# Patient Record
Sex: FEMALE | Race: WHITE | NOT HISPANIC OR LATINO | Employment: OTHER | URBAN - METROPOLITAN AREA
[De-identification: names, ages, dates, MRNs, and addresses within clinical notes are randomized per-mention and may not be internally consistent; named-entity substitution may affect disease eponyms.]

---

## 2017-01-12 ENCOUNTER — RADIANT APPOINTMENT (OUTPATIENT)
Dept: GENERAL RADIOLOGY | Facility: CLINIC | Age: 77
End: 2017-01-12
Payer: COMMERCIAL

## 2017-01-12 ENCOUNTER — OFFICE VISIT (OUTPATIENT)
Dept: OPHTHALMOLOGY | Facility: CLINIC | Age: 77
End: 2017-01-12
Payer: COMMERCIAL

## 2017-01-12 ENCOUNTER — OFFICE VISIT (OUTPATIENT)
Dept: FAMILY MEDICINE | Facility: CLINIC | Age: 77
End: 2017-01-12
Payer: COMMERCIAL

## 2017-01-12 VITALS
WEIGHT: 204 LBS | SYSTOLIC BLOOD PRESSURE: 127 MMHG | HEIGHT: 65 IN | BODY MASS INDEX: 33.99 KG/M2 | TEMPERATURE: 97 F | DIASTOLIC BLOOD PRESSURE: 74 MMHG | OXYGEN SATURATION: 99 % | HEART RATE: 82 BPM

## 2017-01-12 DIAGNOSIS — H43.813 POSTERIOR VITREOUS DETACHMENT, BILATERAL: ICD-10-CM

## 2017-01-12 DIAGNOSIS — M17.12 PRIMARY OSTEOARTHRITIS OF LEFT KNEE: ICD-10-CM

## 2017-01-12 DIAGNOSIS — H26.8 PXF (PSEUDOEXFOLIATION OF LENS CAPSULE): ICD-10-CM

## 2017-01-12 DIAGNOSIS — E66.811 OBESITY, CLASS I, BMI 30-34.9: ICD-10-CM

## 2017-01-12 DIAGNOSIS — L71.9 ROSACEA: ICD-10-CM

## 2017-01-12 DIAGNOSIS — M17.12 PRIMARY OSTEOARTHRITIS OF LEFT KNEE: Primary | ICD-10-CM

## 2017-01-12 DIAGNOSIS — I10 ESSENTIAL HYPERTENSION WITH GOAL BLOOD PRESSURE LESS THAN 140/90: ICD-10-CM

## 2017-01-12 DIAGNOSIS — Z96.1 PSEUDOPHAKIA: ICD-10-CM

## 2017-01-12 DIAGNOSIS — H52.4 PRESBYOPIA: ICD-10-CM

## 2017-01-12 DIAGNOSIS — Z01.01 ENCOUNTER FOR EXAMINATION OF EYES AND VISION WITH ABNORMAL FINDINGS: Primary | ICD-10-CM

## 2017-01-12 DIAGNOSIS — H53.002 AMBLYOPIA, LEFT: ICD-10-CM

## 2017-01-12 PROCEDURE — 92014 COMPRE OPH EXAM EST PT 1/>: CPT | Performed by: OPHTHALMOLOGY

## 2017-01-12 PROCEDURE — 92015 DETERMINE REFRACTIVE STATE: CPT | Performed by: OPHTHALMOLOGY

## 2017-01-12 PROCEDURE — 99214 OFFICE O/P EST MOD 30 MIN: CPT | Performed by: PREVENTIVE MEDICINE

## 2017-01-12 PROCEDURE — 73560 X-RAY EXAM OF KNEE 1 OR 2: CPT | Mod: LT

## 2017-01-12 RX ORDER — PREDNISONE 20 MG/1
20 TABLET ORAL 2 TIMES DAILY
Qty: 10 TABLET | Refills: 0 | Status: SHIPPED | OUTPATIENT
Start: 2017-01-12 | End: 2017-02-18

## 2017-01-12 ASSESSMENT — REFRACTION_WEARINGRX
OS_SPHERE: -1.50
OS_ADD: +2.50
OD_AXIS: 010
OD_CYLINDER: +0.75
OS_AXIS: 131
SPECS_TYPE: PAL
OD_ADD: +3.00
OD_SPHERE: +0.50
OS_CYLINDER: +0.50

## 2017-01-12 ASSESSMENT — REFRACTION_MANIFEST
OD_AXIS: 015
OD_SPHERE: +0.75
OS_SPHERE: -1.50
OS_CYLINDER: +1.25
OS_ADD: +2.75
OD_ADD: +2.75
OS_AXIS: 165
OD_CYLINDER: +1.25

## 2017-01-12 ASSESSMENT — SLIT LAMP EXAM - LIDS
COMMENTS: GOOD COSMESIS
COMMENTS: GOOD COSMESIS

## 2017-01-12 ASSESSMENT — VISUAL ACUITY
OS_CC: J4
OD_CC: J2
CORRECTION_TYPE: GLASSES
OD_CC: 20/20
OS_CC+: +3
METHOD: SNELLEN - LINEAR
OS_CC: 20/50

## 2017-01-12 ASSESSMENT — CONF VISUAL FIELD
OD_NORMAL: 1
OS_NORMAL: 1

## 2017-01-12 ASSESSMENT — EXTERNAL EXAM - LEFT EYE: OS_EXAM: 1+ BROW PTOSIS

## 2017-01-12 ASSESSMENT — TONOMETRY
OS_IOP_MMHG: 22
OD_IOP_MMHG: 19
IOP_METHOD: APPLANATION

## 2017-01-12 ASSESSMENT — PAIN SCALES - GENERAL: PAINLEVEL: WORST PAIN (10)

## 2017-01-12 ASSESSMENT — CUP TO DISC RATIO
OS_RATIO: 0.3
OD_RATIO: 0.4

## 2017-01-12 ASSESSMENT — EXTERNAL EXAM - RIGHT EYE: OD_EXAM: 1+ BROW PTOSIS

## 2017-01-12 NOTE — PATIENT INSTRUCTIONS
Possible clouding of posterior capsule discussed. Right eye   Continue observation regarding glaucoma suspect status   Glasses Rx given -optional   Use artificial tears up to 4 times daily (Refresh Tears or Systane Ultra/Balance)   Call in September 2017 for an appointment in January 2018 for Complete Exam    Dr. Gonzales [phone]

## 2017-01-12 NOTE — MR AVS SNAPSHOT
After Visit Summary   1/12/2017    Yumiko Flowers    MRN: [id number]           Patient Information     Date Of Birth          1940        Visit Information        Provider Department      1/12/2017 8:30 AM Ashu Gonzalse MD [facility]        Today's Diagnoses     Encounter for examination of eyes and vision with abnormal findings    -  1     Presbyopia         Hypermetropia, right         Astigmatism, bilateral         Pseudophakia, ou; Yag Caps, os         Amblyopia, strabismic, left         Hx of pseudoexfoliation, os         Posterior vitreous detachment, bilateral         Rosacea           Care Instructions    Possible clouding of posterior capsule discussed. Right eye   Continue observation regarding glaucoma suspect status   Glasses Rx given -optional   Use artificial tears up to 4 times daily (Refresh Tears or Systane Ultra/Balance)   Call in September 2017 for an appointment in January 2018 for Complete Exam    Dr. Gonzales [phone]        Follow-ups after your visit        Your next 10 appointments already scheduled     Jan 12, 2017  3:40 PM   Office Visit with Gavi Bear MD   [facility] ([facility])    [address] [place] [id number] [phone]           Bring a current list of meds and any records pertaining to this visit.  For Physicals, please bring immunization records and any forms needing to be filled out.  Please arrive 10 minutes early to complete paperwork.              Who to contact     If you have questions or need follow up information about today's clinic visit or your schedule please contact [facility] directly at [phone].  Normal or non-critical lab and imaging results will be communicated to you by MyChart, letter or phone within 4 business days after the clinic has received the results. If you do not hear from us within 7 days, please contact the clinic through  [facility] or phone. If you have a critical or abnormal lab result, we will notify you by phone as soon as possible.  Submit refill requests through [employer] or call your pharmacy and they will forward the refill request to us. Please allow 3 business days for your refill to be completed.          Additional Information About Your Visit        [facility] Information     [employer] gives you secure access to your electronic health record. If you see a primary care provider, you can also send messages to your care team and make appointments. If you have questions, please call your primary care clinic.  If you do not have a primary care provider, please call [phone] and they will assist you.        Care EveryWhere ID     This is your Care EveryWhere ID. This could be used by other organizations to access your [place] medical records  [id number]         Blood Pressure from Last 3 Encounters:   11/01/16 138/80   07/21/16 129/72   12/24/15 113/74    Weight from Last 3 Encounters:   11/01/16 92.534 kg (204 lb)   07/21/16 91.627 kg (202 lb)   12/24/15 96.616 kg (213 lb)              We Performed the Following     EYE EXAM (SIMPLE-NONBILLABLE)     REFRACTIVE STATUS        Primary Care Provider Office Phone # Fax #    Gavi Bear [phone]       [facility] [address] [address] [facility] [address]        Thank you!     [facility]  [facility]. Our goal is always to provide you with excellent care. Hearing back from our patients is one way we can continue to improve our services. Please take a few minutes to complete the written survey that you may receive in the mail after your visit with us. Thank you!             Your Updated Medication List - Protect others around you: Learn how to safely use, store and throw away your medicines at www.disposemymeds.org.          This list is accurate as of: 1/12/17  9:41 AM.  Always use your most recent med list.                    Brand Name Dispense Instructions for use    albuterol 108 (90 BASE) MCG/ACT Inhaler    PROAIR HFA/PROVENTIL HFA/VENTOLIN HFA    1 Inhaler    Inhale into the lungs. INHALE 1-2 PUFFS EVERY 4-6 HOURS AS NEEDED       BENADRYL PO      Take 50 mg by mouth nightly as needed       calcium 500 MG Chew      None Entered       clonazePAM 0.5 MG tablet    klonoPIN    20 tablet    Take 0.5-1 tablets (0.25-0.5 mg) by mouth 2 times daily as needed for anxiety       diclofenac 1 % Gel topical gel    VOLTAREN    100 g    Apply 2 grams to affected area up to four times daily using enclosed dosing card.       etodolac 400 MG tablet    LODINE    60 tablet    Take 1 tablet (400 mg) by mouth 2 times daily as needed       gabapentin 600 MG tablet    NEURONTIN    180 tablet    Take 1 tablet (600 mg) by mouth 2 times daily       lisinopril-hydrochlorothiazide 20-12.5 MG per tablet    PRINZIDE/ZESTORETIC    180 tablet    Take 2 tablets by mouth daily Patient due for office visit for further refills.       metroNIDAZOLE 0.75 % topical gel    METROGEL    45 g    Apply topically daily       Multiple Vitamins-Iron Tabs      Take 1 tablet by mouth daily.       vitamin D 2000 UNITS Caps      Take 2,000 Units by mouth daily.

## 2017-01-12 NOTE — PROGRESS NOTES
SUBJECTIVE:                                                    Yumiko Flowers is a 76 year old female who presents to clinic today for the following health issues:    I have reviewed and agree with the documentation by the MA. I updated the history as indicated.  Gavi Bear MD MPH    Musculoskeletal problem/pain      Duration: x [place]    Description  Location: left knee    Intensity:  10/10    Accompanying signs and symptoms: warmth and swelling    History  Previous similar problem: no   Previous evaluation:  none    Precipitating or alleviating factors:  Trauma or overuse: no   Aggravating factors include: standing, walking and climbing stairs    Therapies tried and outcome: ice, support wrap and acetaminophen and not getting better     Pain is in the anterior and posterior aspect of the knee. Pain suddenly increases with twisting movement. Less at night, no injuries, no recent overuse. Also noticing pedal edema. No other joints with severe pain.  Is leaving for [place] on 2/5/17 for 12 days and would like to feel better before then.   Has had cortisone injections in the past.  No fever or chills.   Has been using a knee sleeve.    Problem list and histories reviewed & adjusted, as indicated.  Additional history: as documented    Patient Active Problem List   Diagnosis     Asthma, moderate persistent     Acne rosacea     CARDIOVASCULAR SCREENING; LDL GOAL LESS THAN 130     Osteopenia     Vitamin D deficiency     Advanced directives, counseling/discussion     Hx of pseudoexfoliation, os     Posterior vitreous detachment, ou     Amblyopia, mild, os      Pseudophakia, ou; Yag Caps, os     HTN, goal below 140/90     Peptic ulcer     Health Care Home     History of blepharoplasty, upper lid, ou (elsewhere); revision (EN)     Obesity, Class II, BMI 35-39.9 (H)     Restless legs syndrome     DJD (degenerative joint disease), lumbosacral     Rosacea     Essential hypertension with goal blood pressure less than  "140/90     Obesity, Class I, BMI 30-34.9     Past Surgical History   Procedure Laterality Date     C removal gallbladder       C appendectomy       Surgical history of -        endometriosis surgeriesx3     Surgical history of -        ganiglion cyst onfoot     Surgical history of -        Eye for \"droopy eye\"     Blepharoplasty bilateral  3/9/2006; 2013     both eyes, upper lid; revision (EN)     Phacoemulsification with standard intraocular lens implant  1/2012; 4/2013     right eye; left eye     Cataract iol, rt/lt       Laser yag capsulotomy       left eye (AC lysis also)       Social History   Substance Use Topics     Smoking status: Never Smoker      Smokeless tobacco: Never Used     Alcohol Use: Yes     Family History   Problem Relation Age of Onset     C.A.D. Father      C.A.D. Brother      CANCER No family hx of      DIABETES No family hx of      CEREBROVASCULAR DISEASE No family hx of      Thyroid Disease No family hx of      Glaucoma No family hx of      Macular Degeneration No family hx of      Hypertension Mother          Current Outpatient Prescriptions   Medication Sig Dispense Refill     predniSONE (DELTASONE) 20 MG tablet Take 1 tablet (20 mg) by mouth 2 times daily 10 tablet 0     lisinopril-hydrochlorothiazide (PRINZIDE,ZESTORETIC) 20-12.5 MG per tablet Take 2 tablets by mouth daily Patient due for office visit for further refills. 180 tablet 1     gabapentin (NEURONTIN) 600 MG tablet Take 1 tablet (600 mg) by mouth 2 times daily 180 tablet 3     etodolac (LODINE) 400 MG tablet Take 1 tablet (400 mg) by mouth 2 times daily as needed 60 tablet 0     albuterol (PROAIR HFA, PROVENTIL HFA, VENTOLIN HFA) 108 (90 BASE) MCG/ACT inhaler Inhale into the lungs. INHALE 1-2 PUFFS EVERY 4-6 HOURS AS NEEDED 1 Inhaler 4     Multiple Vitamins-Iron TABS Take 1 tablet by mouth daily.       Cholecalciferol (VITAMIN D) 2000 UNIT CAPS Take 2,000 Units by mouth daily.       CALCIUM 500 MG PO CHEW None Entered   " "    Allergies   Allergen Reactions     Erythromycin Swelling and Other (See Comments)     BP Readings from Last 3 Encounters:   01/12/17 127/74   11/01/16 138/80   07/21/16 129/72    Wt Readings from Last 3 Encounters:   01/12/17 204 lb (92.534 kg)   11/01/16 204 lb (92.534 kg)   07/21/16 202 lb (91.627 kg)           ROS:  Constitutional, HEENT, cardiovascular, pulmonary, gi and gu systems are negative, except as otherwise noted.    OBJECTIVE:                                                    /74 mmHg  Pulse 82  Temp(Src) 97  F (36.1  C) (Oral)  Ht 5' 4.5\" (1.638 m)  Wt 204 lb (92.534 kg)  BMI 34.49 kg/m2  SpO2 99%  Breastfeeding? No  Body mass index is 34.49 kg/(m^2).  GENERAL APPEARANCE: healthy, alert and no distress  EYES: Eyes grossly normal to inspection and conjunctivae and sclerae normal  MS: peripheral pulses normal and trace pedal edema left ankle   SKIN: no suspicious lesions or rashes  NEURO: Normal strength and tone, mentation intact and speech normal  PSYCH: mentation appears normal  Left knee: Inspection does not show any gross deformities.  Mild edema+, no erythema, no skin changes, no rash.  No tibial tuberosity tenderness, Tenderness along medial, and lateral joint lines+.  No pes anserine bursitis.  Popliteal tenderness+.  Full range of motion.  No quadriceps atrophy.  Strength is 5/5.  Neurovascular intact.  Anterior and posterior drawer signs are negative.  Varus and valgus stress negative.  Yolanda test equivocal. Pain with hyperflexion.      Diagnostic test results:  Diagnostic Test Results:  No results found for this or any previous visit (from the past 24 hour(s)).     Left Knee X rays:  My independent review of the images does not show any acute fractures or dislocations, medial joint space narrowing with osteophyte formation seen. Final radiology reading is pending.  Gavi Bear MD MPH       ASSESSMENT/PLAN:                                                    1. Primary " osteoarthritis of left knee  -Alternating heat and ice  -Knee sleeve  -Keep leg elevated  -No acute changes on X rays  -If not better in 1 week then plan for Orthopedics referral and MRI. Has had cortisone injections in left knee in the past.   - XR Knee Left 1/2 Views; Future  - predniSONE (DELTASONE) 20 MG tablet; Take 1 tablet (20 mg) by mouth 2 times daily  Dispense: 10 tablet; Refill: 0    2. Essential hypertension with goal blood pressure less than 140/90  -Stable  -Readings at goal  -continue current medication     3. Obesity, Class I, BMI 30-34.9  -Has lost weight over the last year  -Encouraged continued weight loss.     I ended our visit today by discussing the patient's diagnoses and recommended treatment. Please refer to today's diagnoses and orders for further details. I briefly discussed the pathophysiology of these conditions and outlined their expected course. I discussed the warning symptoms and signs that indicate an atypical course that would need urgent or emergent care. I also discussed self care strategies for symptom relief.  Patient voiced complete understanding of plan of care and was in full agreement to proceed. Common side effects of medications prescribed at this visit were discussed with the patient. Severe side effects, including current applicable black box warnings, were discussed.     Follow up with Provider - If not improving in a week then refer to Orthopedics.  Is leaving for [place] 2/5/17 and would like to feel better before then      Gavi Bear MD MPH    [facility]

## 2017-01-12 NOTE — PROGRESS NOTES
Current Eye Medications:  At's prn     Subjective:  COMPLETE EYE EXAM  No visual complaints.  Both eyes are dry.     Objective:  See Ophthalmology Exam.       Assessment: Stable eye exam.      ICD-10-CM    1. Encounter for examination of eyes and vision with abnormal findings Z01.01 EYE EXAM (SIMPLE-NONBILLABLE)   2. Presbyopia H52.4 EYE EXAM (SIMPLE-NONBILLABLE)   3. Pseudophakia, ou; Yag Caps, os Z96.1 REFRACTIVE STATUS   4. 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 Amblyopia, mild, left H53.002    6. Rosacea L71.9    7. Posterior vitreous detachment, bilateral H43.813         Plan: Possible clouding of posterior capsule discussed. Right eye   Continue observation regarding glaucoma suspect status   Glasses Rx given -optional   Use artificial tears up to 4 times daily (Refresh Tears or Systane Ultra/Balance)   Call in September 2017 for an appointment in January 2018 for Complete Exam    Dr. Gonzales [phone]

## 2017-01-12 NOTE — NURSING NOTE
"Chief Complaint   Patient presents with     Knee left       Initial /74 mmHg  Pulse 82  Temp(Src) 97  F (36.1  C) (Oral)  Ht 5' 4.5\" (1.638 m)  Wt 204 lb (92.534 kg)  BMI 34.49 kg/m2  SpO2 99%  Breastfeeding? No Estimated body mass index is 34.49 kg/(m^2) as calculated from the following:    Height as of this encounter: 5' 4.5\" (1.638 m).    Weight as of this encounter: 204 lb (92.534 kg).  BP completed using cuff size: ceferino Livingston MA        "

## 2017-01-14 PROBLEM — H43.813 POSTERIOR VITREOUS DETACHMENT, BILATERAL: Status: ACTIVE | Noted: 2017-01-14

## 2017-01-16 NOTE — PROGRESS NOTES
Quick Note:    Yumiko,     X rays of the knee showed arthritis. There is also a possible loose piece of bone in the joint. If the knee pain is not improving, it would be best to see Orthopedics for further evaluation.     Please do not hesitate to call us at [phone] if you have any questions or concerns.    Thank you,    Gavi Bear MD MPH  ______

## 2017-01-25 ENCOUNTER — OFFICE VISIT (OUTPATIENT)
Dept: ORTHOPEDICS | Facility: CLINIC | Age: 77
End: 2017-01-25
Payer: COMMERCIAL

## 2017-01-25 VITALS — HEIGHT: 65 IN | WEIGHT: 204 LBS | BODY MASS INDEX: 33.99 KG/M2 | RESPIRATION RATE: 16 BRPM

## 2017-01-25 DIAGNOSIS — M70.50 PES ANSERINE BURSITIS: ICD-10-CM

## 2017-01-25 DIAGNOSIS — M17.12 PRIMARY OSTEOARTHRITIS OF LEFT KNEE: Primary | ICD-10-CM

## 2017-01-25 PROCEDURE — 20610 DRAIN/INJ JOINT/BURSA W/O US: CPT | Mod: LT | Performed by: PHYSICIAN ASSISTANT

## 2017-01-25 PROCEDURE — 99212 OFFICE O/P EST SF 10 MIN: CPT | Mod: 25 | Performed by: PHYSICIAN ASSISTANT

## 2017-01-25 RX ORDER — METHYLPREDNISOLONE ACETATE 80 MG/ML
80 INJECTION, SUSPENSION INTRA-ARTICULAR; INTRALESIONAL; INTRAMUSCULAR; SOFT TISSUE ONCE
Qty: 2 ML | Refills: 0 | OUTPATIENT
Start: 2017-01-25 | End: 2017-01-25

## 2017-01-25 ASSESSMENT — PAIN SCALES - GENERAL: PAINLEVEL: SEVERE PAIN (6)

## 2017-01-25 NOTE — NURSING NOTE
"Chief Complaint   Patient presents with     RECHECK     Left knee pain. Last pes and knee injection: 2/13/13. Injection helped, lasting until about now. She is having knee pain again, posterior, medial, anterior. Pain with sitting or standing. She can walk pretty good. She would like another injection today. She is going to [place] in a week.        Initial Resp 16  Ht 1.638 m (5' 4.5\")  Wt 92.534 kg (204 lb)  BMI 34.49 kg/m2 Estimated body mass index is 34.49 kg/(m^2) as calculated from the following:    Height as of this encounter: 1.638 m (5' 4.5\").    Weight as of this encounter: 92.534 kg (204 lb).  BP completed using cuff size: NA (Not Taken)  Anju Rogers Certified Medical Assistant    "

## 2017-01-25 NOTE — PROGRESS NOTES
CHIEF COMPLAINT:   Chief Complaint   Patient presents with     RECHECK     Left knee pain. Last pes and knee injection: 2/13/13. Injection helped, lasting until about now. She is having knee pain again, posterior, medial, anterior. Pain with sitting or standing. She can walk pretty good. She would like another injection today. She is going to [place] in a week.        HISTORY OF PRESENT ILLNESS    Yumiko Flowers is a 76 year old female seen for evaluation of ongoing left knee pain with no known injury.   Pain has been present since ~7/5/2012. Pain is posterior, medial, and anterior. Her pain is rated a 6/10 today. Patient has little problem walking at this time. Her last injections were on 2/13/2013 into the pes anserine bursa and intra-articularly into the left knee. These injections have helped greatly, until recently. Patient is going to [place] in a week. She would like repeat injections into the left knee today.    Recall: Also cramping and pain in calf, swelling of left leg and foot. Prior deep vein thrombosis scan negative. Received cortisone injection 8/8/2012 with a couple days relief only, then again 10/3/2012 with great relief.     Her  passed away in May, 2016. She is going to [place] in a week for a much needed vacation with friends.     Present symptoms: pain medially , pain sharp, dull/achy , moderate pain, mild swelling.    Pain severity: 6/10  Frequency of symptoms: are constant, worse with weight bearing  Exacerbating Factors: weight bearing, stairs, [id number], prolonged sitting, prolonged standing, night  Relieving Factors: vicodin (in the past)  Night Pain: Yes  Pain while at rest: Yes   Numbness or tingling: No   Patient has tried:     NSAIDS: yes, Lodine currently     Physical Therapy: No      Activity modification: Yes      Bracing: yes     Injections: yes pes and intra-articular with good relief.     Ice: yes     Other: ace wrap    Orthopaedic PMH: none.    Other PMH:  has a past  medical history of Acne rosacea; Asthma, moderate persistent; HTN (hypertension); Moderate major depression (H); Endometriosis; Osteopenia; Elevated cholesterol; Actinic keratosis; Basal cell carcinoma (10/2010); Amblyopia; Cataract, mod, od; mild-mod, os (1/12/2012); and DJD (degenerative joint disease), lumbosacral (8/6/2014). She also has no past medical history of Malignant melanoma nos, Squamous cell carcinoma (H), Skin cancer, Acne keloidalis, Allergies, Type II or unspecified type diabetes mellitus without mention of complication, not stated as uncontrolled, Psoriasis, Eczema, Pacemaker, Urticaria, Heart valve disorder, Photosensitive contact dermatitis, Diabetes mellitus (H), Diabetic retinopathy (H), Glaucoma (increased eye pressure), Senile macular degeneration, Retinal detachment, Strabismus, Uveitis, Diabetes (H), Glaucoma, or Macular degeneration.    Surgical Hx:  has past surgical history that includes REMOVAL GALLBLADDER; APPENDECTOMY; surgical history of - ; surgical history of - ; surgical history of - ; Blepharoplasty bilateral (3/9/2006; 2013); Phacoemulsification with standard intraocular lens implant (1/2012; 4/2013); cataract iol, rt/lt; and Laser YAG capsulotomy ().    Medications:   Current outpatient prescriptions:      predniSONE (DELTASONE) 20 MG tablet, Take 1 tablet (20 mg) by mouth 2 times daily, Disp: 10 tablet, Rfl: 0     lisinopril-hydrochlorothiazide (PRINZIDE,ZESTORETIC) 20-12.5 MG per tablet, Take 2 tablets by mouth daily Patient due for office visit for further refills., Disp: 180 tablet, Rfl: 1     gabapentin (NEURONTIN) 600 MG tablet, Take 1 tablet (600 mg) by mouth 2 times daily, Disp: 180 tablet, Rfl: 3     etodolac (LODINE) 400 MG tablet, Take 1 tablet (400 mg) by mouth 2 times daily as needed, Disp: 60 tablet, Rfl: 0     albuterol (PROAIR HFA, PROVENTIL HFA, VENTOLIN HFA) 108 (90 BASE) MCG/ACT inhaler, Inhale into the lungs. INHALE 1-2 PUFFS EVERY 4-6 HOURS AS  "NEEDED, Disp: 1 Inhaler, Rfl: 4     Multiple Vitamins-Iron TABS, Take 1 tablet by mouth daily., Disp: , Rfl:      Cholecalciferol (VITAMIN D) 2000 UNIT CAPS, Take 2,000 Units by mouth daily., Disp: , Rfl:      CALCIUM 500 MG PO CHEW, None Entered, Disp: , Rfl:     Allergies:   Allergies   Allergen Reactions     Erythromycin Swelling and Other (See Comments)     REVIEW OF SYSTEMS:   CONSTITUTIONAL:NEGATIVE for fever, chills, change in weight  INTEGUMENTARY/SKIN: NEGATIVE for worrisome rashes, moles or lesions  MUSCULOSKELETAL:See HPI above  NEURO: NEGATIVE for weakness, dizziness or paresthesias    PHYSICAL EXAM:  Resp 16  Ht 1.638 m (5' 4.5\")  Wt 92.534 kg (204 lb)  BMI 34.49 kg/m2   GENERAL APPEARANCE: healthy, alert, no distress.  SKIN: no suspicious lesions or rashes  NEURO: Normal strength and tone, mentation intact and speech normal  PSYCH:  mentation appears normal and affect normal/bright, not anxious  RESPIRATORY: No increased work of breathing.  HANDS: no clubbing, nail pitting or nodes.    BILATERAL LOWER EXTREMITIES:  Gait: antalgic favoring left   Alignment: varus  No gross deformities or masses.  No Quad atrophy, strength normal.  Intact sensation deep peroneal nerve, superficial peroneal nerve, med/lat tibial nerve, sural nerve, saphenous nerve  Intact EHL, EDL, TA, FHL, GS, quadriceps hamstrings and hip flexors  Palpable 2+ posterior tibial pulses.  Bilateral calf soft and nttp or squeeze.  Edema: 1+, Pitting left lower extremity, trace right lower extremity.    LEFT KNEE EXAM:    Skin: intact, no ecchymosis or erythema  AROM: 0 extension to 110 flexion  Tight hamstrings on straight leg raise.  Effusion: mild  Tender: tender to palpation medial joint line, medial distal hamstrings and pes insertion (most severe at pes) nontender to palpation lateral joint line, posterior knee    MCL: stable, and non-painful at both 0 and 30 degrees knee flexion  Varus stress: stable, and non-painful at both 0 and " 30 degrees knee flexion  Posterior Drawer stable  Patellofemoral joint:                Apprehension: negative              Crepitations: mild   Grind: positive     RIGHT KNEE EXAM:    Skin: intact, no ecchymosis or erythema  ROM: 0 extension to 120 flexion  Tight hamstrings on straight leg raise.  Effusion: none  Tender: NTTP med/lat joint line, anterior or posterior knee; tender to palpation medial hamstrings and pes insertion.    MCL: stable, and non-painful at both 0 and 30 degrees knee flexion  Varus stress: stable, and non-painful at both 0 and 30 degrees knee flexion  Posterior Drawer stable  Patellofemoral joint:                Apprehension: negative              Crepitations: minimal    X-RAY:  3 views left knee from 1/12/2017 were reviewed in clinic today. On my review, no obvious fractures or dislocations. Severe medial and moderate patello-femoral degenerative changes.    MRI:  MRI left knee from 7/31/2012 was reviewed in clinic today.   IMPRESSION:  1. Free edge fraying of the body and posterior horn of the left knee  medial meniscus with large areas of full-thickness loss of articular  cartilage in the medial compartment. Small foci of subjacent  subchondral edema within the medial femoral condyle.  2. Small focal area of high-grade full thickness cartilage loss  measuring 8 mm along the anterior aspect of the left lateral femoral  trochlea with associated focus of subchondral edema within the lateral  femoral condyle  3. Irregularity with small foci of full thickness cartilage loss along  the median ridge of the patella with associated subchondral edema in  the superior pole of the patella.  4. Thickening of the proximal MCL suggesting old injury.  5. Small-moderate left knee joint effusion.      Impression:   76 year old female with left medial knee pain, arthritis and pes bursitis. Left lower extremity edema.     Plan:     * reviewed imaging studies with patient  Treatment:  * rest  * Activity  modification - avoid impact activities or activities that aggravate symptoms  * ice, 15-20 minutes at a time several times a day or as needed.  * Strengthening of quadriceps muscles  * Physical Therapy ordered for strengthening, stretching and range of motion exercises  * Tylenol as needed for pain  * Weight loss: Weight loss: The patient's Body mass index is 34.49 kg/(m^2).. Discussed with patient weight loss benefits, not only for the current pain symptoms, but also overall health. Recommend a good diet plan that works for the patient, with the assistance of a dietician or PCP as needed. Also, a good, low-impact exercise program for at least 20 minutes per day, 3 times per week, such as exercise bike, elliptical , or pool.  * Exercise: low impact such as stationary bike, elliptical, pool.  * Injections: risks and perceived benefits of cortisone versus viscosupplementation injections discussed. Patient elected to proceed. See procedure note below for left knee intra-articular and pes bursal injections.  * Bracing: bracing the knee may offer some relief of symptoms when worn  * Return to clinic as needed.    William Guerrero PA-C, LEE (Ortho)  Supervising Physician: Alexandre Calle M.D., M.S.  Dept. of Orthopaedic Surgery  [facility]      PROCEDURE NOTE:  The risks, benefits and potential complications (including but not limited to, bleeding, infection, pain, scar, damage to adjacent structures, atrophy or necrosis of soft tissue, skin blanching, failure to relieve symptoms) of injection were discussed with the patient. Questions were addressed and answered.The patient elected to proceed. Written informed consent was obtained. The correct procedural site was identified and confirmed. A Left Knee intraarticular injection was performed using 1mL Depo Medrol 80mg per mL and 7mL (4mL 1% lidocaine, 3mL 0.25% marcaine)  of local anesthetic after sterile prep, to the correct procedural site. Sterile  bandaid applied. This was tolerated well by the patient. No apparent complications. Patient reported immediate relief of symptoms on way out of clinic today.      The patient's left knee was prepped with betadine solution after verification of allergies. Area approximately 10 cm x 10 cm prepped in a sterile fashion. After injection, betadine removed with soap and water and band-aids applied.    4cc Lidocaine 1%  NDC 5193-4541-27, LOT -[id number],  2018  3cc Bupivacaine 0.25% NDC 55150-167-10, LOT [id number],  2018  1cc Depo Medrol 80 mg/ml NDC 1492-3010-13, LOT [id number],  2019 injected into patient's left Knee by:   William Guerrero PA-C, LEE (Ortho)  Supervising Physician: Alexandre Calle M.D., M.S.  Dept. of Orthopaedic Surgery  [facility]      The risks, benefits and potential complications (including but not limited to, bleeding, infection, pain, scar, damage to adjacent structures, atrophy or necrosis of soft tissue, skin blanching, failure to relieve symptoms) of injection were discussed with the patient. Questions were addressed and answered.The patient elected to proceed. Written informed consent was obtained. The correct procedural site was identified and confirmed. A Left Knee pes anserine bursa injection was performed using 1mL Depo Medrol 80mg per mL and 3mL  of local anesthetic after sterile prep, to the correct procedural site. Sterile bandaid applied. This was tolerated well by the patient. No apparent complications. Patient reported immediate relief of symptoms on way out of clinic today.       The patient's left knee pes anserine bursa was prepped with betadine solution after verification of allergies. Area approximately 10 cm x 10 cm prepped in a sterile fashion. After injection, betadine removed with soap and water and band-aids applied.    3cc Lidocaine 1%  NDC 9593-4718-88, LOT -[id number],  2018  1cc Depo Medrol 80 mg/ml NDC 4748-9245-79, LOT [id number],   2019 injected into patient's left Knee pes anserine bursa by:  William Guerrero PA-C, CAYULIYA (Ortho)  Supervising Physician: Alexandre Calle M.D., M.S.  Dept. of Orthopaedic Surgery  [facility]

## 2017-01-25 NOTE — PATIENT INSTRUCTIONS
Please remember to call and schedule a follow up appointment if one was recommended at your earliest convenience.  Orthopedics CLINIC HOURS TELEPHONE NUMBER   Dr. Alva Rene  Certified Medical Assistant   Monday & Wednesday   8am - 5pm  Thursday 1pm - 5pm  Friday 8am -11:30am Specialty schedulers:   [phone] to schedule your surgery.  Main Clinic:   [phone] to make an appointment with any provider.    Urgent Care locations:    [facility] Monday-Friday Closed  Saturday-Sunday 9am-5pm      Monday-Friday 12pm - 8pm  Saturday-Sunday 9am-5pm [phone]     If SURGERY has been recommended, please call our Specialty Schedulers at [phone] to schedule your procedure.    If you need a medication refill, please contact your pharmacy. Please allow 3 business days for your refill to be completed.    If an MRI or CT scan has been recommended, please call [facility] Schedulers at [phone] to schedule your appointment.  Use Cyan (secure e-mail communication and access to your chart) to send a message or to make an appointment. Please ask how you can sign up for Cyan.  Your care team's suggested websites for health information:   [email or web address] : Up to date and easily searchable information on multiple topics.   [email or web address] : [place] dept of heat, public health issues in [place], N1N1

## 2017-02-10 ENCOUNTER — TELEPHONE (OUTPATIENT)
Dept: FAMILY MEDICINE | Facility: CLINIC | Age: 77
End: 2017-02-10

## 2017-02-10 DIAGNOSIS — J45.30 MILD PERSISTENT ASTHMA: Primary | ICD-10-CM

## 2017-02-10 RX ORDER — ALBUTEROL SULFATE 90 UG/1
AEROSOL, METERED RESPIRATORY (INHALATION)
Qty: 1 INHALER | Refills: 0 | Status: SHIPPED | OUTPATIENT
Start: 2017-02-10 | End: 2018-07-31

## 2017-02-10 NOTE — TELEPHONE ENCOUNTER
Returned call to patient. Pt states [place] and now her asthma is acting up.  She would like albuterol and possibly prednisone refilled.    Patient speaking in full sentences. Patient denies current symptoms.    Advised patient that a refill of albuterol can be sent but would recommend that she be seen to determine need for prednisone.  Recommend a walk in clinic or urgent care in her current location.    Patient verbalized understanding and states if the inhaler does not help or symptoms persist, she will be seen.    Albuterol      Last Written Prescription Date: 12/17/15  Last Fill Quantity: 1, # refills: 4    [place] [facility] or [facility] prescribing provider:  1/12/17   Future Office Visit:       Date of Last Asthma Action Plan Letter:   Asthma Action Plan Q1 Year    Topic Date Due     Asthma Action Plan - yearly  07/21/2017      Asthma Control Test:   ACT Total Scores 11/1/2016   ACT TOTAL SCORE -   ASTHMA ER VISITS -   ASTHMA HOSPITALIZATIONS -   ACT TOTAL SCORE (Goal Greater than or Equal to 20) 25   In the past 12 months, how many times did you visit the emergency room for your asthma without being admitted to the hospital? 0   In the past 12 months, how many times were you hospitalized overnight because of your asthma? 0       Date of Last Spirometry Test:   No results found for this or any previous visit.    Prescription(s) approved per [facility] Refill Protocol.    Marshall Herman RN

## 2017-02-10 NOTE — TELEPHONE ENCOUNTER
Reason for Call:  Medication or medication refill:    Do you use a [facility]?  Name of the pharmacy and phone number for the current request: [facility] [id number] [address]    Name of the medication requested: INHALER & ETC    Other request: PT IS IN [place] AND ASTHMA IS OUT OF CONTROL, ASKING FOR MEDICATION    Can we leave a detailed message on this number? YES       Phone number patient can be reached at: Other phone number:  Yumiko Flowers (Self) [phone]      Best Time: AS SOON AS POSSIBLE    Call taken on 2/10/2017 at 4:41 PM by Madisyn Reynolds

## 2017-02-18 ENCOUNTER — OFFICE VISIT (OUTPATIENT)
Dept: URGENT CARE | Facility: URGENT CARE | Age: 77
End: 2017-02-18
Payer: COMMERCIAL

## 2017-02-18 VITALS
DIASTOLIC BLOOD PRESSURE: 80 MMHG | OXYGEN SATURATION: 99 % | TEMPERATURE: 98.4 F | SYSTOLIC BLOOD PRESSURE: 144 MMHG | BODY MASS INDEX: 33.46 KG/M2 | WEIGHT: 198 LBS | HEART RATE: 68 BPM

## 2017-02-18 DIAGNOSIS — J45.901 ASTHMA EXACERBATION: Primary | ICD-10-CM

## 2017-02-18 PROCEDURE — 99213 OFFICE O/P EST LOW 20 MIN: CPT | Performed by: FAMILY MEDICINE

## 2017-02-18 RX ORDER — PREDNISONE 20 MG/1
20 TABLET ORAL 2 TIMES DAILY
Qty: 10 TABLET | Refills: 0 | Status: SHIPPED | OUTPATIENT
Start: 2017-02-18 | End: 2017-03-01

## 2017-02-18 RX ORDER — PREDNISONE 20 MG/1
20 TABLET ORAL 2 TIMES DAILY
Qty: 10 TABLET | Refills: 0 | Status: SHIPPED | OUTPATIENT
Start: 2017-02-18 | End: 2017-02-18

## 2017-02-18 NOTE — PROGRESS NOTES
"  SUBJECTIVE:                                                    Yumiko Flowers is a 76 year old female who presents to clinic today for the following health issues:      SOB      Duration: A week ago    Description (location/character/radiation):     Intensity:  Severe    Accompanying signs and symptoms: Tightness in the chest    History (similar episodes/previous evaluation): None    Precipitating or alleviating factors: None    Therapies tried and outcome: None     ADDITIONAL HPI: 76 year old female here for above issue.  Was in [place]. Hiked the [place] and sulfer air and hugo has flared asthma.    ROS: 10 point review of systems negative except as per HPI.    PAST MEDICAL HISTORY:  Past Medical History   Diagnosis Date     Acne rosacea      Actinic keratosis      Amblyopia      Asthma, moderate persistent      Basal cell carcinoma 10/2010     back X2     Cataract, mod, od; mild-mod, os 1/12/2012     DJD (degenerative joint disease), lumbosacral 8/6/2014     Elevated cholesterol      Endometriosis      HTN (hypertension)      Moderate major depression (H)      \"Circumstances\"     Osteopenia         ACTIVE MEDICAL PROBLEMS:  Patient Active Problem List   Diagnosis     Asthma, moderate persistent     Acne rosacea     CARDIOVASCULAR SCREENING; LDL GOAL LESS THAN 130     Osteopenia     Vitamin D deficiency     Advanced directives, counseling/discussion     Hx of pseudoexfoliation, os     Pseudophakia, ou; Yag Caps, os     HTN, goal below 140/90     Peptic ulcer     Health Care Home     History of blepharoplasty, upper lid, ou (elsewhere); revision (EN)     Obesity, Class II, BMI 35-39.9 (H)     Restless legs syndrome     DJD (degenerative joint disease), lumbosacral     Rosacea     Essential hypertension with goal blood pressure less than 140/90     Obesity, Class I, BMI 30-34.9     Posterior vitreous detachment, bilateral        FAMILY HISTORY:  Family History   Problem Relation Age of Onset     C.A.D. Father      " BRADEN Brother      Hypertension Mother      CANCER No family hx of      DIABETES No family hx of      CEREBROVASCULAR DISEASE No family hx of      Thyroid Disease No family hx of      Glaucoma No family hx of      Macular Degeneration No family hx of        SOCIAL HISTORY:  Social History     Social History     Marital status:      Spouse name: N/A     Number of children: 3     Years of education: N/A     Occupational History      Retired     Social History Main Topics     Smoking status: Never Smoker     Smokeless tobacco: Never Used     Alcohol use Yes     Drug use: No     Sexual activity: Yes     Partners: Male     Other Topics Concern     Not on file     Social History Narrative       MEDICATIONS:  Current Outpatient Prescriptions   Medication Sig Dispense Refill     albuterol (PROAIR HFA/PROVENTIL HFA/VENTOLIN HFA) 108 (90 BASE) MCG/ACT Inhaler Inhale into the lungs. INHALE 1-2 PUFFS EVERY 4-6 HOURS AS NEEDED 1 Inhaler 0     lisinopril-hydrochlorothiazide (PRINZIDE,ZESTORETIC) 20-12.5 MG per tablet Take 2 tablets by mouth daily Patient due for office visit for further refills. 180 tablet 1     gabapentin (NEURONTIN) 600 MG tablet Take 1 tablet (600 mg) by mouth 2 times daily 180 tablet 3     etodolac (LODINE) 400 MG tablet Take 1 tablet (400 mg) by mouth 2 times daily as needed 60 tablet 0     Multiple Vitamins-Iron TABS Take 1 tablet by mouth daily.       Cholecalciferol (VITAMIN D) 2000 UNIT CAPS Take 2,000 Units by mouth daily.       CALCIUM 500 MG PO CHEW None Entered         ALLERGIES:     Allergies   Allergen Reactions     Erythromycin Swelling and Other (See Comments)       Problem list, Medication list, Allergies, and Medical/Social/Surgical histories reviewed in EPIC and updated as appropriate.    OBJECTIVE:                                                    VITALS: /80  Pulse 68  Temp 98.4  F (36.9  C) (Oral)  Wt 198 lb (89.8 kg)  SpO2 99%  BMI 33.46 kg/m2 Body mass index is 33.46  kg/(m^2).  GENERAL: Pleasant, well appearing female.  HEENT: PERRL, EOMI, oropharynx clear.  NECK: supple, no thyromegaly or thyroid masses, no lymphadenopathy.  CV: RRR, no murmurs, rubs or gallops.  LUNGS: Scattered expiratory wheezing bilaterally, normal effort.    SKIN: warm and dry without obvious rashes.   EXTREMITIES: No edema, normal pulses.     ASSESSMENT/PLAN:                                                    1. Asthma exacerbation  Continue with advair and albuterol. Add prednisone. Follow-up if not improving or if worsening.   - predniSONE (DELTASONE) 20 MG tablet; Take 1 tablet (20 mg) by mouth 2 times daily  Dispense: 10 tablet; Refill: 0

## 2017-02-18 NOTE — NURSING NOTE
"Chief Complaint   Patient presents with     Respiratory Problems     staretd a week ago       Initial /80  Pulse 68  Temp 98.4  F (36.9  C) (Oral)  Wt 198 lb (89.8 kg)  SpO2 99%  BMI 33.46 kg/m2 Estimated body mass index is 33.46 kg/(m^2) as calculated from the following:    Height as of 1/25/17: 5' 4.5\" (1.638 m).    Weight as of this encounter: 198 lb (89.8 kg).  Medication Reconciliation: complete   Geovani Heart MA        "

## 2017-02-18 NOTE — MR AVS SNAPSHOT
After Visit Summary   2/18/2017    Yumiko Flowers    MRN: [id number]           Patient Information     Date Of Birth          1940        Visit Information        Provider Department      2/18/2017 2:50 PM Jennifer Alamo MD [facility]        Today's Diagnoses     Asthma exacerbation    -  1       Follow-ups after your visit        Your next 10 appointments already scheduled     Feb 27, 2017 10:10 AM CST   EVITA Extremity with David Moser PT   [facility] ([facility])    [address]  [facility] [id number] [phone]            Mar 06, 2017 10:10 AM CST   EVITA Extremity with David Moser PT   [facility] ([facility])    [address]  [facility] [id number] [phone]              Who to contact     If you have questions or need follow up information about today's clinic visit or your schedule please contact [facility] directly at [phone].  Normal or non-critical lab and imaging results will be communicated to you by MyChart, letter or phone within 4 business days after the clinic has received the results. If you do not hear from us within 7 days, please contact the clinic through TSSI Systemshart or phone. If you have a critical or abnormal lab result, we will notify you by phone as soon as possible.  Submit refill requests through Pinoccio or call your pharmacy and they will forward the refill request to us. Please allow 3 business days for your refill to be completed.          Additional Information About Your Visit        MyChart Information     Pinoccio gives you secure access to your electronic health record. If you see a primary care provider, you can also send messages to your care team and make appointments. If you have questions, please call your primary care clinic.  If you do not have a  primary care provider, please call [phone] and they will assist you.        Care EveryWhere ID     This is your Care EveryWhere ID. This could be used by other organizations to access your [place] medical records  [id number]        Your Vitals Were     Pulse Temperature Pulse Oximetry BMI (Body Mass Index)          68 98.4  F (36.9  C) (Oral) 99% 33.46 kg/m2         Blood Pressure from Last 3 Encounters:   02/18/17 144/80   01/12/17 127/74   11/01/16 138/80    Weight from Last 3 Encounters:   02/18/17 198 lb (89.8 kg)   01/25/17 204 lb (92.5 kg)   01/12/17 204 lb (92.5 kg)              Today, you had the following     No orders found for display         Today's Medication Changes          These changes are accurate as of: 2/18/17  3:37 PM.  If you have any questions, ask your nurse or doctor.               Start taking these medicines.        Dose/Directions    predniSONE 20 MG tablet   Commonly known as:  DELTASONE   Used for:  Asthma exacerbation   Started by:  Jennifer Alamo MD        Dose:  20 mg   Take 1 tablet (20 mg) by mouth 2 times daily   Quantity:  10 tablet   Refills:  0            Where to get your medicines      Some of these will need a paper prescription and others can be bought over the counter.  Ask your nurse if you have questions.     Bring a paper prescription for each of these medications     predniSONE 20 MG tablet                Primary Care Provider Office Phone # Fax #    Gavi Bear [phone]       [facility] [address] [facility] [address]        Thank you!     Thank you for choosing [facility]  for your care. Our goal is always to provide you with excellent care. Hearing back from our patients is one way we can continue to improve our services. Please take a few minutes to complete the written survey that you may receive in the mail after your visit with us. Thank you!             Your Updated Medication  List - Protect others around you: Learn how to safely use, store and throw away your medicines at www.disposemymeds.org.          This list is accurate as of: 2/18/17  3:37 PM.  Always use your most recent med list.                   Brand Name Dispense Instructions for use    albuterol 108 (90 BASE) MCG/ACT Inhaler    PROAIR HFA/PROVENTIL HFA/VENTOLIN HFA    1 Inhaler    Inhale into the lungs. INHALE 1-2 PUFFS EVERY 4-6 HOURS AS NEEDED       calcium 500 MG Chew      None Entered       etodolac 400 MG tablet    LODINE    60 tablet    Take 1 tablet (400 mg) by mouth 2 times daily as needed       gabapentin 600 MG tablet    NEURONTIN    180 tablet    Take 1 tablet (600 mg) by mouth 2 times daily       lisinopril-hydrochlorothiazide 20-12.5 MG per tablet    PRINZIDE/ZESTORETIC    180 tablet    Take 2 tablets by mouth daily Patient due for office visit for further refills.       Multiple Vitamins-Iron Tabs      Take 1 tablet by mouth daily.       predniSONE 20 MG tablet    DELTASONE    10 tablet    Take 1 tablet (20 mg) by mouth 2 times daily       vitamin D 2000 UNITS Caps      Take 2,000 Units by mouth daily.

## 2017-02-24 ENCOUNTER — TRANSFERRED RECORDS (OUTPATIENT)
Dept: HEALTH INFORMATION MANAGEMENT | Facility: CLINIC | Age: 77
End: 2017-02-24

## 2017-02-27 ENCOUNTER — THERAPY VISIT (OUTPATIENT)
Dept: PHYSICAL THERAPY | Facility: CLINIC | Age: 77
End: 2017-02-27
Payer: COMMERCIAL

## 2017-02-27 DIAGNOSIS — G89.29 CHRONIC PAIN OF LEFT KNEE: Primary | ICD-10-CM

## 2017-02-27 DIAGNOSIS — M25.562 CHRONIC PAIN OF LEFT KNEE: Primary | ICD-10-CM

## 2017-02-27 PROCEDURE — 97161 PT EVAL LOW COMPLEX 20 MIN: CPT | Mod: GP | Performed by: PHYSICAL THERAPIST

## 2017-02-27 PROCEDURE — 97110 THERAPEUTIC EXERCISES: CPT | Mod: GP | Performed by: PHYSICAL THERAPIST

## 2017-02-27 ASSESSMENT — ACTIVITIES OF DAILY LIVING (ADL)
SQUAT: ACTIVITY IS MINIMALLY DIFFICULT
HOW_WOULD_YOU_RATE_THE_OVERALL_FUNCTION_OF_YOUR_KNEE_DURING_YOUR_USUAL_DAILY_ACTIVITIES?: NEARLY NORMAL
LIMPING: I DO NOT HAVE THE SYMPTOM
WEAKNESS: I HAVE THE SYMPTOM BUT IT DOES NOT AFFECT MY ACTIVITY
KNEEL ON THE FRONT OF YOUR KNEE: ACTIVITY IS MINIMALLY DIFFICULT
SWELLING: I DO NOT HAVE THE SYMPTOM
KNEE_ACTIVITY_OF_DAILY_LIVING_SUM: 57
GO UP STAIRS: ACTIVITY IS MINIMALLY DIFFICULT
GO DOWN STAIRS: ACTIVITY IS MINIMALLY DIFFICULT
GIVING WAY, BUCKLING OR SHIFTING OF KNEE: THE SYMPTOM AFFECTS MY ACTIVITY SLIGHTLY
KNEE_ACTIVITY_OF_DAILY_LIVING_SCORE: 81.43
SIT WITH YOUR KNEE BENT: ACTIVITY IS SOMEWHAT DIFFICULT
HOW_WOULD_YOU_RATE_THE_CURRENT_FUNCTION_OF_YOUR_KNEE_DURING_YOUR_USUAL_DAILY_ACTIVITIES_ON_A_SCALE_FROM_0_TO_100_WITH_100_BEING_YOUR_LEVEL_OF_KNEE_FUNCTION_PRIOR_TO_YOUR_INJURY_AND_0_BEING_THE_INABILITY_TO_PERFORM_ANY_OF_YOUR_USUAL_DAILY_ACTIVITIES?: 90
AS_A_RESULT_OF_YOUR_KNEE_INJURY,_HOW_WOULD_YOU_RATE_YOUR_CURRENT_LEVEL_OF_DAILY_ACTIVITY?: NEARLY NORMAL
STAND: ACTIVITY IS MINIMALLY DIFFICULT
RISE FROM A CHAIR: ACTIVITY IS MINIMALLY DIFFICULT
WALK: ACTIVITY IS NOT DIFFICULT
PAIN: I HAVE THE SYMPTOM BUT IT DOES NOT AFFECT MY ACTIVITY
STIFFNESS: I HAVE THE SYMPTOM BUT IT DOES NOT AFFECT MY ACTIVITY
RAW_SCORE: 57

## 2017-02-27 NOTE — PROGRESS NOTES
"[place] [facility] Initial Evaluation      Subjective:    Yumiko Flowers is a 76 year old female with a left knee condition.  Condition occurred with:  Insidious onset.  Condition occurred: for unknown reasons.  This is a chronic condition  Pt reports insidious onset of L knee pain four years ago (2013).  Had three cortisone injections that year which helped.  Was fine until Jan 2017 when the L knee started to \"crack\" and the knee would ache.  Would ice and had trip to [place] planned so saw MD again and was given another injection which helped.  Currently reports that the knee is still better after the injection but continues to get some knee pain.  .    Patient reports pain:  Medial and posterior.    Pain is described as sharp and is intermittent and reported as 2/10.  Associated symptoms:  Loss of motion/stiffness and buckling/giving out.   Symptoms are exacerbated by standing and bending/squatting and relieved by ice.  Since onset symptoms are unchanged.  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      General health as reported by patient is good.  Pertinent medical history includes:  Overweight, high blood pressure and asthma.  Medical allergies: no.  Other surgeries include:  None reported.  Current medications:  High blood pressure medication.  Current occupation is retired.        Barriers include:  None as reported by the patient.    Red flags:  None as reported by the patient.                      Objective:      Gait:    Gait Type:  Normal                                                           Knee Evaluation:  ROM:  Strength:  Normal  AROM    Hyperextension: Left:     Right: 4  Extension: Left: 1    Right:   Flexion: Left: 123    Right: 127            Special Tests: Not Assessed      Palpation:  Normal      Edema:  Normal                                                 Musculoskeletal:        Legs:      ROS    Assessment/Plan:      Patient is a 76 year old " female with left side knee complaints.    Patient has the following significant findings with corresponding treatment plan.                Diagnosis 1:  L knee DJD/derangement  Pain -  manual therapy, self management, education, directional preference exercise and home program  Decreased ROM/flexibility - manual therapy, therapeutic exercise, therapeutic activity and home program  Decreased joint mobility - manual therapy, therapeutic exercise, therapeutic activity and home program  Inflammation - self management/home program  Decreased function - therapeutic activities and home program    Therapy Evaluation Codes:   1) History comprised of:   Personal factors that impact the plan of care:      None.    Comorbidity factors that impact the plan of care are:      None.     Medications impacting care: None.  2) Examination of Body Systems comprised of:   Body structures and functions that impact the plan of care:      Knee.   Activity limitations that impact the plan of care are:      Stairs and Standing.  3) Clinical presentation characteristics are:   Stable/Uncomplicated.  4) Decision-Making    Low complexity using standardized patient assessment instrument and/or measureable assessment of functional outcome.  Cumulative Therapy Evaluation is: Low complexity.    Previous and current functional limitations:  (See Goal Flow Sheet for this information)    Short term and Long term goals: (See Goal Flow Sheet for this information)     Communication ability:  Patient appears to be able to clearly communicate and understand verbal and written communication and follow directions correctly.  Treatment Explanation - The following has been discussed with the patient:   RX ordered/plan of care  Anticipated outcomes  Possible risks and side effects  This patient would benefit from PT intervention to resume normal activities.   Rehab potential is excellent.    Frequency:  2 X week, once daily  Duration:  for 3 weeks  Discharge  Plan:  Achieve all LTG.  Independent in home treatment program.  Reach maximal therapeutic benefit.    Please refer to the daily flowsheet for treatment today, total treatment time and time spent performing 1:1 timed codes.

## 2017-02-27 NOTE — MR AVS SNAPSHOT
After Visit Summary   2/27/2017    Yumiko Flowers    MRN: [id number]           Patient Information     Date Of Birth          1940        Visit Information        Provider Department      2/27/2017 10:10 AM David Moser, PT [facility]        Today's Diagnoses     Chronic pain of left knee    -  1       Follow-ups after your visit        Your next 10 appointments already scheduled     Mar 03, 2017 11:00 AM CST   EVITA Extremity with David Moser PT   [facility] ([facility])    [address]  [facility] [id number] [phone]            Mar 06, 2017 10:10 AM CST   EVITA Extremity with David Moser PT   [facility] ([facility])    [address]  [facility] [id number] [phone]              Who to contact     If you have questions or need follow up information about today's clinic visit or your schedule please contact [facility] directly at [phone].  Normal or non-critical lab and imaging results will be communicated to you by Casentrichart, letter or phone within 4 business days after the clinic has received the results. If you do not hear from us within 7 days, please contact the clinic through Casentrichart or phone. If you have a critical or abnormal lab result, we will notify you by phone as soon as possible.  Submit refill requests through BlueStripe Software or call your pharmacy and they will forward the refill request to us. Please allow 3 business days for your refill to be completed.          Additional Information About Your Visit        MyChart Information     BlueStripe Software gives you secure access to your electronic health record. If you see a primary care provider, you can also send messages to your care team and make appointments. If you have  questions, please call your primary care clinic.  If you do not have a primary care provider, please call [phone] and they will assist you.        Care EveryWhere ID     This is your Care EveryWhere ID. This could be used by other organizations to access your [place] medical records  [id number]         Blood Pressure from Last 3 Encounters:   02/18/17 144/80   01/12/17 127/74   11/01/16 138/80    Weight from Last 3 Encounters:   02/18/17 89.8 kg (198 lb)   01/25/17 92.5 kg (204 lb)   01/12/17 92.5 kg (204 lb)              We Performed the Following     HC PT EVAL, LOW COMPLEXITY     EVITA INITIAL EVAL REPORT     THERAPEUTIC EXERCISES        Primary Care Provider Office Phone # Fax #    Gavi Bear [phone]       [facility] [address] [facility] [address]        Thank you!     Thank you for [facility]  for your care. Our goal is always to provide you with excellent care. Hearing back from our patients is one way we can continue to improve our services. Please take a few minutes to complete the written survey that you may receive in the mail after your visit with us. Thank you!             Your Updated Medication List - Protect others around you: Learn how to safely use, store and throw away your medicines at www.disposemymeds.org.          This list is accurate as of: 2/27/17  1:08 PM.  Always use your most recent med list.                   Brand Name Dispense Instructions for use    albuterol 108 (90 BASE) MCG/ACT Inhaler    PROAIR HFA/PROVENTIL HFA/VENTOLIN HFA    1 Inhaler    Inhale into the lungs. INHALE 1-2 PUFFS EVERY 4-6 HOURS AS NEEDED       calcium 500 MG Chew      None Entered       etodolac 400 MG tablet    LODINE    60 tablet    Take 1 tablet (400 mg) by mouth 2 times daily as needed       gabapentin 600 MG tablet    NEURONTIN    180 tablet    Take 1 tablet (600 mg) by mouth 2 times daily        lisinopril-hydrochlorothiazide 20-12.5 MG per tablet    PRINZIDE/ZESTORETIC    180 tablet    Take 2 tablets by mouth daily Patient due for office visit for further refills.       Multiple Vitamins-Iron Tabs      Take 1 tablet by mouth daily.       predniSONE 20 MG tablet    DELTASONE    10 tablet    Take 1 tablet (20 mg) by mouth 2 times daily       vitamin D 2000 UNITS Caps      Take 2,000 Units by mouth daily.

## 2017-03-01 ENCOUNTER — OFFICE VISIT (OUTPATIENT)
Dept: FAMILY MEDICINE | Facility: CLINIC | Age: 77
End: 2017-03-01
Payer: COMMERCIAL

## 2017-03-01 ENCOUNTER — RADIANT APPOINTMENT (OUTPATIENT)
Dept: GENERAL RADIOLOGY | Facility: CLINIC | Age: 77
End: 2017-03-01
Payer: COMMERCIAL

## 2017-03-01 VITALS
HEART RATE: 80 BPM | SYSTOLIC BLOOD PRESSURE: 114 MMHG | BODY MASS INDEX: 32.99 KG/M2 | WEIGHT: 198 LBS | DIASTOLIC BLOOD PRESSURE: 73 MMHG | OXYGEN SATURATION: 99 % | TEMPERATURE: 97.9 F | HEIGHT: 65 IN

## 2017-03-01 DIAGNOSIS — J45.41 MODERATE PERSISTENT ASTHMA WITH ACUTE EXACERBATION: Primary | ICD-10-CM

## 2017-03-01 DIAGNOSIS — J45.41 MODERATE PERSISTENT ASTHMA WITH ACUTE EXACERBATION: ICD-10-CM

## 2017-03-01 PROCEDURE — 94640 AIRWAY INHALATION TREATMENT: CPT | Performed by: PREVENTIVE MEDICINE

## 2017-03-01 PROCEDURE — 71020 XR CHEST 2 VW: CPT

## 2017-03-01 PROCEDURE — 99214 OFFICE O/P EST MOD 30 MIN: CPT | Mod: 25 | Performed by: PREVENTIVE MEDICINE

## 2017-03-01 RX ORDER — ALBUTEROL SULFATE 0.83 MG/ML
1 SOLUTION RESPIRATORY (INHALATION) EVERY 6 HOURS PRN
Qty: 25 VIAL | Refills: 1 | Status: SHIPPED | OUTPATIENT
Start: 2017-03-01 | End: 2018-07-31

## 2017-03-01 RX ORDER — AMOXICILLIN AND CLAVULANATE POTASSIUM 500; 125 MG/1; MG/1
1 TABLET, FILM COATED ORAL 3 TIMES DAILY
Qty: 21 TABLET | Refills: 0 | Status: SHIPPED | OUTPATIENT
Start: 2017-03-01 | End: 2017-03-08

## 2017-03-01 RX ORDER — PREDNISONE 20 MG/1
20 TABLET ORAL 2 TIMES DAILY
Qty: 10 TABLET | Refills: 0 | Status: SHIPPED | OUTPATIENT
Start: 2017-03-01 | End: 2017-08-01

## 2017-03-01 ASSESSMENT — PAIN SCALES - GENERAL: PAINLEVEL: NO PAIN (0)

## 2017-03-01 NOTE — PROGRESS NOTES
Yumiko, your test results were within normal limits. Chest X ray did not show any pneumonias. Plan of care and follow up as discussed in clinic.     Please do not hesitate to call us at [phone] if you have any questions or concerns.    Thank you,    Gavi Bear MD MPH

## 2017-03-01 NOTE — MR AVS SNAPSHOT
After Visit Summary   3/1/2017    Yumiko Flowers    MRN: [id number]           Patient Information     Date Of Birth          1940        Visit Information        Provider Department      3/1/2017 1:20 PM Gavi Bear MD [facility]        Today's Diagnoses     Moderate persistent asthma with acute exacerbation    -  1      Care Instructions    At [facility], we strive to deliver an exceptional experience to you, every time we see you.    If you receive a survey in the mail, please send us back your thoughts. We really do value your feedback.    Thank you for visiting [facility]    Normal or non-critical lab and imaging results will be communicated to you by MyChart, letter or phone within 7 days.  If you do not hear from us within 10 days, please call the clinic. If you have a critical or abnormal lab result, we will notify you by phone as soon as possible.     If you have any questions regarding your visit please contact:     Team [facility]  Clinic Hours Telephone Number   Dr. Sigrid Bautista   7am-7pm  Monday through Thursday  7am-5pm Friday [phone]  Candi SHARP RN   Pharmacy 8:30am-9pm Monday-Friday    9am-5pm Saturday-Sunday [phone]   Urgent Care 11am-9pm Monday-Friday        9am-5pm Saturday-Sunday [phone]     After hours, weekend or if you need to make an appointment with your primary provider please call [phone].   After Hours nurse advise: call [facility]: [phone]    Use RICS Softwarehart (secure email communication and access to your chart) to send your primary care provider a message or make an appointment. Ask someone on your Team how to sign up for Meiyou. To log on to Streetlife or for more information in [employer] please visit the website at [email or web address].   As of October 8, 2013,  all password changes, disabled accounts, or ID changes in NewsMaven/MyHealth will be done by our Access Services Department.   If you need help with your account or password, call: [phone]. Clinic staff no longer has the ability to change passwords.           Follow-ups after your visit        Follow-up notes from your care team     Return if symptoms worsen or fail to improve.      Your next 10 appointments already scheduled     Mar 03, 2017 11:00 AM CST   EVITA Extremity with David Moser PT   [facility] ([facility])    [address]  [facility] [id number] [phone]            Mar 06, 2017 10:10 AM CST   EVITA Extremity with David Moser PT   [facility] ([facility])    [address]  [facility] [id number] [phone]              Who to contact     If you have questions or need follow up information about today's clinic visit or your schedule please contact [facility] directly at [phone].  Normal or non-critical lab and imaging results will be communicated to you by pic5hart, letter or phone within 4 business days after the clinic has received the results. If you do not hear from us within 7 days, please contact the clinic through pic5hart or phone. If you have a critical or abnormal lab result, we will notify you by phone as soon as possible.  Submit refill requests through NewsMaven or call your pharmacy and they will forward the refill request to us. Please allow 3 business days for your refill to be completed.          Additional Information About Your Visit        NewsMaven Information     NewsMaven gives you secure access to your electronic health record. If you see a primary care provider, you can also send messages to your care team and make appointments. If you have questions, please call your primary care clinic.  If you do  "not have a primary care provider, please call [phone] and they will assist you.        Care EveryWhere ID     This is your Care EveryWhere ID. This could be used by other organizations to access your [place] medical records  [id number]        Your Vitals Were     Pulse Temperature Height Pulse Oximetry Breastfeeding? BMI (Body Mass Index)    80 97.9  F (36.6  C) (Oral) 5' 4.5\" (1.638 m) 99% No 33.46 kg/m2       Blood Pressure from Last 3 Encounters:   03/01/17 114/73   02/18/17 144/80   01/12/17 127/74    Weight from Last 3 Encounters:   03/01/17 198 lb (89.8 kg)   02/18/17 198 lb (89.8 kg)   01/25/17 204 lb (92.5 kg)              We Performed the Following     ALBUTEROL/IPRATROPIUM 3ML NEB     INHALATION/NEBULIZER TREATMENT, INITIAL          Today's Medication Changes          These changes are accurate as of: 3/1/17  2:38 PM.  If you have any questions, ask your nurse or doctor.               Start taking these medicines.        Dose/Directions    amoxicillin-clavulanate 500-125 MG per tablet   Commonly known as:  AUGMENTIN   Used for:  Moderate persistent asthma with acute exacerbation   Started by:  Gavi Bear MD        Dose:  1 tablet   Take 1 tablet by mouth 3 times daily for 7 days   Quantity:  21 tablet   Refills:  0         These medicines have changed or have updated prescriptions.        Dose/Directions    * albuterol 108 (90 BASE) MCG/ACT Inhaler   Commonly known as:  PROAIR HFA/PROVENTIL HFA/VENTOLIN HFA   This may have changed:  Another medication with the same name was added. Make sure you understand how and when to take each.   Used for:  Mild persistent asthma   Changed by:  Gavi Bear MD        Inhale into the lungs. INHALE 1-2 PUFFS EVERY 4-6 HOURS AS NEEDED   Quantity:  1 Inhaler   Refills:  0       * albuterol (2.5 MG/3ML) 0.083% neb solution   This may have changed:  You were already taking a medication with the same name, and this prescription was added. Make sure you " understand how and when to take each.   Used for:  Moderate persistent asthma with acute exacerbation   Changed by:  Gavi Bear MD        Dose:  1 vial   Take 1 vial (2.5 mg) by nebulization every 6 hours as needed for shortness of breath / dyspnea or wheezing   Quantity:  25 vial   Refills:  1       * Notice:  This list has 2 medication(s) that are the same as other medications prescribed for you. Read the directions carefully, and ask your doctor or other care provider to review them with you.         Where to get your medicines      These medications were sent to [facility] [place] [place] - [address]  [address] [address] [facility] [address]     Phone:  [phone]     albuterol (2.5 MG/3ML) 0.083% neb solution    amoxicillin-clavulanate 500-125 MG per tablet    predniSONE 20 MG tablet                Primary Care Provider Office Phone # Fax #    Gavi Bear [phone]       [facility] [address]  [facility] [address]        Thank you!     Thank you for choosing [facility]  for your care. Our goal is always to provide you with excellent care. Hearing back from our patients is one way we can continue to improve our services. Please take a few minutes to complete the written survey that you may receive in the mail after your visit with us. Thank you!             Your Updated Medication List - Protect others around you: Learn how to safely use, store and throw away your medicines at www.disposemymeds.org.          This list is accurate as of: 3/1/17  2:38 PM.  Always use your most recent med list.                   Brand Name Dispense Instructions for use    * albuterol 108 (90 BASE) MCG/ACT Inhaler    PROAIR HFA/PROVENTIL HFA/VENTOLIN HFA    1 Inhaler    Inhale into the lungs. INHALE 1-2 PUFFS EVERY 4-6 HOURS AS NEEDED       * albuterol (2.5 MG/3ML) 0.083% neb solution     25 vial    Take 1 vial (2.5 mg) by  nebulization every 6 hours as needed for shortness of breath / dyspnea or wheezing       amoxicillin-clavulanate 500-125 MG per tablet    AUGMENTIN    21 tablet    Take 1 tablet by mouth 3 times daily for 7 days       calcium 500 MG Chew      None Entered       etodolac 400 MG tablet    LODINE    60 tablet    Take 1 tablet (400 mg) by mouth 2 times daily as needed       gabapentin 600 MG tablet    NEURONTIN    180 tablet    Take 1 tablet (600 mg) by mouth 2 times daily       lisinopril-hydrochlorothiazide 20-12.5 MG per tablet    PRINZIDE/ZESTORETIC    180 tablet    Take 2 tablets by mouth daily Patient due for office visit for further refills.       Multiple Vitamins-Iron Tabs      Take 1 tablet by mouth daily.       predniSONE 20 MG tablet    DELTASONE    10 tablet    Take 1 tablet (20 mg) by mouth 2 times daily       vitamin D 2000 UNITS Caps      Take 2,000 Units by mouth daily.       * Notice:  This list has 2 medication(s) that are the same as other medications prescribed for you. Read the directions carefully, and ask your doctor or other care provider to review them with you.

## 2017-03-01 NOTE — PROGRESS NOTES
SUBJECTIVE:                                                    Yumiko Flowers is a 76 year old female who presents to clinic today for the following health issues:    I have reviewed and agree with the documentation by the MA. I updated the history as indicated.  Gavi Bear MD MPH    RESPIRATORY SYMPTOMS      Duration: x 3 weeks    Description  cough and wheezing    Severity: moderate    Accompanying signs and symptoms: None    History (predisposing factors):  Asthma    Precipitating or alleviating factors: None    Therapies tried and outcome:  Inhaler and steroids     Was seen in Urgent Care on 2/18/17 and treated with Prednisone for an asthma exacerbation. Has had some improvement in the coughing this morning.  No fever or chills. [place].  No rash, no emesis, no abdominal pain. Some wheezing. Using Advair and Albuterol.      Problem list and histories reviewed & adjusted, as indicated.  Additional history: as documented    Patient Active Problem List   Diagnosis     Asthma, moderate persistent     Acne rosacea     CARDIOVASCULAR SCREENING; LDL GOAL LESS THAN 130     Osteopenia     Vitamin D deficiency     Advanced directives, counseling/discussion     Hx of pseudoexfoliation, os     Pseudophakia, ou; Yag Caps, os     HTN, goal below 140/90     Peptic ulcer     Health Care Home     History of blepharoplasty, upper lid, ou (elsewhere); revision (EN)     Obesity, Class II, BMI 35-39.9 (H)     Restless legs syndrome     DJD (degenerative joint disease), lumbosacral     Rosacea     Essential hypertension with goal blood pressure less than 140/90     Obesity, Class I, BMI 30-34.9     Posterior vitreous detachment, bilateral     Chronic pain of left knee     Past Surgical History   Procedure Laterality Date     C removal gallbladder       C appendectomy       Surgical history of -        endometriosis surgeriesx3     Surgical history of -        ganiglion cyst onfoot      "Surgical history of -        Eye for \"droopy eye\"     Blepharoplasty bilateral  3/9/2006; 2013     both eyes, upper lid; revision (EN)     Phacoemulsification with standard intraocular lens implant  1/2012; 4/2013     right eye; left eye     Cataract iol, rt/lt       Laser yag capsulotomy       left eye (AC lysis also)       Social History   Substance Use Topics     Smoking status: Never Smoker     Smokeless tobacco: Never Used     Alcohol use Yes     Family History   Problem Relation Age of Onset     C.A.D. Father      C.A.D. Brother      Hypertension Mother      CANCER No family hx of      DIABETES No family hx of      CEREBROVASCULAR DISEASE No family hx of      Thyroid Disease No family hx of      Glaucoma No family hx of      Macular Degeneration No family hx of          Current Outpatient Prescriptions   Medication Sig Dispense Refill     predniSONE (DELTASONE) 20 MG tablet Take 1 tablet (20 mg) by mouth 2 times daily 10 tablet 0     amoxicillin-clavulanate (AUGMENTIN) 500-125 MG per tablet Take 1 tablet by mouth 3 times daily for 7 days 21 tablet 0     albuterol (2.5 MG/3ML) 0.083% neb solution Take 1 vial (2.5 mg) by nebulization every 6 hours as needed for shortness of breath / dyspnea or wheezing 25 vial 1     albuterol (PROAIR HFA/PROVENTIL HFA/VENTOLIN HFA) 108 (90 BASE) MCG/ACT Inhaler Inhale into the lungs. INHALE 1-2 PUFFS EVERY 4-6 HOURS AS NEEDED 1 Inhaler 0     lisinopril-hydrochlorothiazide (PRINZIDE,ZESTORETIC) 20-12.5 MG per tablet Take 2 tablets by mouth daily Patient due for office visit for further refills. 180 tablet 1     gabapentin (NEURONTIN) 600 MG tablet Take 1 tablet (600 mg) by mouth 2 times daily 180 tablet 3     etodolac (LODINE) 400 MG tablet Take 1 tablet (400 mg) by mouth 2 times daily as needed 60 tablet 0     Multiple Vitamins-Iron TABS Take 1 tablet by mouth daily.       Cholecalciferol (VITAMIN D) 2000 UNIT CAPS Take 2,000 Units by mouth daily.       CALCIUM 500 MG PO " "CHEW None Entered       Allergies   Allergen Reactions     Erythromycin Swelling and Other (See Comments)     BP Readings from Last 3 Encounters:   03/01/17 114/73   02/18/17 144/80   01/12/17 127/74    Wt Readings from Last 3 Encounters:   03/01/17 198 lb (89.8 kg)   02/18/17 198 lb (89.8 kg)   01/25/17 204 lb (92.5 kg)                    Reviewed and updated as needed this visit by clinical staff  Tobacco  Allergies  Meds       Reviewed and updated as needed this visit by Provider         ROS:  Constitutional, neuro, ENT, endocrine, pulmonary, cardiac, gastrointestinal, genitourinary, musculoskeletal, integument and psychiatric systems are negative, except as otherwise noted.    OBJECTIVE:                                                    /73 (BP Location: Left arm, Patient Position: Chair, Cuff Size: Adult Large)  Pulse 80  Temp 97.9  F (36.6  C) (Oral)  Ht 5' 4.5\" (1.638 m)  Wt 198 lb (89.8 kg)  SpO2 99%  Breastfeeding? No  BMI 33.46 kg/m2  Body mass index is 33.46 kg/(m^2).  GENERAL APPEARANCE: healthy, alert and no distress  EYES: Eyes grossly normal to inspection and conjunctivae and sclerae normal  HENT: ear canals and TM's normal, nose and mouth without ulcers or lesions and no pharyngeal exudates or pus points, no uvular deviation.   NECK: no adenopathy  RESP: Scattered rhonchi bilateral lung fields, slight improvement after Duo neb treatment   CV: regular rates and rhythm and normal S1 S2, no S3 or S4  ABDOMEN: soft, non-tender  MS: extremities normal- no gross deformities noted  SKIN: no suspicious lesions or rashes  NEURO: Normal strength and tone, mentation intact and speech normal  PSYCH: mentation appears normal    Diagnostic test results:  Diagnostic Test Results:  No results found for this or any previous visit (from the past 24 hour(s)).     XR CHEST 2 VW 3/1/2017 1:47 PM     HISTORY: Moderate persistent asthma with (acute) exacerbation     COMPARISON: 6/19/2012         IMPRESSION: Lungs " are clear. Probable mild hyperinflation with some  flattening of the hemidiaphragms. Otherwise negative.      ABHI RIZZO MD       ASSESSMENT/PLAN:                                                    1. Moderate persistent asthma with acute exacerbation  -No acute infiltrates on Chest Xray   - XR Chest 2 Views; Future  - INHALATION/NEBULIZER TREATMENT, INITIAL  - ALBUTEROL/IPRATROPIUM 3ML NEB  - predniSONE (DELTASONE) 20 MG tablet; Take 1 tablet (20 mg) by mouth 2 times daily  Dispense: 10 tablet; Refill: 0  - amoxicillin-clavulanate (AUGMENTIN) 500-125 MG per tablet; Take 1 tablet by mouth 3 times daily for 7 days  Dispense: 21 tablet; Refill: 0  - albuterol (2.5 MG/3ML) 0.083% neb solution; Take 1 vial (2.5 mg) by nebulization every 6 hours as needed for shortness of breath / dyspnea or wheezing  Dispense: 25 vial; Refill: 1  -ER precautions reviewed    I ended our visit today by discussing the patient's diagnoses and recommended treatment. Please refer to today's diagnoses and orders for further details. I briefly discussed the pathophysiology of these conditions and outlined their expected course. I discussed the warning symptoms and signs that indicate an atypical course that would need urgent or emergent care. I also discussed self care strategies for symptom relief.  Patient voiced complete understanding of plan of care and was in full agreement to proceed. Common side effects of medications prescribed at this visit were discussed with the patient. Severe side effects, including current applicable black box warnings, were discussed.       Follow up with Provider - If not improving in 5 days or fever over 101 F. Otherwise follow up as scheduled      Gavi Bear MD MPH    [facility]

## 2017-03-01 NOTE — NURSING NOTE
"Chief Complaint   Patient presents with     Cough       Initial /73 (BP Location: Left arm, Patient Position: Chair, Cuff Size: Adult Large)  Pulse 80  Temp 97.9  F (36.6  C) (Oral)  Ht 5' 4.5\" (1.638 m)  Wt 198 lb (89.8 kg)  SpO2 99%  Breastfeeding? No  BMI 33.46 kg/m2 Estimated body mass index is 33.46 kg/(m^2) as calculated from the following:    Height as of this encounter: 5' 4.5\" (1.638 m).    Weight as of this encounter: 198 lb (89.8 kg).  Medication Reconciliation: complete   Yara BERUMEN        "

## 2017-03-01 NOTE — PATIENT INSTRUCTIONS
At [facility], we strive to deliver an exceptional experience to you, every time we see you.    If you receive a survey in the mail, please send us back your thoughts. We really do value your feedback.    Thank you for visiting [facility]    Normal or non-critical lab and imaging results will be communicated to you by MyChart, letter or phone within 7 days.  If you do not hear from us within 10 days, please call the clinic. If you have a critical or abnormal lab result, we will notify you by phone as soon as possible.     If you have any questions regarding your visit please contact:     Team [place]  Clinic Hours Telephone Number   Dr. Sigrid Bautista   7am-7pm  Monday through Thursday  7am-5pm Friday [phone]  Candi SHARP RN   Pharmacy 8:30am-9pm Monday-Friday    9am-5pm Saturday-Sunday [phone]   Urgent Care 11am-9pm Monday-Friday        9am-5pm Saturday-Sunday [phone]     After hours, weekend or if you need to make an appointment with your primary provider please call [phone].   After Hours nurse advise: call [place] [facility]: [phone]    Use Mynglehart (secure email communication and access to your chart) to send your primary care provider a message or make an appointment. Ask someone on your Team how to sign up for Glympse. To log on to [employer] or for more information in [place] please visit the website at [email or web address].   As of October 8, 2013, all password changes, disabled accounts, or ID changes in Glympse/MyHealth will be done by our Access Services Department.   If you need help with your account or password, call: [phone]. Clinic staff no longer has the ability to change passwords.

## 2017-03-01 NOTE — NURSING NOTE
The following nebulizer treatment was given at 1:51 pm:     MEDICATION: Duoneb  : [employer]  LOT #: [id number]  EXPIRATION DATE:  11/2017  NDC # [id number]    Yara BERUMEN

## 2017-03-03 ENCOUNTER — THERAPY VISIT (OUTPATIENT)
Dept: PHYSICAL THERAPY | Facility: CLINIC | Age: 77
End: 2017-03-03
Payer: COMMERCIAL

## 2017-03-03 DIAGNOSIS — M25.562 CHRONIC PAIN OF LEFT KNEE: ICD-10-CM

## 2017-03-03 DIAGNOSIS — G89.29 CHRONIC PAIN OF LEFT KNEE: ICD-10-CM

## 2017-03-03 PROCEDURE — 97110 THERAPEUTIC EXERCISES: CPT | Mod: GP | Performed by: PHYSICAL THERAPIST

## 2017-03-06 ENCOUNTER — THERAPY VISIT (OUTPATIENT)
Dept: PHYSICAL THERAPY | Facility: CLINIC | Age: 77
End: 2017-03-06
Payer: COMMERCIAL

## 2017-03-06 DIAGNOSIS — M25.562 CHRONIC PAIN OF LEFT KNEE: ICD-10-CM

## 2017-03-06 DIAGNOSIS — G89.29 CHRONIC PAIN OF LEFT KNEE: ICD-10-CM

## 2017-03-06 PROCEDURE — 97110 THERAPEUTIC EXERCISES: CPT | Mod: GP | Performed by: PHYSICAL THERAPIST

## 2017-03-06 PROCEDURE — 97140 MANUAL THERAPY 1/> REGIONS: CPT | Mod: GP | Performed by: PHYSICAL THERAPIST

## 2017-03-06 NOTE — PROGRESS NOTES
"Subjective:    HPI                    Objective:    System    Physical Exam    General     ROS    Assessment/Plan:      SUBJECTIVE  Subjective changes as noted by pt:  Has perfomed the knee extension stretch 5x total over the weekend.  As a result, feels about the same.       Current pain level: 5/10 when bending/certain motions.   Changes in function:  None     Adverse reaction to treatment or activity:  None    OBJECTIVE  Changes in objective findings:  Yes, L knee pain dec and ROM inc on squat following standing L knee extension with self overpressure.  \"Felt really good\" after manual mobilization.         ASSESSMENT  Yumiko continues to require intervention to meet STG and LTG's: PT  Patient is progressing as expected.  Response to therapy has shown lack of progress in  pain level  Progress made towards STG/LTG?  None    PLAN  Continue current treatment plan until patient demonstrates readiness to progress to higher level exercises.    PTA/ATC plan:  N/A    Please refer to the daily flowsheet for treatment today, total treatment time and time spent performing 1:1 timed codes.              "

## 2017-03-06 NOTE — MR AVS SNAPSHOT
After Visit Summary   3/6/2017    Yumiko Flowers    MRN: [id number]           Patient Information     Date Of Birth          1940        Visit Information        Provider Department      3/6/2017 10:10 AM David Moser, PT [facility] Physical Therapy        Today's Diagnoses     Chronic pain of left knee           Follow-ups after your visit        Your next 10 appointments already scheduled     Mar 10, 2017  9:00 AM CST   EVITA Extremity with David Moser PT   [facility] Physical Therapy ([facility])    [address]  [facility] [id number] [phone]              Who to contact     If you have questions or need follow up information about today's clinic visit or your schedule please contact [facility] PHYSICAL THERAPY directly at [phone].  Normal or non-critical lab and imaging results will be communicated to you by Pharmacahart, letter or phone within 4 business days after the clinic has received the results. If you do not hear from us within 7 days, please contact the clinic through Pharmacahart or phone. If you have a critical or abnormal lab result, we will notify you by phone as soon as possible.  Submit refill requests through Pagevamp or call your pharmacy and they will forward the refill request to us. Please allow 3 business days for your refill to be completed.          Additional Information About Your Visit        MyChart Information     Pagevamp gives you secure access to your electronic health record. If you see a primary care provider, you can also send messages to your care team and make appointments. If you have questions, please call your primary care clinic.  If you do not have a primary care provider, please call [phone] and they will assist you.        Care EveryWhere ID     This is your Care EveryWhere ID. This could be used by other  organizations to access your [place] medical records  [id number]         Blood Pressure from Last 3 Encounters:   03/01/17 114/73   02/18/17 144/80   01/12/17 127/74    Weight from Last 3 Encounters:   03/01/17 89.8 kg (198 lb)   02/18/17 89.8 kg (198 lb)   01/25/17 92.5 kg (204 lb)              We Performed the Following     MANUAL THER TECH,1+REGIONS,EA 15 MIN     THERAPEUTIC EXERCISES        Primary Care Provider Office Phone # Fax #    Gavi Bear [phone]       [facility] [address]  [facility] [address]        Thank you!     Thank you for choosing [place] [facility] PHYSICAL THERAPY  for your care. Our goal is always to provide you with excellent care. Hearing back from our patients is one way we can continue to improve our services. Please take a few minutes to complete the written survey that you may receive in the mail after your visit with us. Thank you!             Your Updated Medication List - Protect others around you: Learn how to safely use, store and throw away your medicines at www.disposemymeds.org.          This list is accurate as of: 3/6/17 10:44 AM.  Always use your most recent med list.                   Brand Name Dispense Instructions for use    * albuterol 108 (90 BASE) MCG/ACT Inhaler    PROAIR HFA/PROVENTIL HFA/VENTOLIN HFA    1 Inhaler    Inhale into the lungs. INHALE 1-2 PUFFS EVERY 4-6 HOURS AS NEEDED       * albuterol (2.5 MG/3ML) 0.083% neb solution     25 vial    Take 1 vial (2.5 mg) by nebulization every 6 hours as needed for shortness of breath / dyspnea or wheezing       amoxicillin-clavulanate 500-125 MG per tablet    AUGMENTIN    21 tablet    Take 1 tablet by mouth 3 times daily for 7 days       calcium 500 MG Chew      None Entered       etodolac 400 MG tablet    LODINE    60 tablet    Take 1 tablet (400 mg) by mouth 2 times daily as needed       gabapentin 600 MG tablet    NEURONTIN    180 tablet     Take 1 tablet (600 mg) by mouth 2 times daily       lisinopril-hydrochlorothiazide 20-12.5 MG per tablet    PRINZIDE/ZESTORETIC    180 tablet    Take 2 tablets by mouth daily Patient due for office visit for further refills.       Multiple Vitamins-Iron Tabs      Take 1 tablet by mouth daily.       predniSONE 20 MG tablet    DELTASONE    10 tablet    Take 1 tablet (20 mg) by mouth 2 times daily       vitamin D 2000 UNITS Caps      Take 2,000 Units by mouth daily.       * Notice:  This list has 2 medication(s) that are the same as other medications prescribed for you. Read the directions carefully, and ask your doctor or other care provider to review them with you.

## 2017-03-10 ENCOUNTER — THERAPY VISIT (OUTPATIENT)
Dept: PHYSICAL THERAPY | Facility: CLINIC | Age: 77
End: 2017-03-10
Payer: COMMERCIAL

## 2017-03-10 DIAGNOSIS — G89.29 CHRONIC PAIN OF LEFT KNEE: ICD-10-CM

## 2017-03-10 DIAGNOSIS — M25.562 CHRONIC PAIN OF LEFT KNEE: ICD-10-CM

## 2017-03-10 PROCEDURE — 97140 MANUAL THERAPY 1/> REGIONS: CPT | Mod: GP | Performed by: PHYSICAL THERAPIST

## 2017-03-10 PROCEDURE — 97110 THERAPEUTIC EXERCISES: CPT | Mod: GP | Performed by: PHYSICAL THERAPIST

## 2017-03-17 ENCOUNTER — THERAPY VISIT (OUTPATIENT)
Dept: PHYSICAL THERAPY | Facility: CLINIC | Age: 77
End: 2017-03-17
Payer: COMMERCIAL

## 2017-03-17 DIAGNOSIS — G89.29 CHRONIC PAIN OF LEFT KNEE: ICD-10-CM

## 2017-03-17 DIAGNOSIS — M25.562 CHRONIC PAIN OF LEFT KNEE: ICD-10-CM

## 2017-03-17 PROCEDURE — 97110 THERAPEUTIC EXERCISES: CPT | Mod: GP | Performed by: PHYSICAL THERAPIST

## 2017-03-17 PROCEDURE — 97140 MANUAL THERAPY 1/> REGIONS: CPT | Mod: GP | Performed by: PHYSICAL THERAPIST

## 2017-03-17 NOTE — MR AVS SNAPSHOT
After Visit Summary   3/17/2017    Yumiko Flowers    MRN: [id number]           Patient Information     Date Of Birth          1940        Visit Information        Provider Department      3/17/2017 9:40 AM David Moser, PT [facility] Physical Therapy        Today's Diagnoses     Chronic pain of left knee           Follow-ups after your visit        Your next 10 appointments already scheduled     Mar 24, 2017  9:00 AM CDT   EVITA Extremity with David Moser PT   [facility] Physical Therapy ([facility])    [address]  [facility] [id number] [phone]              Who to contact     If you have questions or need follow up information about today's clinic visit or your schedule please contact [facility] PHYSICAL THERAPY directly at [phone].  Normal or non-critical lab and imaging results will be communicated to you by Tissue Regeneration Systemshart, letter or phone within 4 business days after the clinic has received the results. If you do not hear from us within 7 days, please contact the clinic through Tissue Regeneration Systemshart or phone. If you have a critical or abnormal lab result, we will notify you by phone as soon as possible.  Submit refill requests through urturn or call your pharmacy and they will forward the refill request to us. Please allow 3 business days for your refill to be completed.          Additional Information About Your Visit        MyChart Information     urturn gives you secure access to your electronic health record. If you see a primary care provider, you can also send messages to your care team and make appointments. If you have questions, please call your primary care clinic.  If you do not have a primary care provider, please call [phone] and they will assist you.        Care EveryWhere ID     This is your Care EveryWhere ID. This could be used by other  organizations to access your [place] medical records  [id number]         Blood Pressure from Last 3 Encounters:   03/01/17 114/73   02/18/17 144/80   01/12/17 127/74    Weight from Last 3 Encounters:   03/01/17 89.8 kg (198 lb)   02/18/17 89.8 kg (198 lb)   01/25/17 92.5 kg (204 lb)              We Performed the Following     MANUAL THER TECH,1+REGIONS,EA 15 MIN     THERAPEUTIC EXERCISES        Primary Care Provider Office Phone # Fax #    Gavi Bear [phone]       [facility] [address]  [facility] [address]        Thank you!     Thank you for choosing [place] [facility] PHYSICAL THERAPY  for your care. Our goal is always to provide you with excellent care. Hearing back from our patients is one way we can continue to improve our services. Please take a few minutes to complete the written survey that you may receive in the mail after your visit with us. Thank you!             Your Updated Medication List - Protect others around you: Learn how to safely use, store and throw away your medicines at www.disposemymeds.org.          This list is accurate as of: 3/17/17 10:36 AM.  Always use your most recent med list.                   Brand Name Dispense Instructions for use    * albuterol 108 (90 BASE) MCG/ACT Inhaler    PROAIR HFA/PROVENTIL HFA/VENTOLIN HFA    1 Inhaler    Inhale into the lungs. INHALE 1-2 PUFFS EVERY 4-6 HOURS AS NEEDED       * albuterol (2.5 MG/3ML) 0.083% neb solution     25 vial    Take 1 vial (2.5 mg) by nebulization every 6 hours as needed for shortness of breath / dyspnea or wheezing       calcium 500 MG Chew      None Entered       etodolac 400 MG tablet    LODINE    60 tablet    Take 1 tablet (400 mg) by mouth 2 times daily as needed       gabapentin 600 MG tablet    NEURONTIN    180 tablet    Take 1 tablet (600 mg) by mouth 2 times daily       lisinopril-hydrochlorothiazide 20-12.5 MG per tablet    PRINZIDE/ZESTORETIC     180 tablet    Take 2 tablets by mouth daily Patient due for office visit for further refills.       Multiple Vitamins-Iron Tabs      Take 1 tablet by mouth daily.       predniSONE 20 MG tablet    DELTASONE    10 tablet    Take 1 tablet (20 mg) by mouth 2 times daily       vitamin D 2000 UNITS Caps      Take 2,000 Units by mouth daily.       * Notice:  This list has 2 medication(s) that are the same as other medications prescribed for you. Read the directions carefully, and ask your doctor or other care provider to review them with you.

## 2017-03-17 NOTE — PROGRESS NOTES
Subjective:    HPI                    Objective:    System    Physical Exam    General     ROS    Assessment/Plan:      SUBJECTIVE  Subjective changes as noted by pt:  Was doing very well up until about two days ago when the knee began to bother more.  It was difficult to sleep last night due to discomfort.  Back bothering too.      Current pain level: 3/10     Changes in function:  None     Adverse reaction to treatment or activity:  None    OBJECTIVE  Changes in objective findings:  Yes, See physical exam section and/or daily flowsheet for response to repeated movements.           ASSESSMENT  Yumiko continues to require intervention to meet STG and LTG's: PT  Patient has experienced an exacerbation of symptoms.  Response to therapy has shown a worsening of  pain level  Progress made towards STG/LTG?  None    PLAN  Continue current treatment plan until patient demonstrates readiness to progress to higher level exercises.    PTA/ATC plan:  N/A    Please refer to the daily flowsheet for treatment today, total treatment time and time spent performing 1:1 timed codes.

## 2017-03-24 ENCOUNTER — THERAPY VISIT (OUTPATIENT)
Dept: PHYSICAL THERAPY | Facility: CLINIC | Age: 77
End: 2017-03-24
Payer: COMMERCIAL

## 2017-03-24 DIAGNOSIS — M25.562 CHRONIC PAIN OF LEFT KNEE: ICD-10-CM

## 2017-03-24 DIAGNOSIS — G89.29 CHRONIC PAIN OF LEFT KNEE: ICD-10-CM

## 2017-03-24 PROCEDURE — 97110 THERAPEUTIC EXERCISES: CPT | Mod: GP | Performed by: PHYSICAL THERAPIST

## 2017-03-24 PROCEDURE — 97140 MANUAL THERAPY 1/> REGIONS: CPT | Mod: GP | Performed by: PHYSICAL THERAPIST

## 2017-03-24 NOTE — PROGRESS NOTES
Subjective:    HPI                    Objective:    System    Physical Exam    General     ROS    SUBJECTIVE  Subjective changes as noted by pt:  Fell onto L knee four days ago at home in the dark.  Had difficulty at first with some pain and bending but reports back to where she was.  Has not done as much of the knee stretching since then but was feeling overall better.  Still feels overall better than when started.  The therapy has helped. Continues to get pain with certain motions.       Current pain level: 3/10     Changes in function:  Yes (See Goal flowsheet attached for changes in current functional level)     Adverse reaction to treatment or activity:  None    OBJECTIVE  Changes in objective findings:  Yes, pain provoked with IR on L knee.  Pain decreased with inc flexion following seated repeated L knee extension with self overpressure in slight IR.          ASSESSMENT  Yumiko continues to require intervention to meet STG and LTG's: PT  Patient is progressing as expected.  Response to therapy has shown an improvement in  pain level and function  Progress made towards STG/LTG?  Yes (See Goal flowsheet attached for updates on achievement of STG and LTG)    PLAN  Continue current treatment plan until patient demonstrates readiness to progress to higher level exercises.    PTA/ATC plan:  N/A    Please refer to the daily flowsheet for treatment today, total treatment time and time spent performing 1:1 timed codes.

## 2017-03-24 NOTE — MR AVS SNAPSHOT
After Visit Summary   3/24/2017    Yumiko Flowers    MRN: [id number]           Patient Information     Date Of Birth          1940        Visit Information        Provider Department      3/24/2017 9:00 AM David Moser, PT [facility] Physical Therapy        Today's Diagnoses     Chronic pain of left knee           Follow-ups after your visit        Who to contact     If you have questions or need follow up information about today's clinic visit or your schedule please contact [facility] ATHLETIC [facility] PHYSICAL [facility] directly at [phone].  Normal or non-critical lab and imaging results will be communicated to you by [employer], letter or phone within 4 business days after the clinic has received the results. If you do not hear from us within 7 days, please contact the clinic through [employer] or phone. If you have a critical or abnormal lab result, we will notify you by phone as soon as possible.  Submit refill requests through [employer] or call your pharmacy and they will forward the refill request to us. Please allow 3 business days for your refill to be completed.          Additional Information About Your Visit        MyChart Information     [employer] gives you secure access to your electronic health record. If you see a primary care provider, you can also send messages to your care team and make appointments. If you have questions, please call your primary care clinic.  If you do not have a primary care provider, please call [phone] and they will assist you.        Care EveryWhere ID     This is your Care EveryWhere ID. This could be used by other organizations to access your [place] medical records  [id number]         Blood Pressure from Last 3 Encounters:   03/01/17 114/73   02/18/17 144/80   01/12/17 127/74    Weight from Last 3 Encounters:   03/01/17 89.8 kg (198 lb)   02/18/17 89.8 kg (198 lb)   01/25/17 92.5 kg (204 lb)               We Performed the Following     MANUAL THER TECH,1+REGIONS,EA 15 MIN     THERAPEUTIC EXERCISES        Primary Care Provider Office Phone # Fax #    Gavi Bear [phone]       [facility] [address]  [facility] [address]        Thank you!     Thank you for choosing [place] FOR ATHLETIC MEDICINE [facility] PHYSICAL THERAPY  for your care. Our goal is always to provide you with excellent care. Hearing back from our patients is one way we can continue to improve our services. Please take a few minutes to complete the written survey that you may receive in the mail after your visit with us. Thank you!             Your Updated Medication List - Protect others around you: Learn how to safely use, store and throw away your medicines at www.disposemymeds.org.          This list is accurate as of: 3/24/17  9:37 AM.  Always use your most recent med list.                   Brand Name Dispense Instructions for use    * albuterol 108 (90 BASE) MCG/ACT Inhaler    PROAIR HFA/PROVENTIL HFA/VENTOLIN HFA    1 Inhaler    Inhale into the lungs. INHALE 1-2 PUFFS EVERY 4-6 HOURS AS NEEDED       * albuterol (2.5 MG/3ML) 0.083% neb solution     25 vial    Take 1 vial (2.5 mg) by nebulization every 6 hours as needed for shortness of breath / dyspnea or wheezing       calcium 500 MG Chew      None Entered       etodolac 400 MG tablet    LODINE    60 tablet    Take 1 tablet (400 mg) by mouth 2 times daily as needed       gabapentin 600 MG tablet    NEURONTIN    180 tablet    Take 1 tablet (600 mg) by mouth 2 times daily       lisinopril-hydrochlorothiazide 20-12.5 MG per tablet    PRINZIDE/ZESTORETIC    180 tablet    Take 2 tablets by mouth daily Patient due for office visit for further refills.       Multiple Vitamins-Iron Tabs      Take 1 tablet by mouth daily.       predniSONE 20 MG tablet    DELTASONE    10 tablet    Take 1 tablet (20 mg) by mouth 2 times daily       vitamin D 2000  UNITS Caps      Take 2,000 Units by mouth daily.       * Notice:  This list has 2 medication(s) that are the same as other medications prescribed for you. Read the directions carefully, and ask your doctor or other care provider to review them with you.

## 2017-04-10 ENCOUNTER — THERAPY VISIT (OUTPATIENT)
Dept: PHYSICAL THERAPY | Facility: CLINIC | Age: 77
End: 2017-04-10
Payer: COMMERCIAL

## 2017-04-10 DIAGNOSIS — M25.562 CHRONIC PAIN OF LEFT KNEE: ICD-10-CM

## 2017-04-10 DIAGNOSIS — G89.29 CHRONIC PAIN OF LEFT KNEE: ICD-10-CM

## 2017-04-10 PROCEDURE — 97110 THERAPEUTIC EXERCISES: CPT | Mod: GP | Performed by: PHYSICAL THERAPIST

## 2017-04-10 PROCEDURE — 97140 MANUAL THERAPY 1/> REGIONS: CPT | Mod: GP | Performed by: PHYSICAL THERAPIST

## 2017-04-10 PROCEDURE — 97010 HOT OR COLD PACKS THERAPY: CPT | Mod: GP | Performed by: PHYSICAL THERAPIST

## 2017-04-10 PROCEDURE — 97530 THERAPEUTIC ACTIVITIES: CPT | Mod: GP | Performed by: PHYSICAL THERAPIST

## 2017-04-10 NOTE — MR AVS SNAPSHOT
After Visit Summary   4/10/2017    Yumiko Flowers    MRN: [id number]           Patient Information     Date Of Birth          1940        Visit Information        Provider Department      4/10/2017 8:50 AM David Moser, PT [facility] Physical Therapy        Today's Diagnoses     Chronic pain of left knee           Follow-ups after your visit        Your next 10 appointments already scheduled     Apr 18, 2017  9:10 AM CDT   EVITA Extremity with David Moser PT   [facility] Physical Therapy ([facility])    [address]  [facility] [id number] [phone]              Who to contact     If you have questions or need follow up information about today's clinic visit or your schedule please contact [facility] ATHLETIC [facility] PHYSICAL THERAPY directly at [phone].  Normal or non-critical lab and imaging results will be communicated to you by [employer], letter or phone within 4 business days after the clinic has received the results. If you do not hear from us within 7 days, please contact the clinic through [employer] or phone. If you have a critical or abnormal lab result, we will notify you by phone as soon as possible.  Submit refill requests through [employer] or call your pharmacy and they will forward the refill request to us. Please allow 3 business days for your refill to be completed.          Additional Information About Your Visit        MyChart Information     [employer] gives you secure access to your electronic health record. If you see a primary care provider, you can also send messages to your care team and make appointments. If you have questions, please call your primary care clinic.  If you do not have a primary care provider, please call [phone] and they will assist you.        Care EveryWhere ID     This is your Care EveryWhere ID. This could be used by other  organizations to access your [place] medical records  [id number]         Blood Pressure from Last 3 Encounters:   03/01/17 114/73   02/18/17 144/80   01/12/17 127/74    Weight from Last 3 Encounters:   03/01/17 89.8 kg (198 lb)   02/18/17 89.8 kg (198 lb)   01/25/17 92.5 kg (204 lb)              We Performed the Following     HOT OR COLD PACKS THERAPY     MANUAL THER TECH,1+REGIONS,EA 15 MIN     THERAPEUTIC ACTIVITIES     THERAPEUTIC EXERCISES        Primary Care Provider Office Phone # Fax #    Gavi Bear [phone]       [facility] [address]  [facility] [address]        Thank you!     Thank you for choosing [place] FOR ATHLETIC MEDICINE [facility] PHYSICAL THERAPY  for your care. Our goal is always to provide you with excellent care. Hearing back from our patients is one way we can continue to improve our services. Please take a few minutes to complete the written survey that you may receive in the mail after your visit with us. Thank you!             Your Updated Medication List - Protect others around you: Learn how to safely use, store and throw away your medicines at www.disposemymeds.org.          This list is accurate as of: 4/10/17  9:33 AM.  Always use your most recent med list.                   Brand Name Dispense Instructions for use    * albuterol 108 (90 BASE) MCG/ACT Inhaler    PROAIR HFA/PROVENTIL HFA/VENTOLIN HFA    1 Inhaler    Inhale into the lungs. INHALE 1-2 PUFFS EVERY 4-6 HOURS AS NEEDED       * albuterol (2.5 MG/3ML) 0.083% neb solution     25 vial    Take 1 vial (2.5 mg) by nebulization every 6 hours as needed for shortness of breath / dyspnea or wheezing       calcium 500 MG Chew      None Entered       etodolac 400 MG tablet    LODINE    60 tablet    Take 1 tablet (400 mg) by mouth 2 times daily as needed       gabapentin 600 MG tablet    NEURONTIN    180 tablet    Take 1 tablet (600 mg) by mouth 2 times daily        lisinopril-hydrochlorothiazide 20-12.5 MG per tablet    PRINZIDE/ZESTORETIC    180 tablet    Take 2 tablets by mouth daily Patient due for office visit for further refills.       Multiple Vitamins-Iron Tabs      Take 1 tablet by mouth daily.       predniSONE 20 MG tablet    DELTASONE    10 tablet    Take 1 tablet (20 mg) by mouth 2 times daily       vitamin D 2000 UNITS Caps      Take 2,000 Units by mouth daily.       * Notice:  This list has 2 medication(s) that are the same as other medications prescribed for you. Read the directions carefully, and ask your doctor or other care provider to review them with you.

## 2017-04-10 NOTE — PROGRESS NOTES
Subjective:    HPI                    Objective:    System    Physical Exam    General     ROS    Assessment/Plan:      SUBJECTIVE  Subjective changes as noted by pt:  Had been performing the extension stretch with IR and feels quite a bit of pain posteriorly with it.  Not doing much of it anymore.  Squatting still painful.       Current pain level: 5/10     Changes in function:  None     Adverse reaction to treatment or activity:  None    OBJECTIVE  Changes in objective findings:  Yes, pain upon palpation of L MCL/pes anserine and proximal calf.  Decreased pain with ext mobilizations supine (with inc ROM squatting)        ASSESSMENT  Yumiko continues to require intervention to meet STG and LTG's: PT  Patient has experienced an exacerbation of symptoms.  Response to therapy has shown a worsening of  pain level  Progress made towards STG/LTG?  None    PLAN  Continue current treatment plan until patient demonstrates readiness to progress to higher level exercises.    PTA/ATC plan:  N/A    Please refer to the daily flowsheet for treatment today, total treatment time and time spent performing 1:1 timed codes.

## 2017-04-18 ENCOUNTER — THERAPY VISIT (OUTPATIENT)
Dept: PHYSICAL THERAPY | Facility: CLINIC | Age: 77
End: 2017-04-18
Payer: COMMERCIAL

## 2017-04-18 DIAGNOSIS — G89.29 CHRONIC PAIN OF LEFT KNEE: ICD-10-CM

## 2017-04-18 DIAGNOSIS — M25.562 CHRONIC PAIN OF LEFT KNEE: ICD-10-CM

## 2017-04-18 PROCEDURE — 97140 MANUAL THERAPY 1/> REGIONS: CPT | Mod: GP | Performed by: PHYSICAL THERAPIST

## 2017-04-18 PROCEDURE — 97110 THERAPEUTIC EXERCISES: CPT | Mod: GP | Performed by: PHYSICAL THERAPIST

## 2017-04-18 NOTE — PROGRESS NOTES
Subjective:    HPI                    Objective:    System    Physical Exam    General     ROS    Assessment/Plan:      SUBJECTIVE  Subjective changes as noted by pt:  Sore today.  Busy yesterday with activity.   Not getting any lasting change.  A lot of tightness in the popliteal area.    Current pain level: 6/10     Changes in function:  None     Adverse reaction to treatment or activity:  None    OBJECTIVE  Changes in objective findings:  Yes, tender upon palpation of L ITB.  Neg Romie's sign, no discoloration in calf.          ASSESSMENT  Yumiko continues to require intervention to meet STG and LTG's: PT  Patient is not progressing as expected.  Response to therapy has shown lack of progress in  pain level  Progress made towards STG/LTG?  None    PLAN  Continue current treatment plan until patient demonstrates readiness to progress to higher level exercises.    PTA/ATC plan:  N/A    Please refer to the daily flowsheet for treatment today, total treatment time and time spent performing 1:1 timed codes.

## 2017-04-18 NOTE — MR AVS SNAPSHOT
After Visit Summary   4/18/2017    Yumiko Flowers    MRN: [id number]           Patient Information     Date Of Birth          1940        Visit Information        Provider Department      4/18/2017 9:10 AM David Moser, PT [facility] Physical Therapy        Today's Diagnoses     Chronic pain of left knee           Follow-ups after your visit        Your next 10 appointments already scheduled     Apr 25, 2017  2:40 PM CDT   EVITA Extremity with David Moser PT   [facility] Physical Therapy ([facility])    [address]  [facility] [id number] [phone]              Who to contact     If you have questions or need follow up information about today's clinic visit or your schedule please contact [facility] ATHLETIC [facility] PHYSICAL THERAPY directly at [phone].  Normal or non-critical lab and imaging results will be communicated to you by [employer], letter or phone within 4 business days after the clinic has received the results. If you do not hear from us within 7 days, please contact the clinic through [employer] or phone. If you have a critical or abnormal lab result, we will notify you by phone as soon as possible.  Submit refill requests through "[employer] or call your pharmacy and they will forward the refill request to us. Please allow 3 business days for your refill to be completed.          Additional Information About Your Visit        MyChart Information     "[employer] gives you secure access to your electronic health record. If you see a primary care provider, you can also send messages to your care team and make appointments. If you have questions, please call your primary care clinic.  If you do not have a primary care provider, please call [phone] and they will assist you.        Care EveryWhere ID     This is your Care EveryWhere ID. This could be used by other  organizations to access your [place] medical records  [id number]         Blood Pressure from Last 3 Encounters:   03/01/17 114/73   02/18/17 144/80   01/12/17 127/74    Weight from Last 3 Encounters:   03/01/17 89.8 kg (198 lb)   02/18/17 89.8 kg (198 lb)   01/25/17 92.5 kg (204 lb)              We Performed the Following     MANUAL THER TECH,1+REGIONS,EA 15 MIN     THERAPEUTIC EXERCISES        Primary Care Provider Office Phone # Fax #    Gavi Bear [phone]       [facility] [address]  [facility] [address]        Thank you!     Thank you for choosing [place] FOR ATHLETIC MEDICINE [facility] PHYSICAL THERAPY  for your care. Our goal is always to provide you with excellent care. Hearing back from our patients is one way we can continue to improve our services. Please take a few minutes to complete the written survey that you may receive in the mail after your visit with us. Thank you!             Your Updated Medication List - Protect others around you: Learn how to safely use, store and throw away your medicines at www.disposemymeds.org.          This list is accurate as of: 4/18/17 11:16 AM.  Always use your most recent med list.                   Brand Name Dispense Instructions for use    * albuterol 108 (90 BASE) MCG/ACT Inhaler    PROAIR HFA/PROVENTIL HFA/VENTOLIN HFA    1 Inhaler    Inhale into the lungs. INHALE 1-2 PUFFS EVERY 4-6 HOURS AS NEEDED       * albuterol (2.5 MG/3ML) 0.083% neb solution     25 vial    Take 1 vial (2.5 mg) by nebulization every 6 hours as needed for shortness of breath / dyspnea or wheezing       calcium 500 MG Chew      None Entered       etodolac 400 MG tablet    LODINE    60 tablet    Take 1 tablet (400 mg) by mouth 2 times daily as needed       gabapentin 600 MG tablet    NEURONTIN    180 tablet    Take 1 tablet (600 mg) by mouth 2 times daily       lisinopril-hydrochlorothiazide 20-12.5 MG per tablet    PRINZIDE/ZESTORETIC     180 tablet    Take 2 tablets by mouth daily Patient due for office visit for further refills.       Multiple Vitamins-Iron Tabs      Take 1 tablet by mouth daily.       predniSONE 20 MG tablet    DELTASONE    10 tablet    Take 1 tablet (20 mg) by mouth 2 times daily       vitamin D 2000 UNITS Caps      Take 2,000 Units by mouth daily.       * Notice:  This list has 2 medication(s) that are the same as other medications prescribed for you. Read the directions carefully, and ask your doctor or other care provider to review them with you.

## 2017-04-28 DIAGNOSIS — I10 ESSENTIAL HYPERTENSION WITH GOAL BLOOD PRESSURE LESS THAN 140/90: ICD-10-CM

## 2017-04-28 NOTE — TELEPHONE ENCOUNTER
lisinopril-hydrochlorothiazide (PRINZIDE,ZESTORETIC) 20-12.5 MG per tablet      Last Written Prescription Date: 11/01/16  Last Fill Quantity: 90, # refills: 1  Last Office Visit with [facility], [facility] or [facility] prescribing provider: 03/01/17       Potassium   Date Value Ref Range Status   11/01/2016 3.9 3.4 - 5.3 mmol/L Final     Creatinine   Date Value Ref Range Status   11/01/2016 0.73 0.52 - 1.04 mg/dL Final     BP Readings from Last 3 Encounters:   03/01/17 114/73   02/18/17 144/80   01/12/17 127/74           Kamilah Pham  [facility]

## 2017-05-02 RX ORDER — LISINOPRIL AND HYDROCHLOROTHIAZIDE 12.5; 2 MG/1; MG/1
TABLET ORAL
Qty: 180 TABLET | Refills: 1 | Status: SHIPPED | OUTPATIENT
Start: 2017-05-02 | End: 2017-10-27

## 2017-05-02 NOTE — TELEPHONE ENCOUNTER
Prescription approved per [facility] Refill Protocol.  MERCED Dejesus, Clinical RN Cynthia Jenkins.

## 2017-08-01 ENCOUNTER — OFFICE VISIT (OUTPATIENT)
Dept: FAMILY MEDICINE | Facility: CLINIC | Age: 77
End: 2017-08-01
Payer: COMMERCIAL

## 2017-08-01 ENCOUNTER — TELEPHONE (OUTPATIENT)
Dept: FAMILY MEDICINE | Facility: CLINIC | Age: 77
End: 2017-08-01

## 2017-08-01 VITALS
SYSTOLIC BLOOD PRESSURE: 126 MMHG | HEIGHT: 65 IN | DIASTOLIC BLOOD PRESSURE: 74 MMHG | OXYGEN SATURATION: 98 % | TEMPERATURE: 97.5 F | BODY MASS INDEX: 34.32 KG/M2 | WEIGHT: 206 LBS | HEART RATE: 70 BPM

## 2017-08-01 DIAGNOSIS — M54.2 CERVICALGIA: ICD-10-CM

## 2017-08-01 DIAGNOSIS — E55.9 VITAMIN D DEFICIENCY: ICD-10-CM

## 2017-08-01 DIAGNOSIS — J45.40 MODERATE PERSISTENT ASTHMA WITHOUT COMPLICATION: ICD-10-CM

## 2017-08-01 DIAGNOSIS — L71.9 ROSACEA: ICD-10-CM

## 2017-08-01 DIAGNOSIS — I10 HTN, GOAL BELOW 140/90: ICD-10-CM

## 2017-08-01 DIAGNOSIS — L71.9 ACNE ROSACEA: Primary | ICD-10-CM

## 2017-08-01 DIAGNOSIS — H81.10 BPPV (BENIGN PAROXYSMAL POSITIONAL VERTIGO), UNSPECIFIED LATERALITY: Primary | ICD-10-CM

## 2017-08-01 DIAGNOSIS — L71.9 ACNE ROSACEA: ICD-10-CM

## 2017-08-01 DIAGNOSIS — M54.5 LOW BACK PAIN, UNSPECIFIED BACK PAIN LATERALITY, UNSPECIFIED CHRONICITY, WITH SCIATICA PRESENCE UNSPECIFIED: ICD-10-CM

## 2017-08-01 PROCEDURE — 99214 OFFICE O/P EST MOD 30 MIN: CPT | Performed by: PREVENTIVE MEDICINE

## 2017-08-01 RX ORDER — GABAPENTIN 600 MG/1
600 TABLET ORAL 2 TIMES DAILY
Qty: 180 TABLET | Refills: 3 | Status: SHIPPED | OUTPATIENT
Start: 2017-08-01 | End: 2018-07-31

## 2017-08-01 RX ORDER — DOXYCYCLINE 40 MG/1
40 CAPSULE ORAL 2 TIMES DAILY
Qty: 90 CAPSULE | Refills: 1 | Status: SHIPPED | OUTPATIENT
Start: 2017-08-01 | End: 2017-08-02

## 2017-08-01 ASSESSMENT — PAIN SCALES - GENERAL: PAINLEVEL: NO PAIN (0)

## 2017-08-01 NOTE — TELEPHONE ENCOUNTER
[facility] is faxing a PA request:    Plan does not cover azelaic acid (AZELEX) 20 % cream AND doxycycline, Rosacea, (ORACEA) 40 MG CPDR CR capsule.  Please call [phone] to initiate Prior Auth or change med.      ID# [id number]      Kamilah Pham  [facility]

## 2017-08-01 NOTE — MR AVS SNAPSHOT
After Visit Summary   8/1/2017    Yumiko Flowers    MRN: [id number]           Patient Information     Date Of Birth          1940        Visit Information        Provider Department      8/1/2017 11:20 AM Gavi Bear MD [facility]        Today's Diagnoses     BPPV (benign paroxysmal positional vertigo), unspecified laterality    -  1    Cervicalgia        Vitamin D deficiency        Moderate persistent asthma without complication        HTN, goal below 140/90        Acne rosacea        Low back pain, unspecified back pain laterality, unspecified chronicity, with sciatica presence unspecified          Care Instructions    Based on your medical history and these are the current health maintenance or preventive care services that you are due for (some may have been done at this visit)  Health Maintenance Due   Topic Date Due     FARA QUESTIONNAIRE 1 YEAR  07/03/1960     PHQ-9 Q1YR  07/03/1960     WELLNESS VISIT Q1 YR  06/11/2015     ASTHMA CONTROL TEST Q6 MOS  05/01/2017     ASTHMA ACTION PLAN Q1 YR  07/21/2017         At [facility], we strive to deliver an exceptional experience to you, every time we see you.    If you receive a survey in the mail, please send us back your thoughts. We really do value your feedback.    Your care team's suggested websites for health information:  Www.[employer].org : Up to date and easily searchable information on multiple topics.  Www.medlineplus.gov : medication info, interactive tutorials, watch real surgeries online  Www.familydoctor.org : good info from the Academy of Family Physicians  Www.cdc.gov : public health info, travel advisories, epidemics (H1N1)  Www.aap.org : children's health info, normal development, vaccinations  Www.health.state[place]us : [place] dept of health, public health issues in [place], N1N1    How to contact your care team:   Team Magdalena/Spirit [phone]         Pharmacy [phone]      Sigrid, Radha Mccormack PA-C, Dr. Bear, Shara GAGNON CNP, Nichole Regalado PA-C, Dr. Serrano, and KALIN Ayers CNP    Team RNs: Jcsander & Gricel      Clinic hours  M-Th 7 am-7 pm   Fri 7 am-5 pm.   Urgent care M-F 11 am-9 pm,   Sat/Sun 9 am-5 pm.  Pharmacy M-Th 8 am-8 pm Fri 8 am-6 pm  Sat/Sun 9 am-5 pm.     All password changes, disabled accounts, or ID changes in [employer]/MyHealth will be done by our Access Services Department.    If you need help with your account or password, call: [phone]. Clinic staff no longer has the ability to change passwords.             Follow-ups after your visit        Additional Services     [facility] PT, HAND, AND CHIROPRACTIC REFERRAL       **This order will print in the [facility] Scheduling Office**    Physical Therapy, Hand Therapy and Chiropractic Care are available through:    *[facility]  *[facility]  *[facility]    Call one number to schedule at any of the above locations: [phone].    Your provider has referred you to: As Indicated:     Indication/Reason for Referral: Neck pain and Vertigo  Onset of Illness: One month  Therapy Orders: Evaluate and Treat  Special Programs: None  Special Request: None    Mallory Tejada      Additional Comments for the Therapist or Chiropractor:     Please be aware that coverage of these services is subject to the terms and limitations of your health insurance plan.  Call member services at your health plan with any benefit or coverage questions.      Please bring the following to your appointment:    *Your personal calendar for scheduling future appointments  *Comfortable clothing            PHYSICAL THERAPY REFERRAL       *This therapy referral will be filtered to a centralized scheduling office at [facility] and the patient will receive a call to schedule an appointment at a [place] location most convenient for them. *     [facility]  "Services provides Physical Therapy evaluation and treatment and many specialty services across the [facility] system.  If requesting a specialty program, please choose from the list below.    If you have not heard from the scheduling office within 2 business days, please call [phone] for all locations, with the exception of [place], please call [phone].  Treatment: Evaluation & Treatment  Special Instructions/Modalities: None  Special Programs: Balance/Vestibular    Please be aware that coverage of these services is subject to the terms and limitations of your health insurance plan.  Call member services at your health plan with any benefit or coverage questions.      **Note to Provider:  If you are referring outside of [facility] for the therapy appointment, please list the name of the location in the \"special instructions\" above, print the referral and give to the patient to schedule the appointment.                  Future tests that were ordered for you today     Open Future Orders        Priority Expected Expires Ordered    Vitamin D Deficiency Routine  12/1/2017 8/1/2017    Basic metabolic panel Routine  12/1/2017 8/1/2017    Albumin Random Urine Quantitative Routine  12/1/2017 8/1/2017    Lipid panel reflex to direct LDL Routine  12/1/2017 8/1/2017            Who to contact     If you have questions or need follow up information about today's clinic visit or your schedule please contact [facility] directly at [phone].  Normal or non-critical lab and imaging results will be communicated to you by MyChart, letter or phone within 4 business days after the clinic has received the results. If you do not hear from us within 7 days, please contact the clinic through MyChart or phone. If you have a critical or abnormal lab result, we will notify you by phone as soon as possible.  Submit refill requests through [employer] or call your pharmacy and they will forward the refill request to us. " "Please allow 3 business days for your refill to be completed.          Additional Information About Your Visit        veri[employer]hart Information     [facility] gives you secure access to your electronic health record. If you see a primary care provider, you can also send messages to your care team and make appointments. If you have questions, please call your primary care clinic.  If you do not have a primary care provider, please call [phone] and they will assist you.        Care EveryWhere ID     This is your Care EveryWhere ID. This could be used by other organizations to access your [facility] medical records  [id number]        Your Vitals Were     Pulse Temperature Height Pulse Oximetry Breastfeeding? BMI (Body Mass Index)    70 97.5  F (36.4  C) (Oral) 5' 4.5\" (1.638 m) 98% No 34.81 kg/m2       Blood Pressure from Last 3 Encounters:   08/01/17 126/74   03/01/17 114/73   02/18/17 144/80    Weight from Last 3 Encounters:   08/01/17 206 lb (93.4 kg)   03/01/17 198 lb (89.8 kg)   02/18/17 198 lb (89.8 kg)              We Performed the Following     [facility] PT, HAND, AND CHIROPRACTIC REFERRAL     PHYSICAL THERAPY REFERRAL          Today's Medication Changes          These changes are accurate as of: 8/1/17 12:07 PM.  If you have any questions, ask your nurse or doctor.               Start taking these medicines.        Dose/Directions    azelaic acid 20 % cream   Commonly known as:  AZELEX   Used for:  Acne rosacea   Started by:  Gavi Bear MD        Apply topically 2 times daily   Quantity:  50 g   Refills:  11       diclofenac 1 % Gel topical gel   Commonly known as:  VOLTAREN   Used for:  Cervicalgia   Started by:  Gavi Bear MD        Apply 2 grams to affected area up to four times daily using enclosed dosing card.   Quantity:  100 g   Refills:  1       doxycycline (Rosacea) 40 MG Cpdr CR capsule   Commonly known as:  ORACEA   Used for:  Acne rosacea   Started by:  Gavi Bear MD        Dose:  40 mg   Take " 1 capsule (40 mg) by mouth 2 times daily   Quantity:  90 capsule   Refills:  1         Stop taking these medicines if you haven't already. Please contact your care team if you have questions.     predniSONE 20 MG tablet   Commonly known as:  DELTASONE   Stopped by:  Gavi Bear MD                Where to get your medicines      These medications were sent to [place] [address] [facility] - [facility], [place] - [address]  [address] [facility] [address]     Phone:  [phone]     azelaic acid 20 % cream    diclofenac 1 % Gel topical gel    doxycycline (Rosacea) 40 MG Cpdr CR capsule    gabapentin 600 MG tablet                Primary Care Provider Office Phone # Fax #    Gavi Bear [phone]       [facility] [address]  [facility] [address]        Equal Access to Services     [facility]: Hadii aad ku hadasho Soomaali, waaxda luqadaha, qaybta kaalmada adeegyada, waxay idiin hayaan adeeg kharaele fiedl . So [facility] [phone].    ATENCIÓN: Si habla español, tiene a church disposición servicios gratuitos de asistencia lingüística. Navame al [phone].    We comply with applicable federal civil rights laws and [place] laws. We do not discriminate on the basis of race, color, national origin, age, disability sex, sexual orientation or gender identity.            Thank you!     Thank you for choosing [facility]  for your care. Our goal is always to provide you with excellent care. Hearing back from our patients is one way we can continue to improve our services. Please take a few minutes to complete the written survey that you may receive in the mail after your visit with us. Thank you!             Your Updated Medication List - Protect others around you: Learn how to safely use, store and throw away your medicines at www.disposemymeds.org.          This list is accurate as of: 8/1/17 12:07 PM.  Always use your most recent med list.                    Brand Name Dispense Instructions for use Diagnosis    * albuterol 108 (90 BASE) MCG/ACT Inhaler    PROAIR HFA/PROVENTIL HFA/VENTOLIN HFA    1 Inhaler    Inhale into the lungs. INHALE 1-2 PUFFS EVERY 4-6 HOURS AS NEEDED    Mild persistent asthma       * albuterol (2.5 MG/3ML) 0.083% neb solution     25 vial    Take 1 vial (2.5 mg) by nebulization every 6 hours as needed for shortness of breath / dyspnea or wheezing    Moderate persistent asthma with acute exacerbation       azelaic acid 20 % cream    AZELEX    50 g    Apply topically 2 times daily    Acne rosacea       calcium 500 MG Chew      None Entered        diclofenac 1 % Gel topical gel    VOLTAREN    100 g    Apply 2 grams to affected area up to four times daily using enclosed dosing card.    Cervicalgia       doxycycline (Rosacea) 40 MG Cpdr CR capsule    ORACEA    90 capsule    Take 1 capsule (40 mg) by mouth 2 times daily    Acne rosacea       etodolac 400 MG tablet    LODINE    60 tablet    Take 1 tablet (400 mg) by mouth 2 times daily as needed    Chronic low back pain without sciatica, unspecified back pain laterality       gabapentin 600 MG tablet    NEURONTIN    180 tablet    Take 1 tablet (600 mg) by mouth 2 times daily    Low back pain, unspecified back pain laterality, unspecified chronicity, with sciatica presence unspecified       lisinopril-hydrochlorothiazide 20-12.5 MG per tablet    PRINZIDE/ZESTORETIC    180 tablet    TAKE 2 TABLETS BY MOUTH DAILY PATIENT DUE FOR OFFICE VISIT FOR FURTHER REFILLS.    Essential hypertension with goal blood pressure less than 140/90       Multiple Vitamins-Iron Tabs      Take 1 tablet by mouth daily.        vitamin D 2000 UNITS Caps      Take 2,000 Units by mouth daily.        * Notice:  This list has 2 medication(s) that are the same as other medications prescribed for you. Read the directions carefully, and ask your doctor or other care provider to review them with you.

## 2017-08-01 NOTE — PATIENT INSTRUCTIONS
Based on your medical history and these are the current health maintenance or preventive care services that you are due for (some may have been done at this visit)  Health Maintenance Due   Topic Date Due     FRAA QUESTIONNAIRE 1 YEAR  07/03/1958     PHQ-9 Q1YR  07/03/1960     WELLNESS VISIT Q1 YR  06/11/2015     ASTHMA CONTROL TEST Q6 MOS  05/01/2017     ASTHMA ACTION PLAN Q1 YR  07/21/2017         At [facility], we strive to deliver an exceptional experience to you, every time we see you.    If you receive a survey in the mail, please send us back your thoughts. We really do value your feedback.    Your care team's suggested websites for health information:  Www.[employer].org : Up to date and easily searchable information on multiple topics.  Www.medlineplus.gov : medication info, interactive tutorials, watch real surgeries online  Www.familydoctor.org : good info from the Academy of Family Physicians  Www.cdc.gov : public health info, travel advisories, epidemics (H1N1)  Www.aap.org : children's health info, normal development, vaccinations  Www.health.[facility].[place].us : [place] dept of health, public health issues in [place], N1N1    How to contact your care team:   Team Magdalena/Spirit [phone]         Pharmacy [phone]    Dr. Matta, Radha Mccormack PA-C, Dr. Bear, Shara GAGNON CNP, Nichole Regalado PA-C, Dr. Serrano, and KALIN Ayers CNP    Team RNs: Claudia & Gricel      Clinic hours  M-Th 7 am-7 pm   Fri 7 am-5 pm.   Urgent care M-F 11 am-9 pm,   Sat/Sun 9 am-5 pm.  Pharmacy M-Th 8 am-8 pm Fri 8 am-6 pm  Sat/Sun 9 am-5 pm.     All password changes, disabled accounts, or ID changes in [employer]/MyHealth will be done by our Access Services Department.    If you need help with your account or password, call: [phone]. Clinic staff no longer has the ability to change passwords.

## 2017-08-01 NOTE — PROGRESS NOTES
SUBJECTIVE:                                                    Yumiko Flowers is a 77 year old female who presents to clinic today for the following health issues:      Patient was in [place] on vacation. No records on Care Everywhere. Has a few discharge papers with minimal information. Was having problems walking, was very dizzy, fell and was taken to the ER. No loss of consciousness and no seizures. MRI and Ct scans of the Brain were negative for Strokes, masses or bleeds. She was diagnosed with having BPPV. Was started on Meclizine 12.5 mg as needed, has not needed this anymore as less nauseated, but still feels off balance.     Hospital Follow-up Visit:    Hospital/Nursing Home/IP Rehab Facility: [place]  Date of Admission: 7/19/17  Date of Discharge: 7/20/17  Reason(s) for Admission: Vertigo, head injury and vomiting            Problems taking medications regularly:  None       Medication changes since discharge: None       Problems adhering to non-medication therapy:  None    Summary of hospitalization:  Discharge summary unavailable  Diagnostic Tests/Treatments reviewed.  Follow up needed: none  Other Healthcare Providers Involved in Patient s Care:         None  Update since discharge: improved.     Post Discharge Medication Reconciliation: unable to reconcile discharge medications due to lack of records.  Plan of care communicated with patient     Coding guidelines for this visit:  Type of Medical   Decision Making Face-to-Face Visit       within 7 Days of discharge Face-to-Face Visit        within 14 days of discharge   Moderate Complexity 29119 [id number]   High Complexity 21297 58260              Musculoskeletal problem/pain      Duration: Over a month     Description  Location: Neck    Intensity:  moderate    Accompanying signs and symptoms: none    History  Previous similar problem: YES  Previous evaluation:  none    Precipitating or alleviating factors:  Trauma or overuse: no   Aggravating factors  "include: none    Therapies tried and outcome: ice        Problem list and histories reviewed & adjusted, as indicated.  Additional history: as documented    Patient Active Problem List   Diagnosis     Asthma, moderate persistent     Acne rosacea     CARDIOVASCULAR SCREENING; LDL GOAL LESS THAN 130     Osteopenia     Vitamin D deficiency     Advanced directives, counseling/discussion     Hx of pseudoexfoliation, os     Pseudophakia, ou; Yag Caps, os     HTN, goal below 140/90     Peptic ulcer     Health Care Home     History of blepharoplasty, upper lid, ou (elsewhere); revision (EN)     Obesity, Class II, BMI 35-39.9     Restless legs syndrome     DJD (degenerative joint disease), lumbosacral     Rosacea     Essential hypertension with goal blood pressure less than 140/90     Obesity, Class I, BMI 30-34.9     Posterior vitreous detachment, bilateral     Chronic pain of left knee     Past Surgical History:   Procedure Laterality Date     BLEPHAROPLASTY BILATERAL  3/9/2006; 2013    both eyes, upper lid; revision (EN)     C APPENDECTOMY       CATARACT IOL, RT/LT       HC REMOVAL GALLBLADDER       LASER YAG CAPSULOTOMY      left eye (AC lysis also)     PHACOEMULSIFICATION WITH STANDARD INTRAOCULAR LENS IMPLANT  1/2012; 4/2013    right eye; left eye     SURGICAL HISTORY OF -       endometriosis surgeriesx3     SURGICAL HISTORY OF -       ganiglion cyst onfoot     SURGICAL HISTORY OF -       Eye for \"droopy eye\"       Social History   Substance Use Topics     Smoking status: Never Smoker     Smokeless tobacco: Never Used     Alcohol use Yes     Family History   Problem Relation Age of Onset     C.A.D. Father      C.A.D. Brother      Hypertension Mother      CANCER No family hx of      DIABETES No family hx of      CEREBROVASCULAR DISEASE No family hx of      Thyroid Disease No family hx of      Glaucoma No family hx of      Macular Degeneration No family hx of          Current Outpatient Prescriptions   Medication " Sig Dispense Refill     diclofenac (VOLTAREN) 1 % GEL topical gel Apply 2 grams to affected area up to four times daily using enclosed dosing card. 100 g 1     doxycycline, Rosacea, (ORACEA) 40 MG CPDR CR capsule Take 1 capsule (40 mg) by mouth 2 times daily 90 capsule 1     azelaic acid (AZELEX) 20 % cream Apply topically 2 times daily 50 g 11     gabapentin (NEURONTIN) 600 MG tablet Take 1 tablet (600 mg) by mouth 2 times daily 180 tablet 3     lisinopril-hydrochlorothiazide (PRINZIDE/ZESTORETIC) 20-12.5 MG per tablet TAKE 2 TABLETS BY MOUTH DAILY PATIENT DUE FOR OFFICE VISIT FOR FURTHER REFILLS. 180 tablet 1     albuterol (2.5 MG/3ML) 0.083% neb solution Take 1 vial (2.5 mg) by nebulization every 6 hours as needed for shortness of breath / dyspnea or wheezing 25 vial 1     albuterol (PROAIR HFA/PROVENTIL HFA/VENTOLIN HFA) 108 (90 BASE) MCG/ACT Inhaler Inhale into the lungs. INHALE 1-2 PUFFS EVERY 4-6 HOURS AS NEEDED 1 Inhaler 0     etodolac (LODINE) 400 MG tablet Take 1 tablet (400 mg) by mouth 2 times daily as needed 60 tablet 0     Multiple Vitamins-Iron TABS Take 1 tablet by mouth daily.       Cholecalciferol (VITAMIN D) 2000 UNIT CAPS Take 2,000 Units by mouth daily.       CALCIUM 500 MG PO CHEW None Entered       [DISCONTINUED] gabapentin (NEURONTIN) 600 MG tablet Take 1 tablet (600 mg) by mouth 2 times daily 180 tablet 3     Allergies   Allergen Reactions     Erythromycin Swelling and Other (See Comments)     BP Readings from Last 3 Encounters:   08/01/17 126/74   03/01/17 114/73   02/18/17 144/80    Wt Readings from Last 3 Encounters:   08/01/17 206 lb (93.4 kg)   03/01/17 198 lb (89.8 kg)   02/18/17 198 lb (89.8 kg)               Reviewed and updated as needed this visit by clinical staffTobacco  Allergies  Meds       Reviewed and updated as needed this visit by Provider         ROS:  Constitutional, neuro, ENT, endocrine, pulmonary, cardiac, gastrointestinal, genitourinary, musculoskeletal, integument  "and psychiatric systems are negative, except as otherwise noted.      OBJECTIVE:                                                    /74  Pulse 70  Temp 97.5  F (36.4  C) (Oral)  Ht 5' 4.5\" (1.638 m)  Wt 206 lb (93.4 kg)  SpO2 98%  Breastfeeding? No  BMI 34.81 kg/m2  Body mass index is 34.81 kg/(m^2).  GENERAL APPEARANCE: healthy, alert and no distress  EYES: Eyes grossly normal to inspection, PERRL and conjunctivae and sclerae normal  HENT: ear canals and TM's normal and nose and mouth without ulcers or lesions  NECK: no adenopathy, no asymmetry, masses, or scars and trachea midline and normal to palpation  RESP: lungs clear to auscultation - no rales, rhonchi or wheezes  CV: regular rates and rhythm, normal S1 S2, no S3 or S4 and no murmur, click or rub  ABDOMEN: soft, non-tender  MS: extremities normal- no gross deformities noted and peripheral pulses normal  SKIN: Erythematous papules noted on forehead and cheeks   NEURO: Normal strength and tone, mentation intact, speech normal, DTR symmetrically normal in lower extremities, gait normal including heel/toe/tandem walking and normal strength throughout  PSYCH: mentation appears normal    Diagnostic test results:  Diagnostic Test Results:  No results found for this or any previous visit (from the past 24 hour(s)).       ASSESSMENT/PLAN:                                                    1. BPPV (benign paroxysmal positional vertigo), unspecified laterality  -Was diagnosed in [place], MRI Brain negative for Stroke/bleeds  - [facility] PT, HAND, AND CHIROPRACTIC REFERRAL  - PHYSICAL THERAPY REFERRAL    2. Cervicalgia  -Alternating heat and ice   - EVITA PT, HAND, AND CHIROPRACTIC REFERRAL  - diclofenac (VOLTAREN) 1 % GEL topical gel; Apply 2 grams to affected area up to four times daily using enclosed dosing card.  Dispense: 100 g; Refill: 1    3. Vitamin D deficiency  - Vitamin D Deficiency; Future    4. Moderate persistent asthma without " complication  -Stable  -ACT today is 25    5. HTN, goal below 140/90  -Continue current medication   - Basic metabolic panel; Future  - Albumin Random Urine Quantitative; Future  - Lipid panel reflex to direct LDL; Future    6. Acne rosacea  -Maintenance dose of Doxycycline   - doxycycline, Rosacea, (ORACEA) 40 MG CPDR CR capsule; Take 1 capsule (40 mg) by mouth 2 times daily  Dispense: 90 capsule; Refill: 1  - azelaic acid (AZELEX) 20 % cream; Apply topically 2 times daily  Dispense: 50 g; Refill: 11    7. Low back pain, unspecified back pain laterality, unspecified chronicity, with sciatica presence unspecified  - gabapentin (NEURONTIN) 600 MG tablet; Take 1 tablet (600 mg) by mouth 2 times daily  Dispense: 180 tablet; Refill: 3      Follow up with Provider - 6 months, sooner if any changes or concerns      Gavi Bear MD MPH    [facility]

## 2017-08-01 NOTE — NURSING NOTE
"Chief Complaint   Patient presents with     Hospital F/U       Initial /74  Pulse 70  Temp 97.5  F (36.4  C) (Oral)  Ht 5' 4.5\" (1.638 m)  Wt 206 lb (93.4 kg)  SpO2 98%  Breastfeeding? No  BMI 34.81 kg/m2 Estimated body mass index is 34.81 kg/(m^2) as calculated from the following:    Height as of this encounter: 5' 4.5\" (1.638 m).    Weight as of this encounter: 206 lb (93.4 kg).  Medication Reconciliation: complete   Yara BERUMEN        "

## 2017-08-02 RX ORDER — DOXYCYCLINE HYCLATE 100 MG
100 TABLET ORAL 2 TIMES DAILY
Qty: 180 TABLET | Refills: 0 | Status: SHIPPED | OUTPATIENT
Start: 2017-08-02 | End: 2018-05-19

## 2017-08-02 ASSESSMENT — ASTHMA QUESTIONNAIRES: ACT_TOTALSCORE: 25

## 2017-08-02 ASSESSMENT — PATIENT HEALTH QUESTIONNAIRE - PHQ9: SUM OF ALL RESPONSES TO PHQ QUESTIONS 1-9: 4

## 2017-08-02 NOTE — TELEPHONE ENCOUNTER
Plan prefers other alternatives for both medications.   PA# [phone]    Azelaic Acid:  Tretinoin  Tazorac  Adapalene   Metronidazole    Doxycycline, Rosacea:  Doxycycline Monohydrate  Doxycycline Hyclate      Routing to provider to advise.  Rachel Lobo MA

## 2017-08-02 NOTE — TELEPHONE ENCOUNTER
Pt called back, staff verbalize the PCP note below.  PT verbalize understood.  CASSIE Leigh (AMAA)

## 2017-08-02 NOTE — TELEPHONE ENCOUNTER
This writer attempted to contact Yumiko on 08/02/17      Reason for call medication change and left message to return call.      When patient calls back, please contact 2nd floor [place] Care Team (MA/TC). If no one available, document that pt called and route to care team. routine priority .          Rachel Lobo CMA

## 2017-08-02 NOTE — TELEPHONE ENCOUNTER
Please call patient to let her know that her insurance does not cover the lower 40 mg dose of Doxycycline and the topical medication for rosacea. Hence, I sent in a script for the 100 mg twice a day dose that is covered.  Will defer topical medication at this time since she has not responded to the ones on formulary in the past.    Thanks,    Gavi Bear MD MPH

## 2017-08-03 ENCOUNTER — THERAPY VISIT (OUTPATIENT)
Dept: PHYSICAL THERAPY | Facility: CLINIC | Age: 77
End: 2017-08-03
Payer: COMMERCIAL

## 2017-08-03 DIAGNOSIS — M54.2 CERVICALGIA: Primary | ICD-10-CM

## 2017-08-03 PROCEDURE — G8982 BODY POS GOAL STATUS: HCPCS | Mod: GP | Performed by: PHYSICAL THERAPIST

## 2017-08-03 PROCEDURE — 97161 PT EVAL LOW COMPLEX 20 MIN: CPT | Mod: GP | Performed by: PHYSICAL THERAPIST

## 2017-08-03 PROCEDURE — 97110 THERAPEUTIC EXERCISES: CPT | Mod: GP | Performed by: PHYSICAL THERAPIST

## 2017-08-03 PROCEDURE — 97112 NEUROMUSCULAR REEDUCATION: CPT | Mod: GP | Performed by: PHYSICAL THERAPIST

## 2017-08-03 PROCEDURE — G8981 BODY POS CURRENT STATUS: HCPCS | Mod: GP | Performed by: PHYSICAL THERAPIST

## 2017-08-03 PROCEDURE — 97010 HOT OR COLD PACKS THERAPY: CPT | Mod: GP | Performed by: PHYSICAL THERAPIST

## 2017-08-03 NOTE — MR AVS SNAPSHOT
After Visit Summary   8/3/2017    Yumiko Flowers    MRN: [id number]           Patient Information     Date Of Birth          1940        Visit Information        Provider Department      8/3/2017 9:30 AM Juanito Madsen PT [facility]        Today's Diagnoses     Cervicalgia    -  1       Follow-ups after your visit        Your next 10 appointments already scheduled     Aug 08, 2017  8:10 AM CDT   EVITA Spine with Juanito Madsen PT   [facility] ([facility] [place]  )    [address]  [facility] [address]   [phone]              Who to contact     If you have questions or need follow up information about today's clinic visit or your schedule please contact [facility] directly at [phone].  Normal or non-critical lab and imaging results will be communicated to you by MyChart, letter or phone within 4 business days after the clinic has received the results. If you do not hear from us within 7 days, please contact the clinic through LookUPhart or phone. If you have a critical or abnormal lab result, we will notify you by phone as soon as possible.  Submit refill requests through LoveLula or call your pharmacy and they will forward the refill request to us. Please allow 3 business days for your refill to be completed.          Additional Information About Your Visit        MyChart Information     LoveLula gives you secure access to your electronic health record. If you see a primary care provider, you can also send messages to your care team and make appointments. If you have questions, please call your primary care clinic.  If you do not have a primary care provider, please call [phone] and they will assist you.        Care EveryWhere ID     This is your Care EveryWhere ID. This could be used by other organizations to access your [place] medical records  [id number]          Blood Pressure from Last 3 Encounters:   08/01/17 126/74   03/01/17 114/73   02/18/17 144/80    Weight from Last 3 Encounters:   08/01/17 93.4 kg (206 lb)   03/01/17 89.8 kg (198 lb)   02/18/17 89.8 kg (198 lb)              We Performed the Following     Hot or Cold Packs Therapy     EVITA CERT REPORT     EVITA Inital Eval Report     Neuromuscular Re-Education     PT Eval, Low Complexity (60439)     Therapeutic Exercises        Primary Care Provider Office Phone # Fax #    Gavi Bear [phone]       [facility] [address] [facility] [address]        Equal Access to Services     SOFIE CINTRON : Hadii aad ku hadasho Soomaali, waaxda luqadaha, qaybta kaalmada adeegyada, waxay idiin hayramos field . So [facility] [phone].    ATENCIÓN: Si habla español, tiene a church disposición servicios gratuitos de asistencia lingüística. Llame al [phone].    We comply with applicable federal civil rights laws and [place] laws. We do not discriminate on the basis of race, color, national origin, age, disability sex, sexual orientation or gender identity.            Thank you!     Thank you for choosing [facility]  for your care. Our goal is always to provide you with excellent care. Hearing back from our patients is one way we can continue to improve our services. Please take a few minutes to complete the written survey that you may receive in the mail after your visit with us. Thank you!             Your Updated Medication List - Protect others around you: Learn how to safely use, store and throw away your medicines at www.disposemymeds.org.          This list is accurate as of: 8/3/17 10:07 PM.  Always use your most recent med list.                   Brand Name Dispense Instructions for use Diagnosis    * albuterol 108 (90 BASE) MCG/ACT Inhaler    PROAIR HFA/PROVENTIL HFA/VENTOLIN HFA    1 Inhaler    Inhale into the lungs. INHALE 1-2 PUFFS  EVERY 4-6 HOURS AS NEEDED    Mild persistent asthma       * albuterol (2.5 MG/3ML) 0.083% neb solution     25 vial    Take 1 vial (2.5 mg) by nebulization every 6 hours as needed for shortness of breath / dyspnea or wheezing    Moderate persistent asthma with acute exacerbation       calcium 500 MG Chew      None Entered        diclofenac 1 % Gel topical gel    VOLTAREN    100 g    Apply 2 grams to affected area up to four times daily using enclosed dosing card.    Cervicalgia       doxycycline 100 MG tablet    VIBRA-TABS    180 tablet    Take 1 tablet (100 mg) by mouth 2 times daily    Rosacea       etodolac 400 MG tablet    LODINE    60 tablet    Take 1 tablet (400 mg) by mouth 2 times daily as needed    Chronic low back pain without sciatica, unspecified back pain laterality       gabapentin 600 MG tablet    NEURONTIN    180 tablet    Take 1 tablet (600 mg) by mouth 2 times daily    Low back pain, unspecified back pain laterality, unspecified chronicity, with sciatica presence unspecified       lisinopril-hydrochlorothiazide 20-12.5 MG per tablet    PRINZIDE/ZESTORETIC    180 tablet    TAKE 2 TABLETS BY MOUTH DAILY PATIENT DUE FOR OFFICE VISIT FOR FURTHER REFILLS.    Essential hypertension with goal blood pressure less than 140/90       Multiple Vitamins-Iron Tabs      Take 1 tablet by mouth daily.        vitamin D 2000 UNITS Caps      Take 2,000 Units by mouth daily.        * Notice:  This list has 2 medication(s) that are the same as other medications prescribed for you. Read the directions carefully, and ask your doctor or other care provider to review them with you.

## 2017-08-03 NOTE — PROGRESS NOTES
[place] [facility] Initial Evaluation      Subjective:    Patient is a 77 year old female presenting with rehab cervical spine hpi.     Condition occurred with:  Insidious onset.  Condition occurred: for unknown reasons.  This is a new condition  June 2017.    Patient reports pain:  Cervical left side.    Pain is described as sharp and aching and is constant and reported as 9/10.  Associated symptoms:  Headache and loss of motion/stiffness (HAS VERTIGO). Pain is the same all the time.  Symptoms are exacerbated by rotating head, looking up or down, lifting and driving and relieved by ice.  Since onset symptoms are gradually worsening.  Special testing: NONE.  Previous treatment: NONE.    General health as reported by patient is good.  Pertinent medical history includes:  High blood pressure.  Medical allergies: no.  Other surgeries include:  No.  Current medications:  High blood pressure medication and sleep medication.  Current occupation is RETIRED.        Barriers include:  None as reported by the patient.    Red flags:  None as reported by the patient.                        Objective:    System    Physical Exam    Ruth Cervical Evaluation    Posture:  Sitting: poor        Correction of Posture: better    Movement Loss:  Protrusion (PRO): pain  Flexion (Flex): pain  Retraction (RET): pain  Extension (EXT): pain  Lateral Flexion Right (LF R): pain and major  Lateral Flexion Left (LF L): pain and major  Rotation Right (ROT R): pain and min  Rotation Left (ROT L): pain and mod  Test Movements:    PRO: During: increases      RET: During: increases    Repeat RET: During: centralizing    RET EXT: During: centralizing    Repeat RET EXT: During: centralizing                          Conclusion: derangement  Principle of Treatment:  Posture Correction: IN SITTING WITH TOWEL ROLL.     Extension: RETRACTION    Other: NEUTRAL SPINE, THER EX, THER ACT, NMR, MANUAL THERAPY.                                           ROS    Assessment/Plan:      Patient is a 77 year old female with cervical complaints.    Patient has the following significant findings with corresponding treatment plan.                Diagnosis 1:  CERVICALGIA  Pain -  hot/cold therapy, US, electric stimulation, mechanical traction, manual therapy, splint/taping/bracing/orthotics, self management, education, directional preference exercise and home program  Decreased ROM/flexibility - manual therapy, therapeutic exercise, therapeutic activity and home program  Decreased function - therapeutic activities and home program  Impaired posture - neuro re-education, therapeutic activities and home program    Therapy Evaluation Codes:   1) History comprised of:   Personal factors that impact the plan of care:      None.    Comorbidity factors that impact the plan of care are:      None.     Medications impacting care: None.  2) Examination of Body Systems comprised of:   Body structures and functions that impact the plan of care:      Cervical spine.   Activity limitations that impact the plan of care are:      Bending, Cooking, Driving, Reading/Computer work and Sitting.  3) Clinical presentation characteristics are:   Stable/Uncomplicated.  4) Decision-Making    Low complexity using standardized patient assessment instrument and/or measureable assessment of functional outcome.  Cumulative Therapy Evaluation is: Low complexity.    Previous and current functional limitations:  (See Goal Flow Sheet for this information)    Short term and Long term goals: (See Goal Flow Sheet for this information)     Communication ability:  Patient appears to be able to clearly communicate and understand verbal and written communication and follow directions correctly.  Treatment Explanation - The following has been discussed with the patient:   RX ordered/plan of care  Anticipated outcomes  Possible risks and side effects  This patient would benefit from PT intervention to resume  normal activities.   Rehab potential is good.    Frequency:  1 X week, once daily  Duration:  for 6 weeks  Discharge Plan:  Achieve all LTG.  Independent in home treatment program.  Reach maximal therapeutic benefit.    Please refer to the daily flowsheet for treatment today, total treatment time and time spent performing 1:1 timed codes.

## 2017-08-08 ENCOUNTER — TELEPHONE (OUTPATIENT)
Dept: FAMILY MEDICINE | Facility: CLINIC | Age: 77
End: 2017-08-08

## 2017-08-08 ENCOUNTER — THERAPY VISIT (OUTPATIENT)
Dept: PHYSICAL THERAPY | Facility: CLINIC | Age: 77
End: 2017-08-08
Payer: COMMERCIAL

## 2017-08-08 DIAGNOSIS — M54.2 CERVICALGIA: ICD-10-CM

## 2017-08-08 PROCEDURE — 97110 THERAPEUTIC EXERCISES: CPT | Mod: GP | Performed by: PHYSICAL THERAPIST

## 2017-08-08 PROCEDURE — 97035 APP MDLTY 1+ULTRASOUND EA 15: CPT | Mod: GP | Performed by: PHYSICAL THERAPIST

## 2017-08-08 PROCEDURE — 97140 MANUAL THERAPY 1/> REGIONS: CPT | Mod: GP | Performed by: PHYSICAL THERAPIST

## 2017-08-08 NOTE — TELEPHONE ENCOUNTER
..Reason for Call:   prescription    Detailed comments: Magnus called from pharmacy to see if prior authorization was received for [employer]. Magnus said it was faxed on 08/01/2017    Phone Number Patient can be reached at: [phone]    Best Time: anytime    Can we leave a detailed message on this number? YES    Call taken on 8/8/2017 at 2:55 PM by Steve Cabrera

## 2017-08-15 ENCOUNTER — THERAPY VISIT (OUTPATIENT)
Dept: PHYSICAL THERAPY | Facility: CLINIC | Age: 77
End: 2017-08-15
Payer: COMMERCIAL

## 2017-08-15 DIAGNOSIS — M54.2 CERVICALGIA: ICD-10-CM

## 2017-08-15 PROCEDURE — 97140 MANUAL THERAPY 1/> REGIONS: CPT | Mod: GP | Performed by: PHYSICAL THERAPIST

## 2017-08-15 PROCEDURE — 97110 THERAPEUTIC EXERCISES: CPT | Mod: GP | Performed by: PHYSICAL THERAPIST

## 2017-08-16 ENCOUNTER — THERAPY VISIT (OUTPATIENT)
Dept: PHYSICAL THERAPY | Facility: CLINIC | Age: 77
End: 2017-08-16
Payer: COMMERCIAL

## 2017-08-16 DIAGNOSIS — R42 VERTIGO: Primary | ICD-10-CM

## 2017-08-16 PROCEDURE — G8982 BODY POS GOAL STATUS: HCPCS | Mod: GP | Performed by: PHYSICAL THERAPIST

## 2017-08-16 PROCEDURE — 97162 PT EVAL MOD COMPLEX 30 MIN: CPT | Mod: GP | Performed by: PHYSICAL THERAPIST

## 2017-08-16 PROCEDURE — G8981 BODY POS CURRENT STATUS: HCPCS | Mod: GP | Performed by: PHYSICAL THERAPIST

## 2017-08-16 NOTE — MR AVS SNAPSHOT
After Visit Summary   8/16/2017    Yumiko Flowers    MRN: [id number]           Patient Information     Date Of Birth          1940        Visit Information        Provider Department      8/16/2017 10:10 AM Lisa Maria, RENE [facility]        Today's Diagnoses     Vertigo    -  1       Follow-ups after your visit        Your next 10 appointments already scheduled     Aug 22, 2017  9:30 AM CDT   EVITA Spine with Juanito Madsen PT   [facility] ([facility]  )    [address]  [facility] [address]   [phone]            Aug 22, 2017  3:50 PM CDT   EVITA Spine with Lisa Maria PT   [facility] ([facility]  )    [address] [facility] [address]   [phone]              Who to contact     If you have questions or need follow up information about today's clinic visit or your schedule please contact [facility] directly at [phone].  Normal or non-critical lab and imaging results will be communicated to you by TapDoghart, letter or phone within 4 business days after the clinic has received the results. If you do not hear from us within 7 days, please contact the clinic through TapDoghart or phone. If you have a critical or abnormal lab result, we will notify you by phone as soon as possible.  Submit refill requests through Bioaxial or call your pharmacy and they will forward the refill request to us. Please allow 3 business days for your refill to be completed.          Additional Information About Your Visit        MyChart Information     Bioaxial gives you secure access to your electronic health record. If you see a primary care provider, you can also send messages to your care team and make appointments. If you have questions, please call your primary care clinic.  If you do not have a primary care provider, please call  July 11, 2017         Patient: Sharmin Duval   YOB: 1991   Date of Visit: 7/11/2017           To Whom it May Concern:    Sharmin Duval was seen in my clinic on 7/11/2017. She should be off work for 2 days due to illness.     If you have any questions or concerns, please don't hesitate to call.        Sincerely,           REMEDIOS Smiley.  Electronically Signed      [phone] and they will assist you.        Care EveryWhere ID     This is your Care EveryWhere ID. This could be used by other organizations to access your [place] medical records  [id number]         Blood Pressure from Last 3 Encounters:   08/01/17 126/74   03/01/17 114/73   02/18/17 144/80    Weight from Last 3 Encounters:   08/01/17 93.4 kg (206 lb)   03/01/17 89.8 kg (198 lb)   02/18/17 89.8 kg (198 lb)              We Performed the Following     HC PT EVAL, MODERATE COMPLEXITY     EVITA INITIAL EVAL REPORT        Primary Care Provider Office Phone # Fax #    Gavi Bear [phone]       [address]  [facility] [address]        Equal Access to Services     [facility]: Hadii aad ku hadasho Soomaali, waaxda luqadaha, qaybta kaalmada adeegyada, waxay tishain hayramos field . So [facility] [phone].    ATENCIÓN: Si habla español, tiene a church disposición servicios gratuitos de asistencia lingüística. [place] al [phone].    We comply with applicable federal civil rights laws and [place] laws. We do not discriminate on the basis of race, color, national origin, age, disability sex, sexual orientation or gender identity.            Thank you!     Thank you for choosing [place] [facility]  for your care. Our goal is always to provide you with excellent care. Hearing back from our patients is one way we can continue to improve our services. Please take a few minutes to complete the written survey that you may receive in the mail after your visit with us. Thank you!             Your Updated Medication List - Protect others around you: Learn how to safely use, store and throw away your medicines at www.disposemymeds.org.          This list is accurate as of: 8/16/17  1:40 PM.  Always use your most recent med list.                   Brand Name Dispense Instructions for use Diagnosis    * albuterol 108 (90 BASE) MCG/ACT Inhaler    PROAIR HFA/PROVENTIL  HFA/VENTOLIN HFA    1 Inhaler    Inhale into the lungs. INHALE 1-2 PUFFS EVERY 4-6 HOURS AS NEEDED    Mild persistent asthma       * albuterol (2.5 MG/3ML) 0.083% neb solution     25 vial    Take 1 vial (2.5 mg) by nebulization every 6 hours as needed for shortness of breath / dyspnea or wheezing    Moderate persistent asthma with acute exacerbation       calcium 500 MG Chew      None Entered        diclofenac 1 % Gel topical gel    VOLTAREN    100 g    Apply 2 grams to affected area up to four times daily using enclosed dosing card.    Cervicalgia       doxycycline 100 MG tablet    VIBRA-TABS    180 tablet    Take 1 tablet (100 mg) by mouth 2 times daily    Rosacea       etodolac 400 MG tablet    LODINE    60 tablet    Take 1 tablet (400 mg) by mouth 2 times daily as needed    Chronic low back pain without sciatica, unspecified back pain laterality       gabapentin 600 MG tablet    NEURONTIN    180 tablet    Take 1 tablet (600 mg) by mouth 2 times daily    Low back pain, unspecified back pain laterality, unspecified chronicity, with sciatica presence unspecified       lisinopril-hydrochlorothiazide 20-12.5 MG per tablet    PRINZIDE/ZESTORETIC    180 tablet    TAKE 2 TABLETS BY MOUTH DAILY PATIENT DUE FOR OFFICE VISIT FOR FURTHER REFILLS.    Essential hypertension with goal blood pressure less than 140/90       Multiple Vitamins-Iron Tabs      Take 1 tablet by mouth daily.        vitamin D 2000 UNITS Caps      Take 2,000 Units by mouth daily.        * Notice:  This list has 2 medication(s) that are the same as other medications prescribed for you. Read the directions carefully, and ask your doctor or other care provider to review them with you.

## 2017-08-16 NOTE — PROGRESS NOTES
[place] [facility] Initial Evaluation      Subjective:    Patient is a 77 year old female presenting with rehab general hpi. The history is provided by the patient.   Yumiko Flowers is a 77 year old female with other and poor balance (vertigo) condition which occurred with other.      This is a new condition  Pt notes that she started having her dizziness when she was in [place] in July 2017.  She drove down there, and about 5 days after arriving in [place] she came back to her room after having a couple of drinks and ended up having to go the the ER the next day because she was so dizzy she was throwing up and getting dehydrated.  They did the test at the hospital (Bharath-Hallpike) and then tried the correction but she was throwing up so badly that they had to stop.  She has continued to have the vertigo daily, sometimes several times/day.    Progression since onset: unchanged  Frequency of episodes/time of day: at least 10 episodes in 24 hr period, both during the day and at night  Duration of episodes: <2 minutes  Exacerbating factors: turning head, standing up quickly, walking  Relieving factors: rest, sitting still  Previous treatments:  [place]-Hallpike (but not completed), Meclizine (not taking it currently)  Special tests/diagnostics performed: [place]-Hallpike, fluids via IVs, head CT scan and MRI (negative)  Significant medical hx: see initial exam from cervical evaluation  Meds: listed in Epic  Occupation: retired  General health reported by patient: good  Red Flags: none      OBJECTIVE:  Cervical ROM screen: WFLs, >30 deg for all movements, dizzy w/ext>all other movements  Oculomotor/gaze stability screen: negative for nystagmus, but L eye blinks more w/L head movement and L eye movement  Vertebral artery test: positive for dizziness w/45 deg rot combined w/cervical ext in a seated position.  Hallpike-Bharath maneuver:  R: negative  L: negative for nystagmus, just dizziness  Horizontal canal test:   R: not tested  L: not tested  Balance testing:  No time to test today.    Patient reports pain:  Head (on L).  Pain is described as aching (pressure)     and is intermittent and reported as 5/10 (8/10 at end of testing today). Associated symptoms:  Loss of balance, headache, dizziness and loss of motion. Pain is the same all the time.  Symptoms are exacerbated by moving head and relieved by rest.  Special tests:  CT scan and MRI.    Previous treatment: at hospital, tried the [place]y. There was no improvement following previous treatment.    General health as reported by patient is good. Pertinent medical history includes:  Osteoarthritis (listed in Epic).Medical allergies: yes (listed in Epic).Medication history: listed in Epic.  Current occupation is retired.                  Red flags:  Severe dizziness.                      Objective:    System    Physical Exam    General     ROS    Assessment/Plan:      Patient is a 77 year old female with vertigo complaints.    Patient has the following significant findings with corresponding treatment plan.                Diagnosis 1:  Vertigo w/o BPPV  Decreased ROM/flexibility - manual therapy and therapeutic exercise  Impaired balance - neuro re-education and therapeutic activities  Decreased function - therapeutic activities    Therapy Evaluation Codes:   1) History comprised of:   Personal factors that impact the plan of care:      None.    Comorbidity factors that impact the plan of care are:      Dizziness and Osteoarthritis.     Medications impacting care: meclizine.  2) Examination of Body Systems comprised of:   Body structures and functions that impact the plan of care:      Cervical spine and head/dizziness.   Activity limitations that impact the plan of care are:      Bending, Reading/Computer work, Standing, Walking, Laying down and head movements.  3) Clinical presentation characteristics are:   Evolving/Changing.  4) Decision-Making    Moderate complexity using  standardized patient assessment instrument and/or measureable assessment of functional outcome.  Cumulative Therapy Evaluation is: Moderate complexity.    Previous and current functional limitations:  (See Goal Flow Sheet for this information)    Short term and Long term goals: (See Goal Flow Sheet for this information)     Communication ability:  Patient appears to be able to clearly communicate and understand verbal and written communication and follow directions correctly.  Treatment Explanation - The following has been discussed with the patient:   RX ordered/plan of care  Anticipated outcomes  Possible risks and side effects  This patient would benefit from PT intervention to resume normal activities.   Rehab potential is fair.    Frequency:  1 X week, once daily  Duration:  for 6 weeks  Discharge Plan:  Achieve all LTG.  Independent in home treatment program.  Reach maximal therapeutic benefit.    Please refer to the daily flowsheet for treatment today, total treatment time and time spent performing 1:1 timed codes.

## 2017-08-21 ENCOUNTER — TRANSFERRED RECORDS (OUTPATIENT)
Dept: HEALTH INFORMATION MANAGEMENT | Facility: CLINIC | Age: 77
End: 2017-08-21

## 2017-08-22 ENCOUNTER — THERAPY VISIT (OUTPATIENT)
Dept: PHYSICAL THERAPY | Facility: CLINIC | Age: 77
End: 2017-08-22
Payer: COMMERCIAL

## 2017-08-22 DIAGNOSIS — R42 VERTIGO: ICD-10-CM

## 2017-08-22 DIAGNOSIS — M54.2 CERVICALGIA: ICD-10-CM

## 2017-08-22 PROCEDURE — 97140 MANUAL THERAPY 1/> REGIONS: CPT | Mod: GP | Performed by: PHYSICAL THERAPIST

## 2017-08-22 PROCEDURE — 97112 NEUROMUSCULAR REEDUCATION: CPT | Mod: GP | Performed by: PHYSICAL THERAPIST

## 2017-08-22 PROCEDURE — 97110 THERAPEUTIC EXERCISES: CPT | Mod: GP | Performed by: PHYSICAL THERAPIST

## 2017-08-22 PROCEDURE — 97035 APP MDLTY 1+ULTRASOUND EA 15: CPT | Mod: GP | Performed by: PHYSICAL THERAPIST

## 2017-08-29 ENCOUNTER — THERAPY VISIT (OUTPATIENT)
Dept: PHYSICAL THERAPY | Facility: CLINIC | Age: 77
End: 2017-08-29
Payer: COMMERCIAL

## 2017-08-29 DIAGNOSIS — M54.2 CERVICALGIA: ICD-10-CM

## 2017-08-29 DIAGNOSIS — R42 VERTIGO: ICD-10-CM

## 2017-08-29 DIAGNOSIS — E55.9 VITAMIN D DEFICIENCY: ICD-10-CM

## 2017-08-29 DIAGNOSIS — I10 HTN, GOAL BELOW 140/90: ICD-10-CM

## 2017-08-29 PROCEDURE — 97140 MANUAL THERAPY 1/> REGIONS: CPT | Mod: GP | Performed by: PHYSICAL THERAPIST

## 2017-08-29 PROCEDURE — 97112 NEUROMUSCULAR REEDUCATION: CPT | Mod: GP | Performed by: PHYSICAL THERAPIST

## 2017-08-29 PROCEDURE — 97012 MECHANICAL TRACTION THERAPY: CPT | Mod: GP | Performed by: PHYSICAL THERAPIST

## 2017-09-01 ENCOUNTER — THERAPY VISIT (OUTPATIENT)
Dept: PHYSICAL THERAPY | Facility: CLINIC | Age: 77
End: 2017-09-01
Payer: COMMERCIAL

## 2017-09-01 DIAGNOSIS — M54.2 CERVICALGIA: ICD-10-CM

## 2017-09-01 DIAGNOSIS — R42 VERTIGO: ICD-10-CM

## 2017-09-01 PROCEDURE — 97112 NEUROMUSCULAR REEDUCATION: CPT | Mod: GP | Performed by: PHYSICAL THERAPIST

## 2017-09-01 PROCEDURE — 97012 MECHANICAL TRACTION THERAPY: CPT | Mod: GP | Performed by: PHYSICAL THERAPIST

## 2017-09-01 PROCEDURE — 97140 MANUAL THERAPY 1/> REGIONS: CPT | Mod: GP | Performed by: PHYSICAL THERAPIST

## 2017-09-13 ENCOUNTER — THERAPY VISIT (OUTPATIENT)
Dept: PHYSICAL THERAPY | Facility: CLINIC | Age: 77
End: 2017-09-13
Payer: COMMERCIAL

## 2017-09-13 DIAGNOSIS — M54.2 CERVICALGIA: ICD-10-CM

## 2017-09-13 PROCEDURE — 97140 MANUAL THERAPY 1/> REGIONS: CPT | Mod: GP

## 2017-09-13 PROCEDURE — 97012 MECHANICAL TRACTION THERAPY: CPT | Mod: GP

## 2017-09-14 DIAGNOSIS — G89.29 CHRONIC LOW BACK PAIN WITHOUT SCIATICA, UNSPECIFIED BACK PAIN LATERALITY: ICD-10-CM

## 2017-09-14 DIAGNOSIS — M54.50 CHRONIC LOW BACK PAIN WITHOUT SCIATICA, UNSPECIFIED BACK PAIN LATERALITY: ICD-10-CM

## 2017-09-14 NOTE — TELEPHONE ENCOUNTER
etodolac (LODINE) 400 MG tablet      Last Written Prescription Date: 07/21/16  Last Quantity: 60, # refills: 0  Last Office Visit with G, P or [facility] prescribing provider: 08/01/17       Creatinine   Date Value Ref Range Status   11/01/2016 0.73 0.52 - 1.04 mg/dL Final     Lab Results   Component Value Date    AST 27 11/18/2011     Lab Results   Component Value Date    ALT 16 11/18/2011     BP Readings from Last 3 Encounters:   08/01/17 126/74   03/01/17 114/73   02/18/17 144/80         Kamilah Pham  [facility]

## 2017-09-18 RX ORDER — ETODOLAC 400 MG
TABLET ORAL
Qty: 60 TABLET | Refills: 0 | Status: SHIPPED | OUTPATIENT
Start: 2017-09-18 | End: 2017-10-19

## 2017-09-19 ENCOUNTER — THERAPY VISIT (OUTPATIENT)
Dept: PHYSICAL THERAPY | Facility: CLINIC | Age: 77
End: 2017-09-19
Payer: COMMERCIAL

## 2017-09-19 DIAGNOSIS — I10 HTN, GOAL BELOW 140/90: ICD-10-CM

## 2017-09-19 DIAGNOSIS — M54.2 CERVICALGIA: ICD-10-CM

## 2017-09-19 DIAGNOSIS — R42 VERTIGO: ICD-10-CM

## 2017-09-19 DIAGNOSIS — E55.9 VITAMIN D DEFICIENCY: ICD-10-CM

## 2017-09-19 LAB
ANION GAP SERPL CALCULATED.3IONS-SCNC: 7 MMOL/L (ref 3–14)
BUN SERPL-MCNC: 12 MG/DL (ref 7–30)
CALCIUM SERPL-MCNC: 9.4 MG/DL (ref 8.5–10.1)
CHLORIDE SERPL-SCNC: 104 MMOL/L (ref 94–109)
CHOLEST SERPL-MCNC: 169 MG/DL
CO2 SERPL-SCNC: 30 MMOL/L (ref 20–32)
CREAT SERPL-MCNC: 0.84 MG/DL (ref 0.52–1.04)
CREAT UR-MCNC: 66 MG/DL
GFR SERPL CREATININE-BSD FRML MDRD: 66 ML/MIN/1.7M2
GLUCOSE SERPL-MCNC: 96 MG/DL (ref 70–99)
HDLC SERPL-MCNC: 54 MG/DL
LDLC SERPL CALC-MCNC: 93 MG/DL
MICROALBUMIN UR-MCNC: <5 MG/L
MICROALBUMIN/CREAT UR: NORMAL MG/G CR (ref 0–25)
NONHDLC SERPL-MCNC: 115 MG/DL
POTASSIUM SERPL-SCNC: 4.4 MMOL/L (ref 3.4–5.3)
SODIUM SERPL-SCNC: 141 MMOL/L (ref 133–144)
TRIGL SERPL-MCNC: 111 MG/DL

## 2017-09-19 PROCEDURE — 80061 LIPID PANEL: CPT | Performed by: PREVENTIVE MEDICINE

## 2017-09-19 PROCEDURE — 97110 THERAPEUTIC EXERCISES: CPT | Mod: GP | Performed by: PHYSICAL THERAPIST

## 2017-09-19 PROCEDURE — 80048 BASIC METABOLIC PNL TOTAL CA: CPT | Performed by: PREVENTIVE MEDICINE

## 2017-09-19 PROCEDURE — 36415 COLL VENOUS BLD VENIPUNCTURE: CPT | Performed by: PREVENTIVE MEDICINE

## 2017-09-19 PROCEDURE — G8982 BODY POS GOAL STATUS: HCPCS | Mod: GP | Performed by: PHYSICAL THERAPIST

## 2017-09-19 PROCEDURE — 97140 MANUAL THERAPY 1/> REGIONS: CPT | Mod: GP | Performed by: PHYSICAL THERAPIST

## 2017-09-19 PROCEDURE — 97012 MECHANICAL TRACTION THERAPY: CPT | Mod: GP | Performed by: PHYSICAL THERAPIST

## 2017-09-19 PROCEDURE — 82306 VITAMIN D 25 HYDROXY: CPT | Performed by: PREVENTIVE MEDICINE

## 2017-09-19 PROCEDURE — 82043 UR ALBUMIN QUANTITATIVE: CPT | Performed by: PREVENTIVE MEDICINE

## 2017-09-19 PROCEDURE — G8981 BODY POS CURRENT STATUS: HCPCS | Mod: GP | Performed by: PHYSICAL THERAPIST

## 2017-09-19 NOTE — PROGRESS NOTES
Subjective:    HPI                    Objective:    System    Physical Exam    General     ROS    Assessment/Plan:      PROGRESS  REPORT    Progress reporting period is from 8/16/17 to 9/19/17.       SUBJECTIVE  Subjective changes noted by patient:   Pt notes that her dizziness is better, but still has it when she gets up from laying down, but that's really the only time now.  She is able to turn her head to the L better w/driving, but still has some pain that limits her ability to look as far to the L as the R.    Current pain level is  0/10.     Previous pain level was  0/10  .   Changes in function:  Yes (See Goal flowsheet attached for changes in current functional level)  Adverse reaction to treatment or activity: None    OBJECTIVE  Changes noted in objective findings:    CROM: flex 90%, ext 75%, SB R 40%, SB L 33% w/pain, rot R 85%, rot L 75% w/pain.  No dizziness w/any cervical motions. Bharath-Hallpike performed and neg bilaterally (some dizziness w/head rot'd to the L, but no nystagmus present and dizziness was gone w/in 15 sec).     ASSESSMENT/PLAN  Updated problem list and treatment plan: Diagnosis 1:  Cervical DJD w/dizziness  Pain -  hot/cold therapy, mechanical traction, self management and home program  Decreased ROM/flexibility - manual therapy and therapeutic exercise  Decreased strength - therapeutic exercise and therapeutic activities  Impaired muscle performance - neuro re-education  Decreased function - therapeutic activities  STG/LTGs have been met or progress has been made towards goals:  Yes (See Goal flow sheet completed today.)  Assessment of Progress: The patient's condition is improving.  The patient's condition has potential to improve.  Patient is meeting short term goals and is progressing towards long term goals.  Self Management Plans:  Patient has been instructed in a home treatment program.  Patient is independent in a home treatment program.  Patient  has been instructed in self  management of symptoms.  Patient is independent in self management of symptoms.  I have re-evaluated this patient and find that the nature, scope, duration and intensity of the therapy is appropriate for the medical condition of the patient.  Yumiko continues to require the following intervention to meet STG and LTG's:  PT    Recommendations:  This patient would benefit from continued therapy.     Frequency:  1 X week, once daily  Duration:  for 6 weeks          Please refer to the daily flowsheet for treatment today, total treatment time and time spent performing 1:1 timed codes.

## 2017-09-19 NOTE — MR AVS SNAPSHOT
After Visit Summary   9/19/2017    Yumiko Flowers    MRN: [id number]           Patient Information     Date Of Birth          1940        Visit Information        Provider Department      9/19/2017 1:10 PM Lisa Maria PT [facility]        Today's Diagnoses     Cervicalgia        Vertigo           Follow-ups after your visit        Your next 10 appointments already scheduled     Sep 20, 2017  2:30 PM CDT   [facility]le MD   [facility]    [address] [facility] [address]   [phone]            Dec 11, 2017  9:45 AM CST   (Arrive by 9:30 AM)   New Patient Visit with Darlene Wagner MD   [facility] ([facility])    [address] [id number] [phone]            Feb 27, 2018  8:45 AM CST   Return Visit with Tiff Siu MD   [facility]    [address] [place] [address] [phone]              Who to contact     If you have questions or need follow up information about today's clinic visit or your schedule please contact [facility] directly at [phone].  Normal or non-critical lab and imaging results will be communicated to you by MyChart, letter or phone within 4 business days after the clinic has received the results. If you do not hear from us within 7 days, please contact the clinic through MyChart or phone. If you have a critical or abnormal lab result, we will notify you by phone as soon as possible.  Submit refill requests through Inner[employer] or call your pharmacy and they will forward the refill request to us. Please allow 3 business days for your refill to be completed.          Additional Information About Your Visit        [employer] Information     Inner[employer] gives you secure access  to your electronic health record. If you see a primary care provider, you can also send messages to your care team and make appointments. If you have questions, please call your primary care clinic.  If you do not have a primary care provider, please call [phone] and they will assist you.        Care EveryWhere ID     This is your Care EveryWhere ID. This could be used by other organizations to access your [place] medical records  [id number]         Blood Pressure from Last 3 Encounters:   08/01/17 126/74   03/01/17 114/73   02/18/17 144/80    Weight from Last 3 Encounters:   08/01/17 93.4 kg (206 lb)   03/01/17 89.8 kg (198 lb)   02/18/17 89.8 kg (198 lb)              We Performed the Following     EVITA PROGRESS NOTES REPORT     MANUAL THER TECH,1+REGIONS,EA 15 MIN     MECHANICAL TRACTION THERAPY     THERAPEUTIC EXERCISES        Primary Care Provider Office Phone # Fax #    Gavi Bear [phone]       [address]  [facility] [address]        Equal Access to Services     [facility]: Hadii aad ku hadasho Soomaali, waaxda luqadaha, qaybta kaalmada adeegyada, waxay sacha field . So [facility] [phone].    ATENCIÓN: Si habla español, tiene a church disposición servicios gratuitos de asistencia lingüística. Llame al [phone].    We comply with applicable federal civil rights laws and [place] laws. We do not discriminate on the basis of race, color, national origin, age, disability sex, sexual orientation or gender identity.            Thank you!     Thank you for choosing [facility]  for your care. Our goal is always to provide you with excellent care. Hearing back from our patients is one way we can continue to improve our services. Please take a few minutes to complete the written survey that you may receive in the mail after your visit with us. Thank you!             Your Updated Medication List - Protect others around  you: Learn how to safely use, store and throw away your medicines at www.disposemymeds.org.          This list is accurate as of: 9/19/17  2:06 PM.  Always use your most recent med list.                   Brand Name Dispense Instructions for use Diagnosis    * albuterol 108 (90 BASE) MCG/ACT Inhaler    PROAIR HFA/PROVENTIL HFA/VENTOLIN HFA    1 Inhaler    Inhale into the lungs. INHALE 1-2 PUFFS EVERY 4-6 HOURS AS NEEDED    Mild persistent asthma       * albuterol (2.5 MG/3ML) 0.083% neb solution     25 vial    Take 1 vial (2.5 mg) by nebulization every 6 hours as needed for shortness of breath / dyspnea or wheezing    Moderate persistent asthma with acute exacerbation       calcium 500 MG Chew      None Entered        diclofenac 1 % Gel topical gel    VOLTAREN    100 g    Apply 2 grams to affected area up to four times daily using enclosed dosing card.    Cervicalgia       doxycycline 100 MG tablet    VIBRA-TABS    180 tablet    Take 1 tablet (100 mg) by mouth 2 times daily    Rosacea       etodolac 400 MG tablet    LODINE    60 tablet    TAKE 1 TABLET (400 MG) BY MOUTH 2 TIMES DAILY AS NEEDED    Chronic low back pain without sciatica, unspecified back pain laterality       gabapentin 600 MG tablet    NEURONTIN    180 tablet    Take 1 tablet (600 mg) by mouth 2 times daily    Low back pain, unspecified back pain laterality, unspecified chronicity, with sciatica presence unspecified       lisinopril-hydrochlorothiazide 20-12.5 MG per tablet    PRINZIDE/ZESTORETIC    180 tablet    TAKE 2 TABLETS BY MOUTH DAILY PATIENT DUE FOR OFFICE VISIT FOR FURTHER REFILLS.    Essential hypertension with goal blood pressure less than 140/90       Multiple Vitamins-Iron Tabs      Take 1 tablet by mouth daily.        vitamin D 2000 UNITS Caps      Take 2,000 Units by mouth daily.        * Notice:  This list has 2 medication(s) that are the same as other medications prescribed for you. Read the directions carefully, and ask your doctor  or other care provider to review them with you.

## 2017-09-20 ENCOUNTER — OFFICE VISIT (OUTPATIENT)
Dept: ORTHOPEDICS | Facility: CLINIC | Age: 77
End: 2017-09-20
Payer: COMMERCIAL

## 2017-09-20 VITALS — HEIGHT: 65 IN | RESPIRATION RATE: 16 BRPM | BODY MASS INDEX: 34.62 KG/M2 | WEIGHT: 207.8 LBS

## 2017-09-20 DIAGNOSIS — M17.12 PRIMARY OSTEOARTHRITIS OF LEFT KNEE: Primary | ICD-10-CM

## 2017-09-20 DIAGNOSIS — M70.52 PES ANSERINUS BURSITIS OF LEFT KNEE: ICD-10-CM

## 2017-09-20 LAB — DEPRECATED CALCIDIOL+CALCIFEROL SERPL-MC: 31 UG/L (ref 20–75)

## 2017-09-20 PROCEDURE — 20610 DRAIN/INJ JOINT/BURSA W/O US: CPT | Mod: LT | Performed by: ORTHOPAEDIC SURGERY

## 2017-09-20 RX ORDER — METHYLPREDNISOLONE ACETATE 80 MG/ML
80 INJECTION, SUSPENSION INTRA-ARTICULAR; INTRALESIONAL; INTRAMUSCULAR; SOFT TISSUE ONCE
Qty: 5 ML | Refills: 0 | OUTPATIENT
Start: 2017-09-20 | End: 2017-09-20

## 2017-09-20 ASSESSMENT — PAIN SCALES - GENERAL: PAINLEVEL: MODERATE PAIN (4)

## 2017-09-20 NOTE — MR AVS SNAPSHOT
After Visit Summary   9/20/2017    Yumiko Flowers    MRN: [id number]           Patient Information     Date Of Birth          1940        Visit Information        Provider Department      9/20/2017 2:30 PM Alexandre Calle MD [facility]        Today's Diagnoses     Primary osteoarthritis of left knee    -  1    Pes anserinus bursitis of left knee          Care Instructions    Please remember to call and schedule a follow up appointment if one was recommended at your earliest convenience.  Orthopedics CLINIC HOURS TELEPHONE NUMBER   Dr. Alva Rene  Certified Medical Assistant   Monday & Wednesday   8am - 5pm  Thursday 1pm - 5pm  Friday 8am -11:30am Specialty schedulers:   [phone] to schedule your surgery.  Main Clinic:   [phone] to make an appointment with any provider.    Urgent Care locations:    [facility] Monday-Friday Closed  Saturday-Sunday 9am-5pm      Monday-Friday 12pm - 8pm  Saturday-Sunday 9am-5pm [phone]     If SURGERY has been recommended, please call our Specialty Schedulers at [phone] to schedule your procedure.    If you need a medication refill, please contact your pharmacy. Please allow 3 business days for your refill to be completed.    If an MRI or CT scan has been recommended, please call [facility] Schedulers at [phone] to schedule your appointment.  Use [employer] (secure e-mail communication and access to your chart) to send a message or to make an appointment. Please ask how you can sign up for [employer].  Your care team's suggested websites for health information:   [email or web address] : Up to date and easily searchable information on multiple topics.   [email or web address] : [place] dept of heat, public health issues in [place], N1N1              Follow-ups after your visit        Follow-up notes from your care team     Return if symptoms worsen or fail to improve.  "     Your next 10 appointments already scheduled     Dec 11, 2017  9:45 AM CST   (Arrive by 9:30 AM)   New Patient Visit with Darlene Wagner MD   [facility] ([facility])    [address] [facility]  [address] [facility] [address] [phone]            Feb 27, 2018  8:45 AM CST   Return Visit with Tiff Siu MD   [facility]    [address] [place] [address] [phone]              Who to contact     If you have questions or need follow up information about today's clinic visit or your schedule please contact [facility] directly at [phone].  Normal or non-critical lab and imaging results will be communicated to you by MyChart, letter or phone within 4 business days after the clinic has received the results. If you do not hear from us within 7 days, please contact the clinic through MyChart or phone. If you have a critical or abnormal lab result, we will notify you by phone as soon as possible.  Submit refill requests through [employer] or call your pharmacy and they will forward the refill request to us. Please allow 3 business days for your refill to be completed.          Additional Information About Your Visit        MyChart Information     [employer] gives you secure access to your electronic health record. If you see a primary care provider, you can also send messages to your care team and make appointments. If you have questions, please call your primary care clinic.  If you do not have a primary care provider, please call [phone] and they will assist you.        Care EveryWhere ID     This is your Care EveryWhere ID. This could be used by other organizations to access your [place] medical records  [id number]        Your Vitals Were     Respirations Height BMI (Body Mass Index)             16 5' 4.5\" (1.638 m) 35.12 kg/m2          Blood Pressure from Last 3 " Encounters:   08/01/17 126/74   03/01/17 114/73   02/18/17 144/80    Weight from Last 3 Encounters:   09/20/17 207 lb 12.8 oz (94.3 kg)   08/01/17 206 lb (93.4 kg)   03/01/17 198 lb (89.8 kg)              We Performed the Following     DRAIN/INJECT LARGE JOINT/BURSA     METHYLPREDNISOLONE 80 MG INJ          Today's Medication Changes          These changes are accurate as of: 9/20/17  4:15 PM.  If you have any questions, ask your nurse or doctor.               Start taking these medicines.        Dose/Directions    methylPREDNISolone acetate 80 MG/ML injection   Commonly known as:  DEPO-MEDROL   Used for:  Pes anserinus bursitis of left knee, Primary osteoarthritis of left knee   Started by:  Alexandre Calle MD        Dose:  80 mg   1 mL (80 mg) by INTRA-ARTICULAR route once for 1 dose   Quantity:  5 mL   Refills:  0            Where to get your medicines      Some of these will need a paper prescription and others can be bought over the counter.  Ask your nurse if you have questions.     You don't need a prescription for these medications     methylPREDNISolone acetate 80 MG/ML injection                Primary Care Provider Office Phone # Fax #    Gavi Bear [phone]       [address]  [facility] [address]        Equal Access to Services     [facility]: Hadii micky ku hadasho Soomaali, waaxda luqadaha, qaybta kaalmada adeegyada, waxay tishain hayramos buitrago. So [facility] [phone].    ATENCIÓN: Si habla español, tiene a church disposición servicios gratuitos de asistencia lingüística. Llame al [phone].    We comply with applicable federal civil rights laws and [place] laws. We do not discriminate on the basis of race, color, national origin, age, disability sex, sexual orientation or gender identity.            Thank you!     Thank you for choosing [facility]  for your care. Our goal is always to provide you with excellent care. Hearing back from  our patients is one way we can continue to improve our services. Please take a few minutes to complete the written survey that you may receive in the mail after your visit with us. Thank you!             Your Updated Medication List - Protect others around you: Learn how to safely use, store and throw away your medicines at www.disposemymeds.org.          This list is accurate as of: 9/20/17  4:15 PM.  Always use your most recent med list.                   Brand Name Dispense Instructions for use Diagnosis    * albuterol 108 (90 BASE) MCG/ACT Inhaler    PROAIR HFA/PROVENTIL HFA/VENTOLIN HFA    1 Inhaler    Inhale into the lungs. INHALE 1-2 PUFFS EVERY 4-6 HOURS AS NEEDED    Mild persistent asthma       * albuterol (2.5 MG/3ML) 0.083% neb solution     25 vial    Take 1 vial (2.5 mg) by nebulization every 6 hours as needed for shortness of breath / dyspnea or wheezing    Moderate persistent asthma with acute exacerbation       calcium 500 MG Chew      None Entered        diclofenac 1 % Gel topical gel    VOLTAREN    100 g    Apply 2 grams to affected area up to four times daily using enclosed dosing card.    Cervicalgia       doxycycline 100 MG tablet    VIBRA-TABS    180 tablet    Take 1 tablet (100 mg) by mouth 2 times daily    Rosacea       etodolac 400 MG tablet    LODINE    60 tablet    TAKE 1 TABLET (400 MG) BY MOUTH 2 TIMES DAILY AS NEEDED    Chronic low back pain without sciatica, unspecified back pain laterality       gabapentin 600 MG tablet    NEURONTIN    180 tablet    Take 1 tablet (600 mg) by mouth 2 times daily    Low back pain, unspecified back pain laterality, unspecified chronicity, with sciatica presence unspecified       lisinopril-hydrochlorothiazide 20-12.5 MG per tablet    PRINZIDE/ZESTORETIC    180 tablet    TAKE 2 TABLETS BY MOUTH DAILY PATIENT DUE FOR OFFICE VISIT FOR FURTHER REFILLS.    Essential hypertension with goal blood pressure less than 140/90       methylPREDNISolone acetate 80  MG/ML injection    DEPO-MEDROL    5 mL    1 mL (80 mg) by INTRA-ARTICULAR route once for 1 dose    Pes anserinus bursitis of left knee, Primary osteoarthritis of left knee       Multiple Vitamins-Iron Tabs      Take 1 tablet by mouth daily.        vitamin D 2000 UNITS Caps      Take 2,000 Units by mouth daily.        * Notice:  This list has 2 medication(s) that are the same as other medications prescribed for you. Read the directions carefully, and ask your doctor or other care provider to review them with you.

## 2017-09-20 NOTE — PROGRESS NOTES
CHIEF COMPLAINT:   Chief Complaint   Patient presents with     RECHECK     Left knee pain. Last injection: pes and knee 1/25/17. Injection worked good, pain started to come back about a month ago. She is going on another trip so she wants to make sure her knee is good. She would like another injection today.        HISTORY OF PRESENT ILLNESS    Yumiko Flowers is a 77 year old female seen for follow up evaluation of ongoing left knee pain with no known injury. Pain has been present since ~7/5/2012. Today, patient returns with recurring left knee pain. Pain is posterior, medial, and anterior. Her pain today is moderate, rated a 4/10. Her last injections (pes and knee) were on 1/25/2017. She notes the injections worked great. Her pain started to come back about 1 month ago. She is going on a trip to [place] soon and would like to have injections before leaving.     Present symptoms: pain medially , pain sharp, dull/achy , moderate pain, mild swelling.    Pain severity: 4/10  Frequency of symptoms: are constant, worse with weight bearing  Exacerbating Factors: weight bearing, stairs, [id number], prolonged sitting, prolonged standing, night  Relieving Factors: vicodin (in the past)  Night Pain: Yes  Pain while at rest: Yes   Numbness or tingling: No   Patient has tried:     NSAIDS: yes, Lodine currently     Physical Therapy: No      Activity modification: Yes      Bracing: yes     Injections: yes pes and intra-articular with good relief.     Ice: yes     Other: ace wrap    Orthopaedic PMH: none.    Other PMH:  has a past medical history of Acne rosacea; Actinic keratosis; Amblyopia; Asthma, moderate persistent; Basal cell carcinoma (10/2010); Cataract, mod, od; mild-mod, os (1/12/2012); DJD (degenerative joint disease), lumbosacral (8/6/2014); Elevated cholesterol; Endometriosis; HTN (hypertension); Moderate major depression (H); and Osteopenia. She also has no past medical history of Acne keloidalis; Allergies;  Diabetes (H); Diabetes mellitus (H); Diabetic retinopathy (H); Eczema; Glaucoma; Glaucoma (increased eye pressure); Heart valve disorder; Macular degeneration; Malignant melanoma nos; Pacemaker; Photosensitive contact dermatitis; Psoriasis; Retinal detachment; Senile macular degeneration; Skin cancer; Squamous cell carcinoma; Strabismus; Type II or unspecified type diabetes mellitus without mention of complication, not stated as uncontrolled; Urticaria; or Uveitis.  Patient Active Problem List    Diagnosis Date Noted     Vertigo 08/16/2017     Priority: Medium     Cervicalgia 08/03/2017     Priority: Medium     Chronic pain of left knee 02/27/2017     Priority: Medium     Posterior vitreous detachment, bilateral 01/14/2017     Priority: Medium     Obesity, Class I, BMI 30-34.9 01/12/2017     Priority: Medium     Essential hypertension with goal blood pressure less than 140/90 11/01/2016     Priority: Medium     Rosacea 05/18/2015     Priority: Medium     Obesity, Class II, BMI 35-39.9 08/06/2014     Priority: Medium     Restless legs syndrome 08/06/2014     Priority: Medium     DJD (degenerative joint disease), lumbosacral 08/06/2014     Priority: Medium     Advanced on X rays       History of blepharoplasty, upper lid, ou (elsewhere); revision (EN) 09/14/2013     Priority: Medium     Health Care Home 01/24/2013     Priority: Medium     Monserrat Rolle RN-PHN  FPA / [facility]   [phone]   DX V65.8 REPLACED WITH [facility] (04/08/2013)       Peptic ulcer 01/23/2013     Priority: Medium     HTN, goal below 140/90 03/02/2012     Priority: Medium     Pseudophakia, ou; Yag Caps, os 01/30/2012     Priority: Medium     Hx of pseudoexfoliation, 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  Yumiko Flowers has no plan  or code status on file. Mailed available resources and provided with information. Confirmed code status reflects current choices pending further ACP discussions.  Confirmed/documented designated decision maker(s). See permanent comments section of demographics in clinical tab.   Added by Jacqueline Mendoza on 2/26/2015  Discussed advance care planning with patient; information given to patient to review. 11/18/2011   TANYA Clinton MA       Vitamin D deficiency 02/16/2011     Priority: Medium     Osteopenia      Priority: Medium     CARDIOVASCULAR SCREENING; LDL GOAL LESS THAN 130 10/31/2010     Priority: Medium     Asthma, moderate persistent      Priority: Medium     Acne rosacea      Priority: Medium       Surgical Hx:  has a past surgical history that includes REMOVAL GALLBLADDER; APPENDECTOMY; surgical history of -; surgical history of -; surgical history of -; Blepharoplasty bilateral (3/9/2006; 2013); Phacoemulsification with standard intraocular lens implant (1/2012; 4/2013); cataract iol, rt/lt; and Laser YAG capsulotomy ().    Medications:   Current Outpatient Prescriptions:      etodolac (LODINE) 400 MG tablet, TAKE 1 TABLET (400 MG) BY MOUTH 2 TIMES DAILY AS NEEDED, Disp: 60 tablet, Rfl: 0     doxycycline (VIBRA-TABS) 100 MG tablet, Take 1 tablet (100 mg) by mouth 2 times daily, Disp: 180 tablet, Rfl: 0     diclofenac (VOLTAREN) 1 % GEL topical gel, Apply 2 grams to affected area up to four times daily using enclosed dosing card., Disp: 100 g, Rfl: 1     gabapentin (NEURONTIN) 600 MG tablet, Take 1 tablet (600 mg) by mouth 2 times daily, Disp: 180 tablet, Rfl: 3     lisinopril-hydrochlorothiazide (PRINZIDE/ZESTORETIC) 20-12.5 MG per tablet, TAKE 2 TABLETS BY MOUTH DAILY PATIENT DUE FOR OFFICE VISIT FOR FURTHER REFILLS., Disp: 180 tablet, Rfl: 1     albuterol (2.5 MG/3ML) 0.083% neb solution, Take 1 vial (2.5 mg) by nebulization every 6 hours as needed for shortness of breath / dyspnea or wheezing, Disp: 25  "vial, Rfl: 1     albuterol (PROAIR HFA/PROVENTIL HFA/VENTOLIN HFA) 108 (90 BASE) MCG/ACT Inhaler, Inhale into the lungs. INHALE 1-2 PUFFS EVERY 4-6 HOURS AS NEEDED, Disp: 1 Inhaler, Rfl: 0     Multiple Vitamins-Iron TABS, Take 1 tablet by mouth daily., Disp: , Rfl:      Cholecalciferol (VITAMIN D) 2000 UNIT CAPS, Take 2,000 Units by mouth daily., Disp: , Rfl:      CALCIUM 500 MG PO CHEW, None Entered, Disp: , Rfl:     Allergies:   Allergies   Allergen Reactions     Erythromycin Swelling and Other (See Comments)     REVIEW OF SYSTEMS:   CONSTITUTIONAL:NEGATIVE for fever, chills, change in weight  INTEGUMENTARY/SKIN: NEGATIVE for worrisome rashes, moles or lesions  MUSCULOSKELETAL:See HPI above  NEURO: NEGATIVE for weakness, dizziness or paresthesias    This document serves as a record of the services and decisions personally performed and made by Alexandre Calle MD. It was created on his behalf by Jenny Evans, a trained medical scribe. The creation of this document is based the provider's statements to the medical scribe.    Scribe Jenny Evans 2:40 PM 9/20/2017     PHYSICAL EXAM:  Resp 16  Ht 1.638 m (5' 4.5\")  Wt 94.3 kg (207 lb 12.8 oz)  BMI 35.12 kg/m2   GENERAL APPEARANCE: healthy, alert, no distress.  SKIN: no suspicious lesions or rashes  NEURO: Normal strength and tone, mentation intact and speech normal  PSYCH:  mentation appears normal and affect normal/bright, not anxious  RESPIRATORY: No increased work of breathing.    BILATERAL LOWER EXTREMITIES:  Gait: antalgic favoring left   Alignment: varus  No gross deformities or masses.  No Quad atrophy, strength normal.  Intact sensation deep peroneal nerve, superficial peroneal nerve, med/lat tibial nerve, sural nerve, saphenous nerve  Intact EHL, EDL, TA, FHL, GS, quadriceps hamstrings and hip flexors  Palpable 2+ posterior tibial pulses.  Bilateral calf soft and nttp or squeeze.  Edema: 1+, Pitting left lower extremity, trace right lower extremity.    LEFT KNEE " EXAM:    Skin: intact, no ecchymosis or erythema  AROM: 0 extension to 110 flexion  Tight hamstrings on straight leg raise.  Effusion: mild  Tender: tender to palpation medial joint line, medial distal hamstrings and pes insertion (most severe at pes) nontender to palpation lateral joint line, posterior knee    MCL: stable, and non-painful at both 0 and 30 degrees knee flexion  Varus stress: stable, and non-painful at both 0 and 30 degrees knee flexion  Posterior Drawer stable  Patellofemoral joint:                Apprehension: negative              Crepitations: mild   Grind: positive     RIGHT KNEE EXAM:    Skin: intact, no ecchymosis or erythema  ROM: 0 extension to 120 flexion  Tight hamstrings on straight leg raise.  Effusion: none  Tender: NTTP med/lat joint line, anterior or posterior knee; tender to palpation medial hamstrings and pes insertion.    MCL: stable, and non-painful at both 0 and 30 degrees knee flexion  Varus stress: stable, and non-painful at both 0 and 30 degrees knee flexion  Posterior Drawer stable  Patellofemoral joint:                Apprehension: negative              Crepitations: minimal    X-RAY: no new x-rays today.  3 views left knee from 1/12/2017 were reviewed in clinic today. On my review, no obvious fractures or dislocations. Severe medial and moderate patello-femoral degenerative changes.    MRI:  MRI left knee from 7/31/2012 was reviewed in clinic today.   IMPRESSION:  1. Free edge fraying of the body and posterior horn of the left knee  medial meniscus with large areas of full-thickness loss of articular  cartilage in the medial compartment. Small foci of subjacent  subchondral edema within the medial femoral condyle.  2. Small focal area of high-grade full thickness cartilage loss  measuring 8 mm along the anterior aspect of the left lateral femoral  trochlea with associated focus of subchondral edema within the lateral  femoral condyle  3. Irregularity with small foci of full  thickness cartilage loss along  the median ridge of the patella with associated subchondral edema in  the superior pole of the patella.  4. Thickening of the proximal MCL suggesting old injury.  5. Small-moderate left knee joint effusion.      Impression:   77 year old female with left medial knee pain, arthritis and pes bursitis. Left lower extremity edema.     Plan:     * reviewed imaging studies with patient  Treatment:  * rest  * Activity modification - avoid impact activities or activities that aggravate symptoms  * ice, 15-20 minutes at a time several times a day or as needed.  * Strengthening of quadriceps muscles  * Physical Therapy ordered for strengthening, stretching and range of motion exercises  * Tylenol as needed for pain  * Weight loss: Weight loss: The patient's Body mass index is 35.12 kg/(m^2).. Discussed with patient weight loss benefits, not only for the current pain symptoms, but also overall health. Recommend a good diet plan that works for the patient, with the assistance of a dietician or PCP as needed. Also, a good, low-impact exercise program for at least 20 minutes per day, 3 times per week, such as exercise bike, elliptical , or pool.  * Exercise: low impact such as stationary bike, elliptical, pool.  * Injections: risks and perceived benefits of cortisone versus viscosupplementation injections discussed. Patient elected to proceed. See procedure note below for left knee intra-articular and pes bursal injections.  * Bracing: bracing the knee may offer some relief of symptoms when worn  * Return to clinic as needed.    PROCEDURE NOTE:  The risks, perceived benefits and potential complications (including but not limited to: bleeding, infection, pain, scar, damage to adjacent structures, atrophy or necrosis of soft tissue, skin blanching, failure to relieve symptoms, worsening of symptoms, allergic reaction) of injection were discussed with the patient. Questions were addressed and  answered.The patient elected to proceed. Written informed consent was obtained. The correct procedural site was identified and confirmed. A Left Knee pes anserine injection was performed using 1mL Depo Medrol 80mg per mL and 7mL (4mL 1% lidocaine, 3mL 0.25% marcaine)  of local anesthetic after sterile prep, to the correct procedural site. Sterile bandaid applied. This was tolerated well by the patient. No apparent complications. Did also discuss that if diabetic, recommend close monitoring of blood sugars over the next week as cortisone injections can temporarily elevate blood sugars.     PROCEDURE NOTE:  The risks, perceived benefits and potential complications (including but not limited to: bleeding, infection, pain, scar, damage to adjacent structures, atrophy or necrosis of soft tissue, skin blanching, failure to relieve symptoms, worsening of symptoms, allergic reaction) of injection were discussed with the patient. Questions were addressed and answered.The patient elected to proceed. Written informed consent was obtained. The correct procedural site was identified and confirmed. A Left Knee intraarticular injection was performed using 1mL Depo Medrol 80mg per mL and 7mL (4mL 1% lidocaine, 3mL 0.25% marcaine)  of local anesthetic after sterile prep, to the correct procedural site. Sterile bandaid applied. This was tolerated well by the patient. No apparent complications. Did also discuss that if diabetic, recommend close monitoring of blood sugars over the next week as cortisone injections can temporarily elevate blood sugars.    The information in this document, created by a scribe for me, accurately reflects the services I personally performed and the decisions made by me. I have reviewed and approved this document for accuracy.         Alexandre Calle M.D., M.S.  Dept. of Orthopaedic Surgery  [facility]

## 2017-09-20 NOTE — LETTER
9/20/2017         RE: Yumiko Flowers  [address]  [place] [facility] [address]        Dear Colleague,    Thank you for referring your patient, Yumiko Flowers, to the [facility]. Please see a copy of my visit note below.    CHIEF COMPLAINT:   Chief Complaint   Patient presents with     RECHECK     Left knee pain. Last injection: pes and knee 1/25/17. Injection worked good, pain started to come back about a month ago. She is going on another trip so she wants to make sure her knee is good. She would like another injection today.        HISTORY OF PRESENT ILLNESS    Yumiko Flowers is a 77 year old female seen for follow up evaluation of ongoing left knee pain with no known injury. Pain has been present since ~7/5/2012. Today, patient returns with recurring left knee pain. Pain is posterior, medial, and anterior. Her pain today is moderate, rated a 4/10. Her last injections (pes and knee) were on 1/25/2017. She notes the injections worked great. Her pain started to come back about 1 month ago. She is going on a trip to [place] soon and would like to have injections before leaving.     Present symptoms: pain medially , pain sharp, dull/achy , moderate pain, mild swelling.    Pain severity: 4/10  Frequency of symptoms: are constant, worse with weight bearing  Exacerbating Factors: weight bearing, stairs, [id number], prolonged sitting, prolonged standing, night  Relieving Factors: vicodin (in the past)  Night Pain: Yes  Pain while at rest: Yes   Numbness or tingling: No   Patient has tried:     NSAIDS: yes, Lodine currently     Physical Therapy: No      Activity modification: Yes      Bracing: yes     Injections: yes pes and intra-articular with good relief.     Ice: yes     Other: ace wrap    Orthopaedic PMH: none.    Other PMH:  has a past medical history of Acne rosacea; Actinic keratosis; Amblyopia; Asthma, moderate persistent; Basal cell carcinoma (10/2010); Cataract, mod, od; mild-mod, os  (1/12/2012); DJD (degenerative joint disease), lumbosacral (8/6/2014); Elevated cholesterol; Endometriosis; HTN (hypertension); Moderate major depression (H); and Osteopenia. She also has no past medical history of Acne keloidalis; Allergies; Diabetes (H); Diabetes mellitus (H); Diabetic retinopathy (H); Eczema; Glaucoma; Glaucoma (increased eye pressure); Heart valve disorder; Macular degeneration; Malignant melanoma nos; Pacemaker; Photosensitive contact dermatitis; Psoriasis; Retinal detachment; Senile macular degeneration; Skin cancer; Squamous cell carcinoma; Strabismus; Type II or unspecified type diabetes mellitus without mention of complication, not stated as uncontrolled; Urticaria; or Uveitis.  Patient Active Problem List    Diagnosis Date Noted     Vertigo 08/16/2017     Priority: Medium     Cervicalgia 08/03/2017     Priority: Medium     Chronic pain of left knee 02/27/2017     Priority: Medium     Posterior vitreous detachment, bilateral 01/14/2017     Priority: Medium     Obesity, Class I, BMI 30-34.9 01/12/2017     Priority: Medium     Essential hypertension with goal blood pressure less than 140/90 11/01/2016     Priority: Medium     Rosacea 05/18/2015     Priority: Medium     Obesity, Class II, BMI 35-39.9 08/06/2014     Priority: Medium     Restless legs syndrome 08/06/2014     Priority: Medium     DJD (degenerative joint disease), lumbosacral 08/06/2014     Priority: Medium     Advanced on X rays       History of blepharoplasty, upper lid, ou (elsewhere); revision (EN) 09/14/2013     Priority: Medium     Health Care Home 01/24/2013     Priority: Medium     Monserrat Rolle RN-PHN  FPA / FMG [facility]   [phone]   DX V65.8 REPLACED WITH [id number] [facility] (04/08/2013)       Peptic ulcer 01/23/2013     Priority: Medium     HTN, goal below 140/90 03/02/2012     Priority: Medium     Pseudophakia, ou; Yag Caps, os 01/30/2012     Priority: Medium     Hx of pseudoexfoliation,  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  Yumiko Flowers has no plan or code status on file. Mailed available resources and provided with information. Confirmed code status reflects current choices pending further ACP discussions.  Confirmed/documented designated decision maker(s). See permanent comments section of demographics in clinical tab.   Added by Jacqueline Mendoza on 2/26/2015  Discussed advance care planning with patient; information given to patient to review. 11/18/2011   TANYA Clinton MA       Vitamin D deficiency 02/16/2011     Priority: Medium     Osteopenia      Priority: Medium     CARDIOVASCULAR SCREENING; LDL GOAL LESS THAN 130 10/31/2010     Priority: Medium     Asthma, moderate persistent      Priority: Medium     Acne rosacea      Priority: Medium       Surgical Hx:  has a past surgical history that includes REMOVAL GALLBLADDER; APPENDECTOMY; surgical history of -; surgical history of -; surgical history of -; Blepharoplasty bilateral (3/9/2006; 2013); Phacoemulsification with standard intraocular lens implant (1/2012; 4/2013); cataract iol, rt/lt; and Laser YAG capsulotomy ().    Medications:   Current Outpatient Prescriptions:      etodolac (LODINE) 400 MG tablet, TAKE 1 TABLET (400 MG) BY MOUTH 2 TIMES DAILY AS NEEDED, Disp: 60 tablet, Rfl: 0     doxycycline (VIBRA-TABS) 100 MG tablet, Take 1 tablet (100 mg) by mouth 2 times daily, Disp: 180 tablet, Rfl: 0     diclofenac (VOLTAREN) 1 % GEL topical gel, Apply 2 grams to affected area up to four times daily using enclosed dosing card., Disp: 100 g, Rfl: 1     gabapentin (NEURONTIN) 600 MG tablet, Take 1 tablet (600 mg) by mouth 2 times daily, Disp: 180 tablet, Rfl: 3     lisinopril-hydrochlorothiazide (PRINZIDE/ZESTORETIC) 20-12.5 MG per tablet, TAKE 2 TABLETS BY MOUTH  "DAILY PATIENT DUE FOR OFFICE VISIT FOR FURTHER REFILLS., Disp: 180 tablet, Rfl: 1     albuterol (2.5 MG/3ML) 0.083% neb solution, Take 1 vial (2.5 mg) by nebulization every 6 hours as needed for shortness of breath / dyspnea or wheezing, Disp: 25 vial, Rfl: 1     albuterol (PROAIR HFA/PROVENTIL HFA/VENTOLIN HFA) 108 (90 BASE) MCG/ACT Inhaler, Inhale into the lungs. INHALE 1-2 PUFFS EVERY 4-6 HOURS AS NEEDED, Disp: 1 Inhaler, Rfl: 0     Multiple Vitamins-Iron TABS, Take 1 tablet by mouth daily., Disp: , Rfl:      Cholecalciferol (VITAMIN D) 2000 UNIT CAPS, Take 2,000 Units by mouth daily., Disp: , Rfl:      CALCIUM 500 MG PO CHEW, None Entered, Disp: , Rfl:     Allergies:   Allergies   Allergen Reactions     Erythromycin Swelling and Other (See Comments)     REVIEW OF SYSTEMS:   CONSTITUTIONAL:NEGATIVE for fever, chills, change in weight  INTEGUMENTARY/SKIN: NEGATIVE for worrisome rashes, moles or lesions  MUSCULOSKELETAL:See HPI above  NEURO: NEGATIVE for weakness, dizziness or paresthesias    This document serves as a record of the services and decisions personally performed and made by Alexandre Calle MD. It was created on his behalf by Jenny Evans, a trained medical scribe. The creation of this document is based the provider's statements to the medical scribe.    Jonathan Evans 2:40 PM 9/20/2017     PHYSICAL EXAM:  Resp 16  Ht 1.638 m (5' 4.5\")  Wt 94.3 kg (207 lb 12.8 oz)  BMI 35.12 kg/m2   GENERAL APPEARANCE: healthy, alert, no distress.  SKIN: no suspicious lesions or rashes  NEURO: Normal strength and tone, mentation intact and speech normal  PSYCH:  mentation appears normal and affect normal/bright, not anxious  RESPIRATORY: No increased work of breathing.    BILATERAL LOWER EXTREMITIES:  Gait: antalgic favoring left   Alignment: varus  No gross deformities or masses.  No Quad atrophy, strength normal.  Intact sensation deep peroneal nerve, superficial peroneal nerve, med/lat tibial nerve, sural nerve, " saphenous nerve  Intact EHL, EDL, TA, FHL, GS, quadriceps hamstrings and hip flexors  Palpable 2+ posterior tibial pulses.  Bilateral calf soft and nttp or squeeze.  Edema: 1+, Pitting left lower extremity, trace right lower extremity.    LEFT KNEE EXAM:    Skin: intact, no ecchymosis or erythema  AROM: 0 extension to 110 flexion  Tight hamstrings on straight leg raise.  Effusion: mild  Tender: tender to palpation medial joint line, medial distal hamstrings and pes insertion (most severe at pes) nontender to palpation lateral joint line, posterior knee    MCL: stable, and non-painful at both 0 and 30 degrees knee flexion  Varus stress: stable, and non-painful at both 0 and 30 degrees knee flexion  Posterior Drawer stable  Patellofemoral joint:                Apprehension: negative              Crepitations: mild   Grind: positive     RIGHT KNEE EXAM:    Skin: intact, no ecchymosis or erythema  ROM: 0 extension to 120 flexion  Tight hamstrings on straight leg raise.  Effusion: none  Tender: NTTP med/lat joint line, anterior or posterior knee; tender to palpation medial hamstrings and pes insertion.    MCL: stable, and non-painful at both 0 and 30 degrees knee flexion  Varus stress: stable, and non-painful at both 0 and 30 degrees knee flexion  Posterior Drawer stable  Patellofemoral joint:                Apprehension: negative              Crepitations: minimal    X-RAY: no new x-rays today.  3 views left knee from 1/12/2017 were reviewed in clinic today. On my review, no obvious fractures or dislocations. Severe medial and moderate patello-femoral degenerative changes.    MRI:  MRI left knee from 7/31/2012 was reviewed in clinic today.   IMPRESSION:  1. Free edge fraying of the body and posterior horn of the left knee  medial meniscus with large areas of full-thickness loss of articular  cartilage in the medial compartment. Small foci of subjacent  subchondral edema within the medial femoral condyle.  2. Small focal  area of high-grade full thickness cartilage loss  measuring 8 mm along the anterior aspect of the left lateral femoral  trochlea with associated focus of subchondral edema within the lateral  femoral condyle  3. Irregularity with small foci of full thickness cartilage loss along  the median ridge of the patella with associated subchondral edema in  the superior pole of the patella.  4. Thickening of the proximal MCL suggesting old injury.  5. Small-moderate left knee joint effusion.      Impression:   77 year old female with left medial knee pain, arthritis and pes bursitis. Left lower extremity edema.     Plan:     * reviewed imaging studies with patient  Treatment:  * rest  * Activity modification - avoid impact activities or activities that aggravate symptoms  * ice, 15-20 minutes at a time several times a day or as needed.  * Strengthening of quadriceps muscles  * Physical Therapy ordered for strengthening, stretching and range of motion exercises  * Tylenol as needed for pain  * Weight loss: Weight loss: The patient's Body mass index is 35.12 kg/(m^2).. Discussed with patient weight loss benefits, not only for the current pain symptoms, but also overall health. Recommend a good diet plan that works for the patient, with the assistance of a dietician or PCP as needed. Also, a good, low-impact exercise program for at least 20 minutes per day, 3 times per week, such as exercise bike, elliptical , or pool.  * Exercise: low impact such as stationary bike, elliptical, pool.  * Injections: risks and perceived benefits of cortisone versus viscosupplementation injections discussed. Patient elected to proceed. See procedure note below for left knee intra-articular and pes bursal injections.  * Bracing: bracing the knee may offer some relief of symptoms when worn  * Return to clinic as needed.    PROCEDURE NOTE:  The risks, perceived benefits and potential complications (including but not limited to: bleeding,  infection, pain, scar, damage to adjacent structures, atrophy or necrosis of soft tissue, skin blanching, failure to relieve symptoms, worsening of symptoms, allergic reaction) of injection were discussed with the patient. Questions were addressed and answered.The patient elected to proceed. Written informed consent was obtained. The correct procedural site was identified and confirmed. A Left Knee pes anserine injection was performed using 1mL Depo Medrol 80mg per mL and 7mL (4mL 1% lidocaine, 3mL 0.25% marcaine)  of local anesthetic after sterile prep, to the correct procedural site. Sterile bandaid applied. This was tolerated well by the patient. No apparent complications. Did also discuss that if diabetic, recommend close monitoring of blood sugars over the next week as cortisone injections can temporarily elevate blood sugars.     PROCEDURE NOTE:  The risks, perceived benefits and potential complications (including but not limited to: bleeding, infection, pain, scar, damage to adjacent structures, atrophy or necrosis of soft tissue, skin blanching, failure to relieve symptoms, worsening of symptoms, allergic reaction) of injection were discussed with the patient. Questions were addressed and answered.The patient elected to proceed. Written informed consent was obtained. The correct procedural site was identified and confirmed. A Left Knee intraarticular injection was performed using 1mL Depo Medrol 80mg per mL and 7mL (4mL 1% lidocaine, 3mL 0.25% marcaine)  of local anesthetic after sterile prep, to the correct procedural site. Sterile bandaid applied. This was tolerated well by the patient. No apparent complications. Did also discuss that if diabetic, recommend close monitoring of blood sugars over the next week as cortisone injections can temporarily elevate blood sugars.    The information in this document, created by a scribe for me, accurately reflects the services I personally performed and the decisions  made by me. I have reviewed and approved this document for accuracy.         Alexandre Calle M.D., M.S.  Dept. of Orthopaedic Surgery  [facility]     The patient's left knee was prepped with betadine solution after verification of allergies. Area approximately 10 cm x 10 cm prepped in a sterile fashion. After injection, betadine removed with soap and water and band-aids applied.    4cc Lidocaine 1%  NDC 91575-898-05, LOT [id number],    3cc Bupivacaine 0.25% NDC 41747-760-05, LOT [id number],  2018  1cc Depo Medrol 80 mg/ml NDC 0060-7567-88, LOT [id number],  2019 injected into patient's left Knee by:  William Guerrero PA-C, LEE (Ortho)  Supervising Physician: Alexandre Calle M.D., M.S.  Dept. of Orthopaedic Surgery  [facility]    The patient's left knee pes anserine bursa was prepped with betadine solution after verification of allergies. Area approximately 10 cm x 10 cm prepped in a sterile fashion. After injection, betadine removed with soap and water and band-aids applied.    3cc Lidocaine 1%  NDC 96300-213-75, LOT [id number],    1cc Depo Medrol 80 mg/ml NDC 8114-7540-66, LOT [id number],  2019 injected into patient's left Knee pes anserine bursa by:  William Guerrero PA-C, LEE (Ortho)  Supervising Physician: Alexandre Calle M.D., M.S.  Dept. of Orthopaedic Surgery  [facility]            Again, thank you for allowing me to participate in the care of your patient.        Sincerely,        Alexandre Calle MD

## 2017-09-20 NOTE — NURSING NOTE
"Chief Complaint   Patient presents with     RECHECK     Left knee pain. Last injection: pes 1/25/17. Injection worked good, pain started to come back about a month ago. She is going on another trip so she wants to make sure her knee is good. She would like another injection today.        Initial Resp 16  Ht 1.638 m (5' 4.5\")  Wt 94.3 kg (207 lb 12.8 oz)  BMI 35.12 kg/m2 Estimated body mass index is 35.12 kg/(m^2) as calculated from the following:    Height as of this encounter: 1.638 m (5' 4.5\").    Weight as of this encounter: 94.3 kg (207 lb 12.8 oz).  Medication Reconciliation: antonietta Rogers Certified Medical Assistant    "

## 2017-09-20 NOTE — PROGRESS NOTES
The patient's left knee was prepped with betadine solution after verification of allergies. Area approximately 10 cm x 10 cm prepped in a sterile fashion. After injection, betadine removed with soap and water and band-aids applied.    4cc Lidocaine 1%  NDC 24995-519-36, LOT [id number],    3cc Bupivacaine 0.25% NDC 78197-663-95, LOT [id number],  2018  1cc Depo Medrol 80 mg/ml NDC 7195-7408-81, LOT [id number],  2019 injected into patient's left Knee by:  William Guerrero PA-C, CAYULIYA (Ortho)  Supervising Physician: Alexandre Calle M.D., M.S.  Dept. of Orthopaedic Surgery  [facility]    The patient's left knee pes anserine bursa was prepped with betadine solution after verification of allergies. Area approximately 10 cm x 10 cm prepped in a sterile fashion. After injection, betadine removed with soap and water and band-aids applied.    3cc Lidocaine 1%  NDC 40361-268-45, LOT [id number],    1cc Depo Medrol 80 mg/ml NDC 5702-4900-86, LOT [id number],  2019 injected into patient's left Knee pes anserine bursa by:  William Guerrero PA-C, LEE (Ortho)  Supervising Physician: Alexandre Calle M.D., M.S.  Dept. of Orthopaedic Surgery  [facility]

## 2017-09-20 NOTE — PATIENT INSTRUCTIONS
Please remember to call and schedule a follow up appointment if one was recommended at your earliest convenience.  Orthopedics CLINIC HOURS TELEPHONE NUMBER   Dr. Alva Rene  Certified Medical Assistant   Monday & Wednesday   8am - 5pm  Thursday 1pm - 5pm  Friday 8am -11:30am Specialty schedulers:   [phone] to schedule your surgery.  Main Clinic:   [phone] to make an appointment with any provider.    Urgent Care locations:    [facility] Monday-Friday Closed  Saturday-Sunday 9am-5pm      Monday-Friday 12pm - 8pm  Saturday-Sunday 9am-5pm [phone]     If SURGERY has been recommended, please call our Specialty Schedulers at [phone] to schedule your procedure.    If you need a medication refill, please contact your pharmacy. Please allow 3 business days for your refill to be completed.    If an MRI or CT scan has been recommended, please call [facility] Schedulers at [phone] to schedule your appointment.  Use [employer] (secure e-mail communication and access to your chart) to send a message or to make an appointment. Please ask how you can sign up for [employer].  Your care team's suggested websites for health information:   [email or web address] : Up to date and easily searchable information on multiple topics.   [email or web address] : [place] dept of heat, public health issues in [place], [place]1

## 2017-09-22 NOTE — PROGRESS NOTES
Yumiko,     Cholesterol is at goal for you.  Urine sample did not show any abnormal protein.  Electrolytes, glucose, kidney function and Vitamin D levels are normal.     Please do not hesitate to call us at [phone] if you have any questions or concerns.    Thank you,    Gavi Bear MD MPH

## 2017-10-19 DIAGNOSIS — G89.29 CHRONIC LOW BACK PAIN WITHOUT SCIATICA, UNSPECIFIED BACK PAIN LATERALITY: ICD-10-CM

## 2017-10-19 DIAGNOSIS — M54.50 CHRONIC LOW BACK PAIN WITHOUT SCIATICA, UNSPECIFIED BACK PAIN LATERALITY: ICD-10-CM

## 2017-10-22 ENCOUNTER — TRANSFERRED RECORDS (OUTPATIENT)
Dept: HEALTH INFORMATION MANAGEMENT | Facility: CLINIC | Age: 77
End: 2017-10-22

## 2017-10-24 RX ORDER — ETODOLAC 400 MG
TABLET ORAL
Qty: 60 TABLET | Refills: 0 | Status: SHIPPED | OUTPATIENT
Start: 2017-10-24 | End: 2017-11-27

## 2017-10-27 ENCOUNTER — RADIANT APPOINTMENT (OUTPATIENT)
Dept: MAMMOGRAPHY | Facility: CLINIC | Age: 77
End: 2017-10-27
Payer: COMMERCIAL

## 2017-10-27 DIAGNOSIS — Z12.31 VISIT FOR SCREENING MAMMOGRAM: ICD-10-CM

## 2017-10-27 DIAGNOSIS — I10 ESSENTIAL HYPERTENSION WITH GOAL BLOOD PRESSURE LESS THAN 140/90: ICD-10-CM

## 2017-10-27 PROCEDURE — G0202 SCR MAMMO BI INCL CAD: HCPCS | Mod: TC

## 2017-10-30 RX ORDER — LISINOPRIL AND HYDROCHLOROTHIAZIDE 12.5; 2 MG/1; MG/1
TABLET ORAL
Qty: 180 TABLET | Refills: 1 | Status: SHIPPED | OUTPATIENT
Start: 2017-10-30 | End: 2018-04-29

## 2017-11-27 DIAGNOSIS — G89.29 CHRONIC LOW BACK PAIN WITHOUT SCIATICA, UNSPECIFIED BACK PAIN LATERALITY: ICD-10-CM

## 2017-11-27 DIAGNOSIS — M54.50 CHRONIC LOW BACK PAIN WITHOUT SCIATICA, UNSPECIFIED BACK PAIN LATERALITY: ICD-10-CM

## 2017-11-28 NOTE — TELEPHONE ENCOUNTER
[facility] STATES THAT PATIENT WOULD LIKE TO REQUEST A 90 DAY SUPPLY OF MEDICATION. THANKS

## 2017-11-30 NOTE — TELEPHONE ENCOUNTER
Routing refill request to provider for review/approval because:  Labs not current:  ALT/AST last done 2011, CBC 2013

## 2017-12-01 RX ORDER — ETODOLAC 400 MG
TABLET ORAL
Qty: 60 TABLET | Refills: 0 | Status: SHIPPED | OUTPATIENT
Start: 2017-12-01 | End: 2018-07-31

## 2017-12-11 ENCOUNTER — OFFICE VISIT (OUTPATIENT)
Dept: DERMATOLOGY | Facility: CLINIC | Age: 77
End: 2017-12-11
Payer: OTHER GOVERNMENT

## 2017-12-11 ENCOUNTER — TRANSFERRED RECORDS (OUTPATIENT)
Dept: HEALTH INFORMATION MANAGEMENT | Facility: CLINIC | Age: 77
End: 2017-12-11

## 2017-12-11 DIAGNOSIS — L71.9 ACNE ROSACEA: Primary | ICD-10-CM

## 2017-12-11 DIAGNOSIS — L85.3 XEROSIS OF SKIN: ICD-10-CM

## 2017-12-11 DIAGNOSIS — D48.5 NEOPLASM OF UNCERTAIN BEHAVIOR OF SKIN: ICD-10-CM

## 2017-12-11 DIAGNOSIS — L57.0 AK (ACTINIC KERATOSIS): ICD-10-CM

## 2017-12-11 RX ORDER — LIDOCAINE HYDROCHLORIDE AND EPINEPHRINE 10; 10 MG/ML; UG/ML
3 INJECTION, SOLUTION INFILTRATION; PERINEURAL ONCE
Qty: 3 ML | Refills: 0 | OUTPATIENT
Start: 2017-12-11 | End: 2017-12-11

## 2017-12-11 RX ORDER — METRONIDAZOLE 7.5 MG/G
GEL TOPICAL 2 TIMES DAILY
Qty: 45 G | Refills: 3 | Status: CANCELLED | OUTPATIENT
Start: 2017-12-11

## 2017-12-11 RX ORDER — SULFACETAMIDE SODIUM, SULFUR 100; 50 MG/G; MG/G
LOTION TOPICAL
Qty: 60 G | Refills: 11 | Status: SHIPPED | OUTPATIENT
Start: 2017-12-11 | End: 2018-07-31

## 2017-12-11 ASSESSMENT — PAIN SCALES - GENERAL
PAINLEVEL: NO PAIN (1)
PAINLEVEL: NO PAIN (0)

## 2017-12-11 NOTE — PROGRESS NOTES
"[facility] Dermatology Note      Dermatology Problem List:  1. NMSC  -BCC, left mid back, s/p ED&C 10/25/2010  -BCC, mid back, s/p ED&C 10/25/2010  2. AK cryotherapy 12/11/2017  3. NUB left upper back biopsy 12/11/2017    Encounter Date: Dec 11, 2017    CC:  Chief Complaint   Patient presents with     Derm Problem     Yumiko is here for a skin check and for concerns of rosacea.  States she has a concerning area on her nose, and states the rosacea was \"horrible\" 3 months ago, but is alot better now.          History of Present Illness:  Ms. Yumiko Flowers is a 77 year old female who presents for evaluation of history of NMSC. The patient was seen in 4/2016 by Dr. Siu.  PCP Dr. Bear is prescribing doxycycline 100mg BID for rosacea.  3 months ago did not want to leave the house because of this and it is significantly better now. CeraVe face lotion being use.  Complains about severe dryness in these areas.  Wears sunscreen \"all the time\".  She denies exacerbating factors.  The patient reports no other lesions of concern.     Past Medical History:   Patient Active Problem List   Diagnosis     Asthma, moderate persistent     Acne rosacea     CARDIOVASCULAR SCREENING; LDL GOAL LESS THAN 130     Osteopenia     Vitamin D deficiency     Advanced directives, counseling/discussion     Hx of pseudoexfoliation, os     Pseudophakia, ou; Yag Caps, os     HTN, goal below 140/90     Peptic ulcer     Health Care Home     History of blepharoplasty, upper lid, ou (elsewhere); revision (EN)     Obesity, Class II, BMI 35-39.9     Restless legs syndrome     DJD (degenerative joint disease), lumbosacral     Rosacea     Essential hypertension with goal blood pressure less than 140/90     Obesity, Class I, BMI 30-34.9     Posterior vitreous detachment, bilateral     Chronic pain of left knee     Cervicalgia     Vertigo     Past Medical History:   Diagnosis Date     Acne rosacea      Actinic keratosis      Amblyopia      " "Asthma, moderate persistent      Basal cell carcinoma 10/2010    back X2     Cataract, mod, od; mild-mod, os 1/12/2012     DJD (degenerative joint disease), lumbosacral 8/6/2014     Elevated cholesterol      Endometriosis      HTN (hypertension)      Moderate major depression (H)     \"Circumstances\"     Osteopenia      Past Surgical History:   Procedure Laterality Date     BLEPHAROPLASTY BILATERAL  3/9/2006; 2013    both eyes, upper lid; revision (EN)     C APPENDECTOMY       CATARACT IOL, RT/LT       HC REMOVAL GALLBLADDER       LASER YAG CAPSULOTOMY      left eye (AC lysis also)     PHACOEMULSIFICATION WITH STANDARD INTRAOCULAR LENS IMPLANT  1/2012; 4/2013    right eye; left eye     SURGICAL HISTORY OF -       endometriosis surgeriesx3     SURGICAL HISTORY OF -       ganiglion cyst onfoot     SURGICAL HISTORY OF -       Eye for \"droopy eye\"       Social History:  The patient is retired. The patient denies use of tanning beds. She used to work insurance. She has tanned in the past.     Family History:  There is a family history of skin cancer in the father.       Medications:  Current Outpatient Prescriptions   Medication Sig Dispense Refill     etodolac (LODINE) 400 MG tablet TAKE 1 TABLET (400 MG) BY MOUTH 2 TIMES DAILY AS NEEDED 60 tablet 0     lisinopril-hydrochlorothiazide (PRINZIDE/ZESTORETIC) 20-12.5 MG per tablet TAKE 2 TABLETS BY MOUTH DAILY 180 tablet 1     doxycycline (VIBRA-TABS) 100 MG tablet Take 1 tablet (100 mg) by mouth 2 times daily 180 tablet 0     gabapentin (NEURONTIN) 600 MG tablet Take 1 tablet (600 mg) by mouth 2 times daily 180 tablet 3     albuterol (2.5 MG/3ML) 0.083% neb solution Take 1 vial (2.5 mg) by nebulization every 6 hours as needed for shortness of breath / dyspnea or wheezing 25 vial 1     albuterol (PROAIR HFA/PROVENTIL HFA/VENTOLIN HFA) 108 (90 BASE) MCG/ACT Inhaler Inhale into the lungs. INHALE 1-2 PUFFS EVERY 4-6 HOURS AS NEEDED 1 Inhaler 0     Multiple Vitamins-Iron " TABS Take 1 tablet by mouth daily.       Cholecalciferol (VITAMIN D) 2000 UNIT CAPS Take 2,000 Units by mouth daily.       CALCIUM 500 MG PO CHEW None Entered       diclofenac (VOLTAREN) 1 % GEL topical gel Apply 2 grams to affected area up to four times daily using enclosed dosing card. (Patient not taking: Reported on 12/11/2017) 100 g 1       Allergies   Allergen Reactions     Erythromycin Swelling and Other (See Comments)       Review of Systems:  -Skin: As above in HPI. No additional skin concerns.  -Const: The patient is generally feeling well today. No fevers or chills.     Physical exam:  Vitals: There were no vitals taken for this visit.  GEN: This is a well developed, well-nourished female in no acute distress, in a pleasant mood.    SKIN: Total skin excluding the undergarment areas was performed. The exam included the head/face, neck, both arms, chest, back, abdomen, both legs, digits and/or nails.   - There are well healed surgical scars without erythema, nodularity or telangiectasias on the mid back and left mid back.   - There are 2 erythematous macules with overyling adherent scale on the nasal tip.   -There are waxy stuck on tan to brown papules on the trunk and extremities  -16mm erythematous plaque on left upper back with peripheral hyperkeratosis  -Scattered brown macules on sun exposed areas  -xerosis predominantly on lower legs  -No other lesions of concern on areas examined.       Impression/Plan:  1. History of nonmelanoma skin cancer, no clinical evidence or recurrence  2. Actinic keratosis, nasal tip    Cryotherapy procedure note: After verbal consent and discussion of risks and benefits including but no limited to dyspigmentation/scar, blister, and pain, 2 were treated with 1-2mm freeze border for 1 cycle with liquid nitrogen. Post cryotherapy instructions were provided.     If not resolved at follow up plan for biposy.   3. Seborrheic keratosis, non irritated, trunk and  extremities    Benign nature was discussed. No further intervention required at this time.   4. Pink 16mm plaque, non pearly, left upper back. Neoplasm of uncertain behavior. Differential diagnosis includes inflamed SK, rule out NMSC    Shave biopsy:  After discussion of benefits and risks including but not limited to bleeding/bruising, pain/swelling, infection, scar, incomplete removal, nerve damage/numbness, recurrence, and non-diagnostic biopsy, written consent, verbal consent and photographs were obtained. Time-out was performed. The area was cleaned with isopropyl alcohol. 0.5 mL of 1% lidocaine with epinephrine was injected to obtain adequate anesthesia of the lesion on the left upper back. A shave biopsy was performed. Hemostasis was achieved with aluminium chloride. Vaseline and a sterile dressing were applied. The patient tolerated the procedure and no complications were noted. The patient was provided with verbal and written post care instructions.   5.  Acne rosacea--papular    Continue doxycyline 100mg BID    Add sulfacetamide sodium-sulfur lotion for face  6. Xerosis    Amlactin    Follow-up in 1 year      I was present for the entire procedures. KB  .I, Darlene Wagner MD, saw this patient with the resident and agree with the resident s findings and plan of care as documented in the resident s note.

## 2017-12-11 NOTE — MR AVS SNAPSHOT
After Visit Summary   12/11/2017    Yumiko Flowers    MRN: [id number]           Patient Information     Date Of Birth          1940        Visit Information        Provider Department      12/11/2017 9:45 AM Darlene Wagner MD [facility] Dermatology        Today's Diagnoses     Acne rosacea    -  1    Neoplasm of uncertain behavior of skin        AK (actinic keratosis)          Care Instructions    Amlactin lotion to body (not face) for moisturization  Sulfacetamide sodium-sulfur lotion for face for rosacea    Cryotherapy    What is it?    Use of a very cold liquid, such as liquid nitrogen, to freeze and destroy abnormal skin cells that need to be removed    What should I expect?    Tenderness and redness    A small blister that might grow and fill with dark purple blood. There may be crusting.    More than one treatment may be needed if the lesions do not go away.    How do I care for the treated area?    Gently wash the area with your hands when bathing.    Use a thin layer of Vaseline to help with healing. You may use a Band-Aid.     The area should heal within 7-10 days and may leave behind a pink or lighter color.     Do not use an antibiotic or Neosporin ointment.     You may take acetaminophen (Tylenol) for pain.     Call your Doctor if you have:    Severe pain    Signs of infection (warmth, redness, cloudy yellow drainage, and or a bad smell)    Questions or concerns    Who should I call with questions?       [facility]: [phone]       [facility]: [phone]       For urgent needs outside of business hours call the [facility] at [phone]        and ask for the dermatology resident on call    Wound Care After a Biopsy    What is a skin biopsy?  A skin biopsy allows the doctor to examine a very small piece of tissue under the microscope to determine the diagnosis and the best treatment for the skin condition.  A local anesthetic (numbing medicine)  is injected with a very small needle into the skin area to be tested. A small piece of skin is taken from the area. Sometimes a suture (stitch) is used.     What are the risks of a skin biopsy?  I will experience scar, bleeding, swelling, pain, crusting and redness. I may experience incomplete removal or recurrence. Risks of this procedure are excessive bleeding, bruising, infection, nerve damage, numbness, thick (hypertrophic or keloidal) scar and non-diagnostic biopsy.    How should I care for my wound for the first 24 hours?    Keep the wound dry and covered for 24 hours    If it bleeds, hold direct pressure on the area for 15 minutes. If bleeding does not stop then go to the emergency room    Avoid strenuous exercise the first 1-2 days or as your doctor instructs you    How should I care for the wound after 24 hours?    After 24 hours, remove the bandage    You may bathe or shower as normal    If you had a scalp biopsy, you can shampoo as usual and can use shower water to clean the biopsy site daily    Clean the wound twice a day with gentle soap and water    Do not scrub, be gentle    Apply white petroleum/Vaseline after cleaning the wound with a cotton swab or a clean finger, and keep the site covered with a Bandaid /bandage. Bandages are not necessary with a scalp biopsy    If you are unable to cover the site with a Bandaid /bandage, re-apply ointment 2-3 times a day to keep the site moist. Moisture will help with healing    Avoid strenuous activity for first 1-2 days    Avoid lakes, rivers, pools, and oceans until the stitches are removed or the site is healed    How do I clean my wound?    Wash hands thoroughly with soap or use hand  before all wound care    Clean the wound with gentle soap and water    Apply white petroleum/Vaseline  to wound after it is clean    Replace the Bandaid /bandage to keep the wound covered for the first few days or as instructed by  your doctor    If you had a scalp biopsy, warm shower water to the area on a daily basis should suffice    What should I use to clean my wound?     Cotton-tipped applicators (Qtips )    White petroleum jelly (Vaseline ). Use a clean new container and use Q-tips to apply.    Bandaids   as needed    Gentle soap     How should I care for my wound long term?    Do not get your wound dirty    Keep up with wound care for one week or until the area is healed.    A small scab will form and fall off by itself when the area is completely healed. The area will be red and will become pink in color as it heals. Sun protection is very important for how your scar will turn out. Sunscreen with an SPF 30 or greater is recommended once the area is healed.    You should have some soreness but it should be mild and slowly go away over several days. Talk to your doctor about using tylenol for pain,    When should I call my doctor?  If you have increased:     Pain or swelling    Pus or drainage (clear or slightly yellow drainage is ok)    Temperature over 100F    Spreading redness or warmth around wound    When will I hear about my results?  The biopsy results can take 2-3 weeks to come back. The clinic will call you with the results, send you a [employer] message, or have you schedule a follow-up clinic or phone time to discuss the results. Contact our clinics if you do not hear from us in 3 weeks.     Who should I call with questions?    [facility]: [phone]     [facility]: [phone]    For urgent needs outside of business hours call the [facility] at [phone] and ask for the dermatology resident on call              Follow-ups after your visit        Follow-up notes from your care team     Return in about 1 year (around 12/11/2018).      Your next 10 appointments already scheduled     Feb 27, 2018  8:45 AM CST   Return Visit with Tiff Siu MD   [facility]  [facility])    [address] [place] [id number] [phone]              Who to contact     Please call your clinic at [phone] to:    Ask questions about your health    Make or cancel appointments    Discuss your medicines    Learn about your test results    Speak to your doctor   If you have compliments or concerns about an experience at your clinic, or if you wish to file a complaint, please contact [facility] Patient Relations at [phone] or email us at [email or web address]         Additional Information About Your Visit        [employer] Information     [employer] gives you secure access to your electronic health record. If you see a primary care provider, you can also send messages to your care team and make appointments. If you have questions, please call your primary care clinic.  If you do not have a primary care provider, please call [phone] and they will assist you.      [employer] is an electronic gateway that provides easy, online access to your medical records. With [employer], you can request a clinic appointment, read your test results, renew a prescription or communicate with your care team.     To access your existing account, please contact your [facility] or call [phone] for assistance.        Care EveryWhere ID     This is your Care EveryWhere ID. This could be used by other organizations to access your [place] medical records  [id number]         Blood Pressure from Last 3 Encounters:   08/01/17 126/74   03/01/17 114/73   02/18/17 144/80    Weight from Last 3 Encounters:   09/20/17 94.3 kg (207 lb 12.8 oz)   08/01/17 93.4 kg (206 lb)   03/01/17 89.8 kg (198 lb)              We Performed the Following     BIOPSY SKIN/SUBQ/MUC MEM, SINGLE LESION     DESTRUCT PREMALIGNANT LESION, 2-14     DESTRUCT PREMALIGNANT LESION, FIRST     Surgical pathology exam           Today's Medication Changes          These changes are accurate as of: 12/11/17 10:58 AM.  If you have any questions, ask your nurse or doctor.               Start taking these medicines.        Dose/Directions    lidocaine 1% with EPINEPHrine 1:100,000 1 %-1:295274 injection   Used for:  Neoplasm of uncertain behavior of skin   Started by:  Darlene Wagner MD        Dose:  3 mL   Inject 3 mLs into the skin once for 1 dose   Quantity:  3 mL   Refills:  0       sulfacetamide sodium-sulfur 10-5 % Lotn   Used for:  Acne rosacea   Started by:  Darlene Wagner MD        Use daily as directed   Quantity:  60 g   Refills:  11            Where to get your medicines      These medications were sent to [facility] [address] [facility] - [place], [place] - [address]  [address] [address]     Phone:  [phone]     sulfacetamide sodium-sulfur 10-5 % Lotn         Some of these will need a paper prescription and others can be bought over the counter.  Ask your nurse if you have questions.     You don't need a prescription for these medications     lidocaine 1% with EPINEPHrine 1:100,000 1 %-1:521897 injection                Primary Care Provider Office Phone # Fax #    Gavi Bear [phone]       [address]  [place] [address]        Equal Access to Services     [facility] AH: Hadii micky ku hadasho Soomaali, waaxda luqadaha, qaybta kaalmada adeegyada, jadyn buitrago. So [facility] [phone].    ATENCIÓN: Si habla español, tiene a church disposición servicios gratuitos de asistencia lingüística. Llame al [phone].    We comply with applicable federal civil rights laws and [place] laws. We do not discriminate on the basis of race, color, national origin, age, disability, sex, sexual orientation, or gender identity.            Thank you!     Thank you for choosing [facility] DERMATOLOGY  for your care. Our goal is always to provide you with excellent  care. Hearing back from our patients is one way we can continue to improve our services. Please take a few minutes to complete the written survey that you may receive in the mail after your visit with us. Thank you!             Your Updated Medication List - Protect others around you: Learn how to safely use, store and throw away your medicines at www.disposemymeds.org.          This list is accurate as of: 12/11/17 10:58 AM.  Always use your most recent med list.                   Brand Name Dispense Instructions for use Diagnosis    * albuterol 108 (90 BASE) MCG/ACT Inhaler    PROAIR HFA/PROVENTIL HFA/VENTOLIN HFA    1 Inhaler    Inhale into the lungs. INHALE 1-2 PUFFS EVERY 4-6 HOURS AS NEEDED    Mild persistent asthma       * albuterol (2.5 MG/3ML) 0.083% neb solution     25 vial    Take 1 vial (2.5 mg) by nebulization every 6 hours as needed for shortness of breath / dyspnea or wheezing    Moderate persistent asthma with acute exacerbation       calcium 500 MG Chew      None Entered        diclofenac 1 % Gel topical gel    VOLTAREN    100 g    Apply 2 grams to affected area up to four times daily using enclosed dosing card.    Cervicalgia       doxycycline 100 MG tablet    VIBRA-TABS    180 tablet    Take 1 tablet (100 mg) by mouth 2 times daily    Rosacea       etodolac 400 MG tablet    LODINE    60 tablet    TAKE 1 TABLET (400 MG) BY MOUTH 2 TIMES DAILY AS NEEDED    Chronic low back pain without sciatica, unspecified back pain laterality       gabapentin 600 MG tablet    NEURONTIN    180 tablet    Take 1 tablet (600 mg) by mouth 2 times daily    Low back pain, unspecified back pain laterality, unspecified chronicity, with sciatica presence unspecified       lidocaine 1% with EPINEPHrine 1:100,000 1 %-1:113554 injection     3 mL    Inject 3 mLs into the skin once for 1 dose    Neoplasm of uncertain behavior of skin       lisinopril-hydrochlorothiazide 20-12.5 MG per tablet    PRINZIDE/ZESTORETIC    180 tablet     TAKE 2 TABLETS BY MOUTH DAILY    Essential hypertension with goal blood pressure less than 140/90       Multiple Vitamins-Iron Tabs      Take 1 tablet by mouth daily.        sulfacetamide sodium-sulfur 10-5 % Lotn     60 g    Use daily as directed    Acne rosacea       vitamin D 2000 UNITS Caps      Take 2,000 Units by mouth daily.        * Notice:  This list has 2 medication(s) that are the same as other medications prescribed for you. Read the directions carefully, and ask your doctor or other care provider to review them with you.

## 2017-12-11 NOTE — PATIENT INSTRUCTIONS
Amlactin lotion to body (not face) for moisturization  Sulfacetamide sodium-sulfur lotion for face for rosacea    Cryotherapy    What is it?    Use of a very cold liquid, such as liquid nitrogen, to freeze and destroy abnormal skin cells that need to be removed    What should I expect?    Tenderness and redness    A small blister that might grow and fill with dark purple blood. There may be crusting.    More than one treatment may be needed if the lesions do not go away.    How do I care for the treated area?    Gently wash the area with your hands when bathing.    Use a thin layer of Vaseline to help with healing. You may use a Band-Aid.     The area should heal within 7-10 days and may leave behind a pink or lighter color.     Do not use an antibiotic or Neosporin ointment.     You may take acetaminophen (Tylenol) for pain.     Call your Doctor if you have:    Severe pain    Signs of infection (warmth, redness, cloudy yellow drainage, and or a bad smell)    Questions or concerns    Who should I call with questions?       [facility]: [phone]       [facility]: [phone]       For urgent needs outside of business hours call the [facility] at [phone]        and ask for the dermatology resident on call    Wound Care After a Biopsy    What is a skin biopsy?  A skin biopsy allows the doctor to examine a very small piece of tissue under the microscope to determine the diagnosis and the best treatment for the skin condition. A local anesthetic (numbing medicine)  is injected with a very small needle into the skin area to be tested. A small piece of skin is taken from the area. Sometimes a suture (stitch) is used.     What are the risks of a skin biopsy?  I will experience scar, bleeding, swelling, pain, crusting and redness. I may experience incomplete removal or recurrence. Risks of this procedure are excessive bleeding, bruising,  infection, nerve damage, numbness, thick (hypertrophic or keloidal) scar and non-diagnostic biopsy.    How should I care for my wound for the first 24 hours?    Keep the wound dry and covered for 24 hours    If it bleeds, hold direct pressure on the area for 15 minutes. If bleeding does not stop then go to the emergency room    Avoid strenuous exercise the first 1-2 days or as your doctor instructs you    How should I care for the wound after 24 hours?    After 24 hours, remove the bandage    You may bathe or shower as normal    If you had a scalp biopsy, you can shampoo as usual and can use shower water to clean the biopsy site daily    Clean the wound twice a day with gentle soap and water    Do not scrub, be gentle    Apply white petroleum/Vaseline after cleaning the wound with a cotton swab or a clean finger, and keep the site covered with a Bandaid /bandage. Bandages are not necessary with a scalp biopsy    If you are unable to cover the site with a Bandaid /bandage, re-apply ointment 2-3 times a day to keep the site moist. Moisture will help with healing    Avoid strenuous activity for first 1-2 days    Avoid lakes, rivers, pools, and oceans until the stitches are removed or the site is healed    How do I clean my wound?    Wash hands thoroughly with soap or use hand  before all wound care    Clean the wound with gentle soap and water    Apply white petroleum/Vaseline  to wound after it is clean    Replace the Bandaid /bandage to keep the wound covered for the first few days or as instructed by your doctor    If you had a scalp biopsy, warm shower water to the area on a daily basis should suffice    What should I use to clean my wound?     Cotton-tipped applicators (Qtips )    White petroleum jelly (Vaseline ). Use a clean new container and use Q-tips to apply.    Bandaids   as needed    Gentle soap     How should I care for my wound long term?    Do not get your wound dirty    Keep up with wound care  for one week or until the area is healed.    A small scab will form and fall off by itself when the area is completely healed. The area will be red and will become pink in color as it heals. Sun protection is very important for how your scar will turn out. Sunscreen with an SPF 30 or greater is recommended once the area is healed.    You should have some soreness but it should be mild and slowly go away over several days. Talk to your doctor about using tylenol for pain,    When should I call my doctor?  If you have increased:     Pain or swelling    Pus or drainage (clear or slightly yellow drainage is ok)    Temperature over 100F    Spreading redness or warmth around wound    When will I hear about my results?  The biopsy results can take 2-3 weeks to come back. The clinic will call you with the results, send you a [email or web address] message, or have you schedule a follow-up clinic or phone time to discuss the results. Contact our clinics if you do not hear from us in 3 weeks.     Who should I call with questions?    [facility]: [phone]     [facility]: [phone]    For urgent needs outside of business hours call the [facility] at [phone] and ask for the dermatology resident on call

## 2017-12-11 NOTE — NURSING NOTE
"Dermatology Rooming Note    Yumiko Flowers's goals for this visit include:   Chief Complaint   Patient presents with     Derm Problem     Yumiko is here for a skin check and for concerns of rosacea.  States she has a concerning area on her nose, and states the rosacea was \"horrible\" 3 months ago, but is alot better now.        Arianna Keys, BRANN  "

## 2017-12-11 NOTE — LETTER
"12/11/2017       RE: Yumiko Flowers  [address]  [place] [address]     Dear Colleague,    Thank you for referring your patient, Yumiko Flowers, to the [facility] DERMATOLOGY at [facility]. Please see a copy of my visit note below.    [facility] Dermatology Note      Dermatology Problem List:  1. NMSC  -BCC, left mid back, s/p ED&C 10/25/2010  -BCC, mid back, s/p ED&C 10/25/2010  2. AK cryotherapy 12/11/2017  3. NUB left upper back biopsy 12/11/2017    Encounter Date: Dec 11, 2017    CC:  Chief Complaint   Patient presents with     Derm Problem     Yumiko is here for a skin check and for concerns of rosacea.  States she has a concerning area on her nose, and states the rosacea was \"horrible\" 3 months ago, but is alot better now.          History of Present Illness:  Ms. Yumiko Flowers is a 77 year old female who presents for evaluation of history of NMSC. The patient was seen in 4/2016 by Dr. Siu.  PCP Dr. Bear is prescribing doxycycline 100mg BID for rosacea.  3 months ago did not want to leave the house because of this and it is significantly better now. CeraVe face lotion being use.  Complains about severe dryness in these areas.  Wears sunscreen \"all the time\".  She denies exacerbating factors.  The patient reports no other lesions of concern.     Past Medical History:   Patient Active Problem List   Diagnosis     Asthma, moderate persistent     Acne rosacea     CARDIOVASCULAR SCREENING; LDL GOAL LESS THAN 130     Osteopenia     Vitamin D deficiency     Advanced directives, counseling/discussion     Hx of pseudoexfoliation, os     Pseudophakia, ou; Yag Caps, os     HTN, goal below 140/90     Peptic ulcer     Health Care Home     History of blepharoplasty, upper lid, ou (elsewhere); revision (EN)     Obesity, Class II, BMI 35-39.9     Restless legs syndrome     DJD (degenerative joint disease), lumbosacral     Rosacea     Essential hypertension with " "goal blood pressure less than 140/90     Obesity, Class I, BMI 30-34.9     Posterior vitreous detachment, bilateral     Chronic pain of left knee     Cervicalgia     Vertigo     Past Medical History:   Diagnosis Date     Acne rosacea      Actinic keratosis      Amblyopia      Asthma, moderate persistent      Basal cell carcinoma 10/2010    back X2     Cataract, mod, od; mild-mod, os 1/12/2012     DJD (degenerative joint disease), lumbosacral 8/6/2014     Elevated cholesterol      Endometriosis      HTN (hypertension)      Moderate major depression (H)     \"Circumstances\"     Osteopenia      Past Surgical History:   Procedure Laterality Date     BLEPHAROPLASTY BILATERAL  3/9/2006; 2013    both eyes, upper lid; revision (EN)     C APPENDECTOMY       CATARACT IOL, RT/LT       HC REMOVAL GALLBLADDER       LASER YAG CAPSULOTOMY      left eye (AC lysis also)     PHACOEMULSIFICATION WITH STANDARD INTRAOCULAR LENS IMPLANT  1/2012; 4/2013    right eye; left eye     SURGICAL HISTORY OF -       endometriosis surgeriesx3     SURGICAL HISTORY OF -       ganiglion cyst onfoot     SURGICAL HISTORY OF -       Eye for \"droopy eye\"       Social History:  The patient is retired. The patient denies use of tanning beds. She used to work insurance. She has tanned in the past.     Family History:  There is a family history of skin cancer in the father.       Medications:  Current Outpatient Prescriptions   Medication Sig Dispense Refill     etodolac (LODINE) 400 MG tablet TAKE 1 TABLET (400 MG) BY MOUTH 2 TIMES DAILY AS NEEDED 60 tablet 0     lisinopril-hydrochlorothiazide (PRINZIDE/ZESTORETIC) 20-12.5 MG per tablet TAKE 2 TABLETS BY MOUTH DAILY 180 tablet 1     doxycycline (VIBRA-TABS) 100 MG tablet Take 1 tablet (100 mg) by mouth 2 times daily 180 tablet 0     gabapentin (NEURONTIN) 600 MG tablet Take 1 tablet (600 mg) by mouth 2 times daily 180 tablet 3     albuterol (2.5 MG/3ML) 0.083% neb solution Take 1 vial (2.5 mg) by " nebulization every 6 hours as needed for shortness of breath / dyspnea or wheezing 25 vial 1     albuterol (PROAIR HFA/PROVENTIL HFA/VENTOLIN HFA) 108 (90 BASE) MCG/ACT Inhaler Inhale into the lungs. INHALE 1-2 PUFFS EVERY 4-6 HOURS AS NEEDED 1 Inhaler 0     Multiple Vitamins-Iron TABS Take 1 tablet by mouth daily.       Cholecalciferol (VITAMIN D) 2000 UNIT CAPS Take 2,000 Units by mouth daily.       CALCIUM 500 MG PO CHEW None Entered       diclofenac (VOLTAREN) 1 % GEL topical gel Apply 2 grams to affected area up to four times daily using enclosed dosing card. (Patient not taking: Reported on 12/11/2017) 100 g 1       Allergies   Allergen Reactions     Erythromycin Swelling and Other (See Comments)       Review of Systems:  -Skin: As above in HPI. No additional skin concerns.  -Const: The patient is generally feeling well today. No fevers or chills.     Physical exam:  Vitals: There were no vitals taken for this visit.  GEN: This is a well developed, well-nourished female in no acute distress, in a pleasant mood.    SKIN: Total skin excluding the undergarment areas was performed. The exam included the head/face, neck, both arms, chest, back, abdomen, both legs, digits and/or nails.   - There are well healed surgical scars without erythema, nodularity or telangiectasias on the mid back and left mid back.   - There are 2 erythematous macules with overyling adherent scale on the nasal tip.   -There are waxy stuck on tan to brown papules on the trunk and extremities  -16mm erythematous plaque on left upper back with peripheral hyperkeratosis  -Scattered brown macules on sun exposed areas  -xerosis predominantly on lower legs  -No other lesions of concern on areas examined.       Impression/Plan:  1. History of nonmelanoma skin cancer, no clinical evidence or recurrence  2. Actinic keratosis, nasal tip    Cryotherapy procedure note: After verbal consent and discussion of risks and benefits including but no limited to  dyspigmentation/scar, blister, and pain, 2 were treated with 1-2mm freeze border for 1 cycle with liquid nitrogen. Post cryotherapy instructions were provided.     If not resolved at follow up plan for biposy.   3. Seborrheic keratosis, non irritated, trunk and extremities    Benign nature was discussed. No further intervention required at this time.   4. Pink 16mm plaque, non pearly, left upper back. Neoplasm of uncertain behavior. Differential diagnosis includes inflamed SK, rule out NMSC    Shave biopsy:  After discussion of benefits and risks including but not limited to bleeding/bruising, pain/swelling, infection, scar, incomplete removal, nerve damage/numbness, recurrence, and non-diagnostic biopsy, written consent, verbal consent and photographs were obtained. Time-out was performed. The area was cleaned with isopropyl alcohol. 0.5 mL of 1% lidocaine with epinephrine was injected to obtain adequate anesthesia of the lesion on the left upper back. A shave biopsy was performed. Hemostasis was achieved with aluminium chloride. Vaseline and a sterile dressing were applied. The patient tolerated the procedure and no complications were noted. The patient was provided with verbal and written post care instructions.   5.  Acne rosacea--papular    Continue doxycyline 100mg BID    Add sulfacetamide sodium-sulfur lotion for face  6. Xerosis    Amlactin    Follow-up in 1 year      I was present for the entire procedures. KB  .I, Darlene Wagner MD, saw this patient with the resident and agree with the resident s findings and plan of care as documented in the resident s note.          Pictures were placed in Pt's chart today for future reference.

## 2017-12-12 ENCOUNTER — TELEPHONE (OUTPATIENT)
Dept: DERMATOLOGY | Facility: CLINIC | Age: 77
End: 2017-12-12

## 2017-12-12 DIAGNOSIS — L71.9 ACNE ROSACEA: Primary | ICD-10-CM

## 2017-12-12 NOTE — TELEPHONE ENCOUNTER
Central Prior Authorization Team   Phone: [phone]      PA Initiation man faxed     Medication: sulfacetamide sodium-sulfur 10-5 % LOTN  Insurance Company: [employer] - Phone [phone] Fax [phone]  Pharmacy Filling the Rx: [id number] - [place] - [address]  Filling Pharmacy Phone: [phone]  Filling Pharmacy Fax:    Start Date: 12/12/2017

## 2017-12-13 LAB — COPATH REPORT: NORMAL

## 2017-12-13 NOTE — TELEPHONE ENCOUNTER
Central Prior Authorization Team   Phone: [phone]      PRIOR AUTHORIZATION DENIED    Medication: sulfacetamide sodium-sulfur 10-5 % LOTN - Denied    Denial Date: 12/13/2017     Denial Rational:         Appeal Information:

## 2017-12-14 DIAGNOSIS — C44.91 BCC (BASAL CELL CARCINOMA): Primary | ICD-10-CM

## 2017-12-14 NOTE — TELEPHONE ENCOUNTER
Medication Appeal Initiation    We have initiated an appeal for the requested medication:  Medication: sulfacetamide sodium-sulfur 10-5% lotion - DENIED, APPEALING  Appeal Start Date:  12/14/2017  Insurance Company: [facility] - Phone [phone] Fax [phone]  Comments:   FRANKLIN along with chart notes faxed to [facility] for appeal via fax# [phone]

## 2017-12-15 ENCOUNTER — RADIANT APPOINTMENT (OUTPATIENT)
Dept: GENERAL RADIOLOGY | Facility: CLINIC | Age: 77
End: 2017-12-15
Attending: PHYSICIAN ASSISTANT
Payer: COMMERCIAL

## 2017-12-15 ENCOUNTER — OFFICE VISIT (OUTPATIENT)
Dept: FAMILY MEDICINE | Facility: CLINIC | Age: 77
End: 2017-12-15
Payer: COMMERCIAL

## 2017-12-15 VITALS
WEIGHT: 208.9 LBS | OXYGEN SATURATION: 95 % | BODY MASS INDEX: 35.3 KG/M2 | DIASTOLIC BLOOD PRESSURE: 71 MMHG | HEART RATE: 94 BPM | TEMPERATURE: 102.1 F | SYSTOLIC BLOOD PRESSURE: 133 MMHG

## 2017-12-15 DIAGNOSIS — S29.8XXA BLUNT TRAUMA OF RIB, INITIAL ENCOUNTER: ICD-10-CM

## 2017-12-15 DIAGNOSIS — S20.212A CONTUSION OF RIB ON LEFT SIDE, INITIAL ENCOUNTER: ICD-10-CM
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
## 2017-12-15 DIAGNOSIS — Z98.890 S/P SKIN BIOPSY: ICD-10-CM

## 2017-12-15 DIAGNOSIS — R68.89 FLU-LIKE SYMPTOMS: Primary | ICD-10-CM

## 2017-12-15 DIAGNOSIS — R07.0 THROAT PAIN: ICD-10-CM

## 2017-12-15 LAB
DEPRECATED S PYO AG THROAT QL EIA: NORMAL
FLUAV+FLUBV AG SPEC QL: NEGATIVE
FLUAV+FLUBV AG SPEC QL: NEGATIVE
SPECIMEN SOURCE: NORMAL
SPECIMEN SOURCE: NORMAL

## 2017-12-15 PROCEDURE — 87081 CULTURE SCREEN ONLY: CPT | Performed by: PHYSICIAN ASSISTANT

## 2017-12-15 PROCEDURE — 99214 OFFICE O/P EST MOD 30 MIN: CPT | Performed by: PHYSICIAN ASSISTANT

## 2017-12-15 PROCEDURE — 87880 STREP A ASSAY W/OPTIC: CPT | Performed by: PHYSICIAN ASSISTANT

## 2017-12-15 PROCEDURE — 87804 INFLUENZA ASSAY W/OPTIC: CPT | Performed by: PHYSICIAN ASSISTANT

## 2017-12-15 PROCEDURE — 71101 X-RAY EXAM UNILAT RIBS/CHEST: CPT | Mod: LT

## 2017-12-15 RX ORDER — IBUPROFEN 600 MG/1
600 TABLET, FILM COATED ORAL EVERY 6 HOURS PRN
Qty: 30 TABLET | Refills: 0 | Status: SHIPPED | OUTPATIENT
Start: 2017-12-15 | End: 2017-12-15

## 2017-12-15 RX ORDER — HYDROCODONE BITARTRATE AND ACETAMINOPHEN 5; 325 MG/1; MG/1
1 TABLET ORAL EVERY 6 HOURS PRN
Qty: 30 TABLET | Refills: 0 | Status: SHIPPED | OUTPATIENT
Start: 2017-12-15 | End: 2018-07-31

## 2017-12-15 RX ORDER — OSELTAMIVIR PHOSPHATE 75 MG/1
75 CAPSULE ORAL 2 TIMES DAILY
Qty: 10 CAPSULE | Refills: 0 | Status: SHIPPED | OUTPATIENT
Start: 2017-12-15 | End: 2017-12-20

## 2017-12-15 NOTE — MR AVS SNAPSHOT
After Visit Summary   12/15/2017    Yumiko Flowers    MRN: [id number]           Patient Information     Date Of Birth          1940        Visit Information        Provider Department      12/15/2017 1:40 PM Mary Mccormack PA-C [facility]        Today's Diagnoses     Flu-like symptoms    -  1    At risk for falling        Fall, initial encounter        Blunt trauma of rib, initial encounter        Throat pain        Contusion of rib on left side, initial encounter          Care Instructions    Tamiflu 75 mg twice a day for 5 days take after eating only    Ibuprofen 600 mg every 6 hours with food for pain and fever   Increase fluids  Rest   Follow up in 5 days if not kwame   Rib Contusion     A rib contusion is a bruise to one or more rib bones. It may cause pain, tenderness, swelling and a purplish discoloration. There may be a sharp pain while breathing.  You will be assessed for other injuries. You will likely be given pain medicine. Rib contusions heal on their own, without further treatment. However, pain may take weeks to months to go away.   Note that a small crack (fracture) in the rib may cause the same symptoms as a rib contusion. The small crack may not be seen on a chest X-ray. However, the conditions are managed in the same way.  Home care    Rest. Avoid heavy lifting, strenuous exertion, or any activity that causes pain.    Ice the area to reduce pain and swelling. Put ice cubes in a plastic bag or use a cold pack. (Wrap the cold source in a thin towel. Do not place it directly on your skin.) Ice the injured area for 20 minutes every 1 to 2 hours the first day. Continue with ice packs 3 to 4 times a day for the next 2 days, then as needed for the relief of pain and swelling.    Take any prescribed pain medicine as directed by your healthcare provider. If none was prescribed, take acetaminophen, ibuprofen, or naproxen to control pain.    If you have a  significant injury, you may be given a device called an incentive spirometer to keep your lungs healthy. Use as directed.  Follow-up care  Follow up with your healthcare provider during the next week or as directed.  When to seek medical advice  Call your healthcare provider for any of the following:    Shortness of breath or trouble breathing    Increasing chest pain with breathing    Coughing    Dizziness, weakness, or fainting    New or worsening pain    Fever of 100.4 F (38 C) or higher, or as directed by your healthcare provider  Date Last Reviewed: 2/1/2017 2000-2017 [employer]. [address] [place] [address]. All rights reserved. This information is not intended as a substitute for professional medical care. Always follow your healthcare professional's instructions.                Follow-ups after your visit        Your next 10 appointments already scheduled     Feb 27, 2018  8:45 AM CST   Return Visit with Tiff Siu MD   [facility]    [address] [place] [address] [phone]              Who to contact     If you have questions or need follow up information about today's clinic visit or your schedule please contact [facility] directly at [phone].  Normal or non-critical lab and imaging results will be communicated to you by [facility], letter or phone within 4 business days after the clinic has received the results. If you do not hear from us within 7 days, please contact the clinic through [facility] or phone. If you have a critical or abnormal lab result, we will notify you by phone as soon as possible.  Submit refill requests through [employer] or call your pharmacy and they will forward the refill request to us. Please allow 3 business days for your refill to be completed.          Additional Information About Your Visit        [employer]ello Information     [employer] gives you secure access to  your electronic health record. If you see a primary care provider, you can also send messages to your care team and make appointments. If you have questions, please call your primary care clinic.  If you do not have a primary care provider, please call [phone] and they will assist you.        Care EveryWhere ID     This is your Care EveryWhere ID. This could be used by other organizations to access your [facility] medical records  [id number]        Your Vitals Were     Pulse Temperature Pulse Oximetry BMI (Body Mass Index)          94 102.1  F (38.9  C) (Oral) 95% 35.3 kg/m2         Blood Pressure from Last 3 Encounters:   12/15/17 133/71   08/01/17 126/74   03/01/17 114/73    Weight from Last 3 Encounters:   12/15/17 208 lb 14.4 oz (94.8 kg)   09/20/17 207 lb 12.8 oz (94.3 kg)   08/01/17 206 lb (93.4 kg)              We Performed the Following     Beta strep group A culture     Influenza A/B antigen     Strep, Rapid Screen          Today's Medication Changes          These changes are accurate as of: 12/15/17  3:06 PM.  If you have any questions, ask your nurse or doctor.               Start taking these medicines.        Dose/Directions    ibuprofen 600 MG tablet   Commonly known as:  ADVIL/MOTRIN   Used for:  Flu-like symptoms, Blunt trauma of rib, initial encounter   Started by:  Mary Mccormack PA-C        Dose:  600 mg   Take 1 tablet (600 mg) by mouth every 6 hours as needed for moderate pain or fever   Quantity:  30 tablet   Refills:  0       oseltamivir 75 MG capsule   Commonly known as:  TAMIFLU   Used for:  Flu-like symptoms   Started by:  Mary Mccormack PA-C        Dose:  75 mg   Take 1 capsule (75 mg) by mouth 2 times daily for 5 days   Quantity:  10 capsule   Refills:  0            Where to get your medicines      These medications were sent to [facility] [place] - [address]  [address], [place] [address]     Phone:   [phone]     ibuprofen 600 MG tablet    oseltamivir 75 MG capsule                Primary Care Provider Office Phone # Fax #    Gavi Bear [phone] [address]  [facility] [address]        Equal Access to Services     SOFIE CINTRON : Hadii aad ku hadasho Soomaali, waaxda luqadaha, qaybta kaalmada adeegyada, waxay idiin hayaan adeeg michelleele buitrago. So [facility] [phone].    ATENCIÓN: Si habla español, tiene a church disposición servicios gratuitos de asistencia lingüística. Llame al [phone].    We comply with applicable federal civil rights laws and [place] laws. We do not discriminate on the basis of race, color, national origin, age, disability, sex, sexual orientation, or gender identity.            Thank you!     Thank you for choosing [facility]  for your care. Our goal is always to provide you with excellent care. Hearing back from our patients is one way we can continue to improve our services. Please take a few minutes to complete the written survey that you may receive in the mail after your visit with us. Thank you!             Your Updated Medication List - Protect others around you: Learn how to safely use, store and throw away your medicines at www.disposemymeds.org.          This list is accurate as of: 12/15/17  3:06 PM.  Always use your most recent med list.                   Brand Name Dispense Instructions for use Diagnosis    * albuterol 108 (90 BASE) MCG/ACT Inhaler    PROAIR HFA/PROVENTIL HFA/VENTOLIN HFA    1 Inhaler    Inhale into the lungs. INHALE 1-2 PUFFS EVERY 4-6 HOURS AS NEEDED    Mild persistent asthma       * albuterol (2.5 MG/3ML) 0.083% neb solution     25 vial    Take 1 vial (2.5 mg) by nebulization every 6 hours as needed for shortness of breath / dyspnea or wheezing    Moderate persistent asthma with acute exacerbation       calcium 500 MG Chew      None Entered        diclofenac 1 % Gel topical gel    VOLTAREN    100 g     Apply 2 grams to affected area up to four times daily using enclosed dosing card.    Cervicalgia       doxycycline 100 MG tablet    VIBRA-TABS    180 tablet    Take 1 tablet (100 mg) by mouth 2 times daily    Rosacea       etodolac 400 MG tablet    LODINE    60 tablet    TAKE 1 TABLET (400 MG) BY MOUTH 2 TIMES DAILY AS NEEDED    Chronic low back pain without sciatica, unspecified back pain laterality       gabapentin 600 MG tablet    NEURONTIN    180 tablet    Take 1 tablet (600 mg) by mouth 2 times daily    Low back pain, unspecified back pain laterality, unspecified chronicity, with sciatica presence unspecified       ibuprofen 600 MG tablet    ADVIL/MOTRIN    30 tablet    Take 1 tablet (600 mg) by mouth every 6 hours as needed for moderate pain or fever    Flu-like symptoms, Blunt trauma of rib, initial encounter       lisinopril-hydrochlorothiazide 20-12.5 MG per tablet    PRINZIDE/ZESTORETIC    180 tablet    TAKE 2 TABLETS BY MOUTH DAILY    Essential hypertension with goal blood pressure less than 140/90       Multiple Vitamins-Iron Tabs      Take 1 tablet by mouth daily.        oseltamivir 75 MG capsule    TAMIFLU    10 capsule    Take 1 capsule (75 mg) by mouth 2 times daily for 5 days    Flu-like symptoms       sulfacetamide sodium-sulfur 10-5 % Lotn     60 g    Use daily as directed    Acne rosacea       vitamin D 2000 UNITS Caps      Take 2,000 Units by mouth daily.        * Notice:  This list has 2 medication(s) that are the same as other medications prescribed for you. Read the directions carefully, and ask your doctor or other care provider to review them with you.

## 2017-12-15 NOTE — TELEPHONE ENCOUNTER
Central Prior Authorization Team   Phone: [phone]    MEDICATION APPEAL DENIED    Medication: sulfacetamide sodium-sulfur 10-5% lotion - DENIED, APPEALING, Redetermination-Denied    Denial Date:  12/15/2017    Denial Rational: Drug is an excluded medication.        Second Level Appeal Information:      Second level appeals will be managed by the clinic staff and provider. Please contact the [employer] Prior Authorization Team if additional information about the denial is needed.

## 2017-12-15 NOTE — PROGRESS NOTES
SUBJECTIVE:   Yumiko Flowers is a 77 year old female who presents to clinic today for the following health issues:  Joint Pain    Onset: 12/8/2017    Description:   Location: left side ribs  Character: Sharp    Intensity: now 2/10, when bending over 8/10    Progression of Symptoms: worse    Accompanying Signs & Symptoms:  Other symptoms: headache 8/10 constant, yesterday started sore throat, cough, fatigue,     History:   Previous similar pain: no       Precipitating factors:   Trauma or overuse: YES- when was getting out of the tub slipped and hit the side of the tub    Alleviating factors:  Improved by: nothing  Therapies Tried and outcome: Etodolac, tylenol, Aleve, diclofenac gel  On Monday at [facility] they scraped a spot on her lower back and it is at the spot where she can not reach it would like for it to be checked and band aid reapplied with basitracin        RESPIRATORY SYMPTOMS      Duration: 1 day, started last night     Description  nasal congestion, rhinorrhea, sore throat, cough and fever    Severity: moderate    Accompanying signs and symptoms: None    History (predisposing factors):  none    Precipitating or alleviating factors: None    Therapies tried and outcome:  none    Check on skin biopsy      Duration: 1 week    Description (location/character/radiation): has skin biopsy on the back    Intensity:  mild    Accompanying signs and symptoms: none    History (similar episodes/previous evaluation): patient has had a skin lesion that was biopsied    Precipitating or alleviating factors: None    Therapies tried and outcome: None           Problem list and histories reviewed & adjusted, as indicated.  Additional history: as documented    Patient Active Problem List   Diagnosis     Asthma, moderate persistent     Acne rosacea     CARDIOVASCULAR SCREENING; LDL GOAL LESS THAN 130     Osteopenia     Vitamin D deficiency     Advanced directives, counseling/discussion     Hx of pseudoexfoliation, os      "Pseudophakia, ou; Yag Caps, os     HTN, goal below 140/90     Peptic ulcer     Health Care Home     History of blepharoplasty, upper lid, ou (elsewhere); revision (EN)     Obesity, Class II, BMI 35-39.9     Restless legs syndrome     DJD (degenerative joint disease), lumbosacral     Rosacea     Essential hypertension with goal blood pressure less than 140/90     Obesity, Class I, BMI 30-34.9     Posterior vitreous detachment, bilateral     Chronic pain of left knee     Cervicalgia     Vertigo     Past Surgical History:   Procedure Laterality Date     BLEPHAROPLASTY BILATERAL  3/9/2006; 2013    both eyes, upper lid; revision (EN)     C APPENDECTOMY       CATARACT IOL, RT/LT       HC REMOVAL GALLBLADDER       LASER YAG CAPSULOTOMY      left eye (AC lysis also)     PHACOEMULSIFICATION WITH STANDARD INTRAOCULAR LENS IMPLANT  1/2012; 4/2013    right eye; left eye     SURGICAL HISTORY OF -       endometriosis surgeriesx3     SURGICAL HISTORY OF -       ganiglion cyst onfoot     SURGICAL HISTORY OF -       Eye for \"droopy eye\"       Social History   Substance Use Topics     Smoking status: Never Smoker     Smokeless tobacco: Never Used     Alcohol use Yes     Family History   Problem Relation Age of Onset     C.A.D. Father      C.A.D. Brother      Hypertension Mother      CANCER No family hx of      DIABETES No family hx of      CEREBROVASCULAR DISEASE No family hx of      Thyroid Disease No family hx of      Glaucoma No family hx of      Macular Degeneration No family hx of          Current Outpatient Prescriptions   Medication Sig Dispense Refill     oseltamivir (TAMIFLU) 75 MG capsule Take 1 capsule (75 mg) by mouth 2 times daily for 5 days 10 capsule 0     HYDROcodone-acetaminophen (NORCO) 5-325 MG per tablet Take 1 tablet by mouth every 6 hours as needed 30 tablet 0     etodolac (LODINE) 400 MG tablet TAKE 1 TABLET (400 MG) BY MOUTH 2 TIMES DAILY AS NEEDED 60 tablet 0     lisinopril-hydrochlorothiazide " (PRINZIDE/ZESTORETIC) 20-12.5 MG per tablet TAKE 2 TABLETS BY MOUTH DAILY 180 tablet 1     doxycycline (VIBRA-TABS) 100 MG tablet Take 1 tablet (100 mg) by mouth 2 times daily 180 tablet 0     diclofenac (VOLTAREN) 1 % GEL topical gel Apply 2 grams to affected area up to four times daily using enclosed dosing card. 100 g 1     gabapentin (NEURONTIN) 600 MG tablet Take 1 tablet (600 mg) by mouth 2 times daily 180 tablet 3     Multiple Vitamins-Iron TABS Take 1 tablet by mouth daily.       Cholecalciferol (VITAMIN D) 2000 UNIT CAPS Take 2,000 Units by mouth daily.       CALCIUM 500 MG PO CHEW None Entered       sulfacetamide sodium-sulfur 10-5 % LOTN Use daily as directed (Patient not taking: Reported on 12/15/2017) 60 g 11     albuterol (2.5 MG/3ML) 0.083% neb solution Take 1 vial (2.5 mg) by nebulization every 6 hours as needed for shortness of breath / dyspnea or wheezing (Patient not taking: Reported on 12/15/2017) 25 vial 1     albuterol (PROAIR HFA/PROVENTIL HFA/VENTOLIN HFA) 108 (90 BASE) MCG/ACT Inhaler Inhale into the lungs. INHALE 1-2 PUFFS EVERY 4-6 HOURS AS NEEDED (Patient not taking: Reported on 12/15/2017) 1 Inhaler 0     Allergies   Allergen Reactions     Erythromycin Swelling and Other (See Comments)       ROS:  Constitutional, HEENT, cardiovascular, pulmonary, GI, , musculoskeletal, neuro, skin, endocrine and psych systems are negative, except as otherwise noted.      OBJECTIVE:   /71 (BP Location: Right arm, Patient Position: Chair, Cuff Size: Adult Large)  Pulse 94  Temp 102.1  F (38.9  C) (Oral)  Wt 208 lb 14.4 oz (94.8 kg)  SpO2 95%  BMI 35.3 kg/m2  Body mass index is 35.3 kg/(m^2).  GENERAL: healthy, alert and no distress  EYES: Eyes grossly normal to inspection, PERRL and conjunctivae and sclerae normal  HENT: normal cephalic/atraumatic, both ears: clear effusion, nasal mucosa edematous , oral mucous membranes moist and tonsillar erythema  NECK: no adenopathy, no asymmetry, masses,  or scars and thyroid normal to palpation  RESP: lungs clear to auscultation - no rales, rhonchi or wheezes  CV: regular rate and rhythm, normal S1 S2, no S3 or S4, no murmur, click or rub, no peripheral edema and peripheral pulses strong  ABDOMEN: soft, nontender, no hepatosplenomegaly, no masses and bowel sounds normal  MS: no gross musculoskeletal defects noted, no edema  SKIN: right upper back, well healing shave biopsy-no redness, no swelling, no drianage    Diagnostic Test Results:  Results for orders placed or performed in visit on 12/15/17 (from the past 24 hour(s))   Strep, Rapid Screen   Result Value Ref Range    Specimen Description Throat     Rapid Strep A Screen       NEGATIVE: No Group A streptococcal antigen detected by immunoassay, await culture report.   Influenza A/B antigen   Result Value Ref Range    Influenza A/B Agn Specimen Nasal     Influenza A Negative NEG^Negative    Influenza B Negative NEG^Negative     Chest xray/rib- no acute infiltrates, no pneumothorax, no fractures or the ribs   ASSESSMENT/PLAN:       ICD-10-CM    1. Flu-like symptoms R68.89 oseltamivir (TAMIFLU) 75 MG capsule     DISCONTINUED: ibuprofen (ADVIL/MOTRIN) 600 MG tablet   2. Contusion of rib on left side, initial encounter S20.212A    3. Fall, initial encounter W19.XXXA XR Ribs & Chest Left G/E 3 Views   4. Blunt trauma of rib, initial encounter S29.8XXA XR Ribs & Chest Left G/E 3 Views     HYDROcodone-acetaminophen (NORCO) 5-325 MG per tablet     DISCONTINUED: ibuprofen (ADVIL/MOTRIN) 600 MG tablet   5. S/P skin biopsy Z98.890    6. Throat pain R07.0 Strep, Rapid Screen     Influenza A/B antigen     Beta strep group A culture     1. Influenza was negative, but most likely due to early symptoms onset  Tamiflu 75 mg twice a day for 5 days take after eating only  Tylenol 1000 mg every 6 hours as needed for fever   Increase fluids  Rest   Follow up in 5 days if not kwame r  2. Norco 1 tablet every 6 hours as needed for  pain  Apply ice pack as needed   3. Bacitracin and band aid were applied to the well healing biopsy    Mary Mccormack PA-C  [facility]

## 2017-12-15 NOTE — PATIENT INSTRUCTIONS
Tamiflu 75 mg twice a day for 5 days take after eating only    Tylenol 1000 mg every 6 hours as needed for fever   Increase fluids  Rest   Follow up in 5 days if not kwame   Rib Contusion     A rib contusion is a bruise to one or more rib bones. It may cause pain, tenderness, swelling and a purplish discoloration. There may be a sharp pain while breathing.  You will be assessed for other injuries. You will likely be given pain medicine. Rib contusions heal on their own, without further treatment. However, pain may take weeks to months to go away.   Note that a small crack (fracture) in the rib may cause the same symptoms as a rib contusion. The small crack may not be seen on a chest X-ray. However, the conditions are managed in the same way.  Home care    Rest. Avoid heavy lifting, strenuous exertion, or any activity that causes pain.    Ice the area to reduce pain and swelling. Put ice cubes in a plastic bag or use a cold pack. (Wrap the cold source in a thin towel. Do not place it directly on your skin.) Ice the injured area for 20 minutes every 1 to 2 hours the first day. Continue with ice packs 3 to 4 times a day for the next 2 days, then as needed for the relief of pain and swelling.    Take any prescribed pain medicine as directed by your healthcare provider. If none was prescribed, take acetaminophen, ibuprofen, or naproxen to control pain.    If you have a significant injury, you may be given a device called an incentive spirometer to keep your lungs healthy. Use as directed.  Follow-up care  Follow up with your healthcare provider during the next week or as directed.  When to seek medical advice  Call your healthcare provider for any of the following:    Shortness of breath or trouble breathing    Increasing chest pain with breathing    Coughing    Dizziness, weakness, or fainting    New or worsening pain    Fever of 100.4 F (38 C) or higher, or as directed by your healthcare provider  Date Last Reviewed:  2/1/2017 2000-2017 [employer]. [address] [place] [address], [place] [address]. All rights reserved. This information is not intended as a substitute for professional medical care. Always follow your healthcare professional's instructions.

## 2017-12-16 LAB
BACTERIA SPEC CULT: NORMAL
SPECIMEN SOURCE: NORMAL

## 2017-12-27 NOTE — TELEPHONE ENCOUNTER
I called pharmacy last week to ask what rosacea topicals are covered, but they directed me to call patient's insurance instead to ask.

## 2018-01-04 ENCOUNTER — OFFICE VISIT (OUTPATIENT)
Dept: DERMATOLOGY | Facility: CLINIC | Age: 78
End: 2018-01-04
Payer: OTHER GOVERNMENT

## 2018-01-04 DIAGNOSIS — C44.519 BASAL CELL CARCINOMA OF BACK: Primary | ICD-10-CM

## 2018-01-04 RX ORDER — LIDOCAINE HYDROCHLORIDE AND EPINEPHRINE 10; 10 MG/ML; UG/ML
3 INJECTION, SOLUTION INFILTRATION; PERINEURAL ONCE
Qty: 3 ML | Refills: 0 | OUTPATIENT
Start: 2018-01-04 | End: 2018-01-04

## 2018-01-04 ASSESSMENT — PAIN SCALES - GENERAL
PAINLEVEL: NO PAIN (0)
PAINLEVEL: NO PAIN (0)

## 2018-01-04 NOTE — NURSING NOTE
Excison performed on the left upper back. Injected 12.0ml of Xylocaine  (Lidocaine 1% and Epinephrine  (1:100,000))      Present for procedure:    Dr. Chong and Guera Mcgee CMA

## 2018-01-04 NOTE — LETTER
1/4/2018       RE: Yumiko Flowers  [address]  [place] [address]     Dear Colleague,    Thank you for referring your patient, Yumiko Flowers, to the [facility] DERMATOLOGY at [facility]. Please see a copy of my visit note below.    DERMATOLOGY EXCISION PROCEDURE NOTE    Dermatology Problem List:  1. NMSC  -BCC, left upper back, s/p WLE 1/4/2018   -BCC, left mid back, s/p ED&C 10/25/2010  -BCC, mid back, s/p ED&C 10/25/2010  2. AK cryotherapy 12/11/2017    NAME OF PROCEDURE: Excision intermediate layered linear closure  Staff surgeon: Wanda Manrique MD  Resident: Enrike Chong MD  Scrub Nurse: Guera    PRE-OPERATIVE DIAGNOSIS:  Basal Cell Carcinoma  POST-OPERATIVE DIAGNOSIS: Same   FINAL EXCISION SIZE(DEFECT SIZE): 2.4 cm, with 0.4 mm margin   FINAL REPAIR LENGTH: 6.5 cm     INDICATIONS: This patient presented with a 1.7 cm Basal Cell Carcinoma of the Left upper back. Excision was indicated. We discussed the principles of treatment and most likely complications including scarring, bleeding, infection, incomplete excision, wound dehiscence, pain, nerve damage, and recurrence. Informed consent was obtained and the patient underwent the procedure as follows:    PROCEDURE: The patient was taken to the operative suite. Time-out was performed.  The treatment area was anesthetized with 1% lidocaine with epinephrine. The area was prepped with Chlorhexidine and rinsed with sterile saline and draped with sterile towels. The lesion was delineated and excised down to subcutaneous fat in a fusiform manner. Hemostasis was obtained by electrocoagulation.     REPAIR: An intermediate layered linear closure was selected as the procedure which would maximally preserve both function and cosmesis.    After the excision of the tumor, the area was carefully undermined. Hemostasis was obtained with  electrocoagulation.  Closure was oriented so that the wound was in the patient's natural  skin tension lines. The subcutaneous and dermal layers were then closed with 3-0 vicryl sutures. The epidermis was then carefully approximated along the length of the wound using 4-0 ethilon Running subcuticular sutures.   Path: Suture placed at medial (superiomedial) tip.     The final wound length was 6.5 cm. A total of 6.0 ml of anesthesia was administered for all surgical sites. Estimated blood loss was less than 10 ml for all surgical sites. A sterile pressure dressing was applied and wound care instructions, with a written handout, were given. The [facility] alert and ambulatory.    Follow-up in 2 weeks for suture removal.     Dr. Wanda Manrique was immediately available for the entire surgery and was physicially present for the key portions of the procedure.    Anatomic Pathology Results: pending    Clinical Follow-Up: 6 months FBSE    Staff Involved:  Resident(Enrike Chong name)/Staff(as above)    Enrike Chong MD  PGY3 Dermatology  [phone]     I have personally examined this patient and agree with Dr. Chong's documentation and plan of care. I have reviewed and amended the resident's note above. The documentation accurately reflects my clinical observations, diagnoses, treatment and follow-up plans. I was present for key portions of the procedure.       Wanda Manrique MD  Dermatology Staff

## 2018-01-04 NOTE — PATIENT INSTRUCTIONS
Excision Wound Care Instructions  I will experience scar, altered skin color, bleeding, swelling, pain, crusting and redness. I may experience altered sensation. Risks are excessive bleeding, infection, muscle weakness, thick (hypertrophic or keloidal) scar, and recurrence,. A second procedure may be recommended to obtain the best cosmetic or functional result.  Possible complications of any surgical procedure are bleeding, infection, scarring, alteration in skin color and sensation, muscle weakness in the area, wound dehiscence or seperation, or recurrence of the lesion or disease. On occasion, after healing, a secondary procedure or revision may be recommended in order to obtain the best cosmetic or functional result.   After your surgery, a pressure bandage will be placed over the area that has sutures. This will help prevent bleeding. Please follow these instructions until you come back to clinic for suture removal on the 18th of January, as they will help you to prevent complications as your wound heals.  For the First 48 hours After Surgery:  1. Leave the pressure bandage on and keep it dry. If it should come loose, you may retape it, but do not take it off.  2. Relax and take it easy. Do not do any vigorous exercise, heavy lifting, or bending forward. This could cause the wound to bleed.  3. Post-operative pain is usually mild. You may take plain or extra strength Tylenol every 4 hours as needed (do not take more than 4,000mg in one day). Do not take any medicine that contains aspirin, ibuprofen or motrin unless you have been recommended these by a doctor.  Avoid alcohol and vitamin E as these may increase your tendency to bleed.  4. You may put an ice pack around the bandaged area for 20 minutes every 2-3 hours. This may help reduce swelling, bruising, and pain. Make sure the ice pack is waterproof so that the pressure bandage does not get wet.   5. You may see a small amount of drainage or blood on your  pressure bandage. This is normal. However, if drainage or bleeding continues or saturates the bandage, you will need to apply firm pressure over the bandage with a washcloth for 15 minutes. If bleeding continues after applying pressure for 15 minutes then go to the nearest emergency room.  48 Hours After Surgery  Leave tegaderm( clear wrap) on until suture removal.        Call Us If:  1. You have pain that is not controlled with Tylenol.  2. You have signs or symptoms of an infection, such as: fever over 100 degrees F, redness, warmth, or foul-smelling or yellow/creamy drainage from the wound.  Who should I call with questions?    [facility]: [phone]     [facility]: [phone]    For urgent needs outside of business hours call the [facility] at [phone] and ask for the dermatology resident on call

## 2018-01-04 NOTE — TELEPHONE ENCOUNTER
Called patient and she states she would like to try the epiduo, and see if her insurance will cover it.  Told her I would send the message to Dr. Wagner, so she can put the prescription through.  Told patient that Dr. Wagner isn't back in the clinic until Monday, and she said she's okay with waiting.

## 2018-01-04 NOTE — NURSING NOTE
Steri strips with mastasol and tegaderm applied. Pressure dressing applied with dental rolls, gauze and micropore tape. Instructions gone over with patient and son, patient denied having any pain at this time.    Guear Mcgee CMA

## 2018-01-04 NOTE — MR AVS SNAPSHOT
After Visit Summary   1/4/2018    Yumiko Flowers    MRN: [id number]           Patient Information     Date Of Birth          1940        Visit Information        Provider Department      1/4/2018 8:15 AM Enrike Chong MD [facility] Dermatology        Today's Diagnoses     Basal cell carcinoma of back    -  1      Care Instructions    Excision Wound Care Instructions  I will experience scar, altered skin color, bleeding, swelling, pain, crusting and redness. I may experience altered sensation. Risks are excessive bleeding, infection, muscle weakness, thick (hypertrophic or keloidal) scar, and recurrence,. A second procedure may be recommended to obtain the best cosmetic or functional result.  Possible complications of any surgical procedure are bleeding, infection, scarring, alteration in skin color and sensation, muscle weakness in the area, wound dehiscence or seperation, or recurrence of the lesion or disease. On occasion, after healing, a secondary procedure or revision may be recommended in order to obtain the best cosmetic or functional result.   After your surgery, a pressure bandage will be placed over the area that has sutures. This will help prevent bleeding. Please follow these instructions until you come back to clinic for suture removal on the 18th of January, as they will help you to prevent complications as your wound heals.  For the First 48 hours After Surgery:  1. Leave the pressure bandage on and keep it dry. If it should come loose, you may retape it, but do not take it off.  2. Relax and take it easy. Do not do any vigorous exercise, heavy lifting, or bending forward. This could cause the wound to bleed.  3. Post-operative pain is usually mild. You may take plain or extra strength Tylenol every 4 hours as needed (do not take more than 4,000mg in one day). Do not take any medicine that contains aspirin, ibuprofen or motrin unless you have been recommended these by a doctor.   Avoid alcohol and vitamin E as these may increase your tendency to bleed.  4. You may put an ice pack around the bandaged area for 20 minutes every 2-3 hours. This may help reduce swelling, bruising, and pain. Make sure the ice pack is waterproof so that the pressure bandage does not get wet.   5. You may see a small amount of drainage or blood on your pressure bandage. This is normal. However, if drainage or bleeding continues or saturates the bandage, you will need to apply firm pressure over the bandage with a washcloth for 15 minutes. If bleeding continues after applying pressure for 15 minutes then go to the nearest emergency room.  48 Hours After Surgery  Leave tegaderm( clear wrap) on until suture removal.        Call Us If:  1. You have pain that is not controlled with Tylenol.  2. You have signs or symptoms of an infection, such as: fever over 100 degrees F, redness, warmth, or foul-smelling or yellow/creamy drainage from the wound.  Who should I call with questions?    [facility]: [phone]     [facility]: [phone]    For urgent needs outside of business hours call the [facility] at [phone] and ask for the dermatology resident on call              Follow-ups after your visit        Your next 10 appointments already scheduled     Jan 17, 2018  9:00 AM CST   Nurse Visit with  Dermatology Nurse   [facility] ([facility])    [address]  [address] [facility] [address] [phone]            Feb 27, 2018  8:45 AM CST   [facility]    [address] [address] [place] [address] [phone]              Who to contact     Please call your clinic at [phone] to:    Ask questions about your health    Make or cancel appointments    Discuss your medicines    Learn about your test  results    Speak to your doctor   If you have compliments or concerns about an experience at your clinic, or if you wish to file a complaint, please contact [facility] Patient Relations at [phone] or email us at [email or web address]         Additional Information About Your Visit        Beleza [employer] Information     Tick[employer]et gives you secure access to your electronic health record. If you see a primary care provider, you can also send messages to your care team and make appointments. If you have questions, please call your primary care clinic.  If you do not have a primary care provider, please call [phone] and they will assist you.      [employer] is an electronic gateway that provides easy, online access to your medical records. With [employer], you can request a clinic appointment, read your test results, renew a prescription or communicate with your care team.     To access your existing account, please contact your [facility] or call [phone] for assistance.        Care EveryWhere ID     This is your Care EveryWhere ID. This could be used by other organizations to access your [place] medical records  [id number]         Blood Pressure from Last 3 Encounters:   12/15/17 133/71   08/01/17 126/74   03/01/17 114/73    Weight from Last 3 Encounters:   12/15/17 94.8 kg (208 lb 14.4 oz)   09/20/17 94.3 kg (207 lb 12.8 oz)   08/01/17 93.4 kg (206 lb)              We Performed the Following     EXC MALIG SKIN LESION TRUNK/ARM/LEG 2.1-3.0 CM     REPAIR INTERMED, WOUND TRUNK/ARM/LEG 2.6-7.5 CM     Surgical pathology exam          Today's Medication Changes          These changes are accurate as of: 1/4/18  9:32 AM.  If you have any questions, ask your nurse or doctor.               Start taking these medicines.        Dose/Directions    lidocaine 1% with EPINEPHrine 1:100,000 1 %-1:645122 injection   Used for:  Basal cell carcinoma of back   Started  by:  Enrike Chong MD        Dose:  3 mL   Inject 3 mLs into the skin once for 1 dose   Quantity:  3 mL   Refills:  0            Where to get your medicines      Some of these will need a paper prescription and others can be bought over the counter.  Ask your nurse if you have questions.     You don't need a prescription for these medications     lidocaine 1% with EPINEPHrine 1:100,000 1 %-1:522100 injection                Primary Care Provider Office Phone # Fax #    Gavi Bear [phone]       [address] [place]  [facility] [address]        Equal Access to Services     [facility]: Hadii micky soria hadasho Solázaro, waaxda luqadaha, qaybta kaalmada adeyeniferyamahnaz, jadyn field . So [facility] [phone].    ATENCIÓN: Si habla español, tiene a church disposición servicios gratuitos de asistencia lingüística. [facility] [phone].    We comply with applicable federal civil rights laws and [place] laws. We do not discriminate on the basis of race, color, national origin, age, disability, sex, sexual orientation, or gender identity.            Thank you!     Thank you for choosing [facility] DERMATOLOGY  for your care. Our goal is always to provide you with excellent care. Hearing back from our patients is one way we can continue to improve our services. Please take a few minutes to complete the written survey that you may receive in the mail after your visit with us. Thank you!             Your Updated Medication List - Protect others around you: Learn how to safely use, store and throw away your medicines at www.disposemymeds.org.          This list is accurate as of: 1/4/18  9:32 AM.  Always use your most recent med list.                   Brand Name Dispense Instructions for use Diagnosis    * albuterol 108 (90 BASE) MCG/ACT Inhaler    PROAIR HFA/PROVENTIL HFA/VENTOLIN HFA    1 Inhaler    Inhale into the lungs. INHALE 1-2 PUFFS EVERY 4-6 HOURS AS NEEDED    Mild  persistent asthma       * albuterol (2.5 MG/3ML) 0.083% neb solution     25 vial    Take 1 vial (2.5 mg) by nebulization every 6 hours as needed for shortness of breath / dyspnea or wheezing    Moderate persistent asthma with acute exacerbation       calcium 500 MG Chew      None Entered        diclofenac 1 % Gel topical gel    VOLTAREN    100 g    Apply 2 grams to affected area up to four times daily using enclosed dosing card.    Cervicalgia       doxycycline 100 MG tablet    VIBRA-TABS    180 tablet    Take 1 tablet (100 mg) by mouth 2 times daily    Rosacea       etodolac 400 MG tablet    LODINE    60 tablet    TAKE 1 TABLET (400 MG) BY MOUTH 2 TIMES DAILY AS NEEDED    Chronic low back pain without sciatica, unspecified back pain laterality       gabapentin 600 MG tablet    NEURONTIN    180 tablet    Take 1 tablet (600 mg) by mouth 2 times daily    Low back pain, unspecified back pain laterality, unspecified chronicity, with sciatica presence unspecified       HYDROcodone-acetaminophen 5-325 MG per tablet    NORCO    30 tablet    Take 1 tablet by mouth every 6 hours as needed    Blunt trauma of rib, initial encounter       lidocaine 1% with EPINEPHrine 1:100,000 1 %-1:986516 injection     3 mL    Inject 3 mLs into the skin once for 1 dose    Basal cell carcinoma of back       lisinopril-hydrochlorothiazide 20-12.5 MG per tablet    PRINZIDE/ZESTORETIC    180 tablet    TAKE 2 TABLETS BY MOUTH DAILY    Essential hypertension with goal blood pressure less than 140/90       Multiple Vitamins-Iron Tabs      Take 1 tablet by mouth daily.        sulfacetamide sodium-sulfur 10-5 % Lotn     60 g    Use daily as directed    Acne rosacea       vitamin D 2000 UNITS Caps      Take 2,000 Units by mouth daily.        * Notice:  This list has 2 medication(s) that are the same as other medications prescribed for you. Read the directions carefully, and ask your doctor or other care provider to review them with you.

## 2018-01-04 NOTE — PROGRESS NOTES
DERMATOLOGY EXCISION PROCEDURE NOTE    Dermatology Problem List:  1. NMSC  -BCC, left upper back, s/p WLE 1/4/2018   -BCC, left mid back, s/p ED&C 10/25/2010  -BCC, mid back, s/p ED&C 10/25/2010  2. AK cryotherapy 12/11/2017    NAME OF PROCEDURE: Excision intermediate layered linear closure  Staff surgeon: Wanda Manrique MD  Resident: Enrike Chong MD  Scrub Nurse: Guera    PRE-OPERATIVE DIAGNOSIS:  Basal Cell Carcinoma  POST-OPERATIVE DIAGNOSIS: Same   FINAL EXCISION SIZE(DEFECT SIZE): 2.4 cm, with 0.4 mm margin   FINAL REPAIR LENGTH: 6.5 cm     INDICATIONS: This patient presented with a 1.7 cm Basal Cell Carcinoma of the Left upper back. Excision was indicated. We discussed the principles of treatment and most likely complications including scarring, bleeding, infection, incomplete excision, wound dehiscence, pain, nerve damage, and recurrence. Informed consent was obtained and the patient underwent the procedure as follows:    PROCEDURE: The patient was taken to the operative suite. Time-out was performed.  The treatment area was anesthetized with 1% lidocaine with epinephrine. The area was prepped with Chlorhexidine and rinsed with sterile saline and draped with sterile towels. The lesion was delineated and excised down to subcutaneous fat in a fusiform manner. Hemostasis was obtained by electrocoagulation.     REPAIR: An intermediate layered linear closure was selected as the procedure which would maximally preserve both function and cosmesis.    After the excision of the tumor, the area was carefully undermined. Hemostasis was obtained with  electrocoagulation.  Closure was oriented so that the wound was in the patient's natural skin tension lines. The subcutaneous and dermal layers were then closed with 3-0 vicryl sutures. The epidermis was then carefully approximated along the length of the wound using 4-0 ethilon Running subcuticular sutures.   Path: Suture placed at medial (superiomedial) tip.      The final wound length was 6.5 cm. A total of 6.0 ml of anesthesia was administered for all surgical sites. Estimated blood loss was less than 10 ml for all surgical sites. A sterile pressure dressing was applied and wound care instructions, with a written handout, were given. The patient was [facility] alert and ambulatory.    Follow-up in 2 weeks for suture removal.     Dr. Wanda Manrique was immediately available for the entire surgery and was physicially present for the key portions of the procedure.    Anatomic Pathology Results: pending    Clinical Follow-Up: 6 months FBSE    Staff Involved:  Resident(Enrike Chong name)/Staff(as above)    Enrike Chong MD  PGY3 Dermatology  [phone]     I have personally examined this patient and agree with Dr. Chong's documentation and plan of care. I have reviewed and amended the resident's note above. The documentation accurately reflects my clinical observations, diagnoses, treatment and follow-up plans. I was present for key portions of the procedure.       Wanda Manrique MD  Dermatology Staff

## 2018-01-04 NOTE — NURSING NOTE
Dermatology Rooming Note    Yumiko Flowers's goals for this visit include:   Chief Complaint   Patient presents with     Skin Check     Yumiko is here for a bcc on her left upper back- ED&C / Excision     Guera Mcgee CMA

## 2018-01-08 LAB — COPATH REPORT: NORMAL

## 2018-01-08 RX ORDER — ADAPALENE AND BENZOYL PEROXIDE GEL, 0.1%/2.5% 1; 25 MG/G; MG/G
GEL TOPICAL DAILY
Qty: 45 G | Refills: 3 | Status: SHIPPED | OUTPATIENT
Start: 2018-01-08 | End: 2018-07-31

## 2018-01-09 ENCOUNTER — TELEPHONE (OUTPATIENT)
Dept: DERMATOLOGY | Facility: CLINIC | Age: 78
End: 2018-01-09

## 2018-01-09 NOTE — TELEPHONE ENCOUNTER
Path results reviewed. Negative excision margins on BCC. Called to discuss with patient. VM left. Nothing further to do.     Enrike Chong MD  PGY3 Dermatology  [phone]

## 2018-01-17 ENCOUNTER — ALLIED HEALTH/NURSE VISIT (OUTPATIENT)
Dept: DERMATOLOGY | Facility: CLINIC | Age: 78
End: 2018-01-17
Payer: COMMERCIAL

## 2018-01-17 DIAGNOSIS — Z48.02 VISIT FOR SUTURE REMOVAL: Primary | ICD-10-CM

## 2018-01-17 NOTE — MR AVS SNAPSHOT
After Visit Summary   1/17/2018    Yumiko Flowers    MRN: [id number]           Patient Information     Date Of Birth          1940        Visit Information        Provider Department      1/17/2018 9:00 AM Nurse,  Dermatology [facility] Dermatology        Today's Diagnoses     Visit for suture removal    -  1       Follow-ups after your visit        Who to contact     Please call your clinic at [phone] to:    Ask questions about your health    Make or cancel appointments    Discuss your medicines    Learn about your test results    Speak to your doctor   If you have compliments or concerns about an experience at your clinic, or if you wish to file a complaint, please contact [facility] Patient Relations at [phone] or email us at [email or web address]         Additional Information About Your Visit        MyChart Information     "[employer]t gives you secure access to your electronic health record. If you see a primary care provider, you can also send messages to your care team and make appointments. If you have questions, please call your primary care clinic.  If you do not have a primary care provider, please call [phone] and they will assist you.      [employer] App is an electronic gateway that provides easy, online access to your medical records. With [employer] App, you can request a clinic appointment, read your test results, renew a prescription or communicate with your care team.     To access your existing account, please contact your [facility] or call [phone] for assistance.        Care EveryWhere ID     This is your Care EveryWhere ID. This could be used by other organizations to access your [place] medical records  [id number]         Blood Pressure from Last 3 Encounters:   12/15/17 133/71   08/01/17 126/74   03/01/17 114/73    Weight from Last 3 Encounters:   12/15/17 94.8 kg (208 lb 14.4 oz)   09/20/17 94.3 kg (207  lb 12.8 oz)   08/01/17 93.4 kg (206 lb)              Today, you had the following     No orders found for display       Primary Care Provider Office Phone # Fax #    Gavi Bear [phone]       [address]  [place] [facility] [address]        Equal Access to Services     [facility]: Hadii aad ku hadasho Soomaali, waaxda luqadaha, qaybta kaalmada adeegyada, waxay idiin hayaan adeeg kharash laChristopheraan . So [facility] [phone].    ATENCIÓN: Si habla español, tiene a church disposición servicios gratuitos de asistencia lingüística. Llame al [phone].    We comply with applicable federal civil rights laws and [place] laws. We do not discriminate on the basis of race, color, national origin, age, disability, sex, sexual orientation, or gender identity.            Thank you!     Thank you for choosing [facility] DERMATOLOGY  for your care. Our goal is always to provide you with excellent care. Hearing back from our patients is one way we can continue to improve our services. Please take a few minutes to complete the written survey that you may receive in the mail after your visit with us. Thank you!             Your Updated Medication List - Protect others around you: Learn how to safely use, store and throw away your medicines at www.disposemymeds.org.          This list is accurate as of: 1/17/18 11:59 PM.  Always use your most recent med list.                   Brand Name Dispense Instructions for use Diagnosis    adapalene-benzoyl peroxide 0.1-2.5 % gel    EPIDUO    45 g    Apply topically daily    Acne rosacea       * albuterol 108 (90 BASE) MCG/ACT Inhaler    PROAIR HFA/PROVENTIL HFA/VENTOLIN HFA    1 Inhaler    Inhale into the lungs. INHALE 1-2 PUFFS EVERY 4-6 HOURS AS NEEDED    Mild persistent asthma       * albuterol (2.5 MG/3ML) 0.083% neb solution     25 vial    Take 1 vial (2.5 mg) by nebulization every 6 hours as needed for shortness of breath / dyspnea or wheezing    Moderate persistent  asthma with acute exacerbation       calcium 500 MG Chew      None Entered        diclofenac 1 % Gel topical gel    VOLTAREN    100 g    Apply 2 grams to affected area up to four times daily using enclosed dosing card.    Cervicalgia       doxycycline 100 MG tablet    VIBRA-TABS    180 tablet    Take 1 tablet (100 mg) by mouth 2 times daily    Rosacea       etodolac 400 MG tablet    LODINE    60 tablet    TAKE 1 TABLET (400 MG) BY MOUTH 2 TIMES DAILY AS NEEDED    Chronic low back pain without sciatica, unspecified back pain laterality       gabapentin 600 MG tablet    NEURONTIN    180 tablet    Take 1 tablet (600 mg) by mouth 2 times daily    Low back pain, unspecified back pain laterality, unspecified chronicity, with sciatica presence unspecified       HYDROcodone-acetaminophen 5-325 MG per tablet    NORCO    30 tablet    Take 1 tablet by mouth every 6 hours as needed    Blunt trauma of rib, initial encounter       lisinopril-hydrochlorothiazide 20-12.5 MG per tablet    PRINZIDE/ZESTORETIC    180 tablet    TAKE 2 TABLETS BY MOUTH DAILY    Essential hypertension with goal blood pressure less than 140/90       Multiple Vitamins-Iron Tabs      Take 1 tablet by mouth daily.        sulfacetamide sodium-sulfur 10-5 % Lotn     60 g    Use daily as directed    Acne rosacea       vitamin D 2000 UNITS Caps      Take 2,000 Units by mouth daily.        * Notice:  This list has 2 medication(s) that are the same as other medications prescribed for you. Read the directions carefully, and ask your doctor or other care provider to review them with you.

## 2018-01-17 NOTE — NURSING NOTE
Yumiko Flowers comes into clinic today at the request of Dr Chong Ordering Provider for Suture Removal:  Incision was dry, clean and intact. No s/s of infection noted or reported. Denies pain. Cleansed with wound cleanser and sutures were removed without complication. Pt tolerated the procedure. Vaseline and a guaze bandage was applied and to be left in place for 24 hours. Discussed s/s of infection and pt agrees to contact clinic with any concern.    This service provided today was under the supervising provider of the day Dr Mohan., who was available if needed.    Jenny Barragan RN

## 2018-04-19 ENCOUNTER — OFFICE VISIT (OUTPATIENT)
Dept: OPHTHALMOLOGY | Facility: CLINIC | Age: 78
End: 2018-04-19
Payer: COMMERCIAL

## 2018-04-19 DIAGNOSIS — H26.8 PXF (PSEUDOEXFOLIATION OF LENS CAPSULE): ICD-10-CM

## 2018-04-19 DIAGNOSIS — Z96.1 PSEUDOPHAKIA: ICD-10-CM

## 2018-04-19 DIAGNOSIS — H52.4 PRESBYOPIA: ICD-10-CM

## 2018-04-19 DIAGNOSIS — H43.813 POSTERIOR VITREOUS DETACHMENT, BILATERAL: ICD-10-CM

## 2018-04-19 DIAGNOSIS — Z01.01 ENCOUNTER FOR EXAMINATION OF EYES AND VISION WITH ABNORMAL FINDINGS: Primary | ICD-10-CM

## 2018-04-19 PROCEDURE — 92015 DETERMINE REFRACTIVE STATE: CPT | Performed by: OPHTHALMOLOGY

## 2018-04-19 PROCEDURE — 92014 COMPRE OPH EXAM EST PT 1/>: CPT | Performed by: OPHTHALMOLOGY

## 2018-04-19 ASSESSMENT — VISUAL ACUITY
OS_SC+: -2
OS_CC: 4-
METHOD: SNELLEN - LINEAR
OD_CC: 2
OD_SC+: +1
OD_SC: 20/40
OS_SC: 20/50

## 2018-04-19 ASSESSMENT — TONOMETRY
OS_IOP_MMHG: 21
OD_IOP_MMHG: 17
IOP_METHOD: APPLANATION

## 2018-04-19 ASSESSMENT — CONF VISUAL FIELD
OD_NORMAL: 1
OS_NORMAL: 1

## 2018-04-19 ASSESSMENT — REFRACTION_MANIFEST
OD_ADD: +2.50
OS_CYLINDER: +0.75
OS_SPHERE: -1.75
OS_AXIS: 148
OD_AXIS: 045
OD_CYLINDER: +0.50
OS_ADD: +2.50
OD_SPHERE: +0.25

## 2018-04-19 ASSESSMENT — SLIT LAMP EXAM - LIDS
COMMENTS: GOOD COSMESIS
COMMENTS: GOOD COSMESIS

## 2018-04-19 ASSESSMENT — REFRACTION_WEARINGRX
OD_SPHERE: +2.50
SPECS_TYPE: OTC
OS_SPHERE: +2.50

## 2018-04-19 ASSESSMENT — EXTERNAL EXAM - RIGHT EYE: OD_EXAM: 1+ BROW PTOSIS

## 2018-04-19 ASSESSMENT — EXTERNAL EXAM - LEFT EYE: OS_EXAM: 1+ BROW PTOSIS

## 2018-04-19 ASSESSMENT — CUP TO DISC RATIO
OD_RATIO: 0.4
OS_RATIO: 0.3

## 2018-04-19 NOTE — MR AVS SNAPSHOT
After Visit Summary   4/19/2018    Yumiko Flowers    MRN: [id number]           Patient Information     Date Of Birth          1940        Visit Information        Provider Department      4/19/2018 8:30 AM Ashu Gonzales MD [facility]        Today's Diagnoses     Encounter for examination of eyes and vision with abnormal findings    -  1    Presbyopia        Posterior vitreous detachment, bilateral        Pseudophakia, ou; Yag Caps, os        Hx of pseudoexfoliation, os          Care Instructions    Possible clouding of posterior capsule discussed. Right eye   Use artificial tears up to 4 times daily both eyes. (Refresh Tears, Systane Ultra/Balance, or Theratears)   Continue same glasses.  Call in December 2018 for an appointment in April 2019 for Complete Exam.    Dr. Gonzales [phone]          Follow-ups after your visit        Who to contact     If you have questions or need follow up information about today's clinic visit or your schedule please contact [facility] directly at [phone].  Normal or non-critical lab and imaging results will be communicated to you by [employer], letter or phone within 4 business days after the clinic has received the results. If you do not hear from us within 7 days, please contact the clinic through [phone] or phone. If you have a critical or abnormal lab result, we will notify you by phone as soon as possible.  Submit refill requests through [employer] or call your pharmacy and they will forward the refill request to us. Please allow 3 business days for your refill to be completed.          Additional Information About Your Visit        MyChart Information     [employer] gives you secure access to your electronic health record. If you see a primary care provider, you can also send messages to your care team and make appointments. If you have questions, please call your primary care clinic.  If you do not have a primary care provider,  please call [phone] and they will assist you.        Care EveryWhere ID     This is your Care EveryWhere ID. This could be used by other organizations to access your Mobile medical records  [id number]         Blood Pressure from Last 3 Encounters:   12/15/17 133/71   08/01/17 126/74   03/01/17 114/73    Weight from Last 3 Encounters:   12/15/17 94.8 kg (208 lb 14.4 oz)   09/20/17 94.3 kg (207 lb 12.8 oz)   08/01/17 93.4 kg (206 lb)              We Performed the Following     EYE EXAM (SIMPLE-NONBILLABLE)     REFRACTIVE STATUS        Primary Care Provider Office Phone # Fax #    Gavi Bear [phone]       [address] [facility] [address]        Equal Access to Services     [facility] Center[facility]NY : Hadii aad ku hadasho Soomaali, waaxda luqadaha, qaybta kaalmada adeegyada, jadyn goncalves hayramos field . So [facility] [phone].    ATENCIÓN: Si habla español, tiene a church disposición servicios gratuitos de asistencia lingüística. Colby al [phone].    We comply with applicable federal civil rights laws and [place] laws. We do not discriminate on the basis of race, color, national origin, age, disability, sex, sexual orientation, or gender identity.            Thank you!     Thank you for choosing [facility]  for your care. Our goal is always to provide you with excellent care. Hearing back from our patients is one way we can continue to improve our services. Please take a few minutes to complete the written survey that you may receive in the mail after your visit with us. Thank you!             Your Updated Medication List - Protect others around you: Learn how to safely use, store and throw away your medicines at www.disposemymeds.org.          This list is accurate as of 4/19/18  9:33 AM.  Always use your most recent med list.                   Brand Name Dispense Instructions for use Diagnosis    adapalene-benzoyl peroxide 0.1-2.5 % gel    EPIDUO    45 g     Apply topically daily    Acne rosacea       * albuterol 108 (90 Base) MCG/ACT Inhaler    PROAIR HFA/PROVENTIL HFA/VENTOLIN HFA    1 Inhaler    Inhale into the lungs. INHALE 1-2 PUFFS EVERY 4-6 HOURS AS NEEDED    Mild persistent asthma       * albuterol (2.5 MG/3ML) 0.083% neb solution     25 vial    Take 1 vial (2.5 mg) by nebulization every 6 hours as needed for shortness of breath / dyspnea or wheezing    Moderate persistent asthma with acute exacerbation       calcium 500 MG Chew      None Entered        diclofenac 1 % Gel topical gel    VOLTAREN    100 g    Apply 2 grams to affected area up to four times daily using enclosed dosing card.    Cervicalgia       doxycycline 100 MG tablet    VIBRA-TABS    180 tablet    Take 1 tablet (100 mg) by mouth 2 times daily    Rosacea       etodolac 400 MG tablet    LODINE    60 tablet    TAKE 1 TABLET (400 MG) BY MOUTH 2 TIMES DAILY AS NEEDED    Chronic low back pain without sciatica, unspecified back pain laterality       gabapentin 600 MG tablet    NEURONTIN    180 tablet    Take 1 tablet (600 mg) by mouth 2 times daily    Low back pain, unspecified back pain laterality, unspecified chronicity, with sciatica presence unspecified       HYDROcodone-acetaminophen 5-325 MG per tablet    NORCO    30 tablet    Take 1 tablet by mouth every 6 hours as needed    Blunt trauma of rib, initial encounter       lisinopril-hydrochlorothiazide 20-12.5 MG per tablet    PRINZIDE/ZESTORETIC    180 tablet    TAKE 2 TABLETS BY MOUTH DAILY    Essential hypertension with goal blood pressure less than 140/90       Multiple Vitamins-Iron Tabs      Take 1 tablet by mouth daily.        sulfacetamide sodium-sulfur 10-5 % Lotn     60 g    Use daily as directed    Acne rosacea       vitamin D 2000 units Caps      Take 2,000 Units by mouth daily.        * Notice:  This list has 2 medication(s) that are the same as other medications prescribed for you. Read the directions carefully, and ask your doctor or  other care provider to review them with you.

## 2018-04-19 NOTE — LETTER
4/19/2018         RE: Yumiko Flowers  [address]  [place] [address]        Dear Colleague,    Thank you for referring your patient, Yumiko Flowers, to the [facility]. Please see a copy of my visit note below.     Current Eye Medications:  systane both eyes twice a day, as needed.      Subjective:  Comprehensive Eye Exam.  No vision changes or concerns.  She wears over-the-counter readers which are working fairly well.  Distance vision is adequate without correction.     No diplopia.      Objective:  See Ophthalmology Exam.       Assessment:  Stable eye exam.      ICD-10-CM    1. Encounter for examination of eyes and vision with abnormal findings Z01.01 REFRACTIVE STATUS   2. Presbyopia H52.4 REFRACTIVE STATUS   3. Pseudophakia, ou; Yag Caps, os Z96.1 EYE EXAM (SIMPLE-NONBILLABLE)   4. 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 Posterior vitreous detachment, bilateral H43.813         Plan:  Possible clouding of posterior capsule discussed. Right eye   Use artificial tears up to 4 times daily both eyes. (Refresh Tears, Systane Ultra/Balance, or Theratears)   Continue same glasses.  Call in December 2018 for an appointment in April 2019 for Complete Exam.    Dr. Gonzales [phone]         Again, thank you for allowing me to participate in the care of your patient.        Sincerely,        Ashu Gonzales MD

## 2018-04-19 NOTE — PATIENT INSTRUCTIONS
Possible clouding of posterior capsule discussed. Right eye   Use artificial tears up to 4 times daily both eyes. (Refresh Tears, Systane Ultra/Balance, or Theratears)   Continue same glasses.  Call in December 2018 for an appointment in April 2019 for Complete Exam.    Dr. Gonzales [phone]

## 2018-04-19 NOTE — PROGRESS NOTES
Current Eye Medications:  systane both eyes twice a day, as needed.      Subjective:  Comprehensive Eye Exam.  No vision changes or concerns.  She wears over-the-counter readers which are working fairly well.  Distance vision is adequate without correction.     No diplopia.      Objective:  See Ophthalmology Exam.       Assessment:  Stable eye exam.      ICD-10-CM    1. Encounter for examination of eyes and vision with abnormal findings Z01.01 REFRACTIVE STATUS   2. Presbyopia H52.4 REFRACTIVE STATUS   3. Pseudophakia, ou; Yag Caps, os Z96.1 EYE EXAM (SIMPLE-NONBILLABLE)   4. 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 Posterior vitreous detachment, bilateral H43.813         Plan:  Possible clouding of posterior capsule discussed. Right eye   Use artificial tears up to 4 times daily both eyes. (Refresh Tears, Systane Ultra/Balance, or Theratears)   Continue same glasses.  Call in December 2018 for an appointment in April 2019 for Complete Exam.    Dr. Gonzales [phone]

## 2018-04-20 ENCOUNTER — TELEPHONE (OUTPATIENT)
Dept: FAMILY MEDICINE | Facility: CLINIC | Age: 78
End: 2018-04-20

## 2018-04-20 NOTE — TELEPHONE ENCOUNTER
Panel Management Review      BP Readings from Last 1 Encounters:   12/15/17 133/71    ,   Lab Results   Component Value Date    A1C 5.6 07/14/2015   , 12/15/2017  Last Office Visit with this department: 12/15/2017    Fail List measure: Colon cancer screening      Patient is due/failing the following:   COLONOSCOPY    Action needed:   Schedule colonoscopy    Type of outreach:    Sent Info message.    Questions for provider review:    None                                                                                                                                    Josh Harrell      Chart routed to NA .

## 2018-04-29 DIAGNOSIS — I10 ESSENTIAL HYPERTENSION WITH GOAL BLOOD PRESSURE LESS THAN 140/90: ICD-10-CM

## 2018-04-29 NOTE — TELEPHONE ENCOUNTER
"Requested Prescriptions   Pending Prescriptions Disp Refills     lisinopril-hydrochlorothiazide (PRINZIDE/ZESTORETIC) 20-12.5 MG per tablet [Pharmacy Med Name: LISINOPRIL-HCTZ 20-12.5 MG TAB]    Last Written Prescription Date:  10/30/17  Last Fill Quantity: 180,  # refills: 1   [facility] Office Visit with [facility] or [facility] prescribing provider:  12/15/17   Future Office Visit:      180 tablet 1     Sig: TAKE 2 TABLETS BY MOUTH DAILY    Diuretics (Including Combos) Protocol Passed    4/29/2018  5:58 AM       Passed - Blood pressure under 140/90 in past 12 months    BP Readings from Last 3 Encounters:   12/15/17 133/71   08/01/17 126/74   03/01/17 114/73                Passed - Recent (12 mo) or future (30 days) visit within the authorizing provider's specialty    Patient had office visit in the last 12 months or has a visit in the next 30 days with authorizing provider or within the authorizing provider's specialty.  See \"Patient Info\" tab in inbasket, or \"Choose Columns\" in Meds & Orders section of the refill encounter.           Passed - Patient is age 18 or older       Passed - No active pregancy on record       Passed - Normal serum creatinine on file in past 12 months    Recent Labs   Lab Test  09/19/17   1418   CR  0.84             Passed - Normal serum potassium on file in past 12 months    Recent Labs   Lab Test  09/19/17   1418   POTASSIUM  4.4                   Passed - Normal serum sodium on file in past 12 months    Recent Labs   Lab Test  09/19/17   1418   NA  141             Passed - No positive pregnancy test in past 12 months              Bijan Faarax  [facility]  "

## 2018-05-02 RX ORDER — LISINOPRIL AND HYDROCHLOROTHIAZIDE 12.5; 2 MG/1; MG/1
TABLET ORAL
Qty: 180 TABLET | Refills: 1 | Status: SHIPPED | OUTPATIENT
Start: 2018-05-02 | End: 2018-10-23

## 2018-05-19 DIAGNOSIS — L71.9 ROSACEA: ICD-10-CM

## 2018-05-20 NOTE — TELEPHONE ENCOUNTER
Requested Prescriptions   Pending Prescriptions Disp Refills     doxycycline (VIBRA-TABS) 100 MG tablet [Pharmacy Med Name: DOXYCYCLINE HYCLATE 100 MG TAB] 180 tablet 0     Sig: TAKE 1 TABLET (100 MG) BY MOUTH 2 TIMES DAILY    There is no refill protocol information for this order        Last Written Prescription Date:  8/2/17  Last Fill Quantity: 180,  # refills: 0   Last Office Visit with [facility] or [facility] prescribing provider:  12/15/2017     Future Office Visit:

## 2018-05-21 RX ORDER — DOXYCYCLINE HYCLATE 100 MG
TABLET ORAL
Qty: 180 TABLET | Refills: 0 | Status: SHIPPED | OUTPATIENT
Start: 2018-05-21 | End: 2018-08-18

## 2018-05-21 NOTE — TELEPHONE ENCOUNTER
Routing refill request to provider for review/approval because:  Drug not on the [facility] refill protocol     Gricel Gardner RN, [facility]

## 2018-07-27 DIAGNOSIS — M54.5 LOW BACK PAIN, UNSPECIFIED BACK PAIN LATERALITY, UNSPECIFIED CHRONICITY, WITH SCIATICA PRESENCE UNSPECIFIED: ICD-10-CM

## 2018-07-27 RX ORDER — GABAPENTIN 600 MG/1
TABLET ORAL
Qty: 180 TABLET | Refills: 3 | OUTPATIENT
Start: 2018-07-27

## 2018-07-27 NOTE — TELEPHONE ENCOUNTER
Requested Prescriptions   Pending Prescriptions Disp Refills     gabapentin (NEURONTIN) 600 MG tablet [Pharmacy Med Name: GABAPENTIN 600 MG TABLET]        Last Written Prescription Date: 08/01/17   Last Fill Quantity: 180,   # refills: 3  Last Office Visit: 12/15/17  Future Office visit:       Routing refill request to provider for review/approval because:  {RX Non-Protocol 180 tablet 3     Sig: TAKE 1 TABLET (600 MG) BY MOUTH 2 TIMES DAILY    There is no refill protocol information for this order

## 2018-07-27 NOTE — TELEPHONE ENCOUNTER
Routing refill request to provider for review/approval because:  Drug not on the [facility] refill protocol     Ananya Powell RN  [facility]

## 2018-07-30 NOTE — TELEPHONE ENCOUNTER
Called and spoke to patient regarding Dr Bear's message. Scheduled her to see Dr Bear on 7/31/18 at 10:40 am.  Yumiko Avery MA/  [facility]

## 2018-07-31 ENCOUNTER — OFFICE VISIT (OUTPATIENT)
Dept: FAMILY MEDICINE | Facility: CLINIC | Age: 78
End: 2018-07-31
Payer: COMMERCIAL

## 2018-07-31 VITALS
HEIGHT: 65 IN | WEIGHT: 207 LBS | OXYGEN SATURATION: 96 % | BODY MASS INDEX: 34.49 KG/M2 | SYSTOLIC BLOOD PRESSURE: 112 MMHG | HEART RATE: 68 BPM | TEMPERATURE: 98.9 F | DIASTOLIC BLOOD PRESSURE: 70 MMHG

## 2018-07-31 DIAGNOSIS — I10 HTN, GOAL BELOW 140/90: ICD-10-CM

## 2018-07-31 DIAGNOSIS — G89.29 CHRONIC PAIN OF LEFT KNEE: ICD-10-CM

## 2018-07-31 DIAGNOSIS — M25.562 CHRONIC PAIN OF LEFT KNEE: ICD-10-CM

## 2018-07-31 DIAGNOSIS — M54.5 LOW BACK PAIN, UNSPECIFIED BACK PAIN LATERALITY, UNSPECIFIED CHRONICITY, WITH SCIATICA PRESENCE UNSPECIFIED: Primary | ICD-10-CM

## 2018-07-31 DIAGNOSIS — I10 ESSENTIAL HYPERTENSION WITH GOAL BLOOD PRESSURE LESS THAN 140/90: ICD-10-CM

## 2018-07-31 LAB
ALBUMIN SERPL-MCNC: 4.1 G/DL (ref 3.4–5)
ALP SERPL-CCNC: 80 U/L (ref 40–150)
ALT SERPL W P-5'-P-CCNC: 22 U/L (ref 0–50)
ANION GAP SERPL CALCULATED.3IONS-SCNC: 8 MMOL/L (ref 3–14)
AST SERPL W P-5'-P-CCNC: 22 U/L (ref 0–45)
BASOPHILS # BLD AUTO: 0 10E9/L (ref 0–0.2)
BASOPHILS NFR BLD AUTO: 0.8 %
BILIRUB SERPL-MCNC: 0.4 MG/DL (ref 0.2–1.3)
BUN SERPL-MCNC: 20 MG/DL (ref 7–30)
CALCIUM SERPL-MCNC: 9.3 MG/DL (ref 8.5–10.1)
CHLORIDE SERPL-SCNC: 103 MMOL/L (ref 94–109)
CO2 SERPL-SCNC: 29 MMOL/L (ref 20–32)
CREAT SERPL-MCNC: 0.76 MG/DL (ref 0.52–1.04)
CREAT UR-MCNC: 138 MG/DL
DIFFERENTIAL METHOD BLD: ABNORMAL
EOSINOPHIL # BLD AUTO: 0.2 10E9/L (ref 0–0.7)
EOSINOPHIL NFR BLD AUTO: 3.1 %
ERYTHROCYTE [DISTWIDTH] IN BLOOD BY AUTOMATED COUNT: 13.9 % (ref 10–15)
FERRITIN SERPL-MCNC: 56 NG/ML (ref 8–252)
GFR SERPL CREATININE-BSD FRML MDRD: 74 ML/MIN/1.7M2
GLUCOSE SERPL-MCNC: 102 MG/DL (ref 70–99)
HCT VFR BLD AUTO: 37.6 % (ref 35–47)
HGB BLD-MCNC: 11.8 G/DL (ref 11.7–15.7)
LDLC SERPL DIRECT ASSAY-MCNC: 106 MG/DL
LYMPHOCYTES # BLD AUTO: 1.8 10E9/L (ref 0.8–5.3)
LYMPHOCYTES NFR BLD AUTO: 36.5 %
MCH RBC QN AUTO: 25.2 PG (ref 26.5–33)
MCHC RBC AUTO-ENTMCNC: 31.4 G/DL (ref 31.5–36.5)
MCV RBC AUTO: 80 FL (ref 78–100)
MICROALBUMIN UR-MCNC: 12 MG/L
MICROALBUMIN/CREAT UR: 8.91 MG/G CR (ref 0–25)
MONOCYTES # BLD AUTO: 0.2 10E9/L (ref 0–1.3)
MONOCYTES NFR BLD AUTO: 5 %
NEUTROPHILS # BLD AUTO: 2.6 10E9/L (ref 1.6–8.3)
NEUTROPHILS NFR BLD AUTO: 54.6 %
PLATELET # BLD AUTO: 175 10E9/L (ref 150–450)
POTASSIUM SERPL-SCNC: 4.2 MMOL/L (ref 3.4–5.3)
PROT SERPL-MCNC: 7.4 G/DL (ref 6.8–8.8)
RBC # BLD AUTO: 4.69 10E12/L (ref 3.8–5.2)
SODIUM SERPL-SCNC: 140 MMOL/L (ref 133–144)
TSH SERPL DL<=0.005 MIU/L-ACNC: 0.51 MU/L (ref 0.4–4)
VIT B12 SERPL-MCNC: 678 PG/ML (ref 193–986)
WBC # BLD AUTO: 4.8 10E9/L (ref 4–11)

## 2018-07-31 PROCEDURE — 85025 COMPLETE CBC W/AUTO DIFF WBC: CPT | Performed by: PREVENTIVE MEDICINE

## 2018-07-31 PROCEDURE — 80053 COMPREHEN METABOLIC PANEL: CPT | Performed by: PREVENTIVE MEDICINE

## 2018-07-31 PROCEDURE — 99214 OFFICE O/P EST MOD 30 MIN: CPT | Performed by: PREVENTIVE MEDICINE

## 2018-07-31 PROCEDURE — 82607 VITAMIN B-12: CPT | Performed by: PREVENTIVE MEDICINE

## 2018-07-31 PROCEDURE — 84443 ASSAY THYROID STIM HORMONE: CPT | Performed by: PREVENTIVE MEDICINE

## 2018-07-31 PROCEDURE — 82306 VITAMIN D 25 HYDROXY: CPT | Performed by: PREVENTIVE MEDICINE

## 2018-07-31 PROCEDURE — 82728 ASSAY OF FERRITIN: CPT | Performed by: PREVENTIVE MEDICINE

## 2018-07-31 PROCEDURE — 82043 UR ALBUMIN QUANTITATIVE: CPT | Performed by: PREVENTIVE MEDICINE

## 2018-07-31 PROCEDURE — 36415 COLL VENOUS BLD VENIPUNCTURE: CPT | Performed by: PREVENTIVE MEDICINE

## 2018-07-31 PROCEDURE — 83721 ASSAY OF BLOOD LIPOPROTEIN: CPT | Performed by: PREVENTIVE MEDICINE

## 2018-07-31 RX ORDER — GABAPENTIN 600 MG/1
600 TABLET ORAL 2 TIMES DAILY
Qty: 180 TABLET | Refills: 3 | Status: SHIPPED | OUTPATIENT
Start: 2018-07-31 | End: 2019-06-12

## 2018-07-31 RX ORDER — ETODOLAC 400 MG
TABLET ORAL
Qty: 60 TABLET | Refills: 0 | Status: SHIPPED | OUTPATIENT
Start: 2018-07-31 | End: 2018-08-28

## 2018-07-31 ASSESSMENT — PAIN SCALES - GENERAL: PAINLEVEL: NO PAIN (0)

## 2018-07-31 ASSESSMENT — ANXIETY QUESTIONNAIRES
IF YOU CHECKED OFF ANY PROBLEMS ON THIS QUESTIONNAIRE, HOW DIFFICULT HAVE THESE PROBLEMS MADE IT FOR YOU TO DO YOUR WORK, TAKE CARE OF THINGS AT HOME, OR GET ALONG WITH OTHER PEOPLE: NOT DIFFICULT AT ALL
3. WORRYING TOO MUCH ABOUT DIFFERENT THINGS: NOT AT ALL
GAD7 TOTAL SCORE: 2
6. BECOMING EASILY ANNOYED OR IRRITABLE: MORE THAN HALF THE DAYS
5. BEING SO RESTLESS THAT IT IS HARD TO SIT STILL: NOT AT ALL
2. NOT BEING ABLE TO STOP OR CONTROL WORRYING: NOT AT ALL
7. FEELING AFRAID AS IF SOMETHING AWFUL MIGHT HAPPEN: NOT AT ALL
1. FEELING NERVOUS, ANXIOUS, OR ON EDGE: NOT AT ALL

## 2018-07-31 ASSESSMENT — PATIENT HEALTH QUESTIONNAIRE - PHQ9: 5. POOR APPETITE OR OVEREATING: NOT AT ALL

## 2018-07-31 NOTE — PROGRESS NOTES
SUBJECTIVE:   Yumiko Flowers is a 78 year old female who presents to clinic today for the following health issues:      Medication Followup of Gabapentin    Taking Medication as prescribed: yes    Side Effects:  None    Medication Helping Symptoms:  yes       Hypertension Follow-up      Outpatient blood pressures are being checked at home.  Results are less than 140/90.    Low Salt Diet: low salt    Chronic pain of both knees:  -has had injections in the past that have helped  -Will be traveling to [place] in September and son is getting  in August  -hence, wants pain under better control        Problem list and histories reviewed & adjusted, as indicated.  Additional history: as documented    Patient Active Problem List   Diagnosis     Asthma, moderate persistent     Acne rosacea     CARDIOVASCULAR SCREENING; LDL GOAL LESS THAN 130     Osteopenia     Vitamin D deficiency     Advanced directives, counseling/discussion     Hx of pseudoexfoliation, os     Pseudophakia, ou; Yag Caps, os     HTN, goal below 140/90     Peptic ulcer     Health Care Home     History of blepharoplasty, upper lid, ou (elsewhere); revision (EN)     Obesity, Class II, BMI 35-39.9     Restless legs syndrome     DJD (degenerative joint disease), lumbosacral     Rosacea     Essential hypertension with goal blood pressure less than 140/90     Obesity, Class I, BMI 30-34.9     Posterior vitreous detachment, bilateral     Chronic pain of left knee     Cervicalgia     Vertigo     Past Surgical History:   Procedure Laterality Date     BLEPHAROPLASTY BILATERAL  3/9/2006; 2013    both eyes, upper lid; revision (EN)     C APPENDECTOMY       CATARACT IOL, RT/LT       HC REMOVAL GALLBLADDER       LASER YAG CAPSULOTOMY      left eye (AC lysis also)     PHACOEMULSIFICATION WITH STANDARD INTRAOCULAR LENS IMPLANT  1/2012; 4/2013    right eye; left eye     REPAIR PTOSIS       SURGICAL HISTORY OF -       endometriosis surgeriesx3     SURGICAL HISTORY  "OF -       ganiglion cyst onfoot     SURGICAL HISTORY OF -       Eye for \"droopy eye\"       Social History   Substance Use Topics     Smoking status: Never Smoker     Smokeless tobacco: Never Used     Alcohol use Yes     Family History   Problem Relation Age of Onset     C.A.D. Father      C.A.D. Brother      Hypertension Mother      Cancer No family hx of      Diabetes No family hx of      Cerebrovascular Disease No family hx of      Thyroid Disease No family hx of      Glaucoma No family hx of      Macular Degeneration No family hx of          Current Outpatient Prescriptions   Medication Sig Dispense Refill     CALCIUM 500 MG PO CHEW None Entered       Cholecalciferol (VITAMIN D) 2000 UNIT CAPS Take 2,000 Units by mouth daily.       doxycycline (VIBRA-TABS) 100 MG tablet TAKE 1 TABLET (100 MG) BY MOUTH 2 TIMES DAILY 180 tablet 0     etodolac (LODINE) 400 MG tablet TAKE 1 TABLET (400 MG) BY MOUTH 2 TIMES DAILY AS NEEDED 60 tablet 0     gabapentin (NEURONTIN) 600 MG tablet Take 1 tablet (600 mg) by mouth 2 times daily 180 tablet 3     lisinopril-hydrochlorothiazide (PRINZIDE/ZESTORETIC) 20-12.5 MG per tablet TAKE 2 TABLETS BY MOUTH DAILY 180 tablet 1     Multiple Vitamins-Iron TABS Take 1 tablet by mouth daily.       [DISCONTINUED] etodolac (LODINE) 400 MG tablet TAKE 1 TABLET (400 MG) BY MOUTH 2 TIMES DAILY AS NEEDED 60 tablet 0     [DISCONTINUED] gabapentin (NEURONTIN) 600 MG tablet Take 1 tablet (600 mg) by mouth 2 times daily 180 tablet 3     Allergies   Allergen Reactions     Erythromycin Swelling and Other (See Comments)     BP Readings from Last 3 Encounters:   07/31/18 112/70   12/15/17 133/71   08/01/17 126/74    Wt Readings from Last 3 Encounters:   07/31/18 207 lb (93.9 kg)   12/15/17 208 lb 14.4 oz (94.8 kg)   09/20/17 207 lb 12.8 oz (94.3 kg)                    Reviewed and updated as needed this visit by clinical staff  Allergies       Reviewed and updated as needed this visit by Provider     " "    ROS:  Constitutional, neuro, ENT, endocrine, pulmonary, cardiac, gastrointestinal, genitourinary, musculoskeletal, integument and psychiatric systems are negative, except as otherwise noted.    OBJECTIVE:                                                    /70  Pulse 68  Temp 98.9  F (37.2  C) (Oral)  Ht 5' 4.5\" (1.638 m)  Wt 207 lb (93.9 kg)  SpO2 96%  Breastfeeding? No  BMI 34.98 kg/m2  Body mass index is 34.98 kg/(m^2).  GENERAL APPEARANCE: healthy, alert and no distress  EYES: Eyes grossly normal to inspection and conjunctivae and sclerae normal  NECK: trachea midline and normal to palpation  RESP: lungs clear to auscultation - no rales, rhonchi or wheezes  CV: regular rates and rhythm, normal S1 S2, no S3 or S4 and no murmur, click or rub  ABDOMEN: soft, non-tender  MS: extremities normal- no gross deformities noted and peripheral pulses normal  SKIN: no suspicious lesions or rashes  NEURO: Normal strength and tone, mentation intact, speech normal, DTR symmetrically normal in lower extremities and gait normal including heel/toe/tandem walking  PSYCH: mentation appears normal and affect normal/bright    Diagnostic test results:  Diagnostic Test Results:  Results for orders placed or performed in visit on 07/31/18 (from the past 24 hour(s))   CBC with platelets differential   Result Value Ref Range    WBC 4.8 4.0 - 11.0 10e9/L    RBC Count 4.69 3.8 - 5.2 10e12/L    Hemoglobin 11.8 11.7 - 15.7 g/dL    Hematocrit 37.6 35.0 - 47.0 %    MCV 80 78 - 100 fl    MCH 25.2 (L) 26.5 - 33.0 pg    MCHC 31.4 (L) 31.5 - 36.5 g/dL    RDW 13.9 10.0 - 15.0 %    Platelet Count 175 150 - 450 10e9/L    Diff Method Automated Method     % Neutrophils 54.6 %    % Lymphocytes 36.5 %    % Monocytes 5.0 %    % Eosinophils 3.1 %    % Basophils 0.8 %    Absolute Neutrophil 2.6 1.6 - 8.3 10e9/L    Absolute Lymphocytes 1.8 0.8 - 5.3 10e9/L    Absolute Monocytes 0.2 0.0 - 1.3 10e9/L    Absolute Eosinophils 0.2 0.0 - 0.7 10e9/L "    Absolute Basophils 0.0 0.0 - 0.2 10e9/L        ASSESSMENT/PLAN:                                                    1. Low back pain, unspecified back pain laterality, unspecified chronicity, with sciatica presence unspecified  -has been seen by [facility] in the past  -Prefers to follow up with [place]  -Has had injections that helped previously   -aware of sedation side effect with Gabapentin   - gabapentin (NEURONTIN) 600 MG tablet; Take 1 tablet (600 mg) by mouth 2 times daily  Dispense: 180 tablet; Refill: 3  - etodolac (LODINE) 400 MG tablet; TAKE 1 TABLET (400 MG) BY MOUTH 2 TIMES DAILY AS NEEDED  Dispense: 60 tablet; Refill: 0  - ORTHO  REFERRAL  - MR Lumbar Spine w/o Contrast; Future  - CBC with platelets differential  - Ferritin  - Comprehensive metabolic panel  - Vitamin D Deficiency  - Vitamin B12  - TSH with free T4 reflex    2. Essential hypertension with goal blood pressure less than 140/90  -At goal  -Continue current medication     3. HTN, goal below 140/90  - Albumin Random Urine Quantitative with Creat Ratio  - LDL cholesterol direct    4. Chronic pain of both knees  -Plan for cortisone injections   - ORTHO  REFERRAL      Follow up with Provider - Annual physical      Gavi Bear MD MPH    [facility]

## 2018-07-31 NOTE — PROGRESS NOTES
Yumiko,     Basic blood count does not show anemia or infection.Other labs are pending.    Please do not hesitate to call us at [phone] if you have any questions or concerns.    Thank you,    Gavi Bear MD MPH

## 2018-07-31 NOTE — MR AVS SNAPSHOT
After Visit Summary   7/31/2018    Yumiko Flowers    MRN: [id number]           Patient Information     Date Of Birth          1940        Visit Information        Provider Department      7/31/2018 10:40 AM Gavi Bear MD [facility]        Today's Diagnoses     Low back pain, unspecified back pain laterality, unspecified chronicity, with sciatica presence unspecified    -  1    Essential hypertension with goal blood pressure less than 140/90        HTN, goal below 140/90        Chronic pain of both knees          Care Instructions    At [facility], we strive to deliver an exceptional experience to you, every time we see you.  If you receive a survey in the mail, please send us back your thoughts. We really do value your feedback.    Based on your medical history, these are the current health maintenance/preventive care services that you are due for (some may have been done at this visit.)  Health Maintenance Due   Topic Date Due     URINE DRUG SCREEN Q1 YR  07/03/1957     FARA QUESTIONNAIRE 1 YEAR  07/03/1960     WELLNESS VISIT Q1 YR  06/11/2015     ASTHMA ACTION PLAN Q1 YR  07/21/2017     FALL RISK ASSESSMENT  01/12/2018     ASTHMA CONTROL TEST Q6 MOS  02/01/2018     COLON CANCER SCREEN (SYSTEM ASSIGNED)  03/20/2018     PHQ-9 Q1YR  08/01/2018       Suggested websites for health information:  Www.giftee.org : Up to date and easily searchable information on multiple topics.  Www.medlineplus.gov : medication info, interactive tutorials, watch real surgeries online  Www.familydoctor.org : good info from the Academy of Family Physicians  Www.cdc.gov : public health info, travel advisories, epidemics (H1N1)  Www.aap.org : children's health info, normal development, vaccinations  Www.health.state.[place].us : [place] dept of health, public health issues in [place], N1N1    Your care team:                            Family Medicine Internal Medicine   MD David Puckett  MD Mary Alice Childers MD Katya Georgiev PA-C Megan Hill, APRN CNP    Trevin Washburn MD Pediatrics   Andrew Medina, EMY Ibanez, MD Shara Kelly APRN CNP   MD Jacqueline Penny MD Deborah Mielke, MD Kim Thein, APRN [facility]      Clinic hours: Monday - Thursday 7 am-7 pm; Fridays 7 am-5 pm.   Urgent care: Monday - Friday 11 am-9 pm; Saturday and Sunday 9 am-5 pm.  Pharmacy : Monday -Thursday 8 am-8 pm; Friday 8 am-6 pm; Saturday and Sunday 9 am-5 pm.     Clinic: [phone]   Pharmacy: [phone]              Follow-ups after your visit        Additional Services     ORTHO  REFERRAL       [facility] is referring you to the Orthopedic  Services at [facility].       The  Representative will assist you in the coordination of your Orthopedic and Musculoskeletal Care as prescribed by your physician.    The  Representative will call you within 1 business day to help schedule your appointment, or you may contact the  Representative at:    All areas ~ [phone]     Type of Referral : Spine: Lumbar  **Choose Medical Spine Specialist (unless patient was seen by a Medical Spine Specialist within the past 6 months).**  Surgical Evaluation is advised if the patient presents with one or more of the following red flags: Evidence of Spinal Tumor, Infection or Fracture, Cauda Equina Syndrome, Sudden or Progressive Weakness, Loss of Bowel or Bladder Control, or any other documented emergent neurological condition resulting from a Lumbar Spinal Condition. Medical Spine Specialist        Timeframe requested: Routine    Coverage of these services is subject to the terms and limitations of your health insurance plan.  Please call member services at your health plan with any benefit or coverage questions.      If X-rays, CT or MRI's have been performed, please contact the facility where they  were done to arrange for , prior to your scheduled appointment.  Please bring this referral request to your appointment and present it to your specialist.            ORTHO  REFERRAL       [facility] is referring you to the Orthopedic  Services at [facility].       The  Representative will assist you in the coordination of your Orthopedic and Musculoskeletal Care as prescribed by your physician.    The  Representative will call you within 1 business day to help schedule your appointment, or you may contact the  Representative at:    All areas ~ [phone]     Type of Referral : Non Surgical       Timeframe requested: Routine    Coverage of these services is subject to the terms and limitations of your health insurance plan.  Please call member services at your health plan with any benefit or coverage questions.      If X-rays, CT or MRI's have been performed, please contact the facility where they were done to arrange for , prior to your scheduled appointment.  Please bring this referral request to your appointment and present it to your specialist.                  Follow-up notes from your care team     Return in about 1 year (around 7/31/2019) for Routine Visit.      Future tests that were ordered for you today     Open Future Orders        Priority Expected Expires Ordered    MR Lumbar Spine w/o Contrast Routine  7/31/2019 7/31/2018            Who to contact     If you have questions or need follow up information about today's clinic visit or your schedule please contact [facility] directly at [phone].  Normal or non-critical lab and imaging results will be communicated to you by MyChart, letter or phone within 4 business days after the clinic has received the results. If you do not hear from us within 7 days, please contact the clinic through MyChart or phone. If you have a critical or  "abnormal lab result, we will notify you by phone as soon as possible.  Submit refill requests through [email or web address] or call your pharmacy and they will forward the refill request to us. Please allow 3 business days for your refill to be completed.          Additional Information About Your Visit        [employer] Information     [email or web address] gives you secure access to your electronic health record. If you see a primary care provider, you can also send messages to your care team and make appointments. If you have questions, please call your primary care clinic.  If you do not have a primary care provider, please call [phone] and they will assist you.        Care EveryWhere ID     This is your Care EveryWhere ID. This could be used by other organizations to access your [place] medical records  [id number]        Your Vitals Were     Pulse Temperature Height Pulse Oximetry Breastfeeding? BMI (Body Mass Index)    68 98.9  F (37.2  C) (Oral) 5' 4.5\" (1.638 m) 96% No 34.98 kg/m2       Blood Pressure from Last 3 Encounters:   07/31/18 112/70   12/15/17 133/71   08/01/17 126/74    Weight from Last 3 Encounters:   07/31/18 207 lb (93.9 kg)   12/15/17 208 lb 14.4 oz (94.8 kg)   09/20/17 207 lb 12.8 oz (94.3 kg)              We Performed the Following     Albumin Random Urine Quantitative with Creat Ratio     CBC with platelets differential     Comprehensive metabolic panel     Ferritin     LDL cholesterol direct     ORTHO  REFERRAL     ORTHO  REFERRAL     TSH with free T4 reflex     Vitamin B12     Vitamin D Deficiency          Today's Medication Changes          These changes are accurate as of 7/31/18 12:31 PM.  If you have any questions, ask your nurse or doctor.               Stop taking these medicines if you haven't already. Please contact your care team if you have questions.     adapalene-benzoyl peroxide 0.1-2.5 % gel   Commonly known as:  EPIDUO   Stopped by:  Gavi Bear MD           albuterol (2.5 " MG/3ML) 0.083% neb solution   Stopped by:  Gavi Bear MD           albuterol 108 (90 Base) MCG/ACT Inhaler   Commonly known as:  PROAIR HFA/PROVENTIL HFA/VENTOLIN HFA   Stopped by:  Gavi Bear MD           diclofenac 1 % Gel topical gel   Commonly known as:  VOLTAREN   Stopped by:  Gavi Bear MD           HYDROcodone-acetaminophen 5-325 MG per tablet   Commonly known as:  NORCO   Stopped by:  Gavi Bear MD           sulfacetamide sodium-sulfur 10-5 % Lotn   Stopped by:  Gavi Bear MD                Where to get your medicines      These medications were sent to [place] [address] [facility] - [facility], [place] - [address]  [address] [facility] [address]     Phone:  [phone]     etodolac 400 MG tablet    gabapentin 600 MG tablet                Primary Care Provider Office Phone # Fax #    Gavi Bear [phone]       [address]  [facility] [address]        Equal Access to Services     [facility]: Hadii aad ku hadasho Soomaali, waaxda luqadaha, qaybta kaalmada adeegyada, waxay idiin hayramos field . So [facility] [phone].    ATENCIÓN: Si habla español, tiene a church disposición servicios gratuitos de asistencia lingüística. Llame al [phone].    We comply with applicable federal civil rights laws and [place] laws. We do not discriminate on the basis of race, color, national origin, age, disability, sex, sexual orientation, or gender identity.            Thank you!     Thank you for choosing [facility]  for your care. Our goal is always to provide you with excellent care. Hearing back from our patients is one way we can continue to improve our services. Please take a few minutes to complete the written survey that you may receive in the mail after your visit with us. Thank you!             Your Updated Medication List - Protect others around you: Learn how to safely use, store and throw away your medicines at  www.disposemymeds.org.          This list is accurate as of 7/31/18 12:31 PM.  Always use your most recent med list.                   Brand Name Dispense Instructions for use Diagnosis    calcium 500 MG Chew      None Entered        doxycycline 100 MG tablet    VIBRA-TABS    180 tablet    TAKE 1 TABLET (100 MG) BY MOUTH 2 TIMES DAILY    Rosacea       etodolac 400 MG tablet    LODINE    60 tablet    TAKE 1 TABLET (400 MG) BY MOUTH 2 TIMES DAILY AS NEEDED    Low back pain, unspecified back pain laterality, unspecified chronicity, with sciatica presence unspecified       gabapentin 600 MG tablet    NEURONTIN    180 tablet    Take 1 tablet (600 mg) by mouth 2 times daily    Low back pain, unspecified back pain laterality, unspecified chronicity, with sciatica presence unspecified       lisinopril-hydrochlorothiazide 20-12.5 MG per tablet    PRINZIDE/ZESTORETIC    180 tablet    TAKE 2 TABLETS BY MOUTH DAILY    Essential hypertension with goal blood pressure less than 140/90       Multiple Vitamins-Iron Tabs      Take 1 tablet by mouth daily.        vitamin D 2000 units Caps      Take 2,000 Units by mouth daily.

## 2018-07-31 NOTE — PATIENT INSTRUCTIONS
At [facility], we strive to deliver an exceptional experience to you, every time we see you.  If you receive a survey in the mail, please send us back your thoughts. We really do value your feedback.    Based on your medical history, these are the current health maintenance/preventive care services that you are due for (some may have been done at this visit.)  Health Maintenance Due   Topic Date Due     URINE DRUG SCREEN Q1 YR  07/03/1957     FARA QUESTIONNAIRE 1 YEAR  07/03/1960     WELLNESS VISIT Q1 YR  06/11/2015     ASTHMA ACTION PLAN Q1 YR  07/21/2017     FALL RISK ASSESSMENT  01/12/2018     ASTHMA CONTROL TEST Q6 MOS  02/01/2018     COLON CANCER SCREEN (SYSTEM ASSIGNED)  03/20/2018     PHQ-9 Q1YR  08/01/2018       Suggested websites for health information:  Www.tenXer : Up to date and easily searchable information on multiple topics.  Www.Verenium.gov : medication info, interactive tutorials, watch real surgeries online  Www.familydoctor.org : good info from the Academy of Family Physicians  Www.cdc.gov : public health info, travel advisories, epidemics (H1N1)  Www.aap.org : children's health info, normal development, vaccinations  Www.health.state.[place].us : [place] dept of health, public health issues in [place], N1N1    Your care team:                            Family Medicine Internal Medicine   MD David Puckett MD Shantel Branch-Fleming, MD Katya Georgiev PA-C Megan Hill, APRMARIAJOSE Washburn MD Pediatrics   EMY Castillo, MD Shara Kelly APRN CNP   MD Jacqueline Penny MD Deborah Mielke, MD Kim Thein, APRN [facility]      Clinic hours: Monday - Thursday 7 am-7 pm; Fridays 7 am-5 pm.   Urgent care: Monday - Friday 11 am-9 pm; Saturday and Sunday 9 am-5 pm.  Pharmacy : Monday -Thursday 8 am-8 pm; Friday 8 am-6 pm; Saturday and Sunday 9 am-5 pm.     Clinic: [phone]   Pharmacy: [phone]

## 2018-08-01 LAB — DEPRECATED CALCIDIOL+CALCIFEROL SERPL-MC: 35 UG/L (ref 20–75)

## 2018-08-01 ASSESSMENT — PATIENT HEALTH QUESTIONNAIRE - PHQ9: SUM OF ALL RESPONSES TO PHQ QUESTIONS 1-9: 5

## 2018-08-01 ASSESSMENT — ANXIETY QUESTIONNAIRES: GAD7 TOTAL SCORE: 2

## 2018-08-01 ASSESSMENT — ASTHMA QUESTIONNAIRES: ACT_TOTALSCORE: 25

## 2018-08-01 NOTE — PROGRESS NOTES
Yumiko,     Vitamin D levels look good.     Please do not hesitate to call us at [phone] if you have any questions or concerns.    Thank you,    Gavi Bear MD MPH

## 2018-08-01 NOTE — PROGRESS NOTES
Yumiko,     Urine sample did not show any abnormal protein.  LDL cholesterol is at goal for you.  Electrolytes,glucose, kidney function, liver function, thyroid function, Vitamin B 12 and iron stores are in the normal range.   Plan of care and follow up as discussed in clinic.     Please do not hesitate to call us at [phone] if you have any questions or concerns.    Thank you,    Gavi Bear MD MPH

## 2018-08-08 ENCOUNTER — RADIANT APPOINTMENT (OUTPATIENT)
Dept: MRI IMAGING | Facility: CLINIC | Age: 78
End: 2018-08-08
Payer: COMMERCIAL

## 2018-08-08 DIAGNOSIS — M54.5 LOW BACK PAIN, UNSPECIFIED BACK PAIN LATERALITY, UNSPECIFIED CHRONICITY, WITH SCIATICA PRESENCE UNSPECIFIED: ICD-10-CM

## 2018-08-08 PROCEDURE — 72148 MRI LUMBAR SPINE W/O DYE: CPT | Performed by: RADIOLOGY

## 2018-08-09 NOTE — PROGRESS NOTES
Yumiko,     MRI of the lower back is showing pressure on the nerves on the right side. Please follow up with the Spine specialist as we had discussed in clinic.     Please do not hesitate to call us at [phone] if you have any questions or concerns.    Thank you,    Gavi Bear MD MPH

## 2018-08-15 ENCOUNTER — RADIANT APPOINTMENT (OUTPATIENT)
Dept: GENERAL RADIOLOGY | Facility: CLINIC | Age: 78
End: 2018-08-15
Attending: ORTHOPAEDIC SURGERY
Payer: COMMERCIAL

## 2018-08-15 ENCOUNTER — OFFICE VISIT (OUTPATIENT)
Dept: ORTHOPEDICS | Facility: CLINIC | Age: 78
End: 2018-08-15
Payer: COMMERCIAL

## 2018-08-15 VITALS
OXYGEN SATURATION: 95 % | WEIGHT: 207.4 LBS | HEIGHT: 65 IN | DIASTOLIC BLOOD PRESSURE: 70 MMHG | SYSTOLIC BLOOD PRESSURE: 116 MMHG | HEART RATE: 74 BPM | BODY MASS INDEX: 34.55 KG/M2

## 2018-08-15 DIAGNOSIS — M25.562 CHRONIC PAIN OF BOTH KNEES: ICD-10-CM

## 2018-08-15 DIAGNOSIS — G89.29 CHRONIC PAIN OF BOTH KNEES: ICD-10-CM

## 2018-08-15 DIAGNOSIS — M70.52 PES ANSERINUS BURSITIS OF BOTH KNEES: ICD-10-CM

## 2018-08-15 DIAGNOSIS — M70.51 PES ANSERINUS BURSITIS OF BOTH KNEES: ICD-10-CM

## 2018-08-15 DIAGNOSIS — M25.561 CHRONIC PAIN OF BOTH KNEES: ICD-10-CM

## 2018-08-15 DIAGNOSIS — M17.0 PRIMARY OSTEOARTHRITIS OF BOTH KNEES: Primary | ICD-10-CM

## 2018-08-15 PROCEDURE — 20610 DRAIN/INJ JOINT/BURSA W/O US: CPT | Mod: LT | Performed by: ORTHOPAEDIC SURGERY

## 2018-08-15 PROCEDURE — 99214 OFFICE O/P EST MOD 30 MIN: CPT | Mod: 25 | Performed by: ORTHOPAEDIC SURGERY

## 2018-08-15 PROCEDURE — 73562 X-RAY EXAM OF KNEE 3: CPT | Mod: RT

## 2018-08-15 RX ORDER — METHYLPREDNISOLONE ACETATE 80 MG/ML
80 INJECTION, SUSPENSION INTRA-ARTICULAR; INTRALESIONAL; INTRAMUSCULAR; SOFT TISSUE
Status: DISCONTINUED | OUTPATIENT
Start: 2018-08-15 | End: 2019-09-03

## 2018-08-15 RX ORDER — BUPIVACAINE HYDROCHLORIDE 2.5 MG/ML
3 INJECTION, SOLUTION INFILTRATION; PERINEURAL
Status: DISCONTINUED | OUTPATIENT
Start: 2018-08-15 | End: 2019-09-03

## 2018-08-15 RX ORDER — LIDOCAINE HYDROCHLORIDE 10 MG/ML
3 INJECTION, SOLUTION INFILTRATION; PERINEURAL
Status: DISCONTINUED | OUTPATIENT
Start: 2018-08-15 | End: 2019-09-03

## 2018-08-15 RX ORDER — LIDOCAINE HYDROCHLORIDE 10 MG/ML
4 INJECTION, SOLUTION INFILTRATION; PERINEURAL
Status: DISCONTINUED | OUTPATIENT
Start: 2018-08-15 | End: 2019-09-03

## 2018-08-15 RX ADMIN — METHYLPREDNISOLONE ACETATE 80 MG: 80 INJECTION, SUSPENSION INTRA-ARTICULAR; INTRALESIONAL; INTRAMUSCULAR; SOFT TISSUE at 14:29

## 2018-08-15 RX ADMIN — LIDOCAINE HYDROCHLORIDE 3 ML: 10 INJECTION, SOLUTION INFILTRATION; PERINEURAL at 14:41

## 2018-08-15 RX ADMIN — METHYLPREDNISOLONE ACETATE 80 MG: 80 INJECTION, SUSPENSION INTRA-ARTICULAR; INTRALESIONAL; INTRAMUSCULAR; SOFT TISSUE at 14:41

## 2018-08-15 RX ADMIN — BUPIVACAINE HYDROCHLORIDE 3 ML: 2.5 INJECTION, SOLUTION INFILTRATION; PERINEURAL at 14:29

## 2018-08-15 RX ADMIN — LIDOCAINE HYDROCHLORIDE 4 ML: 10 INJECTION, SOLUTION INFILTRATION; PERINEURAL at 14:29

## 2018-08-15 ASSESSMENT — PAIN SCALES - GENERAL: PAINLEVEL: MILD PAIN (3)

## 2018-08-15 NOTE — MR AVS SNAPSHOT
"              After Visit Summary   8/15/2018    Yumiko Flowers    MRN: [id number]           Patient Information     Date Of Birth          1940        Visit Information        Provider Department      8/15/2018 1:30 PM Alexandre Calle MD [facility]        Today's Diagnoses     Primary osteoarthritis of both knees    -  1    Chronic pain of both knees        Pes anserinus bursitis of both knees           Follow-ups after your visit        Follow-up notes from your care team     Return if symptoms worsen or fail to improve.      Who to contact     If you have questions or need follow up information about today's clinic visit or your schedule please contact [facility] directly at [phone].  Normal or non-critical lab and imaging results will be communicated to you by MyChart, letter or phone within 4 business days after the clinic has received the results. If you do not hear from us within 7 days, please contact the clinic through [employer] Audithart or phone. If you have a critical or abnormal lab result, we will notify you by phone as soon as possible.  Submit refill requests through Getting-in or call your pharmacy and they will forward the refill request to us. Please allow 3 business days for your refill to be completed.          Additional Information About Your Visit        MyChart Information     Getting-in gives you secure access to your electronic health record. If you see a primary care provider, you can also send messages to your care team and make appointments. If you have questions, please call your primary care clinic.  If you do not have a primary care provider, please call [phone] and they will assist you.        Care EveryWhere ID     This is your Care EveryWhere ID. This could be used by other organizations to access your [place] medical records  [id number]        Your Vitals Were     Pulse Height Pulse Oximetry BMI (Body Mass Index)          74 5' 4.5\" (1.638 " m) 95% 35.05 kg/m2         Blood Pressure from Last 3 Encounters:   08/15/18 116/70   07/31/18 112/70   12/15/17 133/71    Weight from Last 3 Encounters:   08/15/18 207 lb 6.4 oz (94.1 kg)   07/31/18 207 lb (93.9 kg)   12/15/17 208 lb 14.4 oz (94.8 kg)              We Performed the Following     Large Joint Injection/Arthocentesis     Large Joint Injection/Arthocentesis        Primary Care Provider Office Phone # Fax #    Gavi Bear [phone]       [address]  [facility] [address]        Equal Access to Services     SOFIE CINTRON : Hadii micky Crouch, wahenry rivera, qaybta kaalmada adejenny, jadyn buitrago. So [facility] [phone].    ATENCIÓN: Si habla español, tiene a church disposición servicios gratuitos de asistencia lingüística. Llame al [phone].    We comply with applicable federal civil rights laws and [place] laws. We do not discriminate on the basis of race, color, national origin, age, disability, sex, sexual orientation, or gender identity.            Thank you!     Thank you for choosing [facility]  for your care. Our goal is always to provide you with excellent care. Hearing back from our patients is one way we can continue to improve our services. Please take a few minutes to complete the written survey that you may receive in the mail after your visit with us. Thank you!             Your Updated Medication List - Protect others around you: Learn how to safely use, store and throw away your medicines at www.disposemymeds.org.          This list is accurate as of 8/15/18  3:46 PM.  Always use your most recent med list.                   Brand Name Dispense Instructions for use Diagnosis    calcium 500 MG Chew      None Entered        doxycycline 100 MG tablet    VIBRA-TABS    180 tablet    TAKE 1 TABLET (100 MG) BY MOUTH 2 TIMES DAILY    Rosacea       etodolac 400 MG tablet    LODINE    60 tablet    TAKE 1 TABLET  (400 MG) BY MOUTH 2 TIMES DAILY AS NEEDED    Low back pain, unspecified back pain laterality, unspecified chronicity, with sciatica presence unspecified       gabapentin 600 MG tablet    NEURONTIN    180 tablet    Take 1 tablet (600 mg) by mouth 2 times daily    Low back pain, unspecified back pain laterality, unspecified chronicity, with sciatica presence unspecified       lisinopril-hydrochlorothiazide 20-12.5 MG per tablet    PRINZIDE/ZESTORETIC    180 tablet    TAKE 2 TABLETS BY MOUTH DAILY    Essential hypertension with goal blood pressure less than 140/90       Multiple Vitamins-Iron Tabs      Take 1 tablet by mouth daily.        vitamin D 2000 units Caps      Take 2,000 Units by mouth daily.

## 2018-08-15 NOTE — LETTER
8/15/2018         RE: Yumiko Flowers  [address]  [facility] [id number]        Dear Colleague,    Thank you for referring your patient, Yumiko Flowers, to the [facility]. Please see a copy of my visit note below.    CHIEF COMPLAINT:   Chief Complaint   Patient presents with     Knee Pain     Bilateral knee pain. Last left knee and pes injection: 9/20/17. Injections work great, lasted a year. Her right knee is not as bad as her left. Pain in right knee is anterior/medial. Left knee pain is anterior/medial. She is going to [place] in September and has a wedding to attend. She would like injections today. Pain is worse in the morning or sitting for periods of time.        HISTORY OF PRESENT ILLNESS    Yumiko Flowers is a 78 year old female seen for bilateral knee pain with no known injury. Has been previously followed for left knee pain. Left knee Pain has been present since ~7/5/2012. Left>Right. She returns today with recurring knee pain. With the right knee, she has had episodes of the knee buckling. Pain is located over the medial aspect of the knee. Left knee pain is more severe than the right knee. She has pain in the morning or with prolonged sitting. Symptoms are worsened with laying. For treatment, she has been icing the knees. She was last seen by me on 9/20/2017, 11 months ago. Had pes anserine bursa and intraarticular injections into the left knee only. Patient is going to [place] and has a wedding to attend. Patient would like to try cortisone injections again today.       Present symptoms: pain medially , pain sharp, dull/achy , moderate pain, mild swelling.    Pain severity: 4/10  Frequency of symptoms: are constant, worse with weight bearing  Exacerbating Factors: weight bearing, stairs, [id number], prolonged sitting, prolonged standing, night  Relieving Factors: cortisone injections, left knee pes anserine bursa and intraarticular   Night Pain: Yes  Pain while at rest: Yes    Numbness or tingling: No   Patient has tried:     NSAIDS: yes, Lodine currently     Physical Therapy: No      Activity modification: Yes      Bracing: yes     Injections: yes pes and intra-articular with good relief left knee only.     Ice: yes     Other: ace wrap    Orthopaedic PMH: none.    Other PMH:  has a past medical history of Acne rosacea; Actinic keratosis; Amblyopia; Asthma, moderate persistent; Basal cell carcinoma (10/2010); Cataract, mod, od; mild-mod, os (1/12/2012); DJD (degenerative joint disease), lumbosacral (8/6/2014); Elevated cholesterol; Endometriosis; HTN (hypertension); Moderate major depression (H); and Osteopenia. She also has no past medical history of Acne keloidalis; Allergies; Diabetes (H); Diabetes mellitus (H); Diabetic retinopathy (H); Eczema; Glaucoma; Glaucoma (increased eye pressure); Heart valve disorder; Macular degeneration; Malignant melanoma nos; Pacemaker; Photosensitive contact dermatitis; Psoriasis; Retinal detachment; Senile macular degeneration; Skin cancer; Squamous cell carcinoma; Strabismus; Type II or unspecified type diabetes mellitus without mention of complication, not stated as uncontrolled; Urticaria; or Uveitis.  Patient Active Problem List    Diagnosis Date Noted     Vertigo 08/16/2017     Priority: Medium     Cervicalgia 08/03/2017     Priority: Medium     Chronic pain of left knee 02/27/2017     Priority: Medium     Posterior vitreous detachment, bilateral 01/14/2017     Priority: Medium     Obesity, Class I, BMI 30-34.9 01/12/2017     Priority: Medium     Essential hypertension with goal blood pressure less than 140/90 11/01/2016     Priority: Medium     Rosacea 05/18/2015     Priority: Medium     Obesity, Class II, BMI 35-39.9 08/06/2014     Priority: Medium     Restless legs syndrome 08/06/2014     Priority: Medium     DJD (degenerative joint disease), lumbosacral 08/06/2014     Priority: Medium     Advanced on X rays       History of blepharoplasty,  upper lid, ou (elsewhere); revision (EN) 09/14/2013     Priority: Medium     Health Care Home 01/24/2013     Priority: Medium     Monserrat Rolle RN-PHN  FPA / [facility]   [phone]   DX V65.8 REPLACED WITH [id number] (04/08/2013)       Peptic ulcer 01/23/2013     Priority: Medium     HTN, goal below 140/90 03/02/2012     Priority: Medium     Pseudophakia, ou; Yag Caps, os 01/30/2012     Priority: Medium     Hx of pseudoexfoliation, 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  Yumiko Flowers has no plan or code status on file. Mailed available resources and provided with information. Confirmed code status reflects current choices pending further ACP discussions.  Confirmed/documented designated decision maker(s). See permanent comments section of demographics in clinical tab.   Added by Jacqueline Mendoza on 2/26/2015  Discussed advance care planning with patient; information given to patient to review. 11/18/2011   TANYA Clinton MA       Vitamin D deficiency 02/16/2011     Priority: Medium     Osteopenia      Priority: Medium     CARDIOVASCULAR SCREENING; LDL GOAL LESS THAN 130 10/31/2010     Priority: Medium     Asthma, moderate persistent      Priority: Medium     Acne rosacea      Priority: Medium       Surgical Hx:  has a past surgical history that includes REMOVAL GALLBLADDER; APPENDECTOMY; surgical history of - ; surgical history of - ; surgical history of - ; Blepharoplasty bilateral (3/9/2006; 2013); Phacoemulsification with standard intraocular lens implant (1/2012; 4/2013); cataract iol, rt/lt; Laser YAG capsulotomy (); and Repair ptosis.    Medications:   Current Outpatient Prescriptions:      CALCIUM 500 MG PO CHEW, None Entered, Disp: , Rfl:      Cholecalciferol (VITAMIN D) 2000 UNIT CAPS, Take  "2,000 Units by mouth daily., Disp: , Rfl:      doxycycline (VIBRA-TABS) 100 MG tablet, TAKE 1 TABLET (100 MG) BY MOUTH 2 TIMES DAILY, Disp: 180 tablet, Rfl: 0     etodolac (LODINE) 400 MG tablet, TAKE 1 TABLET (400 MG) BY MOUTH 2 TIMES DAILY AS NEEDED, Disp: 60 tablet, Rfl: 0     gabapentin (NEURONTIN) 600 MG tablet, Take 1 tablet (600 mg) by mouth 2 times daily, Disp: 180 tablet, Rfl: 3     lisinopril-hydrochlorothiazide (PRINZIDE/ZESTORETIC) 20-12.5 MG per tablet, TAKE 2 TABLETS BY MOUTH DAILY, Disp: 180 tablet, Rfl: 1     Multiple Vitamins-Iron TABS, Take 1 tablet by mouth daily., Disp: , Rfl:     Allergies:   Allergies   Allergen Reactions     Erythromycin Swelling and Other (See Comments)     REVIEW OF SYSTEMS:   CONSTITUTIONAL:NEGATIVE for fever, chills, change in weight  INTEGUMENTARY/SKIN: NEGATIVE for worrisome rashes, moles or lesions  MUSCULOSKELETAL:See HPI above  NEURO: NEGATIVE for weakness, dizziness or paresthesias    This document serves as a record of the services and decisions personally performed and made by Alexandre Calle MD. It was created on his behalf by Jenny Evans, a trained medical scribe. The creation of this document is based the provider's statements to the medical scribe.    Jonathan Evans 2:46 PM 8/15/2018      PHYSICAL EXAM:  /70  Pulse 74  Ht 1.638 m (5' 4.5\")  Wt 94.1 kg (207 lb 6.4 oz)  SpO2 95%  BMI 35.05 kg/m2   GENERAL APPEARANCE: healthy, alert, no distress.  SKIN: no suspicious lesions or rashes  NEURO: Normal strength and tone, mentation intact and speech normal  PSYCH:  mentation appears normal and affect normal/bright, not anxious  RESPIRATORY: No increased work of breathing.    BILATERAL LOWER EXTREMITIES:  Gait: antalgic favoring left   Alignment: varus  No gross deformities or masses.  No Quad atrophy, strength normal.  Intact sensation deep peroneal nerve, superficial peroneal nerve, med/lat tibial nerve, sural nerve, saphenous nerve  Intact EHL, EDL, TA, " FHL, GS, quadriceps hamstrings and hip flexors  Palpable 2+ posterior tibial pulses.  Bilateral calf soft and nttp or squeeze.  Edema: 1+, Pitting left lower extremity, trace right lower extremity.    LEFT KNEE EXAM:    Skin: intact, no ecchymosis or erythema  AROM: 3 extension to 110 flexion  Tight hamstrings on straight leg raise.  Effusion: trace  Tender: tender to palpation medial joint line, medial distal hamstrings and pes insertion (most severe at pes) nontender to palpation lateral joint line, posterior knee    MCL: stable, and non-painful at both 0 and 30 degrees knee flexion  Varus stress: stable, and non-painful at both 0 and 30 degrees knee flexion  Posterior Drawer stable  Patellofemoral joint:                Apprehension: negative              Crepitations: mild   Grind: positive     RIGHT KNEE EXAM:    Skin: intact, no ecchymosis or erythema  ROM: 0 extension to 120 flexion  Tight hamstrings on straight leg raise.  Effusion: none  Tender: medial joint line, pes bursa  NTTP lateral joint line, posterior knee.    MCL: stable, and non-painful at both 0 and 30 degrees knee flexion  Varus stress: stable, and non-painful at both 0 and 30 degrees knee flexion  Posterior Drawer stable  Patellofemoral joint:                Apprehension: negative              Crepitations: mild   Grind: negative.    X-RAY: 3 views bilateral knee 8/15/2018 reviewed. Left knee with Severe medial and moderate patello-femoral degenerative changes. Medial space is near bone on bone with subchondrdal sclerosis. There are tricompartmental osteophytes, most prominent patello-femoral compartment left knee. Right knee with mild medial space narrowing.    MRI:  MRI left knee from 7/31/2012 was reviewed in clinic today.   IMPRESSION:  1. Free edge fraying of the body and posterior horn of the left knee  medial meniscus with large areas of full-thickness loss of articular  cartilage in the medial compartment. Small foci of  subjacent  subchondral edema within the medial femoral condyle.  2. Small focal area of high-grade full thickness cartilage loss  measuring 8 mm along the anterior aspect of the left lateral femoral  trochlea with associated focus of subchondral edema within the lateral  femoral condyle  3. Irregularity with small foci of full thickness cartilage loss along  the median ridge of the patella with associated subchondral edema in  the superior pole of the patella.  4. Thickening of the proximal MCL suggesting old injury.  5. Small-moderate left knee joint effusion.      Impression:   78 year old female with chronic bilateral knee pain, primary osteoarthritis and pes bursitis, left more than the right.    Plan:   * reviewed imaging studies with patient    Treatment:  * rest  * Activity modification - avoid impact activities or activities that aggravate symptoms  * ice, 15-20 minutes at a time several times a day or as needed.  * Strengthening of quadriceps muscles  * Physical Therapy ordered for strengthening, stretching and range of motion exercises  * Tylenol as needed for pain  * Weight loss: Weight loss: The patient's Body mass index is 35.05 kg/(m^2).. Discussed with patient weight loss benefits, not only for the current pain symptoms, but also overall health. Recommend a good diet plan that works for the patient, with the assistance of a dietician or PCP as needed. Also, a good, low-impact exercise program for at least 20 minutes per day, 3 times per week, such as exercise bike, elliptical , or pool.  * Exercise: low impact such as stationary bike, elliptical, pool.  * Injections: risks and perceived benefits of cortisone versus viscosupplementation injections discussed. Patient elected to proceed. See procedure note below for left knee intra-articular and pes bursal injections.  * Bracing: bracing the knee may offer some relief of symptoms when worn  * Return to clinic as needed. Consider right knee  injections in future as needed.    PROCEDURE NOTE:  The risks, perceived benefits and potential complications (including but not limited to: bleeding, infection, pain, scar, damage to adjacent structures, atrophy or necrosis of soft tissue, skin blanching, failure to relieve symptoms, worsening of symptoms, allergic reaction) of injection were discussed with the patient. Questions were addressed and answered.The patient elected to proceed. Written informed consent was obtained. The correct procedural site was identified and confirmed. A Left Knee pes anserine injection was performed using 1mL Depo Medrol 80mg per mL and 7mL (4mL 1% lidocaine, 3mL 0.25% marcaine)  of local anesthetic after sterile prep, to the correct procedural site. Sterile bandaid applied. This was tolerated well by the patient. No apparent complications. Did also discuss that if diabetic, recommend close monitoring of blood sugars over the next week as cortisone injections can temporarily elevate blood sugars.     PROCEDURE NOTE:  The risks, perceived benefits and potential complications (including but not limited to: bleeding, infection, pain, scar, damage to adjacent structures, atrophy or necrosis of soft tissue, skin blanching, failure to relieve symptoms, worsening of symptoms, allergic reaction) of injection were discussed with the patient. Questions were addressed and answered.The patient elected to proceed. Written informed consent was obtained. The correct procedural site was identified and confirmed. A Left Knee intraarticular injection was performed using 1mL Depo Medrol 80mg per mL and 7mL (4mL 1% lidocaine, 3mL 0.25% marcaine)  of local anesthetic after sterile prep, to the correct procedural site. Sterile bandaid applied. This was tolerated well by the patient. No apparent complications. Did also discuss that if diabetic, recommend close monitoring of blood sugars over the next week as cortisone injections can temporarily elevate  blood sugars.    The information in this document, created by a scribe for me, accurately reflects the services I personally performed and the decisions made by me. I have reviewed and approved this document for accuracy.         Alexandre Calle M.D., M.S.  Dept. of Orthopaedic Surgery  [facility]       Large Joint Injection/Arthocentesis  Date/Time: 8/15/2018 2:29 PM  Performed by: ALEXANDRE CALLE  Authorized by: ALEXANDRE CALLE     Indications:  Pain  Needle Size:  22 G  Guidance: landmark guided    Approach:  Anterolateral  Location:  Knee  Site:  L knee joint  Medications:  4 mL lidocaine 1 %; 80 mg methylPREDNISolone acetate 80 MG/ML; 3 mL bupivacaine 0.25 %  Consent Given by:  Patient  Timeout: timeout called immediately prior to procedure    Prep: patient was prepped and draped in usual sterile fashion           Large Joint Injection/Arthocentesis  Date/Time: 8/15/2018 2:41 PM  Performed by: ALEXANDRE CALLE  Authorized by: ALEXANDRE CALLE     Indications:  Pain  Needle Size:  22 G  Guidance: landmark guided    Approach:  Anteromedial  Location:  Knee  Site:  L anserine bursa  Medications:  3 mL lidocaine 1 %; 80 mg methylPREDNISolone acetate 80 MG/ML  Consent Given by:  Patient  Timeout: timeout called immediately prior to procedure    Prep: patient was prepped and draped in usual sterile fashion            Again, thank you for allowing me to participate in the care of your patient.        Sincerely,        Alexandre Calle MD

## 2018-08-15 NOTE — PROGRESS NOTES
CHIEF COMPLAINT:   Chief Complaint   Patient presents with     Knee Pain     Bilateral knee pain. Last left knee and pes injection: 9/20/17. Injections work great, lasted a year. Her right knee is not as bad as her left. Pain in right knee is anterior/medial. Left knee pain is anterior/medial. She is going to [place] in September and has a wedding to attend. She would like injections today. Pain is worse in the morning or sitting for periods of time.        HISTORY OF PRESENT ILLNESS    Yumiok Flowers is a 78 year old female seen for bilateral knee pain with no known injury. Has been previously followed for left knee pain. Left knee Pain has been present since ~7/5/2012. Left>Right. She returns today with recurring knee pain. With the right knee, she has had episodes of the knee buckling. Pain is located over the medial aspect of the knee. Left knee pain is more severe than the right knee. She has pain in the morning or with prolonged sitting. Symptoms are worsened with laying. For treatment, she has been icing the knees. She was last seen by me on 9/20/2017, 11 months ago. Had pes anserine bursa and intraarticular injections into the left knee only. Patient is going to [place] and has a wedding to attend. Patient would like to try cortisone injections again today.       Present symptoms: pain medially , pain sharp, dull/achy , moderate pain, mild swelling.    Pain severity: 4/10  Frequency of symptoms: are constant, worse with weight bearing  Exacerbating Factors: weight bearing, stairs, [id number], prolonged sitting, prolonged standing, night  Relieving Factors: cortisone injections, left knee pes anserine bursa and intraarticular   Night Pain: Yes  Pain while at rest: Yes   Numbness or tingling: No   Patient has tried:     NSAIDS: yes, Lodine currently     Physical Therapy: No      Activity modification: Yes      Bracing: yes     Injections: yes pes and intra-articular with good relief left knee only.     Ice: yes      Other: ace wrap    Orthopaedic PMH: none.    Other PMH:  has a past medical history of Acne rosacea; Actinic keratosis; Amblyopia; Asthma, moderate persistent; Basal cell carcinoma (10/2010); Cataract, mod, od; mild-mod, os (1/12/2012); DJD (degenerative joint disease), lumbosacral (8/6/2014); Elevated cholesterol; Endometriosis; HTN (hypertension); Moderate major depression (H); and Osteopenia. She also has no past medical history of Acne keloidalis; Allergies; Diabetes (H); Diabetes mellitus (H); Diabetic retinopathy (H); Eczema; Glaucoma; Glaucoma (increased eye pressure); Heart valve disorder; Macular degeneration; Malignant melanoma nos; Pacemaker; Photosensitive contact dermatitis; Psoriasis; Retinal detachment; Senile macular degeneration; Skin cancer; Squamous cell carcinoma; Strabismus; Type II or unspecified type diabetes mellitus without mention of complication, not stated as uncontrolled; Urticaria; or Uveitis.  Patient Active Problem List    Diagnosis Date Noted     Vertigo 08/16/2017     Priority: Medium     Cervicalgia 08/03/2017     Priority: Medium     Chronic pain of left knee 02/27/2017     Priority: Medium     Posterior vitreous detachment, bilateral 01/14/2017     Priority: Medium     Obesity, Class I, BMI 30-34.9 01/12/2017     Priority: Medium     Essential hypertension with goal blood pressure less than 140/90 11/01/2016     Priority: Medium     Rosacea 05/18/2015     Priority: Medium     Obesity, Class II, BMI 35-39.9 08/06/2014     Priority: Medium     Restless legs syndrome 08/06/2014     Priority: Medium     DJD (degenerative joint disease), lumbosacral 08/06/2014     Priority: Medium     Advanced on X rays       History of blepharoplasty, upper lid, ou (elsewhere); revision (EN) 09/14/2013     Priority: Medium     Health Care Home 01/24/2013     Priority: Medium     Monserrat Rolle RN-PHN  FPA / [facility]   [phone]   DX V65.8 REPLACED WITH [id number] [facility]  CARE HOME (04/08/2013)       Peptic ulcer 01/23/2013     Priority: Medium     HTN, goal below 140/90 03/02/2012     Priority: Medium     Pseudophakia, ou; Yag Caps, os 01/30/2012     Priority: Medium     Hx of pseudoexfoliation, 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  Yumiko Flowers has no plan or code status on file. Mailed available resources and provided with information. Confirmed code status reflects current choices pending further ACP discussions.  Confirmed/documented designated decision maker(s). See permanent comments section of demographics in clinical tab.   Added by Jacqueline Mendoza on 2/26/2015  Discussed advance care planning with patient; information given to patient to review. 11/18/2011   TANYA Clinton MA       Vitamin D deficiency 02/16/2011     Priority: Medium     Osteopenia      Priority: Medium     CARDIOVASCULAR SCREENING; LDL GOAL LESS THAN 130 10/31/2010     Priority: Medium     Asthma, moderate persistent      Priority: Medium     Acne rosacea      Priority: Medium       Surgical Hx:  has a past surgical history that includes REMOVAL GALLBLADDER; APPENDECTOMY; surgical history of - ; surgical history of - ; surgical history of - ; Blepharoplasty bilateral (3/9/2006; 2013); Phacoemulsification with standard intraocular lens implant (1/2012; 4/2013); cataract iol, rt/lt; Laser YAG capsulotomy (); and Repair ptosis.    Medications:   Current Outpatient Prescriptions:      CALCIUM 500 MG PO CHEW, None Entered, Disp: , Rfl:      Cholecalciferol (VITAMIN D) 2000 UNIT CAPS, Take 2,000 Units by mouth daily., Disp: , Rfl:      doxycycline (VIBRA-TABS) 100 MG tablet, TAKE 1 TABLET (100 MG) BY MOUTH 2 TIMES DAILY, Disp: 180 tablet, Rfl: 0     etodolac (LODINE) 400 MG tablet, TAKE 1 TABLET (400 MG) BY MOUTH 2 TIMES DAILY AS  "NEEDED, Disp: 60 tablet, Rfl: 0     gabapentin (NEURONTIN) 600 MG tablet, Take 1 tablet (600 mg) by mouth 2 times daily, Disp: 180 tablet, Rfl: 3     lisinopril-hydrochlorothiazide (PRINZIDE/ZESTORETIC) 20-12.5 MG per tablet, TAKE 2 TABLETS BY MOUTH DAILY, Disp: 180 tablet, Rfl: 1     Multiple Vitamins-Iron TABS, Take 1 tablet by mouth daily., Disp: , Rfl:     Allergies:   Allergies   Allergen Reactions     Erythromycin Swelling and Other (See Comments)     REVIEW OF SYSTEMS:   CONSTITUTIONAL:NEGATIVE for fever, chills, change in weight  INTEGUMENTARY/SKIN: NEGATIVE for worrisome rashes, moles or lesions  MUSCULOSKELETAL:See HPI above  NEURO: NEGATIVE for weakness, dizziness or paresthesias    This document serves as a record of the services and decisions personally performed and made by Alexandre Calle MD. It was created on his behalf by Jenny Evans, a trained medical scribe. The creation of this document is based the provider's statements to the medical scribe.    Scribe Jenny Evans 2:46 PM 8/15/2018      PHYSICAL EXAM:  /70  Pulse 74  Ht 1.638 m (5' 4.5\")  Wt 94.1 kg (207 lb 6.4 oz)  SpO2 95%  BMI 35.05 kg/m2   GENERAL APPEARANCE: healthy, alert, no distress.  SKIN: no suspicious lesions or rashes  NEURO: Normal strength and tone, mentation intact and speech normal  PSYCH:  mentation appears normal and affect normal/bright, not anxious  RESPIRATORY: No increased work of breathing.    BILATERAL LOWER EXTREMITIES:  Gait: antalgic favoring left   Alignment: varus  No gross deformities or masses.  No Quad atrophy, strength normal.  Intact sensation deep peroneal nerve, superficial peroneal nerve, med/lat tibial nerve, sural nerve, saphenous nerve  Intact EHL, EDL, TA, FHL, GS, quadriceps hamstrings and hip flexors  Palpable 2+ posterior tibial pulses.  Bilateral calf soft and nttp or squeeze.  Edema: 1+, Pitting left lower extremity, trace right lower extremity.    LEFT KNEE EXAM:    Skin: intact, no " ecchymosis or erythema  AROM: 3 extension to 110 flexion  Tight hamstrings on straight leg raise.  Effusion: trace  Tender: tender to palpation medial joint line, medial distal hamstrings and pes insertion (most severe at pes) nontender to palpation lateral joint line, posterior knee    MCL: stable, and non-painful at both 0 and 30 degrees knee flexion  Varus stress: stable, and non-painful at both 0 and 30 degrees knee flexion  Posterior Drawer stable  Patellofemoral joint:                Apprehension: negative              Crepitations: mild   Grind: positive     RIGHT KNEE EXAM:    Skin: intact, no ecchymosis or erythema  ROM: 0 extension to 120 flexion  Tight hamstrings on straight leg raise.  Effusion: none  Tender: medial joint line, pes bursa  NTTP lateral joint line, posterior knee.    MCL: stable, and non-painful at both 0 and 30 degrees knee flexion  Varus stress: stable, and non-painful at both 0 and 30 degrees knee flexion  Posterior Drawer stable  Patellofemoral joint:                Apprehension: negative              Crepitations: mild   Grind: negative.    X-RAY: 3 views bilateral knee 8/15/2018 reviewed. Left knee with Severe medial and moderate patello-femoral degenerative changes. Medial space is near bone on bone with subchondrdal sclerosis. There are tricompartmental osteophytes, most prominent patello-femoral compartment left knee. Right knee with mild medial space narrowing.    MRI:  MRI left knee from 7/31/2012 was reviewed in clinic today.   IMPRESSION:  1. Free edge fraying of the body and posterior horn of the left knee  medial meniscus with large areas of full-thickness loss of articular  cartilage in the medial compartment. Small foci of subjacent  subchondral edema within the medial femoral condyle.  2. Small focal area of high-grade full thickness cartilage loss  measuring 8 mm along the anterior aspect of the left lateral femoral  trochlea with associated focus of subchondral edema  within the lateral  femoral condyle  3. Irregularity with small foci of full thickness cartilage loss along  the median ridge of the patella with associated subchondral edema in  the superior pole of the patella.  4. Thickening of the proximal MCL suggesting old injury.  5. Small-moderate left knee joint effusion.      Impression:   78 year old female with chronic bilateral knee pain, primary osteoarthritis and pes bursitis, left more than the right.    Plan:   * reviewed imaging studies with patient    Treatment:  * rest  * Activity modification - avoid impact activities or activities that aggravate symptoms  * ice, 15-20 minutes at a time several times a day or as needed.  * Strengthening of quadriceps muscles  * Physical Therapy ordered for strengthening, stretching and range of motion exercises  * Tylenol as needed for pain  * Weight loss: Weight loss: The patient's Body mass index is 35.05 kg/(m^2).. Discussed with patient weight loss benefits, not only for the current pain symptoms, but also overall health. Recommend a good diet plan that works for the patient, with the assistance of a dietician or PCP as needed. Also, a good, low-impact exercise program for at least 20 minutes per day, 3 times per week, such as exercise bike, elliptical , or pool.  * Exercise: low impact such as stationary bike, elliptical, pool.  * Injections: risks and perceived benefits of cortisone versus viscosupplementation injections discussed. Patient elected to proceed. See procedure note below for left knee intra-articular and pes bursal injections.  * Bracing: bracing the knee may offer some relief of symptoms when worn  * Return to clinic as needed. Consider right knee injections in future as needed.    PROCEDURE NOTE:  The risks, perceived benefits and potential complications (including but not limited to: bleeding, infection, pain, scar, damage to adjacent structures, atrophy or necrosis of soft tissue, skin blanching,  failure to relieve symptoms, worsening of symptoms, allergic reaction) of injection were discussed with the patient. Questions were addressed and answered.The patient elected to proceed. Written informed consent was obtained. The correct procedural site was identified and confirmed. A Left Knee pes anserine injection was performed using 1mL Depo Medrol 80mg per mL and 7mL (4mL 1% lidocaine, 3mL 0.25% marcaine)  of local anesthetic after sterile prep, to the correct procedural site. Sterile bandaid applied. This was tolerated well by the patient. No apparent complications. Did also discuss that if diabetic, recommend close monitoring of blood sugars over the next week as cortisone injections can temporarily elevate blood sugars.     PROCEDURE NOTE:  The risks, perceived benefits and potential complications (including but not limited to: bleeding, infection, pain, scar, damage to adjacent structures, atrophy or necrosis of soft tissue, skin blanching, failure to relieve symptoms, worsening of symptoms, allergic reaction) of injection were discussed with the patient. Questions were addressed and answered.The patient elected to proceed. Written informed consent was obtained. The correct procedural site was identified and confirmed. A Left Knee intraarticular injection was performed using 1mL Depo Medrol 80mg per mL and 7mL (4mL 1% lidocaine, 3mL 0.25% marcaine)  of local anesthetic after sterile prep, to the correct procedural site. Sterile bandaid applied. This was tolerated well by the patient. No apparent complications. Did also discuss that if diabetic, recommend close monitoring of blood sugars over the next week as cortisone injections can temporarily elevate blood sugars.    The information in this document, created by a scribe for me, accurately reflects the services I personally performed and the decisions made by me. I have reviewed and approved this document for accuracy.         Alexandre Calle M.D.,  M.S.  Dept. of Orthopaedic Surgery  [facility]       Large Joint Injection/Arthocentesis  Date/Time: 8/15/2018 2:29 PM  Performed by: QUENTIN SALDAÑA LEIGH ANN  Authorized by: QUENTIN SALDAÑA     Indications:  Pain  Needle Size:  22 G  Guidance: landmark guided    Approach:  Anterolateral  Location:  Knee  Site:  L knee joint  Medications:  4 mL lidocaine 1 %; 80 mg methylPREDNISolone acetate 80 MG/ML; 3 mL bupivacaine 0.25 %  Consent Given by:  Patient  Timeout: timeout called immediately prior to procedure    Prep: patient was prepped and draped in usual sterile fashion           Large Joint Injection/Arthocentesis  Date/Time: 8/15/2018 2:41 PM  Performed by: QUENTIN SALDAÑA  Authorized by: QUENTIN SALDAÑA     Indications:  Pain  Needle Size:  22 G  Guidance: landmark guided    Approach:  Anteromedial  Location:  Knee  Site:  L anserine bursa  Medications:  3 mL lidocaine 1 %; 80 mg methylPREDNISolone acetate 80 MG/ML  Consent Given by:  Patient  Timeout: timeout called immediately prior to procedure    Prep: patient was prepped and draped in usual sterile fashion

## 2018-08-18 DIAGNOSIS — L71.9 ROSACEA: ICD-10-CM

## 2018-08-19 NOTE — TELEPHONE ENCOUNTER
Requested Prescriptions   Pending Prescriptions Disp Refills     doxycycline (VIBRA-TABS) 100 MG tablet [Pharmacy Med Name: DOXYCYCLINE HYCLATE 100 MG TAB]  Last Written Prescription Date:  5/21/18  Last Fill Quantity: 180,  # refills: 0   Last office visit: 7/31/2018 with prescribing provider:  Chema   Future Office Visit:     180 tablet 0     Sig: TAKE 1 TABLET (100 MG) BY MOUTH 2 TIMES DAILY    There is no refill protocol information for this order

## 2018-08-21 NOTE — TELEPHONE ENCOUNTER
Routing refill request to provider for review/approval because:  Drug not on the [facility] refill protocol     Magdy Mann RN, BSN

## 2018-08-27 RX ORDER — DOXYCYCLINE HYCLATE 100 MG
TABLET ORAL
Qty: 180 TABLET | Refills: 0 | Status: SHIPPED | OUTPATIENT
Start: 2018-08-27 | End: 2018-11-26

## 2018-08-28 DIAGNOSIS — M54.5 LOW BACK PAIN, UNSPECIFIED BACK PAIN LATERALITY, UNSPECIFIED CHRONICITY, WITH SCIATICA PRESENCE UNSPECIFIED: ICD-10-CM

## 2018-08-28 NOTE — TELEPHONE ENCOUNTER
"Requested Prescriptions   Pending Prescriptions Disp Refills     etodolac (LODINE) 400 MG tablet 60 tablet 0     Sig: TAKE 1 TABLET (400 MG) BY MOUTH 2 TIMES DAILY AS NEEDED    NSAID Medications Failed    8/28/2018  3:16 PM       Failed - Patient is age 6-64 years       Passed - Blood pressure under 140/90 in past 12 months    BP Readings from Last 3 Encounters:   08/15/18 116/70   07/31/18 112/70   12/15/17 133/71                Passed - Normal ALT on file in past 12 months    Recent Labs   Lab Test  07/31/18   1118   ALT  22            Passed - Normal AST on file in past 12 months    Recent Labs   Lab Test  07/31/18   1118   AST  22            Passed - Recent (12 mo) or future (30 days) visit within the authorizing provider's specialty    Patient had office visit in the last 12 months or has a visit in the next 30 days with authorizing provider or within the authorizing provider's specialty.  See \"Patient Info\" tab in inbasket, or \"Choose Columns\" in Meds & Orders section of the refill encounter.           Passed - Normal CBC on file in past 12 months    Recent Labs   Lab Test  07/31/18   1118   WBC  4.8   RBC  4.69   HGB  11.8   HCT  37.6   PLT  175       For [facility] ONLY: [id number] = WBC, [id number] = RBC         Passed - No active pregnancy on record       Passed - Normal serum creatinine on file in past 12 months    Recent Labs   Lab Test  07/31/18   1118   CR  0.76            Passed - No positive pregnancy test in past 12 months        Last Written Prescription Date:  7/31/18  Last Fill Quantity: 60,  # refills: 0   Last office visit: 7/31/2018 with prescribing provider:  JAYLA   Future Office Visit:      "

## 2018-08-30 RX ORDER — ETODOLAC 400 MG
TABLET ORAL
Qty: 60 TABLET | Refills: 0 | Status: SHIPPED | OUTPATIENT
Start: 2018-08-30 | End: 2022-05-17

## 2018-08-30 NOTE — TELEPHONE ENCOUNTER
Routing refill request to provider for review/approval because:  Patient is over 66 years old so protocol failed.    Magdy Mann RN, BSN

## 2018-09-05 ENCOUNTER — OFFICE VISIT (OUTPATIENT)
Dept: ORTHOPEDICS | Facility: CLINIC | Age: 78
End: 2018-09-05
Payer: COMMERCIAL

## 2018-09-05 VITALS
DIASTOLIC BLOOD PRESSURE: 59 MMHG | WEIGHT: 207 LBS | BODY MASS INDEX: 34.49 KG/M2 | HEART RATE: 35 BPM | HEIGHT: 65 IN | SYSTOLIC BLOOD PRESSURE: 139 MMHG

## 2018-09-05 DIAGNOSIS — M51.379 DEGENERATION OF LUMBAR OR LUMBOSACRAL INTERVERTEBRAL DISC: Primary | ICD-10-CM

## 2018-09-05 DIAGNOSIS — M51.26 LUMBAR HERNIATED DISC: ICD-10-CM

## 2018-09-05 PROCEDURE — 99214 OFFICE O/P EST MOD 30 MIN: CPT | Performed by: PREVENTIVE MEDICINE

## 2018-09-05 ASSESSMENT — PAIN SCALES - GENERAL: PAINLEVEL: MODERATE PAIN (5)

## 2018-09-05 NOTE — LETTER
9/5/2018         RE: Yumiko Flowers  [address]  [place] [address]        Dear Colleague,    Thank you for referring your patient, Yumiko Flowers, [facility]. Please see a copy of my visit note below.    HISTORY OF PRESENT ILLNESS  Ms. Flowers is a pleasant 78 year old year old female who presents to clinic today with lumbar pain for many years  Yumiko explains that she has some symptoms that radiates into right knee  Location: low back right leg  Quality:  achy pain    Severity: 5/10 at worst    Duration: worse over 6 months  Timing: occurs intermittently    Modifying factors:  resting and non-use makes it better, movement and use makes it worse  Associated signs & symptoms: right leg radicular pain  Previous similar pain: yes, has done PT and injections in the past  Additional history: as documented    MEDICAL HISTORY  Patient Active Problem List   Diagnosis     Asthma, moderate persistent     Acne rosacea     CARDIOVASCULAR SCREENING; LDL GOAL LESS THAN 130     Osteopenia     Vitamin D deficiency     Advanced directives, counseling/discussion     Hx of pseudoexfoliation, os     Pseudophakia, ou; Yag Caps, os     HTN, goal below 140/90     Peptic ulcer     Health Care Home     History of blepharoplasty, upper lid, ou (elsewhere); revision (EN)     Obesity, Class II, BMI 35-39.9     Restless legs syndrome     DJD (degenerative joint disease), lumbosacral     Rosacea     Essential hypertension with goal blood pressure less than 140/90     Obesity, Class I, BMI 30-34.9     Posterior vitreous detachment, bilateral     Chronic pain of left knee     Cervicalgia     Vertigo       Current Outpatient Prescriptions   Medication Sig Dispense Refill     CALCIUM 500 MG PO CHEW None Entered       Cholecalciferol (VITAMIN D) 2000 UNIT CAPS Take 2,000 Units by mouth daily.       doxycycline (VIBRA-TABS) 100 MG tablet TAKE 1 TABLET (100 MG) BY MOUTH 2 TIMES DAILY 180 tablet 0     etodolac  "(LODINE) 400 MG tablet TAKE 1 TABLET (400 MG) BY MOUTH 2 TIMES DAILY AS NEEDED 60 tablet 0     gabapentin (NEURONTIN) 600 MG tablet Take 1 tablet (600 mg) by mouth 2 times daily 180 tablet 3     lisinopril-hydrochlorothiazide (PRINZIDE/ZESTORETIC) 20-12.5 MG per tablet TAKE 2 TABLETS BY MOUTH DAILY 180 tablet 1     Multiple Vitamins-Iron TABS Take 1 tablet by mouth daily.         Allergies   Allergen Reactions     Erythromycin Swelling and Other (See Comments)       Family History   Problem Relation Age of Onset     C.A.D. Father      C.A.D. Brother      Hypertension Mother      Cancer No family hx of      Diabetes No family hx of      Cerebrovascular Disease No family hx of      Thyroid Disease No family hx of      Glaucoma No family hx of      Macular Degeneration No family hx of        Additional medical/Social/Surgical histories reviewed in [facility] and updated as appropriate.     REVIEW OF SYSTEMS (9/5/2018)  10 point ROS of systems including Constitutional, Eyes, Respiratory, Cardiovascular, Gastroenterology, Genitourinary, Integumentary, Musculoskeletal, Psychiatric were all negative except for pertinent positives noted in my HPI.     PHYSICAL EXAM  Vitals:    09/05/18 1434   BP: 139/59   Pulse: (!) 35   Weight: 93.9 kg (207 lb)   Height: 1.638 m (5' 4.5\")     Vital Signs: /59  Pulse (!) 35  Ht 1.638 m (5' 4.5\")  Wt 93.9 kg (207 lb)  BMI 34.98 kg/m2 Patient declined being weighed. Body mass index is 34.98 kg/(m^2).    General  - normal appearance, in no obvious distress, overweight  CV  - normal peripheral perfusion  Pulm  - normal respiratory pattern, non-labored  Musculoskeletal - lumbar spine  - stance: normal gait without limp, no obvious leg length discrepancy, normal heel and toe walk  - inspection: normal bone and joint alignment, no obvious scoliosis  - palpation: no paravertebral or bony tenderness  - ROM: flexion exacerbates pain, normal extension, sidebending, rotation  - strength: lower " extremities 5/5 in all planes  - special tests:  (+) straight leg raise- right   (+) slump test- low back  Neuro  - patellar and Achilles DTRs 2+ bilaterally, bilateral lower extremity sensory deficit throughout L5 distribution, grossly normal coordination, normal muscle tone  Skin  - no ecchymosis, erythema, warmth, or induration, no obvious rash  Psych  - interactive, appropriate, normal mood and affect    ASSESSMENT & PLAN  79 yo female with lumbar ddd, radicular pain  Ordered lumbar TANK for L5/s1 for related symptoms, reviewed MRI lumbar  Given HEP  Will increase gabapentin to 3 times daily  F/u 2 weeks after injection      Abad Armendariz MD, CAQSM      Again, thank you for allowing me to participate in the care of your patient.        Sincerely,        Abad Armendariz MD

## 2018-09-05 NOTE — MR AVS SNAPSHOT
After Visit Summary   2018    Yumiko Flowers    MRN: [id number]           Patient Information     Date Of Birth          1940        Visit Information        Provider Department      2018 2:40 PM Abad Armendariz MD [facility]        Today's Diagnoses     Degeneration of lumbar or lumbosacral intervertebral disc    -  1    Lumbar herniated disc          Care Instructions    Thanks for coming today.  Ortho/Sports Medicine Clinic  [address] [place] [address]    To schedule future appointments in Ortho Clinic, you may call [phone].    To schedule ordered imaging by your provider:   Call Central Imaging [phone]    To schedule an injection ordered by your provider:  Call Central Imaging Injection scheduling line: [phone]  MyChart available online at:  [email or web address]    Please call if any further questions or concerns ([phone]).  Clinic hours 8 am to 5 pm.    Return to clinic (call) if symptoms worsen or fail to improve.          Follow-ups after your visit        Your next 10 appointments already scheduled     2018 10:45 AM CST   (Arrive by 10:30 AM)   Return Visit with Darlene Wagner MD   [facility] ([facility])    [address] [address] [phone]              Future tests that were ordered for you today     Open Future Orders        Priority Expected Expires Ordered    X-ray Lumbar epidural injection Routine 2018            Who to contact     If you have questions or need follow up information about today's clinic visit or your schedule please contact [facility] directly at [phone].  Normal or non-critical lab and imaging results will be communicated to you by MyChart, letter or phone within 4 business days after the clinic has received the results. If you do not hear from us  "within 7 days, please contact the clinic through [employer] or phone. If you have a critical or abnormal lab result, we will notify you by phone as soon as possible.  Submit refill requests through [employer] or call your pharmacy and they will forward the refill request to us. Please allow 3 business days for your refill to be completed.          Additional Information About Your Visit        [employer] Information     [employer] gives you secure access to your electronic health record. If you see a primary care provider, you can also send messages to your care team and make appointments. If you have questions, please call your primary care clinic.  If you do not have a primary care provider, please call [phone] and they will assist you.      [employer] is an electronic gateway that provides easy, online access to your medical records. With [employer], you can request a clinic appointment, read your test results, renew a prescription or communicate with your care team.     To access your existing account, please contact your [facility] or call [phone] for assistance.        Care EveryWhere ID     This is your Care EveryWhere ID. This could be used by other organizations to access your [place] medical records  [id number]        Your Vitals Were     Pulse Height BMI (Body Mass Index)             35 1.638 m (5' 4.5\") 34.98 kg/m2          Blood Pressure from Last 3 Encounters:   09/05/18 139/59   08/15/18 116/70   07/31/18 112/70    Weight from Last 3 Encounters:   09/05/18 93.9 kg (207 lb)   08/15/18 94.1 kg (207 lb 6.4 oz)   07/31/18 93.9 kg (207 lb)               Primary Care Provider Office Phone # Fax #    Gavi Bear [phone]       [address]  [facility] [address]        Equal Access to Services     SOFIE CINTRON AH: Hadii micky honeycutto Willa, waaxda luqadaha, qaybta kaalmada joyceyamahnaz, jadyn buitrago. So wac " [phone].    ATENCIÓN: Si angelita martinez, tiene a church disposición servicios gratuitos de asistencia lingüística. Colby vaughan [phone].    We comply with applicable federal civil rights laws and [place] laws. We do not discriminate on the basis of race, color, national origin, age, disability, sex, sexual orientation, or gender identity.            Thank you!     Thank you for choosing [facility]  for your care. Our goal is always to provide you with excellent care. Hearing back from our patients is one way we can continue to improve our services. Please take a few minutes to complete the written survey that you may receive in the mail after your visit with us. Thank you!             Your Updated Medication List - Protect others around you: Learn how to safely use, store and throw away your medicines at www.disposemymeds.org.          This list is accurate as of 9/5/18  3:04 PM.  Always use your most recent med list.                   Brand Name Dispense Instructions for use Diagnosis    calcium 500 MG Chew      None Entered        doxycycline 100 MG tablet    VIBRA-TABS    180 tablet    TAKE 1 TABLET (100 MG) BY MOUTH 2 TIMES DAILY    Rosacea       etodolac 400 MG tablet    LODINE    60 tablet    TAKE 1 TABLET (400 MG) BY MOUTH 2 TIMES DAILY AS NEEDED    Low back pain, unspecified back pain laterality, unspecified chronicity, with sciatica presence unspecified       gabapentin 600 MG tablet    NEURONTIN    180 tablet    Take 1 tablet (600 mg) by mouth 2 times daily    Low back pain, unspecified back pain laterality, unspecified chronicity, with sciatica presence unspecified       lisinopril-hydrochlorothiazide 20-12.5 MG per tablet    PRINZIDE/ZESTORETIC    180 tablet    TAKE 2 TABLETS BY MOUTH DAILY    Essential hypertension with goal blood pressure less than 140/90       Multiple Vitamins-Iron Tabs      Take 1 tablet by mouth daily.        vitamin D 2000 units Caps      Take 2,000 Units by  mouth daily.

## 2018-09-05 NOTE — PROGRESS NOTES
HISTORY OF PRESENT ILLNESS  Ms. Flowers is a pleasant 78 year old year old female who presents to clinic today with lumbar pain for many years  Yumiko explains that she has some symptoms that radiates into right knee  Location: low back right leg  Quality:  achy pain    Severity: 5/10 at worst    Duration: worse over 6 months  Timing: occurs intermittently    Modifying factors:  resting and non-use makes it better, movement and use makes it worse  Associated signs & symptoms: right leg radicular pain  Previous similar pain: yes, has done PT and injections in the past  Additional history: as documented    MEDICAL HISTORY  Patient Active Problem List   Diagnosis     Asthma, moderate persistent     Acne rosacea     CARDIOVASCULAR SCREENING; LDL GOAL LESS THAN 130     Osteopenia     Vitamin D deficiency     Advanced directives, counseling/discussion     Hx of pseudoexfoliation, os     Pseudophakia, ou; Yag Caps, os     HTN, goal below 140/90     Peptic ulcer     Health Care Home     History of blepharoplasty, upper lid, ou (elsewhere); revision (EN)     Obesity, Class II, BMI 35-39.9     Restless legs syndrome     DJD (degenerative joint disease), lumbosacral     Rosacea     Essential hypertension with goal blood pressure less than 140/90     Obesity, Class I, BMI 30-34.9     Posterior vitreous detachment, bilateral     Chronic pain of left knee     Cervicalgia     Vertigo       Current Outpatient Prescriptions   Medication Sig Dispense Refill     CALCIUM 500 MG PO CHEW None Entered       Cholecalciferol (VITAMIN D) 2000 UNIT CAPS Take 2,000 Units by mouth daily.       doxycycline (VIBRA-TABS) 100 MG tablet TAKE 1 TABLET (100 MG) BY MOUTH 2 TIMES DAILY 180 tablet 0     etodolac (LODINE) 400 MG tablet TAKE 1 TABLET (400 MG) BY MOUTH 2 TIMES DAILY AS NEEDED 60 tablet 0     gabapentin (NEURONTIN) 600 MG tablet Take 1 tablet (600 mg) by mouth 2 times daily 180 tablet 3     lisinopril-hydrochlorothiazide (PRINZIDE/ZESTORETIC)  "20-12.5 MG per tablet TAKE 2 TABLETS BY MOUTH DAILY 180 tablet 1     Multiple Vitamins-Iron TABS Take 1 tablet by mouth daily.         Allergies   Allergen Reactions     Erythromycin Swelling and Other (See Comments)       Family History   Problem Relation Age of Onset     C.A.D. Father      C.A.D. Brother      Hypertension Mother      Cancer No family hx of      Diabetes No family hx of      Cerebrovascular Disease No family hx of      Thyroid Disease No family hx of      Glaucoma No family hx of      Macular Degeneration No family hx of        Additional medical/Social/Surgical histories reviewed in [facility] and updated as appropriate.     REVIEW OF SYSTEMS (9/5/2018)  10 point ROS of systems including Constitutional, Eyes, Respiratory, Cardiovascular, Gastroenterology, Genitourinary, Integumentary, Musculoskeletal, Psychiatric were all negative except for pertinent positives noted in my HPI.     PHYSICAL EXAM  Vitals:    09/05/18 1434   BP: 139/59   Pulse: (!) 35   Weight: 93.9 kg (207 lb)   Height: 1.638 m (5' 4.5\")     Vital Signs: /59  Pulse (!) 35  Ht 1.638 m (5' 4.5\")  Wt 93.9 kg (207 lb)  BMI 34.98 kg/m2 Patient declined being weighed. Body mass index is 34.98 kg/(m^2).    General  - normal appearance, in no obvious distress, overweight  CV  - normal peripheral perfusion  Pulm  - normal respiratory pattern, non-labored  Musculoskeletal - lumbar spine  - stance: normal gait without limp, no obvious leg length discrepancy, normal heel and toe walk  - inspection: normal bone and joint alignment, no obvious scoliosis  - palpation: no paravertebral or bony tenderness  - ROM: flexion exacerbates pain, normal extension, sidebending, rotation  - strength: lower extremities 5/5 in all planes  - special tests:  (+) straight leg raise- right   (+) slump test- low back  Neuro  - patellar and Achilles DTRs 2+ bilaterally, bilateral lower extremity sensory deficit throughout L5 distribution, grossly normal " coordination, normal muscle tone  Skin  - no ecchymosis, erythema, warmth, or induration, no obvious rash  Psych  - interactive, appropriate, normal mood and affect    ASSESSMENT & PLAN  79 yo female with lumbar ddd, radicular pain  Ordered lumbar TANK for L5/s1 for related symptoms, reviewed MRI lumbar  Given HEP  Will increase gabapentin to 3 times daily  F/u 2 weeks after injection      Abad Armendariz MD, CAQSM

## 2018-09-05 NOTE — PATIENT INSTRUCTIONS
Thanks for coming today.  Ortho/Sports Medicine Clinic  [address] [place] [address]    To schedule future appointments in Ortho Clinic, you may call [phone].    To schedule ordered imaging by your provider:   Call Central Imaging [phone]    To schedule an injection ordered by your provider:  Call Central Imaging Injection scheduling line: [phone]  [employer] available online at:  [email or web address]    Please call if any further questions or concerns ([phone]).  Clinic hours 8 am to 5 pm.    Return to clinic (call) if symptoms worsen or fail to improve.

## 2018-09-07 ENCOUNTER — RADIANT APPOINTMENT (OUTPATIENT)
Dept: GENERAL RADIOLOGY | Facility: CLINIC | Age: 78
End: 2018-09-07
Attending: PREVENTIVE MEDICINE
Payer: COMMERCIAL

## 2018-09-07 VITALS — DIASTOLIC BLOOD PRESSURE: 62 MMHG | SYSTOLIC BLOOD PRESSURE: 127 MMHG | OXYGEN SATURATION: 97 % | HEART RATE: 56 BPM

## 2018-09-07 DIAGNOSIS — M51.26 LUMBAR HERNIATED DISC: ICD-10-CM

## 2018-09-07 DIAGNOSIS — M51.379 DEGENERATION OF LUMBAR OR LUMBOSACRAL INTERVERTEBRAL DISC: ICD-10-CM

## 2018-09-07 PROCEDURE — 62323 NJX INTERLAMINAR LMBR/SAC: CPT | Performed by: RADIOLOGY

## 2018-09-07 RX ORDER — METHYLPREDNISOLONE ACETATE 80 MG/ML
80 INJECTION, SUSPENSION INTRA-ARTICULAR; INTRALESIONAL; INTRAMUSCULAR; SOFT TISSUE ONCE
Status: COMPLETED | OUTPATIENT
Start: 2018-09-07 | End: 2018-09-07

## 2018-09-07 RX ORDER — BUPIVACAINE HYDROCHLORIDE 5 MG/ML
3 INJECTION, SOLUTION PERINEURAL ONCE
Status: COMPLETED | OUTPATIENT
Start: 2018-09-07 | End: 2018-09-07

## 2018-09-07 RX ORDER — LIDOCAINE HYDROCHLORIDE 10 MG/ML
5 INJECTION, SOLUTION EPIDURAL; INFILTRATION; INTRACAUDAL; PERINEURAL ONCE
Status: COMPLETED | OUTPATIENT
Start: 2018-09-07 | End: 2018-09-07

## 2018-09-07 RX ORDER — IOPAMIDOL 408 MG/ML
10 INJECTION, SOLUTION INTRATHECAL ONCE
Status: COMPLETED | OUTPATIENT
Start: 2018-09-07 | End: 2018-09-07

## 2018-09-07 RX ADMIN — BUPIVACAINE HYDROCHLORIDE 15 MG: 5 INJECTION, SOLUTION PERINEURAL at 13:36

## 2018-09-07 RX ADMIN — LIDOCAINE HYDROCHLORIDE 5 ML: 10 INJECTION, SOLUTION EPIDURAL; INFILTRATION; INTRACAUDAL; PERINEURAL at 13:37

## 2018-09-07 RX ADMIN — METHYLPREDNISOLONE ACETATE 80 MG: 80 INJECTION, SUSPENSION INTRA-ARTICULAR; INTRALESIONAL; INTRAMUSCULAR; SOFT TISSUE at 13:37

## 2018-09-07 RX ADMIN — IOPAMIDOL 2 ML: 408 INJECTION, SOLUTION INTRATHECAL at 13:36

## 2018-09-07 NOTE — PROGRESS NOTES
: Yumiko was seen in X-ray today for a lumbar epidural injection. Patient rated pain before procedure 5/10. After procedure patient rated pain 0/10. This pain level is (is/is not) acceptable to patient. Patient discharged home with Daughter in law.       AFTER YOU GO HOME    ? DO relax; minimize your activity for 24 hours  ? You may resume normal activity tomorrow  ? You may remove the bandage in the evening or next morning  ? You may resume bathing the next day  ? Drink at least 4 extra glasses of fluid today if not on fluid restrictions  ? DO NOT drive or operate machinery at home or at work for at least 24 hours      VISIT THE EMERGENCY ROOM OR URGENT CARE IF:    ? There is redness or swelling at the injection site  ? There is discharge from the injection site  ? You develop a temperature of 101  F or greater      ADDITIONAL INSTRUCTIONS:     ? You may resume your Coumadin or other blood thinner at your regular dose today.  Follow up with your physician to have your INR rechecked if indicated.  ? If you gain no relief from the injection after two (2) weeks, follow-up with your provider for your options.        Contacts:    During business hours from 8 to 5 pm, you may call [phone] to reach a nurse advisor at [facility].  After hours, call [facility]  [phone].  Ask for the Radiologist on-call.  Someone is on-call 24 hrs/day.  [facility] Toll Free Number   [phone]

## 2018-09-18 ENCOUNTER — OFFICE VISIT (OUTPATIENT)
Dept: ORTHOPEDICS | Facility: CLINIC | Age: 78
End: 2018-09-18
Payer: COMMERCIAL

## 2018-09-18 VITALS
HEART RATE: 53 BPM | HEIGHT: 65 IN | WEIGHT: 207 LBS | SYSTOLIC BLOOD PRESSURE: 153 MMHG | BODY MASS INDEX: 34.49 KG/M2 | DIASTOLIC BLOOD PRESSURE: 73 MMHG

## 2018-09-18 DIAGNOSIS — M51.26 LUMBAR HERNIATED DISC: Primary | ICD-10-CM

## 2018-09-18 PROCEDURE — 99213 OFFICE O/P EST LOW 20 MIN: CPT | Performed by: PREVENTIVE MEDICINE

## 2018-09-18 ASSESSMENT — PAIN SCALES - GENERAL: PAINLEVEL: MODERATE PAIN (5)

## 2018-09-18 NOTE — PATIENT INSTRUCTIONS
Thanks for coming today.  Ortho/Sports Medicine Clinic  [address] [place] [address]    To schedule future appointments in Ortho Clinic, you may call [phone].    To schedule ordered imaging by your provider:   Call Central Imaging [phone]    To schedule an injection ordered by your provider:  Call Central Imaging Injection scheduling line: [phone]  [employer] available online at:  [email or web address]    Please call if any further questions or concerns ([phone]).  Clinic hours 8 am to 5 pm.    Return to clinic (call) if symptoms worsen or fail to improve.

## 2018-09-18 NOTE — LETTER
9/18/2018         RE: Yumiko Flowers  [address]  [place] [address]        Dear Colleague,    Thank you for referring your patient, Yumiko Flowers, to the [facility]. Please see a copy of my visit note below.    HISTORY OF PRESENT ILLNESS  Ms. Flowers is a pleasant 78 year old year old female who presents to clinic today for followup after lumbar injection  Feeling better overall    MEDICAL HISTORY  Patient Active Problem List   Diagnosis     Asthma, moderate persistent     Acne rosacea     CARDIOVASCULAR SCREENING; LDL GOAL LESS THAN 130     Osteopenia     Vitamin D deficiency     Advanced directives, counseling/discussion     Hx of pseudoexfoliation, os     Pseudophakia, ou; Yag Caps, os     HTN, goal below 140/90     Peptic ulcer     Health Care Home     History of blepharoplasty, upper lid, ou (elsewhere); revision (EN)     Obesity, Class II, BMI 35-39.9     Restless legs syndrome     DJD (degenerative joint disease), lumbosacral     Rosacea     Essential hypertension with goal blood pressure less than 140/90     Obesity, Class I, BMI 30-34.9     Posterior vitreous detachment, bilateral     Chronic pain of left knee     Cervicalgia     Vertigo       Current Outpatient Prescriptions   Medication Sig Dispense Refill     CALCIUM 500 MG PO CHEW None Entered       Cholecalciferol (VITAMIN D) 2000 UNIT CAPS Take 2,000 Units by mouth daily.       doxycycline (VIBRA-TABS) 100 MG tablet TAKE 1 TABLET (100 MG) BY MOUTH 2 TIMES DAILY 180 tablet 0     etodolac (LODINE) 400 MG tablet TAKE 1 TABLET (400 MG) BY MOUTH 2 TIMES DAILY AS NEEDED 60 tablet 0     gabapentin (NEURONTIN) 600 MG tablet Take 1 tablet (600 mg) by mouth 2 times daily 180 tablet 3     lisinopril-hydrochlorothiazide (PRINZIDE/ZESTORETIC) 20-12.5 MG per tablet TAKE 2 TABLETS BY MOUTH DAILY 180 tablet 1     Multiple Vitamins-Iron TABS Take 1 tablet by mouth daily.       gabapentin (NEURONTIN) 300 MG capsule Take 1 capsule (300  "mg) by mouth 4 times daily 120 capsule 1     methylPREDNISolone (MEDROL DOSEPAK) 4 MG tablet Follow package instructions 21 tablet 0     tiZANidine (ZANAFLEX) 4 MG tablet Take 1-2 tablets (4-8 mg) by mouth nightly as needed 30 tablet 1       Allergies   Allergen Reactions     Erythromycin Swelling and Other (See Comments)       Family History   Problem Relation Age of Onset     C.A.D. Father      C.A.D. Brother      Hypertension Mother      Cancer No family hx of      Diabetes No family hx of      Cerebrovascular Disease No family hx of      Thyroid Disease No family hx of      Glaucoma No family hx of      Macular Degeneration No family hx of        Additional medical/Social/Surgical histories reviewed in [facility] and updated as appropriate.     REVIEW OF SYSTEMS (10/16/2018)  10 point ROS of systems including Constitutional, Eyes, Respiratory, Cardiovascular, Gastroenterology, Genitourinary, Integumentary, Musculoskeletal, Psychiatric were all negative except for pertinent positives noted in my HPI.     PHYSICAL EXAM  Vitals:    09/18/18 1003   BP: 153/73   Pulse: 53   Weight: 93.9 kg (207 lb)   Height: 1.638 m (5' 4.5\")     Vital Signs: /73  Pulse 53  Ht 1.638 m (5' 4.5\")  Wt 93.9 kg (207 lb)  BMI 34.98 kg/m2 Patient declined being weighed. Body mass index is 34.98 kg/(m^2).    General  - normal appearance, in no obvious distress  CV  - normal peripheral perfusion  Pulm  - normal respiratory pattern, non-labored  Musculoskeletal - lumbar spine  - stance: normal gait without limp, no obvious leg length discrepancy, normal heel and toe walk  - inspection: normal bone and joint alignment, no obvious scoliosis  - palpation: no paravertebral or bony tenderness  - ROM: flexion slightly exacerbates pain, normal extension, sidebending, rotation  - strength: lower extremities 5/5 in all planes  - special tests:  (-) straight leg raise  (-) slump test  Neuro  - patellar and Achilles DTRs 2+ bilaterally, no lower " extremity sensory deficit throughout L5 distribution, grossly normal coordination, normal muscle tone  Skin  - no ecchymosis, erythema, warmth, or induration, no obvious rash  Psych  - interactive, appropriate, normal mood and affect    ASSESSMENT & PLAN  77 yo female with lumbar ddd, radicular pain improved  Reviewed her response to injection  Will f/u in 1 month if not improved      Abad Armendariz MD, [facility]    Again, thank you for allowing me to participate in the care of your patient.        Sincerely,        Abad Armendariz MD

## 2018-09-20 ENCOUNTER — MYC MEDICAL ADVICE (OUTPATIENT)
Dept: FAMILY MEDICINE | Facility: CLINIC | Age: 78
End: 2018-09-20

## 2018-09-21 NOTE — TELEPHONE ENCOUNTER
[employer] message sent to patient with contact information to [facility] and advised patient to let us know if she has any further questions or concerns.     Lisandra Gray RN

## 2018-09-25 ENCOUNTER — OFFICE VISIT (OUTPATIENT)
Dept: ORTHOPEDICS | Facility: CLINIC | Age: 78
End: 2018-09-25
Payer: COMMERCIAL

## 2018-09-25 VITALS — HEIGHT: 65 IN | WEIGHT: 207 LBS | BODY MASS INDEX: 34.49 KG/M2

## 2018-09-25 DIAGNOSIS — M51.369 DEGENERATIVE DISC DISEASE, LUMBAR: ICD-10-CM

## 2018-09-25 DIAGNOSIS — M54.16 LUMBAR RADICULAR PAIN: Primary | ICD-10-CM

## 2018-09-25 PROCEDURE — 99214 OFFICE O/P EST MOD 30 MIN: CPT | Performed by: PREVENTIVE MEDICINE

## 2018-09-25 RX ORDER — METHYLPREDNISOLONE 4 MG
TABLET, DOSE PACK ORAL
Qty: 21 TABLET | Refills: 0 | Status: SHIPPED | OUTPATIENT
Start: 2018-09-25 | End: 2020-08-14

## 2018-09-25 RX ORDER — GABAPENTIN 300 MG/1
300 CAPSULE ORAL 4 TIMES DAILY
Qty: 120 CAPSULE | Refills: 1 | Status: SHIPPED | OUTPATIENT
Start: 2018-09-25 | End: 2018-10-17

## 2018-09-25 ASSESSMENT — PAIN SCALES - GENERAL: PAINLEVEL: MODERATE PAIN (4)

## 2018-09-25 NOTE — PATIENT INSTRUCTIONS
Thanks for coming today.  Ortho/Sports Medicine Clinic  [address] [place] [address]    To schedule future appointments in Ortho Clinic, you may call [phone].    To schedule ordered imaging by your provider:   Call Central Imaging [phone]    To schedule an injection ordered by your provider:  Call Central Imaging Injection scheduling line: [phone]  [employer] available online at:  [email or web address]    Please call if any further questions or concerns ([phone]).  Clinic hours 8 am to 5 pm.    Return to clinic (call) if symptoms worsen or fail to improve.

## 2018-09-25 NOTE — MR AVS SNAPSHOT
After Visit Summary   2018    Yumiko Flowers    MRN: [id number]           Patient Information     Date Of Birth          1940        Visit Information        Provider Department      2018 9:40 AM Abad Armendariz MD [facility]        Today's Diagnoses     Lumbar radicular pain    -  1    Degenerative disc disease, lumbar          Care Instructions    Thanks for coming today.  [facility]  [address] [place] [address]    To schedule future appointments in Ortho Clinic, you may call [phone].    To schedule ordered imaging by your provider:   Call Central [facility] [phone]    To schedule an injection ordered by your provider:  Call [facility] Injection scheduling line: [phone]  MyChart available online at:  [email or web address]    Please call if any further questions or concerns ([phone]).  Clinic hours 8 am to 5 pm.    Return to clinic (call) if symptoms worsen or fail to improve.          Follow-ups after your visit        Your next 10 appointments already scheduled     2018 10:45 AM CST   (Arrive by 10:30 AM)   Return Visit with Darlene Wagner MD   [facility] ([facility])    [address] [place] [id number] [phone]              Who to contact     If you have questions or need follow up information about today's clinic visit or your schedule please contact [facility] directly at [phone].  Normal or non-critical lab and imaging results will be communicated to you by MyChart, letter or phone within 4 business days after the clinic has received the results. If you do not hear from us within 7 days, please contact the clinic through MyChart or phone. If you have a critical or abnormal lab result, we will notify you by phone as soon as possible.  Submit refill requests through MyChart or call your  "pharmacy and they will forward the refill request to us. Please allow 3 business days for your refill to be completed.          Additional Information About Your Visit        [employer] Information     [employer] gives you secure access to your electronic health record. If you see a primary care provider, you can also send messages to your care team and make appointments. If you have questions, please call your primary care clinic.  If you do not have a primary care provider, please call [phone] and they will assist you.      [employer] is an electronic gateway that provides easy, online access to your medical records. With [employer], you can request a clinic appointment, read your test results, renew a prescription or communicate with your care team.     To access your existing account, please contact your [facility] or call [phone] for assistance.        Care EveryWhere ID     This is your Care EveryWhere ID. This could be used by other organizations to access your [facility] medical records  [id number]        Your Vitals Were     Height BMI (Body Mass Index)                1.638 m (5' 4.5\") 34.98 kg/m2           Blood Pressure from Last 3 Encounters:   09/18/18 153/73   09/07/18 127/62   09/05/18 139/59    Weight from Last 3 Encounters:   09/25/18 93.9 kg (207 lb)   09/18/18 93.9 kg (207 lb)   09/05/18 93.9 kg (207 lb)              Today, you had the following     No orders found for display         Today's Medication Changes          These changes are accurate as of 9/25/18 11:59 PM.  If you have any questions, ask your nurse or doctor.               Start taking these medicines.        Dose/Directions    methylPREDNISolone 4 MG tablet   Commonly known as:  MEDROL DOSEPAK   Used for:  Lumbar radicular pain        Follow package instructions   Quantity:  21 tablet   Refills:  0       tiZANidine 4 MG tablet   Commonly known as:  ZANAFLEX   Used for:  Lumbar radicular pain        Dose: "  4-8 mg   Take 1-2 tablets (4-8 mg) by mouth nightly as needed   Quantity:  30 tablet   Refills:  1         These medicines have changed or have updated prescriptions.        Dose/Directions    * gabapentin 600 MG tablet   Commonly known as:  NEURONTIN   This may have changed:  Another medication with the same name was added. Make sure you understand how and when to take each.   Used for:  Low back pain, unspecified back pain laterality, unspecified chronicity, with sciatica presence unspecified        Dose:  600 mg   Take 1 tablet (600 mg) by mouth 2 times daily   Quantity:  180 tablet   Refills:  3       * gabapentin 300 MG capsule   Commonly known as:  NEURONTIN   This may have changed:  You were already taking a medication with the same name, and this prescription was added. Make sure you understand how and when to take each.   Used for:  Lumbar radicular pain        Dose:  300 mg   Take 1 capsule (300 mg) by mouth 4 times daily   Quantity:  120 capsule   Refills:  1       * Notice:  This list has 2 medication(s) that are the same as other medications prescribed for you. Read the directions carefully, and ask your doctor or other care provider to review them with you.         Where to get your medicines      These medications were sent to [place] [address] [place] [facility] , [place] - [address]  [address] [facility] [address]     Phone:  [phone]     gabapentin 300 MG capsule    methylPREDNISolone 4 MG tablet    tiZANidine 4 MG tablet                Primary Care Provider Office Phone # Fax #    Gavi Bear [phone]       [address]  [facility] [address]        Equal Access to Services     [facility]: Hadii aad ku hadasho Soomaali, waaxda luqadaha, qaybta kaalmada adeegyada, waxay sacha buitrago. So [facility] [phone].    ATENCIÓN: Si habla español, tiene a church disposición servicios gratuitos de asistencia lingüística. Llame al [phone].    We  comply with applicable federal civil rights laws and [place] laws. We do not discriminate on the basis of race, color, national origin, age, disability, sex, sexual orientation, or gender identity.            Thank you!     Thank you for choosing [facility]  for your care. Our goal is always to provide you with excellent care. Hearing back from our patients is one way we can continue to improve our services. Please take a few minutes to complete the written survey that you may receive in the mail after your visit with us. Thank you!             Your Updated Medication List - Protect others around you: Learn how to safely use, store and throw away your medicines at www.disposemymeds.org.          This list is accurate as of 9/25/18 11:59 PM.  Always use your most recent med list.                   Brand Name Dispense Instructions for use Diagnosis    calcium 500 MG Chew      None Entered        doxycycline 100 MG tablet    VIBRA-TABS    180 tablet    TAKE 1 TABLET (100 MG) BY MOUTH 2 TIMES DAILY    Rosacea       etodolac 400 MG tablet    LODINE    60 tablet    TAKE 1 TABLET (400 MG) BY MOUTH 2 TIMES DAILY AS NEEDED    Low back pain, unspecified back pain laterality, unspecified chronicity, with sciatica presence unspecified       * gabapentin 600 MG tablet    NEURONTIN    180 tablet    Take 1 tablet (600 mg) by mouth 2 times daily    Low back pain, unspecified back pain laterality, unspecified chronicity, with sciatica presence unspecified       * gabapentin 300 MG capsule    NEURONTIN    120 capsule    Take 1 capsule (300 mg) by mouth 4 times daily    Lumbar radicular pain       lisinopril-hydrochlorothiazide 20-12.5 MG per tablet    PRINZIDE/ZESTORETIC    180 tablet    TAKE 2 TABLETS BY MOUTH DAILY    Essential hypertension with goal blood pressure less than 140/90       methylPREDNISolone 4 MG tablet    MEDROL DOSEPAK    21 tablet    Follow package instructions    Lumbar radicular pain        Multiple Vitamins-Iron Tabs      Take 1 tablet by mouth daily.        tiZANidine 4 MG tablet    ZANAFLEX    30 tablet    Take 1-2 tablets (4-8 mg) by mouth nightly as needed    Lumbar radicular pain       vitamin D 2000 units Caps      Take 2,000 Units by mouth daily.        * Notice:  This list has 2 medication(s) that are the same as other medications prescribed for you. Read the directions carefully, and ask your doctor or other care provider to review them with you.

## 2018-09-25 NOTE — LETTER
9/25/2018         RE: Yumiko Flowers  [address]  [place] [address]        Dear Colleague,    Thank you for referring your patient, Yumiko Flowers, to the [facility]. Please see a copy of my visit note below.    HISTORY OF PRESENT ILLNESS  Ms. Flowers is a pleasant 78 year old year old female who presents to clinic today with low back pain and leg pain/knee pain  Yumiko explains that she has had this occur for several months  Location: low back and legs  Quality:  achy pain    Severity: 5/10 at worst    Duration: months  Timing: occurs intermittently    Modifying factors:  resting and non-use makes it better, movement and use makes it worse  Associated signs & symptoms: radiation into both legs    Additional history: as documented    MEDICAL HISTORY  Patient Active Problem List   Diagnosis     Asthma, moderate persistent     Acne rosacea     CARDIOVASCULAR SCREENING; LDL GOAL LESS THAN 130     Osteopenia     Vitamin D deficiency     Advanced directives, counseling/discussion     Hx of pseudoexfoliation, os     Pseudophakia, ou; Yag Caps, os     HTN, goal below 140/90     Peptic ulcer     Health Care Home     History of blepharoplasty, upper lid, ou (elsewhere); revision (EN)     Obesity, Class II, BMI 35-39.9     Restless legs syndrome     DJD (degenerative joint disease), lumbosacral     Rosacea     Essential hypertension with goal blood pressure less than 140/90     Obesity, Class I, BMI 30-34.9     Posterior vitreous detachment, bilateral     Chronic pain of left knee     Cervicalgia     Vertigo       Current Outpatient Prescriptions   Medication Sig Dispense Refill     CALCIUM 500 MG PO CHEW None Entered       Cholecalciferol (VITAMIN D) 2000 UNIT CAPS Take 2,000 Units by mouth daily.       doxycycline (VIBRA-TABS) 100 MG tablet TAKE 1 TABLET (100 MG) BY MOUTH 2 TIMES DAILY 180 tablet 0     etodolac (LODINE) 400 MG tablet TAKE 1 TABLET (400 MG) BY MOUTH 2 TIMES DAILY AS NEEDED 60  "tablet 0     gabapentin (NEURONTIN) 600 MG tablet Take 1 tablet (600 mg) by mouth 2 times daily 180 tablet 3     lisinopril-hydrochlorothiazide (PRINZIDE/ZESTORETIC) 20-12.5 MG per tablet TAKE 2 TABLETS BY MOUTH DAILY 180 tablet 1     methylPREDNISolone (MEDROL DOSEPAK) 4 MG tablet Follow package instructions 21 tablet 0     Multiple Vitamins-Iron TABS Take 1 tablet by mouth daily.       tiZANidine (ZANAFLEX) 4 MG tablet Take 1-2 tablets (4-8 mg) by mouth nightly as needed 30 tablet 1     gabapentin (NEURONTIN) 300 MG capsule Take 1 capsule (300 mg) by mouth 4 times daily 120 capsule 1       Allergies   Allergen Reactions     Erythromycin Swelling and Other (See Comments)       Family History   Problem Relation Age of Onset     C.A.D. Father      C.A.D. Brother      Hypertension Mother      Cancer No family hx of      Diabetes No family hx of      Cerebrovascular Disease No family hx of      Thyroid Disease No family hx of      Glaucoma No family hx of      Macular Degeneration No family hx of        Additional medical/Social/Surgical histories reviewed in [facility] and updated as appropriate.     REVIEW OF SYSTEMS (10/24/2018)  10 point ROS of systems including Constitutional, Eyes, Respiratory, Cardiovascular, Gastroenterology, Genitourinary, Integumentary, Musculoskeletal, Psychiatric were all negative except for pertinent positives noted in my HPI.     PHYSICAL EXAM  Vitals:    09/25/18 0918   Weight: 93.9 kg (207 lb)   Height: 1.638 m (5' 4.5\")     Vital Signs: Ht 1.638 m (5' 4.5\")  Wt 93.9 kg (207 lb)  BMI 34.98 kg/m2 Patient declined being weighed. Body mass index is 34.98 kg/(m^2).    General  - normal appearance, in no obvious distress  CV  - normal peripheral perfusion  Pulm  - normal respiratory pattern, non-labored  Musculoskeletal - lumbar spine  - stance: normal gait without limp, no obvious leg length discrepancy, normal heel and toe walk  - inspection: normal bone and joint alignment, no obvious " scoliosis  - palpation: no paravertebral or bony tenderness  - ROM: flexion exacerbates pain, normal extension, sidebending, rotation  - strength: lower extremities 5/5 in all planes  - special tests:  (+) straight leg raise  (+) slump test  Neuro  - patellar and Achilles DTRs 2+ bilaterally, bilateral lower extremity sensory deficit throughout L5 distribution, grossly normal coordination, normal muscle tone  Skin  - no ecchymosis, erythema, warmth, or induration, no obvious rash  Psych  - interactive, appropriate, normal mood and affect  ASSESSMENT & PLAN  77 yo female with lumbar ddd, radicular pain  Reviewed lumbar MRI  Ordered TANK  Cont. Gabapentin  tizanadine and medrol  Cont. HEP  F/u after TANK      Abad Armendariz MD, CAQSM    Again, thank you for allowing me to participate in the care of your patient.        Sincerely,        Abad Armendariz MD

## 2018-10-17 DIAGNOSIS — M54.16 LUMBAR RADICULAR PAIN: ICD-10-CM

## 2018-10-17 NOTE — PROGRESS NOTES
HISTORY OF PRESENT ILLNESS  Ms. Flowers is a pleasant 78 year old year old female who presents to clinic today for followup after lumbar injection  Feeling better overall    MEDICAL HISTORY  Patient Active Problem List   Diagnosis     Asthma, moderate persistent     Acne rosacea     CARDIOVASCULAR SCREENING; LDL GOAL LESS THAN 130     Osteopenia     Vitamin D deficiency     Advanced directives, counseling/discussion     Hx of pseudoexfoliation, os     Pseudophakia, ou; Yag Caps, os     HTN, goal below 140/90     Peptic ulcer     Health Care Home     History of blepharoplasty, upper lid, ou (elsewhere); revision (EN)     Obesity, Class II, BMI 35-39.9     Restless legs syndrome     DJD (degenerative joint disease), lumbosacral     Rosacea     Essential hypertension with goal blood pressure less than 140/90     Obesity, Class I, BMI 30-34.9     Posterior vitreous detachment, bilateral     Chronic pain of left knee     Cervicalgia     Vertigo       Current Outpatient Prescriptions   Medication Sig Dispense Refill     CALCIUM 500 MG PO CHEW None Entered       Cholecalciferol (VITAMIN D) 2000 UNIT CAPS Take 2,000 Units by mouth daily.       doxycycline (VIBRA-TABS) 100 MG tablet TAKE 1 TABLET (100 MG) BY MOUTH 2 TIMES DAILY 180 tablet 0     etodolac (LODINE) 400 MG tablet TAKE 1 TABLET (400 MG) BY MOUTH 2 TIMES DAILY AS NEEDED 60 tablet 0     gabapentin (NEURONTIN) 600 MG tablet Take 1 tablet (600 mg) by mouth 2 times daily 180 tablet 3     lisinopril-hydrochlorothiazide (PRINZIDE/ZESTORETIC) 20-12.5 MG per tablet TAKE 2 TABLETS BY MOUTH DAILY 180 tablet 1     Multiple Vitamins-Iron TABS Take 1 tablet by mouth daily.       gabapentin (NEURONTIN) 300 MG capsule Take 1 capsule (300 mg) by mouth 4 times daily 120 capsule 1     methylPREDNISolone (MEDROL DOSEPAK) 4 MG tablet Follow package instructions 21 tablet 0     tiZANidine (ZANAFLEX) 4 MG tablet Take 1-2 tablets (4-8 mg) by mouth nightly as needed 30 tablet 1  "      Allergies   Allergen Reactions     Erythromycin Swelling and Other (See Comments)       Family History   Problem Relation Age of Onset     C.A.D. Father      C.A.D. Brother      Hypertension Mother      Cancer No family hx of      Diabetes No family hx of      Cerebrovascular Disease No family hx of      Thyroid Disease No family hx of      Glaucoma No family hx of      Macular Degeneration No family hx of        Additional medical/Social/Surgical histories reviewed in [facility] and updated as appropriate.     REVIEW OF SYSTEMS (10/16/2018)  10 point ROS of systems including Constitutional, Eyes, Respiratory, Cardiovascular, Gastroenterology, Genitourinary, Integumentary, Musculoskeletal, Psychiatric were all negative except for pertinent positives noted in my HPI.     PHYSICAL EXAM  Vitals:    09/18/18 1003   BP: 153/73   Pulse: 53   Weight: 93.9 kg (207 lb)   Height: 1.638 m (5' 4.5\")     Vital Signs: /73  Pulse 53  Ht 1.638 m (5' 4.5\")  Wt 93.9 kg (207 lb)  BMI 34.98 kg/m2 Patient declined being weighed. Body mass index is 34.98 kg/(m^2).    General  - normal appearance, in no obvious distress  CV  - normal peripheral perfusion  Pulm  - normal respiratory pattern, non-labored  Musculoskeletal - lumbar spine  - stance: normal gait without limp, no obvious leg length discrepancy, normal heel and toe walk  - inspection: normal bone and joint alignment, no obvious scoliosis  - palpation: no paravertebral or bony tenderness  - ROM: flexion slightly exacerbates pain, normal extension, sidebending, rotation  - strength: lower extremities 5/5 in all planes  - special tests:  (-) straight leg raise  (-) slump test  Neuro  - patellar and Achilles DTRs 2+ bilaterally, no lower extremity sensory deficit throughout L5 distribution, grossly normal coordination, normal muscle tone  Skin  - no ecchymosis, erythema, warmth, or induration, no obvious rash  Psych  - interactive, appropriate, normal mood and " affect    ASSESSMENT & PLAN  79 yo female with lumbar ddd, radicular pain improved  Reviewed her response to injection  Will f/u in 1 month if not improved      Abad Armendariz MD, CAQSM

## 2018-10-17 NOTE — TELEPHONE ENCOUNTER
gabapentin (NEURONTIN) 300 MG capsule 120 capsule 1 9/25/2018          Last Written Prescription Date:  09/25/18   Last Fill Quantity: , 120   # refills: 1  Last Office Visit: 09/25/18  Future Office visit:

## 2018-10-18 RX ORDER — GABAPENTIN 300 MG/1
300 CAPSULE ORAL 4 TIMES DAILY
Qty: 120 CAPSULE | Refills: 1 | Status: SHIPPED | OUTPATIENT
Start: 2018-10-18 | End: 2019-01-23

## 2018-10-23 DIAGNOSIS — I10 ESSENTIAL HYPERTENSION WITH GOAL BLOOD PRESSURE LESS THAN 140/90: ICD-10-CM

## 2018-10-23 NOTE — TELEPHONE ENCOUNTER
"Requested Prescriptions   Pending Prescriptions Disp Refills     lisinopril-hydrochlorothiazide (PRINZIDE/ZESTORETIC) 20-12.5 MG per tablet  Last Written Prescription Date:  05/02/18  Last Fill Quantity: 180,  # refills: 1   Last Office Visit with FMCHIN, ANGIE or [facility] prescribing provider:  7/31/18Anny   Future Office Visit:    180 tablet 1    Diuretics (Including Combos) Protocol Failed    10/23/2018  2:12 PM       Failed - Blood pressure under 140/90 in past 12 months    BP Readings from Last 3 Encounters:   09/18/18 153/73   09/07/18 127/62   09/05/18 139/59                Passed - Recent (12 mo) or future (30 days) visit within the authorizing provider's specialty    Patient had office visit in the last 12 months or has a visit in the next 30 days with authorizing provider or within the authorizing provider's specialty.  See \"Patient Info\" tab in inbasket, or \"Choose Columns\" in Meds & Orders section of the refill encounter.             Passed - Patient is age 18 or older       Passed - No active pregancy on record       Passed - Normal serum creatinine on file in past 12 months    Recent Labs   Lab Test  07/31/18   1118   CR  0.76             Passed - Normal serum potassium on file in past 12 months    Recent Labs   Lab Test  07/31/18   1118   POTASSIUM  4.2                   Passed - Normal serum sodium on file in past 12 months    Recent Labs   Lab Test  07/31/18   1118   NA  140             Passed - No positive pregnancy test in past 12 months          "

## 2018-10-24 NOTE — PROGRESS NOTES
HISTORY OF PRESENT ILLNESS  Ms. Flowers is a pleasant 78 year old year old female who presents to clinic today with low back pain and leg pain/knee pain  Yumiko explains that she has had this occur for several months  Location: low back and legs  Quality:  achy pain    Severity: 5/10 at worst    Duration: months  Timing: occurs intermittently    Modifying factors:  resting and non-use makes it better, movement and use makes it worse  Associated signs & symptoms: radiation into both legs    Additional history: as documented    MEDICAL HISTORY  Patient Active Problem List   Diagnosis     Asthma, moderate persistent     Acne rosacea     CARDIOVASCULAR SCREENING; LDL GOAL LESS THAN 130     Osteopenia     Vitamin D deficiency     Advanced directives, counseling/discussion     Hx of pseudoexfoliation, os     Pseudophakia, ou; Yag Caps, os     HTN, goal below 140/90     Peptic ulcer     Health Care Home     History of blepharoplasty, upper lid, ou (elsewhere); revision (EN)     Obesity, Class II, BMI 35-39.9     Restless legs syndrome     DJD (degenerative joint disease), lumbosacral     Rosacea     Essential hypertension with goal blood pressure less than 140/90     Obesity, Class I, BMI 30-34.9     Posterior vitreous detachment, bilateral     Chronic pain of left knee     Cervicalgia     Vertigo       Current Outpatient Prescriptions   Medication Sig Dispense Refill     CALCIUM 500 MG PO CHEW None Entered       Cholecalciferol (VITAMIN D) 2000 UNIT CAPS Take 2,000 Units by mouth daily.       doxycycline (VIBRA-TABS) 100 MG tablet TAKE 1 TABLET (100 MG) BY MOUTH 2 TIMES DAILY 180 tablet 0     etodolac (LODINE) 400 MG tablet TAKE 1 TABLET (400 MG) BY MOUTH 2 TIMES DAILY AS NEEDED 60 tablet 0     gabapentin (NEURONTIN) 600 MG tablet Take 1 tablet (600 mg) by mouth 2 times daily 180 tablet 3     lisinopril-hydrochlorothiazide (PRINZIDE/ZESTORETIC) 20-12.5 MG per tablet TAKE 2 TABLETS BY MOUTH DAILY 180 tablet 1      "methylPREDNISolone (MEDROL DOSEPAK) 4 MG tablet Follow package instructions 21 tablet 0     Multiple Vitamins-Iron TABS Take 1 tablet by mouth daily.       tiZANidine (ZANAFLEX) 4 MG tablet Take 1-2 tablets (4-8 mg) by mouth nightly as needed 30 tablet 1     gabapentin (NEURONTIN) 300 MG capsule Take 1 capsule (300 mg) by mouth 4 times daily 120 capsule 1       Allergies   Allergen Reactions     Erythromycin Swelling and Other (See Comments)       Family History   Problem Relation Age of Onset     C.A.D. Father      C.A.D. Brother      Hypertension Mother      Cancer No family hx of      Diabetes No family hx of      Cerebrovascular Disease No family hx of      Thyroid Disease No family hx of      Glaucoma No family hx of      Macular Degeneration No family hx of        Additional medical/Social/Surgical histories reviewed in [facility] and updated as appropriate.     REVIEW OF SYSTEMS (10/24/2018)  10 point ROS of systems including Constitutional, Eyes, Respiratory, Cardiovascular, Gastroenterology, Genitourinary, Integumentary, Musculoskeletal, Psychiatric were all negative except for pertinent positives noted in my HPI.     PHYSICAL EXAM  Vitals:    09/25/18 0918   Weight: 93.9 kg (207 lb)   Height: 1.638 m (5' 4.5\")     Vital Signs: Ht 1.638 m (5' 4.5\")  Wt 93.9 kg (207 lb)  BMI 34.98 kg/m2 Patient declined being weighed. Body mass index is 34.98 kg/(m^2).    General  - normal appearance, in no obvious distress  CV  - normal peripheral perfusion  Pulm  - normal respiratory pattern, non-labored  Musculoskeletal - lumbar spine  - stance: normal gait without limp, no obvious leg length discrepancy, normal heel and toe walk  - inspection: normal bone and joint alignment, no obvious scoliosis  - palpation: no paravertebral or bony tenderness  - ROM: flexion exacerbates pain, normal extension, sidebending, rotation  - strength: lower extremities 5/5 in all planes  - special tests:  (+) straight leg raise  (+) slump " test  Neuro  - patellar and Achilles DTRs 2+ bilaterally, bilateral lower extremity sensory deficit throughout L5 distribution, grossly normal coordination, normal muscle tone  Skin  - no ecchymosis, erythema, warmth, or induration, no obvious rash  Psych  - interactive, appropriate, normal mood and affect  ASSESSMENT & PLAN  77 yo female with lumbar ddd, radicular pain  Reviewed lumbar MRI  Ordered TANK  Cont. Gabapentin  tizanadine and medrol  Cont. HEP  F/u after TANK      Abad Armendariz MD, CAQSM

## 2018-10-25 RX ORDER — LISINOPRIL AND HYDROCHLOROTHIAZIDE 12.5; 2 MG/1; MG/1
TABLET ORAL
Qty: 180 TABLET | Refills: 1 | Status: SHIPPED | OUTPATIENT
Start: 2018-10-25 | End: 2019-04-21

## 2018-10-25 NOTE — TELEPHONE ENCOUNTER
Prescription approved per [facility].      Magdy Mann RN, BSN

## 2018-11-19 ENCOUNTER — OFFICE VISIT (OUTPATIENT)
Dept: DERMATOLOGY | Facility: CLINIC | Age: 78
End: 2018-11-19
Payer: COMMERCIAL

## 2018-11-19 DIAGNOSIS — L57.0 AK (ACTINIC KERATOSIS): Primary | ICD-10-CM

## 2018-11-19 DIAGNOSIS — Z85.828 HISTORY OF SKIN CANCER: ICD-10-CM

## 2018-11-19 ASSESSMENT — PAIN SCALES - GENERAL: PAINLEVEL: NO PAIN (0)

## 2018-11-19 NOTE — LETTER
11/19/2018       RE: Yumiko Flowers  [address]  [place] [address]     Dear Colleague,    Thank you for referring your patient, Yumiko Flowers, to the [facility] DERMATOLOGY at [facility]. Please see a copy of my visit note below.    [facility] Dermatology Note      Dermatology Problem List:  1. NMSC  -BCC, left upper back, s/p WLE 1/4/2018   -BCC, left mid back, s/p ED&C 10/25/2010  -BCC, mid back, s/p ED&C 10/25/2010  2. AK cryotherapy 12/11/2017  New AK of right hand and forearm with recurrent AK of nasal tip  Deferred cryotherapy 11/19/2018 due to holidays.  Will treat early January 2019    CC:   Chief Complaint   Patient presents with     Derm Problem     Yumiko is her for a skin check, has a concerning area under her right breast that she's had for about 6 months.          Encounter Date: Nov 19, 2018    History of Present Illness:  Ms. Yumiko Flowers is a 78 year old female who with a history of skin cancer presents for a FBSE.  She notes a new scaly area below the right breast for the last 6 months and a recurrence of the scaly spot of the nasal tip.  No tender or bleeding lesions.  .    Past Medical History:   Patient Active Problem List   Diagnosis     Asthma, moderate persistent     Acne rosacea     CARDIOVASCULAR SCREENING; LDL GOAL LESS THAN 130     Osteopenia     Vitamin D deficiency     Advanced directives, counseling/discussion     Hx of pseudoexfoliation, os     Pseudophakia, ou; Yag Caps, os     HTN, goal below 140/90     Peptic ulcer     Health Care Home     History of blepharoplasty, upper lid, ou (elsewhere); revision (EN)     Obesity, Class II, BMI 35-39.9     Restless legs syndrome     DJD (degenerative joint disease), lumbosacral     Rosacea     Essential hypertension with goal blood pressure less than 140/90     Obesity, Class I, BMI 30-34.9     Posterior vitreous detachment, bilateral     Chronic pain of left knee      "Cervicalgia     Vertigo     Past Medical History:   Diagnosis Date     Acne rosacea      Actinic keratosis      Amblyopia      Asthma, moderate persistent      Basal cell carcinoma 10/2010    back X2     Cataract, mod, od; mild-mod, os 1/12/2012     DJD (degenerative joint disease), lumbosacral 8/6/2014     Elevated cholesterol      Endometriosis      HTN (hypertension)      Moderate major depression (H)     \"Circumstances\"     Osteopenia      Past Surgical History:   Procedure Laterality Date     BLEPHAROPLASTY BILATERAL  3/9/2006; 2013    both eyes, upper lid; revision (EN)     C APPENDECTOMY       CATARACT IOL, RT/LT       HC REMOVAL GALLBLADDER       LASER YAG CAPSULOTOMY      left eye (AC lysis also)     PHACOEMULSIFICATION WITH STANDARD INTRAOCULAR LENS IMPLANT  1/2012; 4/2013    right eye; left eye     REPAIR PTOSIS       SURGICAL HISTORY OF -       endometriosis surgeriesx3     SURGICAL HISTORY OF -       ganiglion cyst onfoot     SURGICAL HISTORY OF -       Eye for \"droopy eye\"       Social History:  Patient  reports that she has never smoked. She has never used smokeless tobacco. She reports that she drinks alcohol. She reports that she does not use illicit drugs.    Family History:  Family History   Problem Relation Age of Onset     C.A.D. Father      C.A.D. Brother      Hypertension Mother      Cancer No family hx of      Diabetes No family hx of      Cerebrovascular Disease No family hx of      Thyroid Disease No family hx of      Glaucoma No family hx of      Macular Degeneration No family hx of        Medications:  Current Outpatient Prescriptions   Medication Sig Dispense Refill     CALCIUM 500 MG PO CHEW None Entered       Cholecalciferol (VITAMIN D) 2000 UNIT CAPS Take 2,000 Units by mouth daily.       doxycycline (VIBRA-TABS) 100 MG tablet TAKE 1 TABLET (100 MG) BY MOUTH 2 TIMES DAILY 180 tablet 0     etodolac (LODINE) 400 MG tablet TAKE 1 TABLET (400 MG) BY MOUTH 2 TIMES DAILY AS NEEDED 60 " tablet 0     gabapentin (NEURONTIN) 300 MG capsule Take 1 capsule (300 mg) by mouth 4 times daily 120 capsule 1     gabapentin (NEURONTIN) 600 MG tablet Take 1 tablet (600 mg) by mouth 2 times daily 180 tablet 3     lisinopril-hydrochlorothiazide (PRINZIDE/ZESTORETIC) 20-12.5 MG per tablet TAKE 2 TABLETS BY MOUTH DAILY 180 tablet 1     Multiple Vitamins-Iron TABS Take 1 tablet by mouth daily.       tiZANidine (ZANAFLEX) 4 MG tablet Take 1-2 tablets (4-8 mg) by mouth nightly as needed 30 tablet 1     methylPREDNISolone (MEDROL DOSEPAK) 4 MG tablet Follow package instructions (Patient not taking: Reported on 11/19/2018) 21 tablet 0     Allergies   Allergen Reactions     Erythromycin Swelling and Other (See Comments)             Physical exam:  Vitals: There were no vitals taken for this visit.  GEN: This is a well developed, well-nourished female in no acute distress, in a pleasant mood.    SKIN: Full skin, which includes the head/face, both arms, chest, back, abdomen,both legs, genitalia and/or groin buttocks, digits and/or nails, was examined.  -There is no erythema, telangectasias, nodularity, or pigmentation on the previous skin cancer sites.  -There are erythematous macules with overyling adherent scale on the nasal tip, right hand and right forearm.   -Scattered brown macules on sun exposed areas.  -No other lesions of concern on areas examined.     Impression/Plan:  1. Actinic keratosis nasal tip, right hand and right forearm    Discussed cryotherapy but since nasal tip will be very obvious for holidays, she defers until January 2019      2. History of nonmelanoma skin cancer, no clincial evidence of recurrence    We will monitor these sites      CC Dr. Bear on close of this encounter.  Follow-up in 2 months, earlier for new or changing lesions.       Staff Involved:  Staff Only    Again, thank you for allowing me to participate in the care of your patient.      Sincerely,    Darlene Wagner MD

## 2018-11-19 NOTE — NURSING NOTE
Dermatology Rooming Note    Yumiko Flowers's goals for this visit include:   Chief Complaint   Patient presents with     Derm Problem     Yumiko is her for a skin check, has a concerning area under her right breast that she's had for about 6 months.        Arianna Keys LPN   Per MD Pt needs to schedule an appt with MD ASAP. He notes he is totally fine currently but is agreeable to an appt this week or next.

## 2018-11-19 NOTE — PROGRESS NOTES
[facility] Dermatology Note      Dermatology Problem List:  1. NMSC  -BCC, left upper back, s/p WLE 1/4/2018   -BCC, left mid back, s/p ED&C 10/25/2010  -BCC, mid back, s/p ED&C 10/25/2010  2. AK cryotherapy 12/11/2017  New AK of right hand and forearm with recurrent AK of nasal tip  Deferred cryotherapy 11/19/2018 due to holidays.  Will treat early January 2019    CC:   Chief Complaint   Patient presents with     Derm Problem     Yumiko is her for a skin check, has a concerning area under her right breast that she's had for about 6 months.          Encounter Date: Nov 19, 2018    History of Present Illness:  Ms. Yumiko Flowers is a 78 year old female who with a history of skin cancer presents for a FBSE.  She notes a new scaly area below the right breast for the last 6 months and a recurrence of the scaly spot of the nasal tip.  No tender or bleeding lesions.  .    Past Medical History:   Patient Active Problem List   Diagnosis     Asthma, moderate persistent     Acne rosacea     CARDIOVASCULAR SCREENING; LDL GOAL LESS THAN 130     Osteopenia     Vitamin D deficiency     Advanced directives, counseling/discussion     Hx of pseudoexfoliation, os     Pseudophakia, ou; Yag Caps, os     HTN, goal below 140/90     Peptic ulcer     Health Care Home     History of blepharoplasty, upper lid, ou (elsewhere); revision (EN)     Obesity, Class II, BMI 35-39.9     Restless legs syndrome     DJD (degenerative joint disease), lumbosacral     Rosacea     Essential hypertension with goal blood pressure less than 140/90     Obesity, Class I, BMI 30-34.9     Posterior vitreous detachment, bilateral     Chronic pain of left knee     Cervicalgia     Vertigo     Past Medical History:   Diagnosis Date     Acne rosacea      Actinic keratosis      Amblyopia      Asthma, moderate persistent      Basal cell carcinoma 10/2010    back X2     Cataract, mod, od; mild-mod, os 1/12/2012     DJD (degenerative joint disease),  "lumbosacral 8/6/2014     Elevated cholesterol      Endometriosis      HTN (hypertension)      Moderate major depression (H)     \"Circumstances\"     Osteopenia      Past Surgical History:   Procedure Laterality Date     BLEPHAROPLASTY BILATERAL  3/9/2006; 2013    both eyes, upper lid; revision (EN)     C APPENDECTOMY       CATARACT IOL, RT/LT       HC REMOVAL GALLBLADDER       LASER YAG CAPSULOTOMY      left eye (AC lysis also)     PHACOEMULSIFICATION WITH STANDARD INTRAOCULAR LENS IMPLANT  1/2012; 4/2013    right eye; left eye     REPAIR PTOSIS       SURGICAL HISTORY OF -       endometriosis surgeriesx3     SURGICAL HISTORY OF -       ganiglion cyst onfoot     SURGICAL HISTORY OF -       Eye for \"droopy eye\"       Social History:  Patient  reports that she has never smoked. She has never used smokeless tobacco. She reports that she drinks alcohol. She reports that she does not use illicit drugs.    Family History:  Family History   Problem Relation Age of Onset     C.A.D. Father      C.A.D. Brother      Hypertension Mother      Cancer No family hx of      Diabetes No family hx of      Cerebrovascular Disease No family hx of      Thyroid Disease No family hx of      Glaucoma No family hx of      Macular Degeneration No family hx of        Medications:  Current Outpatient Prescriptions   Medication Sig Dispense Refill     CALCIUM 500 MG PO CHEW None Entered       Cholecalciferol (VITAMIN D) 2000 UNIT CAPS Take 2,000 Units by mouth daily.       doxycycline (VIBRA-TABS) 100 MG tablet TAKE 1 TABLET (100 MG) BY MOUTH 2 TIMES DAILY 180 tablet 0     etodolac (LODINE) 400 MG tablet TAKE 1 TABLET (400 MG) BY MOUTH 2 TIMES DAILY AS NEEDED 60 tablet 0     gabapentin (NEURONTIN) 300 MG capsule Take 1 capsule (300 mg) by mouth 4 times daily 120 capsule 1     gabapentin (NEURONTIN) 600 MG tablet Take 1 tablet (600 mg) by mouth 2 times daily 180 tablet 3     lisinopril-hydrochlorothiazide (PRINZIDE/ZESTORETIC) 20-12.5 MG per " tablet TAKE 2 TABLETS BY MOUTH DAILY 180 tablet 1     Multiple Vitamins-Iron TABS Take 1 tablet by mouth daily.       tiZANidine (ZANAFLEX) 4 MG tablet Take 1-2 tablets (4-8 mg) by mouth nightly as needed 30 tablet 1     methylPREDNISolone (MEDROL DOSEPAK) 4 MG tablet Follow package instructions (Patient not taking: Reported on 11/19/2018) 21 tablet 0     Allergies   Allergen Reactions     Erythromycin Swelling and Other (See Comments)             Physical exam:  Vitals: There were no vitals taken for this visit.  GEN: This is a well developed, well-nourished female in no acute distress, in a pleasant mood.    SKIN: Full skin, which includes the head/face, both arms, chest, back, abdomen,both legs, genitalia and/or groin buttocks, digits and/or nails, was examined.  -There is no erythema, telangectasias, nodularity, or pigmentation on the previous skin cancer sites.  -There are erythematous macules with overyling adherent scale on the nasal tip, right hand and right forearm.   -Scattered brown macules on sun exposed areas.  -No other lesions of concern on areas examined.     Impression/Plan:  1. Actinic keratosis nasal tip, right hand and right forearm    Discussed cryotherapy but since nasal tip will be very obvious for holidays, she defers until January 2019      2. History of nonmelanoma skin cancer, no clincial evidence of recurrence    We will monitor these sites      CC Dr. Bear on close of this encounter.  Follow-up in 2 months, earlier for new or changing lesions.       Staff Involved:  Staff Only

## 2018-11-19 NOTE — MR AVS SNAPSHOT
After Visit Summary   11/19/2018    Yumiko Flowers    MRN: [id number]           Patient Information     Date Of Birth          1940        Visit Information        Provider Department      11/19/2018 10:45 AM Darlene Wagner MD [facility] Dermatology         Follow-ups after your visit        Your next 10 appointments already scheduled     Jan 07, 2019  1:15 PM CST   (Arrive by 1:00 PM)   Return Visit with MD PINEDA Smith [facility] Dermatology ([facility])    [address] [place] [id number] [phone]              Who to contact     Please call your clinic at [phone] to:    Ask questions about your health    Make or cancel appointments    Discuss your medicines    Learn about your test results    Speak to your doctor            Additional Information About Your Visit        MyChart Information     [employer] gives you secure access to your electronic health record. If you see a primary care provider, you can also send messages to your care team and make appointments. If you have questions, please call your primary care clinic.  If you do not have a primary care provider, please call [phone] and they will assist you.      [employer] is an electronic gateway that provides easy, online access to your medical records. With [employer], you can request a clinic appointment, read your test results, renew a prescription or communicate with your care team.     To access your existing account, please contact your [facility] or call [phone] for assistance.        Care EveryWhere ID     This is your Care EveryWhere ID. This could be used by other organizations to access your [facility] medical records  [id number]         Blood Pressure from Last 3 Encounters:   09/18/18 153/73   09/07/18 127/62   09/05/18 139/59    Weight from Last 3 Encounters:   09/25/18 93.9 kg (207 lb)   09/18/18 93.9 kg (207 lb)    09/05/18 93.9 kg (207 lb)              Today, you had the following     No orders found for display       Primary Care Provider Office Phone # Fax #    Gavi Bear [phone] [address]  [facility] [address]        Equal Access to Services     [facility]: Hadii aad ku hadasho Soomaali, waaxda luqadaha, qaybta kaalmada adeegyada, waxay tishain haydominikn adeyenifer michelleele field . So [facility] [phone].    ATENCIÓN: Si habla español, tiene a church disposición servicios gratuitos de asistencia lingüística. [facility] [phone].    We comply with applicable federal civil rights laws and [place] laws. We do not discriminate on the basis of race, color, national origin, age, disability, sex, sexual orientation, or gender identity.            Thank you!     Thank you for choosing [facility] DERMATOLOGY  for your care. Our goal is always to provide you with excellent care. Hearing back from our patients is one way we can continue to improve our services. Please take a few minutes to complete the written survey that you may receive in the mail after your visit with us. Thank you!             Your Updated Medication List - Protect others around you: Learn how to safely use, store and throw away your medicines at www.disposemymeds.org.          This list is accurate as of 11/19/18 11:05 AM.  Always use your most recent med list.                   Brand Name Dispense Instructions for use Diagnosis    calcium 500 MG Chew      None Entered        doxycycline 100 MG tablet    VIBRA-TABS    180 tablet    TAKE 1 TABLET (100 MG) BY MOUTH 2 TIMES DAILY    Rosacea       etodolac 400 MG tablet    LODINE    60 tablet    TAKE 1 TABLET (400 MG) BY MOUTH 2 TIMES DAILY AS NEEDED    Low back pain, unspecified back pain laterality, unspecified chronicity, with sciatica presence unspecified       * gabapentin 600 MG tablet    NEURONTIN    180 tablet    Take 1 tablet (600 mg) by mouth 2 times daily    Low back pain,  unspecified back pain laterality, unspecified chronicity, with sciatica presence unspecified       * gabapentin 300 MG capsule    NEURONTIN    120 capsule    Take 1 capsule (300 mg) by mouth 4 times daily    Lumbar radicular pain       lisinopril-hydrochlorothiazide 20-12.5 MG per tablet    PRINZIDE/ZESTORETIC    180 tablet    TAKE 2 TABLETS BY MOUTH DAILY    Essential hypertension with goal blood pressure less than 140/90       methylPREDNISolone 4 MG tablet    MEDROL DOSEPAK    21 tablet    Follow package instructions    Lumbar radicular pain       Multiple Vitamins-Iron Tabs      Take 1 tablet by mouth daily.        tiZANidine 4 MG tablet    ZANAFLEX    30 tablet    Take 1-2 tablets (4-8 mg) by mouth nightly as needed    Lumbar radicular pain       vitamin D 2000 units Caps      Take 2,000 Units by mouth daily.        * Notice:  This list has 2 medication(s) that are the same as other medications prescribed for you. Read the directions carefully, and ask your doctor or other care provider to review them with you.

## 2018-11-26 DIAGNOSIS — L71.9 ROSACEA: ICD-10-CM

## 2018-11-26 NOTE — TELEPHONE ENCOUNTER
Requested Prescriptions   Pending Prescriptions Disp Refills     doxycycline (VIBRA-TABS) 100 MG tablet        Last Written Prescription Date:  8/27/18  Last Fill Quantity: 180,   # refills: 0  Last Office Visit: 07/31/18-[facility]  Future Office visit:       Routing refill request to provider for review/approval because:  Drug not on the [facility], [facility] or [facility] refill protocol or controlled substance 180 tablet 0    There is no refill protocol information for this order

## 2018-11-28 RX ORDER — DOXYCYCLINE HYCLATE 100 MG
TABLET ORAL
Qty: 180 TABLET | Refills: 0 | Status: SHIPPED | OUTPATIENT
Start: 2018-11-28 | End: 2019-02-22

## 2018-11-28 NOTE — TELEPHONE ENCOUNTER
Routing refill request to provider for review/approval because:  Drug not on the [facility] refill protocol.    Gricel Gardner RN, [facility]

## 2018-12-07 ENCOUNTER — RADIANT APPOINTMENT (OUTPATIENT)
Dept: MAMMOGRAPHY | Facility: CLINIC | Age: 78
End: 2018-12-07
Attending: PREVENTIVE MEDICINE
Payer: COMMERCIAL

## 2018-12-07 DIAGNOSIS — Z12.31 VISIT FOR SCREENING MAMMOGRAM: ICD-10-CM

## 2018-12-07 PROCEDURE — 77067 SCR MAMMO BI INCL CAD: CPT | Mod: TC

## 2019-01-07 ENCOUNTER — OFFICE VISIT (OUTPATIENT)
Dept: DERMATOLOGY | Facility: CLINIC | Age: 79
End: 2019-01-07
Payer: COMMERCIAL

## 2019-01-07 DIAGNOSIS — L57.0 AK (ACTINIC KERATOSIS): Primary | ICD-10-CM

## 2019-01-07 ASSESSMENT — PAIN SCALES - GENERAL: PAINLEVEL: NO PAIN (0)

## 2019-01-07 NOTE — PATIENT INSTRUCTIONS
Cryotherapy    What is it?    Use of a very cold liquid, such as liquid nitrogen, to freeze and destroy abnormal skin cells that need to be removed    What should I expect?    Tenderness and redness    A small blister that might grow and fill with dark purple blood. There may be crusting.    More than one treatment may be needed if the lesions do not go away.    How do I care for the treated area?    Gently wash the area with your hands when bathing.    Use a thin layer of Vaseline to help with healing. You may use a Band-Aid.     The area should heal within 7-10 days and may leave behind a pink or lighter color.     Do not use an antibiotic or Neosporin ointment.     You may take acetaminophen (Tylenol) for pain.     Call your Doctor if you have:    Severe pain    Signs of infection (warmth, redness, cloudy yellow drainage, and or a bad smell)    Questions or concerns    Who should I call with questions?       [facility]: [phone]       [facility]: [phone]       For urgent needs outside of business hours call the [facility] at [phone]        and ask for the dermatology resident on call

## 2019-01-07 NOTE — PROGRESS NOTES
"[facility] Dermatology Note      Dermatology Problem List:  1. NMSC  -BCC, left upper back, s/p WLE 1/4/2018   -BCC, left mid back, s/p ED&C 10/25/2010  -BCC, mid back, s/p ED&C 10/25/2010  2. AK cryotherapy 1/7/2019 but deferred nasal tip due to upcoming trip.    CC:   Chief Complaint   Patient presents with     Derm Problem     Yumiko is here today for LN2          Encounter Date: Jan 7, 2019    History of Present Illness:  Ms. Yumiko Flowers is a 78 year old female who presents for cryotherapy of the AK's noted at the last visit.  She does have an upcoming trip to [facility] so wishes to defer treatment of the nasal tip if possible.  She reports it is stable.    Past Medical History:   Patient Active Problem List   Diagnosis     Asthma, moderate persistent     Acne rosacea     CARDIOVASCULAR SCREENING; LDL GOAL LESS THAN 130     Osteopenia     Vitamin D deficiency     Advanced directives, counseling/discussion     Hx of pseudoexfoliation, os     Pseudophakia, ou; Yag Caps, os     HTN, goal below 140/90     Peptic ulcer     Health Care Home     History of blepharoplasty, upper lid, ou (elsewhere); revision (EN)     Obesity, Class II, BMI 35-39.9     Restless legs syndrome     DJD (degenerative joint disease), lumbosacral     Rosacea     Essential hypertension with goal blood pressure less than 140/90     Obesity, Class I, BMI 30-34.9     Posterior vitreous detachment, bilateral     Chronic pain of left knee     Cervicalgia     Vertigo     Past Medical History:   Diagnosis Date     Acne rosacea      Actinic keratosis      Amblyopia      Asthma, moderate persistent      Basal cell carcinoma 10/2010    back X2     Cataract, mod, od; mild-mod, os 1/12/2012     DJD (degenerative joint disease), lumbosacral 8/6/2014     Elevated cholesterol      Endometriosis      HTN (hypertension)      Moderate major depression (H)     \"Circumstances\"     Osteopenia      Past Surgical History: " "  Procedure Laterality Date     BLEPHAROPLASTY BILATERAL  3/9/2006; 2013    both eyes, upper lid; revision (EN)     C APPENDECTOMY       CATARACT IOL, RT/LT       HC REMOVAL GALLBLADDER       LASER YAG CAPSULOTOMY      left eye (AC lysis also)     PHACOEMULSIFICATION WITH STANDARD INTRAOCULAR LENS IMPLANT  1/2012; 4/2013    right eye; left eye     REPAIR PTOSIS       SURGICAL HISTORY OF -       endometriosis surgeriesx3     SURGICAL HISTORY OF -       ganiglion cyst onfoot     SURGICAL HISTORY OF -       Eye for \"droopy eye\"       Social History:  Patient reports that  has never smoked. she has never used smokeless tobacco. She reports that she drinks alcohol. She reports that she does not use drugs.    Family History:  Family History   Problem Relation Age of Onset     C.A.D. Father      C.A.D. Brother      Hypertension Mother      Cancer No family hx of      Diabetes No family hx of      Cerebrovascular Disease No family hx of      Thyroid Disease No family hx of      Glaucoma No family hx of      Macular Degeneration No family hx of        Medications:  Current Outpatient Medications   Medication Sig Dispense Refill     CALCIUM 500 MG PO CHEW None Entered       Cholecalciferol (VITAMIN D) 2000 UNIT CAPS Take 2,000 Units by mouth daily.       doxycycline hyclate (VIBRA-TABS) 100 MG tablet TAKE 1 TABLET (100 MG) BY MOUTH 2 TIMES DAILY 180 tablet 0     etodolac (LODINE) 400 MG tablet TAKE 1 TABLET (400 MG) BY MOUTH 2 TIMES DAILY AS NEEDED 60 tablet 0     gabapentin (NEURONTIN) 300 MG capsule Take 1 capsule (300 mg) by mouth 4 times daily 120 capsule 1     gabapentin (NEURONTIN) 600 MG tablet Take 1 tablet (600 mg) by mouth 2 times daily 180 tablet 3     lisinopril-hydrochlorothiazide (PRINZIDE/ZESTORETIC) 20-12.5 MG per tablet TAKE 2 TABLETS BY MOUTH DAILY 180 tablet 1     Multiple Vitamins-Iron TABS Take 1 tablet by mouth daily.       tiZANidine (ZANAFLEX) 4 MG tablet Take 1-2 tablets (4-8 mg) by mouth " nightly as needed 30 tablet 1     methylPREDNISolone (MEDROL DOSEPAK) 4 MG tablet Follow package instructions (Patient not taking: Reported on 11/19/2018) 21 tablet 0     Allergies   Allergen Reactions     Erythromycin Swelling and Other (See Comments)           Physical exam:  Vitals: There were no vitals taken for this visit.  GEN: This is a well developed, well-nourished female in no acute distress, in a pleasant mood.    SKIN: Focused examination of the face, hands, and arms was performed.  -There are erythematous macules with overyling adherent scale on the nasal tip (tiny), right hand and right forearm.   -scar right neck with some scale (previous biopsy folliculitis)  -No other lesions of concern on areas examined.     Impression/Plan:  1. Actinic keratosis will treat right hand and right forearm and defer nasal tip    Cryotherapy procedure note: After verbal consent and discussion of risks and benefits including but no limited to dyspigmentation/scar, blister, and pain, 2 was(were) treated with 1-2mm freeze border for 2 cycles with liquid nitrogen. Post cryotherapy instructions were provided.      CC Referred Self, MD  No address on file on close of this encounter.  Follow-up in 6 months, earlier for new or changing lesions.       Staff Involved:  Staff Only

## 2019-01-07 NOTE — LETTER
1/7/2019       RE: Yumiko Flowers  [address]  [place] [address]     Dear Colleague,    Thank you for referring your patient, Yumiko Flowers, to the [facility] DERMATOLOGY at [facility]. Please see a copy of my visit note below.    [facility] Dermatology Note      Dermatology Problem List:  1. NMSC  -BCC, left upper back, s/p WLE 1/4/2018   -BCC, left mid back, s/p ED&C 10/25/2010  -BCC, mid back, s/p ED&C 10/25/2010  2. AK cryotherapy 1/7/2019 but deferred nasal tip due to upcoming trip.    CC:   Chief Complaint   Patient presents with     Derm Problem     Yumiko is here today for LN2          Encounter Date: Jan 7, 2019    History of Present Illness:  Ms. Yumiko Flowers is a 78 year old female who presents for cryotherapy of the AK's noted at the last visit.  She does have an upcoming trip to [facility] so wishes to defer treatment of the nasal tip if possible.  She reports it is stable.    Past Medical History:   Patient Active Problem List   Diagnosis     Asthma, moderate persistent     Acne rosacea     CARDIOVASCULAR SCREENING; LDL GOAL LESS THAN 130     Osteopenia     Vitamin D deficiency     Advanced directives, counseling/discussion     Hx of pseudoexfoliation, os     Pseudophakia, ou; Yag Caps, os     HTN, goal below 140/90     Peptic ulcer     Health Care Home     History of blepharoplasty, upper lid, ou (elsewhere); revision (EN)     Obesity, Class II, BMI 35-39.9     Restless legs syndrome     DJD (degenerative joint disease), lumbosacral     Rosacea     Essential hypertension with goal blood pressure less than 140/90     Obesity, Class I, BMI 30-34.9     Posterior vitreous detachment, bilateral     Chronic pain of left knee     Cervicalgia     Vertigo     Past Medical History:   Diagnosis Date     Acne rosacea      Actinic keratosis      Amblyopia      Asthma, moderate persistent      Basal cell carcinoma 10/2010    back  "X2     Cataract, mod, od; mild-mod, os 1/12/2012     DJD (degenerative joint disease), lumbosacral 8/6/2014     Elevated cholesterol      Endometriosis      HTN (hypertension)      Moderate major depression (H)     \"Circumstances\"     Osteopenia      Past Surgical History:   Procedure Laterality Date     BLEPHAROPLASTY BILATERAL  3/9/2006; 2013    both eyes, upper lid; revision (EN)     C APPENDECTOMY       CATARACT IOL, RT/LT       HC REMOVAL GALLBLADDER       LASER YAG CAPSULOTOMY      left eye (AC lysis also)     PHACOEMULSIFICATION WITH STANDARD INTRAOCULAR LENS IMPLANT  1/2012; 4/2013    right eye; left eye     REPAIR PTOSIS       SURGICAL HISTORY OF -       endometriosis surgeriesx3     SURGICAL HISTORY OF -       ganiglion cyst onfoot     SURGICAL HISTORY OF -       Eye for \"droopy eye\"       Social History:  Patient reports that  has never smoked. she has never used smokeless tobacco. She reports that she drinks alcohol. She reports that she does not use drugs.    Family History:  Family History   Problem Relation Age of Onset     C.A.D. Father      C.A.D. Brother      Hypertension Mother      Cancer No family hx of      Diabetes No family hx of      Cerebrovascular Disease No family hx of      Thyroid Disease No family hx of      Glaucoma No family hx of      Macular Degeneration No family hx of        Medications:  Current Outpatient Medications   Medication Sig Dispense Refill     CALCIUM 500 MG PO CHEW None Entered       Cholecalciferol (VITAMIN D) 2000 UNIT CAPS Take 2,000 Units by mouth daily.       doxycycline hyclate (VIBRA-TABS) 100 MG tablet TAKE 1 TABLET (100 MG) BY MOUTH 2 TIMES DAILY 180 tablet 0     etodolac (LODINE) 400 MG tablet TAKE 1 TABLET (400 MG) BY MOUTH 2 TIMES DAILY AS NEEDED 60 tablet 0     gabapentin (NEURONTIN) 300 MG capsule Take 1 capsule (300 mg) by mouth 4 times daily 120 capsule 1     gabapentin (NEURONTIN) 600 MG tablet Take 1 tablet (600 mg) by mouth 2 times daily 180 " tablet 3     lisinopril-hydrochlorothiazide (PRINZIDE/ZESTORETIC) 20-12.5 MG per tablet TAKE 2 TABLETS BY MOUTH DAILY 180 tablet 1     Multiple Vitamins-Iron TABS Take 1 tablet by mouth daily.       tiZANidine (ZANAFLEX) 4 MG tablet Take 1-2 tablets (4-8 mg) by mouth nightly as needed 30 tablet 1     methylPREDNISolone (MEDROL DOSEPAK) 4 MG tablet Follow package instructions (Patient not taking: Reported on 11/19/2018) 21 tablet 0     Allergies   Allergen Reactions     Erythromycin Swelling and Other (See Comments)         Physical exam:  Vitals: There were no vitals taken for this visit.  GEN: This is a well developed, well-nourished female in no acute distress, in a pleasant mood.    SKIN: Focused examination of the face, hands, and arms was performed.  -There are erythematous macules with overyling adherent scale on the nasal tip (tiny), right hand and right forearm.   -scar right neck with some scale (previous biopsy folliculitis)  -No other lesions of concern on areas examined.     Impression/Plan:  1. Actinic keratosis will treat right hand and right forearm and defer nasal tip    Cryotherapy procedure note: After verbal consent and discussion of risks and benefits including but no limited to dyspigmentation/scar, blister, and pain, 2 was(were) treated with 1-2mm freeze border for 2 cycles with liquid nitrogen. Post cryotherapy instructions were provided.      CC Referred Self, MD  No address on file on close of this encounter.    Follow-up in 6 months, earlier for new or changing lesions.     Staff Involved:  Staff Only    Darlene Wagner MD

## 2019-01-07 NOTE — NURSING NOTE
Dermatology Rooming Note    Yumiko Flowers's goals for this visit include:   Chief Complaint   Patient presents with     Derm Problem     Yumiko is here today for LN2      MACY Snyder

## 2019-01-09 ENCOUNTER — OFFICE VISIT (OUTPATIENT)
Dept: ORTHOPEDICS | Facility: CLINIC | Age: 79
End: 2019-01-09
Payer: COMMERCIAL

## 2019-01-09 VITALS
BODY MASS INDEX: 34.49 KG/M2 | HEIGHT: 65 IN | SYSTOLIC BLOOD PRESSURE: 126 MMHG | DIASTOLIC BLOOD PRESSURE: 79 MMHG | WEIGHT: 207 LBS | RESPIRATION RATE: 14 BRPM | HEART RATE: 81 BPM | OXYGEN SATURATION: 97 %

## 2019-01-09 DIAGNOSIS — M70.52 PES ANSERINUS BURSITIS OF LEFT KNEE: ICD-10-CM

## 2019-01-09 DIAGNOSIS — M17.12 PRIMARY OSTEOARTHRITIS OF LEFT KNEE: Primary | ICD-10-CM

## 2019-01-09 PROCEDURE — 20610 DRAIN/INJ JOINT/BURSA W/O US: CPT | Mod: 59 | Performed by: ORTHOPAEDIC SURGERY

## 2019-01-09 RX ORDER — LIDOCAINE HYDROCHLORIDE 10 MG/ML
4 INJECTION, SOLUTION INFILTRATION; PERINEURAL
Status: DISCONTINUED | OUTPATIENT
Start: 2019-01-09 | End: 2019-09-03

## 2019-01-09 RX ORDER — BUPIVACAINE HYDROCHLORIDE 2.5 MG/ML
3 INJECTION, SOLUTION INFILTRATION; PERINEURAL
Status: DISCONTINUED | OUTPATIENT
Start: 2019-01-09 | End: 2019-09-03

## 2019-01-09 RX ORDER — METHYLPREDNISOLONE ACETATE 80 MG/ML
80 INJECTION, SUSPENSION INTRA-ARTICULAR; INTRALESIONAL; INTRAMUSCULAR; SOFT TISSUE
Status: DISCONTINUED | OUTPATIENT
Start: 2019-01-09 | End: 2019-09-03

## 2019-01-09 RX ORDER — LIDOCAINE HYDROCHLORIDE 10 MG/ML
3 INJECTION, SOLUTION INFILTRATION; PERINEURAL
Status: DISCONTINUED | OUTPATIENT
Start: 2019-01-09 | End: 2019-09-03

## 2019-01-09 RX ADMIN — BUPIVACAINE HYDROCHLORIDE 3 ML: 2.5 INJECTION, SOLUTION INFILTRATION; PERINEURAL at 15:50

## 2019-01-09 RX ADMIN — METHYLPREDNISOLONE ACETATE 80 MG: 80 INJECTION, SUSPENSION INTRA-ARTICULAR; INTRALESIONAL; INTRAMUSCULAR; SOFT TISSUE at 15:50

## 2019-01-09 RX ADMIN — LIDOCAINE HYDROCHLORIDE 3 ML: 10 INJECTION, SOLUTION INFILTRATION; PERINEURAL at 15:51

## 2019-01-09 RX ADMIN — LIDOCAINE HYDROCHLORIDE 4 ML: 10 INJECTION, SOLUTION INFILTRATION; PERINEURAL at 15:50

## 2019-01-09 RX ADMIN — METHYLPREDNISOLONE ACETATE 80 MG: 80 INJECTION, SUSPENSION INTRA-ARTICULAR; INTRALESIONAL; INTRAMUSCULAR; SOFT TISSUE at 15:51

## 2019-01-09 ASSESSMENT — MIFFLIN-ST. JEOR: SCORE: 1421.89

## 2019-01-09 ASSESSMENT — PAIN SCALES - GENERAL: PAINLEVEL: MILD PAIN (3)

## 2019-01-09 NOTE — LETTER
1/9/2019         RE: Yumiko Flowers  [address]  [facility], [place] [address]        Dear Colleague,    Thank you for referring your patient, Yumiko Flowers, to the [facility]. Please see a copy of my visit note below.    CHIEF COMPLAINT:   Chief Complaint   Patient presents with     RECHECK     Patient is here to recheck left knee. She had vitor. injection 8/15/2018 helped for about 3.5 months. She would like injections today. Pain is worse in the afternoon and evening or sitting for periods of time.        HISTORY OF PRESENT ILLNESS    Yumiko Flowers is a 78 year old female seen primarily left knee pain with no known injury. Left knee Pain has been present since ~7/5/2012. She also has right knee pain but not as bad. She returns today with recurring left knee pain. Left knee pain is more severe than the right knee. She has pain in the morning or with prolonged sitting. Symptoms are worsened with laying. For treatment, she has been icing the knees. She was last seen by me on 8/15/2018. Had pes anserine bursa and intraarticular injections into the left knee with good relief.  Patient would like to try cortisone injections again today.       Present symptoms: pain medially , pain sharp, dull/achy , mild swelling.    Pain severity: 3/10  Frequency of symptoms: are constant, worse with weight bearing  Exacerbating Factors: weight bearing, stairs, [id number], prolonged sitting, prolonged standing, night  Relieving Factors: cortisone injections, left knee pes anserine bursa and intraarticular   Night Pain: Yes  Pain while at rest: Yes   Numbness or tingling: No   Patient has tried:     NSAIDS: yes, Lodine currently     Physical Therapy: No      Activity modification: Yes      Bracing: yes     Injections: yes pes and intra-articular with good relief left knee only.     Ice: yes     Other: ace wrap    Orthopaedic PMH: none.    Other PMH:  has a past medical history of Acne rosacea, Actinic  keratosis, Amblyopia, Asthma, moderate persistent, Basal cell carcinoma (10/2010), Cataract, mod, od; mild-mod, os (1/12/2012), DJD (degenerative joint disease), lumbosacral (8/6/2014), Elevated cholesterol, Endometriosis, HTN (hypertension), Moderate major depression (H), and Osteopenia. She also has no past medical history of Acne keloidalis, Allergies, Diabetes (H), Diabetes mellitus (H), Diabetic retinopathy (H), Eczema, Glaucoma, Glaucoma (increased eye pressure), Heart valve disorder, Macular degeneration, Malignant melanoma nos, Pacemaker, Photosensitive contact dermatitis, Psoriasis, Retinal detachment, Senile macular degeneration, Skin cancer, Squamous cell carcinoma, Strabismus, Type II or unspecified type diabetes mellitus without mention of complication, not stated as uncontrolled, Urticaria, or Uveitis.  Patient Active Problem List    Diagnosis Date Noted     Vertigo 08/16/2017     Priority: Medium     Cervicalgia 08/03/2017     Priority: Medium     Chronic pain of left knee 02/27/2017     Priority: Medium     Posterior vitreous detachment, bilateral 01/14/2017     Priority: Medium     Obesity, Class I, BMI 30-34.9 01/12/2017     Priority: Medium     Essential hypertension with goal blood pressure less than 140/90 11/01/2016     Priority: Medium     Rosacea 05/18/2015     Priority: Medium     Obesity, Class II, BMI 35-39.9 08/06/2014     Priority: Medium     Restless legs syndrome 08/06/2014     Priority: Medium     DJD (degenerative joint disease), lumbosacral 08/06/2014     Priority: Medium     Advanced on X rays       History of blepharoplasty, upper lid, ou (elsewhere); revision (EN) 09/14/2013     Priority: Medium     Health Care Home 01/24/2013     Priority: Medium     Monserrat Rolle RN-PHN  FPA / [facility]   [phone]   DX V65.8 REPLACED WITH [id number] HEALTH CARE HOME (04/08/2013)       Peptic ulcer 01/23/2013     Priority: Medium     HTN, goal below 140/90 03/02/2012      Priority: Medium     Pseudophakia, ou; Yag Caps, os 01/30/2012     Priority: Medium     Hx of pseudoexfoliation, 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  Yumiko Flowers has no plan or code status on file. Mailed available resources and provided with information. Confirmed code status reflects current choices pending further ACP discussions.  Confirmed/documented designated decision maker(s). See permanent comments section of demographics in clinical tab.   Added by Jacqueline Mendoza on 2/26/2015  Discussed advance care planning with patient; information given to patient to review. 11/18/2011   TANYA Clinton MA       Vitamin D deficiency 02/16/2011     Priority: Medium     Osteopenia      Priority: Medium     CARDIOVASCULAR SCREENING; LDL GOAL LESS THAN 130 10/31/2010     Priority: Medium     Asthma, moderate persistent      Priority: Medium     Acne rosacea      Priority: Medium       Surgical Hx:  has a past surgical history that includes REMOVAL GALLBLADDER; APPENDECTOMY; surgical history of - ; surgical history of - ; surgical history of - ; Blepharoplasty bilateral (3/9/2006; 2013); Phacoemulsification with standard intraocular lens implant (1/2012; 4/2013); cataract iol, rt/lt; Laser YAG capsulotomy (); and Repair ptosis.    Medications:   Current Outpatient Medications:      CALCIUM 500 MG PO CHEW, None Entered, Disp: , Rfl:      Cholecalciferol (VITAMIN D) 2000 UNIT CAPS, Take 2,000 Units by mouth daily., Disp: , Rfl:      doxycycline hyclate (VIBRA-TABS) 100 MG tablet, TAKE 1 TABLET (100 MG) BY MOUTH 2 TIMES DAILY, Disp: 180 tablet, Rfl: 0     etodolac (LODINE) 400 MG tablet, TAKE 1 TABLET (400 MG) BY MOUTH 2 TIMES DAILY AS NEEDED, Disp: 60 tablet, Rfl: 0     gabapentin (NEURONTIN) 300 MG capsule, Take 1 capsule (300 mg) by mouth 4  "times daily, Disp: 120 capsule, Rfl: 1     gabapentin (NEURONTIN) 600 MG tablet, Take 1 tablet (600 mg) by mouth 2 times daily, Disp: 180 tablet, Rfl: 3     lisinopril-hydrochlorothiazide (PRINZIDE/ZESTORETIC) 20-12.5 MG per tablet, TAKE 2 TABLETS BY MOUTH DAILY, Disp: 180 tablet, Rfl: 1     methylPREDNISolone (MEDROL DOSEPAK) 4 MG tablet, Follow package instructions, Disp: 21 tablet, Rfl: 0     Multiple Vitamins-Iron TABS, Take 1 tablet by mouth daily., Disp: , Rfl:      tiZANidine (ZANAFLEX) 4 MG tablet, Take 1-2 tablets (4-8 mg) by mouth nightly as needed, Disp: 30 tablet, Rfl: 1    Current Facility-Administered Medications:      bupivacaine (MARCAINE) 0.25 % injection 3 mL, 3 mL, , , Alexandre Calle MD, 3 mL at 08/15/18 1429     lidocaine 1 % injection 3 mL, 3 mL, , , Alexandre Calle MD, 3 mL at 08/15/18 1441     lidocaine 1 % injection 4 mL, 4 mL, , , Alexandre Calle MD, 4 mL at 08/15/18 1429     methylPREDNISolone acetate (DEPO-MEDROL) injection 80 mg, 80 mg, , , Alexandre Calle MD, 80 mg at 08/15/18 1429     methylPREDNISolone acetate (DEPO-MEDROL) injection 80 mg, 80 mg, , , Alexandre Calle MD, 80 mg at 08/15/18 1441    Allergies:   Allergies   Allergen Reactions     Erythromycin Swelling and Other (See Comments)     REVIEW OF SYSTEMS:   CONSTITUTIONAL:NEGATIVE for fever, chills, change in weight  INTEGUMENTARY/SKIN: NEGATIVE for worrisome rashes, moles or lesions  MUSCULOSKELETAL:See HPI above  NEURO: NEGATIVE for weakness, dizziness or paresthesias        PHYSICAL EXAM:  /79   Pulse 81   Resp 14   Ht 1.638 m (5' 4.5\")   Wt 93.9 kg (207 lb)   SpO2 97%   BMI 34.98 kg/m      GENERAL APPEARANCE: healthy, alert, no distress.  SKIN: no suspicious lesions or rashes  NEURO: Normal strength and tone, mentation intact and speech normal  PSYCH:  mentation appears normal and affect normal/bright, not anxious  RESPIRATORY: No increased work of breathing.    BILATERAL LOWER EXTREMITIES:  Gait: " antalgic favoring left   Alignment: varus  No gross deformities or masses.  No Quad atrophy, strength normal.  Intact sensation deep peroneal nerve, superficial peroneal nerve, med/lat tibial nerve, sural nerve, saphenous nerve  Intact EHL, EDL, TA, FHL, GS, quadriceps hamstrings and hip flexors  Palpable 2+ posterior tibial pulses.  Bilateral calf soft and nttp or squeeze.  Edema: 1+, Pitting left lower extremity, trace right lower extremity.    LEFT KNEE EXAM:    Skin: intact, no ecchymosis or erythema  AROM: 3 extension to 110 flexion  Tight hamstrings on straight leg raise.  Effusion: trace  Tender: tender to palpation medial joint line, medial distal hamstrings and pes insertion (most severe at pes)   nontender to palpation lateral joint line, posterior knee    MCL: stable, and non-painful at both 0 and 30 degrees knee flexion  Varus stress: stable, and non-painful at both 0 and 30 degrees knee flexion  Posterior Drawer stable  Patellofemoral joint:                Apprehension: negative              Crepitations: mild   Grind: positive     RIGHT KNEE EXAM:    Skin: intact, no ecchymosis or erythema  ROM: 0 extension to 120 flexion  Tight hamstrings on straight leg raise.  Effusion: none  Tender: medial joint line, pes bursa  NTTP lateral joint line, posterior knee.    MCL: stable, and non-painful at both 0 and 30 degrees knee flexion  Varus stress: stable, and non-painful at both 0 and 30 degrees knee flexion  Posterior Drawer stable  Patellofemoral joint:                Apprehension: negative              Crepitations: mild   Grind: negative.    X-RAY: no new images today.    3 views bilateral knee 8/15/2018 reviewed. Left knee with Severe medial and moderate patello-femoral degenerative changes. Medial space is near bone on bone with subchondrdal sclerosis. There are tricompartmental osteophytes, most prominent patello-femoral compartment left knee. Right knee with mild medial space narrowing.    MRI:  MRI  left knee from 7/31/2012 was reviewed in clinic today.   IMPRESSION:  1. Free edge fraying of the body and posterior horn of the left knee  medial meniscus with large areas of full-thickness loss of articular  cartilage in the medial compartment. Small foci of subjacent  subchondral edema within the medial femoral condyle.  2. Small focal area of high-grade full thickness cartilage loss  measuring 8 mm along the anterior aspect of the left lateral femoral  trochlea with associated focus of subchondral edema within the lateral  femoral condyle  3. Irregularity with small foci of full thickness cartilage loss along  the median ridge of the patella with associated subchondral edema in  the superior pole of the patella.  4. Thickening of the proximal MCL suggesting old injury.  5. Small-moderate left knee joint effusion.      Impression:   78 year old female with chronic bilateral knee pain, primary osteoarthritis and pes bursitis, left more than the right.    Plan:   Again, consistent with knee osteoarthritis, left more than the right.  As long as injections help, we can continue to provide them today.    Treatment:  * rest  * Activity modification - avoid impact activities or activities that aggravate symptoms  * ice, 15-20 minutes at a time several times a day or as needed.  * Strengthening of quadriceps muscles  * Physical Therapy ordered for strengthening, stretching and range of motion exercises  * Tylenol as needed for pain  * Weight loss: Weight loss: The patient's Body mass index is 34.98 kg/m .. Discussed with patient weight loss benefits, not only for the current pain symptoms, but also overall health. Recommend a good diet plan that works for the patient, with the assistance of a dietician or PCP as needed. Also, a good, low-impact exercise program for at least 20 minutes per day, 3 times per week, such as exercise bike, elliptical , or pool.  * Exercise: low impact such as stationary bike,  elliptical, pool.  * Injections: risks and perceived benefits of cortisone versus viscosupplementation injections discussed. Patient elected to proceed. See procedure note below for left knee intra-articular and pes bursal injections.  * Bracing: bracing the knee may offer some relief of symptoms when worn  * Return to clinic as needed. Consider right knee injections in future as needed.    PROCEDURE NOTE:  The risks, perceived benefits and potential complications (including but not limited to: bleeding, infection, pain, scar, damage to adjacent structures, atrophy or necrosis of soft tissue, skin blanching, failure to relieve symptoms, worsening of symptoms, allergic reaction) of injection were discussed with the patient. Questions were addressed and answered.The patient elected to proceed. Written informed consent was obtained. The correct procedural site was identified and confirmed. A Left Knee pes anserine injection was performed using 1mL Depo Medrol 80mg per mL and 7mL (4mL 1% lidocaine, 3mL 0.25% marcaine)  of local anesthetic after sterile prep, to the correct procedural site. Sterile bandaid applied. This was tolerated well by the patient. No apparent complications. Did also discuss that if diabetic, recommend close monitoring of blood sugars over the next week as cortisone injections can temporarily elevate blood sugars.     PROCEDURE NOTE:  The risks, perceived benefits and potential complications (including but not limited to: bleeding, infection, pain, scar, damage to adjacent structures, atrophy or necrosis of soft tissue, skin blanching, failure to relieve symptoms, worsening of symptoms, allergic reaction) of injection were discussed with the patient. Questions were addressed and answered.The patient elected to proceed. Written informed consent was obtained. The correct procedural site was identified and confirmed. A Left Knee intraarticular injection was performed using 1mL Depo Medrol 80mg per mL  and 7mL (4mL 1% lidocaine, 3mL 0.25% marcaine)  of local anesthetic after sterile prep, to the correct procedural site. Sterile bandaid applied. This was tolerated well by the patient. No apparent complications. Did also discuss that if diabetic, recommend close monitoring of blood sugars over the next week as cortisone injections can temporarily elevate blood sugars.      Alexandre Calle M.D., M.S.  Dept. of Orthopaedic Surgery  [facility]       Large Joint Injection/Arthocentesis: L knee joint  Date/Time: 1/9/2019 3:50 PM  Performed by: iWlliam Guerrero PA  Authorized by: Alexandre Calle MD     Indications:  Pain and osteoarthritis  Needle Size:  22 G  Approach:  Anteromedial  Location:  Knee  Site:  L knee joint  Medications:  3 mL bupivacaine 0.25 %; 4 mL lidocaine 1 %; 80 mg methylPREDNISolone 80 MG/ML  Outcome:  Tolerated well, no immediate complications  Procedure discussed: discussed risks, benefits, and alternatives    Consent Given by:  Patient  Prep: patient was prepped and draped in usual sterile fashion    Large Joint Injection/Arthocentesis: L anserine bursa  Date/Time: 1/9/2019 3:51 PM  Performed by: William Guerrero PA  Authorized by: Alexandre Calle MD     Indications:  Pain  Indications comment:  Pes anserine bursa   Needle Size:  25 G  Guidance: landmark guided    Approach:  Medial  Location:  Knee  Site:  L anserine bursa  Medications:  3 mL lidocaine 1 %; 80 mg methylPREDNISolone 80 MG/ML  Outcome:  Tolerated well, no immediate complications  Procedure discussed: discussed risks, benefits, and alternatives    Consent Given by:  Patient  Prep: patient was prepped and draped in usual sterile fashion            Again, thank you for allowing me to participate in the care of your patient.        Sincerely,        Alexandre Calle MD

## 2019-01-09 NOTE — PROGRESS NOTES
CHIEF COMPLAINT:   Chief Complaint   Patient presents with     RECHECK     Patient is here to recheck left knee. She had vitor. injection 8/15/2018 helped for about 3.5 months. She would like injections today. Pain is worse in the afternoon and evening or sitting for periods of time.        HISTORY OF PRESENT ILLNESS    Yumiko Flowers is a 78 year old female seen primarily left knee pain with no known injury. Left knee Pain has been present since ~7/5/2012. She also has right knee pain but not as bad. She returns today with recurring left knee pain. Left knee pain is more severe than the right knee. She has pain in the morning or with prolonged sitting. Symptoms are worsened with laying. For treatment, she has been icing the knees. She was last seen by me on 8/15/2018. Had pes anserine bursa and intraarticular injections into the left knee with good relief.  Patient would like to try cortisone injections again today.       Present symptoms: pain medially , pain sharp, dull/achy , mild swelling.    Pain severity: 3/10  Frequency of symptoms: are constant, worse with weight bearing  Exacerbating Factors: weight bearing, stairs, [id number], prolonged sitting, prolonged standing, night  Relieving Factors: cortisone injections, left knee pes anserine bursa and intraarticular   Night Pain: Yes  Pain while at rest: Yes   Numbness or tingling: No   Patient has tried:     NSAIDS: yes, Lodine currently     Physical Therapy: No      Activity modification: Yes      Bracing: yes     Injections: yes pes and intra-articular with good relief left knee only.     Ice: yes     Other: ace wrap    Orthopaedic PMH: none.    Other PMH:  has a past medical history of Acne rosacea, Actinic keratosis, Amblyopia, Asthma, moderate persistent, Basal cell carcinoma (10/2010), Cataract, mod, od; mild-mod, os (1/12/2012), DJD (degenerative joint disease), lumbosacral (8/6/2014), Elevated cholesterol, Endometriosis, HTN (hypertension), Moderate  major depression (H), and Osteopenia. She also has no past medical history of Acne keloidalis, Allergies, Diabetes (H), Diabetes mellitus (H), Diabetic retinopathy (H), Eczema, Glaucoma, Glaucoma (increased eye pressure), Heart valve disorder, Macular degeneration, Malignant melanoma nos, Pacemaker, Photosensitive contact dermatitis, Psoriasis, Retinal detachment, Senile macular degeneration, Skin cancer, Squamous cell carcinoma, Strabismus, Type II or unspecified type diabetes mellitus without mention of complication, not stated as uncontrolled, Urticaria, or Uveitis.  Patient Active Problem List    Diagnosis Date Noted     Vertigo 08/16/2017     Priority: Medium     Cervicalgia 08/03/2017     Priority: Medium     Chronic pain of left knee 02/27/2017     Priority: Medium     Posterior vitreous detachment, bilateral 01/14/2017     Priority: Medium     Obesity, Class I, BMI 30-34.9 01/12/2017     Priority: Medium     Essential hypertension with goal blood pressure less than 140/90 11/01/2016     Priority: Medium     Rosacea 05/18/2015     Priority: Medium     Obesity, Class II, BMI 35-39.9 08/06/2014     Priority: Medium     Restless legs syndrome 08/06/2014     Priority: Medium     DJD (degenerative joint disease), lumbosacral 08/06/2014     Priority: Medium     Advanced on X rays       History of blepharoplasty, upper lid, ou (elsewhere); revision (EN) 09/14/2013     Priority: Medium     Health Care Home 01/24/2013     Priority: Medium     Monserrat Rolle RN-PHN  FPA / NELLY 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  Yumiko Flowers has no plan or code status on file. Mailed available resources and provided with information. Confirmed code status reflects current choices pending further ACP discussions.  Confirmed/documented designated decision maker(s). See permanent comments section of demographics in clinical tab.   Added by Jacqueline Mendoza on 2/26/2015  Discussed advance care planning with patient; information given to patient to review. 11/18/2011   TANYA Clinton MA       Vitamin D deficiency 02/16/2011     Priority: Medium     Osteopenia      Priority: Medium     CARDIOVASCULAR SCREENING; LDL GOAL LESS THAN 130 10/31/2010     Priority: Medium     Asthma, moderate persistent      Priority: Medium     Acne rosacea      Priority: Medium       Surgical Hx:  has a past surgical history that includes REMOVAL GALLBLADDER; APPENDECTOMY; surgical history of - ; surgical history of - ; surgical history of - ; Blepharoplasty bilateral (3/9/2006; 2013); Phacoemulsification with standard intraocular lens implant (1/2012; 4/2013); cataract iol, rt/lt; Laser YAG capsulotomy (); and Repair ptosis.    Medications:   Current Outpatient Medications:      CALCIUM 500 MG PO CHEW, None Entered, Disp: , Rfl:      Cholecalciferol (VITAMIN D) 2000 UNIT CAPS, Take 2,000 Units by mouth daily., Disp: , Rfl:      doxycycline hyclate (VIBRA-TABS) 100 MG tablet, TAKE 1 TABLET (100 MG) BY MOUTH 2 TIMES DAILY, Disp: 180 tablet, Rfl: 0     etodolac (LODINE) 400 MG tablet, TAKE 1 TABLET (400 MG) BY MOUTH 2 TIMES DAILY AS NEEDED, Disp: 60 tablet, Rfl: 0     gabapentin (NEURONTIN) 300 MG capsule, Take 1 capsule (300 mg) by mouth 4 times daily, Disp: 120 capsule, Rfl: 1     gabapentin (NEURONTIN) 600 MG tablet, Take 1 tablet (600 mg) by mouth 2 times daily, Disp: 180 tablet, Rfl: 3     lisinopril-hydrochlorothiazide (PRINZIDE/ZESTORETIC) 20-12.5 MG per tablet, TAKE 2 TABLETS  "BY MOUTH DAILY, Disp: 180 tablet, Rfl: 1     methylPREDNISolone (MEDROL DOSEPAK) 4 MG tablet, Follow package instructions, Disp: 21 tablet, Rfl: 0     Multiple Vitamins-Iron TABS, Take 1 tablet by mouth daily., Disp: , Rfl:      tiZANidine (ZANAFLEX) 4 MG tablet, Take 1-2 tablets (4-8 mg) by mouth nightly as needed, Disp: 30 tablet, Rfl: 1    Current Facility-Administered Medications:      bupivacaine (MARCAINE) 0.25 % injection 3 mL, 3 mL, , , Alexandre Calle MD, 3 mL at 08/15/18 1429     lidocaine 1 % injection 3 mL, 3 mL, , , Alexandre Calle MD, 3 mL at 08/15/18 1441     lidocaine 1 % injection 4 mL, 4 mL, , , Alexandre Calle MD, 4 mL at 08/15/18 1429     methylPREDNISolone acetate (DEPO-MEDROL) injection 80 mg, 80 mg, , , Alexandre Calle MD, 80 mg at 08/15/18 1429     methylPREDNISolone acetate (DEPO-MEDROL) injection 80 mg, 80 mg, , , Alexandre Calle MD, 80 mg at 08/15/18 1441    Allergies:   Allergies   Allergen Reactions     Erythromycin Swelling and Other (See Comments)     REVIEW OF SYSTEMS:   CONSTITUTIONAL:NEGATIVE for fever, chills, change in weight  INTEGUMENTARY/SKIN: NEGATIVE for worrisome rashes, moles or lesions  MUSCULOSKELETAL:See HPI above  NEURO: NEGATIVE for weakness, dizziness or paresthesias        PHYSICAL EXAM:  /79   Pulse 81   Resp 14   Ht 1.638 m (5' 4.5\")   Wt 93.9 kg (207 lb)   SpO2 97%   BMI 34.98 kg/m     GENERAL APPEARANCE: healthy, alert, no distress.  SKIN: no suspicious lesions or rashes  NEURO: Normal strength and tone, mentation intact and speech normal  PSYCH:  mentation appears normal and affect normal/bright, not anxious  RESPIRATORY: No increased work of breathing.    BILATERAL LOWER EXTREMITIES:  Gait: antalgic favoring left   Alignment: varus  No gross deformities or masses.  No Quad atrophy, strength normal.  Intact sensation deep peroneal nerve, superficial peroneal nerve, med/lat tibial nerve, sural nerve, saphenous nerve  Intact EHL, EDL, " TA, FHL, GS, quadriceps hamstrings and hip flexors  Palpable 2+ posterior tibial pulses.  Bilateral calf soft and nttp or squeeze.  Edema: 1+, Pitting left lower extremity, trace right lower extremity.    LEFT KNEE EXAM:    Skin: intact, no ecchymosis or erythema  AROM: 3 extension to 110 flexion  Tight hamstrings on straight leg raise.  Effusion: trace  Tender: tender to palpation medial joint line, medial distal hamstrings and pes insertion (most severe at pes)   nontender to palpation lateral joint line, posterior knee    MCL: stable, and non-painful at both 0 and 30 degrees knee flexion  Varus stress: stable, and non-painful at both 0 and 30 degrees knee flexion  Posterior Drawer stable  Patellofemoral joint:                Apprehension: negative              Crepitations: mild   Grind: positive     RIGHT KNEE EXAM:    Skin: intact, no ecchymosis or erythema  ROM: 0 extension to 120 flexion  Tight hamstrings on straight leg raise.  Effusion: none  Tender: medial joint line, pes bursa  NTTP lateral joint line, posterior knee.    MCL: stable, and non-painful at both 0 and 30 degrees knee flexion  Varus stress: stable, and non-painful at both 0 and 30 degrees knee flexion  Posterior Drawer stable  Patellofemoral joint:                Apprehension: negative              Crepitations: mild   Grind: negative.    X-RAY: no new images today.    3 views bilateral knee 8/15/2018 reviewed. Left knee with Severe medial and moderate patello-femoral degenerative changes. Medial space is near bone on bone with subchondrdal sclerosis. There are tricompartmental osteophytes, most prominent patello-femoral compartment left knee. Right knee with mild medial space narrowing.    MRI:  MRI left knee from 7/31/2012 was reviewed in clinic today.   IMPRESSION:  1. Free edge fraying of the body and posterior horn of the left knee  medial meniscus with large areas of full-thickness loss of articular  cartilage in the medial compartment.  Small foci of subjacent  subchondral edema within the medial femoral condyle.  2. Small focal area of high-grade full thickness cartilage loss  measuring 8 mm along the anterior aspect of the left lateral femoral  trochlea with associated focus of subchondral edema within the lateral  femoral condyle  3. Irregularity with small foci of full thickness cartilage loss along  the median ridge of the patella with associated subchondral edema in  the superior pole of the patella.  4. Thickening of the proximal MCL suggesting old injury.  5. Small-moderate left knee joint effusion.      Impression:   78 year old female with chronic bilateral knee pain, primary osteoarthritis and pes bursitis, left more than the right.    Plan:   Again, consistent with knee osteoarthritis, left more than the right.  As long as injections help, we can continue to provide them today.    Treatment:  * rest  * Activity modification - avoid impact activities or activities that aggravate symptoms  * ice, 15-20 minutes at a time several times a day or as needed.  * Strengthening of quadriceps muscles  * Physical Therapy ordered for strengthening, stretching and range of motion exercises  * Tylenol as needed for pain  * Weight loss: Weight loss: The patient's Body mass index is 34.98 kg/m .. Discussed with patient weight loss benefits, not only for the current pain symptoms, but also overall health. Recommend a good diet plan that works for the patient, with the assistance of a dietician or PCP as needed. Also, a good, low-impact exercise program for at least 20 minutes per day, 3 times per week, such as exercise bike, elliptical , or pool.  * Exercise: low impact such as stationary bike, elliptical, pool.  * Injections: risks and perceived benefits of cortisone versus viscosupplementation injections discussed. Patient elected to proceed. See procedure note below for left knee intra-articular and pes bursal injections.  * Bracing: bracing  the knee may offer some relief of symptoms when worn  * Return to clinic as needed. Consider right knee injections in future as needed.    PROCEDURE NOTE:  The risks, perceived benefits and potential complications (including but not limited to: bleeding, infection, pain, scar, damage to adjacent structures, atrophy or necrosis of soft tissue, skin blanching, failure to relieve symptoms, worsening of symptoms, allergic reaction) of injection were discussed with the patient. Questions were addressed and answered.The patient elected to proceed. Written informed consent was obtained. The correct procedural site was identified and confirmed. A Left Knee pes anserine injection was performed using 1mL Depo Medrol 80mg per mL and 7mL (4mL 1% lidocaine, 3mL 0.25% marcaine)  of local anesthetic after sterile prep, to the correct procedural site. Sterile bandaid applied. This was tolerated well by the patient. No apparent complications. Did also discuss that if diabetic, recommend close monitoring of blood sugars over the next week as cortisone injections can temporarily elevate blood sugars.     PROCEDURE NOTE:  The risks, perceived benefits and potential complications (including but not limited to: bleeding, infection, pain, scar, damage to adjacent structures, atrophy or necrosis of soft tissue, skin blanching, failure to relieve symptoms, worsening of symptoms, allergic reaction) of injection were discussed with the patient. Questions were addressed and answered.The patient elected to proceed. Written informed consent was obtained. The correct procedural site was identified and confirmed. A Left Knee intraarticular injection was performed using 1mL Depo Medrol 80mg per mL and 7mL (4mL 1% lidocaine, 3mL 0.25% marcaine)  of local anesthetic after sterile prep, to the correct procedural site. Sterile bandaid applied. This was tolerated well by the patient. No apparent complications. Did also discuss that if diabetic,  recommend close monitoring of blood sugars over the next week as cortisone injections can temporarily elevate blood sugars.      Alexandre Calle M.D., M.S.  Dept. of Orthopaedic Surgery  [facility]       Large Joint Injection/Arthocentesis: L knee joint  Date/Time: 1/9/2019 3:50 PM  Performed by: William Guerrero PA  Authorized by: Alexandre Calle MD     Indications:  Pain and osteoarthritis  Needle Size:  22 G  Approach:  Anteromedial  Location:  Knee  Site:  L knee joint  Medications:  3 mL bupivacaine 0.25 %; 4 mL lidocaine 1 %; 80 mg methylPREDNISolone 80 MG/ML  Outcome:  Tolerated well, no immediate complications  Procedure discussed: discussed risks, benefits, and alternatives    Consent Given by:  Patient  Prep: patient was prepped and draped in usual sterile fashion    Large Joint Injection/Arthocentesis: L anserine bursa  Date/Time: 1/9/2019 3:51 PM  Performed by: William Guerrero PA  Authorized by: Alexandre Calle MD     Indications:  Pain  Indications comment:  Pes anserine bursa   Needle Size:  25 G  Guidance: landmark guided    Approach:  Medial  Location:  Knee  Site:  L anserine bursa  Medications:  3 mL lidocaine 1 %; 80 mg methylPREDNISolone 80 MG/ML  Outcome:  Tolerated well, no immediate complications  Procedure discussed: discussed risks, benefits, and alternatives    Consent Given by:  Patient  Prep: patient was prepped and draped in usual sterile fashion

## 2019-01-17 DIAGNOSIS — M54.16 LUMBAR RADICULAR PAIN: ICD-10-CM

## 2019-01-23 ENCOUNTER — TELEPHONE (OUTPATIENT)
Dept: ORTHOPEDICS | Facility: CLINIC | Age: 79
End: 2019-01-23

## 2019-01-23 RX ORDER — GABAPENTIN 300 MG/1
300 CAPSULE ORAL 4 TIMES DAILY
Qty: 120 CAPSULE | Refills: 1 | Status: SHIPPED | OUTPATIENT
Start: 2019-01-23 | End: 2019-06-12

## 2019-01-23 NOTE — TELEPHONE ENCOUNTER
Per lindsey with Dr. Armendariz- He will not refill RX right now, if Yumiko would like to continue taking the 600 MG he would prefer if she comes in and talks with him.     Called pharmacy to let them know.

## 2019-01-23 NOTE — TELEPHONE ENCOUNTER
Pharmacy wanted Dr. Armendariz to know that the patient has also been prescribed Gabapentin 600 mg 1 tab twice daily by another Provider.  Pharmacy is requesting a call to authorize filling or not filling.  Please advise.  Thank you.

## 2019-02-20 DIAGNOSIS — L71.9 ROSACEA: ICD-10-CM

## 2019-02-20 NOTE — TELEPHONE ENCOUNTER
Requested Prescriptions   Pending Prescriptions Disp Refills     doxycycline hyclate (VIBRA-TABS) 100 MG tablet      Last Written Prescription Date:  11/28/18  Last Fill Quantity: 180,  # refills: 0   Last Office Visit with [facility], P or [facility] prescribing provider:  7/31/18   Future Office Visit:      180 tablet 0     Sig: TAKE 1 TABLET (100 MG) BY MOUTH 2 TIMES DAILY    There is no refill protocol information for this order              Bijan Faarax  [facility]

## 2019-02-22 RX ORDER — DOXYCYCLINE HYCLATE 100 MG
TABLET ORAL
Qty: 180 TABLET | Refills: 0 | Status: SHIPPED | OUTPATIENT
Start: 2019-02-22 | End: 2019-06-12

## 2019-02-22 NOTE — TELEPHONE ENCOUNTER
Requested Prescriptions   Pending Prescriptions Disp Refills     doxycycline hyclate (VIBRA-TABS) 100 MG tablet 180 tablet 0     Sig: TAKE 1 TABLET (100 MG) BY MOUTH 2 TIMES DAILY    There is no refill protocol information for this order        Routing refill request to provider for review/approval because:  Drug [place] [facility] [place]     Ananya Powell RN  [facility]

## 2019-03-12 ENCOUNTER — TELEPHONE (OUTPATIENT)
Dept: ORTHOPEDICS | Facility: CLINIC | Age: 79
End: 2019-03-12

## 2019-03-12 DIAGNOSIS — M51.26 LUMBAR DISC HERNIATION: Primary | ICD-10-CM

## 2019-03-12 NOTE — TELEPHONE ENCOUNTER
[facility] Call Center    Phone Message    May a detailed message be left on voicemail: yes    Reason for Call: Other: Pt has an epidural injection on 4/1/19, pt is wondering if she can be prescribed prednisone prior to this appt for pain. Please advise.      Action Taken: Message routed to:  Adult Clinics: Sports Medicine p [phone]

## 2019-03-13 RX ORDER — METHYLPREDNISOLONE 4 MG
TABLET, DOSE PACK ORAL
Qty: 21 TABLET | Refills: 0 | Status: SHIPPED | OUTPATIENT
Start: 2019-03-13 | End: 2019-06-12

## 2019-03-15 ENCOUNTER — OFFICE VISIT (OUTPATIENT)
Dept: ORTHOPEDICS | Facility: CLINIC | Age: 79
End: 2019-03-15
Payer: COMMERCIAL

## 2019-03-15 VITALS
BODY MASS INDEX: 35.45 KG/M2 | OXYGEN SATURATION: 98 % | RESPIRATION RATE: 16 BRPM | HEIGHT: 65 IN | WEIGHT: 212.8 LBS | DIASTOLIC BLOOD PRESSURE: 80 MMHG | HEART RATE: 90 BPM | SYSTOLIC BLOOD PRESSURE: 135 MMHG

## 2019-03-15 DIAGNOSIS — M17.12 PRIMARY OSTEOARTHRITIS OF LEFT KNEE: Primary | ICD-10-CM

## 2019-03-15 DIAGNOSIS — M25.562 CHRONIC PAIN OF LEFT KNEE: ICD-10-CM

## 2019-03-15 DIAGNOSIS — E66.01 MORBID OBESITY (H): ICD-10-CM

## 2019-03-15 DIAGNOSIS — G89.29 CHRONIC PAIN OF LEFT KNEE: ICD-10-CM

## 2019-03-15 PROCEDURE — 99213 OFFICE O/P EST LOW 20 MIN: CPT | Mod: 25 | Performed by: ORTHOPAEDIC SURGERY

## 2019-03-15 PROCEDURE — 20610 DRAIN/INJ JOINT/BURSA W/O US: CPT | Mod: LT | Performed by: ORTHOPAEDIC SURGERY

## 2019-03-15 RX ORDER — LIDOCAINE HYDROCHLORIDE 10 MG/ML
4 INJECTION, SOLUTION INFILTRATION; PERINEURAL
Status: DISCONTINUED | OUTPATIENT
Start: 2019-03-15 | End: 2019-09-03

## 2019-03-15 RX ORDER — TRIAMCINOLONE ACETONIDE 40 MG/ML
80 INJECTION, SUSPENSION INTRA-ARTICULAR; INTRAMUSCULAR
Status: DISCONTINUED | OUTPATIENT
Start: 2019-03-15 | End: 2019-09-03

## 2019-03-15 RX ORDER — BUPIVACAINE HYDROCHLORIDE 2.5 MG/ML
3 INJECTION, SOLUTION INFILTRATION; PERINEURAL
Status: DISCONTINUED | OUTPATIENT
Start: 2019-03-15 | End: 2019-09-03

## 2019-03-15 RX ADMIN — BUPIVACAINE HYDROCHLORIDE 3 ML: 2.5 INJECTION, SOLUTION INFILTRATION; PERINEURAL at 10:04

## 2019-03-15 RX ADMIN — LIDOCAINE HYDROCHLORIDE 4 ML: 10 INJECTION, SOLUTION INFILTRATION; PERINEURAL at 10:04

## 2019-03-15 RX ADMIN — TRIAMCINOLONE ACETONIDE 80 MG: 40 INJECTION, SUSPENSION INTRA-ARTICULAR; INTRAMUSCULAR at 10:04

## 2019-03-15 ASSESSMENT — MIFFLIN-ST. JEOR: SCORE: 1448.19

## 2019-03-15 ASSESSMENT — PAIN SCALES - GENERAL: PAINLEVEL: SEVERE PAIN (7)

## 2019-03-15 NOTE — LETTER
3/15/2019         RE: Yumiko Flowers  [address]  [place] [address]        Dear Colleague,    Thank you for referring your patient, Yumiko Flowers, to the [facility]. Please see a copy of my visit note below.    CHIEF COMPLAINT:   Chief Complaint   Patient presents with     Left Knee - Follow Up     Last injection: 1/9/19 - knee and pes. Patient states the injection didn't last at all. Injection just did not work at all. Pain is medial/anterior. She would like more injections.        HISTORY OF PRESENT ILLNESS    Yumiko Flowers is a 78 year old female seen primarily left knee pain with no known injury. Left knee Pain has been present since ~7/5/2012. She has right knee pain but not as bad.  She also has low back pain with right sided sciatica. She returns today with recurring left knee pain.  She has pain in the morning or with prolonged sitting. For treatment, she has been icing, taking ibuprofen and bracing. She was last seenon 1/9/2019. Had pes anserine bursa and intraarticular injections into the left knee with good relief.  Patient would like to try cortisone injections again today.       Present symptoms: pain medially , pain sharp, dull/achy , mild swelling.    Pain severity: 7/10  Frequency of symptoms: are constant, worse with weight bearing  Exacerbating Factors: weight bearing, stairs, [id number], prolonged sitting, prolonged standing, night  Relieving Factors: cortisone injections, left knee pes anserine bursa and intraarticular   Night Pain: Yes  Pain while at rest: Yes   Numbness or tingling: No   Patient has tried:     NSAIDS: yes, Lodine currently     Physical Therapy: No      Activity modification: Yes      Bracing: yes     Injections: yes pes and intra-articular with good relief left knee only.     Ice: yes     Other: ace wrap    Orthopaedic PMH: none.    Other PMH:  has a past medical history of Acne rosacea, Actinic keratosis, Amblyopia, Asthma, moderate persistent,  Basal cell carcinoma (10/2010), Cataract, mod, od; mild-mod, os (1/12/2012), DJD (degenerative joint disease), lumbosacral (8/6/2014), Elevated cholesterol, Endometriosis, HTN (hypertension), Moderate major depression (H), and Osteopenia. She also has no past medical history of Acne keloidalis, Allergies, Diabetes (H), Diabetes mellitus (H), Diabetic retinopathy (H), Eczema, Glaucoma, Glaucoma (increased eye pressure), Heart valve disorder, Macular degeneration, Malignant melanoma nos, Pacemaker, Photosensitive contact dermatitis, Psoriasis, Retinal detachment, Senile macular degeneration, Skin cancer, Squamous cell carcinoma, Strabismus, Type II or unspecified type diabetes mellitus without mention of complication, not stated as uncontrolled, Urticaria, or Uveitis.  Patient Active Problem List    Diagnosis Date Noted     Vertigo 08/16/2017     Priority: Medium     Cervicalgia 08/03/2017     Priority: Medium     Chronic pain of left knee 02/27/2017     Priority: Medium     Posterior vitreous detachment, bilateral 01/14/2017     Priority: Medium     Obesity, Class I, BMI 30-34.9 01/12/2017     Priority: Medium     Essential hypertension with goal blood pressure less than 140/90 11/01/2016     Priority: Medium     Rosacea 05/18/2015     Priority: Medium     Obesity, Class II, BMI 35-39.9 08/06/2014     Priority: Medium     Restless legs syndrome 08/06/2014     Priority: Medium     DJD (degenerative joint disease), lumbosacral 08/06/2014     Priority: Medium     Advanced on X rays       History of blepharoplasty, upper lid, ou (elsewhere); revision (EN) 09/14/2013     Priority: Medium     Health Care Home 01/24/2013     Priority: Medium     Monserrat Rolle RN-PHN  FPA / [facility]   [phone]   DX V65.8 REPLACED WITH [facility] (04/08/2013)       Peptic ulcer 01/23/2013     Priority: Medium     HTN, goal below 140/90 03/02/2012     Priority: Medium     Pseudophakia, ou; Yag Caps,  os 01/30/2012     Priority: Medium     Hx of pseudoexfoliation, 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  Yumiko Flowers has no plan or code status on file. Mailed available resources and provided with information. Confirmed code status reflects current choices pending further ACP discussions.  Confirmed/documented designated decision maker(s). See permanent comments section of demographics in clinical tab.   Added by Jacqueline Mendoza on 2/26/2015  Discussed advance care planning with patient; information given to patient to review. 11/18/2011   TANYA Clinton MA       Vitamin D deficiency 02/16/2011     Priority: Medium     Osteopenia      Priority: Medium     CARDIOVASCULAR SCREENING; LDL GOAL LESS THAN 130 10/31/2010     Priority: Medium     Asthma, moderate persistent      Priority: Medium     Acne rosacea      Priority: Medium       Surgical Hx:  has a past surgical history that includes REMOVAL GALLBLADDER; APPENDECTOMY; surgical history of - ; surgical history of - ; surgical history of - ; Blepharoplasty bilateral (3/9/2006; 2013); Phacoemulsification with standard intraocular lens implant (1/2012; 4/2013); cataract iol, rt/lt; Laser YAG capsulotomy (); and Repair ptosis.    Medications:   Current Outpatient Medications:      CALCIUM 500 MG PO CHEW, None Entered, Disp: , Rfl:      Cholecalciferol (VITAMIN D) 2000 UNIT CAPS, Take 2,000 Units by mouth daily., Disp: , Rfl:      doxycycline hyclate (VIBRA-TABS) 100 MG tablet, TAKE 1 TABLET (100 MG) BY MOUTH 2 TIMES DAILY, Disp: 180 tablet, Rfl: 0     etodolac (LODINE) 400 MG tablet, TAKE 1 TABLET (400 MG) BY MOUTH 2 TIMES DAILY AS NEEDED, Disp: 60 tablet, Rfl: 0     gabapentin (NEURONTIN) 300 MG capsule, Take 1 capsule (300 mg) by mouth 4 times daily, Disp: 120 capsule, Rfl: 1     gabapentin  (NEURONTIN) 600 MG tablet, Take 1 tablet (600 mg) by mouth 2 times daily, Disp: 180 tablet, Rfl: 3     lisinopril-hydrochlorothiazide (PRINZIDE/ZESTORETIC) 20-12.5 MG per tablet, TAKE 2 TABLETS BY MOUTH DAILY, Disp: 180 tablet, Rfl: 1     methylPREDNISolone (MEDROL DOSEPAK) 4 MG tablet therapy pack, Follow Package Directions, Disp: 21 tablet, Rfl: 0     methylPREDNISolone (MEDROL DOSEPAK) 4 MG tablet, Follow package instructions, Disp: 21 tablet, Rfl: 0     Multiple Vitamins-Iron TABS, Take 1 tablet by mouth daily., Disp: , Rfl:      tiZANidine (ZANAFLEX) 4 MG tablet, Take 1-2 tablets (4-8 mg) by mouth nightly as needed, Disp: 30 tablet, Rfl: 1    Current Facility-Administered Medications:      bupivacaine (MARCAINE) 0.25 % injection 3 mL, 3 mL, , , Alexandre Calle MD, 3 mL at 01/09/19 1550     bupivacaine (MARCAINE) 0.25 % injection 3 mL, 3 mL, , , Alexandre Calle MD, 3 mL at 08/15/18 1429     lidocaine 1 % injection 3 mL, 3 mL, , , Alexandre Calle MD, 3 mL at 01/09/19 1551     lidocaine 1 % injection 3 mL, 3 mL, , , Alexandre Calle MD, 3 mL at 08/15/18 1441     lidocaine 1 % injection 4 mL, 4 mL, , , Alexandre Calle MD, 4 mL at 01/09/19 1550     lidocaine 1 % injection 4 mL, 4 mL, , , Alexandre Calle MD, 4 mL at 08/15/18 1429     methylPREDNISolone (DEPO-MEDROL) injection 80 mg, 80 mg, , , Alexandre Calle MD, 80 mg at 01/09/19 1550     methylPREDNISolone (DEPO-MEDROL) injection 80 mg, 80 mg, , , Alexandre Calle MD, 80 mg at 01/09/19 1551     methylPREDNISolone acetate (DEPO-MEDROL) injection 80 mg, 80 mg, , , Alexandre Calle MD, 80 mg at 08/15/18 1429     methylPREDNISolone acetate (DEPO-MEDROL) injection 80 mg, 80 mg, , , Alexandre Calle MD, 80 mg at 08/15/18 1441    Allergies:   Allergies   Allergen Reactions     Erythromycin Swelling and Other (See Comments)     REVIEW OF SYSTEMS:   CONSTITUTIONAL:NEGATIVE for fever, chills, change in weight  INTEGUMENTARY/SKIN: NEGATIVE for  "worrisome rashes, moles or lesions  MUSCULOSKELETAL:See HPI above  NEURO: NEGATIVE for weakness, dizziness or paresthesias        PHYSICAL EXAM:  /80   Pulse 90   Resp 16   Ht 1.638 m (5' 4.5\")   Wt 96.5 kg (212 lb 12.8 oz)   SpO2 98%   BMI 35.96 kg/m      GENERAL APPEARANCE: healthy, alert, no distress.  SKIN: no suspicious lesions or rashes  NEURO: Normal strength and tone, mentation intact and speech normal  PSYCH:  mentation appears normal and affect normal/bright, not anxious  RESPIRATORY: No increased work of breathing.    BILATERAL LOWER EXTREMITIES:  Gait: antalgic favoring left   Alignment: varus  No gross deformities or masses.  No Quad atrophy, strength normal.  Intact sensation deep peroneal nerve, superficial peroneal nerve, med/lat tibial nerve, sural nerve, saphenous nerve  Intact EHL, EDL, TA, FHL, GS, quadriceps hamstrings and hip flexors  Palpable 2+ posterior tibial pulses.  Bilateral calf soft and nttp or squeeze.  Edema: 1+, Pitting left lower extremity, trace right lower extremity.    LEFT KNEE EXAM:    Skin: intact, no ecchymosis or erythema  AROM: 3 extension to 110 flexion  Tight hamstrings on straight leg raise.  Effusion: trace  Tender: tender to palpation medial joint line, medial distal hamstrings and pes insertion (most severe at pes)   nontender to palpation lateral joint line, posterior knee    MCL: stable, and non-painful at both 0 and 30 degrees knee flexion  Varus stress: stable, and non-painful at both 0 and 30 degrees knee flexion  Posterior Drawer stable  Patellofemoral joint:                Apprehension: negative              Crepitations: mild   Grind: positive     RIGHT KNEE EXAM:    Skin: intact, no ecchymosis or erythema  ROM: 0 extension to 120 flexion  Tight hamstrings on straight leg raise.  Effusion: none  Tender: medial joint line, pes bursa  NTTP lateral joint line, posterior knee.    MCL: stable, and non-painful at both 0 and 30 degrees knee " flexion  Varus stress: stable, and non-painful at both 0 and 30 degrees knee flexion  Posterior Drawer stable  Patellofemoral joint:                Apprehension: negative              Crepitations: mild   Grind: negative.    X-RAY: no new images today.    3 views bilateral knee 8/15/2018 reviewed. Left knee with Severe medial and moderate patello-femoral degenerative changes. Medial space is near bone on bone with subchondrdal sclerosis. There are tricompartmental osteophytes, most prominent patello-femoral compartment left knee. Right knee with mild medial space narrowing.    MRI:  MRI left knee from 7/31/2012 was reviewed in clinic today.   IMPRESSION:  1. Free edge fraying of the body and posterior horn of the left knee  medial meniscus with large areas of full-thickness loss of articular  cartilage in the medial compartment. Small foci of subjacent  subchondral edema within the medial femoral condyle.  2. Small focal area of high-grade full thickness cartilage loss  measuring 8 mm along the anterior aspect of the left lateral femoral  trochlea with associated focus of subchondral edema within the lateral  femoral condyle  3. Irregularity with small foci of full thickness cartilage loss along  the median ridge of the patella with associated subchondral edema in  the superior pole of the patella.  4. Thickening of the proximal MCL suggesting old injury.  5. Small-moderate left knee joint effusion.      Impression:   78 year old female with chronic bilateral knee pain, primary osteoarthritis and pes bursitis, left more than the right.    Plan:   Again, consistent with knee osteoarthritis, left more than the right.  Had nice discussion about more seriously having total knee arthroplasty. She believes fall 2019 it can happen. She was made aware that will need at least 3 months between last injection and total knee arthroplasty.    Treatment:  * rest  * Activity modification - avoid impact activities or activities  that aggravate symptoms  * ice, 15-20 minutes at a time several times a day or as needed.  * Strengthening of quadriceps muscles  * Physical Therapy ordered for strengthening, stretching and range of motion exercises  * Tylenol as needed for pain  * Weight loss: Weight loss: The patient's Body mass index is 35.96 kg/m .. Discussed with patient weight loss benefits, not only for the current pain symptoms, but also overall health. Recommend a good diet plan that works for the patient, with the assistance of a dietician or PCP as needed. Also, a good, low-impact exercise program for at least 20 minutes per day, 3 times per week, such as exercise bike, elliptical , or pool.  * Exercise: low impact such as stationary bike, elliptical, pool.  * Injections: risks and perceived benefits of cortisone versus viscosupplementation injections discussed. Patient elected to proceed. See procedure note below for left knee intra-articular injection. Decided to try Kenalog as last injection only worked for ~8 weeks.  * Bracing: bracing the knee may offer some relief of symptoms when worn  * Return to clinic as needed. Consider right knee injections in future as needed.      William Guerrero PA-C, CAQ (Ortho)  Supervising Physician: Alexandre Calle M.D., M.S.  Dept. of Orthopaedic Surgery  [facility]    Large Joint Injection/Arthocentesis: L knee joint  Date/Time: 3/15/2019 10:04 AM  Performed by: William Guerrero PA  Authorized by: William Guerrero PA     Indications:  Pain and osteoarthritis  Needle Size:  22 G  Guidance: landmark guided    Approach:  Anteromedial  Location:  Knee  Site:  L knee joint  Medications:  3 mL bupivacaine 0.25 %; 4 mL lidocaine 1 %; 80 mg triamcinolone 40 MG/ML  Outcome:  Tolerated well, no immediate complications  Procedure discussed: discussed risks, benefits, and alternatives    Consent Given by:  Patient  Prep: patient was prepped and draped in usual sterile fashion             Again, thank you for allowing me to participate in the care of your patient.        Sincerely,        Alexandre Calle MD

## 2019-03-15 NOTE — PROGRESS NOTES
CHIEF COMPLAINT:   Chief Complaint   Patient presents with     Left Knee - Follow Up     Last injection: 1/9/19 - knee and pes. Patient states the injection didn't last at all. Injection just did not work at all. Pain is medial/anterior. She would like more injections.        HISTORY OF PRESENT ILLNESS    Yumiko Flowers is a 78 year old female seen primarily left knee pain with no known injury. Left knee Pain has been present since ~7/5/2012. She has right knee pain but not as bad.  She also has low back pain with right sided sciatica. She returns today with recurring left knee pain.  She has pain in the morning or with prolonged sitting. For treatment, she has been icing, taking ibuprofen and bracing. She was last seenon 1/9/2019. Had pes anserine bursa and intraarticular injections into the left knee with good relief.  Patient would like to try cortisone injections again today.       Present symptoms: pain medially , pain sharp, dull/achy , mild swelling.    Pain severity: 7/10  Frequency of symptoms: are constant, worse with weight bearing  Exacerbating Factors: weight bearing, stairs, [id number], prolonged sitting, prolonged standing, night  Relieving Factors: cortisone injections, left knee pes anserine bursa and intraarticular   Night Pain: Yes  Pain while at rest: Yes   Numbness or tingling: No   Patient has tried:     NSAIDS: yes, Lodine currently     Physical Therapy: No      Activity modification: Yes      Bracing: yes     Injections: yes pes and intra-articular with good relief left knee only.     Ice: yes     Other: ace wrap    Orthopaedic PMH: none.    Other PMH:  has a past medical history of Acne rosacea, Actinic keratosis, Amblyopia, Asthma, moderate persistent, Basal cell carcinoma (10/2010), Cataract, mod, od; mild-mod, os (1/12/2012), DJD (degenerative joint disease), lumbosacral (8/6/2014), Elevated cholesterol, Endometriosis, HTN (hypertension), Moderate major depression (H), and Osteopenia.  She also has no past medical history of Acne keloidalis, Allergies, Diabetes (H), Diabetes mellitus (H), Diabetic retinopathy (H), Eczema, Glaucoma, Glaucoma (increased eye pressure), Heart valve disorder, Macular degeneration, Malignant melanoma nos, Pacemaker, Photosensitive contact dermatitis, Psoriasis, Retinal detachment, Senile macular degeneration, Skin cancer, Squamous cell carcinoma, Strabismus, Type II or unspecified type diabetes mellitus without mention of complication, not stated as uncontrolled, Urticaria, or Uveitis.  Patient Active Problem List    Diagnosis Date Noted     Vertigo 08/16/2017     Priority: Medium     Cervicalgia 08/03/2017     Priority: Medium     Chronic pain of left knee 02/27/2017     Priority: Medium     Posterior vitreous detachment, bilateral 01/14/2017     Priority: Medium     Obesity, Class I, BMI 30-34.9 01/12/2017     Priority: Medium     Essential hypertension with goal blood pressure less than 140/90 11/01/2016     Priority: Medium     Rosacea 05/18/2015     Priority: Medium     Obesity, Class II, BMI 35-39.9 08/06/2014     Priority: Medium     Restless legs syndrome 08/06/2014     Priority: Medium     DJD (degenerative joint disease), lumbosacral 08/06/2014     Priority: Medium     Advanced on X rays       History of blepharoplasty, upper lid, ou (elsewhere); revision (EN) 09/14/2013     Priority: Medium     Health Care Home 01/24/2013     Priority: Medium     Monserrat Rolle RN-PHN  FPA / [facility]G [facility]   [phone]   DX V65.8 REPLACED WITH 08729 HEALTH CARE HOME (04/08/2013)       Peptic ulcer 01/23/2013     Priority: Medium     HTN, goal below 140/90 03/02/2012     Priority: Medium     Pseudophakia, ou; Yag Caps, os 01/30/2012     Priority: Medium     Hx of pseudoexfoliation, 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  Yumiko Flowers has no plan or code status on file. Mailed available resources and provided with information. Confirmed code status reflects current choices pending further ACP discussions.  Confirmed/documented designated decision maker(s). See permanent comments section of demographics in clinical tab.   Added by Jacqueline Mendoza on 2/26/2015  Discussed advance care planning with patient; information given to patient to review. 11/18/2011   TANYA Clinton MA       Vitamin D deficiency 02/16/2011     Priority: Medium     Osteopenia      Priority: Medium     CARDIOVASCULAR SCREENING; LDL GOAL LESS THAN 130 10/31/2010     Priority: Medium     Asthma, moderate persistent      Priority: Medium     Acne rosacea      Priority: Medium       Surgical Hx:  has a past surgical history that includes REMOVAL GALLBLADDER; APPENDECTOMY; surgical history of - ; surgical history of - ; surgical history of - ; Blepharoplasty bilateral (3/9/2006; 2013); Phacoemulsification with standard intraocular lens implant (1/2012; 4/2013); cataract iol, rt/lt; Laser YAG capsulotomy (); and Repair ptosis.    Medications:   Current Outpatient Medications:      CALCIUM 500 MG PO CHEW, None Entered, Disp: , Rfl:      Cholecalciferol (VITAMIN D) 2000 UNIT CAPS, Take 2,000 Units by mouth daily., Disp: , Rfl:      doxycycline hyclate (VIBRA-TABS) 100 MG tablet, TAKE 1 TABLET (100 MG) BY MOUTH 2 TIMES DAILY, Disp: 180 tablet, Rfl: 0     etodolac (LODINE) 400 MG tablet, TAKE 1 TABLET (400 MG) BY MOUTH 2 TIMES DAILY AS NEEDED, Disp: 60 tablet, Rfl: 0     gabapentin (NEURONTIN) 300 MG capsule, Take 1 capsule (300 mg) by mouth 4 times daily, Disp: 120 capsule, Rfl: 1     gabapentin (NEURONTIN) 600 MG tablet, Take 1 tablet (600 mg) by mouth 2 times daily, Disp: 180 tablet, Rfl: 3     lisinopril-hydrochlorothiazide (PRINZIDE/ZESTORETIC) 20-12.5 MG per tablet, TAKE 2 TABLETS BY MOUTH DAILY, Disp: 180 tablet, Rfl:  "1     methylPREDNISolone (MEDROL DOSEPAK) 4 MG tablet therapy pack, Follow Package Directions, Disp: 21 tablet, Rfl: 0     methylPREDNISolone (MEDROL DOSEPAK) 4 MG tablet, Follow package instructions, Disp: 21 tablet, Rfl: 0     Multiple Vitamins-Iron TABS, Take 1 tablet by mouth daily., Disp: , Rfl:      tiZANidine (ZANAFLEX) 4 MG tablet, Take 1-2 tablets (4-8 mg) by mouth nightly as needed, Disp: 30 tablet, Rfl: 1    Current Facility-Administered Medications:      bupivacaine (MARCAINE) 0.25 % injection 3 mL, 3 mL, , , Alexandre Calle MD, 3 mL at 01/09/19 1550     bupivacaine (MARCAINE) 0.25 % injection 3 mL, 3 mL, , , Alexandre Calle MD, 3 mL at 08/15/18 1429     lidocaine 1 % injection 3 mL, 3 mL, , , Alexandre Calle MD, 3 mL at 01/09/19 1551     lidocaine 1 % injection 3 mL, 3 mL, , , Alexandre Calle MD, 3 mL at 08/15/18 1441     lidocaine 1 % injection 4 mL, 4 mL, , , Alexandre Calle MD, 4 mL at 01/09/19 1550     lidocaine 1 % injection 4 mL, 4 mL, , , Alexandre Calle MD, 4 mL at 08/15/18 1429     methylPREDNISolone (DEPO-MEDROL) injection 80 mg, 80 mg, , , Alexandre Calle MD, 80 mg at 01/09/19 1550     methylPREDNISolone (DEPO-MEDROL) injection 80 mg, 80 mg, , , Alexandre Calle MD, 80 mg at 01/09/19 1551     methylPREDNISolone acetate (DEPO-MEDROL) injection 80 mg, 80 mg, , , Alexandre Calle MD, 80 mg at 08/15/18 1429     methylPREDNISolone acetate (DEPO-MEDROL) injection 80 mg, 80 mg, , , Alexandre Calle MD, 80 mg at 08/15/18 1441    Allergies:   Allergies   Allergen Reactions     Erythromycin Swelling and Other (See Comments)     REVIEW OF SYSTEMS:   CONSTITUTIONAL:NEGATIVE for fever, chills, change in weight  INTEGUMENTARY/SKIN: NEGATIVE for worrisome rashes, moles or lesions  MUSCULOSKELETAL:See HPI above  NEURO: NEGATIVE for weakness, dizziness or paresthesias        PHYSICAL EXAM:  /80   Pulse 90   Resp 16   Ht 1.638 m (5' 4.5\")   Wt 96.5 kg (212 lb 12.8 oz)   " SpO2 98%   BMI 35.96 kg/m     GENERAL APPEARANCE: healthy, alert, no distress.  SKIN: no suspicious lesions or rashes  NEURO: Normal strength and tone, mentation intact and speech normal  PSYCH:  mentation appears normal and affect normal/bright, not anxious  RESPIRATORY: No increased work of breathing.    BILATERAL LOWER EXTREMITIES:  Gait: antalgic favoring left   Alignment: varus  No gross deformities or masses.  No Quad atrophy, strength normal.  Intact sensation deep peroneal nerve, superficial peroneal nerve, med/lat tibial nerve, sural nerve, saphenous nerve  Intact EHL, EDL, TA, FHL, GS, quadriceps hamstrings and hip flexors  Palpable 2+ posterior tibial pulses.  Bilateral calf soft and nttp or squeeze.  Edema: 1+, Pitting left lower extremity, trace right lower extremity.    LEFT KNEE EXAM:    Skin: intact, no ecchymosis or erythema  AROM: 3 extension to 110 flexion  Tight hamstrings on straight leg raise.  Effusion: trace  Tender: tender to palpation medial joint line, medial distal hamstrings and pes insertion (most severe at pes)   nontender to palpation lateral joint line, posterior knee    MCL: stable, and non-painful at both 0 and 30 degrees knee flexion  Varus stress: stable, and non-painful at both 0 and 30 degrees knee flexion  Posterior Drawer stable  Patellofemoral joint:                Apprehension: negative              Crepitations: mild   Grind: positive     RIGHT KNEE EXAM:    Skin: intact, no ecchymosis or erythema  ROM: 0 extension to 120 flexion  Tight hamstrings on straight leg raise.  Effusion: none  Tender: medial joint line, pes bursa  NTTP lateral joint line, posterior knee.    MCL: stable, and non-painful at both 0 and 30 degrees knee flexion  Varus stress: stable, and non-painful at both 0 and 30 degrees knee flexion  Posterior Drawer stable  Patellofemoral joint:                Apprehension: negative              Crepitations: mild   Grind: negative.    X-RAY: no new images  today.    3 views bilateral knee 8/15/2018 reviewed. Left knee with Severe medial and moderate patello-femoral degenerative changes. Medial space is near bone on bone with subchondrdal sclerosis. There are tricompartmental osteophytes, most prominent patello-femoral compartment left knee. Right knee with mild medial space narrowing.    MRI:  MRI left knee from 7/31/2012 was reviewed in clinic today.   IMPRESSION:  1. Free edge fraying of the body and posterior horn of the left knee  medial meniscus with large areas of full-thickness loss of articular  cartilage in the medial compartment. Small foci of subjacent  subchondral edema within the medial femoral condyle.  2. Small focal area of high-grade full thickness cartilage loss  measuring 8 mm along the anterior aspect of the left lateral femoral  trochlea with associated focus of subchondral edema within the lateral  femoral condyle  3. Irregularity with small foci of full thickness cartilage loss along  the median ridge of the patella with associated subchondral edema in  the superior pole of the patella.  4. Thickening of the proximal MCL suggesting old injury.  5. Small-moderate left knee joint effusion.      Impression:   78 year old female with chronic bilateral knee pain, primary osteoarthritis and pes bursitis, left more than the right.    Plan:   Again, consistent with knee osteoarthritis, left more than the right.  Had nice discussion about more seriously having total knee arthroplasty. She believes fall 2019 it can happen. She was made aware that will need at least 3 months between last injection and total knee arthroplasty.    Treatment:  * rest  * Activity modification - avoid impact activities or activities that aggravate symptoms  * ice, 15-20 minutes at a time several times a day or as needed.  * Strengthening of quadriceps muscles  * Physical Therapy ordered for strengthening, stretching and range of motion exercises  * Tylenol as needed for pain  *  Weight loss: Weight loss: The patient's Body mass index is 35.96 kg/m .. Discussed with patient weight loss benefits, not only for the current pain symptoms, but also overall health. Recommend a good diet plan that works for the patient, with the assistance of a dietician or PCP as needed. Also, a good, low-impact exercise program for at least 20 minutes per day, 3 times per week, such as exercise bike, elliptical , or pool.  * Exercise: low impact such as stationary bike, elliptical, pool.  * Injections: risks and perceived benefits of cortisone versus viscosupplementation injections discussed. Patient elected to proceed. See procedure note below for left knee intra-articular injection. Decided to try Kenalog as last injection only worked for ~8 weeks.  * Bracing: bracing the knee may offer some relief of symptoms when worn  * Return to clinic as needed. Consider right knee injections in future as needed.      William Guerrero PA-C, CAQ (Ortho)  Supervising Physician: Alexandre Calle M.D., M.S.  Dept. of Orthopaedic Surgery  [facility]    Large Joint Injection/Arthocentesis: L knee joint  Date/Time: 3/15/2019 10:04 AM  Performed by: William Guerrero PA  Authorized by: William Guerrero PA     Indications:  Pain and osteoarthritis  Needle Size:  22 G  Guidance: landmark guided    Approach:  Anteromedial  Location:  Knee  Site:  L knee joint  Medications:  3 mL bupivacaine 0.25 %; 4 mL lidocaine 1 %; 80 mg triamcinolone 40 MG/ML  Outcome:  Tolerated well, no immediate complications  Procedure discussed: discussed risks, benefits, and alternatives    Consent Given by:  Patient  Prep: patient was prepped and draped in usual sterile fashion

## 2019-04-01 ENCOUNTER — ANCILLARY PROCEDURE (OUTPATIENT)
Dept: GENERAL RADIOLOGY | Facility: CLINIC | Age: 79
End: 2019-04-01
Attending: PREVENTIVE MEDICINE
Payer: COMMERCIAL

## 2019-04-01 DIAGNOSIS — M51.26 LUMBAR DISC HERNIATION: ICD-10-CM

## 2019-04-01 PROCEDURE — 62323 NJX INTERLAMINAR LMBR/SAC: CPT | Performed by: RADIOLOGY

## 2019-04-01 RX ORDER — IOPAMIDOL 408 MG/ML
10 INJECTION, SOLUTION INTRATHECAL ONCE
Status: COMPLETED | OUTPATIENT
Start: 2019-04-01 | End: 2019-04-01

## 2019-04-01 RX ORDER — METHYLPREDNISOLONE ACETATE 80 MG/ML
80 INJECTION, SUSPENSION INTRA-ARTICULAR; INTRALESIONAL; INTRAMUSCULAR; SOFT TISSUE ONCE
Status: COMPLETED | OUTPATIENT
Start: 2019-04-01 | End: 2019-04-01

## 2019-04-01 RX ORDER — BUPIVACAINE HYDROCHLORIDE 5 MG/ML
10 INJECTION, SOLUTION PERINEURAL ONCE
Status: COMPLETED | OUTPATIENT
Start: 2019-04-01 | End: 2019-04-01

## 2019-04-01 RX ADMIN — BUPIVACAINE HYDROCHLORIDE 5 MG: 5 INJECTION, SOLUTION PERINEURAL at 14:13

## 2019-04-01 RX ADMIN — IOPAMIDOL 2 ML: 408 INJECTION, SOLUTION INTRATHECAL at 14:14

## 2019-04-01 RX ADMIN — METHYLPREDNISOLONE ACETATE 80 MG: 80 INJECTION, SUSPENSION INTRA-ARTICULAR; INTRALESIONAL; INTRAMUSCULAR; SOFT TISSUE at 14:14

## 2019-04-01 NOTE — DISCHARGE SUMMARY
AFTER YOU GO HOME    ? DO relax; minimize your activity for 24 hours  ? You may resume normal activity tomorrow  ? You may remove the bandage in the evening or next morning  ? You may resume bathing the next day  ? Drink at least 4 extra glasses of fluid today if not on fluid restrictions  ? DO NOT drive or operate machinery at home or at work for at least 24 hours      VISIT THE EMERGENCY ROOM OR URGENT CARE IF:    ? There is redness or swelling at the injection site  ? There is discharge from the injection site  ? You develop a temperature of 101  F or greater      ADDITIONAL INSTRUCTIONS:     ? You may resume your Coumadin or other blood thinner at your regular dose today.  Follow up with your physician to have your INR rechecked if indicated.  ? If you gain no relief from the injection after two (2) weeks, follow-up with your provider for your options.        Contacts:    During business hours from 8 to 5 pm, you may call [phone] to reach a nurse advisor at [facility].  After hours, call [facility]  [phone].  Ask for the Radiologist on-call.  Someone is on-call 24 hrs/day.  [facility] Toll Free Number   [phone]

## 2019-04-01 NOTE — PROGRESS NOTES
: Yumiko Flowers was seen in X-ray today for a lumbar epidural injection. Patient rated pain before procedure 6/10. After procedure patient rated pain 3/10. This pain level is acceptable to patient, even with the pain she was feeling during the injection. Patient discharged home with her friend Yumiko.

## 2019-04-15 ENCOUNTER — OFFICE VISIT (OUTPATIENT)
Dept: ORTHOPEDICS | Facility: CLINIC | Age: 79
End: 2019-04-15
Payer: COMMERCIAL

## 2019-04-15 VITALS
HEIGHT: 65 IN | HEART RATE: 69 BPM | SYSTOLIC BLOOD PRESSURE: 106 MMHG | DIASTOLIC BLOOD PRESSURE: 66 MMHG | WEIGHT: 212 LBS | BODY MASS INDEX: 35.32 KG/M2

## 2019-04-15 DIAGNOSIS — M51.369 DDD (DEGENERATIVE DISC DISEASE), LUMBAR: Primary | ICD-10-CM

## 2019-04-15 DIAGNOSIS — M17.12 ARTHRITIS OF LEFT KNEE: ICD-10-CM

## 2019-04-15 PROCEDURE — 99213 OFFICE O/P EST LOW 20 MIN: CPT | Performed by: PREVENTIVE MEDICINE

## 2019-04-15 RX ORDER — METHYLPREDNISOLONE 4 MG
TABLET, DOSE PACK ORAL
Qty: 21 TABLET | Refills: 0 | Status: SHIPPED | OUTPATIENT
Start: 2019-04-15 | End: 2019-06-12

## 2019-04-15 ASSESSMENT — MIFFLIN-ST. JEOR: SCORE: 1444.57

## 2019-04-15 ASSESSMENT — PAIN SCALES - GENERAL: PAINLEVEL: SEVERE PAIN (6)

## 2019-04-15 NOTE — PATIENT INSTRUCTIONS
Thanks for coming today.  Ortho/Sports Medicine Clinic  [address] [place] [address]    To schedule future appointments in Ortho Clinic, you may call [phone].    To schedule ordered imaging by your Provider: Call [facility] at [phone]    [employer] available online at:   [email or web address]    Please call if any further questions or concerns [phone] and ask for the Orthopedic Department. Clinic hours 8 am to 5 pm.    Return to clinic if symptoms worsen.

## 2019-04-15 NOTE — PROGRESS NOTES
"      [facility] FOLLOW-UP VISIT 4/15/2019    Yumiko Flowers's chief complaint for this visit includes:  Chief Complaint   Patient presents with     RECHECK     f/u lumbar TANK 4/1/19, doing a little better      PCP: Gavi Bear    Referring Provider:  No referring provider defined for this encounter.    Ht 1.638 m (5' 4.5\")   Wt 96.2 kg (212 lb)   BMI 35.83 kg/m    Severe Pain (6)       Interval History:     Follow up reason: f/u lumbar TANK 4/1/19    Following Therapeutic Plan: Yes     Pain: Unchanged    Function: Unchanged    Medical History:    Any recent changes to your medical history? No    Any new medication prescribed since last visit? No    Review of Systems:    Do you have fever, chills, weight loss? No    Do you have any vision problems? No    Do you have any chest pain or edema? No    Do you have any shortness of breath or wheezing?  No    Do you have stomach problems? No    Do you have any numbness or focal weakness? No    Do you have diabetes? No    Do you have problems with bleeding or clotting? No    Do you have an rashes or other skin lesions? No    "

## 2019-04-15 NOTE — LETTER
"    4/15/2019         RE: Yumiko Flowers  [address]  [place] [address]        Dear Colleague,    Thank you for referring your patient, Yumiko Flowers, to the [facility]. Please see a copy of my visit note below.          [facility] FOLLOW-UP VISIT 4/15/2019    Yumiok Flowers's chief complaint for this visit includes:  Chief Complaint   Patient presents with     RECHECK     f/u lumbar TANK 4/1/19, doing a little better      PCP: Gavi Bear    Referring Provider:  No referring provider defined for this encounter.    Ht 1.638 m (5' 4.5\")   Wt 96.2 kg (212 lb)   BMI 35.83 kg/m     Severe Pain (6)       Interval History:     Follow up reason: f/u lumbar TANK 4/1/19    Following Therapeutic Plan: Yes     Pain: Unchanged    Function: Unchanged    Medical History:    Any recent changes to your medical history? No    Any new medication prescribed since last visit? No    Review of Systems:    Do you have fever, chills, weight loss? No    Do you have any vision problems? No    Do you have any chest pain or edema? No    Do you have any shortness of breath or wheezing?  No    Do you have stomach problems? No    Do you have any numbness or focal weakness? No    Do you have diabetes? No    Do you have problems with bleeding or clotting? No    Do you have an rashes or other skin lesions? No      HISTORY OF PRESENT ILLNESS  Ms. Flowers is a pleasant 78 year old year old female who presents to clinic today for followup for lumbar injection  She has had some improvement from the injection, but still has pain in her legs and low back  Had injection in knee last visit and had improvement overall, but still has pain in leg    Additional history: as documented    MEDICAL HISTORY  Patient Active Problem List   Diagnosis     Asthma, moderate persistent     Acne rosacea     CARDIOVASCULAR SCREENING; LDL GOAL LESS THAN 130     Osteopenia     Vitamin D deficiency     Advanced directives, " counseling/discussion     Hx of pseudoexfoliation, os     Pseudophakia, ou; Yag Caps, os     HTN, goal below 140/90     Peptic ulcer     Health Care Home     History of blepharoplasty, upper lid, ou (elsewhere); revision (EN)     Obesity, Class II, BMI 35-39.9     Restless legs syndrome     DJD (degenerative joint disease), lumbosacral     Rosacea     Essential hypertension with goal blood pressure less than 140/90     Obesity, Class I, BMI 30-34.9     Posterior vitreous detachment, bilateral     Chronic pain of left knee     Cervicalgia     Vertigo     Obesity (BMI 35.0-39.9) with comorbidity (H)       Current Outpatient Medications   Medication Sig Dispense Refill     CALCIUM 500 MG PO CHEW None Entered       Cholecalciferol (VITAMIN D) 2000 UNIT CAPS Take 2,000 Units by mouth daily.       doxycycline hyclate (VIBRA-TABS) 100 MG tablet TAKE 1 TABLET (100 MG) BY MOUTH 2 TIMES DAILY 180 tablet 0     etodolac (LODINE) 400 MG tablet TAKE 1 TABLET (400 MG) BY MOUTH 2 TIMES DAILY AS NEEDED 60 tablet 0     gabapentin (NEURONTIN) 300 MG capsule Take 1 capsule (300 mg) by mouth 4 times daily 120 capsule 1     gabapentin (NEURONTIN) 600 MG tablet Take 1 tablet (600 mg) by mouth 2 times daily 180 tablet 3     lisinopril-hydrochlorothiazide (PRINZIDE/ZESTORETIC) 20-12.5 MG per tablet TAKE 2 TABLETS BY MOUTH DAILY 180 tablet 1     methylPREDNISolone (MEDROL DOSEPAK) 4 MG tablet therapy pack Follow Package Directions 21 tablet 0     methylPREDNISolone (MEDROL DOSEPAK) 4 MG tablet Follow package instructions 21 tablet 0     Multiple Vitamins-Iron TABS Take 1 tablet by mouth daily.       tiZANidine (ZANAFLEX) 4 MG tablet Take 1-2 tablets (4-8 mg) by mouth nightly as needed 30 tablet 1       Allergies   Allergen Reactions     Erythromycin Swelling and Other (See Comments)       Family History   Problem Relation Age of Onset     C.A.D. Father      C.A.D. Brother      Hypertension Mother      Cancer No family hx of      Diabetes No  "family hx of      Cerebrovascular Disease No family hx of      Thyroid Disease No family hx of      Glaucoma No family hx of      Macular Degeneration No family hx of        Additional medical/Social/Surgical histories reviewed in [facility] and updated as appropriate.     REVIEW OF SYSTEMS (4/15/2019)  10 point ROS of systems including Constitutional, Eyes, Respiratory, Cardiovascular, Gastroenterology, Genitourinary, Integumentary, Musculoskeletal, Psychiatric were all negative except for pertinent positives noted in my HPI.     PHYSICAL EXAM  Vitals:    04/15/19 1002   BP: 106/66   BP Location: Left arm   Patient Position: Sitting   Pulse: 69   Weight: 96.2 kg (212 lb)   Height: 1.638 m (5' 4.5\")     Vital Signs: /66 (BP Location: Left arm, Patient Position: Sitting)   Pulse 69   Ht 1.638 m (5' 4.5\")   Wt 96.2 kg (212 lb)   BMI 35.83 kg/m    Patient declined being weighed. Body mass index is 35.83 kg/m .    General  - normal appearance, in no obvious distress  CV  - normal peripheral perfusion  Pulm  - normal respiratory pattern, non-labored  Musculoskeletal - lumbar spine  - stance: normal gait without limp, no obvious leg length discrepancy, normal heel and toe walk  - inspection: normal bone and joint alignment, no obvious scoliosis  - palpation: no paravertebral or bony tenderness  - ROM: flexion exacerbates pain, normal extension, sidebending, rotation  - strength: lower extremities 5/5 in all planes  - special tests:  (+) straight leg raise  (+) slump test  Neuro  - patellar and Achilles DTRs 2+ bilaterally, lower extremity sensory deficit throughout L5 distribution, grossly normal coordination, normal muscle tone  Skin  - no ecchymosis, erythema, warmth, or induration, no obvious rash  Psych  - interactive, appropriate, normal mood and affect  ASSESSMENT & PLAN  77 yo female with lumbar radicular pain, ddd, not resolved, but improved  Reviewed response to injection and will consider another in 1-2 " months  Knee is improved with gel one  F/u in 1-2 months  Medrol and tizanadine    Abad Armendariz MD, [facility]    Again, thank you for allowing me to participate in the care of your patient.        Sincerely,        Abad Armendariz MD

## 2019-04-15 NOTE — PROGRESS NOTES
HISTORY OF PRESENT ILLNESS  Ms. Flowers is a pleasant 78 year old year old female who presents to clinic today for followup for lumbar injection  She has had some improvement from the injection, but still has pain in her legs and low back  Had injection in knee last visit and had improvement overall, but still has pain in leg    Additional history: as documented    MEDICAL HISTORY  Patient Active Problem List   Diagnosis     Asthma, moderate persistent     Acne rosacea     CARDIOVASCULAR SCREENING; LDL GOAL LESS THAN 130     Osteopenia     Vitamin D deficiency     Advanced directives, counseling/discussion     Hx of pseudoexfoliation, os     Pseudophakia, ou; Yag Caps, os     HTN, goal below 140/90     Peptic ulcer     Health Care Home     History of blepharoplasty, upper lid, ou (elsewhere); revision (EN)     Obesity, Class II, BMI 35-39.9     Restless legs syndrome     DJD (degenerative joint disease), lumbosacral     Rosacea     Essential hypertension with goal blood pressure less than 140/90     Obesity, Class I, BMI 30-34.9     Posterior vitreous detachment, bilateral     Chronic pain of left knee     Cervicalgia     Vertigo     Obesity (BMI 35.0-39.9) with comorbidity (H)       Current Outpatient Medications   Medication Sig Dispense Refill     CALCIUM 500 MG PO CHEW None Entered       Cholecalciferol (VITAMIN D) 2000 UNIT CAPS Take 2,000 Units by mouth daily.       doxycycline hyclate (VIBRA-TABS) 100 MG tablet TAKE 1 TABLET (100 MG) BY MOUTH 2 TIMES DAILY 180 tablet 0     etodolac (LODINE) 400 MG tablet TAKE 1 TABLET (400 MG) BY MOUTH 2 TIMES DAILY AS NEEDED 60 tablet 0     gabapentin (NEURONTIN) 300 MG capsule Take 1 capsule (300 mg) by mouth 4 times daily 120 capsule 1     gabapentin (NEURONTIN) 600 MG tablet Take 1 tablet (600 mg) by mouth 2 times daily 180 tablet 3     lisinopril-hydrochlorothiazide (PRINZIDE/ZESTORETIC) 20-12.5 MG per tablet TAKE 2 TABLETS BY MOUTH DAILY 180 tablet 1      "methylPREDNISolone (MEDROL DOSEPAK) 4 MG tablet therapy pack Follow Package Directions 21 tablet 0     methylPREDNISolone (MEDROL DOSEPAK) 4 MG tablet Follow package instructions 21 tablet 0     Multiple Vitamins-Iron TABS Take 1 tablet by mouth daily.       tiZANidine (ZANAFLEX) 4 MG tablet Take 1-2 tablets (4-8 mg) by mouth nightly as needed 30 tablet 1       Allergies   Allergen Reactions     Erythromycin Swelling and Other (See Comments)       Family History   Problem Relation Age of Onset     C.A.D. Father      C.A.D. Brother      Hypertension Mother      Cancer No family hx of      Diabetes No family hx of      Cerebrovascular Disease No family hx of      Thyroid Disease No family hx of      Glaucoma No family hx of      Macular Degeneration No family hx of        Additional medical/Social/Surgical histories reviewed in [employer] and updated as appropriate.     REVIEW OF SYSTEMS (4/15/2019)  10 point ROS of systems including Constitutional, Eyes, Respiratory, Cardiovascular, Gastroenterology, Genitourinary, Integumentary, Musculoskeletal, Psychiatric were all negative except for pertinent positives noted in my HPI.     PHYSICAL EXAM  Vitals:    04/15/19 1002   BP: 106/66   BP Location: Left arm   Patient Position: Sitting   Pulse: 69   Weight: 96.2 kg (212 lb)   Height: 1.638 m (5' 4.5\")     Vital Signs: /66 (BP Location: Left arm, Patient Position: Sitting)   Pulse 69   Ht 1.638 m (5' 4.5\")   Wt 96.2 kg (212 lb)   BMI 35.83 kg/m   Patient declined being weighed. Body mass index is 35.83 kg/m .    General  - normal appearance, in no obvious distress  CV  - normal peripheral perfusion  Pulm  - normal respiratory pattern, non-labored  Musculoskeletal - lumbar spine  - stance: normal gait without limp, no obvious leg length discrepancy, normal heel and toe walk  - inspection: normal bone and joint alignment, no obvious scoliosis  - palpation: no paravertebral or bony tenderness  - ROM: flexion exacerbates " pain, normal extension, sidebending, rotation  - strength: lower extremities 5/5 in all planes  - special tests:  (+) straight leg raise  (+) slump test  Neuro  - patellar and Achilles DTRs 2+ bilaterally, lower extremity sensory deficit throughout L5 distribution, grossly normal coordination, normal muscle tone  Skin  - no ecchymosis, erythema, warmth, or induration, no obvious rash  Psych  - interactive, appropriate, normal mood and affect  ASSESSMENT & PLAN  77 yo female with lumbar radicular pain, ddd, not resolved, but improved  Reviewed response to injection and will consider another in 1-2 months  Knee is improved with gel one  F/u in 1-2 months  Medrol and tizanadine    Abad Armendariz MD, CAQSM

## 2019-04-18 ENCOUNTER — TELEPHONE (OUTPATIENT)
Dept: ORTHOPEDICS | Facility: CLINIC | Age: 79
End: 2019-04-18

## 2019-04-18 NOTE — TELEPHONE ENCOUNTER
Financial Counselor Review for Hylan (gel) injection:    Procedure to be performed (include CPT code):Yes DRAIN/INJECT LARGE JOINT/BURSA - CPT: 90629    Diagnosis code (include ICD-10 code): Left knee degenerative osteoarthritis M17.2    Medication order (include J code):Yes     Synvisc One (Hylan G-F 20) - .014  Euflexxa -   Supartz -   Gel One -     Coverage and patient financial responsibility information:YES    Does patient need to be contacted by Financial Counselor:YES, only if Pt is required to pay anything    Note: Do not use abbreviations and route encounter to [facility] [place] [19030]

## 2019-04-18 NOTE — TELEPHONE ENCOUNTER
Spoke to Gillian at [facility] insurance, CPT code: ,  no auth or pre-d is needed for medically necessary procedures, and based on Medicare guideline.  Patient also had secondary insurance so patient should pay very little on this injections Call ref. # [id number]    Called and spoke to Yumiko and she would like to set up an appointment for these sooner than later. If you can call her back that would be great.  Thanks

## 2019-04-18 NOTE — TELEPHONE ENCOUNTER
4/18 called and left voicemail. Instructed patient to call [phone] and schedule gel injection.    Please schedule as procedure for 40 minutes.    Etelvina Pierre   Procedure    Ortho/Sports Med/Ent/Eye   [facility]   [phone]

## 2019-04-18 NOTE — TELEPHONE ENCOUNTER
Patient returned call and scheduled appointment with Dr. Armendariz for a gel injection on 4/24.  No further questions at this time.

## 2019-04-21 DIAGNOSIS — I10 ESSENTIAL HYPERTENSION WITH GOAL BLOOD PRESSURE LESS THAN 140/90: ICD-10-CM

## 2019-04-21 NOTE — TELEPHONE ENCOUNTER
"Requested Prescriptions   Pending Prescriptions Disp Refills     lisinopril-hydrochlorothiazide (PRINZIDE/ZESTORETIC) 20-12.5 MG tablet [Pharmacy Med Name: LISINOPRIL-HCTZ 20-12.5 MG TAB] 180 tablet 1     Sig: TAKE 2 TABLETS BY MOUTH EVERY DAY       Diuretics (Including Combos) Protocol Passed - 4/21/2019 12:17 AM        Passed - Blood pressure under 140/90 in past 12 months     BP Readings from Last 3 Encounters:   04/15/19 106/66   03/15/19 135/80   01/09/19 126/79                 Passed - Recent (12 mo) or future (30 days) visit within the authorizing provider's specialty     Patient had office visit in the last 12 months or has a visit in the next 30 days with authorizing provider or within the authorizing provider's specialty.  See \"Patient Info\" tab in inbasket, or \"Choose Columns\" in Meds & Orders section of the refill encounter.              Passed - Medication is active on med list        Passed - Patient is age 18 or older        Passed - No active pregancy on record        Passed - Normal serum creatinine on file in past 12 months     Recent Labs   Lab Test 07/31/18  1118   CR 0.76              Passed - Normal serum potassium on file in past 12 months     Recent Labs   Lab Test 07/31/18  1118   POTASSIUM 4.2                    Passed - Normal serum sodium on file in past 12 months     Recent Labs   Lab Test 07/31/18  1118                 Passed - No positive pregnancy test in past 12 months        Last Written Prescription Date:  10/25/18  Last Fill Quantity: 180,  # refills: 1   Last office visit: 7/31/2018 with prescribing provider:     Future Office Visit:      "

## 2019-04-22 RX ORDER — LISINOPRIL AND HYDROCHLOROTHIAZIDE 12.5; 2 MG/1; MG/1
TABLET ORAL
Qty: 90 TABLET | Refills: 0 | Status: SHIPPED | OUTPATIENT
Start: 2019-04-22 | End: 2019-06-12

## 2019-04-22 NOTE — TELEPHONE ENCOUNTER
Prescription approved per [facility] Refill Protocol.  Masha Hewitt RN

## 2019-04-23 ENCOUNTER — THERAPY VISIT (OUTPATIENT)
Dept: PHYSICAL THERAPY | Facility: CLINIC | Age: 79
End: 2019-04-23
Attending: PREVENTIVE MEDICINE
Payer: COMMERCIAL

## 2019-04-23 DIAGNOSIS — M17.12 ARTHRITIS OF LEFT KNEE: ICD-10-CM

## 2019-04-23 DIAGNOSIS — M51.369 DDD (DEGENERATIVE DISC DISEASE), LUMBAR: ICD-10-CM

## 2019-04-23 DIAGNOSIS — G89.29 CHRONIC PAIN OF LEFT KNEE: ICD-10-CM

## 2019-04-23 DIAGNOSIS — M25.562 CHRONIC PAIN OF LEFT KNEE: ICD-10-CM

## 2019-04-23 PROCEDURE — 97530 THERAPEUTIC ACTIVITIES: CPT | Mod: GP | Performed by: PHYSICAL THERAPIST

## 2019-04-23 PROCEDURE — 97161 PT EVAL LOW COMPLEX 20 MIN: CPT | Mod: GP | Performed by: PHYSICAL THERAPIST

## 2019-04-23 PROCEDURE — 97110 THERAPEUTIC EXERCISES: CPT | Mod: GP | Performed by: PHYSICAL THERAPIST

## 2019-04-23 NOTE — PROGRESS NOTES
[facility] Initial Evaluation -- Lumbar    Date: April 23, 2019  Yumiko Flowers is a 78 year old female with a Low back and left knee condition.   Referral: See chart  Work mechanical stresses:  None  Emploment status:  None  Leisure mechanical stresses: Walking and Transfers  Functional disability score (JOSE/STarT Back):  See chart  VAS score (0-10): Low back 6/10 Knee 8/10  Patient goals/expectations:  Decrease pain    HISTORY:    Present symptoms: Low back  (right) and Left Knee  Pain quality (sharp/shooting/stabbing/aching/burning/cramping):  Sharp and sching   Paresthesia (yes/no):  no    Present since (onset date): 4/15/19-PT referral.     Symptoms (improving/unchanging/worsening):  worsening.     Symptoms commenced as a result of: unknown   Condition occurred in the following environment:   unknown     Symptoms at onset (back/thigh/leg): back  Constant symptoms (back/thigh/leg): back  Intermittent symptoms (back/thigh/leg): Left Knee pain-becoming more frequent    Symptoms are made worse with the following: Always Bending, Always Standing, Always Walking and Time of day - No effect   Symptoms are made better with the following: Always Sitting and Other - Medication    Disturbed sleep (yes/no):  no Sleeping postures (prone/sup/side R/L): all different postions    Previous episodes (0/1-5/6-10/11+): 10+ Year of first episode: 10+ years    Previous history: Has had multiple issues with back and knee in the past.  Has tried PT in the past which helped a little.  Has tried injections in the knee and the low back both have been helpful.    Previous treatments: Injections, medications, PT      Specific Questions:  Cough/Sneeze/Strain (pos/neg): neg  Bowel/Bladder (normal/abnormal): neg  Gait (normal/abnormal): abnormal  Medications (nil/NSAIDS/analg/steroids/anticoag/other):  None  Medical allergies:  None  General health (excellent/good/fair/poor):  Excellent  Pertinent medical history:  High  blood pressure and Overweight  Imaging (None/Xray/MRI/Other):  X-ray and MRI  Recent or major surgery (yes/no):  yes  Night pain (yes/no): No  Accidents (yes/no): No  Unexplained weight loss (yes/no): No  Barriers at home: None  Other red flags: None    EXAMINATION    Posture:   Sitting (good/fair/poor): poor  Standing (good/fair/poor):  Lordosis (red/acc/normal):   Correction of posture (better/worse/no effect): better    Lateral Shift (right/left/nil): nil  Relevant (yes/no):    Other Observations: (L) Knee AROM 0- (R) Knee AROM 10-0-132    Neurological:    Motor deficit:  All Strength normal 5/5 except (L) knee flexion 4/5 (L) knee extension 3+/5       Dural signs:  Slump (-) bilateral    Movement Loss:   [place]on Mod Min Nil Pain   Flexion    X PDM   Extension  X   PDM   Side Gliding R    X    Side Gliding L    X      Test Movements:   During: produces, abolishes, increases, decreases, no effect, centralizing, peripheralizing   After: better, worse, no better, no worse, no effect, centralized, peripheralized    Pretest symptoms standing: Low back pain   Symptoms During Symptoms After ROM increased ROM decreased No Effect   FIS Increases    No Worse      X   Rep FIS Increases    Worse      X   EIS Increases    No Worse      X   Rep EIS Increases    No Worse    X     Pretest symptoms lying:     Symptoms During Symptoms After ROM increased ROM decreased No Effect   AAYUSH          Rep AAYUSH          EIL          Rep EIL          If required, pretest symptoms:    Symptoms During Symptoms After ROM increased ROM decreased No Effect   SGIS - R          Rep SGIS - R          SGIS - L          Rep SGIS - L            Static Tests:  Sitting slouched:  D: INC A: NW  Sitting erect:    Standing slouched   Standing erect:    Lying prone in extension:  D: INC A: W with INC ROM Long sitting:      Other Tests:     Provisional Classification:  Derangement - Bilateral, symmetrical, symptoms above knee    Principle of  Management:  Education:  Discussed anatomy/cent-periph of pain/Posture/Lifting and bending     Equipment provided:  Lumbar roll  Mechanical therapy (Y/N):  Y   Extension principle:  EIS 10x 6x/day  Lateral Principle:    Flexion principle:    Other:      ASSESSMENT/PLAN:    Patient is a 78 year old female with lumbar and left side knee complaints.    Patient has the following significant findings with corresponding treatment plan.                Diagnosis 1:  Low back and Left Knee  Pain -  manual therapy, self management, education and directional preference exercise  Decreased ROM/flexibility - manual therapy and therapeutic exercise  Decreased strength - therapeutic exercise and therapeutic activities  Impaired muscle performance - neuro re-education  Decreased function - therapeutic activities  Impaired posture - neuro re-education    Therapy Evaluation Codes:   1) History comprised of:   Personal factors that impact the plan of care:      None.    Comorbidity factors that impact the plan of care are:      Overweight.     Medications impacting care: None.  2) Examination of Body Systems comprised of:   Body structures and functions that impact the plan of care:      Knee and Lumbar spine.   Activity limitations that impact the plan of care are:      Walking.  3) Clinical presentation characteristics are:   Stable/Uncomplicated.  4) Decision-Making    Low complexity using standardized patient assessment instrument and/or measureable assessment of functional outcome.  Cumulative Therapy Evaluation is: Low complexity.    Previous and current functional limitations:  (See Goal Flow Sheet for this information)    Short term and Long term goals: (See Goal Flow Sheet for this information)     Communication ability:  Patient appears to be able to clearly communicate and understand verbal and written communication and follow directions correctly.  Treatment Explanation - The following has been discussed with the patient:    RX ordered/plan of care  Anticipated outcomes  Possible risks and side effects  This patient would benefit from PT intervention to resume normal activities.   Rehab potential is good.    Frequency:  1 X week, once daily  Duration:  for 8 weeks  Discharge Plan:  Achieve all LTG.  Independent in home treatment program.  Reach maximal therapeutic benefit.    Please refer to the daily flowsheet for treatment today, total treatment time and time spent performing 1:1 timed codes.

## 2019-04-24 ENCOUNTER — OFFICE VISIT (OUTPATIENT)
Dept: ORTHOPEDICS | Facility: CLINIC | Age: 79
End: 2019-04-24
Payer: COMMERCIAL

## 2019-04-24 VITALS
HEIGHT: 65 IN | BODY MASS INDEX: 35.32 KG/M2 | WEIGHT: 212 LBS | HEART RATE: 72 BPM | SYSTOLIC BLOOD PRESSURE: 117 MMHG | DIASTOLIC BLOOD PRESSURE: 71 MMHG

## 2019-04-24 DIAGNOSIS — M17.12 PRIMARY OSTEOARTHRITIS OF LEFT KNEE: Primary | ICD-10-CM

## 2019-04-24 PROBLEM — M25.562 CHRONIC PAIN OF LEFT KNEE: Status: ACTIVE | Noted: 2019-04-24

## 2019-04-24 PROBLEM — G89.29 CHRONIC PAIN OF LEFT KNEE: Status: ACTIVE | Noted: 2019-04-24

## 2019-04-24 PROCEDURE — 99207 ZZC NO CHARGE LOS: CPT | Performed by: PREVENTIVE MEDICINE

## 2019-04-24 PROCEDURE — 20610 DRAIN/INJ JOINT/BURSA W/O US: CPT | Mod: LT | Performed by: PREVENTIVE MEDICINE

## 2019-04-24 ASSESSMENT — MIFFLIN-ST. JEOR: SCORE: 1444.57

## 2019-04-24 NOTE — PROGRESS NOTES
Yumiko returns requesting a gel one injection which she was approved per insurance for    Diagnosis: left knee arthritis    PROCEDURE:         left   Knee joint injection   GelOne: [id number]       The patient was apprised of the risks and the benefits of the procedure and consented and a written consent was signed.   The patient s  KNEE was sterilely prepped with chloraprep.   The patient was injected with gel one syringe with a 1.5-inch 25-gauge needle at the_superiorlateral aspect of their knee joint  There were no complications. The patient tolerated the procedure well. There was minimal bleeding.   The patient was instructed to ice the knee upon leaving clinic and refrain from overuse over the next 3 days.   The patient was instructed to go to the emergency room with any usual pain, swelling, or redness occurred in the injected area.   The patient was given a followup appointment to evaluate response to the injection to their increased range of motion and reduction of pain.  Follow up PRN  Dr Abad Armendariz

## 2019-04-24 NOTE — PATIENT INSTRUCTIONS
Thanks for coming today.  Ortho/Sports Medicine Clinic  [address] [place] [address]    To schedule future appointments in Ortho Clinic, you may call [phone].    To schedule ordered imaging by your provider:   Call Central Imaging [phone]    To schedule an injection ordered by your provider:  Call Central Imaging Injection scheduling line: [phone]  Talicioushart available online at:  [email or web address]    Please call if any further questions or concerns ([phone]).  Clinic hours 8 am to 5 pm.    Return to clinic (call) if symptoms worsen or fail to improve.

## 2019-04-24 NOTE — LETTER
4/24/2019         RE: Yumiko Flowers  [address]  [place] [address]        Dear Colleague,    Thank you for referring your patient, Yumiko Flowers, to the [facility]. Please see a copy of my visit note below.    Yumiko returns requesting a gel one injection which she was approved per insurance for    Diagnosis: left knee arthritis    PROCEDURE:         left   Knee joint injection   GelOne: [id number]       The patient was apprised of the risks and the benefits of the procedure and consented and a written consent was signed.   The patient s  KNEE was sterilely prepped with chloraprep.   40 mg of triamcinolone suspension was drawn up into a 5 mL syringe with 2 mL of 1% lidocaine  The patient was injected with gel one syringe with a 1.5-inch 25-gauge needle at the_superiorlateral aspect of their knee joint  There were no complications. The patient tolerated the procedure well. There was minimal bleeding.   The patient was instructed to ice the knee upon leaving clinic and refrain from overuse over the next 3 days.   The patient was instructed to go to the emergency room with any usual pain, swelling, or redness occurred in the injected area.   The patient was given a followup appointment to evaluate response to the injection to their increased range of motion and reduction of pain.  Follow up PRN  Dr Abad Armendariz    Again, thank you for allowing me to participate in the care of your patient.        Sincerely,        Abad Armendariz MD

## 2019-04-30 ENCOUNTER — THERAPY VISIT (OUTPATIENT)
Dept: PHYSICAL THERAPY | Facility: CLINIC | Age: 79
End: 2019-04-30
Payer: COMMERCIAL

## 2019-04-30 DIAGNOSIS — M25.562 CHRONIC PAIN OF LEFT KNEE: ICD-10-CM

## 2019-04-30 DIAGNOSIS — G89.29 CHRONIC PAIN OF LEFT KNEE: ICD-10-CM

## 2019-04-30 DIAGNOSIS — M54.50 BILATERAL LOW BACK PAIN WITHOUT SCIATICA: ICD-10-CM

## 2019-04-30 PROCEDURE — 97110 THERAPEUTIC EXERCISES: CPT | Mod: GP | Performed by: PHYSICAL THERAPY ASSISTANT

## 2019-04-30 PROCEDURE — 97140 MANUAL THERAPY 1/> REGIONS: CPT | Mod: GP | Performed by: PHYSICAL THERAPY ASSISTANT

## 2019-05-08 ENCOUNTER — THERAPY VISIT (OUTPATIENT)
Dept: PHYSICAL THERAPY | Facility: CLINIC | Age: 79
End: 2019-05-08
Payer: COMMERCIAL

## 2019-05-08 DIAGNOSIS — M25.562 CHRONIC PAIN OF LEFT KNEE: ICD-10-CM

## 2019-05-08 DIAGNOSIS — G89.29 CHRONIC PAIN OF LEFT KNEE: ICD-10-CM

## 2019-05-08 DIAGNOSIS — M54.50 BILATERAL LOW BACK PAIN WITHOUT SCIATICA: ICD-10-CM

## 2019-05-08 PROCEDURE — 97110 THERAPEUTIC EXERCISES: CPT | Mod: GP | Performed by: PHYSICAL THERAPY ASSISTANT

## 2019-05-08 PROCEDURE — 97140 MANUAL THERAPY 1/> REGIONS: CPT | Mod: GP | Performed by: PHYSICAL THERAPY ASSISTANT

## 2019-05-22 ENCOUNTER — THERAPY VISIT (OUTPATIENT)
Dept: PHYSICAL THERAPY | Facility: CLINIC | Age: 79
End: 2019-05-22
Payer: COMMERCIAL

## 2019-05-22 DIAGNOSIS — M25.562 CHRONIC PAIN OF LEFT KNEE: ICD-10-CM

## 2019-05-22 DIAGNOSIS — M54.50 BILATERAL LOW BACK PAIN WITHOUT SCIATICA: ICD-10-CM

## 2019-05-22 DIAGNOSIS — G89.29 CHRONIC PAIN OF LEFT KNEE: ICD-10-CM

## 2019-05-22 PROCEDURE — 97110 THERAPEUTIC EXERCISES: CPT | Mod: GP | Performed by: PHYSICAL THERAPY ASSISTANT

## 2019-05-22 PROCEDURE — 97140 MANUAL THERAPY 1/> REGIONS: CPT | Mod: GP | Performed by: PHYSICAL THERAPY ASSISTANT

## 2019-05-28 ENCOUNTER — OFFICE VISIT (OUTPATIENT)
Dept: OPHTHALMOLOGY | Facility: CLINIC | Age: 79
End: 2019-05-28
Payer: COMMERCIAL

## 2019-05-28 DIAGNOSIS — Z96.1 PSEUDOPHAKIA: ICD-10-CM

## 2019-05-28 DIAGNOSIS — H43.813 POSTERIOR VITREOUS DETACHMENT, BILATERAL: ICD-10-CM

## 2019-05-28 DIAGNOSIS — Z98.890 HISTORY OF BLEPHAROPLASTY: ICD-10-CM

## 2019-05-28 DIAGNOSIS — H26.8 PXF (PSEUDOEXFOLIATION OF LENS CAPSULE): ICD-10-CM

## 2019-05-28 DIAGNOSIS — Z01.01 ENCOUNTER FOR EXAMINATION OF EYES AND VISION WITH ABNORMAL FINDINGS: Primary | ICD-10-CM

## 2019-05-28 DIAGNOSIS — H52.4 PRESBYOPIA: ICD-10-CM

## 2019-05-28 PROCEDURE — 92015 DETERMINE REFRACTIVE STATE: CPT | Performed by: OPHTHALMOLOGY

## 2019-05-28 PROCEDURE — 92014 COMPRE OPH EXAM EST PT 1/>: CPT | Performed by: OPHTHALMOLOGY

## 2019-05-28 ASSESSMENT — TONOMETRY
IOP_METHOD: APPLANATION
OS_IOP_MMHG: 22
OD_IOP_MMHG: 21

## 2019-05-28 ASSESSMENT — CONF VISUAL FIELD
OD_NORMAL: 1
OS_NORMAL: 1

## 2019-05-28 ASSESSMENT — REFRACTION_MANIFEST
OD_CYLINDER: +0.75
OS_SPHERE: -2.50
OS_AXIS: 163
OS_ADD: +2.75
OD_ADD: +2.75
OS_CYLINDER: +1.25
OD_AXIS: 032
OD_SPHERE: PLANO

## 2019-05-28 ASSESSMENT — VISUAL ACUITY
OS_PH_SC+: +2
OD_SC: 20/25
OD_SC+: -3
METHOD: SNELLEN - LINEAR
OS_SC: 20/60
OS_PH_SC: 20/50
OS_SC+: -2

## 2019-05-28 ASSESSMENT — CUP TO DISC RATIO
OS_RATIO: 0.3
OD_RATIO: 0.4

## 2019-05-28 ASSESSMENT — SLIT LAMP EXAM - LIDS
COMMENTS: GOOD COSMESIS
COMMENTS: GOOD COSMESIS

## 2019-05-28 ASSESSMENT — EXTERNAL EXAM - LEFT EYE: OS_EXAM: 1+ BROW PTOSIS

## 2019-05-28 ASSESSMENT — EXTERNAL EXAM - RIGHT EYE: OD_EXAM: 1+ BROW PTOSIS

## 2019-05-28 NOTE — LETTER
5/28/2019         RE: Yumiko Flowers  [address]  [place] [address]        Dear Colleague,    Thank you for referring your patient, Yumiko Flowers, to the [facility]. Please see a copy of my visit note below.     Current Eye Medications:  systane both eyes twice a day.       Subjective:  Comprehensive Eye Exam.  Distance vision is good without correction, but she is having difficulty reading, even with her over-the-counter readers (?+2.75).       Objective:  See Ophthalmology Exam.       Assessment:  Stable eye exam.      ICD-10-CM    1. Encounter for examination of eyes and vision with abnormal findings Z01.01 REFRACTIVE STATUS   2. Presbyopia H52.4 REFRACTIVE STATUS   3. Pseudophakia, ou; Yag Caps, os Z96.1 EYE EXAM (SIMPLE-NONBILLABLE)   4. 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 History of blepharoplasty, upper lid, ou (elsewhere); revision (EN) Z98.890    6. Posterior vitreous detachment, bilateral H43.813         Plan:  Glasses Rx given - optional.  Ok to continue using over the counter readers as needed for close up vision.  Also Rx for prescription readers.  Use artificial tears up to 4 times daily both eyes.  (Refresh Tears, Systane Ultra/Balance, or Theratears)  Possible clouding of posterior capsule right eye discussed.  Call in January 2020 for an appointment in May 2020 for Complete Exam.    Dr. Gonzales [phone]         Again, thank you for allowing me to participate in the care of your patient.        Sincerely,        Ashu Gonzales MD

## 2019-05-28 NOTE — PROGRESS NOTES
Current Eye Medications:  systane both eyes twice a day.       Subjective:  Comprehensive Eye Exam.  Distance vision is good without correction, but she is having difficulty reading, even with her over-the-counter readers (?+2.75).       Objective:  See Ophthalmology Exam.       Assessment:  Stable eye exam.      ICD-10-CM    1. Encounter for examination of eyes and vision with abnormal findings Z01.01 REFRACTIVE STATUS   2. Presbyopia H52.4 REFRACTIVE STATUS   3. Pseudophakia, ou; Yag Caps, os Z96.1 EYE EXAM (SIMPLE-NONBILLABLE)   4. 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 History of blepharoplasty, upper lid, ou (elsewhere); revision (EN) Z98.890    6. Posterior vitreous detachment, bilateral H43.813         Plan:  Glasses Rx given - optional.  Ok to continue using over the counter readers as needed for close up vision.  Also Rx for prescription readers.  Use artificial tears up to 4 times daily both eyes.  (Refresh Tears, Systane Ultra/Balance, or Theratears)  Possible clouding of posterior capsule right eye discussed.  Call in January 2020 for an appointment in May 2020 for Complete Exam.    Dr. Gonzales [phone]

## 2019-05-28 NOTE — PATIENT INSTRUCTIONS
Glasses Rx given - optional.  Ok to continue using over the counter readers as needed for close up vision.  Use artificial tears up to 4 times daily both eyes.  (Refresh Tears, Systane Ultra/Balance, or Theratears)  Possible clouding of posterior capsule right eye discussed.  Call in January 2020 for an appointment in May 2020 for Complete Exam.    Dr. Gonzales [phone]

## 2019-05-29 ENCOUNTER — THERAPY VISIT (OUTPATIENT)
Dept: PHYSICAL THERAPY | Facility: CLINIC | Age: 79
End: 2019-05-29
Payer: COMMERCIAL

## 2019-05-29 DIAGNOSIS — G89.29 CHRONIC PAIN OF LEFT KNEE: ICD-10-CM

## 2019-05-29 DIAGNOSIS — M25.562 CHRONIC PAIN OF LEFT KNEE: ICD-10-CM

## 2019-05-29 DIAGNOSIS — M54.50 BILATERAL LOW BACK PAIN WITHOUT SCIATICA: ICD-10-CM

## 2019-05-29 PROCEDURE — 97110 THERAPEUTIC EXERCISES: CPT | Mod: GP | Performed by: PHYSICAL THERAPY ASSISTANT

## 2019-05-29 PROCEDURE — 97140 MANUAL THERAPY 1/> REGIONS: CPT | Mod: GP | Performed by: PHYSICAL THERAPY ASSISTANT

## 2019-06-12 ENCOUNTER — OFFICE VISIT (OUTPATIENT)
Dept: FAMILY MEDICINE | Facility: CLINIC | Age: 79
End: 2019-06-12
Payer: COMMERCIAL

## 2019-06-12 ENCOUNTER — THERAPY VISIT (OUTPATIENT)
Dept: PHYSICAL THERAPY | Facility: CLINIC | Age: 79
End: 2019-06-12
Payer: COMMERCIAL

## 2019-06-12 VITALS
TEMPERATURE: 98 F | SYSTOLIC BLOOD PRESSURE: 129 MMHG | HEART RATE: 83 BPM | RESPIRATION RATE: 16 BRPM | WEIGHT: 213.4 LBS | BODY MASS INDEX: 34.3 KG/M2 | HEIGHT: 66 IN | DIASTOLIC BLOOD PRESSURE: 75 MMHG | OXYGEN SATURATION: 97 %

## 2019-06-12 DIAGNOSIS — M54.16 LUMBAR RADICULAR PAIN: ICD-10-CM

## 2019-06-12 DIAGNOSIS — M54.50 BILATERAL LOW BACK PAIN WITHOUT SCIATICA: ICD-10-CM

## 2019-06-12 DIAGNOSIS — Z00.00 ENCOUNTER FOR MEDICARE ANNUAL WELLNESS EXAM: Primary | ICD-10-CM

## 2019-06-12 DIAGNOSIS — I83.813 VARICOSE VEINS OF BOTH LOWER EXTREMITIES WITH PAIN: ICD-10-CM

## 2019-06-12 DIAGNOSIS — M25.562 CHRONIC PAIN OF LEFT KNEE: ICD-10-CM

## 2019-06-12 DIAGNOSIS — M54.5 LOW BACK PAIN, UNSPECIFIED BACK PAIN LATERALITY, UNSPECIFIED CHRONICITY, WITH SCIATICA PRESENCE UNSPECIFIED: ICD-10-CM

## 2019-06-12 DIAGNOSIS — L71.9 ROSACEA: ICD-10-CM

## 2019-06-12 DIAGNOSIS — G89.29 CHRONIC PAIN OF LEFT KNEE: ICD-10-CM

## 2019-06-12 DIAGNOSIS — I10 ESSENTIAL HYPERTENSION WITH GOAL BLOOD PRESSURE LESS THAN 140/90: ICD-10-CM

## 2019-06-12 LAB
BASOPHILS # BLD AUTO: 0.1 10E9/L (ref 0–0.2)
BASOPHILS NFR BLD AUTO: 1.4 %
DIFFERENTIAL METHOD BLD: NORMAL
EOSINOPHIL # BLD AUTO: 0.3 10E9/L (ref 0–0.7)
EOSINOPHIL NFR BLD AUTO: 3.6 %
ERYTHROCYTE [DISTWIDTH] IN BLOOD BY AUTOMATED COUNT: 13 % (ref 10–15)
HCT VFR BLD AUTO: 36.9 % (ref 35–47)
HGB BLD-MCNC: 11.9 G/DL (ref 11.7–15.7)
LYMPHOCYTES # BLD AUTO: 2.6 10E9/L (ref 0.8–5.3)
LYMPHOCYTES NFR BLD AUTO: 33.5 %
MCH RBC QN AUTO: 26.6 PG (ref 26.5–33)
MCHC RBC AUTO-ENTMCNC: 32.2 G/DL (ref 31.5–36.5)
MCV RBC AUTO: 82 FL (ref 78–100)
MONOCYTES # BLD AUTO: 0.3 10E9/L (ref 0–1.3)
MONOCYTES NFR BLD AUTO: 4.4 %
NEUTROPHILS # BLD AUTO: 4.4 10E9/L (ref 1.6–8.3)
NEUTROPHILS NFR BLD AUTO: 57.1 %
PLATELET # BLD AUTO: 231 10E9/L (ref 150–450)
RBC # BLD AUTO: 4.48 10E12/L (ref 3.8–5.2)
WBC # BLD AUTO: 7.7 10E9/L (ref 4–11)

## 2019-06-12 PROCEDURE — 36415 COLL VENOUS BLD VENIPUNCTURE: CPT | Performed by: PREVENTIVE MEDICINE

## 2019-06-12 PROCEDURE — 97140 MANUAL THERAPY 1/> REGIONS: CPT | Mod: GP | Performed by: PHYSICAL THERAPY ASSISTANT

## 2019-06-12 PROCEDURE — 80053 COMPREHEN METABOLIC PANEL: CPT | Performed by: PREVENTIVE MEDICINE

## 2019-06-12 PROCEDURE — 99213 OFFICE O/P EST LOW 20 MIN: CPT | Mod: 25 | Performed by: PREVENTIVE MEDICINE

## 2019-06-12 PROCEDURE — 80061 LIPID PANEL: CPT | Performed by: PREVENTIVE MEDICINE

## 2019-06-12 PROCEDURE — 99207 C PAF COMPLETED  NO CHARGE: CPT | Mod: 25 | Performed by: PREVENTIVE MEDICINE

## 2019-06-12 PROCEDURE — 85025 COMPLETE CBC W/AUTO DIFF WBC: CPT | Performed by: PREVENTIVE MEDICINE

## 2019-06-12 PROCEDURE — 97110 THERAPEUTIC EXERCISES: CPT | Mod: GP | Performed by: PHYSICAL THERAPY ASSISTANT

## 2019-06-12 PROCEDURE — 82043 UR ALBUMIN QUANTITATIVE: CPT | Performed by: PREVENTIVE MEDICINE

## 2019-06-12 PROCEDURE — 99397 PER PM REEVAL EST PAT 65+ YR: CPT | Performed by: PREVENTIVE MEDICINE

## 2019-06-12 RX ORDER — DOXYCYCLINE HYCLATE 100 MG
TABLET ORAL
Qty: 180 TABLET | Refills: 1 | Status: SHIPPED | OUTPATIENT
Start: 2019-06-12 | End: 2019-12-08

## 2019-06-12 RX ORDER — LISINOPRIL AND HYDROCHLOROTHIAZIDE 12.5; 2 MG/1; MG/1
TABLET ORAL
Qty: 180 TABLET | Refills: 0 | Status: SHIPPED | OUTPATIENT
Start: 2019-06-12 | End: 2019-09-09

## 2019-06-12 RX ORDER — GABAPENTIN 600 MG/1
600 TABLET ORAL AT BEDTIME
Qty: 90 TABLET | Refills: 3 | Status: SHIPPED | OUTPATIENT
Start: 2019-06-12 | End: 2020-08-07

## 2019-06-12 ASSESSMENT — MIFFLIN-ST. JEOR: SCORE: 1466.79

## 2019-06-12 ASSESSMENT — PAIN SCALES - GENERAL: PAINLEVEL: NO PAIN (0)

## 2019-06-12 NOTE — PATIENT INSTRUCTIONS
At [facility], we strive to deliver an exceptional experience to you, every time we see you.  If you receive a survey in the mail, please send us back your thoughts. We really do value your feedback.    Based on your medical history, these are the current health maintenance/preventive care services that you are due for (some may have been done at this visit.)  Health Maintenance Due   Topic Date Due     URINE DRUG SCREEN  1940     ZOSTER IMMUNIZATION (2 of 3) 09/29/2010     MEDICARE ANNUAL WELLNESS VISIT  06/11/2015     ASTHMA CONTROL TEST  01/31/2019         Suggested websites for health information:  Www.[employer] : Up to date and easily searchable information on multiple topics.  Www.[place].gov : medication info, interactive tutorials, watch real surgeries online  Www.familydoctor.org : good info from the Academy of Family Physicians  Www.cdc.gov : public health info, travel advisories, epidemics (H1N1)  Www.aap.org : children's health info, normal development, vaccinations  Www.health.[facility].[place].us : [place] dept of health, public health issues in [place], N1N1    Your care team:                            Family Medicine Internal Medicine   MD David Puckett MD Shantel Branch-Fleming, MD Katya Georgiev PA-C Nam Ho, MD Pediatrics   EMY Castillo, KIKE Bautista APRMARIAJOSE CNP   MD Jacqueline Penny MD Deborah Mielke, MD Kim Thein, APRN [facility]      Clinic hours: Monday - Thursday 7 am-7 pm; Fridays 7 am-5 pm.   Urgent care: Monday - Friday 11 am-9 pm; Saturday and Sunday 9 am-5 pm.  Pharmacy : Monday -Thursday 8 am-8 pm; Friday 8 am-6 pm; Saturday and Sunday 9 am-5 pm.     Clinic: [phone]   Pharmacy: [phone]    Patient Education   Personalized Prevention Plan  You are due for the preventive services outlined below.  Your care team is available to assist you in scheduling these services.  If you have already completed  any of these items, please share that information with your care team to update in your medical record.  Health Maintenance Due   Topic Date Due     URINE DRUG SCREEN  1940     Zoster (Shingles) Vaccine (2 of 3) 09/29/2010     Annual Wellness Visit  06/11/2015     Asthma Control Test  01/31/2019

## 2019-06-12 NOTE — PROGRESS NOTES
SUBJECTIVE:   Yumiko Flowers is a 78 year old female who presents for Preventive Visit.  Also needs refills on her routine medication   Would like to see a Specialist for Varicose veins, has had for several years but increased in 1.5 years. Has pain, no bleeding. Uses compression stockings.   Are you in the first 12 months of your Medicare coverage?  No    HPI  Do you feel safe in your environment? Yes    Do you have a Health Care Directive? No: Advance care planning was reviewed with patient; patient declined at this time.      Hearing testing not done    Fall risk  Fallen 2 or more times in the past year?: No  Any fall with injury in the past year?: No  click delete button to remove this line now  Cognitive Screening   1) Repeat 3 items (Leader, Season, Table)    2) Clock draw: NORMAL  3) 3 item recall: Recalls 3 objects  Results: NORMAL clock, 1-2 items recalled: COGNITIVE IMPAIRMENT LESS LIKELY    Mini-CogTM Copyright S Twila. Licensed by the author for use in [facility]; reprinted with permission (jorge@[facility]). All rights reserved.      Do you have sleep apnea, excessive snoring or daytime drowsiness?: yes    Reviewed and updated as needed this visit by clinical staff  Tobacco  Allergies  Meds  Fam Hx         Reviewed and updated as needed this visit by Provider        Social History     Tobacco Use     Smoking status: Never Smoker     Smokeless tobacco: Never Used   Substance Use Topics     Alcohol use: Yes     If you drink alcohol do you typically have >3 drinks per day or >7 drinks per week? No    Alcohol Use 6/11/2014   Prescreen: >3 drinks/day or >7 drinks/week? The patient does not drink >3 drinks per day nor >7 drinks per week.         Hypertension Follow-up      Do you check your blood pressure regularly outside of the clinic? No     Are you following a low salt diet? Yes    Are your blood pressures ever more than 140 on the top number (systolic) OR more   than 90 on the bottom number  (diastolic), for example 140/90? No    Current providers sharing in care for this patient include:   Patient Care Team:  Gavi Bear MD as PCP - General (Family Practice)  Jesse Huitron DPM (Podiatry)  Gavi Bear MD as MD (Family Practice)  Gavi Bear MD as Assigned PCP    The following health maintenance items are reviewed in Epic and correct as of today:  Health Maintenance   Topic Date Due     URINE DRUG SCREEN  1940     ZOSTER IMMUNIZATION (2 of 3) 09/29/2010     MEDICARE ANNUAL WELLNESS VISIT  06/11/2015     ASTHMA CONTROL TEST  01/31/2019     FARA ASSESSMENT  07/31/2019     PHQ-9  07/31/2019     ADVANCED DIRECTIVE PLANNING  02/26/2020     EYE EXAM  05/28/2020     LIPID  09/19/2022     DTAP/TDAP/TD IMMUNIZATION (3 - Td) 01/23/2023     DEXA  Completed     ASTHMA ACTION PLAN  Completed     INFLUENZA VACCINE  Completed     IPV IMMUNIZATION  Aged Out     MENINGITIS IMMUNIZATION  Aged Out     Lab work is in process  Labs reviewed in EPIC  BP Readings from Last 3 Encounters:   06/12/19 129/75   04/24/19 117/71   04/15/19 106/66    Wt Readings from Last 3 Encounters:   06/12/19 96.8 kg (213 lb 6.4 oz)   04/24/19 96.2 kg (212 lb)   04/15/19 96.2 kg (212 lb)                  Patient Active Problem List   Diagnosis     Asthma, moderate persistent     Acne rosacea     CARDIOVASCULAR SCREENING; LDL GOAL LESS THAN 130     Osteopenia     Vitamin D deficiency     Advanced directives, counseling/discussion     Hx of pseudoexfoliation, os     Pseudophakia, ou; Yag Caps, os     HTN, goal below 140/90     Peptic ulcer     Health Care Home     History of blepharoplasty, upper lid, ou (elsewhere); revision (EN)     Obesity, Class II, BMI 35-39.9     Restless legs syndrome     DJD (degenerative joint disease), lumbosacral     Bilateral low back pain without sciatica     Rosacea     Essential hypertension with goal blood pressure less than 140/90     Obesity, Class I, BMI 30-34.9     Posterior vitreous  "detachment, bilateral     Left knee pain     Cervicalgia     Vertigo     Obesity (BMI 35.0-39.9) with comorbidity (H)     Chronic pain of left knee     Past Surgical History:   Procedure Laterality Date     BLEPHAROPLASTY BILATERAL  3/9/2006; 2013    both eyes, upper lid; revision (EN)     C APPENDECTOMY       CATARACT IOL, RT/LT       HC REMOVAL GALLBLADDER       LASER YAG CAPSULOTOMY      left eye (AC lysis also)     PHACOEMULSIFICATION WITH STANDARD INTRAOCULAR LENS IMPLANT  1/2012; 4/2013    right eye; left eye     REPAIR PTOSIS       SURGICAL HISTORY OF -       endometriosis surgeriesx3     SURGICAL HISTORY OF -       ganiglion cyst onfoot     SURGICAL HISTORY OF -       Eye for \"droopy eye\"       Social History     Tobacco Use     Smoking status: Never Smoker     Smokeless tobacco: Never Used   Substance Use Topics     Alcohol use: Yes     Family History   Problem Relation Age of Onset     C.A.D. Father      C.A.D. Brother      Hypertension Mother      Cancer No family hx of      Diabetes No family hx of      Cerebrovascular Disease No family hx of      Thyroid Disease No family hx of      Glaucoma No family hx of      Macular Degeneration No family hx of          Current Outpatient Medications   Medication Sig Dispense Refill     CALCIUM 500 MG PO CHEW None Entered       Cholecalciferol (VITAMIN D) 2000 UNIT CAPS Take 2,000 Units by mouth daily.       doxycycline hyclate (VIBRA-TABS) 100 MG tablet TAKE 1 TABLET (100 MG) BY MOUTH 2 TIMES DAILY 180 tablet 1     etodolac (LODINE) 400 MG tablet TAKE 1 TABLET (400 MG) BY MOUTH 2 TIMES DAILY AS NEEDED 60 tablet 0     gabapentin (NEURONTIN) 600 MG tablet Take 1 tablet (600 mg) by mouth At Bedtime 90 tablet 3     lisinopril-hydrochlorothiazide (PRINZIDE/ZESTORETIC) 20-12.5 MG tablet TAKE 2 TABLETS BY MOUTH EVERY  tablet 0     Multiple Vitamins-Iron TABS Take 1 tablet by mouth daily.       tiZANidine (ZANAFLEX) 4 MG tablet Take 1-2 tablets (4-8 mg) by " "mouth nightly as needed 90 tablet 1     methylPREDNISolone (MEDROL DOSEPAK) 4 MG tablet Follow package instructions (Patient not taking: Reported on 6/12/2019) 21 tablet 0     Allergies   Allergen Reactions     Erythromycin Swelling and Other (See Comments)     Pneumonia Vaccine:Adults age 65+ who received Pneumovax (PPSV23) at 65 years or older: Should be given PCV13 > 1 year after their most recent PPSV23  Mammogram Screening: Patient over age 75, has elected to stop mammography screening.    Review of Systems  Constitutional, HEENT, cardiovascular, pulmonary, gi and gu systems are negative, except as otherwise noted.    OBJECTIVE:   /75 (BP Location: Left arm, Patient Position: Sitting, Cuff Size: Adult Large)   Pulse 83   Temp 98  F (36.7  C) (Oral)   Resp 16   Ht 1.664 m (5' 5.5\")   Wt 96.8 kg (213 lb 6.4 oz)   SpO2 97%   BMI 34.97 kg/m   Estimated body mass index is 34.97 kg/m  as calculated from the following:    Height as of this encounter: 1.664 m (5' 5.5\").    Weight as of this encounter: 96.8 kg (213 lb 6.4 oz).  Physical Exam     GENERAL APPEARANCE: healthy, alert and no distress  EYES: Eyes grossly normal to inspection and conjunctivae and sclerae normal  HENT: nose and mouth without ulcers or lesions  NECK: no adenopathy and trachea midline and normal to palpation  RESP: lungs clear to auscultation - no rales, rhonchi or wheezes  CV: regular rates and rhythm, normal S1 S2, no S3 or S4 and no murmur, click or rub  ABDOMEN: soft, non-tender and no rebound or guarding   MS: extremities normal- no gross deformities noted and peripheral pulses normal  SKIN: no suspicious lesions or rashes  NEURO: Normal strength and tone, mentation intact and speech normal  PSYCH: mentation appears normal  Legs: Varicose veins+     Diagnostic Test Results:  Labs reviewed in Epic  Results for orders placed or performed in visit on 06/12/19   CBC with platelets differential   Result Value Ref Range    WBC 7.7 4.0 " - 11.0 10e9/L    RBC Count 4.48 3.8 - 5.2 10e12/L    Hemoglobin 11.9 11.7 - 15.7 g/dL    Hematocrit 36.9 35.0 - 47.0 %    MCV 82 78 - 100 fl    MCH 26.6 26.5 - 33.0 pg    MCHC 32.2 31.5 - 36.5 g/dL    RDW 13.0 10.0 - 15.0 %    Platelet Count 231 150 - 450 10e9/L    % Neutrophils 57.1 %    % Lymphocytes 33.5 %    % Monocytes 4.4 %    % Eosinophils 3.6 %    % Basophils 1.4 %    Absolute Neutrophil 4.4 1.6 - 8.3 10e9/L    Absolute Lymphocytes 2.6 0.8 - 5.3 10e9/L    Absolute Monocytes 0.3 0.0 - 1.3 10e9/L    Absolute Eosinophils 0.3 0.0 - 0.7 10e9/L    Absolute Basophils 0.1 0.0 - 0.2 10e9/L    Diff Method Automated Method    Comprehensive metabolic panel   Result Value Ref Range    Sodium 140 133 - 144 mmol/L    Potassium 4.0 3.4 - 5.3 mmol/L    Chloride 101 94 - 109 mmol/L    Carbon Dioxide 31 20 - 32 mmol/L    Anion Gap 8 3 - 14 mmol/L    Glucose 89 70 - 99 mg/dL    Urea Nitrogen 16 7 - 30 mg/dL    Creatinine 0.71 0.52 - 1.04 mg/dL    GFR Estimate 82 >60 mL/min/[1.73_m2]    GFR Estimate If Black >90 >60 mL/min/[1.73_m2]    Calcium 9.6 8.5 - 10.1 mg/dL    Bilirubin Total 0.3 0.2 - 1.3 mg/dL    Albumin 4.3 3.4 - 5.0 g/dL    Protein Total 7.4 6.8 - 8.8 g/dL    Alkaline Phosphatase 70 40 - 150 U/L    ALT 24 0 - 50 U/L    AST 20 0 - 45 U/L   Albumin Random Urine Quantitative with Creat Ratio   Result Value Ref Range    Creatinine Urine 60 mg/dL    Albumin Urine mg/L 6 mg/L    Albumin Urine mg/g Cr 9.26 0 - 25 mg/g Cr   Lipid panel reflex to direct LDL Non-fasting   Result Value Ref Range    Cholesterol 183 <200 mg/dL    Triglycerides 285 (H) <150 mg/dL    HDL Cholesterol 47 (L) >49 mg/dL    LDL Cholesterol Calculated 79 <100 mg/dL    Non HDL Cholesterol 136 (H) <130 mg/dL       ASSESSMENT / PLAN:   Yumiko was seen today for physical.    Diagnoses and all orders for this visit:    Encounter for Medicare annual wellness exam  -     Lipid panel reflex to direct LDL Non-fasting    Varicose veins of both lower extremities with  "pain  -     VASCULAR SURGERY REFERRAL; Future  -     CBC with platelets differential  -     Comprehensive metabolic panel    Essential hypertension with goal blood pressure less than 140/90  -     CBC with platelets differential  -     Comprehensive metabolic panel  -     Albumin Random Urine Quantitative with Creat Ratio  -     lisinopril-hydrochlorothiazide (PRINZIDE/ZESTORETIC) 20-12.5 MG tablet; TAKE 2 TABLETS BY MOUTH EVERY DAY  -at goal, continue current medication     Rosacea  -     doxycycline hyclate (VIBRA-TABS) 100 MG tablet; TAKE 1 TABLET (100 MG) BY MOUTH 2 TIMES DAILY  -refill on medication provided   -also seen by Dermatology     Low back pain, unspecified back pain laterality, unspecified chronicity, with sciatica presence unspecified  -     gabapentin (NEURONTIN) 600 MG tablet; Take 1 tablet (600 mg) by mouth At Bedtime, sedation side effect reviewed  -Renal function normal    Lumbar radicular pain  -     tiZANidine (ZANAFLEX) 4 MG tablet; Take 1-2 tablets (4-8 mg) by mouth nightly as needed  -sedation side effect reviewed     Other orders  -     PAF COMPLETED        End of Life Planning:  Patient currently has an advanced directive: No.  I have verified the patient's ablity to prepare an advanced directive/make health care decisions.  Literature was provided to assist patient in preparing an advanced directive.    COUNSELING:  Reviewed preventive health counseling, as reflected in patient instructions       Regular exercise       Healthy diet/nutrition       Fall risk prevention    Estimated body mass index is 34.97 kg/m  as calculated from the following:    Height as of this encounter: 1.664 m (5' 5.5\").    Weight as of this encounter: 96.8 kg (213 lb 6.4 oz).    Weight management plan: Discussed healthy diet and exercise guidelines     reports that she has never smoked. She has never used smokeless tobacco.      Appropriate preventive services were discussed with this patient, including " applicable screening as appropriate for cardiovascular disease, diabetes, osteopenia/osteoporosis, and glaucoma.  As appropriate for age/gender, discussed screening for colorectal cancer, prostate cancer, breast cancer, and cervical cancer. Checklist reviewing preventive services available has been given to the patient.    Reviewed patients plan of care and provided an AVS. The Basic Care Plan (routine screening as documented in Health Maintenance) for Yumiko meets the Care Plan requirement. This Care Plan has been established and reviewed with the Patient.    Counseling Resources:  ATP IV Guidelines  Pooled Cohorts Equation Calculator  Breast Cancer Risk Calculator  FRAX Risk Assessment  ICSI Preventive Guidelines  Dietary Guidelines for Americans, 2010  USDA's MyPlate  ASA Prophylaxis  Lung CA Screening    Gavi Bear MD MPH    [facility]

## 2019-06-13 LAB
ALBUMIN SERPL-MCNC: 4.3 G/DL (ref 3.4–5)
ALP SERPL-CCNC: 70 U/L (ref 40–150)
ALT SERPL W P-5'-P-CCNC: 24 U/L (ref 0–50)
ANION GAP SERPL CALCULATED.3IONS-SCNC: 8 MMOL/L (ref 3–14)
AST SERPL W P-5'-P-CCNC: 20 U/L (ref 0–45)
BILIRUB SERPL-MCNC: 0.3 MG/DL (ref 0.2–1.3)
BUN SERPL-MCNC: 16 MG/DL (ref 7–30)
CALCIUM SERPL-MCNC: 9.6 MG/DL (ref 8.5–10.1)
CHLORIDE SERPL-SCNC: 101 MMOL/L (ref 94–109)
CHOLEST SERPL-MCNC: 183 MG/DL
CO2 SERPL-SCNC: 31 MMOL/L (ref 20–32)
CREAT SERPL-MCNC: 0.71 MG/DL (ref 0.52–1.04)
CREAT UR-MCNC: 60 MG/DL
GFR SERPL CREATININE-BSD FRML MDRD: 82 ML/MIN/{1.73_M2}
GLUCOSE SERPL-MCNC: 89 MG/DL (ref 70–99)
HDLC SERPL-MCNC: 47 MG/DL
LDLC SERPL CALC-MCNC: 79 MG/DL
MICROALBUMIN UR-MCNC: 6 MG/L
MICROALBUMIN/CREAT UR: 9.26 MG/G CR (ref 0–25)
NONHDLC SERPL-MCNC: 136 MG/DL
POTASSIUM SERPL-SCNC: 4 MMOL/L (ref 3.4–5.3)
PROT SERPL-MCNC: 7.4 G/DL (ref 6.8–8.8)
SODIUM SERPL-SCNC: 140 MMOL/L (ref 133–144)
TRIGL SERPL-MCNC: 285 MG/DL

## 2019-06-13 NOTE — RESULT ENCOUNTER NOTE
Yumiko,     Basic blood count is not showing anemia or infection. Other labs are pending.     Please do not hesitate to call us at [phone] if you have any questions or concerns.    Thank you,    Gavi Bear MD MPH

## 2019-06-14 NOTE — RESULT ENCOUNTER NOTE
Yumiko,     Urine sample did not show any abnormal protein.  LDL cholesterol is at goal for you.  Electrolytes, glucose and kidney function are normal.  Plan of care and follow up as discussed in clinic.     Please do not hesitate to call us at [phone] if you have any questions or concerns.    Thank you,    Gavi Bear MD MPH

## 2019-07-18 DIAGNOSIS — M54.5 LOW BACK PAIN, UNSPECIFIED BACK PAIN LATERALITY, UNSPECIFIED CHRONICITY, WITH SCIATICA PRESENCE UNSPECIFIED: ICD-10-CM

## 2019-07-18 RX ORDER — GABAPENTIN 600 MG/1
TABLET ORAL
Qty: 180 TABLET | Refills: 3
Start: 2019-07-18

## 2019-07-18 NOTE — TELEPHONE ENCOUNTER
90 day supply with 3 refills sent on 6/12/19. Should have refills on file at pharmacy. Too soon to refill.         Magdy Mann RN, BSN, PHN

## 2019-07-30 ENCOUNTER — TELEPHONE (OUTPATIENT)
Dept: VASCULAR SURGERY | Facility: CLINIC | Age: 79
End: 2019-07-30

## 2019-07-30 DIAGNOSIS — I73.9 CLAUDICATION (H): Primary | ICD-10-CM

## 2019-07-30 DIAGNOSIS — I83.893 VARICOSE VEINS OF BOTH LOWER EXTREMITIES WITH COMPLICATIONS: ICD-10-CM

## 2019-07-30 NOTE — TELEPHONE ENCOUNTER
Previsit call to pt    She states that on her right leg has varicose veins has been ongoing 1.5 years    Ongoing pain with walking in which she states that it does make it difficult for her to walk.     Pt states that she has sciatica pain and notes that there are visible veins in front and one in back of her right calf.     Swelling: a little she states    She states that her ankles are a little swollen after a day of shopping this morning.   No hx of DVT or compression stocking use.     Will obtain US prior to [facility] for new consult    Roxana ALVARADO RN, BSN  Interventional Radiology Nurse Coordinator   Phone: [phone]

## 2019-08-05 NOTE — TELEPHONE ENCOUNTER
RECORDS RECEIVED FROM: varicose veins/referred by Dr Gavi Bear at [facility]/recs and referral internal/appt sched per pt   DATE RECEIVED: 8.7.19   NOTES STATUS DETAILS   OFFICE NOTE from referring provider Internal 6.12.19 Dr. Bear   OFFICE NOTE from other specialist N/A    OPERATIVE REPORT N/A    MEDICATION LIST Internal    PERTINENT LABS Internal    CTA (CT ANGIOGRAPHY) N/A    CT N/A    MRI N/A    ULTRASOUND In process Scheduled for 8.7.19

## 2019-08-07 ENCOUNTER — ANCILLARY PROCEDURE (OUTPATIENT)
Dept: ULTRASOUND IMAGING | Facility: CLINIC | Age: 79
End: 2019-08-07
Attending: RADIOLOGY
Payer: COMMERCIAL

## 2019-08-07 ENCOUNTER — PRE VISIT (OUTPATIENT)
Dept: VASCULAR SURGERY | Facility: CLINIC | Age: 79
End: 2019-08-07

## 2019-08-07 ENCOUNTER — OFFICE VISIT (OUTPATIENT)
Dept: VASCULAR SURGERY | Facility: CLINIC | Age: 79
End: 2019-08-07
Payer: COMMERCIAL

## 2019-08-07 VITALS — OXYGEN SATURATION: 95 % | HEART RATE: 75 BPM | SYSTOLIC BLOOD PRESSURE: 120 MMHG | DIASTOLIC BLOOD PRESSURE: 66 MMHG

## 2019-08-07 DIAGNOSIS — I73.9 CLAUDICATION (H): ICD-10-CM

## 2019-08-07 DIAGNOSIS — I83.813 VARICOSE VEINS OF BOTH LOWER EXTREMITIES WITH PAIN: ICD-10-CM

## 2019-08-07 DIAGNOSIS — I83.893 VARICOSE VEINS OF BOTH LOWER EXTREMITIES WITH COMPLICATIONS: ICD-10-CM

## 2019-08-07 ASSESSMENT — PAIN SCALES - GENERAL: PAINLEVEL: MODERATE PAIN (4)

## 2019-08-07 NOTE — NURSING NOTE
Vascular Rooming Note     Yumiko Flowers's goals for this visit include:   Chief Complaint   Patient presents with     Consult     Tomi, is being seen today for a consult varicose veins bilateral leg pain mainly the right, experiencing the pain the approx. 1 year, as reported by patient.     Nichole Marcus LPN

## 2019-08-07 NOTE — LETTER
"8/7/2019       RE: Yumiko Flowers  [address]  [place] [address]     Dear Colleague,    Thank you for referring your patient, Yumiko Flowers, to the [facility] at [facility]. Please see a copy of my visit note below.      INTERVENTIONAL RADIOLOGY CONSULTATION    Name: Yumiko Flowers  Age: 79 year old   Referring Physician: Dr. Bear   REASON FOR REFERRAL: right leg pain     HPI: Mrs. Flowers is a 79 year old female who presents to interventional radiology clinic with chief complaint of right leg pain.  She has had this pain for a long time.  It is described as an aching/heavy pain.  It is exacerbated by standing and walking.  It is alleviated by lying down.  It is separate from the sharp, shooting pain that extends from her right gluteal region throughout her posterior and lateral right leg.  The pain is mostly localized to the anterior right shin region in the area of some varicose veins.  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  The right shin pain has gotten worse recently and now she gets pain while in the car driving.  She has had 2 children.  She butch history of DVT/PE.  She does not smoke.  She has a history of restless leg syndrome and she takes 1200 mg gabapentin at bedtime.      PAST MEDICAL HISTORY:   Past Medical History:   Diagnosis Date     Acne rosacea      Actinic keratosis      Amblyopia      Asthma, moderate persistent      Basal cell carcinoma 10/2010    back X2     Cataract, mod, od; mild-mod, os 1/12/2012     DJD (degenerative joint disease), lumbosacral 8/6/2014     Elevated cholesterol      Endometriosis      HTN (hypertension)      Moderate major depression (H)     \"Circumstances\"     Osteopenia        PAST SURGICAL HISTORY:   Past Surgical History:   Procedure Laterality Date     BLEPHAROPLASTY BILATERAL  3/9/2006; 2013    both eyes, upper lid; revision (EN) " "    C APPENDECTOMY       CATARACT IOL, RT/LT       HC REMOVAL GALLBLADDER       LASER YAG CAPSULOTOMY      left eye (AC lysis also)     PHACOEMULSIFICATION WITH STANDARD INTRAOCULAR LENS IMPLANT  1/2012; 4/2013    right eye; left eye     REPAIR PTOSIS       SURGICAL HISTORY OF -       endometriosis surgeriesx3     SURGICAL HISTORY OF -       ganiglion cyst onfoot     SURGICAL HISTORY OF -       Eye for \"droopy eye\"       FAMILY HISTORY:   Family History   Problem Relation Age of Onset     C.A.D. Father      C.A.D. Brother      Hypertension Mother      Cancer No family hx of      Diabetes No family hx of      Cerebrovascular Disease No family hx of      Thyroid Disease No family hx of      Glaucoma No family hx of      Macular Degeneration No family hx of        SOCIAL HISTORY:   Social History     Tobacco Use     Smoking status: Never Smoker     Smokeless tobacco: Never Used   Substance Use Topics     Alcohol use: Yes       PROBLEM LIST:   Patient Active Problem List    Diagnosis Date Noted     Chronic pain of left knee 04/24/2019     Priority: Medium     Obesity (BMI 35.0-39.9) with comorbidity (H) 03/15/2019     Priority: Medium     Vertigo 08/16/2017     Priority: Medium     Cervicalgia 08/03/2017     Priority: Medium     Left knee pain 02/27/2017     Priority: Medium     Posterior vitreous detachment, bilateral 01/14/2017     Priority: Medium     Obesity, Class I, BMI 30-34.9 01/12/2017     Priority: Medium     Essential hypertension with goal blood pressure less than 140/90 11/01/2016     Priority: Medium     Rosacea 05/18/2015     Priority: Medium     Bilateral low back pain without sciatica 09/03/2014     Priority: Medium     Obesity, Class II, BMI 35-39.9 08/06/2014     Priority: Medium     Restless legs syndrome 08/06/2014     Priority: Medium     DJD (degenerative joint disease), lumbosacral 08/06/2014     Priority: Medium     Advanced on X rays       History of blepharoplasty, upper lid, ou " (elsewhere); revision (EN) 09/14/2013     Priority: Medium     Health Care Home 01/24/2013     Priority: Medium     Monserrat Rolle, RN-PHN  FPA / [facility]   [phone]   DX V65.8 REPLACED WITH [id number] (04/08/2013)       Peptic ulcer 01/23/2013     Priority: Medium     HTN, goal below 140/90 03/02/2012     Priority: Medium     Pseudophakia, ou; Yag Caps, os 01/30/2012     Priority: Medium     Hx of pseudoexfoliation, 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  Yumiko Flowers has no plan or code status on file. Mailed available resources and provided with information. Confirmed code status reflects current choices pending further ACP discussions.  Confirmed/documented designated decision maker(s). See permanent comments section of demographics in clinical tab.   Added by Jacqueline Mendoza on 2/26/2015  Discussed advance care planning with patient; information given to patient to review. 11/18/2011   TANYA Clinton MA       Vitamin D deficiency 02/16/2011     Priority: Medium     Osteopenia      Priority: Medium     CARDIOVASCULAR SCREENING; LDL GOAL LESS THAN 130 10/31/2010     Priority: Medium     Asthma, moderate persistent      Priority: Medium     Acne rosacea      Priority: Medium       MEDICATIONS:   Prescription Medications as of 8/7/2019       Rx Number Disp Refills Start End Last Dispensed Date Next Fill Date Owning Pharmacy    CALCIUM 500 MG PO CHEW            Sig: None Entered    Class: Historical    Route: Oral    Cholecalciferol (VITAMIN D) 2000 UNIT CAPS            Sig: Take 2,000 Units by mouth daily.    Class: Historical    Route: Oral    doxycycline hyclate (VIBRA-TABS) 100 MG tablet  180 tablet 1 6/12/2019    [id number] [place] - [address]    Sig: TAKE 1 TABLET (100 MG) BY  MOUTH 2 TIMES DAILY    Class: E-Prescribe    etodolac (LODINE) 400 MG tablet  60 tablet 0 8/30/2018    [facility], [place] - [address]    Sig: TAKE 1 TABLET (400 MG) BY MOUTH 2 TIMES DAILY AS NEEDED    Class: E-Prescribe    gabapentin (NEURONTIN) 600 MG tablet  90 tablet 3 6/12/2019    [facility] [place] [facility], [place] - [address]    Sig: Take 1 tablet (600 mg) by mouth At Bedtime    Class: E-Prescribe    Route: Oral    Glucos-MSM-C-Hq-Jnwszu-Ttcfth (GLUCOSAMINE MSM COMPLEX) TABS tablet        [id number], [place] - [address]    Sig: Take by mouth daily    Class: Historical    Route: Oral    lisinopril-hydrochlorothiazide (PRINZIDE/ZESTORETIC) 20-12.5 MG tablet  180 tablet 0 6/12/2019    [facility] [place] [facility], [place] - [address]    Sig: TAKE 2 TABLETS BY MOUTH EVERY DAY    Class: E-Prescribe    Multiple Vitamins-Iron TABS            Sig: Take 1 tablet by mouth daily.    Class: Historical    Route: Oral    order for DME  1 each 1 8/7/2019    [facility] [place] [facility] [place] [id number]    Sig: Please measure and distribute 1 pair of 20mmHg - 30mmHg knee high open or closed toe compression stockings. Jobst ultrasheer or equivalent.    Class: Local Print    tiZANidine (ZANAFLEX) 4 MG tablet  90 tablet 1 6/12/2019    [facility] [place] [facility], [place] - [address]    Sig: Take 1-2 tablets (4-8 mg) by mouth nightly as needed    Class: E-Prescribe    Route: Oral    methylPREDNISolone (MEDROL DOSEPAK) 4 MG tablet  21 tablet 0 9/25/2018    [facility] [place] [facility], [place] - [address]    Sig: Follow package instructions    Class: E-Prescribe      Clinic-Administered Medications as of 8/7/2019       Dose Frequency Start End    bupivacaine (MARCAINE) 0.25 % injection 3 mL 3 mL  3/15/2019     Sig: 3 mLs    bupivacaine (MARCAINE) 0.25 % injection 3 mL 3 mL  1/9/2019     Sig: 3 mLs    bupivacaine (MARCAINE) 0.25 % injection 3 mL 3  mL  8/15/2018     Sig: 3 mLs    Class: E-Prescribe    lidocaine 1 % injection 3 mL 3 mL  2019     Sig: 3 mLs    lidocaine 1 % injection 3 mL 3 mL  8/15/2018     Sig: 3 mLs    Class: E-Prescribe    lidocaine 1 % injection 4 mL 4 mL  3/15/2019     Si mLs    lidocaine 1 % injection 4 mL 4 mL  2019     Si mLs    lidocaine 1 % injection 4 mL 4 mL  8/15/2018     Si mLs    Class: E-Prescribe    methylPREDNISolone (DEPO-MEDROL) injection 80 mg 80 mg  2019     Si mg    methylPREDNISolone (DEPO-MEDROL) injection 80 mg 80 mg  2019     Si mg    methylPREDNISolone acetate (DEPO-MEDROL) injection 80 mg 80 mg  8/15/2018     Si mg    Class: E-Prescribe    methylPREDNISolone acetate (DEPO-MEDROL) injection 80 mg 80 mg  8/15/2018     Si mg    Class: E-Prescribe    triamcinolone (KENALOG-40) injection 80 mg 80 mg  3/15/2019     Si mg          ALLERGIES:   Erythromycin    ROS:  Skin: negative except as above  Eyes: negative  Ears/Nose/Throat: negative  Respiratory: No shortness of breath, dyspnea on exertion, cough, or hemoptysis  Cardiovascular: negative  Gastrointestinal: negative  Genitourinary: negative  Musculoskeletal: negative  Neurologic: restless leg symptoms, mostly alleviated with gabapentin  Psychiatric: negative  Hematologic/Lymphatic/Immunologic: negative  Endocrine: negative      Physical Examination:   VITALS:   /66 (BP Location: Left arm, Patient Position: Chair, Cuff Size: Adult Large)   Pulse 75   SpO2 95%   Constitutional: healthy, alert and no distress  Skin: Right leg varicose veins of the anterior foot, medial ankle, and medial calf.  Telangiectatic changes below the knees, right greater than left.   Vascular: Lower extremities are warm and appear well perfused.       Labs:    BMP RESULTS:  Lab Results   Component Value Date     2019    POTASSIUM 4.0 2019    CHLORIDE 101 2019    CO2 31 2019    ANIONGAP 8 2019    GLC  89 06/12/2019    BUN 16 06/12/2019    CR 0.71 06/12/2019    GFRESTIMATED 82 06/12/2019    GFRESTBLACK >90 06/12/2019    NGOC 9.6 06/12/2019        CBC RESULTS:  Lab Results   Component Value Date    WBC 7.7 06/12/2019    RBC 4.48 06/12/2019    HGB 11.9 06/12/2019    HCT 36.9 06/12/2019    MCV 82 06/12/2019    MCH 26.6 06/12/2019    MCHC 32.2 06/12/2019    RDW 13.0 06/12/2019     06/12/2019       INR/PTT:  No results found for: INR, PTT     Diagnostic studies: I personally reviewed the RLE arterial duplex study and MEL from earlier 8/7/2019.  MEL 1.27 on the right and 1.18 on the left.  These values are within normal limits.  She has no hemodynamically significant stenosis of the arteries of the right lower extremity.      Assessment 79 year old female with right lower extremity pain, likely a combination of sciatica and symptomatic venous varicosities, CEAP 3.  She has not tried prescription strength compression stockings.  There is no clinical history or imaging evidence of peripheral arterial disease.  Her restless leg syndrome appears to be well controlled on gabapentin.    Plan   - Will prescribe knee high 20-30 mm Hg compression stockings of the right lower extremity.  Will order venous insufficiency study of her right leg to be performed prior to next IR clinic appointment.   - Return to clinic in 3 months.  If her pain does not significantly improve and she has evidence of incompetence and varicosities on insufficiency study, will most likely offer the patient endovenous ablation treatment of the right GSV, as well as sclerotherapy of her venous varicosities.     I saw and examined the patient with the fellow and agree with the assessment and plan.    Total of approximately 30 minutes spent with the patient of which >50% spent on counseling and coordination of care.    CC  Patient Care Team:  Gavi Bear MD as PCP - General (Family Practice)  Jesse Huitron, DPM (Podiatry)  Gavi Bear MD as  Assigned PCP  Cedrick Dickerson MD as MD (Family Practice)  CEDRICK DICKERSON      Again, thank you for allowing me to participate in the care of your patient.      Sincerely,    Aryan Barton MD

## 2019-08-07 NOTE — PROGRESS NOTES
"    INTERVENTIONAL RADIOLOGY CONSULTATION    Name: Yumiko Flowers  Age: 79 year old   Referring Physician: Dr. Bear   REASON FOR REFERRAL: right leg pain     HPI: Mrs. Flowers is a 79 year old female who presents to interventional radiology clinic with chief complaint of right leg pain.  She has had this pain for a long time.  It is described as an aching/heavy pain.  It is exacerbated by standing and walking.  It is alleviated by lying down.  It is separate from the sharp, shooting pain that extends from her right gluteal region throughout her posterior and lateral right leg.  The pain is mostly localized to the anterior right shin region in the area of some varicose veins.  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  The right shin pain has gotten worse recently and now she gets pain while in the car driving.  She has had 2 children.  She butch history of DVT/PE.  She does not smoke.  She has a history of restless leg syndrome and she takes 1200 mg gabapentin at bedtime.      PAST MEDICAL HISTORY:   Past Medical History:   Diagnosis Date     Acne rosacea      Actinic keratosis      Amblyopia      Asthma, moderate persistent      Basal cell carcinoma 10/2010    back X2     Cataract, mod, od; mild-mod, os 1/12/2012     DJD (degenerative joint disease), lumbosacral 8/6/2014     Elevated cholesterol      Endometriosis      HTN (hypertension)      Moderate major depression (H)     \"Circumstances\"     Osteopenia        PAST SURGICAL HISTORY:   Past Surgical History:   Procedure Laterality Date     BLEPHAROPLASTY BILATERAL  3/9/2006; 2013    both eyes, upper lid; revision (EN)     C APPENDECTOMY       CATARACT IOL, RT/LT       HC REMOVAL GALLBLADDER       LASER YAG CAPSULOTOMY      left eye (AC lysis also)     PHACOEMULSIFICATION WITH STANDARD INTRAOCULAR LENS IMPLANT  1/2012; 4/2013    right eye; left eye     REPAIR PTOSIS       SURGICAL HISTORY " "OF -       endometriosis surgeriesx3     SURGICAL HISTORY OF -       ganiglion cyst onfoot     SURGICAL HISTORY OF -       Eye for \"droopy eye\"       FAMILY HISTORY:   Family History   Problem Relation Age of Onset     C.A.D. Father      C.A.D. Brother      Hypertension Mother      Cancer No family hx of      Diabetes No family hx of      Cerebrovascular Disease No family hx of      Thyroid Disease No family hx of      Glaucoma No family hx of      Macular Degeneration No family hx of        SOCIAL HISTORY:   Social History     Tobacco Use     Smoking status: Never Smoker     Smokeless tobacco: Never Used   Substance Use Topics     Alcohol use: Yes       PROBLEM LIST:   Patient Active Problem List    Diagnosis Date Noted     Chronic pain of left knee 04/24/2019     Priority: Medium     Obesity (BMI 35.0-39.9) with comorbidity (H) 03/15/2019     Priority: Medium     Vertigo 08/16/2017     Priority: Medium     Cervicalgia 08/03/2017     Priority: Medium     Left knee pain 02/27/2017     Priority: Medium     Posterior vitreous detachment, bilateral 01/14/2017     Priority: Medium     Obesity, Class I, BMI 30-34.9 01/12/2017     Priority: Medium     Essential hypertension with goal blood pressure less than 140/90 11/01/2016     Priority: Medium     Rosacea 05/18/2015     Priority: Medium     Bilateral low back pain without sciatica 09/03/2014     Priority: Medium     Obesity, Class II, BMI 35-39.9 08/06/2014     Priority: Medium     Restless legs syndrome 08/06/2014     Priority: Medium     DJD (degenerative joint disease), lumbosacral 08/06/2014     Priority: Medium     Advanced on X rays       History of blepharoplasty, upper lid, ou (elsewhere); revision (EN) 09/14/2013     Priority: Medium     Health Care Home 01/24/2013     Priority: Medium     Monserrat Rolle RN-PHN  FPA / [facility]   [phone]   DX V65.8 REPLACED WITH [id number] (04/08/2013)       Peptic ulcer 01/23/2013    "  Priority: Medium     HTN, goal below 140/90 03/02/2012     Priority: Medium     Pseudophakia, ou; Yag Caps, os 01/30/2012     Priority: Medium     Hx of pseudoexfoliation, 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  Yumiko Flowers has no plan or code status on file. Mailed available resources and provided with information. Confirmed code status reflects current choices pending further ACP discussions.  Confirmed/documented designated decision maker(s). See permanent comments section of demographics in clinical tab.   Added by Jacqueline Mendoza on 2/26/2015  Discussed advance care planning with patient; information given to patient to review. 11/18/2011   TANYA Clinton MA       Vitamin D deficiency 02/16/2011     Priority: Medium     Osteopenia      Priority: Medium     CARDIOVASCULAR SCREENING; LDL GOAL LESS THAN 130 10/31/2010     Priority: Medium     Asthma, moderate persistent      Priority: Medium     Acne rosacea      Priority: Medium       MEDICATIONS:   Prescription Medications as of 8/7/2019       Rx Number Disp Refills Start End Last Dispensed Date Next Fill Date Owning Pharmacy    CALCIUM 500 MG PO CHEW            Sig: None Entered    Class: Historical    Route: Oral    Cholecalciferol (VITAMIN D) 2000 UNIT CAPS            Sig: Take 2,000 Units by mouth daily.    Class: Historical    Route: Oral    doxycycline hyclate (VIBRA-TABS) 100 MG tablet  180 tablet 1 6/12/2019    [facility], [place] - [address]    Sig: TAKE 1 TABLET (100 MG) BY MOUTH 2 TIMES DAILY    Class: E-Prescribe    etodolac (LODINE) 400 MG tablet  60 tablet 0 8/30/2018    [facility] [place] [facility] [place] - [address]    Sig: TAKE 1 TABLET (400 MG) BY MOUTH 2 TIMES DAILY AS NEEDED    Class: E-Prescribe    gabapentin (NEURONTIN) 600 MG tablet  90  tablet 3 2019    [facility] [place] - [address]    Sig: Take 1 tablet (600 mg) by mouth At Bedtime    Class: E-Prescribe    Route: Oral    Glucos-MSM-C-Bp-Txecmg-Gnvhmi (GLUCOSAMINE MSM COMPLEX) TABS tablet        [facility] [place] [facility] [place] - [address]    Sig: Take by mouth daily    Class: Historical    Route: Oral    lisinopril-hydrochlorothiazide (PRINZIDE/ZESTORETIC) 20-12.5 MG tablet  180 tablet 0 2019    [facility] [place] - [address]    Sig: TAKE 2 TABLETS BY MOUTH EVERY DAY    Class: E-Prescribe    Multiple Vitamins-Iron TABS            Sig: Take 1 tablet by mouth daily.    Class: Historical    Route: Oral    order for DME  1 each 1 2019    [facility] [place] [facility], [place] - [address]    Sig: Please measure and distribute 1 pair of 20mmHg - 30mmHg knee high open or closed toe compression stockings. Jobst ultrasheer or equivalent.    Class: Local Print    tiZANidine (ZANAFLEX) 4 MG tablet  90 tablet 1 2019    [facility] [place] [facility], [place] - [address]    Sig: Take 1-2 tablets (4-8 mg) by mouth nightly as needed    Class: E-Prescribe    Route: Oral    methylPREDNISolone (MEDROL DOSEPAK) 4 MG tablet  21 tablet 0 2018    [facility] [place] [facility], [place] - [address]    Sig: Follow package instructions    Class: E-Prescribe      Clinic-Administered Medications as of 2019       Dose Frequency Start End    bupivacaine (MARCAINE) 0.25 % injection 3 mL 3 mL  3/15/2019     Sig: 3 mLs    bupivacaine (MARCAINE) 0.25 % injection 3 mL 3 mL  2019     Sig: 3 mLs    bupivacaine (MARCAINE) 0.25 % injection 3 mL 3 mL  8/15/2018     Sig: 3 mLs    Class: E-Prescribe    lidocaine 1 % injection 3 mL 3 mL  2019     Sig: 3 mLs    lidocaine 1 % injection 3 mL 3 mL  8/15/2018     Sig: 3 mLs    Class: E-Prescribe    lidocaine 1 % injection 4 mL 4 mL  3/15/2019     Si mLs    lidocaine 1 % injection  4 mL 4 mL  2019     Si mLs    lidocaine 1 % injection 4 mL 4 mL  8/15/2018     Si mLs    Class: E-Prescribe    methylPREDNISolone (DEPO-MEDROL) injection 80 mg 80 mg  2019     Si mg    methylPREDNISolone (DEPO-MEDROL) injection 80 mg 80 mg  2019     Si mg    methylPREDNISolone acetate (DEPO-MEDROL) injection 80 mg 80 mg  8/15/2018     Si mg    Class: E-Prescribe    methylPREDNISolone acetate (DEPO-MEDROL) injection 80 mg 80 mg  8/15/2018     Si mg    Class: E-Prescribe    triamcinolone (KENALOG-40) injection 80 mg 80 mg  3/15/2019     Si mg          ALLERGIES:   Erythromycin    ROS:  Skin: negative except as above  Eyes: negative  Ears/Nose/Throat: negative  Respiratory: No shortness of breath, dyspnea on exertion, cough, or hemoptysis  Cardiovascular: negative  Gastrointestinal: negative  Genitourinary: negative  Musculoskeletal: negative  Neurologic: restless leg symptoms, mostly alleviated with gabapentin  Psychiatric: negative  Hematologic/Lymphatic/Immunologic: negative  Endocrine: negative      Physical Examination:   VITALS:   /66 (BP Location: Left arm, Patient Position: Chair, Cuff Size: Adult Large)   Pulse 75   SpO2 95%   Constitutional: healthy, alert and no distress  Skin: Right leg varicose veins of the anterior foot, medial ankle, and medial calf.  Telangiectatic changes below the knees, right greater than left.   Vascular: Lower extremities are warm and appear well perfused.       Labs:    BMP RESULTS:  Lab Results   Component Value Date     2019    POTASSIUM 4.0 2019    CHLORIDE 101 2019    CO2 31 2019    ANIONGAP 8 2019    GLC 89 2019    BUN 16 2019    CR 0.71 2019    GFRESTIMATED 82 2019    GFRESTBLACK >90 2019    NGOC 9.6 2019        CBC RESULTS:  Lab Results   Component Value Date    WBC 7.7 2019    RBC 4.48 2019    HGB 11.9 2019    HCT 36.9 2019     MCV 82 06/12/2019    MCH 26.6 06/12/2019    MCHC 32.2 06/12/2019    RDW 13.0 06/12/2019     06/12/2019       INR/PTT:  No results found for: INR, PTT     Diagnostic studies: I personally reviewed the RLE arterial duplex study and MEL from earlier 8/7/2019.  MEL 1.27 on the right and 1.18 on the left.  These values are within normal limits.  She has no hemodynamically significant stenosis of the arteries of the right lower extremity.      Assessment 79 year old female with right lower extremity pain, likely a combination of sciatica and symptomatic venous varicosities, CEAP 3.  She has not tried prescription strength compression stockings.  There is no clinical history or imaging evidence of peripheral arterial disease.  Her restless leg syndrome appears to be well controlled on gabapentin.    Plan   - Will prescribe knee high 20-30 mm Hg compression stockings of the right lower extremity.  Will order venous insufficiency study of her right leg to be performed prior to next IR clinic appointment.   - Return to clinic in 3 months.  If her pain does not significantly improve and she has evidence of incompetence and varicosities on insufficiency study, will most likely offer the patient endovenous ablation treatment of the right GSV, as well as sclerotherapy of her venous varicosities.     I saw and examined the patient with the fellow and agree with the assessment and plan.    Total of approximately 30 minutes spent with the patient of which >50% spent on counseling and coordination of care.    CC  Patient Care Team:  Cedrick Dickerson MD as PCP - General (Family Practice)  Jesse Huitron DPM (Podiatry)  Cedrick Dickerson MD as Assigned PCP  Cedrick Dickerson MD as MD (Family Practice)  CEDRICK DICKERSON

## 2019-09-03 ENCOUNTER — ANCILLARY PROCEDURE (OUTPATIENT)
Dept: GENERAL RADIOLOGY | Facility: CLINIC | Age: 79
End: 2019-09-03
Attending: NURSE PRACTITIONER
Payer: COMMERCIAL

## 2019-09-03 ENCOUNTER — OFFICE VISIT (OUTPATIENT)
Dept: FAMILY MEDICINE | Facility: CLINIC | Age: 79
End: 2019-09-03
Payer: COMMERCIAL

## 2019-09-03 VITALS
HEART RATE: 74 BPM | WEIGHT: 208.4 LBS | RESPIRATION RATE: 16 BRPM | SYSTOLIC BLOOD PRESSURE: 120 MMHG | TEMPERATURE: 98.4 F | BODY MASS INDEX: 33.49 KG/M2 | HEIGHT: 66 IN | DIASTOLIC BLOOD PRESSURE: 60 MMHG | OXYGEN SATURATION: 97 %

## 2019-09-03 DIAGNOSIS — M54.2 NECK PAIN: Primary | ICD-10-CM
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
## 2019-09-03 DIAGNOSIS — R07.89 RIGHT-SIDED CHEST WALL PAIN: ICD-10-CM

## 2019-09-03 DIAGNOSIS — M54.2 NECK PAIN: ICD-10-CM

## 2019-09-03 PROCEDURE — 99214 OFFICE O/P EST MOD 30 MIN: CPT | Performed by: NURSE PRACTITIONER

## 2019-09-03 PROCEDURE — 72040 X-RAY EXAM NECK SPINE 2-3 VW: CPT

## 2019-09-03 PROCEDURE — 71101 X-RAY EXAM UNILAT RIBS/CHEST: CPT | Mod: RT

## 2019-09-03 ASSESSMENT — PAIN SCALES - GENERAL: PAINLEVEL: MODERATE PAIN (5)

## 2019-09-03 ASSESSMENT — MIFFLIN-ST. JEOR: SCORE: 1439.11

## 2019-09-03 NOTE — PATIENT INSTRUCTIONS
Ice 15 minutes 4-6 times daily to chest  Ice or heat to neck   tizanidine (muscle relaxer)  Tylenol or ibuprofen  Follow up if not improving in 2 weeks, sooner as needed      Patient Education     Chest Wall Pain: Costochondritis    The chest pain that you have had today is caused by costochondritis. This condition is caused by an inflammation of the cartilage joining your ribs to your breastbone. It is not caused by heart or lung problems. Your healthcare team has made sure that the chest pain you feel is not from a life threatening cause of chest pain such as heart attack, collapsed lung, blood clot in the lung, tear in the aorta, or esophageal rupture. The inflammation may have been brought on by a blow to the chest, lifting heavy objects, intense exercise, or an illness that made you cough and sneeze a lot. It often occurs during times of emotional stress. It can be painful, but it is not dangerous. It usually goes away in 1 to 2 weeks. But it may happen again. Rarely, a more serious condition may cause symptoms similar to costochondritis. That s why it s important to watch for the warning signs listed below.  Home care  Follow these guidelines when caring for yourself at home:    If you feel that emotional stress is a cause of your condition, try to figure out the sources of that stress. It may not be obvious. Learn ways to deal with the stress in your life. This can include regular exercise, muscle relaxation, meditation, or simply taking time out for yourself.    You may use acetaminophen, ibuprofen, or naproxen to control pain, unless another pain medicine was prescribed. If you have liver or kidney disease or ever had a stomach ulcer, talk with your healthcare provider before using these medicines.    You can also help ease pain by using a hot, wet compress or heating pad. Use this with or without a medicated skin cream that helps relieves pain.    Do stretching exercise as advised by your  provider.    Take any prescribed medicines as directed.  Follow-up care  Follow up with your healthcare provider, or as advised, if you do not start to get better in the next 2 days.  When to seek medical advice  Call your healthcare provider right away if any of these occur:    A change in the type of pain. Call if it feels different, becomes more serious, lasts longer, or spreads into your shoulder, arm, neck, jaw, or back.    Shortness of breath or pain gets worse when you breathe    Weakness, dizziness, or fainting    Cough with dark-colored sputum (phlegm) or blood    Abdominal pain    Dark red or black stools    Fever of 100.4 F (38 C) or higher, or as directed by your healthcare provider  Date Last Reviewed: 12/1/2016 2000-2018 [employer]. [address] [place], [place] [id number]. All rights reserved. This information is not intended as a substitute for professional medical care. Always follow your healthcare professional's instructions.           Patient Education     General Neck and Back Pain    Both neck and back pain are usually caused by injury to the muscles or ligaments of the spine. Sometimes the disks that separate each bone of the spine may cause pain by pressing on a nearby nerve. Back and neck pain may appear after a sudden twisting or bending force (such as in a car accident), or sometimes after a simple awkward movement. In either case, muscle spasm is often present and adds to the pain.  Acute neck and back pain usually gets better in 1 to 2 weeks. Pain related to disk disease, arthritis in the spinal joints or spinal stenosis (narrowing of the spinal canal) can become chronic and last for months or years.  Back and neck pain are common problems. Most people feel better in 1 or 2 weeks, and most of the rest in 1 to 2 months. Most people can remain active.  People have and describe pain differently.    Pain can be sharp, stabbing, shooting, aching, cramping, or  burning    Movement, standing, bending, lifting, sitting, or walking may worsen the pain    Pain can be localized to one spot or area, or it can be more generalized    Pain can spread or radiate upwards, downwards, to the front, or go down your arms    Muscle spasm may occur.  Most of the time mechanical problems with the muscles or spine cause the pain. it is usually caused by an injury, whether known or not, to the muscles or ligaments. While illnesses can cause back pain, it is usually not caused by a serious illness. Pain is usually related to physical activity, whether sports, exercise, work, or normal activity. Sometimes it can occur without an identifiable cause. This can happen simply by stretching or moving wrong, without noting pain at the time. Other causes include:    Overexertion, lifting, pushing, pulling incorrectly or too aggressively.    Sudden twisting, bending or stretching from an accident (car or fall), or accidental movement.    Poor posture    Poor conditioning, lack of regular exercise    Spinal disc disease or arthritis    Stress    Pregnancy, or illness like appendicitis, bladder or kidney infection, pelvic infections   Home care    For neck pain: Use a comfortable pillow that supports the head and keeps the spine in a neutral position. The position of the head should not be tilted forward or backward.    When in bed, try to find a position of comfort. A firm mattress is best. Try lying flat on your back with pillows under your knees. You can also try lying on your side with your knees bent up towards your chest and a pillow between your knees.    At first, do not try to stretch out the sore spots. If there is a strain, it is not like the good soreness you get after exercising without an injury. In this case, stretching may make it worse.    Don't sit for long periods, as in long car rides or other travel. This puts more stress on the lower back than standing or walking.    During the first  24 to 72 hours after an injury, apply an ice pack to the painful area for 20 minutes and then remove it for 20 minutes over a period of 60 to 90 minutes or several times a day.     You can alternate ice and heat therapies. Talk with your healthcare provider about the best treatment for your back or neck pain. As a safety precaution, do not use a heating pad at bedtime. Sleeping with a heating pad can lead to skin burns or tissue damage.    Therapeutic massage can help relax the back and neck muscles without stretching them.    Be aware of safe lifting methods and do not lift anything over 15 pounds until all the pain is gone.  Medicines  Talk to your healthcare provider before using medicine, especially if you have other medical problems or are taking other medicines.    You may use over-the-counter medicine to control pain, unless another pain medicine was prescribed. If you have chronic conditions like diabetes, liver or kidney disease, stomach ulcers,  gastrointestinal bleeding, or are taking blood thinner medicines.    Be careful if you are given pain medicines, narcotics, or medicine for muscle spasm. They can cause drowsiness, and can affect your coordination, reflexes, and judgment. Do not drive or operate heavy machinery.  Follow-up care  Follow up with your healthcare provider, or as advised. Physical therapy or further tests may be needed.  If X-rays were taken, you will be notified of any new findings that may affect your care.  Call 911  Call 911 if any of the following occur:    Trouble breathing    Confusion    Very drowsy or trouble awakening    Fainting or loss of consciousness    Rapid or very slow heart rate    Loss of bowel or bladder control  When to seek medical advice  Call your healthcare provider right away if any of these occur:    Pain becomes worse or spreads into your arms or legs    Weakness, numbness or pain in one or both arms or legs    Numbness in the groin area    Difficulty  walking    Fever of 100.4 F (38 C) or higher, or as directed by your healthcare provider  Date Last Reviewed: 7/1/2016 2000-2018 [employer]. [address] [place] [address], [place] [address]. All rights reserved. This information is not intended as a substitute for professional medical care. Always follow your healthcare professional's instructions.
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
## 2019-09-09 DIAGNOSIS — I10 ESSENTIAL HYPERTENSION WITH GOAL BLOOD PRESSURE LESS THAN 140/90: ICD-10-CM

## 2019-09-09 NOTE — TELEPHONE ENCOUNTER
"Requested Prescriptions   Pending Prescriptions Disp Refills     lisinopril-hydrochlorothiazide (PRINZIDE/ZESTORETIC) 20-12.5 MG tablet [Pharmacy Med Name: LISINOPRIL-HCTZ 20-12.5 MG TAB]  Last Written Prescription Date:  06/12/19  Last Fill Quantity: 180,  # refills: 0   Last Office Visit with [facility], [facility] or [facility] prescribing provider:  09/03/19[place]   Future Office Visit:    180 tablet 0     Sig: TAKE 2 TABLETS BY MOUTH EVERY DAY       Diuretics (Including Combos) Protocol Passed - 9/9/2019  1:28 AM        Passed - Blood pressure under 140/90 in past 12 months     BP Readings from Last 3 Encounters:   09/03/19 120/60   08/07/19 120/66   06/12/19 129/75                 Passed - Recent (12 mo) or future (30 days) visit within the authorizing provider's specialty     Patient had office visit in the last 12 months or has a visit in the next 30 days with authorizing provider or within the authorizing provider's specialty.  See \"Patient Info\" tab in inbasket, or \"Choose Columns\" in Meds & Orders section of the refill encounter.              Passed - Medication is active on med list        Passed - Patient is age 18 or older        Passed - No active pregancy on record        Passed - Normal serum creatinine on file in past 12 months     Recent Labs   Lab Test 06/12/19 1929   CR 0.71              Passed - Normal serum potassium on file in past 12 months     Recent Labs   Lab Test 06/12/19 1929   POTASSIUM 4.0                    Passed - Normal serum sodium on file in past 12 months     Recent Labs   Lab Test 06/12/19 1929                 Passed - No positive pregnancy test in past 12 months          "

## 2019-09-11 DIAGNOSIS — M54.16 LUMBAR RADICULAR PAIN: ICD-10-CM

## 2019-09-11 RX ORDER — LISINOPRIL AND HYDROCHLOROTHIAZIDE 12.5; 2 MG/1; MG/1
TABLET ORAL
Qty: 180 TABLET | Refills: 0 | Status: SHIPPED | OUTPATIENT
Start: 2019-09-11 | End: 2019-12-08

## 2019-09-11 NOTE — TELEPHONE ENCOUNTER
Routing refill request to provider for review/approval because:  Drug not on the [facility] refill protocol       Magdy Mann RN, BSN, PHN

## 2019-09-11 NOTE — TELEPHONE ENCOUNTER
Requested Prescriptions   Pending Prescriptions Disp Refills     tiZANidine (ZANAFLEX) 4 MG tablet [Pharmacy Med Name: TIZANIDINE HCL 4 MG TABLET]        Last Written Prescription Date:  06/12/19  Last Fill Quantity: 90,   # refills: 1  Last Office Visit: 09/03/19[facility] Office visit:       Routing refill request to provider for review/approval because:  Drug not on the [facility], [facility] or [facility] refill protocol or controlled substance 90 tablet 1     Sig: TAKE 1-2 TABLETS (4-8 MG) BY MOUTH NIGHTLY AS NEEDED       There is no refill protocol information for this order

## 2019-09-26 PROBLEM — M25.562 CHRONIC PAIN OF LEFT KNEE: Status: RESOLVED | Noted: 2019-04-24 | Resolved: 2019-09-26

## 2019-09-26 PROBLEM — G89.29 CHRONIC PAIN OF LEFT KNEE: Status: RESOLVED | Noted: 2019-04-24 | Resolved: 2019-09-26

## 2019-09-26 PROBLEM — M54.2 CERVICALGIA: Status: RESOLVED | Noted: 2017-08-03 | Resolved: 2019-09-26

## 2019-09-26 PROBLEM — M25.562 LEFT KNEE PAIN: Status: RESOLVED | Noted: 2017-02-27 | Resolved: 2019-09-26

## 2019-09-26 NOTE — PROGRESS NOTES
Discharge Note    Progress reporting period is from initial evaluation date (please see noted date below) to Jun 12, 2019.  Linked Episodes   Type: Episode: Status: Noted: Resolved: Last update: Updated by:   PHYSICAL THERAPY Low back/Left Knee pain-4/23/19 Active 4/23/2019 6/12/2019  3:37 PM Gricel Prater, RENE      Comments:       Yumiko failed to follow up and current status is unknown.  Please see information below for last relevant information on current status.  Patient seen for 6 visits.    SUBJECTIVE  Subjective changes noted by patient:  Pt reports knee and back are about the same. If sits doesn't have much pain for her knee. Stairs are most paifnul for knee 7-8/10 so usually does one at a time. Any physical work or bending over increases back pain. Sometimes able to walk just fine but stairs stop her from doing more. Intermittent swelling, espeically yesterday after cleaning house and followed with yard work. Wonders if injection is wearing off as she starting to notice leg pain again (not as intense as it was) espeically with standing and putting makeup on this morning. Discussed scheduling with MD re: lack of progress.   .  Current pain level is (5-6/10 ).     Previous pain level was  8/10.   Changes in function:  Yes (See Goal flowsheet attached for changes in current functional level)  Adverse reaction to treatment or activity: None    OBJECTIVE  Changes noted in objective findings: AROM L Knee: 0-1-130; LROM: Flex mod loss+, Ext kimberli loss +; Tender medial to patellar tendon; Held on knee strengthening due to continued pain with increased focus on back exercises today     ASSESSMENT/PLAN  Diagnosis: Low back/Left Knee   Updated problem list and treatment plan:     STG/LTGs have been met or progress has been made towards goals:  Yes, please see goal flowsheet for most current information  Assessment of Progress: current status is unknown.    Last current status: Pt is progressing slower than anticipated    Self Management Plans:  HEP  I have re-evaluated this patient and find that the nature, scope, duration and intensity of the therapy is appropriate for the medical condition of the patient.  Yumiko continues to require the following intervention to meet STG and LTG's:  HEP.    Recommendations:  Discharge with current home program.  Patient to follow up with MD as needed.    Please refer to the daily flowsheet for treatment today, total treatment time and time spent performing 1:1 timed codes.

## 2019-11-05 ENCOUNTER — DOCUMENTATION ONLY (OUTPATIENT)
Dept: OPHTHALMOLOGY | Facility: CLINIC | Age: 79
End: 2019-11-05

## 2019-11-13 ENCOUNTER — ANCILLARY PROCEDURE (OUTPATIENT)
Dept: ULTRASOUND IMAGING | Facility: CLINIC | Age: 79
End: 2019-11-13
Attending: RADIOLOGY
Payer: COMMERCIAL

## 2019-11-13 ENCOUNTER — OFFICE VISIT (OUTPATIENT)
Dept: VASCULAR SURGERY | Facility: CLINIC | Age: 79
End: 2019-11-13
Payer: COMMERCIAL

## 2019-11-13 VITALS — HEART RATE: 74 BPM | SYSTOLIC BLOOD PRESSURE: 113 MMHG | OXYGEN SATURATION: 95 % | DIASTOLIC BLOOD PRESSURE: 72 MMHG

## 2019-11-13 DIAGNOSIS — I83.813 VARICOSE VEINS OF BOTH LOWER EXTREMITIES WITH PAIN: ICD-10-CM

## 2019-11-13 DIAGNOSIS — I83.813 VARICOSE VEINS OF BOTH LOWER EXTREMITIES WITH PAIN: Primary | ICD-10-CM

## 2019-11-13 ASSESSMENT — PAIN SCALES - GENERAL: PAINLEVEL: NO PAIN (0)

## 2019-11-13 NOTE — NURSING NOTE
Vascular Rooming Note     Yumiko Flowers's goals for this visit include:   Chief Complaint   Patient presents with     Follow Up     Tomi, is being seen today for 3 month follow up compression stockings, no change sinces last visit, as reported by patient.     Nichole Marcus LPN

## 2019-11-13 NOTE — PROGRESS NOTES
SUBJECTIVE: Patient is following up after recommended 3 months usage of compression stockings.  Patient was last seen 8/7/2019 for right leg pain, believed to be secondary to sciatica and symptomatic right varicose veins. She did not tolerate the compression stockings and she says that they bother her hammer toes and were too tight leaving marks on her skin.     Patient Active Problem List   Diagnosis     Asthma, moderate persistent     Acne rosacea     CARDIOVASCULAR SCREENING; LDL GOAL LESS THAN 130     Osteopenia     Vitamin D deficiency     Advanced directives, counseling/discussion     Hx of pseudoexfoliation, os     Pseudophakia, ou; Yag Caps, os     HTN, goal below 140/90     Peptic ulcer     Health Care Home     History of blepharoplasty, upper lid, ou (elsewhere); revision (EN)     Obesity, Class II, BMI 35-39.9     Restless legs syndrome     DJD (degenerative joint disease), lumbosacral     Rosacea     Essential hypertension with goal blood pressure less than 140/90     Obesity, Class I, BMI 30-34.9     Posterior vitreous detachment, bilateral     Vertigo     Obesity (BMI 35.0-39.9) with comorbidity (H)      Current Outpatient Medications   Medication Sig     CALCIUM 500 MG PO CHEW None Entered     Cholecalciferol (VITAMIN D) 2000 UNIT CAPS Take 2,000 Units by mouth daily.     doxycycline hyclate (VIBRA-TABS) 100 MG tablet TAKE 1 TABLET (100 MG) BY MOUTH 2 TIMES DAILY     etodolac (LODINE) 400 MG tablet TAKE 1 TABLET (400 MG) BY MOUTH 2 TIMES DAILY AS NEEDED     gabapentin (NEURONTIN) 600 MG tablet Take 1 tablet (600 mg) by mouth At Bedtime     Glucos-MSM-C-Uh-Dlhmzv-Jgtlpr (GLUCOSAMINE MSM COMPLEX) TABS tablet Take by mouth daily     lisinopril-hydrochlorothiazide (PRINZIDE/ZESTORETIC) 20-12.5 MG tablet TAKE 2 TABLETS BY MOUTH EVERY DAY     methylPREDNISolone (MEDROL DOSEPAK) 4 MG tablet Follow package instructions     Multiple Vitamins-Iron TABS Take 1 tablet by mouth daily.     order for DME Please  measure and distribute 1 pair of 20mmHg - 30mmHg knee high open or closed toe compression stockings. Jobst ultrasheer or equivalent.     tiZANidine (ZANAFLEX) 4 MG tablet TAKE 1-2 TABLETS (4-8 MG) BY MOUTH NIGHTLY AS NEEDED     No current facility-administered medications for this visit.      Social History     Tobacco Use     Smoking status: Never Smoker     Smokeless tobacco: Never Used   Substance Use Topics     Alcohol use: Yes     Drug use: No      REVIEW OF SYSTEMS: As above    OBJECTIVE:  General appearance: Pleasant, cooperative, no distress.  Mental Status: cooperative, normal affect, no gross thought process defects during casual conversation.  Please refer note dated 8/7/2019 for lower extremity exam    Ultrasound venous incompetence 11/13/2019: official read pending. 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 EVLA right GSV with sclerotherapy may be a great option for her however as she has not tolerated the 20-30 mm Hg compression stockings, which she will need after the procedure. We can try and see if she will tolerate 10-20 mm Hg compression stockings and if she does we will plan to do a EVLA and sclerotherapy. However if she doesn't the only option for her would be venaseal treatment of right GSV as it does not need compression stocking use after     -10-20 mm Hg compression stockings and she will call us in 2 weeks about her tolerance and compliance  -If she tolerates those, we will schedule the patient for EVLA right GSV and sclerotherapy   -If not, we will potentially plan the patient for a venaseal treatment of right GSV    She agrees to the plan     Dean Canales   IR fellow   [id number]     I saw and examined the patient with the fellow and agree with the assessment and plan.    Aryan  MD Mick    Total of approximately 15 minutes spent with the patient of which >50% spent on counseling.

## 2019-11-13 NOTE — LETTER
11/13/2019       RE: Yumiko Flowers  [address]  [place] [address]     Dear Colleague,    Thank you for referring your patient, Yumiko Flowers, to the [facility] VASCULAR [facility]INIC at [facility]. Please see a copy of my visit note below.    SUBJECTIVE: Patient is following up after recommended 3 months usage of compression stockings.  Patient was last seen 8/7/2019 for right leg pain, believed to be secondary to sciatica and symptomatic right varicose veins. She did not tolerate the compression stockings and she says that they bother her hammer toes and were too tight leaving marks on her skin.     Patient Active Problem List   Diagnosis     Asthma, moderate persistent     Acne rosacea     CARDIOVASCULAR SCREENING; LDL GOAL LESS THAN 130     Osteopenia     Vitamin D deficiency     Advanced directives, counseling/discussion     Hx of pseudoexfoliation, os     Pseudophakia, ou; Yag Caps, os     HTN, goal below 140/90     Peptic ulcer     Health Care Home     History of blepharoplasty, upper lid, ou (elsewhere); revision (EN)     Obesity, Class II, BMI 35-39.9     Restless legs syndrome     DJD (degenerative joint disease), lumbosacral     Rosacea     Essential hypertension with goal blood pressure less than 140/90     Obesity, Class I, BMI 30-34.9     Posterior vitreous detachment, bilateral     Vertigo     Obesity (BMI 35.0-39.9) with comorbidity (H)      Current Outpatient Medications   Medication Sig     CALCIUM 500 MG PO CHEW None Entered     Cholecalciferol (VITAMIN D) 2000 UNIT CAPS Take 2,000 Units by mouth daily.     doxycycline hyclate (VIBRA-TABS) 100 MG tablet TAKE 1 TABLET (100 MG) BY MOUTH 2 TIMES DAILY     etodolac (LODINE) 400 MG tablet TAKE 1 TABLET (400 MG) BY MOUTH 2 TIMES DAILY AS NEEDED     gabapentin (NEURONTIN) 600 MG tablet Take 1 tablet (600 mg) by mouth At Bedtime     Glucos-MSM-C-Tz-Tincad-Mgjuey (GLUCOSAMINE MSM COMPLEX) TABS tablet Take by mouth  daily     lisinopril-hydrochlorothiazide (PRINZIDE/ZESTORETIC) 20-12.5 MG tablet TAKE 2 TABLETS BY MOUTH EVERY DAY     methylPREDNISolone (MEDROL DOSEPAK) 4 MG tablet Follow package instructions     Multiple Vitamins-Iron TABS Take 1 tablet by mouth daily.     order for DME Please measure and distribute 1 pair of 20mmHg - 30mmHg knee high open or closed toe compression stockings. Jobst ultrasheer or equivalent.     tiZANidine (ZANAFLEX) 4 MG tablet TAKE 1-2 TABLETS (4-8 MG) BY MOUTH NIGHTLY AS NEEDED     No current facility-administered medications for this visit.      Social History     Tobacco Use     Smoking status: Never Smoker     Smokeless tobacco: Never Used   Substance Use Topics     Alcohol use: Yes     Drug use: No      REVIEW OF SYSTEMS: As above    OBJECTIVE:  General appearance: Pleasant, cooperative, no distress.  Mental Status: cooperative, normal affect, no gross thought process defects during casual conversation.  Please refer note dated 8/7/2019 for lower extremity exam    Ultrasound venous incompetence 11/13/2019: official read pending. 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 EVLA right GSV with sclerotherapy may be a great option for her however as she has not tolerated the 20-30 mm Hg compression stockings, which she will need after the procedure. We can try and see if she will tolerate 10-20 mm Hg compression stockings and if she does we will plan to do a EVLA and sclerotherapy. However if she doesn't the only option for her would be venaseal treatment of right GSV as it does not need compression stocking use after     -10-20 mm Hg compression stockings and she will call us in 2 weeks about her tolerance and compliance  -If she tolerates those, we will schedule the patient for  EVLA right GSV and sclerotherapy   -If not, we will potentially plan the patient for a venaseal treatment of right GSV    She agrees to the plan     Dean Canales   IR fellow   [id number]     I saw and examined the patient with the fellow and agree with the assessment and plan.    Aryan Barton MD    Total of approximately 15 minutes spent with the patient of which >50% spent on counseling.

## 2019-12-08 DIAGNOSIS — I10 ESSENTIAL HYPERTENSION WITH GOAL BLOOD PRESSURE LESS THAN 140/90: ICD-10-CM

## 2019-12-08 DIAGNOSIS — L71.9 ROSACEA: ICD-10-CM

## 2019-12-08 DIAGNOSIS — M54.16 LUMBAR RADICULAR PAIN: ICD-10-CM

## 2019-12-08 NOTE — TELEPHONE ENCOUNTER
"Requested Prescriptions   Pending Prescriptions Disp Refills     lisinopril-hydrochlorothiazide (PRINZIDE/ZESTORETIC) 20-12.5 MG tablet [Pharmacy Med Name: LISINOPRIL-HCTZ 20-12.5 MG TAB] 180 tablet 0     Sig: TAKE 2 TABLETS BY MOUTH EVERY DAY       Diuretics (Including Combos) Protocol Passed - 12/8/2019 12:46 AM        Passed - Blood pressure under 140/90 in past 12 months     BP Readings from Last 3 Encounters:   11/13/19 113/72   09/03/19 120/60   08/07/19 120/66                 Passed - Recent (12 mo) or future (30 days) visit within the authorizing provider's specialty     Patient has had an office visit with the authorizing provider or a provider within the authorizing providers department within the previous 12 mos or has a future within next 30 days. See \"Patient Info\" tab in inbasket, or \"Choose Columns\" in Meds & Orders section of the refill encounter.              Passed - Medication is active on med list        Passed - Patient is age 18 or older        Passed - No active pregancy on record        Passed - Normal serum creatinine on file in past 12 months     Recent Labs   Lab Test 06/12/19 1929   CR 0.71              Passed - Normal serum potassium on file in past 12 months     Recent Labs   Lab Test 06/12/19 1929   POTASSIUM 4.0                    Passed - Normal serum sodium on file in past 12 months     Recent Labs   Lab Test 06/12/19 1929                 Passed - No positive pregnancy test in past 12 months        tiZANidine (ZANAFLEX) 4 MG tablet [Pharmacy Med Name: TIZANIDINE HCL 4 MG TABLET] 90 tablet 1     Sig: TAKE 1-2 TABLETS (4-8 MG) BY MOUTH NIGHTLY AS NEEDED       There is no refill protocol information for this order        doxycycline hyclate (VIBRA-TABS) 100 MG tablet [Pharmacy Med Name: DOXYCYCLINE HYCLATE 100 MG TAB] 180 tablet 1     Sig: TAKE 1 TABLET (100 MG) BY MOUTH 2 TIMES DAILY       There is no refill protocol information for this order        Last Written Prescription " Date:  9/11/19  Last Fill Quantity: 180,  # refills: 0   Last office visit: 9/3/2019 with prescribing provider:  Gavi Bear     Future Office Visit:

## 2019-12-10 RX ORDER — LISINOPRIL AND HYDROCHLOROTHIAZIDE 12.5; 2 MG/1; MG/1
TABLET ORAL
Qty: 180 TABLET | Refills: 1 | Status: SHIPPED | OUTPATIENT
Start: 2019-12-10 | End: 2020-06-03

## 2019-12-10 NOTE — TELEPHONE ENCOUNTER
Prescription approved per [facility] Refill Protocol. - Prinzide     Routing refill request to provider for review/approval because:  Drug not on the [facility] refill protocol - Tizanidine; Doxycycline     Sally Pool RN  [facility]

## 2019-12-11 RX ORDER — DOXYCYCLINE HYCLATE 100 MG
TABLET ORAL
Qty: 180 TABLET | Refills: 0 | Status: SHIPPED | OUTPATIENT
Start: 2019-12-11 | End: 2023-01-17

## 2019-12-11 NOTE — TELEPHONE ENCOUNTER
I only provided one refill on Doxycycline, she should discuss with her Dermatologist for further refills. Gavi Bear MD MPH    Called and left a voicemail message regarding refill.  [facility]  2nd Floor  Primary Care

## 2019-12-12 ENCOUNTER — TELEPHONE (OUTPATIENT)
Dept: VASCULAR SURGERY | Facility: CLINIC | Age: 79
End: 2019-12-12

## 2019-12-12 NOTE — TELEPHONE ENCOUNTER
Called pt to f/u on her compression stockings    Informed her that at our last visit with  on 11/13:   -10-20 mm Hg compression stockings and she will call us in 2 weeks about her tolerance and compliance  -If she tolerates those, we will schedule the patient for EVLA right GSV and sclerotherapy   -If not, we will potentially plan the patient for a venaseal treatment of right GSV    I was calling to check in to see how she is doing with the stockings and if she's able to tolerate them.    Asked that she return my call to f/u    Left my direct line for her.   Roxana ALVARADO RN, BSN  Interventional Radiology Nurse Coordinator   Phone: [phone]

## 2019-12-19 ENCOUNTER — OFFICE VISIT (OUTPATIENT)
Dept: ORTHOPEDICS | Facility: CLINIC | Age: 79
End: 2019-12-19
Payer: COMMERCIAL

## 2019-12-19 VITALS
SYSTOLIC BLOOD PRESSURE: 114 MMHG | DIASTOLIC BLOOD PRESSURE: 69 MMHG | HEIGHT: 66 IN | WEIGHT: 208 LBS | BODY MASS INDEX: 33.43 KG/M2 | HEART RATE: 76 BPM

## 2019-12-19 DIAGNOSIS — M17.12 ARTHRITIS OF LEFT KNEE: ICD-10-CM

## 2019-12-19 DIAGNOSIS — M51.369 DDD (DEGENERATIVE DISC DISEASE), LUMBAR: Primary | ICD-10-CM

## 2019-12-19 PROCEDURE — 20610 DRAIN/INJ JOINT/BURSA W/O US: CPT | Mod: LT | Performed by: PREVENTIVE MEDICINE

## 2019-12-19 PROCEDURE — 99207 ZZC NO CHARGE LOS: CPT | Performed by: PREVENTIVE MEDICINE

## 2019-12-19 RX ORDER — TRIAMCINOLONE ACETONIDE 40 MG/ML
40 INJECTION, SUSPENSION INTRA-ARTICULAR; INTRAMUSCULAR
Status: DISCONTINUED | OUTPATIENT
Start: 2019-12-19 | End: 2021-10-05

## 2019-12-19 RX ORDER — GABAPENTIN 600 MG/1
1200 TABLET ORAL AT BEDTIME
Qty: 150 TABLET | Refills: 3 | Status: SHIPPED | OUTPATIENT
Start: 2019-12-19 | End: 2020-08-14

## 2019-12-19 RX ORDER — METHYLPREDNISOLONE 4 MG
TABLET, DOSE PACK ORAL
Qty: 21 TABLET | Refills: 0 | Status: SHIPPED | OUTPATIENT
Start: 2019-12-19 | End: 2020-08-14

## 2019-12-19 RX ADMIN — TRIAMCINOLONE ACETONIDE 40 MG: 40 INJECTION, SUSPENSION INTRA-ARTICULAR; INTRAMUSCULAR at 08:36

## 2019-12-19 ASSESSMENT — MIFFLIN-ST. JEOR: SCORE: 1437.29

## 2019-12-19 NOTE — PROGRESS NOTES
HISTORY OF PRESENT ILLNESS  Ms. Flowers is a pleasant 79 year old year old female who presents to clinic today for followup for her lumbar degenerative arthritis  Having more pain  Wants to consider another injection for low back  Knee is starting to bother her again    MEDICAL HISTORY  Patient Active Problem List   Diagnosis     Asthma, moderate persistent     Acne rosacea     CARDIOVASCULAR SCREENING; LDL GOAL LESS THAN 130     Osteopenia     Vitamin D deficiency     Advanced directives, counseling/discussion     Hx of pseudoexfoliation, os     Pseudophakia, ou; Yag Caps, os     HTN, goal below 140/90     Peptic ulcer     Health Care Home     History of blepharoplasty, upper lid, ou (elsewhere); revision (EN)     Obesity, Class II, BMI 35-39.9     Restless legs syndrome     DJD (degenerative joint disease), lumbosacral     Rosacea     Essential hypertension with goal blood pressure less than 140/90     Obesity, Class I, BMI 30-34.9     Posterior vitreous detachment, bilateral     Vertigo     Obesity (BMI 35.0-39.9) with comorbidity (H)       Current Outpatient Medications   Medication Sig Dispense Refill     CALCIUM 500 MG PO CHEW None Entered       Cholecalciferol (VITAMIN D) 2000 UNIT CAPS Take 2,000 Units by mouth daily.       doxycycline hyclate (VIBRA-TABS) 100 MG tablet TAKE 1 TABLET (100 MG) BY MOUTH 2 TIMES DAILY 180 tablet 0     etodolac (LODINE) 400 MG tablet TAKE 1 TABLET (400 MG) BY MOUTH 2 TIMES DAILY AS NEEDED 60 tablet 0     gabapentin (NEURONTIN) 600 MG tablet Take 1 tablet (600 mg) by mouth At Bedtime 90 tablet 3     Glucos-MSM-C-Zs-Rlqhzn-Npenqy (GLUCOSAMINE MSM COMPLEX) TABS tablet Take by mouth daily       lisinopril-hydrochlorothiazide (PRINZIDE/ZESTORETIC) 20-12.5 MG tablet TAKE 2 TABLETS BY MOUTH EVERY  tablet 1     methylPREDNISolone (MEDROL DOSEPAK) 4 MG tablet Follow package instructions 21 tablet 0     Multiple Vitamins-Iron TABS Take 1 tablet by mouth daily.       order for DME  "Please measure and distribute 1 pair of 10mmHg - 20mmHg THIGH high open or closed toe compression stockings. Jobst ultrasheer or equivalent. 1 each 3     order for DME Please measure and distribute 1 pair of 10mmHg - 20mmHg KNEE high open or closed toe compression stockings. Jobst ultrasheer or equivalent. 1 each 3     order for DME Please measure and distribute 1 pair of 20mmHg - 30mmHg knee high open or closed toe compression stockings. Jobst ultrasheer or equivalent. 1 each 1     tiZANidine (ZANAFLEX) 4 MG tablet TAKE 1-2 TABLETS (4-8 MG) BY MOUTH NIGHTLY AS NEEDED 90 tablet 1       Allergies   Allergen Reactions     Erythromycin Swelling and Other (See Comments)       Family History   Problem Relation Age of Onset     C.A.D. Father      C.A.D. Brother      Hypertension Mother      Cancer No family hx of      Diabetes No family hx of      Cerebrovascular Disease No family hx of      Thyroid Disease No family hx of      Glaucoma No family hx of      Macular Degeneration No family hx of        Additional medical/Social/Surgical histories reviewed in [facility] and updated as appropriate.     REVIEW OF SYSTEMS (12/19/2019)  10 point ROS of systems including Constitutional, Eyes, Respiratory, Cardiovascular, Gastroenterology, Genitourinary, Integumentary, Musculoskeletal, Psychiatric were all negative except for pertinent positives noted in my HPI.     PHYSICAL EXAM  Vitals:    12/19/19 0735   BP: 114/69   Pulse: 76   Weight: 94.3 kg (208 lb)   Height: 1.664 m (5' 5.5\")     Vital Signs: /69   Pulse 76   Ht 1.664 m (5' 5.5\")   Wt 94.3 kg (208 lb)   BMI 34.09 kg/m   Patient declined being weighed. Body mass index is 34.09 kg/m .    General  - normal appearance, in no obvious distress  CV  - normal peripheral perfusion  Pulm  - normal respiratory pattern, non-labored  Musculoskeletal - lumbar spine  - stance: normal gait without limp, no obvious leg length discrepancy, normal heel and toe walk  - inspection: normal " bone and joint alignment, no obvious scoliosis  - palpation: no paravertebral or bony tenderness  - ROM: flexion exacerbates pain, normal extension, sidebending, rotation  - strength: lower extremities 5/5 in all planes  - special tests:  (+) straight leg raise- right  (+) slump test  Neuro  - patellar and Achilles DTRs 2+ bilaterally, some lower extremity sensory deficit throughout L5 distribution, grossly normal coordination, normal muscle tone  Skin  - no ecchymosis, erythema, warmth, or induration, no obvious rash  Psych  - interactive, appropriate, normal mood and affect  Left knee: has pain with flexion    ASSESSMENT & PLAN  80 yo female with left knee arthritis, and lumbar ddd  Reviewed xrays knee: shows djd  Given injection knee      Abad Armendariz MD, CAQSM    PROCEDURE:       left     Knee joint injection     The patient was apprised of the risks and the benefits of the procedure and consented and a written consent was signed.   The patient s  KNEE was sterilely prepped with chloraprep.   40 mg of triamcinolone suspension was drawn up into a 5 mL syringe with 2 mL of 1% lidocaine  The patient was injected with a 1.5-inch 22-gauge needle at the_superiorlateral aspect of their knee joint  There were no complications. The patient tolerated the procedure well. There was minimal bleeding.   The patient was instructed to ice the knee upon leaving clinic and refrain from overuse over the next 3 days.   The patient was instructed to go to the emergency room with any usual pain, swelling, or redness occurred in the injected area.   The patient was given a followup appointment to evaluate response to the injection to their increased range of motion and reduction of pain.  Follow up PRN  Dr Abad Armendariz

## 2019-12-19 NOTE — LETTER
12/19/2019         RE: Yumiko Flowers  [address]  [place] [address]        Dear Colleague,    Thank you for referring your patient, Yumiko Flowers, to the [facility]. Please see a copy of my visit note below.    HISTORY OF PRESENT ILLNESS  Ms. Flowers is a pleasant 79 year old year old female who presents to clinic today for followup for her lumbar degenerative arthritis  Having more pain  Wants to consider another injection for low back  Knee is starting to bother her again    MEDICAL HISTORY  Patient Active Problem List   Diagnosis     Asthma, moderate persistent     Acne rosacea     CARDIOVASCULAR SCREENING; LDL GOAL LESS THAN 130     Osteopenia     Vitamin D deficiency     Advanced directives, counseling/discussion     Hx of pseudoexfoliation, os     Pseudophakia, ou; Yag Caps, os     HTN, goal below 140/90     Peptic ulcer     Health Care Home     History of blepharoplasty, upper lid, ou (elsewhere); revision (EN)     Obesity, Class II, BMI 35-39.9     Restless legs syndrome     DJD (degenerative joint disease), lumbosacral     Rosacea     Essential hypertension with goal blood pressure less than 140/90     Obesity, Class I, BMI 30-34.9     Posterior vitreous detachment, bilateral     Vertigo     Obesity (BMI 35.0-39.9) with comorbidity (H)       Current Outpatient Medications   Medication Sig Dispense Refill     CALCIUM 500 MG PO CHEW None Entered       Cholecalciferol (VITAMIN D) 2000 UNIT CAPS Take 2,000 Units by mouth daily.       doxycycline hyclate (VIBRA-TABS) 100 MG tablet TAKE 1 TABLET (100 MG) BY MOUTH 2 TIMES DAILY 180 tablet 0     etodolac (LODINE) 400 MG tablet TAKE 1 TABLET (400 MG) BY MOUTH 2 TIMES DAILY AS NEEDED 60 tablet 0     gabapentin (NEURONTIN) 600 MG tablet Take 1 tablet (600 mg) by mouth At Bedtime 90 tablet 3     Glucos-MSM-C-Uj-Zgbsuc-Opsxxv (GLUCOSAMINE MSM COMPLEX) TABS tablet Take by mouth daily       lisinopril-hydrochlorothiazide  "(PRINZIDE/ZESTORETIC) 20-12.5 MG tablet TAKE 2 TABLETS BY MOUTH EVERY  tablet 1     methylPREDNISolone (MEDROL DOSEPAK) 4 MG tablet Follow package instructions 21 tablet 0     Multiple Vitamins-Iron TABS Take 1 tablet by mouth daily.       order for DME Please measure and distribute 1 pair of 10mmHg - 20mmHg THIGH high open or closed toe compression stockings. Jobst ultrasheer or equivalent. 1 each 3     order for DME Please measure and distribute 1 pair of 10mmHg - 20mmHg KNEE high open or closed toe compression stockings. Jobst ultrasheer or equivalent. 1 each 3     order for DME Please measure and distribute 1 pair of 20mmHg - 30mmHg knee high open or closed toe compression stockings. Jobst ultrasheer or equivalent. 1 each 1     tiZANidine (ZANAFLEX) 4 MG tablet TAKE 1-2 TABLETS (4-8 MG) BY MOUTH NIGHTLY AS NEEDED 90 tablet 1       Allergies   Allergen Reactions     Erythromycin Swelling and Other (See Comments)       Family History   Problem Relation Age of Onset     C.A.D. Father      C.A.D. Brother      Hypertension Mother      Cancer No family hx of      Diabetes No family hx of      Cerebrovascular Disease No family hx of      Thyroid Disease No family hx of      Glaucoma No family hx of      Macular Degeneration No family hx of        Additional medical/Social/Surgical histories reviewed in [facility] and updated as appropriate.     REVIEW OF SYSTEMS (12/19/2019)  10 point ROS of systems including Constitutional, Eyes, Respiratory, Cardiovascular, Gastroenterology, Genitourinary, Integumentary, Musculoskeletal, Psychiatric were all negative except for pertinent positives noted in my HPI.     PHYSICAL EXAM  Vitals:    12/19/19 0735   BP: 114/69   Pulse: 76   Weight: 94.3 kg (208 lb)   Height: 1.664 m (5' 5.5\")     Vital Signs: /69   Pulse 76   Ht 1.664 m (5' 5.5\")   Wt 94.3 kg (208 lb)   BMI 34.09 kg/m    Patient declined being weighed. Body mass index is 34.09 kg/m .    General  - normal " appearance, in no obvious distress  CV  - normal peripheral perfusion  Pulm  - normal respiratory pattern, non-labored  Musculoskeletal - lumbar spine  - stance: normal gait without limp, no obvious leg length discrepancy, normal heel and toe walk  - inspection: normal bone and joint alignment, no obvious scoliosis  - palpation: no paravertebral or bony tenderness  - ROM: flexion exacerbates pain, normal extension, sidebending, rotation  - strength: lower extremities 5/5 in all planes  - special tests:  (+) straight leg raise- right  (+) slump test  Neuro  - patellar and Achilles DTRs 2+ bilaterally, some lower extremity sensory deficit throughout L5 distribution, grossly normal coordination, normal muscle tone  Skin  - no ecchymosis, erythema, warmth, or induration, no obvious rash  Psych  - interactive, appropriate, normal mood and affect  Left knee: has pain with flexion    ASSESSMENT & PLAN  78 yo female with left knee arthritis, and lumbar ddd  Reviewed xrays knee: shows djd  Given injection knee      Abad Armendariz MD, CAQSM    PROCEDURE:       left     Knee joint injection     The patient was apprised of the risks and the benefits of the procedure and consented and a written consent was signed.   The patient s  KNEE was sterilely prepped with chloraprep.   40 mg of triamcinolone suspension was drawn up into a 5 mL syringe with 2 mL of 1% lidocaine  The patient was injected with a 1.5-inch 22-gauge needle at the_superiorlateral aspect of their knee joint  There were no complications. The patient tolerated the procedure well. There was minimal bleeding.   The patient was instructed to ice the knee upon leaving clinic and refrain from overuse over the next 3 days.   The patient was instructed to go to the emergency room with any usual pain, swelling, or redness occurred in the injected area.   The patient was given a followup appointment to evaluate response to the injection to their increased range of motion  and reduction of pain.  Follow up PRN  Dr Abad Armendariz    Large Joint Injection/Arthocentesis: L knee joint  Date/Time: 12/19/2019 8:36 AM  Performed by: Abad Armendariz MD  Authorized by: Abad Armendariz MD     Approach:  Lateral  Location:  Knee      Medications:  40 mg triamcinolone 40 MG/ML  Procedure discussed: discussed risks, benefits, and alternatives    Consent Given by:  Patient   NDC: [address]-2        Again, thank you for allowing me to participate in the care of your patient.        Sincerely,        Abad Armendariz MD

## 2019-12-19 NOTE — PATIENT INSTRUCTIONS
Thanks for coming today.  Ortho/Sports Medicine Clinic  [address] [place] [address]    To schedule future appointments in Ortho Clinic, you may call [phone].    To schedule ordered imaging by your provider:   Call Central Imaging [phone]    To schedule an injection ordered by your provider:  Call Central [facility] Injection scheduling line: [phone]  [email or web address] available online at:  [email or web address]    Please call if any further questions or concerns ([phone]).  Clinic hours 8 am to 5 pm.    Return to clinic (call) if symptoms worsen or fail to improve.

## 2019-12-19 NOTE — PROGRESS NOTES
Large Joint Injection/Arthocentesis: L knee joint  Date/Time: 12/19/2019 8:36 AM  Performed by: Abad Armendariz MD  Authorized by: Abad Armendariz MD     Approach:  Lateral  Location:  Knee      Medications:  40 mg triamcinolone 40 MG/ML  Procedure discussed: discussed risks, benefits, and alternatives    Consent Given by:  Patient   NDC: [id number]

## 2019-12-23 ENCOUNTER — ANCILLARY PROCEDURE (OUTPATIENT)
Dept: MRI IMAGING | Facility: CLINIC | Age: 79
End: 2019-12-23
Attending: PREVENTIVE MEDICINE
Payer: COMMERCIAL

## 2019-12-23 DIAGNOSIS — M51.369 DDD (DEGENERATIVE DISC DISEASE), LUMBAR: ICD-10-CM

## 2019-12-23 PROCEDURE — 72148 MRI LUMBAR SPINE W/O DYE: CPT | Performed by: RADIOLOGY

## 2020-01-14 DIAGNOSIS — M51.26 LUMBAR DISC HERNIATION: ICD-10-CM

## 2020-01-14 DIAGNOSIS — M51.369 DDD (DEGENERATIVE DISC DISEASE), LUMBAR: Primary | ICD-10-CM

## 2020-02-13 ENCOUNTER — ANCILLARY PROCEDURE (OUTPATIENT)
Dept: GENERAL RADIOLOGY | Facility: CLINIC | Age: 80
End: 2020-02-13
Attending: PREVENTIVE MEDICINE
Payer: COMMERCIAL

## 2020-02-13 DIAGNOSIS — M51.26 LUMBAR DISC HERNIATION: ICD-10-CM

## 2020-02-13 DIAGNOSIS — M51.369 DDD (DEGENERATIVE DISC DISEASE), LUMBAR: ICD-10-CM

## 2020-02-13 PROCEDURE — 62323 NJX INTERLAMINAR LMBR/SAC: CPT | Performed by: RADIOLOGY

## 2020-02-13 RX ORDER — IOPAMIDOL 408 MG/ML
10 INJECTION, SOLUTION INTRATHECAL ONCE
Status: COMPLETED | OUTPATIENT
Start: 2020-02-13 | End: 2020-02-13

## 2020-02-13 RX ORDER — METHYLPREDNISOLONE ACETATE 80 MG/ML
80 INJECTION, SUSPENSION INTRA-ARTICULAR; INTRALESIONAL; INTRAMUSCULAR; SOFT TISSUE ONCE
Status: COMPLETED | OUTPATIENT
Start: 2020-02-13 | End: 2020-02-13

## 2020-02-13 RX ORDER — BUPIVACAINE HYDROCHLORIDE 5 MG/ML
10 INJECTION, SOLUTION PERINEURAL ONCE
Status: COMPLETED | OUTPATIENT
Start: 2020-02-13 | End: 2020-02-13

## 2020-02-13 RX ADMIN — BUPIVACAINE HYDROCHLORIDE 1 ML: 5 INJECTION, SOLUTION PERINEURAL at 12:54

## 2020-02-13 RX ADMIN — IOPAMIDOL 2 ML: 408 INJECTION, SOLUTION INTRATHECAL at 12:54

## 2020-02-13 RX ADMIN — METHYLPREDNISOLONE ACETATE 80 MG: 80 INJECTION, SUSPENSION INTRA-ARTICULAR; INTRALESIONAL; INTRAMUSCULAR; SOFT TISSUE at 12:54

## 2020-02-13 NOTE — PROGRESS NOTES
: Tomi was seen in X-ray today for a lumbar injection. Patient rated pain before procedure 4/10. After procedure patient rated pain 3/10.  Patient discharged home with her daughter.

## 2020-02-13 NOTE — DISCHARGE SUMMARY
AFTER YOU GO HOME   ü DO relax; minimize your activity for 24 hours   ü You may resume normal activity tomorrow   ü You may remove the bandage in the evening or next morning   ü You may resume bathing the next day   ü Drink at least 4 extra glasses of fluid today if not on fluid restrictions   ü DO NOT drive or operate machinery at home or at work for at least 24 hours   VISIT THE EMERGENCY ROOM OR URGENT CARE IF:   ü There is redness or swelling at the injection site   ü There is discharge from the injection site   ü You develop a temperature of 101  F or greater   ADDITIONAL INSTRUCTIONS:   ü You may resume your Coumadin or other blood thinner at your regular dose today. Follow up with your physician to have your INR rechecked if indicated.   ü If you gain no relief from the injection after two (2) weeks, follow-up with your provider for your options.   Contacts:   During business hours from 8 to 5 pm, you may call [phone] to reach a nurse advisor at [facility].   After hours, call [facility]  [phone]. Ask for the Radiologist on-call. Someone is on-call 24 hrs/day.   [facility] Toll Free Number   [phone]

## 2020-03-04 DIAGNOSIS — M54.16 LUMBAR RADICULAR PAIN: ICD-10-CM

## 2020-03-04 NOTE — TELEPHONE ENCOUNTER
Routing refill request to provider for review/approval because:  Drug not on the [facility] refill protocol.    Gricel Gardner RN, [facility] Triage

## 2020-03-04 NOTE — TELEPHONE ENCOUNTER
Requested Prescriptions   Pending Prescriptions Disp Refills     tiZANidine (ZANAFLEX) 4 MG tablet [Pharmacy Med Name: TIZANIDINE HCL 4 MG TABLET]        Last Written Prescription Date:  12/11/19  Last Fill Quantity: 90,   # refills: 1  Last Office Visit: 09/30/19[facility] Office visit:       Routing refill request to provider for review/approval because:  Drug not on the [facility], [facility] or [facility] refill protocol or controlled substance 90 tablet 1     Sig: TAKE 1-2 TABLETS (4-8 MG) BY MOUTH NIGHTLY AS NEEDED       There is no refill protocol information for this order

## 2020-05-24 DIAGNOSIS — M54.16 LUMBAR RADICULAR PAIN: ICD-10-CM

## 2020-05-27 NOTE — TELEPHONE ENCOUNTER
Routing refill request to provider for review/approval because:  Drug not on the [facility] refill protocol     Gricel Gardner RN, [facility] Triage

## 2020-05-31 DIAGNOSIS — I10 ESSENTIAL HYPERTENSION WITH GOAL BLOOD PRESSURE LESS THAN 140/90: ICD-10-CM

## 2020-06-03 RX ORDER — LISINOPRIL AND HYDROCHLOROTHIAZIDE 12.5; 2 MG/1; MG/1
TABLET ORAL
Qty: 180 TABLET | Refills: 0 | Status: SHIPPED | OUTPATIENT
Start: 2020-06-03 | End: 2020-08-14

## 2020-06-03 NOTE — TELEPHONE ENCOUNTER
Prescription approved per [facility] Refill Protocol.    Gricel Gardner RN, [facility] Triage

## 2020-08-14 ENCOUNTER — OFFICE VISIT (OUTPATIENT)
Dept: FAMILY MEDICINE | Facility: CLINIC | Age: 80
End: 2020-08-14
Payer: COMMERCIAL

## 2020-08-14 VITALS
DIASTOLIC BLOOD PRESSURE: 80 MMHG | SYSTOLIC BLOOD PRESSURE: 139 MMHG | HEART RATE: 75 BPM | RESPIRATION RATE: 18 BRPM | WEIGHT: 205.4 LBS | OXYGEN SATURATION: 97 % | TEMPERATURE: 98.1 F | HEIGHT: 64 IN | BODY MASS INDEX: 35.07 KG/M2

## 2020-08-14 DIAGNOSIS — Z00.00 ENCOUNTER FOR MEDICARE ANNUAL WELLNESS EXAM: Primary | ICD-10-CM

## 2020-08-14 DIAGNOSIS — M47.817 DJD (DEGENERATIVE JOINT DISEASE), LUMBOSACRAL: ICD-10-CM

## 2020-08-14 DIAGNOSIS — I10 ESSENTIAL HYPERTENSION WITH GOAL BLOOD PRESSURE LESS THAN 140/90: ICD-10-CM

## 2020-08-14 DIAGNOSIS — M54.16 LUMBAR RADICULAR PAIN: ICD-10-CM

## 2020-08-14 DIAGNOSIS — M51.369 DDD (DEGENERATIVE DISC DISEASE), LUMBAR: ICD-10-CM

## 2020-08-14 PROBLEM — E66.01 MORBID OBESITY (H): Status: RESOLVED | Noted: 2019-03-15 | Resolved: 2020-08-14

## 2020-08-14 LAB
ALBUMIN SERPL-MCNC: 4.4 G/DL (ref 3.4–5)
ALP SERPL-CCNC: 84 U/L (ref 40–150)
ALT SERPL W P-5'-P-CCNC: 30 U/L (ref 0–50)
ANION GAP SERPL CALCULATED.3IONS-SCNC: 4 MMOL/L (ref 3–14)
AST SERPL W P-5'-P-CCNC: 25 U/L (ref 0–45)
BASOPHILS # BLD AUTO: 0.1 10E9/L (ref 0–0.2)
BASOPHILS NFR BLD AUTO: 0.8 %
BILIRUB SERPL-MCNC: 0.6 MG/DL (ref 0.2–1.3)
BUN SERPL-MCNC: 16 MG/DL (ref 7–30)
CALCIUM SERPL-MCNC: 9.7 MG/DL (ref 8.5–10.1)
CHLORIDE SERPL-SCNC: 106 MMOL/L (ref 94–109)
CHOLEST SERPL-MCNC: 187 MG/DL
CO2 SERPL-SCNC: 30 MMOL/L (ref 20–32)
CREAT SERPL-MCNC: 0.91 MG/DL (ref 0.52–1.04)
CREAT UR-MCNC: 303 MG/DL
DIFFERENTIAL METHOD BLD: ABNORMAL
EOSINOPHIL # BLD AUTO: 0.1 10E9/L (ref 0–0.7)
EOSINOPHIL NFR BLD AUTO: 1.5 %
ERYTHROCYTE [DISTWIDTH] IN BLOOD BY AUTOMATED COUNT: 13 % (ref 10–15)
GFR SERPL CREATININE-BSD FRML MDRD: 60 ML/MIN/{1.73_M2}
GLUCOSE SERPL-MCNC: 97 MG/DL (ref 70–99)
HCT VFR BLD AUTO: 37.3 % (ref 35–47)
HDLC SERPL-MCNC: 61 MG/DL
HGB BLD-MCNC: 11.7 G/DL (ref 11.7–15.7)
LDLC SERPL CALC-MCNC: 96 MG/DL
LYMPHOCYTES # BLD AUTO: 2 10E9/L (ref 0.8–5.3)
LYMPHOCYTES NFR BLD AUTO: 30.3 %
MCH RBC QN AUTO: 25 PG (ref 26.5–33)
MCHC RBC AUTO-ENTMCNC: 31.4 G/DL (ref 31.5–36.5)
MCV RBC AUTO: 80 FL (ref 78–100)
MICROALBUMIN UR-MCNC: 52 MG/L
MICROALBUMIN/CREAT UR: 17.23 MG/G CR (ref 0–25)
MONOCYTES # BLD AUTO: 0.4 10E9/L (ref 0–1.3)
MONOCYTES NFR BLD AUTO: 5.3 %
NEUTROPHILS # BLD AUTO: 4.1 10E9/L (ref 1.6–8.3)
NEUTROPHILS NFR BLD AUTO: 62.1 %
NONHDLC SERPL-MCNC: 126 MG/DL
PLATELET # BLD AUTO: 245 10E9/L (ref 150–450)
POTASSIUM SERPL-SCNC: 3.9 MMOL/L (ref 3.4–5.3)
PROT SERPL-MCNC: 7.7 G/DL (ref 6.8–8.8)
RBC # BLD AUTO: 4.68 10E12/L (ref 3.8–5.2)
SODIUM SERPL-SCNC: 140 MMOL/L (ref 133–144)
TRIGL SERPL-MCNC: 148 MG/DL
WBC # BLD AUTO: 6.6 10E9/L (ref 4–11)

## 2020-08-14 PROCEDURE — 99213 OFFICE O/P EST LOW 20 MIN: CPT | Mod: 25 | Performed by: PREVENTIVE MEDICINE

## 2020-08-14 PROCEDURE — 85025 COMPLETE CBC W/AUTO DIFF WBC: CPT | Performed by: PREVENTIVE MEDICINE

## 2020-08-14 PROCEDURE — 36415 COLL VENOUS BLD VENIPUNCTURE: CPT | Performed by: PREVENTIVE MEDICINE

## 2020-08-14 PROCEDURE — 80061 LIPID PANEL: CPT | Performed by: PREVENTIVE MEDICINE

## 2020-08-14 PROCEDURE — G0438 PPPS, INITIAL VISIT: HCPCS | Performed by: PREVENTIVE MEDICINE

## 2020-08-14 PROCEDURE — 80053 COMPREHEN METABOLIC PANEL: CPT | Performed by: PREVENTIVE MEDICINE

## 2020-08-14 PROCEDURE — 82043 UR ALBUMIN QUANTITATIVE: CPT | Performed by: PREVENTIVE MEDICINE

## 2020-08-14 RX ORDER — GABAPENTIN 600 MG/1
1200 TABLET ORAL AT BEDTIME
Qty: 180 TABLET | Refills: 3 | Status: SHIPPED | OUTPATIENT
Start: 2020-08-14 | End: 2022-02-15

## 2020-08-14 RX ORDER — LISINOPRIL AND HYDROCHLOROTHIAZIDE 12.5; 2 MG/1; MG/1
TABLET ORAL
Qty: 180 TABLET | Refills: 3 | Status: SHIPPED | OUTPATIENT
Start: 2020-08-14 | End: 2021-08-04

## 2020-08-14 ASSESSMENT — PAIN SCALES - GENERAL: PAINLEVEL: NO PAIN (0)

## 2020-08-14 ASSESSMENT — MIFFLIN-ST. JEOR: SCORE: 1400.66

## 2020-08-14 NOTE — PATIENT INSTRUCTIONS
Patient Education   Personalized Prevention Plan  You are due for the preventive services outlined below.  Your care team is available to assist you in scheduling these services.  If you have already completed any of these items, please share that information with your care team to update in your medical record.  Health Maintenance Due   Topic Date Due     URINE DRUG SCREEN  1940     Zoster (Shingles) Vaccine (2 of 3) 09/29/2010     Asthma Action Plan - yearly  07/21/2017     Asthma Control Test  01/31/2019     PHQ-2  01/01/2020     Discuss Advance Care Planning  02/26/2020     Eye Exam  05/28/2020     Annual Wellness Visit  06/12/2020     FALL RISK ASSESSMENT  06/12/2020

## 2020-08-14 NOTE — PROGRESS NOTES
"  SUBJECTIVE:   Yumiko Flowers is a 80 year old female who presents for Preventive Visit:    Are you in the first 12 months of your Medicare Part B coverage?  No    Physical Health:    In general, how would you rate your overall physical health? fair    Outside of work, how many days during the week do you exercise? 1 day/week    Outside of work, approximately how many minutes a day do you exercise?less than 15 minutes, limited exercise due to back pain     If you drink alcohol do you typically have >3 drinks per day or >7 drinks per week? No    Do you usually eat at least 4 servings of fruit and vegetables a day, include whole grains & fiber and avoid regularly eating high fat or \"junk\" foods? Yes    Do you have any problems taking medications regularly?  No    Do you have any side effects from medications? none    Needs assistance for the following daily activities: no assistance needed    Which of the following safety concerns are present in your home?  none identified     Hearing impairment: No    In the past 6 months, have you been bothered by leaking of urine? no    Mental Health:    In general, how would you rate your overall mental or emotional health? good  PHQ-2 Score:      Do you feel safe in your environment? Yes    Have you ever done Advance Care Planning? (For example, a Health Directive, POLST, or a discussion with a medical provider or your loved ones about your wishes): No, advance care planning information given to patient to review.  Patient plans to discuss their wishes with loved ones or provider.      Additional concerns to address?  YES    Fall risk: One fall in the last 6 months, fell on her knees, caught on the carpet, no major injuries   Fallen 2 or more times in the past year?: No  Any fall with injury in the past year?: No  click delete button to remove this line now  Cognitive Screenin) Repeat 3 items (Leader, Season, Table)    2) Clock draw: NORMAL  3) 3 item recall: Recalls 2 " objects   Results: NORMAL clock, 1-2 items recalled: COGNITIVE IMPAIRMENT LESS LIKELY    Mini-CogTM Copyright CELINA Mattson. Licensed by the author for use in [facility]; reprinted with permission (jorge@[facility]). All rights reserved.      Do you have sleep apnea, excessive snoring or daytime drowsiness?: no        Hypertension Follow-up      Do you check your blood pressure regularly outside of the clinic? Yes     Are you following a low salt diet? Yes    Are your blood pressures ever more than 140 on the top number (systolic) OR more   than 90 on the bottom number (diastolic), for example 140/90? No      Reviewed and updated as needed this visit by clinical staff  Tobacco  Allergies  Meds  Problems  Med Hx  Surg Hx  Fam Hx         Reviewed and updated as needed this visit by Provider  Tobacco  Allergies  Meds  Problems  Med Hx  Surg Hx  Fam Hx        Social History     Tobacco Use     Smoking status: Never Smoker     Smokeless tobacco: Never Used   Substance Use Topics     Alcohol use: Yes                           Current providers sharing in care for this patient include:   Patient Care Team:  Gavi Bear MD as PCP - General (Family Practice)  Jesse Huitron DPM (Podiatry)  Gavi Bear MD as MD (Family Practice)  Arianna Pastor APRN CNP as Assigned PCP    The following health maintenance items are reviewed in Epic and correct as of today:  Health Maintenance   Topic Date Due     URINE DRUG SCREEN  1940     ZOSTER IMMUNIZATION (2 of 3) 09/29/2010     ASTHMA ACTION PLAN  07/21/2017     ASTHMA CONTROL TEST  01/31/2019     PHQ-2  01/01/2020     ADVANCE CARE PLANNING  02/26/2020     EYE EXAM  05/28/2020     MEDICARE ANNUAL WELLNESS VISIT  06/12/2020     FALL RISK ASSESSMENT  06/12/2020     INFLUENZA VACCINE (1) 09/01/2020     DTAP/TDAP/TD IMMUNIZATION (2 - Td) 01/23/2023     LIPID  06/12/2024     DEXA  Completed     PNEUMOCOCCAL IMMUNIZATION 65+ LOW/MEDIUM RISK  Completed      "IPV IMMUNIZATION  Aged Out     MENINGITIS IMMUNIZATION  Aged Out     HEPATITIS B IMMUNIZATION  Aged Out     Lab work is in process  Labs reviewed in EPIC  BP Readings from Last 3 Encounters:   08/14/20 139/80   12/19/19 114/69   11/13/19 113/72    Wt Readings from Last 3 Encounters:   08/14/20 93.2 kg (205 lb 6.4 oz)   12/19/19 94.3 kg (208 lb)   09/03/19 94.5 kg (208 lb 6.4 oz)                  Patient Active Problem List   Diagnosis     Asthma, moderate persistent     Acne rosacea     CARDIOVASCULAR SCREENING; LDL GOAL LESS THAN 130     Osteopenia     Vitamin D deficiency     Advanced directives, counseling/discussion     Hx of pseudoexfoliation, os     Pseudophakia, ou; Yag Caps, os     HTN, goal below 140/90     Peptic ulcer     Health Care Home     History of blepharoplasty, upper lid, ou (elsewhere); revision (EN)     Obesity, Class II, BMI 35-39.9     Restless legs syndrome     DJD (degenerative joint disease), lumbosacral     Rosacea     Essential hypertension with goal blood pressure less than 140/90     Obesity, Class I, BMI 30-34.9     Posterior vitreous detachment, bilateral     Vertigo     Past Surgical History:   Procedure Laterality Date     BLEPHAROPLASTY BILATERAL  3/9/2006; 2013    both eyes, upper lid; revision (EN)     C APPENDECTOMY       CATARACT IOL, RT/LT       HC REMOVAL GALLBLADDER       LASER YAG CAPSULOTOMY      left eye (AC lysis also)     PHACOEMULSIFICATION WITH STANDARD INTRAOCULAR LENS IMPLANT  1/2012; 4/2013    right eye; left eye     REPAIR PTOSIS       SURGICAL HISTORY OF -       endometriosis surgeriesx3     SURGICAL HISTORY OF -       ganiglion cyst onfoot     SURGICAL HISTORY OF -       Eye for \"droopy eye\"       Social History     Tobacco Use     Smoking status: Never Smoker     Smokeless tobacco: Never Used   Substance Use Topics     Alcohol use: Yes     Family History   Problem Relation Age of Onset     C.A.D. Father      C.A.D. Brother      Hypertension Mother      " Cancer No family hx of      Diabetes No family hx of      Cerebrovascular Disease No family hx of      Thyroid Disease No family hx of      Glaucoma No family hx of      Macular Degeneration No family hx of          Current Outpatient Medications   Medication Sig Dispense Refill     gabapentin (NEURONTIN) 600 MG tablet Take 2 tablets (1,200 mg) by mouth At Bedtime 180 tablet 3     lisinopril-hydrochlorothiazide (ZESTORETIC) 20-12.5 MG tablet TAKE 2 TABLETS BY MOUTH EVERY  tablet 3     tiZANidine (ZANAFLEX) 4 MG tablet Take 1-2 tablets (4-8 mg) by mouth 3 times daily as needed for muscle spasms 90 tablet 1     CALCIUM 500 MG PO CHEW None Entered       Cholecalciferol (VITAMIN D) 2000 UNIT CAPS Take 2,000 Units by mouth daily.       doxycycline hyclate (VIBRA-TABS) 100 MG tablet TAKE 1 TABLET (100 MG) BY MOUTH 2 TIMES DAILY 180 tablet 0     etodolac (LODINE) 400 MG tablet TAKE 1 TABLET (400 MG) BY MOUTH 2 TIMES DAILY AS NEEDED 60 tablet 0     Glucos-MSM-C-Mj-Ttvlek-Tjaltn (GLUCOSAMINE MSM COMPLEX) TABS tablet Take by mouth daily       Multiple Vitamins-Iron TABS Take 1 tablet by mouth daily.       order for DME Please measure and distribute 1 pair of 10mmHg - 20mmHg THIGH high open or closed toe compression stockings. Jobst ultrasheer or equivalent. 1 each 3     order for DME Please measure and distribute 1 pair of 10mmHg - 20mmHg KNEE high open or closed toe compression stockings. Jobst ultrasheer or equivalent. 1 each 3     order for DME Please measure and distribute 1 pair of 20mmHg - 30mmHg knee high open or closed toe compression stockings. Jobst ultrasheer or equivalent. 1 each 1     Allergies   Allergen Reactions     Erythromycin Swelling and Other (See Comments)     Pneumonia Vaccine:Adults age 65+ who received Pneumovax (PPSV23) at 65 years or older: Should be given PCV13 > 1 year after their most recent PPSV23  Mammogram Screening: Patient over age 75, has elected to stop mammography  "screening.    ROS:  Constitutional, HEENT, cardiovascular, pulmonary, gi and gu systems are negative, except as otherwise noted.    OBJECTIVE:   /80 (BP Location: Left arm, Patient Position: Sitting, Cuff Size: Adult Large)   Pulse 75   Temp 98.1  F (36.7  C) (Oral)   Resp 18   Ht 1.632 m (5' 4.25\")   Wt 93.2 kg (205 lb 6.4 oz)   SpO2 97%   BMI 34.98 kg/m   Estimated body mass index is 34.98 kg/m  as calculated from the following:    Height as of this encounter: 1.632 m (5' 4.25\").    Weight as of this encounter: 93.2 kg (205 lb 6.4 oz).  EXAM:   GENERAL APPEARANCE: healthy, alert and no distress  EYES: Eyes grossly normal to inspection, PERRL and conjunctivae and sclerae normal  HENT: ear canals and TM's normal  NECK: no adenopathy, no asymmetry  RESP: lungs clear to auscultation - no rales, rhonchi or wheezes  CV: regular rate and rhythm, normal S1 S2, no S3 or S4, no murmur, click or rub, no peripheral edema and peripheral pulses strong  ABDOMEN: soft, nontender, no rebound or guarding   MS: no musculoskeletal defects are noted and gait is age appropriate without ataxia, varicose veins++   SKIN: no suspicious lesions or rashes  NEURO: Normal strength and tone, sensory exam grossly normal, mentation intact and speech normal  PSYCH: mentation appears normal and affect normal/bright    Diagnostic Test Results:  Labs reviewed in Epic  No results found for this or any previous visit (from the past 24 hour(s)).    ASSESSMENT / PLAN:   Yumiko was seen today for physical.    Diagnoses and all orders for this visit:    Encounter for Medicare annual wellness exam  -Preventive information reviewed     Essential hypertension with goal blood pressure less than 140/90  -     Lipid panel reflex to direct LDL Non-fasting  -     Albumin Random Urine Quantitative with Creat Ratio  -     CBC with platelets differential  -     Comprehensive metabolic panel  -     lisinopril-hydrochlorothiazide (ZESTORETIC) 20-12.5 MG " "tablet; TAKE 2 TABLETS BY MOUTH EVERY DAY  -at goal, continue current medication   -refills provided     DJD (degenerative joint disease), lumbosacral  -     CBC with platelets differential  -     Comprehensive metabolic panel  -Acetaminophen 1000 mg every 8 hours as needed   -has been followed by Sports medicine     Lumbar radicular pain  -     CBC with platelets differential  -     Comprehensive metabolic panel  -     tiZANidine (ZANAFLEX) 4 MG tablet; Take 1-2 tablets (4-8 mg) by mouth 3 times daily as needed for muscle spasms, sedation side effect reviewed, especially in older patients. Patient expressed comprehension of this     DDD (degenerative disc disease), lumbar  -     gabapentin (NEURONTIN) 600 MG tablet; Take 2 tablets (1,200 mg) by mouth At Bedtime, defer additional increase in the dose at this time  -discuss increased risk of falls and sedation especially when used with Zanaflex         COUNSELING:  Reviewed preventive health counseling, as reflected in patient instructions       Regular exercise       Healthy diet/nutrition       Vision screening       Fall risk prevention       Osteoporosis Prevention/Bone Health    Estimated body mass index is 34.98 kg/m  as calculated from the following:    Height as of this encounter: 1.632 m (5' 4.25\").    Weight as of this encounter: 93.2 kg (205 lb 6.4 oz).    Weight management plan: Discussed healthy diet and exercise guidelines     reports that she has never smoked. She has never used smokeless tobacco.      Appropriate preventive services were discussed with this patient, including applicable screening as appropriate for cardiovascular disease, diabetes, osteopenia/osteoporosis, and glaucoma.  As appropriate for age/gender, discussed screening for colorectal cancer, prostate cancer, breast cancer, and cervical cancer. Checklist reviewing preventive services available has been given to the patient.    Reviewed patients plan of care and provided an AVS. The " Basic Care Plan (routine screening as documented in Health Maintenance) for Yumiko meets the Care Plan requirement. This Care Plan has been established and reviewed with the Patient.    Counseling Resources:  ATP IV Guidelines  Pooled Cohorts Equation Calculator  Breast Cancer Risk Calculator  FRAX Risk Assessment  ICSI Preventive Guidelines  Dietary Guidelines for Americans, 2010  USDA's MyPlate  ASA Prophylaxis  Lung CA Screening    Gavi Bear MD MPH    [facility]

## 2020-08-17 NOTE — RESULT ENCOUNTER NOTE
Yumiko,     Urine sample is not showing any abnormal protein.  Cholesterol is at goal.  Basic blood count is not showing anemia or infection.  Electrolytes, glucose and liver function tests are normal.  Kidney function is borderline low. Please avoid medications such as Ibuprofen, Advil and Aleve.     Please do not hesitate to call us at [phone] if you have any questions or concerns.    Thank you,    Gavi Bear MD MPH

## 2020-12-07 ENCOUNTER — OFFICE VISIT (OUTPATIENT)
Dept: ORTHOPEDICS | Facility: CLINIC | Age: 80
End: 2020-12-07
Payer: COMMERCIAL

## 2020-12-07 ENCOUNTER — ANCILLARY PROCEDURE (OUTPATIENT)
Dept: GENERAL RADIOLOGY | Facility: CLINIC | Age: 80
End: 2020-12-07
Attending: PREVENTIVE MEDICINE
Payer: COMMERCIAL

## 2020-12-07 VITALS — DIASTOLIC BLOOD PRESSURE: 81 MMHG | HEART RATE: 71 BPM | SYSTOLIC BLOOD PRESSURE: 135 MMHG

## 2020-12-07 DIAGNOSIS — M17.12 ARTHRITIS OF LEFT KNEE: Primary | ICD-10-CM

## 2020-12-07 DIAGNOSIS — M17.12 ARTHRITIS OF LEFT KNEE: ICD-10-CM

## 2020-12-07 DIAGNOSIS — M51.26 LUMBAR DISC HERNIATION: ICD-10-CM

## 2020-12-07 DIAGNOSIS — M51.369 DDD (DEGENERATIVE DISC DISEASE), LUMBAR: ICD-10-CM

## 2020-12-07 PROCEDURE — 73562 X-RAY EXAM OF KNEE 3: CPT | Mod: LT | Performed by: RADIOLOGY

## 2020-12-07 PROCEDURE — 99214 OFFICE O/P EST MOD 30 MIN: CPT | Mod: 25 | Performed by: PREVENTIVE MEDICINE

## 2020-12-07 PROCEDURE — 20610 DRAIN/INJ JOINT/BURSA W/O US: CPT | Mod: LT | Performed by: PREVENTIVE MEDICINE

## 2020-12-07 RX ORDER — METHYLPREDNISOLONE 4 MG
TABLET, DOSE PACK ORAL
Qty: 21 TABLET | Refills: 0 | Status: ON HOLD | OUTPATIENT
Start: 2020-12-07 | End: 2021-10-05

## 2020-12-07 RX ORDER — TRIAMCINOLONE ACETONIDE 40 MG/ML
40 INJECTION, SUSPENSION INTRA-ARTICULAR; INTRAMUSCULAR
Status: DISCONTINUED | OUTPATIENT
Start: 2020-12-07 | End: 2021-10-05

## 2020-12-07 RX ORDER — CYCLOBENZAPRINE HCL 10 MG
10 TABLET ORAL
Qty: 30 TABLET | Refills: 0 | Status: SHIPPED | OUTPATIENT
Start: 2020-12-07 | End: 2021-01-06

## 2020-12-07 RX ADMIN — TRIAMCINOLONE ACETONIDE 40 MG: 40 INJECTION, SUSPENSION INTRA-ARTICULAR; INTRAMUSCULAR at 10:27

## 2020-12-07 NOTE — TELEPHONE ENCOUNTER
RECORDS RECEIVED FROM: spinal stenosis, lumbar ddd for many years, she has employed injections on/off over the past few years which last a few months but her pain and symptoms are debilitating at times. She is ready to discuss options. Per Dr. Armendariz   DATE RECEIVED: Jan 5, 2021     NOTES STATUS DETAILS   OFFICE NOTE from referring provider Internal    OFFICE NOTE from other specialist N/A    DISCHARGE SUMMARY from hospital N/A    DISCHARGE REPORT from the ER N/A    OPERATIVE REPORT N/A    MEDICATION LIST Internal    IMPLANT RECORD/STICKER N/A    LABS     CBC/DIFF N/A    CULTURES N/A    INJECTIONS DONE IN RADIOLOGY N/A    MRI Internal    CT SCAN N/A    XRAYS (IMAGES & REPORTS) Internal    TUMOR     PATHOLOGY  Slides & report N/A      12/07/20   3:04 PM   Complete  Janett Torres CMA

## 2020-12-07 NOTE — PATIENT INSTRUCTIONS
Thanks for coming today.  [facility]  [address] [place] [address]    To schedule future appointments in Ortho Clinic, you may call [phone].    To schedule ordered imaging by your provider:   Call [facility] [phone]    To schedule an injection ordered by your provider:  Call [facility] Injection scheduling line: [phone]  [email or web address] available online at:  [email or web address]    Please call if any further questions or concerns ([phone]).  Clinic hours 8 am to 5 pm.    Return to clinic (call) if symptoms worsen or fail to improve.

## 2020-12-07 NOTE — PROGRESS NOTES
HISTORY OF PRESENT ILLNESS  Ms. Flowers is a pleasant 80 year old year old female who presents to clinic today with ongoing low back pain and left knee pain  Yumiko explains that her last set of injections for her knee and low back helped her through the summer, but started having more pain again over the past two months  Location: left knee, low back  Quality:  achy pain    Severity: 6/10 at worst    Duration: see above  Timing: occurs intermittently  Context: occurs while exercising and lifting  Modifying factors:  resting and non-use makes it better, movement and use makes it worse  Associated signs & symptoms: radiation of pain down left leg    MEDICAL HISTORY  Patient Active Problem List   Diagnosis     Asthma, moderate persistent     Acne rosacea     CARDIOVASCULAR SCREENING; LDL GOAL LESS THAN 130     Osteopenia     Vitamin D deficiency     Advanced directives, counseling/discussion     Hx of pseudoexfoliation, os     Pseudophakia, ou; Yag Caps, os     HTN, goal below 140/90     Peptic ulcer     Health Care Home     History of blepharoplasty, upper lid, ou (elsewhere); revision (EN)     Obesity, Class II, BMI 35-39.9     Restless legs syndrome     DJD (degenerative joint disease), lumbosacral     Rosacea     Essential hypertension with goal blood pressure less than 140/90     Obesity, Class I, BMI 30-34.9     Posterior vitreous detachment, bilateral     Vertigo       Current Outpatient Medications   Medication Sig Dispense Refill     CALCIUM 500 MG PO CHEW None Entered       Cholecalciferol (VITAMIN D) 2000 UNIT CAPS Take 2,000 Units by mouth daily.       doxycycline hyclate (VIBRA-TABS) 100 MG tablet TAKE 1 TABLET (100 MG) BY MOUTH 2 TIMES DAILY 180 tablet 0     etodolac (LODINE) 400 MG tablet TAKE 1 TABLET (400 MG) BY MOUTH 2 TIMES DAILY AS NEEDED 60 tablet 0     gabapentin (NEURONTIN) 600 MG tablet Take 2 tablets (1,200 mg) by mouth At Bedtime 180 tablet 3     Glucos-MSM-C-Tn-Vsewmw-Lzxmuc (GLUCOSAMINE MSM  COMPLEX) TABS tablet Take by mouth daily       lisinopril-hydrochlorothiazide (ZESTORETIC) 20-12.5 MG tablet TAKE 2 TABLETS BY MOUTH EVERY  tablet 3     Multiple Vitamins-Iron TABS Take 1 tablet by mouth daily.       order for DME Please measure and distribute 1 pair of 10mmHg - 20mmHg THIGH high open or closed toe compression stockings. Jobst ultrasheer or equivalent. 1 each 3     order for DME Please measure and distribute 1 pair of 10mmHg - 20mmHg KNEE high open or closed toe compression stockings. Jobst ultrasheer or equivalent. 1 each 3     order for DME Please measure and distribute 1 pair of 20mmHg - 30mmHg knee high open or closed toe compression stockings. Jobst ultrasheer or equivalent. 1 each 1     tiZANidine (ZANAFLEX) 4 MG tablet Take 1-2 tablets (4-8 mg) by mouth 3 times daily as needed for muscle spasms 90 tablet 1       Allergies   Allergen Reactions     Erythromycin Swelling and Other (See Comments)       Family History   Problem Relation Age of Onset     C.A.D. Father      C.A.D. Brother      Hypertension Mother      Cancer No family hx of      Diabetes No family hx of      Cerebrovascular Disease No family hx of      Thyroid Disease No family hx of      Glaucoma No family hx of      Macular Degeneration No family hx of      Social History     Socioeconomic History     Marital status:      Spouse name: Not on file     Number of children: 3     Years of education: Not on file     Highest education level: Not on file   Occupational History     Employer: RETIRED   Social Needs     Financial resource strain: Not on file     Food insecurity     Worry: Not on file     Inability: Not on file     Transportation needs     Medical: Not on file     Non-medical: Not on file   Tobacco Use     Smoking status: Never Smoker     Smokeless tobacco: Never Used   Substance and Sexual Activity     Alcohol use: Yes     Drug use: No     Sexual activity: Yes     Partners: Male   Lifestyle     Physical  activity     Days per week: Not on file     Minutes per session: Not on file     Stress: Not on file   Relationships     Social connections     Talks on phone: Not on file     Gets together: Not on file     Attends [employer] service: Not on file     Active member of club or organization: Not on file     Attends meetings of clubs or organizations: Not on file     Relationship status: Not on file     Intimate partner violence     Fear of current or ex partner: Not on file     Emotionally abused: Not on file     Physically abused: Not on file     Forced sexual activity: Not on file   Other Topics Concern     Parent/sibling w/ CABG, MI or angioplasty before 65F 55M? Not Asked   Social History Narrative     Not on file       Additional medical/Social/Surgical histories reviewed in [facility] and updated as appropriate.     REVIEW OF SYSTEMS (12/7/2020)  10 point ROS of systems including Constitutional, Eyes, Respiratory, Cardiovascular, Gastroenterology, Genitourinary, Integumentary, Musculoskeletal, Psychiatric, Allergic/Immunologic were all negative except for pertinent positives noted in my HPI.     PHYSICAL EXAM  Vitals:    12/07/20 0934   BP: 135/81   Pulse: 71     Vital Signs: /81   Pulse 71  Patient declined being weighed. There is no height or weight on file to calculate BMI.    General  - normal appearance, in no obvious distress  HEENT  - conjunctivae not injected, moist mucous membranes, normocephalic/atraumatic head, ears normal appearance, no lesions, mouth normal appearance, no scars, normal dentition and teeth present  CV  - normal peripheral perfusion  Pulm  - normal respiratory pattern, non-labored  Musculoskeletal - lumbar spine  - stance: normal gait without limp, no obvious leg length discrepancy, normal heel and toe walk  - inspection: normal bone and joint alignment, no obvious scoliosis  - palpation: no paravertebral or bony tenderness  - ROM: flexion exacerbates pain, normal extension,  sidebending, rotation  - strength: lower extremities 5/5 in all planes  - special tests:  (+) straight leg raise- left  (+) slump test  Neuro  - patellar and Achilles DTRs 2+ bilaterally, bilateral lower extremity sensory deficit throughout L5 distribution, grossly normal coordination, normal muscle tone  Skin  - no ecchymosis, erythema, warmth, or induration, no obvious rash  Psych  - interactive, appropriate, normal mood and affect  Left knee: has pain with flexion, pain with patellar compression , and palpation over medial joint line  ASSESSMENT & PLAN  81 yo female with left knee arthritis, worse and lumbar ddd, radicular pain, worse  Reviewed plan for her to see spine surgery  Reviewed her lumbar MRI shows ddd  Ordered another lumbar TANK  Reviewed knee xrays: shows djd, worse in medial joint  After 20 minute discussion decided to perform a left knee injection for diagnostic and therapeutic purpose  Will f/u for that and discuss possible referral to knee surgery vs. Hl INjections  F/u with me after lumbar TANK  Medrol pack given   Replace tizanadine with flexeril    Appropriate PPE was utilized for prevention of spread of Covid-19.  Abad Armendariz MD, CAQSM    PROCEDURE:          Left   Knee joint injection   The patient was apprised of the risks and the benefits of the procedure and consented and a written consent was signed.   The patient s  KNEE was sterilely prepped with chloraprep.   40 mg of triamcinolone suspension was drawn up into a 5 mL syringe with 4 mL of 1% lidocaine  The patient was injected into the knee with a 1.5-inch 22-gauge needle at the_superiorlateral aspect of their knee joint  There were no complications. The patient tolerated the procedure well. There was minimal bleeding.   The patient was instructed to ice the knee upon leaving clinic and refrain from overuse over the next 3 days.   The patient was instructed to go to the emergency room with any usual pain, swelling, or redness occurred  in the injected area.   The patient was given a followup appointment to evaluate response to the injection to their increased range of motion and reduction of pain.  Follow up PRN  Dr Abad Armendariz

## 2020-12-07 NOTE — LETTER
12/7/2020         RE: Yumiko Flowers  [address]  [place] [address]        Dear Colleague,    Thank you for referring your patient, Yumiko Flowers, to the [facility] [place]. Please see a copy of my visit note below.          [facility] FOLLOW-UP VISIT 12/7/2020    Yumiko Flowers's chief complaint for this visit includes:  Chief Complaint   Patient presents with     RECHECK     Follow up for low back, discuss repeat back injection     PCP: Gavi Bear    Referring Provider:  No referring provider defined for this encounter.    /81   Pulse 71   Data Unavailable       Interval History:     Follow up reason: Follow up for low back, discuss lumbar TANK    Date of injury: No injury     Date last seen: 12/2019    Following Therapeutic Plan: Yes     Pain: Worsening    Function: Worsening    Interval History:      Medical History:    Any recent changes to your medical history? No    Any new medication prescribed since last visit? No    Review of Systems:    Do you have fever, chills, weight loss? No    Do you have any vision problems? No    Do you have any chest pain or edema? No    Do you have any shortness of breath or wheezing?  No    Do you have stomach problems? No    Do you have any urinary track issues? No    Do you have any numbness or focal weakness? No    Do you have diabetes? No    Do you have problems with bleeding or clotting? No    Do you have an rashes or other skin lesions? No    Large Joint Injection/Arthocentesis: L knee joint    Date/Time: 12/7/2020 10:27 AM  Performed by: Abad Armendariz MD  Authorized by: Abad Armendariz MD     Indications:  Pain, osteoarthritis and diagnostic evaluation  Needle Size:  22 G  Guidance: landmark guided    Approach:  Lateral  Location:  Knee      Medications:  40 mg triamcinolone 40 MG/ML  Procedure discussed: discussed risks, benefits, and alternatives    Consent Given  by:  Patient  Timeout: timeout called immediately prior to procedure    Prep: patient was prepped and draped in usual sterile fashion     NDC: [address]-9        HISTORY OF PRESENT ILLNESS  Ms. Flowers is a pleasant 80 year old year old female who presents to clinic today with ongoing low back pain and left knee pain  Yumiko explains that her last set of injections for her knee and low back helped her through the summer, but started having more pain again over the past two months  Location: left knee, low back  Quality:  achy pain    Severity: 6/10 at worst    Duration: see above  Timing: occurs intermittently  Context: occurs while exercising and lifting  Modifying factors:  resting and non-use makes it better, movement and use makes it worse  Associated signs & symptoms: radiation of pain down left leg    MEDICAL HISTORY  Patient Active Problem List   Diagnosis     Asthma, moderate persistent     Acne rosacea     CARDIOVASCULAR SCREENING; LDL GOAL LESS THAN 130     Osteopenia     Vitamin D deficiency     Advanced directives, counseling/discussion     Hx of pseudoexfoliation, os     Pseudophakia, ou; Yag Caps, os     HTN, goal below 140/90     Peptic ulcer     Health Care Home     History of blepharoplasty, upper lid, ou (elsewhere); revision (EN)     Obesity, Class II, BMI 35-39.9     Restless legs syndrome     DJD (degenerative joint disease), lumbosacral     Rosacea     Essential hypertension with goal blood pressure less than 140/90     Obesity, Class I, BMI 30-34.9     Posterior vitreous detachment, bilateral     Vertigo       Current Outpatient Medications   Medication Sig Dispense Refill     CALCIUM 500 MG PO CHEW None Entered       Cholecalciferol (VITAMIN D) 2000 UNIT CAPS Take 2,000 Units by mouth daily.       doxycycline hyclate (VIBRA-TABS) 100 MG tablet TAKE 1 TABLET (100 MG) BY MOUTH 2 TIMES DAILY 180 tablet 0     etodolac (LODINE) 400 MG tablet TAKE 1 TABLET (400 MG) BY MOUTH 2 TIMES DAILY AS NEEDED  60 tablet 0     gabapentin (NEURONTIN) 600 MG tablet Take 2 tablets (1,200 mg) by mouth At Bedtime 180 tablet 3     Glucos-MSM-C-Zj-Gzmnag-Ztwodb (GLUCOSAMINE MSM COMPLEX) TABS tablet Take by mouth daily       lisinopril-hydrochlorothiazide (ZESTORETIC) 20-12.5 MG tablet TAKE 2 TABLETS BY MOUTH EVERY  tablet 3     Multiple Vitamins-Iron TABS Take 1 tablet by mouth daily.       order for DME Please measure and distribute 1 pair of 10mmHg - 20mmHg THIGH high open or closed toe compression stockings. Jobst ultrasheer or equivalent. 1 each 3     order for DME Please measure and distribute 1 pair of 10mmHg - 20mmHg KNEE high open or closed toe compression stockings. Jobst ultrasheer or equivalent. 1 each 3     order for DME Please measure and distribute 1 pair of 20mmHg - 30mmHg knee high open or closed toe compression stockings. Jobst ultrasheer or equivalent. 1 each 1     tiZANidine (ZANAFLEX) 4 MG tablet Take 1-2 tablets (4-8 mg) by mouth 3 times daily as needed for muscle spasms 90 tablet 1       Allergies   Allergen Reactions     Erythromycin Swelling and Other (See Comments)       Family History   Problem Relation Age of Onset     C.A.D. Father      C.A.D. Brother      Hypertension Mother      Cancer No family hx of      Diabetes No family hx of      Cerebrovascular Disease No family hx of      Thyroid Disease No family hx of      Glaucoma No family hx of      Macular Degeneration No family hx of      Social History     Socioeconomic History     Marital status:      Spouse name: Not on file     Number of children: 3     Years of education: Not on file     Highest education level: Not on file   Occupational History     Employer: RETIRED   Social Needs     Financial resource strain: Not on file     Food insecurity     Worry: Not on file     Inability: Not on file     Transportation needs     Medical: Not on file     Non-medical: Not on file   Tobacco Use     Smoking status: Never Smoker     Smokeless  tobacco: Never Used   Substance and Sexual Activity     Alcohol use: Yes     Drug use: No     Sexual activity: Yes     Partners: Male   Lifestyle     Physical activity     Days per week: Not on file     Minutes per session: Not on file     Stress: Not on file   Relationships     Social connections     Talks on phone: Not on file     Gets together: Not on file     Attends Sikhism service: Not on file     Active member of club or organization: Not on file     Attends meetings of clubs or organizations: Not on file     Relationship status: Not on file     Intimate partner violence     Fear of current or ex partner: Not on file     Emotionally abused: Not on file     Physically abused: Not on file     Forced sexual activity: Not on file   Other Topics Concern     Parent/sibling w/ CABG, MI or angioplasty before 65F 55M? Not Asked   Social History Narrative     Not on file       Additional medical/Social/Surgical histories reviewed in [facility] and updated as appropriate.     REVIEW OF SYSTEMS (12/7/2020)  10 point ROS of systems including Constitutional, Eyes, Respiratory, Cardiovascular, Gastroenterology, Genitourinary, Integumentary, Musculoskeletal, Psychiatric, Allergic/Immunologic were all negative except for pertinent positives noted in my HPI.     PHYSICAL EXAM  Vitals:    12/07/20 0934   BP: 135/81   Pulse: 71     Vital Signs: /81   Pulse 71  Patient declined being weighed. There is no height or weight on file to calculate BMI.    General  - normal appearance, in no obvious distress  HEENT  - conjunctivae not injected, moist mucous membranes, normocephalic/atraumatic head, ears normal appearance, no lesions, mouth normal appearance, no scars, normal dentition and teeth present  CV  - normal peripheral perfusion  Pulm  - normal respiratory pattern, non-labored  Musculoskeletal - lumbar spine  - stance: normal gait without limp, no obvious leg length discrepancy, normal heel and toe walk  - inspection: normal  bone and joint alignment, no obvious scoliosis  - palpation: no paravertebral or bony tenderness  - ROM: flexion exacerbates pain, normal extension, sidebending, rotation  - strength: lower extremities 5/5 in all planes  - special tests:  (+) straight leg raise- left  (+) slump test  Neuro  - patellar and Achilles DTRs 2+ bilaterally, bilateral lower extremity sensory deficit throughout L5 distribution, grossly normal coordination, normal muscle tone  Skin  - no ecchymosis, erythema, warmth, or induration, no obvious rash  Psych  - interactive, appropriate, normal mood and affect  Left knee: has pain with flexion, pain with patellar compression , and palpation over medial joint line  ASSESSMENT & PLAN  79 yo female with left knee arthritis, worse and lumbar ddd, radicular pain, worse  Reviewed plan for her to see spine surgery  Reviewed her lumbar MRI shows ddd  Ordered another lumbar TANK  Reviewed knee xrays: shows djd, worse in medial joint  After 20 minute discussion decided to perform a left knee injection for diagnostic and therapeutic purpose  Will f/u for that and discuss possible referral to knee surgery vs. Hl INjections  F/u with me after lumbar TANK  Medrol pack given   Replace tizanadine with flexeril    Appropriate PPE was utilized for prevention of spread of Covid-19.  Abad Armendariz MD, CAQSM    PROCEDURE:          Left   Knee joint injection   The patient was apprised of the risks and the benefits of the procedure and consented and a written consent was signed.   The patient s  KNEE was sterilely prepped with chloraprep.   40 mg of triamcinolone suspension was drawn up into a 5 mL syringe with 4 mL of 1% lidocaine  The patient was injected into the knee with a 1.5-inch 22-gauge needle at the_superiorlateral aspect of their knee joint  There were no complications. The patient tolerated the procedure well. There was minimal bleeding.   The patient was instructed to ice the knee upon leaving clinic and  refrain from overuse over the next 3 days.   The patient was instructed to go to the emergency room with any usual pain, swelling, or redness occurred in the injected area.   The patient was given a followup appointment to evaluate response to the injection to their increased range of motion and reduction of pain.  Follow up PRN  Dr Abad Armendariz        Again, thank you for allowing me to participate in the care of your patient.        Sincerely,        Abad Armendariz MD

## 2020-12-07 NOTE — PROGRESS NOTES
[facility] FOLLOW-UP VISIT 12/7/2020    Yumiko Flowers's chief complaint for this visit includes:  Chief Complaint   Patient presents with     RECHECK     Follow up for low back, discuss repeat back injection     PCP: Gavi Bear    Referring Provider:  No referring provider defined for this encounter.    /81   Pulse 71   Data Unavailable       Interval History:     Follow up reason: Follow up for low back, discuss lumbar TANK    Date of injury: No injury     Date last seen: 12/2019    Following Therapeutic Plan: Yes     Pain: Worsening    Function: Worsening    Interval History:      Medical History:    Any recent changes to your medical history? No    Any new medication prescribed since last visit? No    Review of Systems:    Do you have fever, chills, weight loss? No    Do you have any vision problems? No    Do you have any chest pain or edema? No    Do you have any shortness of breath or wheezing?  No    Do you have stomach problems? No    Do you have any urinary track issues? No    Do you have any numbness or focal weakness? No    Do you have diabetes? No    Do you have problems with bleeding or clotting? No    Do you have an rashes or other skin lesions? No    Large Joint Injection/Arthocentesis: L knee joint    Date/Time: 12/7/2020 10:27 AM  Performed by: Abad Armendariz MD  Authorized by: Abad Armendariz MD     Indications:  Pain, osteoarthritis and diagnostic evaluation  Needle Size:  22 G  Guidance: landmark guided    Approach:  Lateral  Location:  Knee      Medications:  40 mg triamcinolone 40 MG/ML  Procedure discussed: discussed risks, benefits, and alternatives    Consent Given by:  Patient  Timeout: timeout called immediately prior to procedure    Prep: patient was prepped and draped in usual sterile fashion     NDC: [address]-3

## 2020-12-09 ENCOUNTER — TRANSFERRED RECORDS (OUTPATIENT)
Dept: HEALTH INFORMATION MANAGEMENT | Facility: CLINIC | Age: 80
End: 2020-12-09

## 2020-12-13 ENCOUNTER — HEALTH MAINTENANCE LETTER (OUTPATIENT)
Age: 80
End: 2020-12-13

## 2021-01-05 ENCOUNTER — PRE VISIT (OUTPATIENT)
Dept: ORTHOPEDICS | Facility: CLINIC | Age: 81
End: 2021-01-05

## 2021-01-05 ENCOUNTER — VIRTUAL VISIT (OUTPATIENT)
Dept: ORTHOPEDICS | Facility: CLINIC | Age: 81
End: 2021-01-05
Payer: COMMERCIAL

## 2021-01-05 DIAGNOSIS — M51.26 LUMBAR DISC HERNIATION: ICD-10-CM

## 2021-01-05 DIAGNOSIS — M51.369 DDD (DEGENERATIVE DISC DISEASE), LUMBAR: ICD-10-CM

## 2021-01-05 DIAGNOSIS — M17.12 PRIMARY OSTEOARTHRITIS OF LEFT KNEE: Primary | ICD-10-CM

## 2021-01-05 PROCEDURE — 99212 OFFICE O/P EST SF 10 MIN: CPT | Mod: 95 | Performed by: ORTHOPAEDIC SURGERY

## 2021-01-05 NOTE — NURSING NOTE
Reason For Visit:   Chief Complaint   Patient presents with     Consult     patient has had back issuessince she was 23 and in the recent years it has been getting worse and worse especially with bending and having knee issues which her referring provider thinks is connected to her spine. patient has tried injections and PT and no previous surgeries        There were no vitals taken for this visit.         Jair Montiel, ATC

## 2021-01-05 NOTE — LETTER
1/5/2021         RE: Yumiko Flowers  [address]  [place] [address]        Dear Colleague,    Thank you for referring your patient, Yumiko Flowers, to the [facility] [place]. Please see a copy of my visit note below.    Yumiko Flowers is a 80 year old female who is being evaluated via a billable telephone visit.      What phone number would you like to be contacted at? Home  How would you like to obtain your AVS? MyChart      Subjective   HPI   Review of Systems      Objective       Vitals:  No vitals were obtained today due to virtual visit.  Physical Exam         Phone call duration: 11 minutes    I spoke with Jack today on the phone.  She is dealing with 2 separate pain issues.  The first is in her low back.  This pain is worse when she is up and walking.  She describes having to sit down at least every 10 minutes.  She cannot go more than about a block or 2 before the back pain becomes fairly severe.  It is limiting her activities a fair amount.    The second issue is pain around the inside or medial aspect of her left knee.  She does have known left knee arthritis and has had a series of injections for this.  These help her somewhat.    She is undergone physical therapy for both the back and the knee, but most recently was about a year ago.  She also had an epidural injection in the spine in December, which helped her somewhat and she feels like she is walking little bit better after this injection.  She has been under under some oral medications, and this is followed by our nonoperative team.    We discussed options for her spine.  I reviewed her lumbar MRI from December 2019 which shows a mild diffuse lumbar spondylosis, with foraminal stenosis worse on the right side at L4-5 and L5-S1.    Given that her predominant complaint is back pain, I explained that to address this surgically would require a lumbar fusion operation.  We went over the risks and  benefits of this somewhat, and I explained that it would be a moderate to large magnitude surgery in her case.  At this time she was feeling that her symptoms were not severe enough to warrant to that magnitude of intervention and recovery.  I think this is reasonable, particularly given her age of 80 years.  At this time she was inclined to try to maximize the nonoperative options which I think is reasonable.    I did tell her that if she wished to have the knee evaluated by a knee surgeon I would be happy to make a referral to my partner Dr. Glenn Zamora.  She will let us know if she wants that done.  Otherwise she can follow-up with surgery as needed and will continue to maximize nonoperative options.          Adebayo Shea MD

## 2021-01-06 RX ORDER — CYCLOBENZAPRINE HCL 10 MG
TABLET ORAL
Qty: 30 TABLET | Refills: 0 | Status: SHIPPED | OUTPATIENT
Start: 2021-01-06 | End: 2021-02-08

## 2021-02-08 DIAGNOSIS — M51.26 LUMBAR DISC HERNIATION: ICD-10-CM

## 2021-02-08 DIAGNOSIS — M51.369 DDD (DEGENERATIVE DISC DISEASE), LUMBAR: ICD-10-CM

## 2021-02-08 NOTE — TELEPHONE ENCOUNTER
cyclobenzaprine (FLEXERIL) 10 MG tablet 30 tablet 0 1/6/2021  No   Sig: TAKE 1 TABLET BY MOUTH NIGHTLY AS NEEDED FOR MUSCLE SPASMS   Sent to pharmacy as: Cyclobenzaprine HCl 10 MG Oral Tablet (FLEXERIL)   Class: E-Prescribe   Order: [id number]   E-Prescribing Status: Receipt confirmed by pharmacy (1/6/2021  9:15 AM CST)

## 2021-02-09 RX ORDER — CYCLOBENZAPRINE HCL 10 MG
TABLET ORAL
Qty: 30 TABLET | Refills: 0 | Status: SHIPPED | OUTPATIENT
Start: 2021-02-09 | End: 2021-03-09

## 2021-03-09 DIAGNOSIS — M51.369 DDD (DEGENERATIVE DISC DISEASE), LUMBAR: ICD-10-CM

## 2021-03-09 DIAGNOSIS — M51.26 LUMBAR DISC HERNIATION: ICD-10-CM

## 2021-03-09 RX ORDER — CYCLOBENZAPRINE HCL 10 MG
TABLET ORAL
Qty: 30 TABLET | Refills: 0 | Status: SHIPPED | OUTPATIENT
Start: 2021-03-09 | End: 2021-04-12

## 2021-04-02 ENCOUNTER — PRE VISIT (OUTPATIENT)
Dept: ORTHOPEDICS | Facility: CLINIC | Age: 81
End: 2021-04-02

## 2021-04-02 NOTE — TELEPHONE ENCOUNTER
PREVISIT INFORMATION                                                    Yumiko Flowers scheduled for future visit at [facility].    Patient is scheduled to see Dr. Calvin on 4/8/21  Reason for visit: Left knee pain  Referring provider Kala  Has patient seen previous specialist? No  Medical Records:  Available in chart.  Patient was previously seen at a [facility] or [facility] facility.    REVIEW                                                      New patient packet mailed to patient: Yes  Medication reconciliation complete: Yes      Current Outpatient Medications   Medication Sig Dispense Refill     CALCIUM 500 MG PO CHEW None Entered       Cholecalciferol (VITAMIN D) 2000 UNIT CAPS Take 2,000 Units by mouth daily.       cyclobenzaprine (FLEXERIL) 10 MG tablet TAKE 1 TABLET BY MOUTH NIGHTLY AS NEEDED FOR MUSCLE SPASMS 30 tablet 0     doxycycline hyclate (VIBRA-TABS) 100 MG tablet TAKE 1 TABLET (100 MG) BY MOUTH 2 TIMES DAILY 180 tablet 0     etodolac (LODINE) 400 MG tablet TAKE 1 TABLET (400 MG) BY MOUTH 2 TIMES DAILY AS NEEDED 60 tablet 0     gabapentin (NEURONTIN) 600 MG tablet Take 2 tablets (1,200 mg) by mouth At Bedtime 180 tablet 3     Glucos-MSM-C-Fd-Loewqh-Ksxocz (GLUCOSAMINE MSM COMPLEX) TABS tablet Take by mouth daily       lisinopril-hydrochlorothiazide (ZESTORETIC) 20-12.5 MG tablet TAKE 2 TABLETS BY MOUTH EVERY  tablet 3     methylPREDNISolone (MEDROL) 4 MG tablet therapy pack Follow Package Directions 21 tablet 0     Multiple Vitamins-Iron TABS Take 1 tablet by mouth daily.       order for DME Please measure and distribute 1 pair of 10mmHg - 20mmHg THIGH high open or closed toe compression stockings. Jobst ultrasheer or equivalent. 1 each 3     order for DME Please measure and distribute 1 pair of 10mmHg - 20mmHg KNEE high open or closed toe compression stockings. Jobst ultrasheer or equivalent. 1 each 3     order for DME Please measure and  distribute 1 pair of 20mmHg - 30mmHg knee high open or closed toe compression stockings. Jobst ultrasheer or equivalent. 1 each 1     tiZANidine (ZANAFLEX) 4 MG tablet Take 1-2 tablets (4-8 mg) by mouth 3 times daily as needed for muscle spasms 90 tablet 1       Allergies: Erythromycin    XR done 12/7/20    PLAN/FOLLOW-UP NEEDED                                                      Previsit review complete.  Patient will see provider at future scheduled appointment.     Patient Reminders Given:  Please, make sure you bring an updated list of your medications.   If you are having a procedure, please, present 15 minutes early.  If you need to cancel or reschedule,please call [phone].    Jenny Cordoba RN

## 2021-04-07 ENCOUNTER — OFFICE VISIT (OUTPATIENT)
Dept: OPHTHALMOLOGY | Facility: CLINIC | Age: 81
End: 2021-04-07
Payer: COMMERCIAL

## 2021-04-07 DIAGNOSIS — H52.4 PRESBYOPIA: ICD-10-CM

## 2021-04-07 DIAGNOSIS — H26.8 PXF (PSEUDOEXFOLIATION OF LENS CAPSULE): ICD-10-CM

## 2021-04-07 DIAGNOSIS — Z98.890 HISTORY OF BLEPHAROPLASTY: ICD-10-CM

## 2021-04-07 DIAGNOSIS — H43.813 POSTERIOR VITREOUS DETACHMENT, BILATERAL: ICD-10-CM

## 2021-04-07 DIAGNOSIS — Z96.1 PSEUDOPHAKIA: ICD-10-CM

## 2021-04-07 DIAGNOSIS — Z01.01 ENCOUNTER FOR EXAMINATION OF EYES AND VISION WITH ABNORMAL FINDINGS: Primary | ICD-10-CM

## 2021-04-07 DIAGNOSIS — H53.002 AMBLYOPIA OF LEFT EYE: ICD-10-CM

## 2021-04-07 PROCEDURE — 92015 DETERMINE REFRACTIVE STATE: CPT | Performed by: OPHTHALMOLOGY

## 2021-04-07 PROCEDURE — 92014 COMPRE OPH EXAM EST PT 1/>: CPT | Performed by: OPHTHALMOLOGY

## 2021-04-07 ASSESSMENT — VISUAL ACUITY
OS_SC: 20/60
OS_SC+: -2
OS_PH_SC: 20/50
OD_SC: 20/40
OD_PH_SC+: -2
METHOD: SNELLEN - LINEAR
OS_PH_SC+: -2
OD_SC+: -1+2
OD_PH_SC: 20/30

## 2021-04-07 ASSESSMENT — REFRACTION_MANIFEST
OD_SPHERE: PLANO
OS_SPHERE: -1.50
OS_CYLINDER: +0.75
OD_ADD: +2.75
OD_CYLINDER: +1.25
OD_AXIS: 020
OS_ADD: +2.75
OS_AXIS: 155

## 2021-04-07 ASSESSMENT — CONF VISUAL FIELD
OS_SUPERIOR_TEMPORAL_RESTRICTION: 2
OS_INFERIOR_TEMPORAL_RESTRICTION: 2
OD_NORMAL: 1
OS_SUPERIOR_NASAL_RESTRICTION: 2
OS_INFERIOR_NASAL_RESTRICTION: 2

## 2021-04-07 ASSESSMENT — EXTERNAL EXAM - RIGHT EYE: OD_EXAM: 1+ BROW PTOSIS

## 2021-04-07 ASSESSMENT — REFRACTION_WEARINGRX
OS_SPHERE: +2.75
OD_SPHERE: +2.75
SPECS_TYPE: OTC READERS
OS_CYLINDER: SPHERE
OD_CYLINDER: SPHERE

## 2021-04-07 ASSESSMENT — TONOMETRY
OD_IOP_MMHG: 18
OS_IOP_MMHG: 23
IOP_METHOD: APPLANATION

## 2021-04-07 ASSESSMENT — SLIT LAMP EXAM - LIDS
COMMENTS: GOOD COSMESIS
COMMENTS: GOOD COSMESIS

## 2021-04-07 ASSESSMENT — CUP TO DISC RATIO
OS_RATIO: 0.3
OD_RATIO: 0.4

## 2021-04-07 ASSESSMENT — EXTERNAL EXAM - LEFT EYE: OS_EXAM: 1+ BROW PTOSIS

## 2021-04-07 NOTE — PATIENT INSTRUCTIONS
Possible clouding of posterior capsule right eye discussed.   Use artificial tears up to 4 times daily both eyes as needed (Refresh Tears, Systane Ultra/Balance, or Theratears)   Glasses Rx given - optional   Call in December 2021 for an appointment in April 2022 for Complete Exam    Dr. Gonzales [phone]

## 2021-04-07 NOTE — LETTER
4/7/2021         RE: Yumiko Flowers  [address]  [place] [address]        Dear Colleague,    Thank you for referring your patient, Yumiko Flowers, to the [facility]. Please see a copy of my visit note below.     Current Eye Medications:  Systane twice to four times a day both eyes      Subjective:  Here for complete today. Feels left eye is getting more blurry over the past year. No pain, but eyes are dry and scratchy.      Objective:  See Ophthalmology Exam.       Assessment:  Stable eye exam.      ICD-10-CM    1. Encounter for examination of eyes and vision with abnormal findings  Z01.01    2. Presbyopia  H52.4 REFRACTIVE STATUS   3. Pseudophakia, ou; Yag Caps, os  Z96.1 EYE EXAM (SIMPLE-NONBILLABLE)   4. Hx of pseudoexfoliation, os  H26.8    5. Amblyopia, mild, of left eye  H53.002    6. History of blepharoplasty, upper lid, ou (elsewhere); revision (EN)  Z98.890    7. Posterior vitreous detachment, bilateral  H43.813         Plan:  Possible clouding of posterior capsule right eye discussed.   Use artificial tears up to 4 times daily both eyes as needed (Refresh Tears, Systane Ultra/Balance, or Theratears)   Glasses Rx given - optional   Call in December 2021 for an appointment in April 2022 for Complete Exam    Dr. Gonzales [phone]           Again, thank you for allowing me to participate in the care of your patient.        Sincerely,        Ashu Gonzales MD

## 2021-04-07 NOTE — PROGRESS NOTES
Current Eye Medications:  Systane twice to four times a day both eyes      Subjective:  Here for complete today. Feels left eye is getting more blurry over the past year. No pain, but eyes are dry and scratchy.      Objective:  See Ophthalmology Exam.       Assessment:  Stable eye exam.      ICD-10-CM    1. Encounter for examination of eyes and vision with abnormal findings  Z01.01    2. Presbyopia  H52.4 REFRACTIVE STATUS   3. Pseudophakia, ou; Yag Caps, os  Z96.1 EYE EXAM (SIMPLE-NONBILLABLE)   4. Hx of pseudoexfoliation, os  H26.8    5. Amblyopia, mild, of left eye  H53.002    6. History of blepharoplasty, upper lid, ou (elsewhere); revision (EN)  Z98.890    7. Posterior vitreous detachment, bilateral  H43.813         Plan:  Possible clouding of posterior capsule right eye discussed.   Use artificial tears up to 4 times daily both eyes as needed (Refresh Tears, Systane Ultra/Balance, or Theratears)   Glasses Rx given - optional   Call in December 2021 for an appointment in April 2022 for Complete Exam    Dr. Gonzales [phone]

## 2021-04-11 DIAGNOSIS — M51.369 DDD (DEGENERATIVE DISC DISEASE), LUMBAR: ICD-10-CM

## 2021-04-11 DIAGNOSIS — M51.26 LUMBAR DISC HERNIATION: ICD-10-CM

## 2021-04-12 RX ORDER — CYCLOBENZAPRINE HCL 10 MG
TABLET ORAL
Qty: 30 TABLET | Refills: 0 | Status: SHIPPED | OUTPATIENT
Start: 2021-04-12 | End: 2021-04-15

## 2021-04-13 ENCOUNTER — OFFICE VISIT (OUTPATIENT)
Dept: ORTHOPEDICS | Facility: CLINIC | Age: 81
End: 2021-04-13
Payer: COMMERCIAL

## 2021-04-13 ENCOUNTER — TELEPHONE (OUTPATIENT)
Dept: ORTHOPEDICS | Facility: CLINIC | Age: 81
End: 2021-04-13

## 2021-04-13 VITALS
WEIGHT: 206.7 LBS | HEART RATE: 84 BPM | BODY MASS INDEX: 34.44 KG/M2 | DIASTOLIC BLOOD PRESSURE: 85 MMHG | HEIGHT: 65 IN | SYSTOLIC BLOOD PRESSURE: 129 MMHG

## 2021-04-13 DIAGNOSIS — M51.26 LUMBAR DISC HERNIATION: ICD-10-CM

## 2021-04-13 DIAGNOSIS — M51.369 DDD (DEGENERATIVE DISC DISEASE), LUMBAR: ICD-10-CM

## 2021-04-13 DIAGNOSIS — M17.12 PRIMARY OSTEOARTHRITIS OF LEFT KNEE: Primary | ICD-10-CM

## 2021-04-13 DIAGNOSIS — G89.29 CHRONIC PAIN OF LEFT KNEE: Primary | ICD-10-CM

## 2021-04-13 DIAGNOSIS — M25.562 CHRONIC PAIN OF LEFT KNEE: Primary | ICD-10-CM

## 2021-04-13 PROCEDURE — 99214 OFFICE O/P EST MOD 30 MIN: CPT | Performed by: ORTHOPAEDIC SURGERY

## 2021-04-13 ASSESSMENT — MIFFLIN-ST. JEOR: SCORE: 1410.53

## 2021-04-13 ASSESSMENT — PAIN SCALES - GENERAL: PAINLEVEL: SEVERE PAIN (6)

## 2021-04-13 NOTE — NURSING NOTE
"Yumiko Flowers's goals for this visit include:   Chief Complaint   Patient presents with     Left Knee - Pain       She requests these members of her care team be copied on today's visit information:     PCP: Gavi Bear    Referring Provider:  No referring provider defined for this encounter.    /85 (BP Location: Left arm, Patient Position: Sitting, Cuff Size: Adult Regular)   Pulse 84   Ht 1.638 m (5' 4.5\")   Wt 93.8 kg (206 lb 11.2 oz)   BMI 34.93 kg/m      Do you need any medication refills at today's visit? No  Candida Holliday CMA      "

## 2021-04-13 NOTE — TELEPHONE ENCOUNTER
Procedure: LTKA  Facility: [facility]  Length: 120 minutes  Anesthesia: Choice  Post-op appointments needed: 2 weeks nurse only, 6 weeks with provider only.  Surgery packet/instructions given to patient?  No will need to mail    Jenny Cordoba RN

## 2021-04-13 NOTE — PROGRESS NOTES
Assessment: This is a 80 year old with advanced osteoarthritis associated with severe symptoms limiting her activities of daily living despite severe years of non-operative management including activity modification, prescription oral pain medication, physical therapy, and interarticular injections. We discussed the options including living with it and total knee. We spent approximately 20 minutes discussing the risks and benefits of total knee arthroplasty.  We reviewed the proposed surgical plan.  The discussion included but was not limited to the following:  Blood clots, blood clots to the lungs, injury to blood vessels and nerves, the literature as it pertains to known problems with leg length discrepancy, stiffness, anterior knee pain, and infection. We discussed the post-operative recovery for the typical patient, return to driving, and return to normal activities.  All the patient's questions were answered to the best of my ability.    Plan:  Left total knee when the patient would like to go forward with it.      Chief Complaint: Pain of the Left Knee    Physician:  No ref. provider found    HPI: Yumiko Flowers is a 80 year old female who presents today for evaluation of her left knee     Symptom Profile  Location of symptoms:  Medial greater than  Lateral joint line   Onset: insidious  Trend: getting worse   Duration of symptoms: several years   Quality of symptoms: aching, sharp/stabbing, clicking  Severity: severe  Alleviate:activity modification   Exacerbating: activities, stairs, walking up an incline  Previous Treatments: Previous treatments include activity modification, oral pain medication, physical therapy for greater than 3 months, interarticular injections (more than 5-6; last four months ago)    Current Status:  Results of the patient s Hip Disability and Osteoarthritis Outcome Score (RICARDO S)  are as follows (0-100 scales with 100 being the theoretical best):  Pain:  58   Symptoms:79   ADLs:  "59   Sports/Recreation:0  Quality of Life:50   ([email or web address]/)  [facility] Activity Score:4    MEDICAL HISTORY:   Past Medical History:   Diagnosis Date     Acne rosacea      Actinic keratosis      Amblyopia      Asthma, moderate persistent      Basal cell carcinoma 10/2010    back X2     Cataract, mod, od; mild-mod, os 1/12/2012     DJD (degenerative joint disease), lumbosacral 8/6/2014     Elevated cholesterol      Endometriosis      HTN (hypertension)      Moderate major depression (H)     \"Circumstances\"     Osteopenia        Medications:     Current Outpatient Medications:      CALCIUM 500 MG PO CHEW, None Entered, Disp: , Rfl:      Cholecalciferol (VITAMIN D) 2000 UNIT CAPS, Take 2,000 Units by mouth daily., Disp: , Rfl:      cyclobenzaprine (FLEXERIL) 10 MG tablet, TAKE 1 TABLET BY MOUTH NIGHTLY AS NEEDED FOR MUSCLE SPASMS, Disp: 30 tablet, Rfl: 0     doxycycline hyclate (VIBRA-TABS) 100 MG tablet, TAKE 1 TABLET (100 MG) BY MOUTH 2 TIMES DAILY, Disp: 180 tablet, Rfl: 0     etodolac (LODINE) 400 MG tablet, TAKE 1 TABLET (400 MG) BY MOUTH 2 TIMES DAILY AS NEEDED, Disp: 60 tablet, Rfl: 0     gabapentin (NEURONTIN) 600 MG tablet, Take 2 tablets (1,200 mg) by mouth At Bedtime, Disp: 180 tablet, Rfl: 3     Glucos-MSM-C-Sm-Qgepzl-Rxhxke (GLUCOSAMINE MSM COMPLEX) TABS tablet, Take by mouth daily, Disp: , Rfl:      lisinopril-hydrochlorothiazide (ZESTORETIC) 20-12.5 MG tablet, TAKE 2 TABLETS BY MOUTH EVERY DAY, Disp: 180 tablet, Rfl: 3     methylPREDNISolone (MEDROL) 4 MG tablet therapy pack, Follow Package Directions, Disp: 21 tablet, Rfl: 0     Multiple Vitamins-Iron TABS, Take 1 tablet by mouth daily., Disp: , Rfl:      order for DME, Please measure and distribute 1 pair of 10mmHg - 20mmHg THIGH high open or closed toe compression stockings. Jobst ultrasheer or equivalent., Disp: 1 each, Rfl: 3     order for DME, Please measure and distribute 1 pair of 10mmHg - 20mmHg KNEE high open or closed toe compression stockings. " "Jobst ultrasheer or equivalent., Disp: 1 each, Rfl: 3     order for DME, Please measure and distribute 1 pair of 20mmHg - 30mmHg knee high open or closed toe compression stockings. Jobst ultrasheer or equivalent., Disp: 1 each, Rfl: 1     Propylene Glycol (SYSTANE BALANCE OP), Apply 1 drop to eye 2 times daily, Disp: , Rfl:      tiZANidine (ZANAFLEX) 4 MG tablet, Take 1-2 tablets (4-8 mg) by mouth 3 times daily as needed for muscle spasms, Disp: 90 tablet, Rfl: 1    Current Facility-Administered Medications:      triamcinolone (KENALOG-40) injection 40 mg, 40 mg, , , bAad Armendariz MD, 40 mg at 12/07/20 1027     triamcinolone (KENALOG-40) injection 40 mg, 40 mg, , , Abad Armendariz MD, 40 mg at 12/19/19 0836    Allergies: Erythromycin    SURGICAL HISTORY:   Past Surgical History:   Procedure Laterality Date     BLEPHAROPLASTY BILATERAL  3/9/2006; 2013    both eyes, upper lid; revision (EN)     C APPENDECTOMY       CATARACT IOL, RT/LT       HC REMOVAL GALLBLADDER       LASER YAG CAPSULOTOMY      left eye (AC lysis also)     PHACOEMULSIFICATION WITH STANDARD INTRAOCULAR LENS IMPLANT  1/2012; 4/2013    right eye; left eye     REPAIR PTOSIS       SURGICAL HISTORY OF -       endometriosis surgeriesx3     SURGICAL HISTORY OF -       ganiglion cyst onfoot     SURGICAL HISTORY OF -       Eye for \"droopy eye\"       FAMILY HISTORY:   Family History   Problem Relation Age of Onset     C.A.D. Father      C.A.D. Brother      Hypertension Mother      Cancer No family hx of      Diabetes No family hx of      Cerebrovascular Disease No family hx of      Thyroid Disease No family hx of      Glaucoma No family hx of      Macular Degeneration No family hx of        SOCIAL HISTORY:   Social History     Tobacco Use     Smoking status: Never Smoker     Smokeless tobacco: Never Used   Substance Use Topics     Alcohol use: Yes   retiried, , lives alone    REVIEW OF SYSTEMS:  The comprehensive review of systems " "from the intake form was reviewed with the patient.  No fever, weight change or fatigue. No dry eyes. No oral ulcers, sore throat or voice change. No palpitations, syncope, angina or edema.  No chest pain, excessive sleepiness, shortness of breath or hemoptysis.   No abdominal pain, nausea, vomiting, diarrhea or heartburn.  No skin rash. No focal weakness or numbness. No bleeding or lymphadenopathy. No rhinitis or hives.     Exam:  On physical examination the patient appears the stated age, is in no acute distress, affectThe is appropriate, and breathing is non-labored.  Vitals are documented in the EMR and have been reviewed:    /85 (BP Location: Left arm, Patient Position: Sitting, Cuff Size: Adult Regular)   Pulse 84   Ht 1.638 m (5' 4.5\")   Wt 93.8 kg (206 lb 11.2 oz)   BMI 34.93 kg/m    5' 4.5\"  Body mass index is 34.93 kg/m .    Rises from chair: with selina effort   Gait: antalgic with less time on the left   Left Knee  Appearance: benign  Clinical alignment: mild varus  Effusion: no  Tenderness to palpation:medial greater than lateral joint line   Extension: 8  Flexion: 100  Collateral ligaments: intact  Cruciate ligaments: grossly intact   Distally, the circulatory, motor, and sensation exam is intact with 5/5 EHL, gastroc-soleus, and tibialis anterior.  Sensation to light touch is intact.  Dorsalis pedis and posterior tibialis pulses are palpable.  There are no sores on the feet, no bruising, and no lymphedema.    X-rays:   Study Result    3 views left knee radiographs 12/7/2020 10:21 AM     History: Arthritis of left knee     Additional History from EMR: Bilateral knee radiographs     Comparison: Bilateral knee radiographs 8/15/2018     Findings:     AP, lateral and patellofemoral views of the left knee were obtained.      No acute osseous abnormality.  No joint effusion.     Moderate medial compartment and mild-to-moderate patellofemoral joint  space narrowing. Moderate tricompartmental " osteophytic spurring.   Degenerative findings are relatively unchanged from prior.     Posterior joint bodies.     Increased osteopenic appearance of the bones which may be related to  technique.     Soft tissue is unremarkable. Small quadriceps tendon insertional  enthesophyte.                                                                       Impression:     Tricompartmental osteoarthrosis of the left knee, predominantly medial  compartment.     I have personally reviewed the examination and initial interpretation  and I agree with the findings.     FIEDL BEAULIEU MD (Joe)     Impact on ADLs: KOOS ADLs subdomain (0-100 scale, 100 being best)= 59    Kellgren & Ezra Grade:  Grade 4 (large osteophytes + marked JSN + severe sclerosis + definite bone end deformity)    Implant : gutierrez and nephew  Implant name: [email or web address]  Implant fixation: cement

## 2021-04-13 NOTE — PATIENT INSTRUCTIONS
[facility]  [address] [address] [place] [id number]  Phone [phone]  Fax [phone]    SURGICAL INFORMATION & INSTRUCTIONS  Dr. Glenn Calvin  Name of Surgery: Left total knee arthroplasty    Date of Surgery:     If you need to reschedule/schedule your surgery, please contact Patricia, our surgery scheduler at [place], at [phone].    Arrival Time:     Time of Surgery:      Please arrive early so that we can prepare you for surgery. If you arrive later than your scheduled arrival time, your surgery may be cancelled.  Please note that scheduled times may change, but you will always be notified if there is a change.     Location of Surgery:     ? [facility], [facility]  [address] [place], [place] [address]. [address], 3rd floor for check-in  Phone [phone]  Fax [phone]  Bring parking ticket with you for validation and reduced parking rate  [email or web address]    Prior to surgery    ? Medical Leave Forms  Please fax any medical leave forms from your employer/school to [phone] (if seen in [place]). It can take up to 5 business days to complete the forms. We will fax them back to the number listed on the forms, if you would like a copy, please let us know and we will mail a copy to you. Do not bring with on day of surgery as the forms may get lost.    ? Call your insurance company  Ask if you need pre-approval for your surgery.  If pre-approval is needed, please call our surgery scheduler for assistance with the pre-approval process.   If you do not have insurance, please let us know.     ? [facility] (if on [facility])  10 days before surgery, call [phone] to register with the surgery center. Have your insurance card ready.     Total Joint Replacement:  - Joint Replacement Class:  If you are scheduled to have a joint replacement, you (and any family/friends that will be  helping to care for you) are expected to attend an educational class at least two weeks prior to your surgery.  To register for the class please call the [facility] Learning Center at [phone] or register online at [email or web address]. Classes are held at multiple locations as well as online.  You must know the date of your surgery before you can register for a class (approximate date OK). If registering online, please select hip or knee as surgery type as shoulder has not been made an option at this time.     o This is also a good opportunity to think about where you would like to have physical therapy after your surgery. You may also be able to set up appointments in advance so that everything is ready to go after surgery.    - Antibiotics Prophylaxis:  Certain procedures such as dental cleaning/work, colonoscopies and other surgeries can cause bacteria to enter the bloodstream.  These bacteria can attach to joint replacement devices.  To reduce this risk, you will need to take antibiotics before you have invasive procedures or dental work.  This is known as antibiotic prophylaxis.    - Dental Visit:  You may want to schedule an appointment with your dentist for a cleaning or needed work before your surgery.  We advise no dental work or cleaning until 6 months after your surgery with implants to hip(s), knee(s), or shoulder(s).  Once you are 6 months past your surgery, you will need to take prophylactic (preventative) antibiotics for the rest of your life before having any dental cleaning or work.  If dental work is needed before surgery, make sure everything is completely healed prior to surgery.  It may cause delays or cancellation of surgery if not healed completely. This is also for any other invasive procedures you may have such as a colonoscopy.  Please notify the clinic if you have any questions.    ? Schedule an appointment with a Primary Care Provider for a Pre-Op Physical.  This should be done  within 30 days of surgery  If you do not have a primary care provider, you may call [facility] at [phone], for an appointment.  Please have your office note and any labs or tests faxed to the facility where you are having surgery. Please be sure to request a copy of your pre-op physical and bring it with you on the day of surgery.      Tell your provider if you have any of the following:   - IF you have a pacemaker or an ICD (implanted cardiac defibrillator). If you do, please bring the ID card with you on the day of surgery  - IF you're a smoker. People who smoke have a higher risk of infection after surgery. Ask your provider how you can quit smoking.  - If you have diabetes, work with your provider to control your blood sugar. If its not well-controlled, we may need to delay surgery (or you may have problems healing afterward).  - If your surgeon asks you to see your dentist: You'll need to complete any dental work before surgery. Your dentist must send a letter to your surgery center saying it's ok to do the surgery.    ? Blood Bank Pre-Admission order (total joint replacement only):    You will need to have a Blood Type and Screen drawn at a [facility] or [facility] location before your surgery.  Please check your form included in your packet to determine if it needs to be done 1-30 days before surgery or 1-3 days before surgery.    ? Pre-Op Phone Call  You will receive a pre-op phone call 1-3 days before surgery to review your eating and drinking restrictions, review medications, and confirm surgery times.      ? 7-10 days BEFORE surgery  ? Stop taking aspirin, Plavix or aspirin products 10 days before surgery or as directed by your doctor.  ? Stop taking non-steroidal anti-inflammatory medications (naproxen/Aleve, ibuprofen/Advil/Motrin, celecoxib/Celebrex, meloxicam/Mobic) 3 days before surgery or as directed by your surgeon.  This will reduce the risk of bleeding during surgery.  ? Stop taking  fish oil (Omega-3-fatty acid) 1 week before surgery.  ? It is OK to take acetaminophen (Tylenol) up until 2 hours prior to surgery.  ? Take prescription medications as directed by your doctor.  Discuss which medications to take or hold prior to your surgery, with your primary care doctor.   ? If you have diabetes, ask your primary care doctor or endocrinologist how you should take your medication(s).    ? COVID-19 Testing Prior to Surgery (see included handout)  o 3-4 days prior to surgery  o Call [phone] or [phone] to schedule    ? 24 hours BEFORE surgery  Stop drinking alcohol (beer, wine, liquor) at least 24-hours before and after your surgery.     ? Evening BEFORE surgery  - You may eat a normal meal the night before surgery, but eat nothing after midnight.     - Take a shower - to help wash away bacteria (germs) from your skin.  It s normal to have bacteria on your skin and skin protects us from these germs.  When you have surgery, we cut the skin.  Sometimes germs get into the cuts and cause infection (illness caused by germs).  By following the showering instructions and using the special soap, you will lower the number of germs on your skin.  This decreases your chance of an infection.    - Buy or get 8 ounces of antiseptic surgical soap called 4% CHG.  Common brands of this soap are Hibiclens and Exidine.    - You can find it this soap at your local pharmacy, clinic or retail store.  If you have trouble finding it, ask your pharmacist to help you find the right substitute.  OK to use generic unscented soap if not able to purchase surgical soap.  - Do not shave within 12 inches of your incision (surgical cut) area for at least 3 days before surgery.  Shaving can make small cuts in the skin. This puts you at a higher risk of infection.    Items you will need for each shower:   - Newly washed towel  - 4 ounces of one of the recommended soaps    Follow these instructions, the evening before  surgery  - Wash your hair and body with your regular shampoo and soap. Make sure you rinse the shampoo and soap from your hair and body.  - Using clean hands, apply about 2 ounces of soap gently on your skin from the neck to your toes. Use on your groin area last. Do not use this soap on your face or head. If you get any soap in your eyes, ears or mouth, rinse right away.  - Once the soap has been on your skin for at least 1 minute, rinse off completely and repeat washing with the surgical soap one more time.  - Rinse well and dry off using a clean towel.  If you feel any tingling, itching or other irritation, rinse right away. It is normal to feel some coolness on the skin after using the antiseptic soap. Your skin may feel a bit dry after the shower, but do not use any lotions, creams or moisturizers. Do not use hair spray or other products in your hair.  - Dress in freshly washed clothes or pajamas. Use fresh pillowcases and sheets on your bed.    ? Day of Surgery  - You may drink clear liquids from midnight up to 2 hours before surgery or as directed on your pre-op phone call.  Clear liquids include: Water, Pedialyte, Gatorade, apple juice and liquids you can read through. Please avoid liquids that are red or orange in color.   - Do NOT drink: milk, liquids that have pulp, orange juice, and lemonade or tomato juice.   - Do NOT chew gum, chew tobacco or suck on hard candy.    - Take another shower          Follow these instructions:      - Wash your hair and body with your regular shampoo and soap. Make sure you rinse the shampoo and soap from your hair and body.  - Using clean hands, apply about 2 ounces of soap gently on your skin from the neck to your toes. Use on your groin area last. Do not use this soap on your face or head. If you get any soap in your eyes, ears or mouth, rinse right away.  - Once the soap has been on your skin for at least 1 minute, rinse off completely and repeat washing with the  surgical soap one more time.  - Rinse well and dry off using a clean towel.  If you feel any tingling, itching or other irritation, rinse right away. It is normal to feel some coolness on the skin after using the antiseptic soap. Your skin may feel a bit dry after the shower, but do not use any lotions, creams or moisturizers. Do not use hair spray or other products in your hair.  - Dress in freshly washed clothes.  - Do not wear deodorant, cologne, lotion, makeup, nail polish or jewelry to surgery.    ? If there is any change in your health PRIOR to your surgery, please contact your surgeon's office.  Such as a fever, body aches, fatigue, any flu-like symptoms, rash, or any new injury to related body part.    ? Arrange for someone to drive you home after discharge.    will need to be a responsible adult (18 years or older) that will provide transportation to and from surgery and stay in the waiting room during your surgery. You may not drive yourself or take public transportation to and from surgery.    ? Arrange for someone to stay with you for 24 hours after you go home.   This person must be a responsible adult (18 years or older).    ? Bring these items to the hospital/surgery center:   ? Insurance card(s)  ? Money for parking and co-pays, if needed  ? A list of all the medications you take, including vitamins, minerals, herbs and over the counter medications.    ? A copy of your Advance Health Care Directive, if you have one.  This tells us what treatment(s) you would or would not want, and who would make health care decisions, if you could no longer speak for yourself.    ? A case for glasses, contact lenses, hearing aids or dentures.   ? Your inhaler or CPAP machine, if you use these at home    ? Leave extra cash, jewelry and other valuables at home.         When you arrive for surgery  When you get to the surgery center/hospital, you will:  - Check in. If you are under age 18, you must be with a  parent or legal guardian.  - Sign consent forms, if you haven t already. These forms state that you know the risks and benefits of surgery. When you sign the forms, you give us permission to do the surgery. Do not sign them unless you understand what will happen during and after your surgery. If you have any questions about your surgery, ask to speak with your doctor before you sign the forms. If you don t understand the answers, ask again.  - Receive a copy of the Patient s Bill of Rights. If you do not receive a copy, please ask for one.  - Change into hospital clothes. Your belongings will be placed in a bag. We will return them to you after surgery.  - Meet with the anesthesia provider. He or she will tell you what kind of anesthesia (medicine) will be used to keep you comfortable during surgery.  - Remember: it s okay to remind doctors and nurses to wash their hands before touching you.  - In most cases, your surgeon will use a marker to write his or her initials on the surgery site. This ensures that the exact site is operated on.  - For safety reasons, we will ask you the same questions many times. For example, we may ask your name and birth date over and over again.  - Friends and family can stay with you until it s time for surgery. While you re in surgery, they will be in the waiting area. Please note that cell phones are not allowed in some patient care areas.  - If you have questions about what will happen in the operating room, talk to your care team.  - You will meet with an anesthesiologist, before your surgery.  He or she may reference types of anesthesia commonly used for surgeries:   o General:  This involves the use of an IV for injection of medication and anesthesia. You are put into a sleep and have a breathing tube to assist you with breathing.  o Sedation:  You are asleep, but not so deply that you need a breathing tube.   o Local or Regional: a nerve is injected to numb the surgical  "area.  o Spinal: you are numbed from the waist down with medicine injected into your back.  o Femoral Nerve Block:  Anesthesia injected into the groin of leg which you are having surgery on.      After surgery  We will move you to a recovery room, where we will watch you closely. If you have any pain or discomfort, tell your nurse. He or she will try to make you comfortable.    - We will move you to your hospital room after you are awake and stable. If you having any pain or discomfort, please let your nursing staff know.  - Please wash your hands every time you touch the wound or change bandages or dressings.  - Do not submerge the wound in water for 3 months after surgery.  You may not use a bathtub, hot tub, pool, or lake. Showering is ok. Any open area can be a source of incoming bacteria, which can lead to infection. Keep all dressings clean and dry.   - Remove your post op Aquacel/pressure dressing at 1 week post op. It is important for this to be removed to incision to \"breathe\"as well as minimize skin issues related to the dressing being in contact with your skin for so long (can cause skin tears).   - Elevate your leg(s) as much as possible to help reduce swelling. You will also receive compression stockings for the surgical leg. Please wear these during the day and fully remove them at night. Continue to wear these for approximately 4 weeks after surgery or until your swelling has resolved.  - You may put ice on the surgical area for comfort, keeping ice on area for up to 20 minutes then off for 20 minutes.  You may do this the first few days after surgery to help reduce pain and swelling.    - Drink at least 8-10 glasses of liquid each day to help your body heal.  - Keep your lungs clear by coughing and taking deep breaths every couple hours.  This is especially important the first 48 hours after surgery.  - Typically, you will be able to start driving once you are fully off narcotics and able to do a the " quick stop test (slamming on the brake) with your right leg without experiencing much pain.  - You will start physical therapy while in the hospital. Once you leave the hospital, you will need to continue going to physical therapy to maintain and improve your range of motion and strength. Please call the physical therapy clinic of your choice to set up an appointment within 1 week of being discharged from the hospital.    - Notify your doctor if you have any of the following:   o Fever of 101 F or higher  o Numbness and/or tingling  o Increased pain, redness or swelling  o Drainage from wound  o Prolonged or uncontrolled bleeding  o Difficulty with movement      Follow-up Appointments, in Clinic  If you don't already have an appointment scheduled, please call to set up an appointment at [phone].      ? Nurse Only Appointment (2 weeks post op)  ? Post-Op appointments with provider (6 weeks post op)    Dealing with pain  A nurse will check your comfort level often during your stay. He or she will work with you to manage your pain.  It s expected that you will have pain after surgery.  Our goal is to reduce or minimize pain by:   - Medicine doesn t work the same for everyone. If your medicine isn t working, tell your nurse. There may be other medicines or treatments we can try.  - Medication Refills.  If you need refills on your pain medication, please call the clinic as soon as possible.  It may take 72-business hours to obtain a refill.  Refills must be picked up at check-in 2, [facility] or mailed to a pharmacy of your choice.    - It is expected that you will wean off the pain medications in a timely manner.   - Constipation is a common side effect of pain medication, decreased activity and anesthesia from surgery.  Take a stool softener as prescribed by your doctor at the time of discharge.  You may also use over the counter medications as needed.  Be sure to increase your fiber (fruits  and vegetables) and your water intake.      Please call the clinic at [phone], if you experience any problems or have questions.  If you are having an emergency, always call [phone] or seek immediate evaluation at the Emergency Room.    Thank you for selecting [facility] for your care!  ---------------------------------------------        Thanks for coming today.  [facility]  [address] [place] [address]    To schedule future appointments in Ortho Clinic, you may call [phone].    To schedule ordered imaging by your provider:   Call [facility] [phone]    To schedule an injection ordered by your provider:  Call [facility] Injection scheduling line: [phone]  MyChart available online at:  [email or web address]    Please call if any further questions or concerns ([phone]).  Clinic hours 8 am to 5 pm.    Return to clinic (call) if symptoms worsen or fail to improve.

## 2021-04-13 NOTE — LETTER
4/13/2021         RE: Yumiko Flowers  [address]  [place] [address]        Dear Colleague,    Thank you for referring your patient, Yumiko Flowers, to the [facility]. Please see a copy of my visit note below.     Assessment: This is a 80 year old with advanced osteoarthritis associated with severe symptoms limiting her activities of daily living despite severe years of non-operative management including activity modification, prescription oral pain medication, physical therapy, and interarticular injections. We discussed the options including living with it and total knee. We spent approximately 20 minutes discussing the risks and benefits of total knee arthroplasty.  We reviewed the proposed surgical plan.  The discussion included but was not limited to the following:  Blood clots, blood clots to the lungs, injury to blood vessels and nerves, the literature as it pertains to known problems with leg length discrepancy, stiffness, anterior knee pain, and infection. We discussed the post-operative recovery for the typical patient, return to driving, and return to normal activities.  All the patient's questions were answered to the best of my ability.    Plan:  Left total knee when the patient would like to go forward with it.      Chief Complaint: Pain of the Left Knee    Physician:  No ref. provider found    HPI: Yumiko Flowers is a 80 year old female who presents today for evaluation of her left knee     Symptom Profile  Location of symptoms:  Medial greater than  Lateral joint line   Onset: insidious  Trend: getting worse   Duration of symptoms: several years   Quality of symptoms: aching, sharp/stabbing, clicking  Severity: severe  Alleviate:activity modification   Exacerbating: activities, stairs, walking up an incline  Previous Treatments: Previous treatments include activity modification, oral pain medication, physical therapy for greater than 3 months,  "interarticular injections (more than 5-6; last four months ago)    Current Status:  Results of the patient s Hip Disability and Osteoarthritis Outcome Score (RICARDO S)  are as follows (0-100 scales with 100 being the theoretical best):  Pain:  58   Symptoms:79   ADLs: 59   Sports/Recreation:0  Quality of Life:50   ([email or web address].nu/)  [facility] Activity Score:4    MEDICAL HISTORY:   Past Medical History:   Diagnosis Date     Acne rosacea      Actinic keratosis      Amblyopia      Asthma, moderate persistent      Basal cell carcinoma 10/2010    back X2     Cataract, mod, od; mild-mod, os 1/12/2012     DJD (degenerative joint disease), lumbosacral 8/6/2014     Elevated cholesterol      Endometriosis      HTN (hypertension)      Moderate major depression (H)     \"Circumstances\"     Osteopenia        Medications:     Current Outpatient Medications:      CALCIUM 500 MG PO CHEW, None Entered, Disp: , Rfl:      Cholecalciferol (VITAMIN D) 2000 UNIT CAPS, Take 2,000 Units by mouth daily., Disp: , Rfl:      cyclobenzaprine (FLEXERIL) 10 MG tablet, TAKE 1 TABLET BY MOUTH NIGHTLY AS NEEDED FOR MUSCLE SPASMS, Disp: 30 tablet, Rfl: 0     doxycycline hyclate (VIBRA-TABS) 100 MG tablet, TAKE 1 TABLET (100 MG) BY MOUTH 2 TIMES DAILY, Disp: 180 tablet, Rfl: 0     etodolac (LODINE) 400 MG tablet, TAKE 1 TABLET (400 MG) BY MOUTH 2 TIMES DAILY AS NEEDED, Disp: 60 tablet, Rfl: 0     gabapentin (NEURONTIN) 600 MG tablet, Take 2 tablets (1,200 mg) by mouth At Bedtime, Disp: 180 tablet, Rfl: 3     Glucos-MSM-C-Vk-Rugsxz-Cgcrha (GLUCOSAMINE MSM COMPLEX) TABS tablet, Take by mouth daily, Disp: , Rfl:      lisinopril-hydrochlorothiazide (ZESTORETIC) 20-12.5 MG tablet, TAKE 2 TABLETS BY MOUTH EVERY DAY, Disp: 180 tablet, Rfl: 3     methylPREDNISolone (MEDROL) 4 MG tablet therapy pack, Follow Package Directions, Disp: 21 tablet, Rfl: 0     Multiple Vitamins-Iron TABS, Take 1 tablet by mouth daily., Disp: , Rfl:      order for DME, Please measure and " "distribute 1 pair of 10mmHg - 20mmHg THIGH high open or closed toe compression stockings. Jobst ultrasheer or equivalent., Disp: 1 each, Rfl: 3     order for DME, Please measure and distribute 1 pair of 10mmHg - 20mmHg KNEE high open or closed toe compression stockings. Jobst ultrasheer or equivalent., Disp: 1 each, Rfl: 3     order for DME, Please measure and distribute 1 pair of 20mmHg - 30mmHg knee high open or closed toe compression stockings. Jobst ultrasheer or equivalent., Disp: 1 each, Rfl: 1     Propylene Glycol (SYSTANE BALANCE OP), Apply 1 drop to eye 2 times daily, Disp: , Rfl:      tiZANidine (ZANAFLEX) 4 MG tablet, Take 1-2 tablets (4-8 mg) by mouth 3 times daily as needed for muscle spasms, Disp: 90 tablet, Rfl: 1    Current Facility-Administered Medications:      triamcinolone (KENALOG-40) injection 40 mg, 40 mg, , , Abad Armendariz MD, 40 mg at 12/07/20 1027     triamcinolone (KENALOG-40) injection 40 mg, 40 mg, , , Abad Armendariz MD, 40 mg at 12/19/19 0836    Allergies: Erythromycin    SURGICAL HISTORY:   Past Surgical History:   Procedure Laterality Date     BLEPHAROPLASTY BILATERAL  3/9/2006; 2013    both eyes, upper lid; revision (EN)     C APPENDECTOMY       CATARACT IOL, RT/LT       HC REMOVAL GALLBLADDER       LASER YAG CAPSULOTOMY      left eye (AC lysis also)     PHACOEMULSIFICATION WITH STANDARD INTRAOCULAR LENS IMPLANT  1/2012; 4/2013    right eye; left eye     REPAIR PTOSIS       SURGICAL HISTORY OF -       endometriosis surgeriesx3     SURGICAL HISTORY OF -       ganiglion cyst onfoot     SURGICAL HISTORY OF -       Eye for \"droopy eye\"       FAMILY HISTORY:   Family History   Problem Relation Age of Onset     C.A.D. Father      C.A.D. Brother      Hypertension Mother      Cancer No family hx of      Diabetes No family hx of      Cerebrovascular Disease No family hx of      Thyroid Disease No family hx of      Glaucoma No family hx of      Macular Degeneration No " "family hx of        SOCIAL HISTORY:   Social History     Tobacco Use     Smoking status: Never Smoker     Smokeless tobacco: Never Used   Substance Use Topics     Alcohol use: Yes   retiried, , lives alone    REVIEW OF SYSTEMS:  The comprehensive review of systems from the intake form was reviewed with the patient.  No fever, weight change or fatigue. No dry eyes. No oral ulcers, sore throat or voice change. No palpitations, syncope, angina or edema.  No chest pain, excessive sleepiness, shortness of breath or hemoptysis.   No abdominal pain, nausea, vomiting, diarrhea or heartburn.  No skin rash. No focal weakness or numbness. No bleeding or lymphadenopathy. No rhinitis or hives.     Exam:  On physical examination the patient appears the stated age, is in no acute distress, affectThe is appropriate, and breathing is non-labored.  Vitals are documented in the EMR and have been reviewed:    /85 (BP Location: Left arm, Patient Position: Sitting, Cuff Size: Adult Regular)   Pulse 84   Ht 1.638 m (5' 4.5\")   Wt 93.8 kg (206 lb 11.2 oz)   BMI 34.93 kg/m    5' 4.5\"  Body mass index is 34.93 kg/m .    Rises from chair: with selina effort   Gait: antalgic with less time on the left   Left Knee  Appearance: benign  Clinical alignment: mild varus  Effusion: no  Tenderness to palpation:medial greater than lateral joint line   Extension: 8  Flexion: 100  Collateral ligaments: intact  Cruciate ligaments: grossly intact   Distally, the circulatory, motor, and sensation exam is intact with 5/5 EHL, gastroc-soleus, and tibialis anterior.  Sensation to light touch is intact.  Dorsalis pedis and posterior tibialis pulses are palpable.  There are no sores on the feet, no bruising, and no lymphedema.    X-rays:   Study Result    3 views left knee radiographs 12/7/2020 10:21 AM     History: Arthritis of left knee     Additional History from EMR: Bilateral knee radiographs     Comparison: Bilateral knee radiographs " 8/15/2018     Findings:     AP, lateral and patellofemoral views of the left knee were obtained.      No acute osseous abnormality.  No joint effusion.     Moderate medial compartment and mild-to-moderate patellofemoral joint  space narrowing. Moderate tricompartmental osteophytic spurring.   Degenerative findings are relatively unchanged from prior.     Posterior joint bodies.     Increased osteopenic appearance of the bones which may be related to  technique.     Soft tissue is unremarkable. Small quadriceps tendon insertional  enthesophyte.                                                                       Impression:     Tricompartmental osteoarthrosis of the left knee, predominantly medial  compartment.     I have personally reviewed the examination and initial interpretation  and I agree with the findings.     FIDEL (Andrey BEAULIEU MD     Impact on ADLs: KOOS ADLs subdomain (0-100 scale, 100 being best)= 59    Kellgren & Ezra Grade:  Grade 4 (large osteophytes + marked JSN + severe sclerosis + definite bone end deformity)    Implant : gutierrez and nephew  Implant name: [email or web address]  Implant fixation: cement            Again, thank you for allowing me to participate in the care of your patient.        Sincerely,        Glenn Calvin MD

## 2021-04-13 NOTE — TELEPHONE ENCOUNTER
Disp Refills Start End JAMILAH   cyclobenzaprine (FLEXERIL) 10 MG tablet 30 tablet 0 4/12/2021  No   Sig: TAKE 1 TABLET BY MOUTH NIGHTLY AS NEEDED FOR MUSCLE SPASMS   Sent to pharmacy as: Cyclobenzaprine HCl 10 MG Oral Tablet (FLEXERIL)   Class: E-Prescribe   Order: [id number]   E-Prescribing Status: Receipt confirmed by pharmacy (4/12/2021 10:46 AM CDT)

## 2021-04-15 DIAGNOSIS — M51.26 LUMBAR DISC HERNIATION: ICD-10-CM

## 2021-04-15 DIAGNOSIS — M51.369 DDD (DEGENERATIVE DISC DISEASE), LUMBAR: ICD-10-CM

## 2021-04-15 RX ORDER — CYCLOBENZAPRINE HCL 10 MG
TABLET ORAL
Qty: 30 TABLET | Refills: 1 | Status: SHIPPED | OUTPATIENT
Start: 2021-04-15 | End: 2021-07-06

## 2021-04-15 NOTE — TELEPHONE ENCOUNTER
cyclobenzaprine (FLEXERIL) 10 MG tablet 30 tablet 0 4/12/2021  No   Sig: TAKE 1 TABLET BY MOUTH NIGHTLY AS NEEDED FOR MUSCLE SPASMS   Sent to pharmacy as: Cyclobenzaprine HCl 10 MG Oral Tablet (FLEXERIL)   Class: E-Prescribe   Order: [id number]   E-Prescribing Status: Receipt confirmed by pharmacy (4/12/2021 10:46 AM CDT)     Received request for 90 day RX supply.

## 2021-04-27 NOTE — TELEPHONE ENCOUNTER
Spoke with the patient to explain the steps for scheduling surgery (Ex:pre op physical, covid test). She isn't sure when a good time will be [place] expecting another grandchild in September. I told her that Jenny has mailed her the surgery packet for her to review and that she can call me when she is ready to schedule. I did also suggest that if she would like it this fall or early winter she may want to hold a date by the end of the summer as we book out with patients that have met their deductible. She understood and will review the packet and call back to schedule.  Patricia Pool, Surgery Scheduling Coordinator

## 2021-06-18 PROBLEM — G89.29 CHRONIC PAIN OF LEFT KNEE: Status: ACTIVE | Noted: 2021-06-18

## 2021-06-18 PROBLEM — M25.562 CHRONIC PAIN OF LEFT KNEE: Status: ACTIVE | Noted: 2021-06-18

## 2021-06-18 NOTE — TELEPHONE ENCOUNTER
Date Scheduled: 10-4-21  Facility: [facility]  Surgeon: Dr. Calvin   Post-op appointment scheduled:    scheduled?: No  Surgery packet/instructions confirmed received?  Yes  Special Considerations:   Patricia Pool, Surgery Scheduling Coordinator

## 2021-06-19 DIAGNOSIS — Z11.59 ENCOUNTER FOR SCREENING FOR OTHER VIRAL DISEASES: ICD-10-CM

## 2021-06-23 ENCOUNTER — OFFICE VISIT (OUTPATIENT)
Dept: ORTHOPEDICS | Facility: CLINIC | Age: 81
End: 2021-06-23
Payer: COMMERCIAL

## 2021-06-23 VITALS — SYSTOLIC BLOOD PRESSURE: 114 MMHG | OXYGEN SATURATION: 98 % | HEART RATE: 75 BPM | DIASTOLIC BLOOD PRESSURE: 73 MMHG

## 2021-06-23 DIAGNOSIS — M51.369 DDD (DEGENERATIVE DISC DISEASE), LUMBAR: Primary | ICD-10-CM

## 2021-06-23 DIAGNOSIS — M17.12 ARTHRITIS OF LEFT KNEE: ICD-10-CM

## 2021-06-23 DIAGNOSIS — M54.16 CHRONIC RADICULAR LUMBAR PAIN: ICD-10-CM

## 2021-06-23 DIAGNOSIS — G89.29 CHRONIC RADICULAR LUMBAR PAIN: ICD-10-CM

## 2021-06-23 PROCEDURE — 20610 DRAIN/INJ JOINT/BURSA W/O US: CPT | Mod: LT | Performed by: PREVENTIVE MEDICINE

## 2021-06-23 PROCEDURE — 99214 OFFICE O/P EST MOD 30 MIN: CPT | Mod: 25 | Performed by: PREVENTIVE MEDICINE

## 2021-06-23 RX ORDER — TRIAMCINOLONE ACETONIDE 40 MG/ML
40 INJECTION, SUSPENSION INTRA-ARTICULAR; INTRAMUSCULAR
Status: DISCONTINUED | OUTPATIENT
Start: 2021-06-23 | End: 2021-10-05

## 2021-06-23 RX ORDER — METHYLPREDNISOLONE 4 MG
TABLET, DOSE PACK ORAL
Qty: 21 TABLET | Refills: 0 | Status: ON HOLD | OUTPATIENT
Start: 2021-06-23 | End: 2021-10-05

## 2021-06-23 RX ADMIN — TRIAMCINOLONE ACETONIDE 40 MG: 40 INJECTION, SUSPENSION INTRA-ARTICULAR; INTRAMUSCULAR at 11:30

## 2021-06-23 ASSESSMENT — PAIN SCALES - GENERAL
PAINLEVEL: SEVERE PAIN (6)
PAINLEVEL: MODERATE PAIN (5)

## 2021-06-23 NOTE — PROGRESS NOTES
HISTORY OF PRESENT ILLNESS  Ms. Flowers is a pleasant 80 year old year old female who presents to clinic today with ongoing low back pain that radiates into legs  Left knee continues to bother her    Location: left knee and low back  Quality:  achy pain    Severity: 8/10 at worst    Duration: worse over past few months  Timing: occurs intermittently  Context: occurs while exercising and lifting  Modifying factors:  resting and non-use makes it better, movement and use makes it worse  Associated signs & symptoms: some swelling in knee  MEDICAL HISTORY  Patient Active Problem List   Diagnosis     Asthma, moderate persistent     Acne rosacea     CARDIOVASCULAR SCREENING; LDL GOAL LESS THAN 130     Osteopenia     Vitamin D deficiency     Advanced directives, counseling/discussion     Hx of pseudoexfoliation, os     Pseudophakia, ou; Yag Caps, os     HTN, goal below 140/90     Peptic ulcer     Health Care Home     History of blepharoplasty, upper lid, ou (elsewhere); revision (EN)     Obesity, Class II, BMI 35-39.9     Restless legs syndrome     DJD (degenerative joint disease), lumbosacral     Rosacea     Essential hypertension with goal blood pressure less than 140/90     Obesity, Class I, BMI 30-34.9     Posterior vitreous detachment, bilateral     Vertigo     Amblyopia, mild, of left eye     Chronic pain of left knee       Current Outpatient Medications   Medication Sig Dispense Refill     CALCIUM 500 MG PO CHEW None Entered       Cholecalciferol (VITAMIN D) 2000 UNIT CAPS Take 2,000 Units by mouth daily.       cyclobenzaprine (FLEXERIL) 10 MG tablet QHSPRN 30 tablet 1     doxycycline hyclate (VIBRA-TABS) 100 MG tablet TAKE 1 TABLET (100 MG) BY MOUTH 2 TIMES DAILY 180 tablet 0     etodolac (LODINE) 400 MG tablet TAKE 1 TABLET (400 MG) BY MOUTH 2 TIMES DAILY AS NEEDED 60 tablet 0     gabapentin (NEURONTIN) 600 MG tablet Take 2 tablets (1,200 mg) by mouth At Bedtime 180 tablet 3     lisinopril-hydrochlorothiazide  (ZESTORETIC) 20-12.5 MG tablet TAKE 2 TABLETS BY MOUTH EVERY  tablet 3     Multiple Vitamin (MULTIVITAMIN ADULT PO)        Propylene Glycol (SYSTANE BALANCE OP) Apply 1 drop to eye 2 times daily       Glucos-MSM-C-Ml-Tumzrs-Gfsvyj (GLUCOSAMINE MSM COMPLEX) TABS tablet Take by mouth daily       methylPREDNISolone (MEDROL) 4 MG tablet therapy pack Follow Package Directions (Patient not taking: Reported on 6/23/2021) 21 tablet 0     Multiple Vitamins-Iron TABS Take 1 tablet by mouth daily.       order for DME Please measure and distribute 1 pair of 10mmHg - 20mmHg THIGH high open or closed toe compression stockings. Jobst ultrasheer or equivalent. 1 each 3     order for DME Please measure and distribute 1 pair of 10mmHg - 20mmHg KNEE high open or closed toe compression stockings. Jobst ultrasheer or equivalent. 1 each 3     order for DME Please measure and distribute 1 pair of 20mmHg - 30mmHg knee high open or closed toe compression stockings. Jobst ultrasheer or equivalent. 1 each 1     tiZANidine (ZANAFLEX) 4 MG tablet Take 1-2 tablets (4-8 mg) by mouth 3 times daily as needed for muscle spasms (Patient not taking: Reported on 6/23/2021) 90 tablet 1       Allergies   Allergen Reactions     Erythromycin Swelling and Other (See Comments)       Family History   Problem Relation Age of Onset     C.A.D. Father      C.A.D. Brother      Hypertension Mother      Cancer No family hx of      Diabetes No family hx of      Cerebrovascular Disease No family hx of      Thyroid Disease No family hx of      Glaucoma No family hx of      Macular Degeneration No family hx of      Social History     Socioeconomic History     Marital status:      Spouse name: Not on file     Number of children: 3     Years of education: Not on file     Highest education level: Not on file   Occupational History     Employer: RETIRED   Social Needs     Financial resource strain: Not on file     Food insecurity     Worry: Not on file      Inability: Not on file     Transportation needs     Medical: Not on file     Non-medical: Not on file   Tobacco Use     Smoking status: Never Smoker     Smokeless tobacco: Never Used   Substance and Sexual Activity     Alcohol use: Yes     Drug use: No     Sexual activity: Yes     Partners: Male   Lifestyle     Physical activity     Days per week: Not on file     Minutes per session: Not on file     Stress: Not on file   Relationships     Social connections     Talks on phone: Not on file     Gets together: Not on file     Attends Confucianism service: Not on file     Active member of club or organization: Not on file     Attends meetings of clubs or organizations: Not on file     Relationship status: Not on file     Intimate partner violence     Fear of current or ex partner: Not on file     Emotionally abused: Not on file     Physically abused: Not on file     Forced sexual activity: Not on file   Other Topics Concern     Parent/sibling w/ CABG, MI or angioplasty before 65F 55M? Not Asked   Social History Narrative     Not on file       Additional medical/Social/Surgical histories reviewed in [facility] and updated as appropriate.     REVIEW OF SYSTEMS (6/23/2021)  10 point ROS of systems including Constitutional, Eyes, Respiratory, Cardiovascular, Gastroenterology, Genitourinary, Integumentary, Musculoskeletal, Psychiatric, Allergic/Immunologic were all negative except for pertinent positives noted in my HPI.     PHYSICAL EXAM  Vitals:    06/23/21 1022   BP: 114/73   BP Location: Left arm   Patient Position: Sitting   Cuff Size: Adult Regular   Pulse: 75   SpO2: 98%     Vital Signs: /73 (BP Location: Left arm, Patient Position: Sitting, Cuff Size: Adult Regular)   Pulse 75   SpO2 98%  Patient declined being weighed. There is no height or weight on file to calculate BMI.    General  - normal appearance, in no obvious distress  HEENT  - conjunctivae not injected, moist mucous membranes, normocephalic/atraumatic  head, ears normal appearance, no lesions, mouth normal appearance, no scars, normal dentition and teeth present  CV  - normal peripheral perfusion  Pulm  - normal respiratory pattern, non-labored  Musculoskeletal - lumbar spine  - stance: normal gait without limp, no obvious leg length discrepancy, normal heel and toe walk  - inspection: normal bone and joint alignment, no obvious scoliosis  - palpation: no paravertebral or bony tenderness  - ROM: flexion exacerbates pain, normal extension, sidebending, rotation  - strength: lower extremities 5/5 in all planes  - special tests:  (+) straight leg raise  (+) slump test  Neuro  - patellar and Achilles DTRs 2+ bilaterally, lower extremity sensory deficit throughout L5 distribution, grossly normal coordination, normal muscle tone  Skin  - no ecchymosis, erythema, warmth, or induration, no obvious rash  Psych  - interactive, appropriate, normal mood and affect  Left knee: has pain with flexion and medial joint pain to palpation and with flexion  ASSESSMENT & PLAN  79 yo female with left knee arthritis, worse and lumbar ddd, radicular pain, worse    I independently reviewed the following imaging studies:  Lumbar MRI : shows ddd, disc herniations  Ordered lumbar MRI due to length of time since previous  And change in symptoms  Will consider HL injections for knee    Consider lumbar TANK  Left knee xray: shows arthritis  After a 20 minute discussion and examination, we decided to perform a same day injection for diagnostic and therapeutic purposes for knee    Appropriate PPE was utilized for prevention of spread of Covid-19.  Abad Armendariz MD, CAQSM    PROCEDURE:     left       Knee joint injection   The patient was apprised of the risks and the benefits of the procedure and consented and a written consent was signed.   The patient s  KNEE was sterilely prepped with chloraprep.   40 mg of triamcinolone suspension was drawn up into a 5 mL syringe with 4 mL of 1%  lidocaine  The patient was injected into the knee with a 1.5-inch 22-gauge needle at the_superiorlateral aspect of their knee joint  There were no complications. The patient tolerated the procedure well. There was minimal bleeding.   The patient was instructed to ice the knee upon leaving clinic and refrain from overuse over the next 3 days.   The patient was instructed to go to the emergency room with any usual pain, swelling, or redness occurred in the injected area.   The patient was given a followup appointment to evaluate response to the injection to their increased range of motion and reduction of pain.  Follow up PRN  Dr Abad Armendariz

## 2021-06-23 NOTE — LETTER
6/23/2021         RE: Yumiko Flowers  [address]  [place] [address]        Dear Colleague,    Thank you for referring your patient, Yumiko Flowers, to the [facility] [place]. Please see a copy of my visit note below.    HISTORY OF PRESENT ILLNESS  Ms. Flowers is a pleasant 80 year old year old female who presents to clinic today with ongoing low back pain that radiates into legs  Left knee continues to bother her    Location: left knee and low back  Quality:  achy pain    Severity: 8/10 at worst    Duration: worse over past few months  Timing: occurs intermittently  Context: occurs while exercising and lifting  Modifying factors:  resting and non-use makes it better, movement and use makes it worse  Associated signs & symptoms: some swelling in knee  MEDICAL HISTORY  Patient Active Problem List   Diagnosis     Asthma, moderate persistent     Acne rosacea     CARDIOVASCULAR SCREENING; LDL GOAL LESS THAN 130     Osteopenia     Vitamin D deficiency     Advanced directives, counseling/discussion     Hx of pseudoexfoliation, os     Pseudophakia, ou; Yag Caps, os     HTN, goal below 140/90     Peptic ulcer     Health Care Home     History of blepharoplasty, upper lid, ou (elsewhere); revision (EN)     Obesity, Class II, BMI 35-39.9     Restless legs syndrome     DJD (degenerative joint disease), lumbosacral     Rosacea     Essential hypertension with goal blood pressure less than 140/90     Obesity, Class I, BMI 30-34.9     Posterior vitreous detachment, bilateral     Vertigo     Amblyopia, mild, of left eye     Chronic pain of left knee       Current Outpatient Medications   Medication Sig Dispense Refill     CALCIUM 500 MG PO CHEW None Entered       Cholecalciferol (VITAMIN D) 2000 UNIT CAPS Take 2,000 Units by mouth daily.       cyclobenzaprine (FLEXERIL) 10 MG tablet QHSPRN 30 tablet 1     doxycycline hyclate (VIBRA-TABS) 100 MG tablet TAKE 1 TABLET (100 MG)  BY MOUTH 2 TIMES DAILY 180 tablet 0     etodolac (LODINE) 400 MG tablet TAKE 1 TABLET (400 MG) BY MOUTH 2 TIMES DAILY AS NEEDED 60 tablet 0     gabapentin (NEURONTIN) 600 MG tablet Take 2 tablets (1,200 mg) by mouth At Bedtime 180 tablet 3     lisinopril-hydrochlorothiazide (ZESTORETIC) 20-12.5 MG tablet TAKE 2 TABLETS BY MOUTH EVERY  tablet 3     Multiple Vitamin (MULTIVITAMIN ADULT PO)        Propylene Glycol (SYSTANE BALANCE OP) Apply 1 drop to eye 2 times daily       Glucos-MSM-C-Rw-Idklkp-Ruzkyu (GLUCOSAMINE MSM COMPLEX) TABS tablet Take by mouth daily       methylPREDNISolone (MEDROL) 4 MG tablet therapy pack Follow Package Directions (Patient not taking: Reported on 6/23/2021) 21 tablet 0     Multiple Vitamins-Iron TABS Take 1 tablet by mouth daily.       order for DME Please measure and distribute 1 pair of 10mmHg - 20mmHg THIGH high open or closed toe compression stockings. Jobst ultrasheer or equivalent. 1 each 3     order for DME Please measure and distribute 1 pair of 10mmHg - 20mmHg KNEE high open or closed toe compression stockings. Jobst ultrasheer or equivalent. 1 each 3     order for DME Please measure and distribute 1 pair of 20mmHg - 30mmHg knee high open or closed toe compression stockings. Jobst ultrasheer or equivalent. 1 each 1     tiZANidine (ZANAFLEX) 4 MG tablet Take 1-2 tablets (4-8 mg) by mouth 3 times daily as needed for muscle spasms (Patient not taking: Reported on 6/23/2021) 90 tablet 1       Allergies   Allergen Reactions     Erythromycin Swelling and Other (See Comments)       Family History   Problem Relation Age of Onset     C.A.D. Father      C.A.D. Brother      Hypertension Mother      Cancer No family hx of      Diabetes No family hx of      Cerebrovascular Disease No family hx of      Thyroid Disease No family hx of      Glaucoma No family hx of      Macular Degeneration No family hx of      Social History     Socioeconomic History     Marital status:      Spouse  name: Not on file     Number of children: 3     Years of education: Not on file     Highest education level: Not on file   Occupational History     Employer: RETIRED   Social Needs     Financial resource strain: Not on file     Food insecurity     Worry: Not on file     Inability: Not on file     Transportation needs     Medical: Not on file     Non-medical: Not on file   Tobacco Use     Smoking status: Never Smoker     Smokeless tobacco: Never Used   Substance and Sexual Activity     Alcohol use: Yes     Drug use: No     Sexual activity: Yes     Partners: Male   Lifestyle     Physical activity     Days per week: Not on file     Minutes per session: Not on file     Stress: Not on file   Relationships     Social connections     Talks on phone: Not on file     Gets together: Not on file     Attends Holiness service: Not on file     Active member of club or organization: Not on file     Attends meetings of clubs or organizations: Not on file     Relationship status: Not on file     Intimate partner violence     Fear of current or ex partner: Not on file     Emotionally abused: Not on file     Physically abused: Not on file     Forced sexual activity: Not on file   Other Topics Concern     Parent/sibling w/ CABG, MI or angioplasty before 65F 55M? Not Asked   Social History Narrative     Not on file       Additional medical/Social/Surgical histories reviewed in [facility] and updated as appropriate.     REVIEW OF SYSTEMS (6/23/2021)  10 point ROS of systems including Constitutional, Eyes, Respiratory, Cardiovascular, Gastroenterology, Genitourinary, Integumentary, Musculoskeletal, Psychiatric, Allergic/Immunologic were all negative except for pertinent positives noted in my HPI.     PHYSICAL EXAM  Vitals:    06/23/21 1022   BP: 114/73   BP Location: Left arm   Patient Position: Sitting   Cuff Size: Adult Regular   Pulse: 75   SpO2: 98%     Vital Signs: /73 (BP Location: Left arm, Patient Position: Sitting, Cuff Size:  Adult Regular)   Pulse 75   SpO2 98%  Patient declined being weighed. There is no height or weight on file to calculate BMI.    General  - normal appearance, in no obvious distress  HEENT  - conjunctivae not injected, moist mucous membranes, normocephalic/atraumatic head, ears normal appearance, no lesions, mouth normal appearance, no scars, normal dentition and teeth present  CV  - normal peripheral perfusion  Pulm  - normal respiratory pattern, non-labored  Musculoskeletal - lumbar spine  - stance: normal gait without limp, no obvious leg length discrepancy, normal heel and toe walk  - inspection: normal bone and joint alignment, no obvious scoliosis  - palpation: no paravertebral or bony tenderness  - ROM: flexion exacerbates pain, normal extension, sidebending, rotation  - strength: lower extremities 5/5 in all planes  - special tests:  (+) straight leg raise  (+) slump test  Neuro  - patellar and Achilles DTRs 2+ bilaterally, lower extremity sensory deficit throughout L5 distribution, grossly normal coordination, normal muscle tone  Skin  - no ecchymosis, erythema, warmth, or induration, no obvious rash  Psych  - interactive, appropriate, normal mood and affect  Left knee: has pain with flexion and medial joint pain to palpation and with flexion  ASSESSMENT & PLAN  79 yo female with left knee arthritis, worse and lumbar ddd, radicular pain, worse    I independently reviewed the following imaging studies:  Lumbar MRI : shows ddd, disc herniations  Ordered lumbar MRI due to length of time since previous  And change in symptoms  Will consider HL injections for knee    Consider lumbar TANK  Left knee xray: shows arthritis  After a 20 minute discussion and examination, we decided to perform a same day injection for diagnostic and therapeutic purposes for knee    Appropriate PPE was utilized for prevention of spread of Covid-19.  Abad Armendariz MD, CAQSM    PROCEDURE:     left       Knee joint injection   The  patient was apprised of the risks and the benefits of the procedure and consented and a written consent was signed.   The patient s  KNEE was sterilely prepped with chloraprep.   40 mg of triamcinolone suspension was drawn up into a 5 mL syringe with 4 mL of 1% lidocaine  The patient was injected into the knee with a 1.5-inch 22-gauge needle at the_superiorlateral aspect of their knee joint  There were no complications. The patient tolerated the procedure well. There was minimal bleeding.   The patient was instructed to ice the knee upon leaving clinic and refrain from overuse over the next 3 days.   The patient was instructed to go to the emergency room with any usual pain, swelling, or redness occurred in the injected area.   The patient was given a followup appointment to evaluate response to the injection to their increased range of motion and reduction of pain.  Follow up PRN  Dr Abad Armendariz      Large Joint Injection/Arthocentesis: L knee joint    Date/Time: 2021 11:30 AM  Performed by: Abad Armendariz MD  Authorized by: Abad Armendariz MD     Indications:  Pain and osteoarthritis  Needle Size:  22 G  Guidance: landmark guided    Approach:  Superolateral  Location:  Knee      Medications:  40 mg triamcinolone 40 MG/ML  Procedure discussed: discussed risks, benefits, and alternatives    Consent Given by:  Patient  Timeout: timeout called immediately prior to procedure    Prep: patient was prepped and draped in usual sterile fashion        NDC#[id number]    [facility]  [address] [place] [id number]  Dept: [phone]  ______________________________________________________________________________    Patient: Yumiko Flowers   : 1940   MRN: [id number]   2021    INVASIVE PROCEDURE SAFETY CHECKLIST    Date: 2021  Procedure: Corticosteroid injection left knee  Patient Name: Yumiko Flowers  MRN: [id number]  Date of birth:  1940    Action: Complete sections as appropriate. Any discrepancy results in a HARD COPY until resolved.     PRE PROCEDURE:  Patient ID verified with 2 identifiers (name and  or MRN): Yes  Procedure and site verified with patient/designee (when able): Yes  Accurate consent documentation in medical record: Yes  H&P (or appropriate assessment) documented in medical record: NA  H&P must be up to 20 days prior to procedure and updates within 24 hours of procedure as applicable: NA  Relevant diagnostic and radiology test results appropriately labeled and displayed as applicable: NA  Procedure site(s) marked with provider initials: NA    TIMEOUT:  Time-Out performed immediately prior to starting procedure, including verbal and active participation of all team members addressing the following:Yes  * Correct patient identify  * Confirmed that the correct side and site are marked  * An accurate procedure consent form  * Agreement on the procedure to be done  * Correct patient position  * Relevant images and results are properly labeled and appropriately displayed  * The need to administer antibiotics or fluids for irrigation purposes during the procedure as applicable   * Safety precautions based on patient history or medication use    DURING PROCEDURE: Verification of correct person, site, and procedures any time the responsibility for care of the patient is transferred to another member of the care team.       Prior to injection, verified patient identity using patient's name and date of birth.  Due to injection administration, patient instructed to remain in clinic for 15 minutes  afterwards, and to report any adverse reaction to me immediately.    Joint injection was performed.      Drug Amount Wasted:  None.  Vial/Syringe: Single dose vial  Expiration Date:  2022      Laney Cox CMA  2021        Again, thank you for allowing me to participate in the care of your patient.         Sincerely,        Abad Armnedariz MD

## 2021-06-23 NOTE — PATIENT INSTRUCTIONS
Thanks for coming today.  Ortho/[facility] Medicine Clinic  [address] [place] [address]    To schedule future appointments in Ortho Clinic, you may call [phone].    To schedule ordered imaging by your provider:   Call Central [facility] [phone]    To schedule an injection ordered by your provider:  Call [facility] Injection scheduling line: [phone]  [email or web address] available online at:  [email or web address]    Please call if any further questions or concerns ([phone]).  Clinic hours 8 am to 5 pm.    Return to clinic (call) if symptoms worsen or fail to improve.

## 2021-06-23 NOTE — PROGRESS NOTES
Large Joint Injection/Arthocentesis: L knee joint    Date/Time: 6/23/2021 11:30 AM  Performed by: Abad Armendariz MD  Authorized by: Abad Armendariz MD     Indications:  Pain and osteoarthritis  Needle Size:  22 G  Guidance: landmark guided    Approach:  Superolateral  Location:  Knee      Medications:  40 mg triamcinolone 40 MG/ML  Procedure discussed: discussed risks, benefits, and alternatives    Consent Given by:  Patient  Timeout: timeout called immediately prior to procedure    Prep: patient was prepped and draped in usual sterile fashion        NDC#[id number]

## 2021-06-23 NOTE — NURSING NOTE
[facility] FOLLOW-UP VISIT 6/23/2021    Yumiko Flowers's chief complaint for this visit includes:  Chief Complaint   Patient presents with     Lower Back - Pain     Last lumbar TANK 12/9/2020 at [facility] - relief lasted for 1 month     Left Knee - Pain     PCP: Gavi Bear    Referring Provider:  No referring provider defined for this encounter.    /73 (BP Location: Left arm, Patient Position: Sitting, Cuff Size: Adult Regular)   Pulse 75   SpO2 98%   Moderate Pain (5)       Interval History:     Follow up reason: low back and left knee    Date of injury: n/a    Date last seen: 12/7/2020    Following Therapeutic Plan: Yes     Pain: unchanged    Function: unchanged      Medical History:    Any recent changes to your medical history? No    Any new medication prescribed since last visit? No    Review of Systems:    Do you have fever, chills, weight loss? No    Do you have any vision problems? No    Do you have any chest pain or edema? No    Do you have any shortness of breath or wheezing?  SOB due to asthma    Do you have stomach problems? No    Do you have any urinary track issues? No    Do you have any numbness or focal weakness? 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 No    Do you have problems with bleeding or clotting? No    Do you have an rashes or other skin lesions? No    Laney Cox CMA

## 2021-06-23 NOTE — PROGRESS NOTES
[address] [address]  Dept: [phone]  ______________________________________________________________________________    Patient: Yumiko Flowers   : 1940   MRN: [id number]   2021    INVASIVE PROCEDURE SAFETY CHECKLIST    Date: 2021  Procedure: Corticosteroid injection left knee  Patient Name: Yumiko Flowers  MRN: [id number]  YOB: 1940    Action: Complete sections as appropriate. Any discrepancy results in a HARD COPY until resolved.     PRE PROCEDURE:  Patient ID verified with 2 identifiers (name and  or MRN): Yes  Procedure and site verified with patient/designee (when able): Yes  Accurate consent documentation in medical record: Yes  H&P (or appropriate assessment) documented in medical record: NA  H&P must be up to 20 days prior to procedure and updates within 24 hours of procedure as applicable: NA  Relevant diagnostic and radiology test results appropriately labeled and displayed as applicable: NA  Procedure site(s) marked with provider initials: NA    TIMEOUT:  Time-Out performed immediately prior to starting procedure, including verbal and active participation of all team members addressing the following:Yes  * Correct patient identify  * Confirmed that the correct side and site are marked  * An accurate procedure consent form  * Agreement on the procedure to be done  * Correct patient position  * Relevant images and results are properly labeled and appropriately displayed  * The need to administer antibiotics or fluids for irrigation purposes during the procedure as applicable   * Safety precautions based on patient history or medication use    DURING PROCEDURE: Verification of correct person, site, and procedures any time the responsibility for care of the patient is transferred to another member of the care team.       Prior to injection, verified patient identity using patient's name and date  of birth.  Due to injection administration, patient instructed to remain in clinic for 15 minutes  afterwards, and to report any adverse reaction to me immediately.    Joint injection was performed.      Drug Amount Wasted:  None.  Vial/Syringe: Single dose vial  Expiration Date:  11/30/2022      Laney Cox CMA  June 23, 2021

## 2021-07-05 DIAGNOSIS — M51.26 LUMBAR DISC HERNIATION: ICD-10-CM

## 2021-07-05 DIAGNOSIS — M51.369 DDD (DEGENERATIVE DISC DISEASE), LUMBAR: ICD-10-CM

## 2021-07-06 RX ORDER — CYCLOBENZAPRINE HCL 10 MG
TABLET ORAL
Qty: 30 TABLET | Refills: 1 | Status: SHIPPED | OUTPATIENT
Start: 2021-07-06 | End: 2021-09-10

## 2021-07-12 ENCOUNTER — ANCILLARY PROCEDURE (OUTPATIENT)
Dept: MRI IMAGING | Facility: CLINIC | Age: 81
End: 2021-07-12
Attending: PREVENTIVE MEDICINE
Payer: COMMERCIAL

## 2021-07-12 DIAGNOSIS — G89.29 CHRONIC RADICULAR LUMBAR PAIN: ICD-10-CM

## 2021-07-12 DIAGNOSIS — M51.369 DDD (DEGENERATIVE DISC DISEASE), LUMBAR: ICD-10-CM

## 2021-07-12 DIAGNOSIS — M54.16 CHRONIC RADICULAR LUMBAR PAIN: ICD-10-CM

## 2021-07-12 PROCEDURE — 72148 MRI LUMBAR SPINE W/O DYE: CPT | Performed by: RADIOLOGY

## 2021-08-09 ENCOUNTER — VIRTUAL VISIT (OUTPATIENT)
Dept: ORTHOPEDICS | Facility: CLINIC | Age: 81
End: 2021-08-09
Payer: COMMERCIAL

## 2021-08-09 DIAGNOSIS — M51.369 DDD (DEGENERATIVE DISC DISEASE), LUMBAR: Primary | ICD-10-CM

## 2021-08-09 DIAGNOSIS — M47.816 LUMBAR FACET ARTHROPATHY: ICD-10-CM

## 2021-08-09 DIAGNOSIS — M51.16 LUMBAR DISC HERNIATION WITH RADICULOPATHY: ICD-10-CM

## 2021-08-09 PROCEDURE — 99213 OFFICE O/P EST LOW 20 MIN: CPT | Mod: 95 | Performed by: PREVENTIVE MEDICINE

## 2021-08-09 NOTE — LETTER
8/9/2021         RE: Yumiko Flowers  [address]  [place] [address]        Dear Colleague,    Thank you for referring your patient, Yumiko Flowers, to the [facility] [place]. Please see a copy of my visit note below.    Patient is a  81   year old who is being evaluated via a billable telephone visit.      What phone number would you like to be contacted at? CELL  How would you like to obtain your AVS? MYCHART        Subjective   Patient is a 81  year old who presents by phone call visit for the following: followup for lumbar MRI  She continues to have low back pain, her last injection about 8 months prior helped her for about 6 months  She is not having radicular pain into legs nor numbness or tingling  Just low back pain below her waist line    HPI       Review of Systems   Constitutional, HEENT, cardiovascular, pulmonary, gi and gu systems are negative, except as otherwise noted.      Objective           Vitals:  No vitals were obtained today due to virtual visit.    Physical Exam   healthy, alert and no distress  PSYCH: Alert and oriented times 3; coherent speech, normal   rate and volume, able to articulate logical thoughts, able   to abstract reason, no tangential thoughts, no hallucinations   or delusions  His affect is normal  RESP: No cough, no audible wheezing, able to talk in full sentences  Remainder of exam unable to be completed due to telephone visits    Assessment/Plan  80 yo female with lumbar ddd, disc herniation and facet joint injections    I independently reviewed the following imaging studies and discussed with patient:  Lumbar MRI: shows ddd, disc herniations, facet arthropathy          Phone call duration: 20 minutes  Phone call start: 8:28am  Phone call end: 8:48am  Dr Armendariz      Again, thank you for allowing me to participate in the care of your patient.        Sincerely,        Abad Armendariz MD

## 2021-08-09 NOTE — PROGRESS NOTES
Patient is a  81   year old who is being evaluated via a billable telephone visit.      What phone number would you like to be contacted at? CELL  How would you like to obtain your AVS? EMILY        Subjective   Patient is a 81  year old who presents by phone call visit for the following: followup for lumbar MRI  She continues to have low back pain, her last injection about 8 months prior helped her for about 6 months  She is not having radicular pain into legs nor numbness or tingling  Just low back pain below her waist line    HPI       Review of Systems   Constitutional, HEENT, cardiovascular, pulmonary, gi and gu systems are negative, except as otherwise noted.      Objective           Vitals:  No vitals were obtained today due to virtual visit.    Physical Exam   healthy, alert and no distress  PSYCH: Alert and oriented times 3; coherent speech, normal   rate and volume, able to articulate logical thoughts, able   to abstract reason, no tangential thoughts, no hallucinations   or delusions  His affect is normal  RESP: No cough, no audible wheezing, able to talk in full sentences  Remainder of exam unable to be completed due to telephone visits    Assessment/Plan  82 yo female with lumbar ddd, disc herniation and facet joint injections    I independently reviewed the following imaging studies and discussed with patient:  Lumbar MRI: shows ddd, disc herniations, facet arthropathy  Discussed and ordered lumbar TANK  F/u in 1-2 months        Phone call duration: 20 minutes  Phone call start: 8:28am  Phone call end: 8:48am  Dr Armendariz

## 2021-08-09 NOTE — LETTER
8/9/2021         RE: Yumiko Flowers  [address]  [place] [address]        Dear Colleague,    Thank you for referring your patient, Yumiko Flowers, to the [facility] [place]. Please see a copy of my visit note below.    Patient is a  81   year old who is being evaluated via a billable telephone visit.      What phone number would you like to be contacted at? CELL  How would you like to obtain your AVS? MYCHART        Subjective   Patient is a 81  year old who presents by phone call visit for the following: followup for lumbar MRI  She continues to have low back pain, her last injection about 8 months prior helped her for about 6 months  She is not having radicular pain into legs nor numbness or tingling  Just low back pain below her waist line    HPI       Review of Systems   Constitutional, HEENT, cardiovascular, pulmonary, gi and gu systems are negative, except as otherwise noted.      Objective           Vitals:  No vitals were obtained today due to virtual visit.    Physical Exam   healthy, alert and no distress  PSYCH: Alert and oriented times 3; coherent speech, normal   rate and volume, able to articulate logical thoughts, able   to abstract reason, no tangential thoughts, no hallucinations   or delusions  His affect is normal  RESP: No cough, no audible wheezing, able to talk in full sentences  Remainder of exam unable to be completed due to telephone visits    Assessment/Plan  80 yo female with lumbar ddd, disc herniation and facet joint injections    I independently reviewed the following imaging studies and discussed with patient:  Lumbar MRI: shows ddd, disc herniations, facet arthropathy          Phone call duration: 20 minutes  Phone call start: 8:28am  Phone call end: 8:48am  Dr Armendariz      Again, thank you for allowing me to participate in the care of your patient.        Sincerely,        Abad Armendariz MD

## 2021-09-01 DIAGNOSIS — M51.16 LUMBAR DISC HERNIATION WITH RADICULOPATHY: Primary | ICD-10-CM

## 2021-09-01 RX ORDER — METHYLPREDNISOLONE 4 MG
TABLET, DOSE PACK ORAL
Qty: 21 TABLET | Refills: 0 | Status: ON HOLD | OUTPATIENT
Start: 2021-09-01 | End: 2021-10-05

## 2021-09-08 DIAGNOSIS — M51.26 LUMBAR DISC HERNIATION: ICD-10-CM

## 2021-09-08 DIAGNOSIS — M51.369 DDD (DEGENERATIVE DISC DISEASE), LUMBAR: ICD-10-CM

## 2021-09-10 RX ORDER — CYCLOBENZAPRINE HCL 10 MG
TABLET ORAL
Qty: 30 TABLET | Refills: 1 | Status: SHIPPED | OUTPATIENT
Start: 2021-09-10 | End: 2021-11-08

## 2021-09-21 ENCOUNTER — OFFICE VISIT (OUTPATIENT)
Dept: FAMILY MEDICINE | Facility: CLINIC | Age: 81
End: 2021-09-21
Payer: COMMERCIAL

## 2021-09-21 VITALS
DIASTOLIC BLOOD PRESSURE: 76 MMHG | WEIGHT: 203 LBS | BODY MASS INDEX: 33.82 KG/M2 | TEMPERATURE: 97.5 F | SYSTOLIC BLOOD PRESSURE: 116 MMHG | HEART RATE: 78 BPM | OXYGEN SATURATION: 97 % | HEIGHT: 65 IN

## 2021-09-21 DIAGNOSIS — G89.29 CHRONIC PAIN OF LEFT KNEE: ICD-10-CM

## 2021-09-21 DIAGNOSIS — Z01.818 PREOP GENERAL PHYSICAL EXAM: Primary | ICD-10-CM

## 2021-09-21 DIAGNOSIS — M25.562 CHRONIC PAIN OF LEFT KNEE: ICD-10-CM

## 2021-09-21 DIAGNOSIS — I10 ESSENTIAL HYPERTENSION WITH GOAL BLOOD PRESSURE LESS THAN 140/90: ICD-10-CM

## 2021-09-21 LAB
ANION GAP SERPL CALCULATED.3IONS-SCNC: 5 MMOL/L (ref 3–14)
BUN SERPL-MCNC: 21 MG/DL (ref 7–30)
CALCIUM SERPL-MCNC: 9.2 MG/DL (ref 8.5–10.1)
CHLORIDE BLD-SCNC: 106 MMOL/L (ref 94–109)
CO2 SERPL-SCNC: 30 MMOL/L (ref 20–32)
CREAT SERPL-MCNC: 0.86 MG/DL (ref 0.52–1.04)
GFR SERPL CREATININE-BSD FRML MDRD: 64 ML/MIN/1.73M2
GLUCOSE BLD-MCNC: 99 MG/DL (ref 70–99)
HGB BLD-MCNC: 11.4 G/DL (ref 11.7–15.7)
POTASSIUM BLD-SCNC: 4.5 MMOL/L (ref 3.4–5.3)
SODIUM SERPL-SCNC: 141 MMOL/L (ref 133–144)

## 2021-09-21 PROCEDURE — 36415 COLL VENOUS BLD VENIPUNCTURE: CPT | Performed by: PREVENTIVE MEDICINE

## 2021-09-21 PROCEDURE — 99215 OFFICE O/P EST HI 40 MIN: CPT | Performed by: PREVENTIVE MEDICINE

## 2021-09-21 PROCEDURE — 80048 BASIC METABOLIC PNL TOTAL CA: CPT | Performed by: PREVENTIVE MEDICINE

## 2021-09-21 PROCEDURE — 85018 HEMOGLOBIN: CPT | Performed by: PREVENTIVE MEDICINE

## 2021-09-21 PROCEDURE — 93000 ELECTROCARDIOGRAM COMPLETE: CPT | Performed by: PREVENTIVE MEDICINE

## 2021-09-21 ASSESSMENT — MIFFLIN-ST. JEOR: SCORE: 1388.74

## 2021-09-21 ASSESSMENT — PAIN SCALES - GENERAL: PAINLEVEL: MODERATE PAIN (5)

## 2021-09-21 NOTE — H&P (VIEW-ONLY)
[address] [address]  Phone: [phone]  Primary Provider: Cedrick Dickerson  Pre-op Performing Provider: CEDRICK DICKERSON      PREOPERATIVE EVALUATION:  Today's date: 9/21/2021    Yumiko Flowers is a 81 year old female who presents for a preoperative evaluation.    Surgical Information:  Surgery/Procedure: left knee, arthroplasty, total  Surgery Location:   OR  Surgeon: Glenn Calvin  Surgery Date: 10/04/2021  Time of Surgery: 7:30am  Where patient plans to recover: At home with family  Fax number for surgical facility: Note does not need to be faxed, will be available electronically in Epic.    Type of Anesthesia Anticipated: Choice    Assessment & Plan     The proposed surgical procedure is considered INTERMEDIATE risk.    Preop general physical exam  -Procedure scheduled for 10/4/21   - EKG 12-lead complete w/read - Clinics  - Hemoglobin  - Basic metabolic panel  (Ca, Cl, CO2, Creat, Gluc, K, Na, BUN)    Chronic pain of left knee  -Plan is for Total knee Arthroplasty     Essential hypertension with goal blood pressure less than 140/90  -controlled  -continue current regimen   - Basic metabolic panel  (Ca, Cl, CO2, Creat, Gluc, K, Na, BUN)           Risks and Recommendations:  The patient has the following additional risks and recommendations for perioperative complications:  Anemia/Bleeding/Clotting:    - Anemia and does not require treatment prior to surgery. Monitor hemoglobin postoperatively    Medication Instructions:   - ACE/ARB: May be continued on the day of surgery.    - Diuretics: May continue due to combination blood pressure medication   -gabapentin: continue as scheduled     RECOMMENDATION:  APPROVAL GIVEN to proceed with proposed procedure, without further diagnostic evaluation.      40 minutes spent on the date of the encounter doing chart review, history and exam, documentation and further activities per the  note        Subjective     HPI related to upcoming procedure: History of advanced osteoarthritis associated with severe symptoms limiting activities of daily living. Therapies tried in the past include activity modification, prescription oral pain medication, physical therapy, and interarticular injections. Plan is for Left Total Knee Arthroplasty.     Preop Questions 9/21/2021   1. Have you ever had a heart attack or stroke? No   2. Have you ever had surgery on your heart or blood vessels, such as a stent placement, a coronary artery bypass, or surgery on an artery in your head, neck, heart, or legs? No   3. Do you have chest pain with activity? No   4. Do you have a history of  heart failure? No   5. Do you currently have a cold, bronchitis or symptoms of other infection? No   6. Do you have a cough, shortness of breath, or wheezing? No   7. Do you or anyone in your family have previous history of blood clots? No   8. Do you or does anyone in your family have a serious bleeding problem such as prolonged bleeding following surgeries or cuts? No   9. Have you ever had problems with anemia or been told to take iron pills? No   10. Have you had any abnormal blood loss such as black, tarry or bloody stools, or abnormal vaginal bleeding? No   11. Have you ever had a blood transfusion? No   12. Are you willing to have a blood transfusion if it is medically needed before, during, or after your surgery? Yes   13. Have you or any of your relatives ever had problems with anesthesia? No   14. Do you have sleep apnea, excessive snoring or daytime drowsiness? No   15. Do you have any artifical heart valves or other implanted medical devices like a pacemaker, defibrillator, or continuous glucose monitor? No   16. Do you have artificial joints? No   17. Are you allergic to latex? No       Health Care Directive:  Patient does not have a Health Care Directive or Living Will: Discussed advance care planning with patient; information  given to patient to review.    Preoperative Review of :   reviewed - no record of controlled substances prescribed.      Status of Chronic Conditions:  HYPERTENSION - Patient has longstanding history of HTN , currently denies any symptoms referable to elevated blood pressure. Specifically denies chest pain, palpitations, dyspnea, orthopnea, PND or peripheral edema. Blood pressure readings have been in normal range. Current medication regimen is as listed below. Patient denies any side effects of medication.       Review of Systems  CONSTITUTIONAL: NEGATIVE for fever, chills, change in weight  INTEGUMENTARY/SKIN: NEGATIVE for worrisome rashes, moles or lesions  EYES: NEGATIVE for vision changes or irritation  ENT/MOUTH: NEGATIVE for ear, mouth and throat problems  RESP: NEGATIVE for significant cough or SOB  CV: NEGATIVE for chest pain, palpitations or peripheral edema  GI: NEGATIVE for nausea, abdominal pain, heartburn, or change in bowel habits  : NEGATIVE for frequency, dysuria, or hematuria  MUSCULOSKELETAL: NEGATIVE for significant arthralgias or myalgia  NEURO: NEGATIVE for weakness, dizziness or paresthesias  ENDOCRINE: NEGATIVE for temperature intolerance, skin/hair changes  HEME: NEGATIVE for bleeding problems  PSYCHIATRIC: NEGATIVE for changes in mood or affect    Patient Active Problem List    Diagnosis Date Noted     Chronic pain of left knee 06/18/2021     Priority: Medium     Added automatically from request for surgery [id number]       Amblyopia, mild, of left eye 04/07/2021     Priority: Medium     Vertigo 08/16/2017     Priority: Medium     Posterior vitreous detachment, bilateral 01/14/2017     Priority: Medium     Obesity, Class I, BMI 30-34.9 01/12/2017     Priority: Medium     Essential hypertension with goal blood pressure less than 140/90 11/01/2016     Priority: Medium     Rosacea 05/18/2015     Priority: Medium     Obesity, Class II, BMI 35-39.9 08/06/2014     Priority: Medium      Restless legs syndrome 08/06/2014     Priority: Medium     DJD (degenerative joint disease), lumbosacral 08/06/2014     Priority: Medium     Advanced on X rays       History of blepharoplasty, upper lid, ou (elsewhere); revision (EN) 09/14/2013     Priority: Medium     Health Care Home 01/24/2013     Priority: Medium     Monserrat Rolle RN-PHN  JACOBY / NELLY [facility]   [phone]   DX V65.8 REPLACED WITH 81427 [facility]ALTH CARE HOME (04/08/2013)       Peptic ulcer 01/23/2013     Priority: Medium     HTN, goal below 140/90 03/02/2012     Priority: Medium     Pseudophakia, ou; Yag Caps, os 01/30/2012     Priority: Medium     Hx of pseudoexfoliation, 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  Yumiko Flowers has no plan or code status on file. Mailed available resources and provided with information. Confirmed code status reflects current choices pending further ACP discussions.  Confirmed/documented designated decision maker(s). See permanent comments section of demographics in clinical tab.   Added by Jacqueline Mendoza on 2/26/2015  Discussed advance care planning with patient; information given to patient to review. 11/18/2011   TANYA Clinton MA       Vitamin D deficiency 02/16/2011     Priority: Medium     Osteopenia      Priority: Medium     CARDIOVASCULAR SCREENING; LDL GOAL LESS THAN 130 10/31/2010     Priority: Medium     Asthma, moderate persistent      Priority: Medium     Acne rosacea      Priority: Medium      Past Medical History:   Diagnosis Date     Acne rosacea      Actinic keratosis      Amblyopia      Asthma, moderate persistent      Basal cell carcinoma 10/2010    back X2     Cataract, mod, od; mild-mod, os 1/12/2012     DJD (degenerative joint disease), lumbosacral 8/6/2014     Elevated cholesterol      Endometriosis       "HTN (hypertension)      Moderate major depression (H)     \"Circumstances\"     Osteopenia      Past Surgical History:   Procedure Laterality Date     BLEPHAROPLASTY BILATERAL  3/9/2006; 2013    both eyes, upper lid; revision (EN)     C APPENDECTOMY       CATARACT IOL, RT/LT       HC REMOVAL GALLBLADDER       LASER YAG CAPSULOTOMY      left eye (AC lysis also)     PHACOEMULSIFICATION WITH STANDARD INTRAOCULAR LENS IMPLANT  1/2012; 4/2013    right eye; left eye     REPAIR PTOSIS       SURGICAL HISTORY OF -       endometriosis surgeriesx3     SURGICAL HISTORY OF -       ganiglion cyst onfoot     SURGICAL HISTORY OF -       Eye for \"droopy eye\"     Current Outpatient Medications   Medication Sig Dispense Refill     CALCIUM 500 MG PO CHEW None Entered       Cholecalciferol (VITAMIN D) 2000 UNIT CAPS Take 2,000 Units by mouth daily.       cyclobenzaprine (FLEXERIL) 10 MG tablet TAKE 1 TABLET BY MOUTH AT BEDTIME AS NEEDED 30 tablet 1     doxycycline hyclate (VIBRA-TABS) 100 MG tablet TAKE 1 TABLET (100 MG) BY MOUTH 2 TIMES DAILY 180 tablet 0     etodolac (LODINE) 400 MG tablet TAKE 1 TABLET (400 MG) BY MOUTH 2 TIMES DAILY AS NEEDED 60 tablet 0     gabapentin (NEURONTIN) 600 MG tablet Take 2 tablets (1,200 mg) by mouth At Bedtime 180 tablet 3     Glucos-MSM-C-Po-Jhbgdk-Vsxouu (GLUCOSAMINE MSM COMPLEX) TABS tablet Take by mouth daily       lisinopril-hydrochlorothiazide (ZESTORETIC) 20-12.5 MG tablet TAKE 2 TABLETS BY MOUTH EVERY  tablet 0     methylPREDNISolone (MEDROL DOSEPAK) 4 MG tablet therapy pack Follow Package Directions 21 tablet 0     methylPREDNISolone (MEDROL) 4 MG tablet therapy pack Follow Package Directions 21 tablet 0     methylPREDNISolone (MEDROL) 4 MG tablet therapy pack Follow Package Directions (Patient not taking: Reported on 6/23/2021) 21 tablet 0     Multiple Vitamin (MULTIVITAMIN ADULT PO)        Multiple Vitamins-Iron TABS Take 1 tablet by mouth daily.       order for DME Please measure " "and distribute 1 pair of 10mmHg - 20mmHg THIGH high open or closed toe compression stockings. Jobst ultrasheer or equivalent. 1 each 3     order for DME Please measure and distribute 1 pair of 10mmHg - 20mmHg KNEE high open or closed toe compression stockings. Jobst ultrasheer or equivalent. 1 each 3     order for DME Please measure and distribute 1 pair of 20mmHg - 30mmHg knee high open or closed toe compression stockings. Jobst ultrasheer or equivalent. 1 each 1     Propylene Glycol (SYSTANE BALANCE OP) Apply 1 drop to eye 2 times daily       tiZANidine (ZANAFLEX) 4 MG tablet Take 1-2 tablets (4-8 mg) by mouth 3 times daily as needed for muscle spasms (Patient not taking: Reported on 6/23/2021) 90 tablet 1       Allergies   Allergen Reactions     Erythromycin Swelling and Other (See Comments)        Social History     Tobacco Use     Smoking status: Never Smoker     Smokeless tobacco: Never Used   Substance Use Topics     Alcohol use: Yes     Family History   Problem Relation Age of Onset     C.A.D. Father      C.A.D. Brother      Hypertension Mother      Cancer No family hx of      Diabetes No family hx of      Cerebrovascular Disease No family hx of      Thyroid Disease No family hx of      Glaucoma No family hx of      Macular Degeneration No family hx of      History   Drug Use No         Objective     /76 (BP Location: Left arm, Patient Position: Sitting, Cuff Size: Adult Regular)   Pulse 78   Temp 97.5  F (36.4  C) (Tympanic)   Ht 1.638 m (5' 4.5\")   Wt 92.1 kg (203 lb)   SpO2 97%   BMI 34.31 kg/m      Physical Exam  GENERAL APPEARANCE: healthy, alert and no distress  EYES: Eyes grossly normal to inspection and conjunctivae and sclerae normal  NECK: no adenopathy and trachea midline and normal to palpation, I did not hear any carotid bruits   RESP: lungs clear to auscultation - no rales, rhonchi or wheezes  CV: regular rates and rhythm, normal S1 S2  ABDOMEN: soft, non-tender and no rebound or " guarding   MS: extremities normal- no gross deformities noted and peripheral pulses normal, varicose veins++  SKIN: no suspicious lesions or rashes  NEURO: Normal strength and tone, mentation intact and speech normal  PSYCH: mentation appears normal      Recent Labs   Lab Test 08/14/20  1657   HGB 11.7         POTASSIUM 3.9   CR 0.91        Diagnostics:  Labs pending at this time.  Results will be reviewed when available.  Recent Results (from the past 24 hour(s))   Hemoglobin    Collection Time: 09/21/21 12:04 PM   Result Value Ref Range    Hemoglobin 11.4 (L) 11.7 - 15.7 g/dL      EKG: appears normal, NSR, unchanged from previous tracings, No acute ST changes    Revised Cardiac Risk Index (RCRI):  The patient has the following serious cardiovascular risks for perioperative complications:   - No serious cardiac risks = 0 points     RCRI Interpretation: 0 points: Class I (very low risk - 0.4% complication rate)           Signed Electronically by: Gavi Bear MD MPH    Copy of this evaluation report is provided to requesting physician.

## 2021-09-21 NOTE — PATIENT INSTRUCTIONS
At [facility], we strive to deliver an exceptional experience to you, every time we see you. If you receive a survey, please complete it as we do value your feedback.  If you have MyChart, you can expect to receive results automatically within 24 hours of their completion.  Your provider will send a note interpreting your results as well.   If you do not have MyChart, you should receive your results in about a week by mail.    Your care team:                            Family Medicine Internal Medicine   MD David Puckett MD Shantel Branch-Fleming, MD Srinivasa Vaka, MD Katya Belousova, PAJENNIFER Pastor, APRN CNP    Trevin Washburn, MD Pediatrics   Andrew Medina, PAJENNIFER Ibanez, CNP MD Shara Das APRN CNP   MD Jacqueline Penny MD Deborah Mielke, MD Betty Rangel, APRN [facility]      Clinic hours: Monday - Thursday 7 am-6 pm; Fridays 7 am-5 pm.   Urgent care: Monday - Friday 10 am- 8 pm; Saturday and Sunday 9 am-5 pm.    Clinic: [phone]       [facility]: Monday - Thursday 8 am - 7 pm; Friday 8 am - 6 pm  [facility]: [phone]     Use [email or web address] for 24/7 diagnosis and treatment of dozens of conditions.    Preparing for Your Surgery  Getting started  A nurse will call you to review your health history and instructions. They will give you an arrival time based on your scheduled surgery time.  Please be ready to share the following:    Your doctor's clinic name and phone number    Your medical, surgical and anesthesia history    A list of allergies and sensitivities    A list of medicines, including herbal treatments and over-the-counter drugs    Whether the patient has a legal guardian (ask how to send us the papers in advance)  If you have a child who's having surgery, please ask for a copy of Preparing for Your Child's Surgery.    Preparing for  surgery    Within 30 days of surgery: Have a pre-op exam (sometimes called an H&P, or History and Physical). This can be done at a clinic or pre-operative center.  ? If you're having a , you may not need this exam. Talk to your care team    At your pre-op exam, talk to your care team about all medicines you take. If you need to stop any medicines before surgery, ask when to start taking them again.  ? We do this for your safety. Many medicines can make you bleed too much during surgery. Some change how well surgery (anesthesia) drugs work.    Call your insurance company to let them know you're having surgery. (If you don't have insurance, call [phone].)    Call your clinic if there's any change in your health. This includes signs of a cold or flu (sore throat, runny nose, cough, rash, fever). It also includes a scrape or scratch near the surgery site.    If you have questions on the day of surgery, call your hospital or surgery center.  Eating and drinking guidelines  For your safety: Unless your surgeon tells you otherwise, follow the guidelines below.    Eat and drink as usual until 8 hours before surgery. After that, no food or milk.    Drink clear liquids until 2 hours before surgery. These are liquids you can see through, like water, Gatorade and Propel Water. You may also have black coffee and tea (no cream or milk).    Nothing by mouth within 2 hours of surgery. This includes gum, candy and breath mints.    If you drink, stop drinking alcohol the night before surgery.    If your care team tells you to take medicine on the morning of surgery, it's okay to take it with a sip of water.  Preventing infection    Shower or bathe the night before and morning of your surgery. Follow the instructions your clinic gave you. (If no instructions, use regular soap.)    Don't shave or clip hair near your surgery site. We'll remove the hair if needed.    Don't smoke or vape the morning of surgery. You may chew  nicotine gum up to 2 hours before surgery. A nicotine patch is okay.  ? Note: Some surgeries require you to completely quit smoking and nicotine. Check with your surgeon.    Your care team will make every effort to keep you safe from infection. We will:  ? Clean our hands often with soap and water (or an alcohol-based hand rub).  ? Clean the skin at your surgery site with a special soap that kills germs.  ? Give you a special gown to keep you warm. (Cold raises the risk of infection.)  ? Wear special hair covers, masks, gowns and gloves during surgery.  ? Give antibiotic medicine, if prescribed. Not all surgeries need antibiotics.  What to bring on the day of surgery    Photo ID and insurance card    Copy of your health care directive, if you have one    Glasses and hearing aides (bring cases)  ? You can't wear contacts during surgery    Inhaler and eye drops, if you use them (tell us about these when you arrive)    CPAP machine or breathing device, if you use them    A few personal items, if spending the night    If you have . . .  ? A pacemaker or ICD (cardiac defibrillator): Bring the ID card.  ? An implanted stimulator: Bring the remote control.  ? A legal guardian: Bring a copy of the certified (court-stamped) guardianship papers.  Please remove any jewelry, including body piercings. Leave jewelry and other valuables at home.  If you're going home the day of surgery  Important: If you don't follow the rules below, we must cancel your surgery.     Arrange for someone to drive you home after surgery. You may not drive, take a taxi or take public transportation by yourself (unless you'll have local anesthesia only).    Arrange for a responsible adult to stay with you overnight. If you don't, we may keep you in the hospital overnight, and you may need to pay the costs yourself.  Questions?   If you have any questions for your care team, list them here:  _________________________________________________________________________________________________________________________________________________________________________________________________________________________________________________________________________________________________________________________  For informational purposes only. Not to replace the advice of your health care provider. Copyright   2003, 2019 [facility]. All rights reserved. Clinically reviewed by Neris Chavez MD. [employer] 371312 - REV 4/20.

## 2021-09-21 NOTE — PROGRESS NOTES
[address] [address]  Phone: [phone]  Primary Provider: Cedrick Dickerson  Pre-op Performing Provider: CEDRICK DICKERSON      PREOPERATIVE EVALUATION:  Today's date: 9/21/2021    Yumiko Flowers is a 81 year old female who presents for a preoperative evaluation.    Surgical Information:  Surgery/Procedure: left knee, arthroplasty, total  Surgery Location:   OR  Surgeon: Glenn Calvin  Surgery Date: 10/04/2021  Time of Surgery: 7:30am  Where patient plans to recover: At home with family  Fax number for surgical facility: Note does not need to be faxed, will be available electronically in Epic.    Type of Anesthesia Anticipated: Choice    Assessment & Plan     The proposed surgical procedure is considered INTERMEDIATE risk.    Preop general physical exam  -Procedure scheduled for 10/4/21   - EKG 12-lead complete w/read - Clinics  - Hemoglobin  - Basic metabolic panel  (Ca, Cl, CO2, Creat, Gluc, K, Na, BUN)    Chronic pain of left knee  -Plan is for Total knee Arthroplasty     Essential hypertension with goal blood pressure less than 140/90  -controlled  -continue current regimen   - Basic metabolic panel  (Ca, Cl, CO2, Creat, Gluc, K, Na, BUN)           Risks and Recommendations:  The patient has the following additional risks and recommendations for perioperative complications:  Anemia/Bleeding/Clotting:    - Anemia and does not require treatment prior to surgery. Monitor hemoglobin postoperatively    Medication Instructions:   - ACE/ARB: May be continued on the day of surgery.    - Diuretics: May continue due to combination blood pressure medication   -gabapentin: continue as scheduled     RECOMMENDATION:  APPROVAL GIVEN to proceed with proposed procedure, without further diagnostic evaluation.      40 minutes spent on the date of the encounter doing chart review, history and exam, documentation and further activities per the  note        Subjective     HPI related to upcoming procedure: History of advanced osteoarthritis associated with severe symptoms limiting activities of daily living. Therapies tried in the past include activity modification, prescription oral pain medication, physical therapy, and interarticular injections. Plan is for Left Total Knee Arthroplasty.     Preop Questions 9/21/2021   1. Have you ever had a heart attack or stroke? No   2. Have you ever had surgery on your heart or blood vessels, such as a stent placement, a coronary artery bypass, or surgery on an artery in your head, neck, heart, or legs? No   3. Do you have chest pain with activity? No   4. Do you have a history of  heart failure? No   5. Do you currently have a cold, bronchitis or symptoms of other infection? No   6. Do you have a cough, shortness of breath, or wheezing? No   7. Do you or anyone in your family have previous history of blood clots? No   8. Do you or does anyone in your family have a serious bleeding problem such as prolonged bleeding following surgeries or cuts? No   9. Have you ever had problems with anemia or been told to take iron pills? No   10. Have you had any abnormal blood loss such as black, tarry or bloody stools, or abnormal vaginal bleeding? No   11. Have you ever had a blood transfusion? No   12. Are you willing to have a blood transfusion if it is medically needed before, during, or after your surgery? Yes   13. Have you or any of your relatives ever had problems with anesthesia? No   14. Do you have sleep apnea, excessive snoring or daytime drowsiness? No   15. Do you have any artifical heart valves or other implanted medical devices like a pacemaker, defibrillator, or continuous glucose monitor? No   16. Do you have artificial joints? No   17. Are you allergic to latex? No       Health Care Directive:  Patient does not have a Health Care Directive or Living Will: Discussed advance care planning with patient; information  given to patient to review.    Preoperative Review of :   reviewed - no record of controlled substances prescribed.      Status of Chronic Conditions:  HYPERTENSION - Patient has longstanding history of HTN , currently denies any symptoms referable to elevated blood pressure. Specifically denies chest pain, palpitations, dyspnea, orthopnea, PND or peripheral edema. Blood pressure readings have been in normal range. Current medication regimen is as listed below. Patient denies any side effects of medication.       Review of Systems  CONSTITUTIONAL: NEGATIVE for fever, chills, change in weight  INTEGUMENTARY/SKIN: NEGATIVE for worrisome rashes, moles or lesions  EYES: NEGATIVE for vision changes or irritation  ENT/MOUTH: NEGATIVE for ear, mouth and throat problems  RESP: NEGATIVE for significant cough or SOB  CV: NEGATIVE for chest pain, palpitations or peripheral edema  GI: NEGATIVE for nausea, abdominal pain, heartburn, or change in bowel habits  : NEGATIVE for frequency, dysuria, or hematuria  MUSCULOSKELETAL: NEGATIVE for significant arthralgias or myalgia  NEURO: NEGATIVE for weakness, dizziness or paresthesias  ENDOCRINE: NEGATIVE for temperature intolerance, skin/hair changes  HEME: NEGATIVE for bleeding problems  PSYCHIATRIC: NEGATIVE for changes in mood or affect    Patient Active Problem List    Diagnosis Date Noted     Chronic pain of left knee 06/18/2021     Priority: Medium     Added automatically from request for surgery [id number]       Amblyopia, mild, of left eye 04/07/2021     Priority: Medium     Vertigo 08/16/2017     Priority: Medium     Posterior vitreous detachment, bilateral 01/14/2017     Priority: Medium     Obesity, Class I, BMI 30-34.9 01/12/2017     Priority: Medium     Essential hypertension with goal blood pressure less than 140/90 11/01/2016     Priority: Medium     Rosacea 05/18/2015     Priority: Medium     Obesity, Class II, BMI 35-39.9 08/06/2014     Priority: Medium      Restless legs syndrome 08/06/2014     Priority: Medium     DJD (degenerative joint disease), lumbosacral 08/06/2014     Priority: Medium     Advanced on X rays       History of blepharoplasty, upper lid, ou (elsewhere); revision (EN) 09/14/2013     Priority: Medium     Health Care Home 01/24/2013     Priority: Medium     Monserrat Rolle RN-PHN  JACOBY / NELLY [facility]   [phone]   DX V65.8 REPLACED WITH [id number]ALTH CARE HOME (04/08/2013)       Peptic ulcer 01/23/2013     Priority: Medium     HTN, goal below 140/90 03/02/2012     Priority: Medium     Pseudophakia, ou; Yag Caps, os 01/30/2012     Priority: Medium     Hx of pseudoexfoliation, 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  Yumiko Flowers has no plan or code status on file. Mailed available resources and provided with information. Confirmed code status reflects current choices pending further ACP discussions.  Confirmed/documented designated decision maker(s). See permanent comments section of demographics in clinical tab.   Added by Jacqueline Mendoza on 2/26/2015  Discussed advance care planning with patient; information given to patient to review. 11/18/2011   TANYA Clinton MA       Vitamin D deficiency 02/16/2011     Priority: Medium     Osteopenia      Priority: Medium     CARDIOVASCULAR SCREENING; LDL GOAL LESS THAN 130 10/31/2010     Priority: Medium     Asthma, moderate persistent      Priority: Medium     Acne rosacea      Priority: Medium      Past Medical History:   Diagnosis Date     Acne rosacea      Actinic keratosis      Amblyopia      Asthma, moderate persistent      Basal cell carcinoma 10/2010    back X2     Cataract, mod, od; mild-mod, os 1/12/2012     DJD (degenerative joint disease), lumbosacral 8/6/2014     Elevated cholesterol      Endometriosis       "HTN (hypertension)      Moderate major depression (H)     \"Circumstances\"     Osteopenia      Past Surgical History:   Procedure Laterality Date     BLEPHAROPLASTY BILATERAL  3/9/2006; 2013    both eyes, upper lid; revision (EN)     C APPENDECTOMY       CATARACT IOL, RT/LT       HC REMOVAL GALLBLADDER       LASER YAG CAPSULOTOMY      left eye (AC lysis also)     PHACOEMULSIFICATION WITH STANDARD INTRAOCULAR LENS IMPLANT  1/2012; 4/2013    right eye; left eye     REPAIR PTOSIS       SURGICAL HISTORY OF -       endometriosis surgeriesx3     SURGICAL HISTORY OF -       ganiglion cyst onfoot     SURGICAL HISTORY OF -       Eye for \"droopy eye\"     Current Outpatient Medications   Medication Sig Dispense Refill     CALCIUM 500 MG PO CHEW None Entered       Cholecalciferol (VITAMIN D) 2000 UNIT CAPS Take 2,000 Units by mouth daily.       cyclobenzaprine (FLEXERIL) 10 MG tablet TAKE 1 TABLET BY MOUTH AT BEDTIME AS NEEDED 30 tablet 1     doxycycline hyclate (VIBRA-TABS) 100 MG tablet TAKE 1 TABLET (100 MG) BY MOUTH 2 TIMES DAILY 180 tablet 0     etodolac (LODINE) 400 MG tablet TAKE 1 TABLET (400 MG) BY MOUTH 2 TIMES DAILY AS NEEDED 60 tablet 0     gabapentin (NEURONTIN) 600 MG tablet Take 2 tablets (1,200 mg) by mouth At Bedtime 180 tablet 3     Glucos-MSM-C-Pc-Fmraim-Systpx (GLUCOSAMINE MSM COMPLEX) TABS tablet Take by mouth daily       lisinopril-hydrochlorothiazide (ZESTORETIC) 20-12.5 MG tablet TAKE 2 TABLETS BY MOUTH EVERY  tablet 0     methylPREDNISolone (MEDROL DOSEPAK) 4 MG tablet therapy pack Follow Package Directions 21 tablet 0     methylPREDNISolone (MEDROL) 4 MG tablet therapy pack Follow Package Directions 21 tablet 0     methylPREDNISolone (MEDROL) 4 MG tablet therapy pack Follow Package Directions (Patient not taking: Reported on 6/23/2021) 21 tablet 0     Multiple Vitamin (MULTIVITAMIN ADULT PO)        Multiple Vitamins-Iron TABS Take 1 tablet by mouth daily.       order for DME Please measure " "and distribute 1 pair of 10mmHg - 20mmHg THIGH high open or closed toe compression stockings. Jobst ultrasheer or equivalent. 1 each 3     order for DME Please measure and distribute 1 pair of 10mmHg - 20mmHg KNEE high open or closed toe compression stockings. Jobst ultrasheer or equivalent. 1 each 3     order for DME Please measure and distribute 1 pair of 20mmHg - 30mmHg knee high open or closed toe compression stockings. Jobst ultrasheer or equivalent. 1 each 1     Propylene Glycol (SYSTANE BALANCE OP) Apply 1 drop to eye 2 times daily       tiZANidine (ZANAFLEX) 4 MG tablet Take 1-2 tablets (4-8 mg) by mouth 3 times daily as needed for muscle spasms (Patient not taking: Reported on 6/23/2021) 90 tablet 1       Allergies   Allergen Reactions     Erythromycin Swelling and Other (See Comments)        Social History     Tobacco Use     Smoking status: Never Smoker     Smokeless tobacco: Never Used   Substance Use Topics     Alcohol use: Yes     Family History   Problem Relation Age of Onset     C.A.D. Father      C.A.D. Brother      Hypertension Mother      Cancer No family hx of      Diabetes No family hx of      Cerebrovascular Disease No family hx of      Thyroid Disease No family hx of      Glaucoma No family hx of      Macular Degeneration No family hx of      History   Drug Use No         Objective     /76 (BP Location: Left arm, Patient Position: Sitting, Cuff Size: Adult Regular)   Pulse 78   Temp 97.5  F (36.4  C) (Tympanic)   Ht 1.638 m (5' 4.5\")   Wt 92.1 kg (203 lb)   SpO2 97%   BMI 34.31 kg/m      Physical Exam  GENERAL APPEARANCE: healthy, alert and no distress  EYES: Eyes grossly normal to inspection and conjunctivae and sclerae normal  NECK: no adenopathy and trachea midline and normal to palpation, I did not hear any carotid bruits   RESP: lungs clear to auscultation - no rales, rhonchi or wheezes  CV: regular rates and rhythm, normal S1 S2  ABDOMEN: soft, non-tender and no rebound or " guarding   MS: extremities normal- no gross deformities noted and peripheral pulses normal, varicose veins++  SKIN: no suspicious lesions or rashes  NEURO: Normal strength and tone, mentation intact and speech normal  PSYCH: mentation appears normal      Recent Labs   Lab Test 08/14/20  1657   HGB 11.7         POTASSIUM 3.9   CR 0.91        Diagnostics:  Labs pending at this time.  Results will be reviewed when available.  Recent Results (from the past 24 hour(s))   Hemoglobin    Collection Time: 09/21/21 12:04 PM   Result Value Ref Range    Hemoglobin 11.4 (L) 11.7 - 15.7 g/dL      EKG: appears normal, NSR, unchanged from previous tracings, No acute ST changes    Revised Cardiac Risk Index (RCRI):  The patient has the following serious cardiovascular risks for perioperative complications:   - No serious cardiac risks = 0 points     RCRI Interpretation: 0 points: Class I (very low risk - 0.4% complication rate)           Signed Electronically by: Gavi Bear MD MPH    Copy of this evaluation report is provided to requesting physician.

## 2021-09-23 NOTE — RESULT ENCOUNTER NOTE
Yumiko,       Hemoglobin blood count is borderline low. Other results are pending.    Please do not hesitate to call us at [phone] if you have any questions or concerns.    Thank you,    Gavi Bear MD MPH   
Yumiko,     Electrolytes, glucose and kidney function are normal.     Please do not hesitate to call us at [phone] if you have any questions or concerns.    Thank you,    Gavi Bear MD MPH   
27.8

## 2021-09-26 ENCOUNTER — HEALTH MAINTENANCE LETTER (OUTPATIENT)
Age: 81
End: 2021-09-26

## 2021-09-29 ENCOUNTER — TRANSFERRED RECORDS (OUTPATIENT)
Dept: HEALTH INFORMATION MANAGEMENT | Facility: CLINIC | Age: 81
End: 2021-09-29

## 2021-09-29 ENCOUNTER — TELEPHONE (OUTPATIENT)
Dept: ORTHOPEDICS | Facility: CLINIC | Age: 81
End: 2021-09-29

## 2021-09-29 NOTE — TELEPHONE ENCOUNTER
[facility] Call Center    Phone Message    May a detailed message be left on voicemail: yes     Reason for Call: Other: they need clearance for steriod injection before her surgery, pt is there now     Action Taken: Message routed to:  Adult Clinics: Orthopedics p [id number]    Travel Screening: Not Applicable

## 2021-09-29 NOTE — TELEPHONE ENCOUNTER
Called number back and left detailed VM with callback. OK to get TANK now or tomorrow, but otherwise would need to wait until after surgery. Pt should also understand risks that if she gets an infection from the TANK or she has lasting symptoms causes by the TANK (leg numbness, weakness) she may have to reschedule surgery.     Travis Gaming RN

## 2021-09-30 ENCOUNTER — LAB (OUTPATIENT)
Dept: URGENT CARE | Facility: URGENT CARE | Age: 81
End: 2021-09-30
Attending: ORTHOPAEDIC SURGERY
Payer: COMMERCIAL

## 2021-09-30 DIAGNOSIS — Z11.59 ENCOUNTER FOR SCREENING FOR OTHER VIRAL DISEASES: ICD-10-CM

## 2021-09-30 LAB — SARS-COV-2 RNA RESP QL NAA+PROBE: NEGATIVE

## 2021-09-30 PROCEDURE — U0005 INFEC AGEN DETEC AMPLI PROBE: HCPCS

## 2021-09-30 PROCEDURE — U0003 INFECTIOUS AGENT DETECTION BY NUCLEIC ACID (DNA OR RNA); SEVERE ACUTE RESPIRATORY SYNDROME CORONAVIRUS 2 (SARS-COV-2) (CORONAVIRUS DISEASE [COVID-19]), AMPLIFIED PROBE TECHNIQUE, MAKING USE OF HIGH THROUGHPUT TECHNOLOGIES AS DESCRIBED BY CMS-2020-01-R: HCPCS

## 2021-09-30 NOTE — TELEPHONE ENCOUNTER
Received note from [facility] nursing that patient was wondering about getting PT set up for after surgery. I called and let her know that she may qualify for home care PT for the first few weeks, but this will be determined while she is in the hospital. Nothing to schedule at this point.  Jenny Cordoba RN

## 2021-10-04 ENCOUNTER — HOSPITAL ENCOUNTER (OUTPATIENT)
Facility: CLINIC | Age: 81
LOS: 1 days | Discharge: HOME | End: 2021-10-05
Attending: ORTHOPAEDIC SURGERY | Admitting: ORTHOPAEDIC SURGERY
Payer: COMMERCIAL

## 2021-10-04 ENCOUNTER — APPOINTMENT (OUTPATIENT)
Dept: PHYSICAL THERAPY | Facility: CLINIC | Age: 81
End: 2021-10-04
Attending: ORTHOPAEDIC SURGERY
Payer: COMMERCIAL

## 2021-10-04 ENCOUNTER — APPOINTMENT (OUTPATIENT)
Dept: GENERAL RADIOLOGY | Facility: CLINIC | Age: 81
End: 2021-10-04
Attending: ORTHOPAEDIC SURGERY
Payer: COMMERCIAL

## 2021-10-04 ENCOUNTER — ANESTHESIA (OUTPATIENT)
Dept: SURGERY | Facility: CLINIC | Age: 81
End: 2021-10-04
Payer: COMMERCIAL

## 2021-10-04 ENCOUNTER — ANESTHESIA EVENT (OUTPATIENT)
Dept: SURGERY | Facility: CLINIC | Age: 81
End: 2021-10-04
Payer: COMMERCIAL

## 2021-10-04 DIAGNOSIS — G89.29 CHRONIC PAIN OF LEFT KNEE: ICD-10-CM

## 2021-10-04 DIAGNOSIS — M25.562 CHRONIC PAIN OF LEFT KNEE: ICD-10-CM

## 2021-10-04 LAB
ABO/RH(D): NORMAL
ANTIBODY SCREEN: NEGATIVE
GLUCOSE BLDC GLUCOMTR-MCNC: 109 MG/DL (ref 70–99)
SPECIMEN EXPIRATION DATE: NORMAL

## 2021-10-04 PROCEDURE — 250N000009 HC RX 250: Performed by: NURSE ANESTHETIST, CERTIFIED REGISTERED

## 2021-10-04 PROCEDURE — 258N000003 HC RX IP 258 OP 636: Performed by: ORTHOPAEDIC SURGERY

## 2021-10-04 PROCEDURE — C1776 JOINT DEVICE (IMPLANTABLE): HCPCS | Performed by: ORTHOPAEDIC SURGERY

## 2021-10-04 PROCEDURE — 999N000141 HC STATISTIC PRE-PROCEDURE NURSING ASSESSMENT: Performed by: ORTHOPAEDIC SURGERY

## 2021-10-04 PROCEDURE — 97530 THERAPEUTIC ACTIVITIES: CPT | Mod: GP

## 2021-10-04 PROCEDURE — 250N000013 HC RX MED GY IP 250 OP 250 PS 637: Performed by: ORTHOPAEDIC SURGERY

## 2021-10-04 PROCEDURE — 27447 TOTAL KNEE ARTHROPLASTY: CPT | Mod: LT | Performed by: ORTHOPAEDIC SURGERY

## 2021-10-04 PROCEDURE — 250N000011 HC RX IP 250 OP 636: Performed by: ORTHOPAEDIC SURGERY

## 2021-10-04 PROCEDURE — 250N000009 HC RX 250: Performed by: ANESTHESIOLOGY

## 2021-10-04 PROCEDURE — 86901 BLOOD TYPING SEROLOGIC RH(D): CPT | Performed by: ORTHOPAEDIC SURGERY

## 2021-10-04 PROCEDURE — 250N000011 HC RX IP 250 OP 636: Performed by: ANESTHESIOLOGY

## 2021-10-04 PROCEDURE — 278N000051 HC OR IMPLANT GENERAL: Performed by: ORTHOPAEDIC SURGERY

## 2021-10-04 PROCEDURE — 999N000065 XR KNEE PORT LEFT 1/2 VIEWS: Mod: LT

## 2021-10-04 PROCEDURE — 258N000001 HC RX 258: Performed by: ORTHOPAEDIC SURGERY

## 2021-10-04 PROCEDURE — 272N000001 HC OR GENERAL SUPPLY STERILE: Performed by: ORTHOPAEDIC SURGERY

## 2021-10-04 PROCEDURE — 99232 SBSQ HOSP IP/OBS MODERATE 35: CPT | Performed by: INTERNAL MEDICINE

## 2021-10-04 PROCEDURE — 250N000011 HC RX IP 250 OP 636: Performed by: NURSE ANESTHETIST, CERTIFIED REGISTERED

## 2021-10-04 PROCEDURE — 710N000010 HC RECOVERY PHASE 1, LEVEL 2, PER MIN: Performed by: ORTHOPAEDIC SURGERY

## 2021-10-04 PROCEDURE — 258N000003 HC RX IP 258 OP 636: Performed by: NURSE ANESTHETIST, CERTIFIED REGISTERED

## 2021-10-04 PROCEDURE — 97110 THERAPEUTIC EXERCISES: CPT | Mod: GP

## 2021-10-04 PROCEDURE — 73560 X-RAY EXAM OF KNEE 1 OR 2: CPT | Mod: 26 | Performed by: RADIOLOGY

## 2021-10-04 PROCEDURE — 99207 PR CONSULT E&M CHANGED TO SUBSEQUENT LEVEL: CPT | Performed by: INTERNAL MEDICINE

## 2021-10-04 PROCEDURE — 360N000077 HC SURGERY LEVEL 4, PER MIN: Performed by: ORTHOPAEDIC SURGERY

## 2021-10-04 PROCEDURE — 97161 PT EVAL LOW COMPLEX 20 MIN: CPT | Mod: GP

## 2021-10-04 PROCEDURE — 36415 COLL VENOUS BLD VENIPUNCTURE: CPT | Performed by: ORTHOPAEDIC SURGERY

## 2021-10-04 PROCEDURE — 370N000017 HC ANESTHESIA TECHNICAL FEE, PER MIN: Performed by: ORTHOPAEDIC SURGERY

## 2021-10-04 DEVICE — IMP BASEPLATE TIBIAL GENESIS II SZ 3 LT TI 71420164: Type: IMPLANTABLE DEVICE | Site: KNEE | Status: FUNCTIONAL

## 2021-10-04 DEVICE — LEGION CR HIGH FLEX XLPE SZ 3-4 12MM
Type: IMPLANTABLE DEVICE | Site: KNEE | Status: FUNCTIONAL
Brand: LEGION

## 2021-10-04 DEVICE — GENESIS II NON POROUS CRUCIATE                                    RETAINING FEMORAL SIZE 4 LEFT
Type: IMPLANTABLE DEVICE | Site: KNEE | Status: FUNCTIONAL
Brand: GENESIS II

## 2021-10-04 DEVICE — BONE CEMENT SIMPLEX FULL DOSE 6191-1-001: Type: IMPLANTABLE DEVICE | Site: KNEE | Status: FUNCTIONAL

## 2021-10-04 RX ORDER — NALOXONE HYDROCHLORIDE 0.4 MG/ML
0.4 INJECTION, SOLUTION INTRAMUSCULAR; INTRAVENOUS; SUBCUTANEOUS
Status: DISCONTINUED | OUTPATIENT
Start: 2021-10-04 | End: 2021-10-04 | Stop reason: HOSPADM

## 2021-10-04 RX ORDER — NALOXONE HYDROCHLORIDE 0.4 MG/ML
0.4 INJECTION, SOLUTION INTRAMUSCULAR; INTRAVENOUS; SUBCUTANEOUS
Status: DISCONTINUED | OUTPATIENT
Start: 2021-10-04 | End: 2021-10-05 | Stop reason: HOSPADM

## 2021-10-04 RX ORDER — MEPERIDINE HYDROCHLORIDE 25 MG/ML
12.5 INJECTION INTRAMUSCULAR; INTRAVENOUS; SUBCUTANEOUS
Status: DISCONTINUED | OUTPATIENT
Start: 2021-10-04 | End: 2021-10-04 | Stop reason: HOSPADM

## 2021-10-04 RX ORDER — CELECOXIB 200 MG/1
400 CAPSULE ORAL ONCE
Status: COMPLETED | OUTPATIENT
Start: 2021-10-04 | End: 2021-10-04

## 2021-10-04 RX ORDER — AMOXICILLIN 250 MG
1 CAPSULE ORAL 2 TIMES DAILY
Status: DISCONTINUED | OUTPATIENT
Start: 2021-10-04 | End: 2021-10-05 | Stop reason: HOSPADM

## 2021-10-04 RX ORDER — ONDANSETRON 4 MG/1
4 TABLET, ORALLY DISINTEGRATING ORAL EVERY 6 HOURS PRN
Status: DISCONTINUED | OUTPATIENT
Start: 2021-10-04 | End: 2021-10-05 | Stop reason: HOSPADM

## 2021-10-04 RX ORDER — ONDANSETRON 4 MG/1
4 TABLET, ORALLY DISINTEGRATING ORAL EVERY 30 MIN PRN
Status: DISCONTINUED | OUTPATIENT
Start: 2021-10-04 | End: 2021-10-04 | Stop reason: HOSPADM

## 2021-10-04 RX ORDER — FENTANYL CITRATE 50 UG/ML
25 INJECTION, SOLUTION INTRAMUSCULAR; INTRAVENOUS
Status: DISCONTINUED | OUTPATIENT
Start: 2021-10-04 | End: 2021-10-04 | Stop reason: HOSPADM

## 2021-10-04 RX ORDER — FLUMAZENIL 0.1 MG/ML
0.2 INJECTION, SOLUTION INTRAVENOUS
Status: DISCONTINUED | OUTPATIENT
Start: 2021-10-04 | End: 2021-10-04 | Stop reason: HOSPADM

## 2021-10-04 RX ORDER — CEFAZOLIN SODIUM 2 G/100ML
INJECTION, SOLUTION INTRAVENOUS PRN
Status: DISCONTINUED | OUTPATIENT
Start: 2021-10-04 | End: 2021-10-04

## 2021-10-04 RX ORDER — DEXAMETHASONE SODIUM PHOSPHATE 10 MG/ML
INJECTION, SOLUTION INTRAMUSCULAR; INTRAVENOUS PRN
Status: DISCONTINUED | OUTPATIENT
Start: 2021-10-04 | End: 2021-10-04

## 2021-10-04 RX ORDER — OXYCODONE HYDROCHLORIDE 5 MG/1
10 TABLET ORAL EVERY 4 HOURS PRN
Status: DISCONTINUED | OUTPATIENT
Start: 2021-10-04 | End: 2021-10-05 | Stop reason: HOSPADM

## 2021-10-04 RX ORDER — FENTANYL CITRATE 50 UG/ML
INJECTION, SOLUTION INTRAMUSCULAR; INTRAVENOUS PRN
Status: DISCONTINUED | OUTPATIENT
Start: 2021-10-04 | End: 2021-10-04

## 2021-10-04 RX ORDER — SCOLOPAMINE TRANSDERMAL SYSTEM 1 MG/1
1 PATCH, EXTENDED RELEASE TRANSDERMAL
Status: DISCONTINUED | OUTPATIENT
Start: 2021-10-04 | End: 2021-10-04 | Stop reason: HOSPADM

## 2021-10-04 RX ORDER — LIDOCAINE HYDROCHLORIDE 20 MG/ML
INJECTION, SOLUTION INFILTRATION; PERINEURAL PRN
Status: DISCONTINUED | OUTPATIENT
Start: 2021-10-04 | End: 2021-10-04

## 2021-10-04 RX ORDER — ACETAMINOPHEN 325 MG/1
975 TABLET ORAL ONCE
Status: COMPLETED | OUTPATIENT
Start: 2021-10-04 | End: 2021-10-04

## 2021-10-04 RX ORDER — HYDROMORPHONE HYDROCHLORIDE 1 MG/ML
0.4 INJECTION, SOLUTION INTRAMUSCULAR; INTRAVENOUS; SUBCUTANEOUS EVERY 5 MIN PRN
Status: DISCONTINUED | OUTPATIENT
Start: 2021-10-04 | End: 2021-10-04 | Stop reason: HOSPADM

## 2021-10-04 RX ORDER — ONDANSETRON 2 MG/ML
INJECTION INTRAMUSCULAR; INTRAVENOUS PRN
Status: DISCONTINUED | OUTPATIENT
Start: 2021-10-04 | End: 2021-10-04

## 2021-10-04 RX ORDER — CEFAZOLIN SODIUM 2 G/100ML
2 INJECTION, SOLUTION INTRAVENOUS
Status: DISCONTINUED | OUTPATIENT
Start: 2021-10-04 | End: 2021-10-04 | Stop reason: HOSPADM

## 2021-10-04 RX ORDER — NALOXONE HYDROCHLORIDE 0.4 MG/ML
0.2 INJECTION, SOLUTION INTRAMUSCULAR; INTRAVENOUS; SUBCUTANEOUS
Status: DISCONTINUED | OUTPATIENT
Start: 2021-10-04 | End: 2021-10-05 | Stop reason: HOSPADM

## 2021-10-04 RX ORDER — BISACODYL 10 MG
10 SUPPOSITORY, RECTAL RECTAL DAILY PRN
Status: DISCONTINUED | OUTPATIENT
Start: 2021-10-04 | End: 2021-10-05 | Stop reason: HOSPADM

## 2021-10-04 RX ORDER — SODIUM CHLORIDE, SODIUM LACTATE, POTASSIUM CHLORIDE, CALCIUM CHLORIDE 600; 310; 30; 20 MG/100ML; MG/100ML; MG/100ML; MG/100ML
INJECTION, SOLUTION INTRAVENOUS CONTINUOUS
Status: DISCONTINUED | OUTPATIENT
Start: 2021-10-04 | End: 2021-10-05 | Stop reason: HOSPADM

## 2021-10-04 RX ORDER — FENTANYL CITRATE 50 UG/ML
25-50 INJECTION, SOLUTION INTRAMUSCULAR; INTRAVENOUS
Status: DISCONTINUED | OUTPATIENT
Start: 2021-10-04 | End: 2021-10-04

## 2021-10-04 RX ORDER — NALOXONE HYDROCHLORIDE 0.4 MG/ML
0.2 INJECTION, SOLUTION INTRAMUSCULAR; INTRAVENOUS; SUBCUTANEOUS
Status: DISCONTINUED | OUTPATIENT
Start: 2021-10-04 | End: 2021-10-04 | Stop reason: HOSPADM

## 2021-10-04 RX ORDER — CEFAZOLIN SODIUM 2 G/100ML
2 INJECTION, SOLUTION INTRAVENOUS SEE ADMIN INSTRUCTIONS
Status: DISCONTINUED | OUTPATIENT
Start: 2021-10-04 | End: 2021-10-04 | Stop reason: HOSPADM

## 2021-10-04 RX ORDER — SODIUM CHLORIDE, SODIUM LACTATE, POTASSIUM CHLORIDE, CALCIUM CHLORIDE 600; 310; 30; 20 MG/100ML; MG/100ML; MG/100ML; MG/100ML
INJECTION, SOLUTION INTRAVENOUS CONTINUOUS PRN
Status: DISCONTINUED | OUTPATIENT
Start: 2021-10-04 | End: 2021-10-04

## 2021-10-04 RX ORDER — PROCHLORPERAZINE MALEATE 5 MG
5 TABLET ORAL EVERY 6 HOURS PRN
Status: DISCONTINUED | OUTPATIENT
Start: 2021-10-04 | End: 2021-10-05 | Stop reason: HOSPADM

## 2021-10-04 RX ORDER — PROPOFOL 10 MG/ML
INJECTION, EMULSION INTRAVENOUS CONTINUOUS PRN
Status: DISCONTINUED | OUTPATIENT
Start: 2021-10-04 | End: 2021-10-04

## 2021-10-04 RX ORDER — POLYETHYLENE GLYCOL 3350 17 G/17G
17 POWDER, FOR SOLUTION ORAL DAILY
Status: DISCONTINUED | OUTPATIENT
Start: 2021-10-04 | End: 2021-10-05 | Stop reason: HOSPADM

## 2021-10-04 RX ORDER — OXYCODONE HYDROCHLORIDE 5 MG/1
5 TABLET ORAL EVERY 4 HOURS PRN
Status: DISCONTINUED | OUTPATIENT
Start: 2021-10-04 | End: 2021-10-05 | Stop reason: HOSPADM

## 2021-10-04 RX ORDER — LIDOCAINE 40 MG/G
CREAM TOPICAL
Status: DISCONTINUED | OUTPATIENT
Start: 2021-10-04 | End: 2021-10-05 | Stop reason: HOSPADM

## 2021-10-04 RX ORDER — ASPIRIN 81 MG/1
162 TABLET ORAL DAILY
Status: DISCONTINUED | OUTPATIENT
Start: 2021-10-04 | End: 2021-10-05 | Stop reason: HOSPADM

## 2021-10-04 RX ORDER — ONDANSETRON 2 MG/ML
4 INJECTION INTRAMUSCULAR; INTRAVENOUS EVERY 30 MIN PRN
Status: DISCONTINUED | OUTPATIENT
Start: 2021-10-04 | End: 2021-10-04 | Stop reason: HOSPADM

## 2021-10-04 RX ORDER — LISINOPRIL AND HYDROCHLOROTHIAZIDE 12.5; 2 MG/1; MG/1
2 TABLET ORAL DAILY
Status: DISCONTINUED | OUTPATIENT
Start: 2021-10-04 | End: 2021-10-05 | Stop reason: HOSPADM

## 2021-10-04 RX ORDER — ONDANSETRON 2 MG/ML
4 INJECTION INTRAMUSCULAR; INTRAVENOUS EVERY 6 HOURS PRN
Status: DISCONTINUED | OUTPATIENT
Start: 2021-10-04 | End: 2021-10-05 | Stop reason: HOSPADM

## 2021-10-04 RX ORDER — EPHEDRINE SULFATE 50 MG/ML
INJECTION, SOLUTION INTRAVENOUS PRN
Status: DISCONTINUED | OUTPATIENT
Start: 2021-10-04 | End: 2021-10-04

## 2021-10-04 RX ORDER — GABAPENTIN 600 MG/1
1200 TABLET ORAL AT BEDTIME
Status: DISCONTINUED | OUTPATIENT
Start: 2021-10-04 | End: 2021-10-05 | Stop reason: HOSPADM

## 2021-10-04 RX ORDER — CEFAZOLIN SODIUM 2 G/100ML
2 INJECTION, SOLUTION INTRAVENOUS EVERY 8 HOURS
Status: COMPLETED | OUTPATIENT
Start: 2021-10-04 | End: 2021-10-05

## 2021-10-04 RX ORDER — FENTANYL CITRATE 50 UG/ML
50 INJECTION, SOLUTION INTRAMUSCULAR; INTRAVENOUS EVERY 5 MIN PRN
Status: DISCONTINUED | OUTPATIENT
Start: 2021-10-04 | End: 2021-10-04 | Stop reason: HOSPADM

## 2021-10-04 RX ORDER — BUPIVACAINE HYDROCHLORIDE 2.5 MG/ML
INJECTION, SOLUTION EPIDURAL; INFILTRATION; INTRACAUDAL PRN
Status: DISCONTINUED | OUTPATIENT
Start: 2021-10-04 | End: 2021-10-04

## 2021-10-04 RX ORDER — BUPIVACAINE HYDROCHLORIDE 7.5 MG/ML
INJECTION, SOLUTION INTRASPINAL
Status: DISCONTINUED | OUTPATIENT
Start: 2021-10-04 | End: 2021-10-04

## 2021-10-04 RX ORDER — HYDROMORPHONE HCL IN WATER/PF 6 MG/30 ML
0.2 PATIENT CONTROLLED ANALGESIA SYRINGE INTRAVENOUS
Status: DISCONTINUED | OUTPATIENT
Start: 2021-10-04 | End: 2021-10-05 | Stop reason: HOSPADM

## 2021-10-04 RX ORDER — SODIUM CHLORIDE, SODIUM LACTATE, POTASSIUM CHLORIDE, CALCIUM CHLORIDE 600; 310; 30; 20 MG/100ML; MG/100ML; MG/100ML; MG/100ML
INJECTION, SOLUTION INTRAVENOUS CONTINUOUS
Status: DISCONTINUED | OUTPATIENT
Start: 2021-10-04 | End: 2021-10-04 | Stop reason: HOSPADM

## 2021-10-04 RX ORDER — ACETAMINOPHEN 325 MG/1
975 TABLET ORAL EVERY 8 HOURS
Status: DISCONTINUED | OUTPATIENT
Start: 2021-10-04 | End: 2021-10-05 | Stop reason: HOSPADM

## 2021-10-04 RX ORDER — OXYCODONE HYDROCHLORIDE 5 MG/1
5 TABLET ORAL EVERY 4 HOURS PRN
Status: DISCONTINUED | OUTPATIENT
Start: 2021-10-04 | End: 2021-10-04 | Stop reason: HOSPADM

## 2021-10-04 RX ORDER — HYDROMORPHONE HCL IN WATER/PF 6 MG/30 ML
0.4 PATIENT CONTROLLED ANALGESIA SYRINGE INTRAVENOUS
Status: DISCONTINUED | OUTPATIENT
Start: 2021-10-04 | End: 2021-10-05 | Stop reason: HOSPADM

## 2021-10-04 RX ORDER — ACETAMINOPHEN 325 MG/1
650 TABLET ORAL EVERY 4 HOURS PRN
Status: DISCONTINUED | OUTPATIENT
Start: 2021-10-07 | End: 2021-10-05 | Stop reason: HOSPADM

## 2021-10-04 RX ORDER — TRANEXAMIC ACID 650 MG/1
1950 TABLET ORAL ONCE
Status: COMPLETED | OUTPATIENT
Start: 2021-10-04 | End: 2021-10-04

## 2021-10-04 RX ADMIN — OXYCODONE HYDROCHLORIDE 10 MG: 5 TABLET ORAL at 16:00

## 2021-10-04 RX ADMIN — FENTANYL CITRATE 25 MCG: 50 INJECTION INTRAMUSCULAR; INTRAVENOUS at 10:59

## 2021-10-04 RX ADMIN — EPHEDRINE SULFATE 5 MG: 50 INJECTION, SOLUTION INTRAVENOUS at 08:01

## 2021-10-04 RX ADMIN — DEXMEDETOMIDINE 20 MCG: 100 INJECTION, SOLUTION, CONCENTRATE INTRAVENOUS at 07:25

## 2021-10-04 RX ADMIN — ACETAMINOPHEN 975 MG: 325 TABLET, FILM COATED ORAL at 13:17

## 2021-10-04 RX ADMIN — DOCUSATE SODIUM AND SENNOSIDES 1 TABLET: 8.6; 5 TABLET ORAL at 21:00

## 2021-10-04 RX ADMIN — OXYCODONE HYDROCHLORIDE 5 MG: 5 TABLET ORAL at 11:25

## 2021-10-04 RX ADMIN — FENTANYL CITRATE 50 MCG: 50 INJECTION, SOLUTION INTRAMUSCULAR; INTRAVENOUS at 07:21

## 2021-10-04 RX ADMIN — GABAPENTIN 1200 MG: 600 TABLET, FILM COATED ORAL at 22:05

## 2021-10-04 RX ADMIN — CELECOXIB 400 MG: 200 CAPSULE ORAL at 06:12

## 2021-10-04 RX ADMIN — SCOPALAMINE 1 PATCH: 1 PATCH, EXTENDED RELEASE TRANSDERMAL at 07:12

## 2021-10-04 RX ADMIN — CEFAZOLIN 2 G: 10 INJECTION, POWDER, FOR SOLUTION INTRAVENOUS at 07:58

## 2021-10-04 RX ADMIN — EPHEDRINE SULFATE 10 MG: 50 INJECTION, SOLUTION INTRAVENOUS at 08:06

## 2021-10-04 RX ADMIN — PHENYLEPHRINE HYDROCHLORIDE 100 MCG/KG/MIN: 10 INJECTION INTRAVENOUS at 08:06

## 2021-10-04 RX ADMIN — FENTANYL CITRATE 50 MCG: 50 INJECTION, SOLUTION INTRAMUSCULAR; INTRAVENOUS at 07:38

## 2021-10-04 RX ADMIN — PHENYLEPHRINE HYDROCHLORIDE 0.5 MCG/KG/MIN: 10 INJECTION INTRAVENOUS at 08:10

## 2021-10-04 RX ADMIN — FENTANYL CITRATE 25 MCG: 50 INJECTION INTRAMUSCULAR; INTRAVENOUS at 10:45

## 2021-10-04 RX ADMIN — ONDANSETRON 4 MG: 2 INJECTION INTRAMUSCULAR; INTRAVENOUS at 09:50

## 2021-10-04 RX ADMIN — CEFAZOLIN SODIUM 2 G: 2 INJECTION, SOLUTION INTRAVENOUS at 16:02

## 2021-10-04 RX ADMIN — OXYCODONE HYDROCHLORIDE 5 MG: 5 TABLET ORAL at 12:03

## 2021-10-04 RX ADMIN — ASPIRIN 162 MG: 81 TABLET, COATED ORAL at 13:16

## 2021-10-04 RX ADMIN — SODIUM CHLORIDE, POTASSIUM CHLORIDE, SODIUM LACTATE AND CALCIUM CHLORIDE: 600; 310; 30; 20 INJECTION, SOLUTION INTRAVENOUS at 09:50

## 2021-10-04 RX ADMIN — PHENYLEPHRINE HYDROCHLORIDE 0.5 MCG/KG/MIN: 10 INJECTION INTRAVENOUS at 08:40

## 2021-10-04 RX ADMIN — ACETAMINOPHEN 975 MG: 325 TABLET, FILM COATED ORAL at 22:05

## 2021-10-04 RX ADMIN — PHENYLEPHRINE HYDROCHLORIDE 100 MCG: 10 INJECTION INTRAVENOUS at 08:06

## 2021-10-04 RX ADMIN — ACETAMINOPHEN 975 MG: 325 TABLET, FILM COATED ORAL at 06:11

## 2021-10-04 RX ADMIN — DEXAMETHASONE SODIUM PHOSPHATE 2 MG: 10 INJECTION, SOLUTION INTRAMUSCULAR; INTRAVENOUS at 07:25

## 2021-10-04 RX ADMIN — FENTANYL CITRATE 25 MCG: 50 INJECTION INTRAMUSCULAR; INTRAVENOUS at 10:34

## 2021-10-04 RX ADMIN — BUPIVACAINE HYDROCHLORIDE 20 ML: 2.5 INJECTION, SOLUTION EPIDURAL; INFILTRATION; INTRACAUDAL at 07:25

## 2021-10-04 RX ADMIN — PHENYLEPHRINE HYDROCHLORIDE 100 MCG: 10 INJECTION INTRAVENOUS at 08:12

## 2021-10-04 RX ADMIN — OXYCODONE HYDROCHLORIDE 10 MG: 5 TABLET ORAL at 22:05

## 2021-10-04 RX ADMIN — SODIUM CHLORIDE, POTASSIUM CHLORIDE, SODIUM LACTATE AND CALCIUM CHLORIDE: 600; 310; 30; 20 INJECTION, SOLUTION INTRAVENOUS at 07:35

## 2021-10-04 RX ADMIN — DOCUSATE SODIUM AND SENNOSIDES 1 TABLET: 8.6; 5 TABLET ORAL at 13:15

## 2021-10-04 RX ADMIN — TRANEXAMIC ACID 1950 MG: 650 TABLET ORAL at 06:12

## 2021-10-04 RX ADMIN — PHENYLEPHRINE HYDROCHLORIDE 50 MCG: 10 INJECTION INTRAVENOUS at 08:01

## 2021-10-04 RX ADMIN — PROPOFOL 100 MCG/KG/MIN: 10 INJECTION, EMULSION INTRAVENOUS at 07:54

## 2021-10-04 RX ADMIN — BUPIVACAINE HYDROCHLORIDE IN DEXTROSE 1.6 ML: 7.5 INJECTION, SOLUTION SUBARACHNOID at 07:44

## 2021-10-04 RX ADMIN — LIDOCAINE HYDROCHLORIDE 50 MG: 20 INJECTION, SOLUTION INFILTRATION; PERINEURAL at 07:54

## 2021-10-04 RX ADMIN — PHENYLEPHRINE HYDROCHLORIDE 150 MCG: 10 INJECTION INTRAVENOUS at 08:20

## 2021-10-04 ASSESSMENT — MIFFLIN-ST. JEOR: SCORE: 1398.88

## 2021-10-04 NOTE — PROGRESS NOTES
10/04/21 1522   Quick Adds   Type of Visit Initial PT Evaluation       Present no   Language English   Living Environment   People in home alone   Current Living Arrangements house   Home Accessibility stairs within home   Number of Stairs, Main Entrance none   Number of Stairs, Within Home, Primary greater than 10 stairs  (3 + 10)   Stair Railings, Within Home, Primary railing on left side (ascending);railing on right side (ascending);railings safe and in good condition  (3 (L rail), 10 (R rail))   Transportation Anticipated family or friend will provide   Living Environment Comments will have daughter with her to assist.    Self-Care   Usual Activity Tolerance moderate   Current Activity Tolerance fair   Regular Exercise Yes   Activity/Exercise Type walking   Exercise Amount/Frequency daily   Equipment Currently Used at Home none  (owns cane and walker)   Disability/Function   Hearing Difficulty or Deaf no   Wear Glasses or Blind no   Concentrating, Remembering or Making Decisions Difficulty no   Difficulty Communicating no   Difficulty Eating/Swallowing no   Walking or Climbing Stairs Difficulty no   Dressing/Bathing Difficulty no   Toileting issues no   Doing Errands Independently Difficulty (such as shopping) no   Fall history within last six months no   Change in Functional Status Since Onset of Current Illness/Injury yes   General Information   Onset of Illness/Injury or Date of Surgery 10/04/21   Referring Physician Cole Min MD   Patient/Family Therapy Goals Statement (PT) Did not endorse   Pertinent History of Current Problem (include personal factors and/or comorbidities that impact the POC) s/p L TKA   Existing Precautions/Restrictions fall   Weight-Bearing Status - LUE full weight-bearing   Weight-Bearing Status - RUE full weight-bearing   Weight-Bearing Status - LLE weight-bearing as tolerated   Weight-Bearing Status - RLE full weight-bearing   General Observations supine  in bed upon arrival, agreeable to PT.   Cognition   Orientation Status (Cognition) oriented x 3   Affect/Mental Status (Cognition) WNL   Follows Commands (Cognition) WNL   Pain Assessment   Patient Currently in Pain Yes, see Vital Sign flowsheet   Integumentary/Edema   Integumentary/Edema Comments pt with normal post-op swelling   Posture    Posture Forward head position;Protracted shoulders;Kyphosis   Posture Comments min to mod upon standing   Range of Motion (ROM)   ROM Comment noty formally assessed, demonstrates functional ROM with mobility.    Strength   Strength Comments Not formally assessed, demonstrates functional ability based upon mobility, able to perform SLR in supine.   Bed Mobility   Comment (Bed Mobility) completes supine<>sit with SBA   Transfers   Transfer Safety Comments completes transfer sit <>stand contact guard using walker.    Gait/Stairs (Locomotion)   Comment (Gait/Stairs) Pt able to ambulate using contact guard to and from hallway using walker.    Balance   Balance Comments independent sitting balance, contact guard assist standing balance with support from walker,    Sensory Examination   Sensory Perception Comments baseline neuropathy.    Clinical Impression   Criteria for Skilled Therapeutic Intervention yes, treatment indicated   PT Diagnosis (PT) impaired functional mobility   Influenced by the following impairments increased pain, decreased L LE strength and ROM, decreased activity tolerance.   Functional limitations due to impairments impaired bed mobility, transfers, and ambulation   Clinical Presentation Stable/Uncomplicated   Clinical Presentation Rationale s/p TKA, mobilizing well, limited comorbidities   Clinical Decision Making (Complexity) low complexity   Therapy Frequency (PT) 2x/day   Predicted Duration of Therapy Intervention (days/wks) 3   Planned Therapy Interventions (PT) balance training;bed mobility training;gait training;home exercise program;neuromuscular  re-education;ROM (range of motion);stair training;strengthening;transfer training;progressive activity/exercise;risk factor education;home program guidelines   Risk & Benefits of therapy have been explained evaluation/treatment results reviewed;care plan/treatment goals reviewed;risks/benefits reviewed;current/potential barriers reviewed   PT Discharge Planning    PT Discharge Recommendation (DC Rec) home with assist;home with home care physical therapy   PT Rationale for DC Rec home PT for home progression and TKA protocol.   PT Brief overview of current status  CGA to SBA for all mobility with walker   Total Evaluation Time   Total Evaluation Time (Minutes) 8

## 2021-10-04 NOTE — CONSULTS
[facility]  Consult Note - Hospitalist Service     Date of Admission:  10/4/2021  Consult Requested by: Ortho   Reason for Consult: Medical co-managing.     Assessment & Plan   Yumiko Mccartney is a 81 year old female admitted on 10/4/2021. POD # 0 s/p left total knee arthroplasty. EBL 50 ml. No complications during surgery.   She has a PMHx significant for HTN and restless leg syndrome.   I saw the patient after surgery, she was pleasant and hemodynamically stable. Family at bedside.     Assessment & plan     S/P left total knee arthroplasty  -Ortho is primary.   -Capnography per protocol.   -PT / OT per protocol.   -Gentle hydration.   -Pain control, DVT prophylaxis and andres op antibiotics per Ortho.   -Continue monitoring HGB.   -Monitor renal function and lytes.     HTN   -Hold PTA Zestoretic for now, reassess in the morning.   -Continue monitoring BP.     Restless leg syndrome   -Continue on PTA Neurontin.         The patient's care was discussed with the Patient.    Refugio Ward MD  [facility]  Securely message with the Vocera Web Console (learn more here)  Text page via [employer] Paging/Directory    ______________________________________________________________________    Chief Complaint   Knee pain     History is obtained from the patient    History of Present Illness   Yumiko Mccartney is a 81 year old female admitted on 10/4/2021. POD # 0 s/p left total knee arthroplasty. EBL 50 ml. No complications during surgery.   She has a PMHx significant for HTN and restless leg syndrome.   I saw the patient after surgery, she was pleasant and hemodynamically stable. Family at bedside.   Patient is feeling well, no complaints. No chest pain, palpitations, shortness of breath, cough, dizziness, fever, chills, dysuria or abdominal pain.     Review of Systems   The 10 point Review of Systems is negative other than noted in the  "HPI or here. Other ROS negative.     Past Medical History    I have reviewed this patient's medical history and updated it with pertinent information if needed.   Past Medical History:   Diagnosis Date     Acne rosacea      Actinic keratosis      Amblyopia      Asthma, moderate persistent      Basal cell carcinoma 10/2010    back X2     Cataract, mod, od; mild-mod, os 1/12/2012     DJD (degenerative joint disease), lumbosacral 8/6/2014     Elevated cholesterol      Endometriosis      HTN (hypertension)      Moderate major depression (H)     \"Circumstances\"     Osteopenia        Past Surgical History   I have reviewed this patient's surgical history and updated it with pertinent information if needed.  Past Surgical History:   Procedure Laterality Date     BLEPHAROPLASTY BILATERAL  3/9/2006; 2013    both eyes, upper lid; revision (EN)     C APPENDECTOMY       CATARACT IOL, RT/LT       HC REMOVAL GALLBLADDER       LASER YAG CAPSULOTOMY      left eye (AC lysis also)     PHACOEMULSIFICATION WITH STANDARD INTRAOCULAR LENS IMPLANT  1/2012; 4/2013    right eye; left eye     REPAIR PTOSIS       SURGICAL HISTORY OF -       endometriosis surgeriesx3     SURGICAL HISTORY OF -       ganiglion cyst onfoot     SURGICAL HISTORY OF -       Eye for \"droopy eye\"       Social History   I have reviewed this patient's social history and updated it with pertinent information if needed.  Social History     Tobacco Use     Smoking status: Never Smoker     Smokeless tobacco: Never Used   Substance Use Topics     Alcohol use: Yes     Drug use: No       Family History   I have reviewed this patient's family history and updated it with pertinent information if needed.  Family History   Problem Relation Age of Onset     C.A.D. Father      C.A.D. Brother      Hypertension Mother      Cancer No family hx of      Diabetes No family hx of      Cerebrovascular Disease No family hx of      Thyroid Disease No family hx of      Glaucoma No " family hx of      Macular Degeneration No family hx of        Medications   I have reviewed this patient's current medications    Allergies   Allergies   Allergen Reactions     Erythromycin Swelling and Other (See Comments)       Physical Exam   Vital Signs: Temp: (!) 96.6  F (35.9  C) Temp src: Oral BP: 116/55 Pulse: 69   Resp: 15 SpO2: 95 % O2 Device: None (Room air) Oxygen Delivery: 2 LPM  Weight: 203 lbs 7.75 oz    General Appearance: Alert, on O 2 NC, no acute distress.   Eyes: Pupils equal and reactive to light.   HEENT: Oral mucosa moist.   Respiratory: Normal respiratory effort, clear lungs.    Cardiovascular: RRR, no murmur, no peripheral edema.   GI: Abdomen is soft, + BS, no tenderness to palpation.   Lymph/Hematologic: No lymphadenopathy.   Genitourinary: Deferred.   Skin: No rash.   Musculoskeletal: Left leg with dressing.   Neurologic: Alert, oriented x 3, no focal deficits.   Psychiatric: Mood stable.     Data   Results for orders placed or performed during the hospital encounter of 10/04/21 (from the past 24 hour(s))   Glucose by meter   Result Value Ref Range    GLUCOSE BY METER POCT 109 (H) 70 - 99 mg/dL   ABO/Rh type and screen    Narrative    The following orders were created for panel order ABO/Rh type and screen.  Procedure                               Abnormality         Status                     ---------                               -----------         ------                     Adult Type and Screen[[id number]]                            Final result                 Please view results for these tests on the individual orders.   Adult Type and Screen   Result Value Ref Range    ABO/RH(D) B POS     Antibody Screen Negative Negative    SPECIMEN EXPIRATION DATE [id number]    XR Knee Port Left 1/2 Views    Narrative    2 views left knee radiographs 10/4/2021 11:09 AM    History: Post-Op Total Knee    Comparison: Left knee radiograph 12/7/2020    Findings:    AP and crosstable lateral  views supine views of the left knee were  obtained.     New postsurgical changes of left total knee arthroplasty with  subcutaneous and intra-articular gas. Mineralization around the knee.       Impression    IMPRESSION: New total knee arthroplasty.    I have personally reviewed the examination and initial interpretation  and I agree with the findings.    JESSICA ARELLANO         SYSTEM ID:  [id number]

## 2021-10-04 NOTE — PLAN OF CARE
[facility]      OUTPATIENT PHYSICAL THERAPY EVALUATION  PLAN OF TREATMENT FOR OUTPATIENT REHABILITATION  (COMPLETE FOR INITIAL CLAIMS ONLY)  Patient's Last Name, First Name, M.I.  YOB: 1940  Yumiko Mccartney                        Provider's Name  [facility] Medical Record No.  [id number]                               Onset Date:  10/04/21   Start of Care Date:         Type:     _X_PT   ___OT   ___SLP Medical Diagnosis:                           PT Diagnosis:  impaired functional mobility   Visits from SOC:  1   _________________________________________________________________________________  Plan of Treatment/Functional Goals    Planned Interventions: balance training, bed mobility training, gait training, home exercise program, neuromuscular re-education, ROM (range of motion), stair training, strengthening, transfer training, progressive activity/exercise, risk factor education, home program guidelines     Goals: See Physical Therapy Goals on Care Plan in [facility] electronic health record.    Therapy Frequency: 2x/day  Predicted Duration of Therapy Intervention: 3  _________________________________________________________________________________    I CERTIFY THE NEED FOR THESE SERVICES FURNISHED UNDER        THIS PLAN OF TREATMENT AND WHILE UNDER MY CARE     (Physician co-signature of this document indicates review and certification of the therapy plan).               ,      Referring Physician: Dr. Glenn Calvin           Initial Assessment        See Physical Therapy evaluation dated   in Epic electronic health record.

## 2021-10-04 NOTE — OR NURSING
PACU to Inpatient Nursing Handoff    Patient Yumiko Mccartney is a 81 year old female who speaks English.   Procedure Procedure(s):  ARTHROPLASTY, LEFT KNEE, TOTAL   Surgeon(s) Primary: Glenn Calvin MD     Allergies   Allergen Reactions     Erythromycin Swelling and Other (See Comments)       Isolation  No active isolations     Past Medical History   has a past medical history of Acne rosacea, Actinic keratosis, Amblyopia, Asthma, moderate persistent, Basal cell carcinoma (10/2010), Cataract, mod, od; mild-mod, os (1/12/2012), DJD (degenerative joint disease), lumbosacral (8/6/2014), Elevated cholesterol, Endometriosis, HTN (hypertension), Moderate major depression (H), and Osteopenia.    Anesthesia Choice   Dermatome Level     Preop Meds acetaminophen (Tylenol) - time given: 0615  celecoxib (Celebrex) - time given: 0615  TXA tablet - time given: 0615   Nerve block Adductor canal.  Location:left. Med:Total Knee/Hip Cocktail (ropivacaine, epinephrine, ketorolac, morphine). Time given: 0700   Intraop Meds fentanyl (Sublimaze): 4 mcg total  ondansetron (Zofran): last given at 1015   Local Meds Yes - Local Cocktail (morphine, ropivacaine, epinephrine, Toradol)   Antibiotics Not applicable     Pain Patient Currently in Pain: yes   PACU meds  fentanyl (Sublimaze): 75 mcg (total dose) last given at 1100    PCA / epidural No   Capnography     Telemetry ECG Rhythm: Normal sinus rhythm   Inpatient Telemetry Monitor Ordered? No        Labs Glucose Lab Results   Component Value Date     10/04/2021    GLC 99 09/21/2021    GLC 97 08/14/2020       Hgb Lab Results   Component Value Date    HGB 11.4 09/21/2021    HGB 11.7 08/14/2020       INR No results found for: INR   PACU Imaging Completed     Wound/Incision Incision/Surgical Site 10/04/21 Knee (Active)   Incision Assessment UTV 10/04/21 1022   Sarah-Incision Assessment UTV 10/04/21 1022   Closure Adhesive strip(s);Sutures 10/04/21 0934   Incision Drainage Amount None  10/04/21 1022   Dressing Intervention Clean, dry, intact 10/04/21 1022   Number of days: 0      CMS        Equipment ice pack   Other LDA       IV Access Peripheral IV 10/04/21 Right Upper forearm (Active)   Site Assessment WDL 10/04/21 1022   Line Status Infusing 10/04/21 1022   Phlebitis Scale 0-->no symptoms 10/04/21 0715   Infiltration Scale 0 10/04/21 0715   Number of days: 0      Blood Products Not applicable EBL 50 mL   Intake/Output Date 10/04/21 0700 - 10/05/21 0659   Shift 4090-7837 1284-8329 2041-4447 24 Hour Total   INTAKE   I.V. 1000   1000   Shift Total(mL/kg) 1000(10.83)   1000(10.83)   OUTPUT   Blood 50   50   Shift Total(mL/kg) 50(0.54)   50(0.54)   Weight (kg) 92.3 92.3 92.3 92.3      Drains / Elizondo     Time of void PreOp Void Prior to Procedure: 0620 (10/04/21 0620)    PostOp      Diapered? No   Bladder Scan     PO    water     Vitals    B/P: 104/55  T: 97.3  F (36.3  C)    Temp src: Axillary  P:  Pulse: 76 (10/04/21 1045)          R: 15  O2:  SpO2: 98 %    O2 Device: Nasal cannula (10/04/21 1030)    Oxygen Delivery: 2 LPM (10/04/21 1030)         Family/support present daughter Kortney   Patient belongings     Patient transported on bed   DC meds/scripts (obs/outpt) Not applicable   Inpatient Pain Meds Released? Yes       Special needs/considerations None   Tasks needing completion pt has a scop patch behind right ear       Naz Jimenes, RN  [facility] [id number]

## 2021-10-04 NOTE — OP NOTE
OPERATIVE REPORT    DATE OF SERVICE: 10/4/21    SURGEON: Glenn Calvin MD.    ASSISTANT(S):  Cole Faye DO    PREOPERATIVE DIAGNOSIS:  Osteoarthritis    POSTOPERATIVE DIAGNOSIS:  Osteoarthritis    OPERATION PERFORMED:  Left total knee arthroplasty    IMPLANTS:    Implant Name Type Inv. Item Serial No.  Lot No. LRB No. Used Action   BONE CEMENT SIMPLEX FULL DOSE 6191-1-001 - [id number] Cement, Bone BONE CEMENT SIMPLEX FULL DOSE 6191-1-001  [employer]  Left 2 Implanted   IMP COMP FEMORAL SNN GEN II CR SZ 4 LT [id number] - [id number] Total Joint Component/Insert IMP COMP FEMORAL SNN GEN II CR SZ 4 LT [id number]  TRAN  [id number] Left 1 Implanted   SIZE 4, LEFT NON-POROUS DESTINY II CRUCIATE RETAINING FEMORAL COMPONENT    [employer] [id number] Left 1 Implanted   IMP COMP KNEE S&N LEGION CR HI-FLEX XLPE SZ3-4 12MM [id number] - [id number] Total Joint Component/Insert IMP COMP KNEE S&N LEGION CR HI-FLEX XLPE SZ3-4 12MM [id number]  [employer] [id number] Left 1 Implanted       ANESTHETIC: General     OPERATIVE FINDINGS:  End stage arthrosis of the knee. Patella appropriate for patellar retention.     BLOOD LOSS: about 50 ml     TOURNIQUET TIME: about 60 minutes     COMPLICATIONS:  None apparent    OPERATIVE INDICATIONS:  The patient has a long history of debilitating pain secondary to ostearthritis of the knee.  Despite comprehensive non-operative management these symptoms continued to interfere with activities of daily living.  After discussion of further treatment options including the risks and benefits that patient elected to proceed with a total knee.    DESCRIPTION OF THE PROCEDURE:  The patient was identified in the preoperative holding area.  The consent form including the risks and benefits were reviewed with the patient.  The operative limb was identified and marked.  The patient was brought back to the operating room and placed supine on the operating table.  An anesthetic was induced  by the anesthesia team.   The patient was prepped and draped in the normal standard fashion for a knee replacement.  A time-out was called.  Antibiotics were given.The tourniquet was inflated.  We utilized an approximately 15 cm curvilinear incision, centered on the patella ridge, and performed a standard parapatellar approach to the knee. There was normal appearing joint fluid seen upon arthrotomy. A modest medial release was performed. The patella was everted. Medial and lateral retractors were placed. The knee was brought into flexion. The AP axis of the knee was marked and an intra-medullary femoral guide artur was placed as templated. The epicondylar axis was then marked. The anterior femoral cutting guide was placed and the epicondylar and AP axes were used to set the rotation of the jig. A stylus was then used to set the depth of the resection. Retractors were used to protect the skin and the anterior cut was made. The distal femoral cutting guide was then placed and pinned. The resection was set to remove the cartilage from the intra-condylar notch. The femoral sizing guide was then placed; the knee sized well to a 4. The four-in-one femoral cutting guide was then placed and pinned. The cuts were made. The trial component was well fit. Attention was then turned to the tibia. A PCL retractor was then placed and used to bring the tibia anterior. Medial and lateral retractors were placed. A tibial intra-medullary guide artur was placed. The tibial cutting guide was then assembled on the guide artur. The slope was set to nearly mirror the anatomic slope. The tibial cut was first checked with a two-and-nine guide and the cut was then made. A lamina  was then used to remove the necessary bone from the posterior condyles and then the remaining meniscus was removed. The tibia and was sized and it sized well to a 3. The trial components were then assembled cris  9 mm trial poly was used for trialing. The knee came  out into full extension and the knee had greater than 120 degrees of flexion. It was well balanced in the coronal plane with varus and valgus stress at 0 and 30 degrees of flexion. Happy with the reconstruction the tibial rotation was marked, the trial components were removed, and all cancellous surfaces were cleaned with a pulse lavage and then dried. The components were cemented into place, a trial poly placed, the knee brought into extension, and the cement was allowed to dry. With the cement dry the knee was lavaged. The tourniquet was let down and hemostasis was achieved. The knee was then brought into extension and the final poly was placed. It was seen to lock into place. The soft tissues were then injected with analgesic. The capsule was closed over a drain. The capsule was closed with interrupted Vicryl, the dermis with interrupted Vicryl, and skin with running monocryl, Dermabond and steri-strips.  At the end of the procedure the sponge and needle counts were correct times two.  The patient tolerated the procedure well and returned to the PAR extubated and stable.    POSTOPERATIVE PLAN:  1. Weight bearing as tolerated  2. DVT prophylaxis with ECASA  4. 24 hours of prophylactic antibiotics  5. Follow-up:  Wound clinic in 2 weeks and with Nikunj in clinic in 6 weeks for x-rays and a rehabilitation check.

## 2021-10-04 NOTE — ANESTHESIA CARE TRANSFER NOTE
Patient: Yumiko SUNG LaPage    Procedure(s):  ARTHROPLASTY, LEFT KNEE, TOTAL    Diagnosis: Chronic pain of left knee [M25.562, G89.29]  Diagnosis Additional Information: No value filed.    Anesthesia Type:   Spinal     Note:    Oropharynx: spontaneously breathing  Level of Consciousness: awake  Oxygen Supplementation: face mask    Independent Airway: airway patency satisfactory and stable  Dentition: dentition unchanged  Vital Signs Stable: post-procedure vital signs reviewed and stable  Report to RN Given: handoff report given  Patient transferred to: PACU    Handoff Report: Identifed the Patient, Identified the Reponsible Provider, Reviewed the pertinent medical history, Discussed the surgical course, Reviewed Intra-OP anesthesia mangement and issues during anesthesia, Set expectations for post-procedure period and Allowed opportunity for questions and acknowledgement of understanding      Vitals:  Vitals Value Taken Time   BP     Temp     Pulse 71 10/04/21 1033   Resp 13 10/04/21 1033   SpO2 98 % 10/04/21 1033   Vitals shown include unvalidated device data.    Electronically Signed By: KALIN Nielsen CRNA  October 4, 2021  10:35 AM

## 2021-10-04 NOTE — ANESTHESIA PROCEDURE NOTES
Adductor canal Procedure Note    Pre-Procedure   Staff -        Anesthesiologist:  Dominic Michel MD       Performed By: anesthesiologist       Location: pre-op       Procedure Start/Stop Times: 10/4/2021 7:20 AM and 10/4/2021 7:25 AM       Pre-Anesthestic Checklist: patient identified, IV checked, site marked, risks and benefits discussed, informed consent, monitors and equipment checked, pre-op evaluation, at physician/surgeon's request and post-op pain management  Timeout:       Correct Patient: Yes        Correct Procedure: Yes        Correct Site: Yes        Correct Position: Yes        Correct Laterality: Yes        Site Marked: Yes  Procedure Documentation  Procedure: Adductor canal       Laterality: left       Patient Position: supine       Patient Prep/Sterile Barriers: sterile gloves, mask       Skin prep: Chloraprep       Needle Type: short bevel       Needle Gauge: 21.        Needle Length (Inches): 4        Ultrasound guided       1. Ultrasound was used to identify targeted nerve, plexus, vascular marker, or fascial plane and place a needle adjacent to it in real-time.       2. Ultrasound was used to visualize the spread of anesthetic in close proximity to the above referenced structure.       3. A permanent image is entered into the patient's record.       4. The visualized anatomic structures appeared normal.       5. There were no apparent abnormal pathologic findings.    Assessment/Narrative         The placement was negative for: blood aspirated, painful injection and site bleeding       Paresthesias: No.     Bolus given via needle..        Secured via.        Insertion/Infusion Method: Single Shot       Complications: none       Injection made incrementally with aspirations every 5 mL.

## 2021-10-04 NOTE — ANESTHESIA PREPROCEDURE EVALUATION
"Anesthesia Pre-Procedure Evaluation    Patient: Yumiko Mccartney   MRN: [id number] : 1940        Preoperative Diagnosis: Chronic pain of left knee [M25.562, G89.29]   Procedure : Procedure(s):  ARTHROPLASTY, LEFT KNEE, TOTAL     Past Medical History:   Diagnosis Date     Acne rosacea      Actinic keratosis      Amblyopia      Asthma, moderate persistent      Basal cell carcinoma 10/2010    back X2     Cataract, mod, od; mild-mod, os 2012     DJD (degenerative joint disease), lumbosacral 2014     Elevated cholesterol      Endometriosis      HTN (hypertension)      Moderate major depression (H)     \"Circumstances\"     Osteopenia       Past Surgical History:   Procedure Laterality Date     BLEPHAROPLASTY BILATERAL  3/9/2006;     both eyes, upper lid; revision (EN)     C APPENDECTOMY       CATARACT IOL, RT/LT       HC REMOVAL GALLBLADDER       LASER YAG CAPSULOTOMY      left eye (AC lysis also)     PHACOEMULSIFICATION WITH STANDARD INTRAOCULAR LENS IMPLANT  2012; 2013    right eye; left eye     REPAIR PTOSIS       SURGICAL HISTORY OF -       endometriosis surgeriesx3     SURGICAL HISTORY OF -       ganiglion cyst onfoot     SURGICAL HISTORY OF -       Eye for \"droopy eye\"      Allergies   Allergen Reactions     Erythromycin Swelling and Other (See Comments)      Social History     Tobacco Use     Smoking status: Never Smoker     Smokeless tobacco: Never Used   Substance Use Topics     Alcohol use: Yes      Wt Readings from Last 1 Encounters:   10/04/21 92.3 kg (203 lb 7.8 oz)        Anesthesia Evaluation            ROS/MED HX  ENT/Pulmonary:     (+) asthma     Neurologic: Comment: Restless leg syndrome      Cardiovascular:     (+) hypertension-----    METS/Exercise Tolerance:     Hematologic:  - neg hematologic  ROS     Musculoskeletal:  - neg musculoskeletal ROS     GI/Hepatic:  - neg GI/hepatic ROS     Renal/Genitourinary:  - neg Renal ROS     Endo:     (+) type II DM, Obesity,   "   Psychiatric/Substance Use:  - neg psychiatric ROS     Infectious Disease:       Malignancy:       Other:            Physical Exam    Airway        Mallampati: II   TM distance: > 3 FB   Neck ROM: full   Mouth opening: > 3 cm    Respiratory Devices and Support         Dental  no notable dental history         Cardiovascular          Rhythm and rate: regular and normal     Pulmonary   pulmonary exam normal        breath sounds clear to auscultation           OUTSIDE LABS:  CBC:   Lab Results   Component Value Date    WBC 6.6 08/14/2020    WBC 7.7 06/12/2019    HGB 11.4 (L) 09/21/2021    HGB 11.7 08/14/2020    HCT 37.3 08/14/2020    HCT 36.9 06/12/2019     08/14/2020     06/12/2019     BMP:   Lab Results   Component Value Date     09/21/2021     08/14/2020    POTASSIUM 4.5 09/21/2021    POTASSIUM 3.9 08/14/2020    CHLORIDE 106 09/21/2021    CHLORIDE 106 08/14/2020    CO2 30 09/21/2021    CO2 30 08/14/2020    BUN 21 09/21/2021    BUN 16 08/14/2020    CR 0.86 09/21/2021    CR 0.91 08/14/2020     (H) 10/04/2021    GLC 99 09/21/2021     COAGS: No results found for: PTT, INR, FIBR  POC: No results found for: BGM, HCG, HCGS  HEPATIC:   Lab Results   Component Value Date    ALBUMIN 4.4 08/14/2020    PROTTOTAL 7.7 08/14/2020    ALT 30 08/14/2020    AST 25 08/14/2020    ALKPHOS 84 08/14/2020    BILITOTAL 0.6 08/14/2020     OTHER:   Lab Results   Component Value Date    A1C 5.6 07/14/2015    NGOC 9.2 09/21/2021    PHOS 3.9 04/12/2013    TSH 0.51 07/31/2018    SED 8 06/19/2012       Anesthesia Plan    ASA Status:  2      Anesthesia Type: Spinal.      Maintenance: TIVA.        Consents    Anesthesia Plan(s) and associated risks, benefits, and realistic alternatives discussed. Questions answered and patient/representative(s) expressed understanding.     - Discussed with:  Patient      - Extended Intubation/Ventilatory Support Discussed: No.      - Patient is DNR/DNI Status: No    Use of blood  products discussed: No .     Postoperative Care    Pain management: IV analgesics.   PONV prophylaxis: Ondansetron (or other 5HT-3), Dexamethasone or Solumedrol     Comments:                Timbo Ly DO

## 2021-10-04 NOTE — ANESTHESIA PROCEDURE NOTES
Intrathecal injection Procedure Note    Pre-Procedure   Staff -        Anesthesiologist:  Timbo Ly DO       Performed By: anesthesiologist       Location: OR       Pre-Anesthestic Checklist: patient identified, IV checked, risks and benefits discussed, informed consent, monitors and equipment checked, pre-op evaluation, at physician/surgeon's request and post-op pain management  Timeout:       Correct Patient: Yes        Correct Procedure: Yes        Correct Site: Yes        Correct Position: Yes   Procedure Documentation  Procedure: intrathecal injection       Patient Position: sitting       Skin prep: Chloraprep       Insertion Site: L4-5. (midline approach).       Needle Gauge: 25.        Needle Length (Inches): 4        Spinal Needle Type: Pencan       Introducer used       Introducer: 20 G       # of attempts: 3 and  # of redirects:  2    Assessment/Narrative         Paresthesias: No.       Sensory Level: T7       CSF fluid: clear.    Medication(s) Administered   0.75% Hyperbaric Bupivacaine (Intrathecal), 1.6 mL  Medication Administration Time: 10/4/2021 7:44 AM

## 2021-10-04 NOTE — PLAN OF CARE
Patient vital signs are at baseline: Yes  Patient able to ambulate as they were prior to admission or with assist devices provided by therapies during their stay:  Yes  Patient MUST void prior to discharge:  Yes  Patient able to tolerate oral intake:  Yes  Pain has adequate pain control using Oral analgesics:  Yes    Pt arrived to unit at 1140 and was oriented to room and call light  VS: VSS   O2: >90% on RA wile awake, 1L while sleeping   Output: Voiding adequately, first PVR 74   Last BM: 10/3   Activity: WBAT; 1A with gait belt and FWW   Up for meals? Declined   Skin: Incision L knee   Pain: 10mg oxycodone q4h   CMS: Intact after spinal wore off   Dressing: CDI   Diet: Regular   LDA: PIV   Equipment: PCDs, IV pole, capnography, gait belt, FWW   Plan: TBD   Additional Info: Pt received spinal anesthesia + cocktail. EBL 50.

## 2021-10-04 NOTE — ANESTHESIA POSTPROCEDURE EVALUATION
Patient: Yumiko SUNG LaPage    Procedure(s):  ARTHROPLASTY, LEFT KNEE, TOTAL    Diagnosis:Chronic pain of left knee [M25.562, G89.29]  Diagnosis Additional Information: No value filed.    Anesthesia Type:  Spinal    Note:  Disposition: Outpatient   Postop Pain Control: Uneventful            Sign Out: Well controlled pain   PONV: No   Neuro/Psych: Uneventful            Sign Out: Acceptable/Baseline neuro status   Airway/Respiratory: Uneventful            Sign Out: Acceptable/Baseline resp. status   CV/Hemodynamics: Uneventful            Sign Out: Acceptable CV status; No obvious hypovolemia; No obvious fluid overload   Other NRE: NONE   DID A NON-ROUTINE EVENT OCCUR? No           Last vitals:  Vitals Value Taken Time   BP 99/52 10/04/21 1100   Temp     Pulse 67 10/04/21 1102   Resp 8 10/04/21 1102   SpO2 94 % 10/04/21 1102   Vitals shown include unvalidated device data.    Electronically Signed By: Timbo Ly DO  October 4, 2021  11:03 AM

## 2021-10-04 NOTE — OR NURSING
Pt bladder scanned for 126 mls at 2315.  Pt with VSS able to discharge to 5th floor.  2 gm Ancef was given by CRNA at 0756 in OR.  CRNA confirmed this over the phone with this nurse and stated she will chart in the MAR when she is able to.Pt had spinal block in OR.  Spinal analgesia is wearing off as pt is able to feel and wiggle toes bilaterally and able to help lift her LLE off bed to place pillow underneath.

## 2021-10-05 ENCOUNTER — APPOINTMENT (OUTPATIENT)
Dept: OCCUPATIONAL THERAPY | Facility: CLINIC | Age: 81
End: 2021-10-05
Attending: ORTHOPAEDIC SURGERY
Payer: COMMERCIAL

## 2021-10-05 ENCOUNTER — APPOINTMENT (OUTPATIENT)
Dept: PHYSICAL THERAPY | Facility: CLINIC | Age: 81
End: 2021-10-05
Attending: ORTHOPAEDIC SURGERY
Payer: COMMERCIAL

## 2021-10-05 VITALS
TEMPERATURE: 97.1 F | SYSTOLIC BLOOD PRESSURE: 106 MMHG | DIASTOLIC BLOOD PRESSURE: 38 MMHG | OXYGEN SATURATION: 99 % | RESPIRATION RATE: 16 BRPM | HEART RATE: 62 BPM | WEIGHT: 203.48 LBS | BODY MASS INDEX: 33.9 KG/M2 | HEIGHT: 65 IN

## 2021-10-05 LAB
ANION GAP SERPL CALCULATED.3IONS-SCNC: 1 MMOL/L (ref 3–14)
BUN SERPL-MCNC: 17 MG/DL (ref 7–30)
CALCIUM SERPL-MCNC: 8 MG/DL (ref 8.5–10.1)
CHLORIDE BLD-SCNC: 104 MMOL/L (ref 94–109)
CO2 SERPL-SCNC: 33 MMOL/L (ref 20–32)
CREAT SERPL-MCNC: 1 MG/DL (ref 0.52–1.04)
GFR SERPL CREATININE-BSD FRML MDRD: 54 ML/MIN/1.73M2
GLUCOSE BLD-MCNC: 124 MG/DL (ref 70–99)
GLUCOSE BLDC GLUCOMTR-MCNC: 216 MG/DL (ref 70–99)
HGB BLD-MCNC: 8.8 G/DL (ref 11.7–15.7)
POTASSIUM BLD-SCNC: 4.2 MMOL/L (ref 3.4–5.3)
SODIUM SERPL-SCNC: 138 MMOL/L (ref 133–144)

## 2021-10-05 PROCEDURE — 80048 BASIC METABOLIC PNL TOTAL CA: CPT | Performed by: INTERNAL MEDICINE

## 2021-10-05 PROCEDURE — 97530 THERAPEUTIC ACTIVITIES: CPT | Mod: GO

## 2021-10-05 PROCEDURE — 250N000011 HC RX IP 250 OP 636: Performed by: ORTHOPAEDIC SURGERY

## 2021-10-05 PROCEDURE — 97165 OT EVAL LOW COMPLEX 30 MIN: CPT | Mod: GO

## 2021-10-05 PROCEDURE — 36415 COLL VENOUS BLD VENIPUNCTURE: CPT | Performed by: INTERNAL MEDICINE

## 2021-10-05 PROCEDURE — 82962 GLUCOSE BLOOD TEST: CPT

## 2021-10-05 PROCEDURE — 97110 THERAPEUTIC EXERCISES: CPT | Mod: GP

## 2021-10-05 PROCEDURE — 85018 HEMOGLOBIN: CPT | Performed by: ORTHOPAEDIC SURGERY

## 2021-10-05 PROCEDURE — 250N000013 HC RX MED GY IP 250 OP 250 PS 637: Performed by: ORTHOPAEDIC SURGERY

## 2021-10-05 PROCEDURE — 99231 SBSQ HOSP IP/OBS SF/LOW 25: CPT | Performed by: INTERNAL MEDICINE

## 2021-10-05 PROCEDURE — 97535 SELF CARE MNGMENT TRAINING: CPT | Mod: GO,59

## 2021-10-05 PROCEDURE — 97116 GAIT TRAINING THERAPY: CPT | Mod: GP

## 2021-10-05 PROCEDURE — 258N000003 HC RX IP 258 OP 636: Performed by: ORTHOPAEDIC SURGERY

## 2021-10-05 RX ORDER — IBUPROFEN 200 MG
1 CAPSULE ORAL 2 TIMES DAILY
COMMUNITY

## 2021-10-05 RX ORDER — ACETAMINOPHEN 325 MG/1
650 TABLET ORAL EVERY 4 HOURS PRN
Qty: 60 TABLET | Refills: 0 | Status: SHIPPED | OUTPATIENT
Start: 2021-10-07

## 2021-10-05 RX ORDER — AMOXICILLIN 250 MG
1 CAPSULE ORAL 2 TIMES DAILY
Qty: 30 TABLET | Refills: 0 | Status: SHIPPED | OUTPATIENT
Start: 2021-10-05 | End: 2022-05-17

## 2021-10-05 RX ORDER — OXYCODONE HYDROCHLORIDE 5 MG/1
5 TABLET ORAL EVERY 4 HOURS PRN
Qty: 45 TABLET | Refills: 0 | Status: SHIPPED | OUTPATIENT
Start: 2021-10-05 | End: 2022-01-18

## 2021-10-05 RX ORDER — POLYETHYLENE GLYCOL 3350 17 G/17G
17 POWDER, FOR SOLUTION ORAL DAILY
Qty: 7 PACKET | Refills: 0 | Status: SHIPPED | OUTPATIENT
Start: 2021-10-05 | End: 2022-05-17

## 2021-10-05 RX ADMIN — POLYETHYLENE GLYCOL 3350 17 G: 17 POWDER, FOR SOLUTION ORAL at 08:09

## 2021-10-05 RX ADMIN — SODIUM CHLORIDE, POTASSIUM CHLORIDE, SODIUM LACTATE AND CALCIUM CHLORIDE: 600; 310; 30; 20 INJECTION, SOLUTION INTRAVENOUS at 05:53

## 2021-10-05 RX ADMIN — OXYCODONE HYDROCHLORIDE 10 MG: 5 TABLET ORAL at 11:06

## 2021-10-05 RX ADMIN — ACETAMINOPHEN 975 MG: 325 TABLET, FILM COATED ORAL at 05:51

## 2021-10-05 RX ADMIN — ASPIRIN 162 MG: 81 TABLET, COATED ORAL at 08:10

## 2021-10-05 RX ADMIN — CEFAZOLIN SODIUM 2 G: 2 INJECTION, SOLUTION INTRAVENOUS at 01:35

## 2021-10-05 RX ADMIN — DOCUSATE SODIUM AND SENNOSIDES 1 TABLET: 8.6; 5 TABLET ORAL at 08:10

## 2021-10-05 RX ADMIN — OXYCODONE HYDROCHLORIDE 10 MG: 5 TABLET ORAL at 05:51

## 2021-10-05 ASSESSMENT — ACTIVITIES OF DAILY LIVING (ADL): DEPENDENT_IADLS:: INDEPENDENT

## 2021-10-05 NOTE — PROGRESS NOTES
10/05/21 0909   Quick Adds   Type of Visit Initial Occupational Therapy Evaluation   Living Environment   People in home alone   Current Living Arrangements house   Home Accessibility stairs within home   Number of Stairs, Main Entrance none   Number of Stairs, Within Home, Primary greater than 10 stairs   Stair Railings, Within Home, Primary railing on left side (ascending);railing on right side (ascending);railings safe and in good condition   Transportation Anticipated family or friend will provide   Living Environment Comments Pt's daughter will stay with pt. 4 level split. Has shower and high toilet   Self-Care   Usual Activity Tolerance moderate   Current Activity Tolerance fair   Equipment Currently Used at Home walker, standard;shower chair;raised toilet seat;grab bar, tub/shower   Activity/Exercise/Self-Care Comment Previously IND with I/ADLs   Disability/Function   Hearing Difficulty or Deaf no   Wear Glasses or Blind no   Concentrating, Remembering or Making Decisions Difficulty no   Difficulty Communicating no   Difficulty Eating/Swallowing no   Fall history within last six months no   Change in Functional Status Since Onset of Current Illness/Injury yes   General Information   Onset of Illness/Injury or Date of Surgery 10/04/21   Referring Physician Cole Min MD   Patient/Family Therapy Goal Statement (OT) Not endorsed   Additional Occupational Profile Info/Pertinent History of Current Problem S/p L TKA   Existing Precautions/Restrictions fall   Left Lower Extremity (Weight-bearing Status) weight-bearing as tolerated (WBAT)   Cognitive Status Examination   Orientation Status orientation to person, place and time   Sensory   Sensory Comments BLE neuropathy   Pain Assessment   Patient Currently in Pain Yes, see Vital Sign flowsheet  (at rest 4/10, with activity 9/10)   Integumentary/Edema   Integumentary/Edema Comments LLE edema   Posture   Posture forward head position   Range of Motion  Comprehensive   General Range of Motion bilateral upper extremity ROM WFL   Strength Comprehensive (MMT)   Comment, General Manual Muscle Testing (MMT) Assessment Generally deconditioned   Coordination   Upper Extremity Coordination No deficits were identified   Transfers   Transfers sit-stand transfer;toilet transfer   Sit-Stand Transfer   Assistive Device (Sit-Stand Transfers) walker, standard   Toilet Transfer   Type (Toilet Transfer) sit-stand;stand-sit   Balance   Balance Assessment identified impairments contributing to balance disturbance   Impairments Contributing to Balance Dysfunction pain;decreased range of motion (ROM);decreased strength   Activities of Daily Living   BADL Assessment toileting;grooming;lower body dressing;upper body dressing   Lower Body Dressing Assessment   [place] Level (Lower Body Dressing) verbal cues;supervision;nonverbal cues (demo/gesture)   Assistive Devices (Lower Body Dressing) sock-aid   Position (Lower Body Dressing) edge of bed sitting   Grooming Assessment   [place] Level (Grooming) set up;verbal cues   Position (Grooming) sink side   Toileting   [place] Level (Toileting) supervision;verbal cues;nonverbal cues (demo/gesture)   Clinical Impression   Criteria for Skilled Therapeutic Interventions Met (OT) yes   OT Diagnosis ADL dysfunction   OT Problem List-Impairments impacting ADL activity tolerance impaired;balance;range of motion (ROM);post-surgical precautions;strength;pain   Assessment of Occupational Performance 1-3 Performance Deficits   Identified Performance Deficits Dressing, bathing, functional transfers and mobility   Planned Therapy Interventions (OT) ADL retraining;IADL retraining;home program guidelines;progressive activity/exercise;risk factor education   Clinical Decision Making Complexity (OT) low complexity   Therapy Frequency (OT) 1x eval and treat   Predicted Duration of Therapy 1 day   Anticipated Equipment Needs Upon Discharge (OT) hip  kit   OT Discharge Planning    OT Discharge Recommendation (DC Rec) home with home care occupational therapy   OT Rationale for DC Rec Patient is below functional baseline and would benefit from skilled therapy in the home to increase independence with ADLs and home safety.    Total Evaluation Time (Minutes)   Total Evaluation Time (Minutes) 8

## 2021-10-05 NOTE — PROGRESS NOTES
"Orthopedic Surgery Progress Note    Subjective:   No acute events overnight. Pain controlled. Tolerating PO. Voiding. Last BM prior to surgery. No numbness or tingling. Has been out of bed to bedside commode.    Exam:  /49   Pulse 69   Temp 96.8  F (36  C) (Oral)   Resp 16   Ht 1.651 m (5' 5\")   Wt 92.3 kg (203 lb 7.8 oz)   SpO2 94%   BMI 33.86 kg/m    Gen: Awake, alert, NAD  Resp: breathing equal and non-labored  Extremities:    LLE: Dressing clean/dry/intact. Able to fire quad, TA, GSC, EHL, FHL. SILT to SP/DP/saphenous/sural/tibial nerves. Toes warm and well perfused.    Labs:  No lab results found in last 7 days.  Recent Labs   Lab 10/04/21  0550   *     No lab results found in last 7 days.    Assessment:   81 year old female s/p L TKA on 10/4/21 with Dr. Calvin. Doing well.    Plan for today:  - PT/OT  - Possible discharge to home pending PT clearance    Plan:  Post-Op Plan:  Ortho Primary  Activity: Up with assist and assistive devices as needed until independent.   Weight bearing status: WBAT  ROM: ROMAT  Antibiotics: Cefazolin x 24 hours  Diet: Begin with clear fluids and progress diet as tolerated. Bowel regimen. Anti-emetics PRN.    DVT prophylaxis: Mechanical while in hospital with aspirin 162mg x 4 weeks (beginning POD1  Elevation: Elevate heels off of bed on pillows   Wound Care: Dressing to remain c/d/I x 7 days.    Pain management: Orals PRN, IV for breakthrough only  X-rays: AP Pelvis and Lateral operative knee in PACU.   Physical Therapy: Mobilization, ROM, ADL's  Occupational Therapy: ADL's  Labs: Hgb POD1  Consults: PT, OT. Hospitalist, appreciate assistance in caring for this patient throughout the perioperative period  Disposition: Pending progress with therapies, pain control on orals, and medical stability, anticipate discharge to Home vs TCU on POD #1-2.    Future Appointments   Date Time Provider Department Center   10/5/2021  9:45 AM Arianna Beaver, PT [facility] "   10/5/2021 10:15 AM Kendy Adams, OT [facility] [place]   10/5/2021  3:30 PM Arianna Beaver, PT [facility] [place]     Cristal Kang MD  Orthopaedic Surgery PGY-4

## 2021-10-05 NOTE — PLAN OF CARE
"  VS: BP (!) 106/38 (BP Location: Left arm)   Pulse 62   Temp 97.1  F (36.2  C) (Oral)   Resp 16   Ht 1.651 m (5' 5\")   Wt 92.3 kg (203 lb 7.8 oz)   SpO2 99%   BMI 33.86 kg/m     O2: >90% on room air. Denies SOB/N/V/chest pain    Output: Voiding spontaneously without difficulties    Last BM: 10/03/21. Bowel sounds active and passing flatus    Activity: Patient met all PT goals. WBAT, SBA with walker and gait belt    Skin: Incision to left knee    Pain: Managed with schedule tylenol, PRN tylenol, and ice applied    CMS: Denies numbness and tingling    Dressing: CDI   Diet: Regular diet    LDA: PIV saline locked    Equipment: IV pole/pump, CAPNO, walker, gait belt, personal belongings, call light within patient reach    Plan: To discharge to home today    Additional Info:      Patient vital signs are at baseline: Yes  Patient able to ambulate as they were prior to admission or with assist devices provided by therapies during their stay:  Yes  Patient MUST void prior to discharge:  Yes  Patient able to tolerate oral intake:  Yes  Pain has adequate pain control using Oral analgesics:  Yes    Pt. discharged at 1155 to Home. Pt. was accompanied by writer, and left with personal belongings. Prior to discharge, PIV was removed. Pt. received complete discharge paperwork and medications as filled by discharge pharmacy. Pt. was given times of last dose for all discharge medications in writing on discharge medication sheets.  Discharge teaching included stop light tool, medication, pain management, activity restrictions, dressing changes, and signs and symptoms of infection. Pt. to follow up with surgeon in 2 weeks. Pt. had no further questions at the time of discharge and no unmet needs were identified.      "

## 2021-10-05 NOTE — DISCHARGE SUMMARY
ORTHOPAEDIC SURGERY DISCHARGE SUMMARY     Date of Admission: 10/4/2021  Date of Discharge: 10/5/2021 12:10 PM  Disposition: Home  Staff Physician: No att. providers found  Primary Care Provider: Gavi Bear    DISCHARGE DIAGNOSIS:  Chronic pain of left knee [M25.562, G89.29]    PROCEDURES: Procedure(s):  ARTHROPLASTY, LEFT KNEE, TOTAL on 10/4/2021 with Dr. Calvin.    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  Despite comprehensive non-operative management these symptoms continued to interfere with activities of daily living.  After discussion of further treatment options including the risks and benefits that patient elected to proceed with a total knee.    HOSPITAL COURSE:    The patient was admitted following the above listed procedures for pain control and rehabilitation. Yumiko Mccartney did well post-operatively. Medicine was consulted post operatively to aid in management of medical co-morbidities. The patient received routine nursing cares and at the time of discharge was medically stable. The patient is tolerating a regular diet and is voiding spontaneously. All PT/OT goals have been met for safe mobility. Pain is now controlled on oral medications which will be available on discharge. Stool softeners have been used while taking pain medications to help prevent constipation. Yumiko Mccartney is deemed medically safe to discharge.     Antibiotics:  Ancef given periop and 24 hours postop.   DVT prophylaxis:  Aspirin 162mg initiated after surgery and will be continued for 4 weeks.   PT Progress:  Has met PT/OT goals for safe mobility.   Pain Meds:  Weaned off all IV pain meds by discharge.  Inpatient Events: No significant events or complications.     PHYSICAL EXAM:    Gen: Awake, alert, NAD  Resp: breathing equal and non-labored  Extremities:     LLE: Dressing clean/dry/intact. Able to fire quad, TA, GSC, EHL, FHL. SILT to  SP/DP/saphenous/sural/tibial nerves. Toes warm and well perfused.    FOLLOWUP:    Follow up with Dr. Calvin at 2 weeks postoperatively.    No future appointments.    Orthopaedic Surgery appointments are at the [facility] ([address] [place] [id number]). Call [phone] to schedule a follow-up appointment at this location with your provider.     PLANNED DISCHARGE ORDERS:    Discharge Medication List as of 10/5/2021 11:12 AM      START taking these medications    Details   acetaminophen (TYLENOL) 325 MG tablet Take 2 tablets (650 mg) by mouth every 4 hours as needed for other, Disp-60 tablet, R-0, E-PrescribeDischarge today      aspirin (ASA) 81 MG EC tablet Take 2 tablets (162 mg) by mouth daily, Disp-60 tablet, R-0, E-Prescribe      oxyCODONE (ROXICODONE) 5 MG tablet Take 1 tablet (5 mg) by mouth every 4 hours as needed, Disp-45 tablet, R-0, E-Prescribe      polyethylene glycol (MIRALAX) 17 g packet Take 17 g by mouth daily, Disp-7 packet, R-0, E-Prescribe      senna-docusate (SENOKOT-S/PERICOLACE) 8.6-50 MG tablet Take 1 tablet by mouth 2 times daily, Disp-30 tablet, R-0, E-Prescribe         CONTINUE these medications which have NOT CHANGED    Details   calcium citrate (CITRACAL) 950 (200 Ca) MG tablet Take 1 tablet by mouth 2 times daily, Historical      Cholecalciferol (VITAMIN D) 2000 UNIT CAPS Take 2,000 Units by mouth daily., 2,000 Units, Oral, DAILY, Until Discontinued, Historical      cyclobenzaprine (FLEXERIL) 10 MG tablet TAKE 1 TABLET BY MOUTH AT BEDTIME AS NEEDED, Disp-30 tablet, R-1, E-Prescribe      doxycycline hyclate (VIBRA-TABS) 100 MG tablet TAKE 1 TABLET (100 MG) BY MOUTH 2 TIMES DAILY, Disp-180 tablet, R-0, E-Prescribe      gabapentin (NEURONTIN) 600 MG tablet Take 2 tablets (1,200 mg) by mouth At Bedtime, Disp-180 tablet,R-3, E-Prescribe      Multiple Vitamins-Iron TABS Take 1 tablet by mouth daily., 1 tablet, Oral, DAILY, Until Discontinued, Historical     "  tiZANidine (ZANAFLEX) 4 MG tablet Take 4 mg by mouth At Bedtime, Historical      etodolac (LODINE) 400 MG tablet TAKE 1 TABLET (400 MG) BY MOUTH 2 TIMES DAILY AS NEEDED, Disp-60 tablet, R-0, E-Prescribe      !! order for DME Please measure and distribute 1 pair of 10mmHg - 20mmHg THIGH high open or closed toe compression stockings. Jobst ultrasheer or equivalent.Disp-1 each, R-3, Local Print      !! order for DME Please measure and distribute 1 pair of 10mmHg - 20mmHg KNEE high open or closed toe compression stockings. Jobst ultrasheer or equivalent.Disp-1 each, R-3, Local Print      !! order for DME Please measure and distribute 1 pair of 20mmHg - 30mmHg knee high open or closed toe compression stockings. Jobst ultrasheer or equivalent.Disp-1 each, R-1, Local Print      Propylene Glycol (SYSTANE BALANCE OP) Apply 1 drop to eye 4 times daily as needed (Dry eyes) , Historical       !! - Potential duplicate medications found. Please discuss with provider.      STOP taking these medications       Glucos-MSM-C-Dz-Uhuumx-Psfnss (GLUCOSAMINE MSM COMPLEX) TABS tablet Comments:   Reason for Stopping:         lisinopril-hydrochlorothiazide (ZESTORETIC) 20-12.5 MG tablet Comments:   Reason for Stopping:                 Discharge Procedure Orders   Home Care OT Referral for Hospital Discharge   Referral Priority: Routine Referral Type: Consultation   Number of Visits Requested: 1     Home Care PT Referral for Hospital Discharge   Referral Priority: Routine Referral Type: Home Health Therapies & Aides   Number of Visits Requested: 1     Reason for your hospital stay   Order Comments: You stayed in the hospital overnight following your surgery     Contact Surgeon Team   Order Comments: You may experience symptoms that require follow-up before your scheduled appointment. Refer to the \"Stoplight Tool\" for instructions on when to contact Dr. Calvin's office if you are concerned about pain control, blood clots, constipation, or if " you are unable to urinate.    Regular business hours (Monday - Friday, 8am - 5pm):  [facility]: [phone]  [facility]: [phone]  [facility]: [phone]    After hours and weekends:  [facility] on call Orthopedic resident: [phone]     Orthopedic Urgent Care   Order Comments: If you are not able to reach Dr. Calvin's office and you need immediate care, go to the Orthopedic Urgent Care at your Surgeon's office.  Do NOT go to the Emergency Room unless you have shortness of breath, chest pain, or other signs of a medical emergency.     Call 911   Order Comments: Call 911 immediately if you experience sudden-onset chest pain, arm weakness/numbness, slurred speech, or shortness of breath     Breathing exercises   Order Comments: Perform breathing exercises using your Incentive Spirometer 10 times per hour while awake for 2 weeks.     Fever Management   Order Comments: A low grade fever can be expected after surgery.  Use acetaminophen (TYLENOL) as needed for fever management.  Contact your Surgeon Team if you have a fever greater than 101.5 F, chills, and/or night sweats.     Constipation management   Order Comments: Constipation (hard, dry bowel movements) is expected after surgery due to the combination of being less active, the anesthetic, and the opioid pain medication.  You can do the following to help reduce constipation:  ~  FLUIDS:  Drink clear liquids (water or Gatorade), or juice (apple/prune).  ~  DIET:  Eat a fiber rich diet.    ~  ACTIVITY:  Get up and move around several times a day.  Increase your activity as you are able.  MEDICATIONS:  Reduce the risk of constipation by starting medications before you are constipated.  You can take Miralax   (1 packet as directed) and/or a stool softener (Senokot 1-2 tablets 1-2 times a day).  If you already have constipation and these medications  are not working, you can get magnesium citrate and use as directed.  If you continue to have constipation you can try an over the counter suppository or enema.  Call your Surgeon Team if it has been greater than 3 days since your last bowel movement.     Reduced Urine Output   Order Comments: Changes in the amount of fluids you drank before and after surgery may result in problems urinating.  It is important to stay well-hydrated after surgery and drink plenty of water. If it has been greater than 8 hours since you have urinated despite drinking plenty of water, call your Surgeon Team.     Anticoagulation - aspirin   Order Comments: Take the aspirin as prescribed for a total of four weeks after surgery.  This is given to help minimize your risk of blood clot.     Activity - Exercises to prevent blood clots   Order Comments: Unless otherwise directed by your Surgeon team, perform the following exercises at least three times per day for the first four weeks after surgery to prevent blood clots in your legs: 1) Point and flex your feet (Ankle Pumps), 2) Move your ankle around in big circles, 3) Wiggle your toes, 4) Walk, even for short distances, several times a day, will help decrease the risk of blood clots.     Order Specific Question Answer Comments   Is discharge order? Yes      Pain after Surgery   Order Comments: Pain after surgery is normal and expected.  You will have some amount of pain for several weeks after surgery.  Your pain will improve with time.  There are several things you can do to help reduce your pain including: rest, ice, elevation, and using pain medications as needed. Contact your Surgeon Team if you have pain that persists or worsens after surgery despite rest, ice, elevation, and taking your medication(s) as prescribed. Contact your Surgeon Team if you have new numbness, tingling, or weakness in your operative extremity.     Swelling after Surgery   Order Comments: Swelling and/or bruising  "of the surgical extremity is common and may persist for several months after surgery. In addition to frequent icing and elevation, gentle compressive support with an ACE wrap or tubigrip may help with swelling. Apply compression regularly, removing at least twice daily to perform skin checks. Contact your Surgeon Team if your swelling increases and is NOT associated with an increase in your activity level, or if your swelling increases and is associated with redness and pain.     LOWER Extremity Elevation   Order Comments: Swelling is expected for several months after surgery. This type of swelling is usually associated with gravity and activity, and can be improved with elevation.   The best way to do this is to get your \"toes above your nose\" by laying down and placing several pillows lengthwise under your calf and heel. When elevating your leg keep your knee completely straight. Perform this elevation as often as possible especially for the first two weeks after surgery.     Cold therapy   Order Comments: Ice can be used to control swelling and discomfort after surgery. Place a thin towel over your operative site and apply the ice pack overtop. Leave ice pack in place for 20 minutes, then remove for 20 minutes. Repeat this 20 minutes on/20 minutes off routine as often as tolerated.     acetaminophen (TYLENOL) Instructions   Order Comments: You were discharged with acetaminophen (TYLENOL) for pain management after surgery. Acetaminophen most effectively manages pain symptoms when it is taken on a schedule without missing doses (every four, six, or eight hours). Your Provider will prescribe a safe daily dose between 3000 - 4000 mg.  Do NOT exceed this daily dose. Most patients use acetaminophen for pain control for the first four weeks after surgery.  You can wean from this medication as your pain decreases.     NSAID Instructions   Order Comments: You were discharged with an anti-inflammatory medication for pain " management to use in combination with acetaminophen (TYLENOL) and the narcotic pain medication.  Take this medication exactly as directed.  You should only take one anti-inflammatory at a time.  Some common anti-inflammatories include: ibuprofen (ADVIL, MOTRIN), naproxen (ALEVE, NAPROSYN), celecoxib (CELEBREX), meloxicam (MOBIC), ketorolac (TORADOL).  Take this medication with food and water.     Opioids - Tapering Instructions   Order Comments: In the first three days following surgery, your symptoms may warrant use of the narcotic pain medication every four to six hours as prescribed. This is normal. As your pain symptoms improve, focus your efforts on decreasing (tapering) use of narcotic medications. The most successful tapering strategy is to first, decrease the number of tablets you take every 4-6 hours to the minimum prescribed. Then, increase the amount of time between doses.  For example:  First, taper to   or 1 tablet every 4-6 hours.  Then, taper to   or 1 tablet every 6-8 hours.  Then, taper to   or 1 tablet every 8-10 hours.  Then, taper to   or 1 tablet every 10-12 hours.  Then, taper to   or 1 tablet at bedtime.  The bedtime dose can help with comfort during sleep and is typically the last dose to be discontinued after surgery.     Activity - Total Knee Arthroplasty     Order Specific Question Answer Comments   Is discharge order? Yes      Return to Driving   Order Comments: Return to driving - Driving is NOT permitted until directed by your provider. Under no circumstance are you permitted to drive while using narcotic pain medications.     Order Specific Question Answer Comments   Is discharge order? Yes      Dressing / Wound Care - Wound   Order Comments: You have a clean dressing on your surgical wound. Dressing change instructions as follows: remove your dressing in 7 days, and leave incision open to air. Contact your Surgeon Team if you have increased redness, warmth around the surgical wound,  and/or drainage from the surgical wound.     Dressing / Wound Care - NO Tub Bathing   Order Comments: Tub bathing, swimming, or any other activities that will cause your incision to be submerged in water should be avoided until allowed by your Surgeon.     Opioid Instructions (Less than 65 years)   Order Comments: You were discharged with an opioid medication (hydromorphone, oxycodone, hydrocodone, or tramadol). This medication should only be taken for breakthrough pain that is not controlled with acetaminophen (TYLENOL). If you rate your pain less than 3 you do not need this medication.  Pain rating 0-3:  You do not need this medication.  Pain rating 4-6:  Take 1 tablet every 4-6 hours as needed  Pain rating 7-10:  Take 2 tablets every 4-6 hours as needed.  Do not exceed 6 tablets per day     Weight bearing as tolerated   Order Comments: Weight bearing as tolerated on your operative extremity.     Order Specific Question Answer Comments   Is discharge order? Yes      Shower with wound/dressing covered   Order Comments: You must COVER your dressing or incision with saran wrap (or any other non-permeable covering) to allow the incision to remain dry while showering.  You may shower 2 days after surgery as long as the surgical wound stays dry. Continue to cover your dressing or incision for showering until your first office visit.  You are strictly prohibited from soaking   or submerging the surgical wound underwater.     MD face to face encounter   Order Comments: Documentation of Face to Face and Certification for Home Health Services    I certify that patient: Yumiko Mccartney is under my care and that I, or a nurse practitioner or physician's assistant working with me, had a face-to-face encounter that meets the physician face-to-face encounter requirements with this patient on: 10/5/2021.    This encounter with the patient was in whole, or in part, for the following medical condition, which is the primary reason  for home health care: Left total knee arthroplasty.    I certify that, based on my findings, the following services are medically necessary home health services: Occupational Therapy and Physical Therapy.    My clinical findings support the need for the above services because: Occupational Therapy Services are needed to assess and treat cognitive ability and address ADL safety due to impairment in To increase in functional mobility and activities of daily living and Physical Therapy Services are needed to assess and treat the following functional impairments: For home safety evaluation, strengthening, and increase independence with all functional mobility and gait.    Further, I certify that my clinical findings support that this patient is homebound (i.e. absences from home require considerable and taxing effort and are for medical reasons or Ale[facility] services or infrequently or of short duration when for other reasons) because: Requires assistance of another person or specialized equipment to access medical services because patient: Range of motion limitations prevents ability to exit home safely. and Requires supervision of another for safe transfer...    Based on the above findings. I certify that this patient is confined to the home and needs intermittent skilled nursing care, physical therapy and/or speech therapy.  The patient is under my care, and I have initiated the establishment of the plan of care.  This patient will be followed by a physician who will periodically review the plan of care.  Physician/Provider to provide follow up care: Gavi Bear    Attending hospital physician (the Medicare certified [place] provider): Glenn Calvin MD  Physician Signature: See electronic signature associated with these discharge orders.  Date: 10/5/2021     Follow Up Care   Order Comments: You are scheduled for a post operative wound check with Dr. Calvin's clinic approximately two weeks after surgery. At  approximately six weeks after surgery, you will see Dr. Calvin in clinic. During this visit, repeat X rays of your operative hip will be performed.      Your follow up appointments will be at the location that you regularly see Dr. Calvin:    [facility]  [address] [place] [address] [phone]    [facility]  [address] [address] [place] [address] [phone]    [facility]  [address] [place] [address] [phone]      Lisinopril was held after surgery because of low blood pressures.   Please check your blood pressures once of twice daily.  Resume Lisinopril when your blood pressures are consistently over 130     Crutches DME   Order Comments: DME Documentation: Describe the reason for need to support medical necessity: Impaired gait status post knee surgery. I, the undersigned, certify that the above prescribed supplies are medically necessary for this patient and is both reasonable and necessary in reference to accepted standards of medical practice in the treatment of this patient's condition and is not prescribed as a convenience.     Order Specific Question Answer Comments   DME Provider: [employer]    Crutch Type: Standard    Crutches Add On: NA    Length of Need: Lifetime      Cane DME   Order Comments: DME Documentation: Describe the reason for need to support medical necessity: Impaired gait status post knee surgery. I, the undersigned, certify that the above prescribed supplies are medically necessary for this patient and is both reasonable and necessary in reference to accepted standards of medical practice in the treatment of this patient's condition and is not prescribed as a convenience.     Order Specific Question Answer Comments   DME Provider: [employer]    Cane Type: Single Tip    Reminder: Patient can typically get 1 every 5 years      Walker  DME   Order Comments: DME Documentation: Describe the reason for need to support medical necessity: Impaired gait status post knee surgery. I, the undersigned, certify that the above prescribed supplies are medically necessary for this patient and is both reasonable and necessary in reference to accepted standards of medical practice in the treatment of this patient's condition and is not prescribed as a convenience.     Order Specific Question Answer Comments   DME Provider: [employer]    Walker Type: Standard (2 Wheel)    Accessories: N/A      Regular Diet Adult     Order Specific Question Answer Comments   Is discharge order? Yes      Assign Questionnaire Series to Patient         Cristal Kang MD  Orthopaedic Surgery, PGY4

## 2021-10-05 NOTE — PLAN OF CARE
Physical Therapy Discharge Summary    Reason for therapy discharge:    All goals and outcomes met, no further needs identified.    Progress towards therapy goal(s). See goals on Care Plan in [facility] electronic health record for goal details.  Goals met    Therapy recommendation(s):    Continued therapy is recommended.  Rationale/Recommendations:  home PT for safety evaluation and progression.

## 2021-10-05 NOTE — PROGRESS NOTES
Patient was seen, course reviewed with nursing staff.    Internal medicine consultation reviewed.    Patient feels well, states pain control is fair  No chest pain or shortness of breath  Vital signs have been stable  Systolic blood pressure 100s  Patient is alert fully oriented  Lungs clear  CV regular rhythm  Abdomen soft  No lower extremity edema    BMP normal  Hemoglobin 8.8 (11.4)      Assessment    Status post left total knee arthroplasty.  Patient doing well overall    Acute blood loss anemia, not symptomatic    History of hypertension, blood pressure low postop off Zestoretic    Plan  Discharge per orthopedics  Continue to hold Zestoretic on discharge  Patient to check blood pressure once or twice a day and resume Zestoretic when systolic blood pressure over 130  Symptoms of progressive anemia reviewed with patient  DVT prophylaxis per Ortho

## 2021-10-05 NOTE — PHARMACY-ADMISSION MEDICATION HISTORY
Admission Medication History Completed by Pharmacy    See [facility] Admission Navigator for allergy information, preferred outpatient pharmacy, prior to admission medications and immunization status.     Medication History Sources:     Patient    Surescript    Changes made to PTA medication list (reason):    Added: None    Deleted: Multivitamin-Duplicate, Glucosamine-MSM, Medrol Dosepak    Changed: Tizanidine to at bedtime     Additional Information:    None    Prior to Admission medications    Medication Sig Last Dose Taking? Auth Provider   calcium citrate (CITRACAL) 950 (200 Ca) MG tablet Take 1 tablet by mouth 2 times daily 10/2/2021 Yes Unknown, Entered By History   Cholecalciferol (VITAMIN D) 2000 UNIT CAPS Take 2,000 Units by mouth daily. Past Week at Unknown time Yes Reported, Patient   cyclobenzaprine (FLEXERIL) 10 MG tablet TAKE 1 TABLET BY MOUTH AT BEDTIME AS NEEDED Past Week at Unknown time Yes Abad Armendariz MD   doxycycline hyclate (VIBRA-TABS) 100 MG tablet TAKE 1 TABLET (100 MG) BY MOUTH 2 TIMES DAILY 10/3/2021 at Unknown time Yes Gavi Bear MD   gabapentin (NEURONTIN) 600 MG tablet Take 2 tablets (1,200 mg) by mouth At Bedtime 10/3/2021 at 2200 Yes Gavi Bear MD   lisinopril-hydrochlorothiazide (ZESTORETIC) 20-12.5 MG tablet TAKE 2 TABLETS BY MOUTH EVERY DAY 10/3/2021 at 0900 Yes Gavi Bear MD   Multiple Vitamins-Iron TABS Take 1 tablet by mouth daily. Past Week at Unknown time Yes Reported, Patient   tiZANidine (ZANAFLEX) 4 MG tablet Take 4 mg by mouth At Bedtime  Yes Unknown, Entered By History   etodolac (LODINE) 400 MG tablet TAKE 1 TABLET (400 MG) BY MOUTH 2 TIMES DAILY AS NEEDED   Gavi Bear MD   order for DME Please measure and distribute 1 pair of 10mmHg - 20mmHg THIGH high open or closed toe compression stockings. Jobst ultrasheer or equivalent.   Aryan Barton MD   order for DME Please measure and distribute 1 pair of 10mmHg - 20mmHg KNEE high open or closed  toe compression stockings. Jobst ultrasheer or equivalent.   Aryan Barton MD   order for DME Please measure and distribute 1 pair of 20mmHg - 30mmHg knee high open or closed toe compression stockings. Jobst ultrasheer or equivalent.   Aryan Barton MD   Propylene Glycol (SYSTANE BALANCE OP) Apply 1 drop to eye 4 times daily as needed (Dry eyes)    Reported, Patient       Date completed: 10/05/21    Medication history completed by: Stalin Garner [facility]

## 2021-10-05 NOTE — PLAN OF CARE
Occupational Therapy Discharge Summary    Reason for therapy discharge:    Discharged to home with home therapy.    Progress towards therapy goal(s). See goals on Care Plan in [facility] electronic health record for goal details.  Goals met    Therapy recommendation(s):    Continued therapy is recommended.  Rationale/Recommendations:  Patient would benefit from skilled home OT to increase independence with ADLs, decrease burden of care, and increase safety.

## 2021-10-05 NOTE — CONSULTS
Care Management Initial Consult    General Information  Assessment completed with: Patient,    Type of CM/SW Visit: Initial Assessment    Primary Care Provider verified and updated as needed: Yes   Readmission within the last 30 days: no previous admission in last 30 days      Reason for Consult: discharge planning  Advance Care Planning: Advance Care Planning Reviewed: no concerns identified          Communication Assessment  Patient's communication style: spoken language (English or Bilingual)    Hearing Difficulty or Deaf: no   Wear Glasses or Blind: no    Cognitive  Cognitive/Neuro/Behavioral: WDL  Level of Consciousness: alert  Arousal Level: opens eyes spontaneously     Mood/Behavior: calm, cooperative          Living Environment:   People in home: alone     Current living Arrangements: house      Able to return to prior arrangements: yes       Family/Social Support:  Care provided by: self  Provides care for: no one  Marital Status:   Children          Description of Support System: Supportive, Involved    Support Assessment: Adequate family and caregiver support, Adequate social supports    Current Resources:   Patient receiving home care services: No     Community Resources: None  Equipment currently used at home: none (owns cane and walker)  Supplies currently used at home:      Employment/Financial:  Employment Status:          Financial Concerns: insurance, none, No concerns identified           Lifestyle & Psychosocial Needs:  Social Determinants of Health     Tobacco Use: Low Risk      Smoking Tobacco Use: Never Smoker     Smokeless Tobacco Use: Never Used   Alcohol Use:      Frequency of Alcohol Consumption:      Average Number of Drinks:      Frequency of Binge Drinking:    Financial Resource Strain:      Difficulty of Paying Living Expenses:    Food Insecurity:      Worried About Running Out of Food in the Last Year:      Ran Out of Food in the Last Year:    Transportation Needs:      Lack of  Transportation (Medical):      Lack of Transportation (Non-Medical):    Physical Activity:      Days of Exercise per Week:      Minutes of Exercise per Session:    Stress:      Feeling of Stress :    Social Connections:      Frequency of Communication with Friends and Family:      Frequency of Social Gatherings with Friends and Family:      Attends Mandaeism Services:      Active Member of Clubs or Organizations:      Attends Club or Organization Meetings:      Marital Status:    Intimate Partner Violence:      Fear of Current or Ex-Partner:      Emotionally Abused:      Physically Abused:      Sexually Abused:    Depression: Not at risk     PHQ-2 Score: 0   Housing Stability:      Unable to Pay for Housing in the Last Year:      Number of Places Lived in the Last Year:      Unstable Housing in the Last Year:        Functional Status:  Prior to admission patient needed assistance:   Dependent ADLs:: Independent  Dependent IADLs:: Independent       Mental Health Status:          Chemical Dependency Status:                Values/Beliefs:  Spiritual, Cultural Beliefs, Mandaeism Practices, Values that affect care: no  Description of Beliefs that Will Affect Care:             Additional Information:  This writer called and spoke with patient regarding discharge planning. Tomi states that she will have support at home- her daughter will be coming to stay with her for the next few days after discharge. She has a cane and walker at home to use if needed. This writer discussed about having home therapy to see Tomi and she is open to having them. She does not have a preference for agency and is agreeable to having [facility]. Tomi does not express any other needs at this time.     A referral will be sent to [facility].     [facility] will be available for any other needs.    SHERRY Salas RN Care Coordinator  Pager [phone] (unit [facility] pager)     For Weekend & Holiday on call  RN Care Coordinator:  (home discharge with needs including home care, assisted living facility returns, durable medical equip, IV antibiotics)   Page [phone]    For weekend social work needs, contact information below:  (Transitional care unit, Long-term care unit, Hospice, social needs)  4A, 4C, 4E, 5A, 5B                  pager [phone]  6A, 6B, 6C, 6D                        pager [phone]  7A, 7B, 7C, 7D, 5C                 pager [phone]

## 2021-10-05 NOTE — PROGRESS NOTES
SPIRITUAL HEALTH SERVICES  SPIRITUAL ASSESSMENT Progress Note  [facility] ([facility]) 5A ORTHO   10/5  REFERRAL SOURCE: Initial Visit    Pt mentioned her journey as a 'Anglican ' and how she felt transitioning to the 'Zoroastrian' rayna. She stated the  condemned her and the spouse every Sunday which resulted in her returning to the [employer]. Pt had concerns about death and eternity. Unit  provided some scriptures and information that states where people go after death is based on life choices. Pt was willing to read those scriptures.    PLAN: Will follow up with pt as needed while on unit.    Mi Trammell  Associate    Pager: [phone]

## 2021-10-05 NOTE — PLAN OF CARE
"VS: /46 (BP Location: Left arm)   Pulse 63   Temp 96.9  F (36.1  C) (Oral)   Resp 16   Ht 1.651 m (5' 5\")   Wt 92.3 kg (203 lb 7.8 oz)   SpO2 96%   BMI 33.86 kg/m      O2: 96% on room air. Lung sounds clear. Pt denies chest pain. SOB, or coughs.    Output: Voids spontaneously without difficulty in the bathroom.   Last BM: 9/03/21 per pt report. Bowel sounds active/   Activity: Assist x1 with gait belt and walker. WBAT. Ambulated to bathroom x 2.   Skin: Intact except left knee surgical incision    Pain: Malinda left knee incisional pain. Prn oxycodone and scheduled Tylenol,Gabapentin given for pain.   CMS /Neuro: A&O x 4. Denies numbness or tingling.    Dressing: Left knee dressing CDI   Diet: Regular diet    LDA: PIV right arm infusing    Equipment: IV pole/pump, call light, walker, gait belt, PCDs, CAPNO and personal belongings    Plan: TBD   Additional Info:        "

## 2021-10-06 DIAGNOSIS — M17.12 PRIMARY OSTEOARTHRITIS OF LEFT KNEE: Primary | ICD-10-CM

## 2021-10-06 NOTE — PROGRESS NOTES
Called patient and left message.  I talked with Trell in [facility], he needed verbal orders for PT. Orders were ok'd for 2 times this week, 3 times next week, 2 time the week after and then transition to outpatient.   Order placed for outpatient PT so patient can schedule now.  Also need to change follow up appointments to a 2 week nurse visit and a 6 week provider visit.   Jenny Cordoba RN

## 2021-10-07 RX ORDER — ONDANSETRON 4 MG/1
4 TABLET, FILM COATED ORAL EVERY 8 HOURS PRN
Qty: 30 TABLET | Refills: 0 | Status: SHIPPED | OUTPATIENT
Start: 2021-10-07 | End: 2022-05-17

## 2021-10-07 RX ORDER — HYDROXYZINE HYDROCHLORIDE 25 MG/1
25 TABLET, FILM COATED ORAL EVERY 6 HOURS PRN
Qty: 45 TABLET | Refills: 0 | Status: SHIPPED | OUTPATIENT
Start: 2021-10-07 | End: 2022-05-17

## 2021-10-07 NOTE — PROGRESS NOTES
Patient returned call to set up appointments. Appointments were changed for a 2 week nurse visit.   We also discussed pain she was having today. She notes she is barely able to move about today due to pain. She is taking 1 oxycodone every 4 hours and this is not helping with the pain. Advised that she can go up to 2 tabs every 4 hours. We will also add Hydroxyzine to help increase the effectiveness of the narcotic. Patient was also complaining of a lot of nausea and has been unable to eat, Zofran given to patient.   She will trial these new medications and increase the Oxycodone. If she does not tolerate, we will change narcotic medication. She will call me with any further concerns.   Jenny Cordoba RN

## 2021-10-08 ENCOUNTER — MEDICAL CORRESPONDENCE (OUTPATIENT)
Dept: HEALTH INFORMATION MANAGEMENT | Facility: CLINIC | Age: 81
End: 2021-10-08
Payer: COMMERCIAL

## 2021-10-08 DIAGNOSIS — G89.29 CHRONIC PAIN OF LEFT KNEE: ICD-10-CM

## 2021-10-08 DIAGNOSIS — M25.562 CHRONIC PAIN OF LEFT KNEE: ICD-10-CM

## 2021-10-08 RX ORDER — OXYCODONE HYDROCHLORIDE 5 MG/1
5-10 TABLET ORAL EVERY 4 HOURS PRN
Qty: 60 TABLET | Refills: 0 | Status: SHIPPED | OUTPATIENT
Start: 2021-10-08 | End: 2021-10-09

## 2021-10-08 RX ORDER — OXYCODONE HYDROCHLORIDE 5 MG/1
5-10 TABLET ORAL EVERY 4 HOURS PRN
Qty: 60 TABLET | Refills: 0 | Status: CANCELLED | OUTPATIENT
Start: 2021-10-08

## 2021-10-08 NOTE — TELEPHONE ENCOUNTER
[facility] Call Center    Phone Message    May a detailed message be left on voicemail: yes     Reason for Call: Medication Refill Request    Has the patient contacted the pharmacy for the refill? Yes   Name of medication being requested: oxyCODONE (ROXICODONE) 5 MG tablet  Provider who prescribed the medication: Nikunj  Pharmacy:[facility]   Date medication is needed:Please advise. Thank you.    Action Taken: Message routed to:  Adult Clinics: Orthopedics p [id number]    Travel Screening: Not Applicable

## 2021-10-08 NOTE — TELEPHONE ENCOUNTER
[facility] Call Center    Phone Message    May a detailed message be left on voicemail: yes     Reason for Call: Medication Question or concern regarding medication   Prescription Clarification  Name of Medication: oxyCODONE (ROXICODONE) 5 MG tablet  Prescribing Provider: Nikunj   Pharmacy: [facility] in [place] called to let us know they can't fill this medication because they are out of stock.  They said that we could send it to [facility] in [employer] in [place].  They said Pt needs medication today.  Thanks.    Action Taken: Message routed to:  Adult Clinics: Orthopedics p [id number]    Travel Screening: Not Applicable

## 2021-10-09 RX ORDER — OXYCODONE HYDROCHLORIDE 5 MG/1
5-10 TABLET ORAL EVERY 4 HOURS PRN
Qty: 60 TABLET | Refills: 0 | Status: SHIPPED | OUTPATIENT
Start: 2021-10-09 | End: 2021-10-15

## 2021-10-14 ENCOUNTER — TELEPHONE (OUTPATIENT)
Dept: FAMILY MEDICINE | Facility: CLINIC | Age: 81
End: 2021-10-14

## 2021-10-14 ENCOUNTER — HOSPITAL ENCOUNTER (EMERGENCY)
Facility: CLINIC | Age: 81
Discharge: HOME | End: 2021-10-14
Attending: EMERGENCY MEDICINE | Admitting: EMERGENCY MEDICINE
Payer: COMMERCIAL

## 2021-10-14 VITALS
TEMPERATURE: 98.8 F | WEIGHT: 200 LBS | DIASTOLIC BLOOD PRESSURE: 51 MMHG | HEART RATE: 73 BPM | RESPIRATION RATE: 16 BRPM | OXYGEN SATURATION: 100 % | BODY MASS INDEX: 33.28 KG/M2 | SYSTOLIC BLOOD PRESSURE: 101 MMHG

## 2021-10-14 DIAGNOSIS — E86.0 DEHYDRATION: ICD-10-CM

## 2021-10-14 LAB
ALBUMIN SERPL-MCNC: 2.7 G/DL (ref 3.4–5)
ALBUMIN UR-MCNC: NEGATIVE MG/DL
ALP SERPL-CCNC: 75 U/L (ref 40–150)
ALT SERPL W P-5'-P-CCNC: 22 U/L (ref 0–50)
ANION GAP SERPL CALCULATED.3IONS-SCNC: 2 MMOL/L (ref 3–14)
APPEARANCE UR: CLEAR
AST SERPL W P-5'-P-CCNC: 25 U/L (ref 0–45)
ATRIAL RATE - MUSE: 79 BPM
BASOPHILS # BLD AUTO: 0 10E3/UL (ref 0–0.2)
BASOPHILS NFR BLD AUTO: 0 %
BILIRUB SERPL-MCNC: 0.3 MG/DL (ref 0.2–1.3)
BILIRUB UR QL STRIP: NEGATIVE
BUN SERPL-MCNC: 10 MG/DL (ref 7–30)
CALCIUM SERPL-MCNC: 8.3 MG/DL (ref 8.5–10.1)
CHLORIDE BLD-SCNC: 105 MMOL/L (ref 94–109)
CO2 SERPL-SCNC: 33 MMOL/L (ref 20–32)
COLOR UR AUTO: ABNORMAL
CREAT SERPL-MCNC: 0.83 MG/DL (ref 0.52–1.04)
DIASTOLIC BLOOD PRESSURE - MUSE: NORMAL MMHG
EOSINOPHIL # BLD AUTO: 0.3 10E3/UL (ref 0–0.7)
EOSINOPHIL NFR BLD AUTO: 4 %
ERYTHROCYTE [DISTWIDTH] IN BLOOD BY AUTOMATED COUNT: 13.6 % (ref 10–15)
GFR SERPL CREATININE-BSD FRML MDRD: 67 ML/MIN/1.73M2
GLUCOSE BLD-MCNC: 109 MG/DL (ref 70–99)
GLUCOSE UR STRIP-MCNC: NEGATIVE MG/DL
HCT VFR BLD AUTO: 29.3 % (ref 35–47)
HGB BLD-MCNC: 8.7 G/DL (ref 11.7–15.7)
HGB UR QL STRIP: NEGATIVE
HOLD SPECIMEN: NORMAL
HOLD SPECIMEN: NORMAL
HYALINE CASTS: 8 /LPF
IMM GRANULOCYTES # BLD: 0.1 10E3/UL
IMM GRANULOCYTES NFR BLD: 1 %
INTERPRETATION ECG - MUSE: NORMAL
KETONES UR STRIP-MCNC: NEGATIVE MG/DL
LEUKOCYTE ESTERASE UR QL STRIP: NEGATIVE
LYMPHOCYTES # BLD AUTO: 1.1 10E3/UL (ref 0.8–5.3)
LYMPHOCYTES NFR BLD AUTO: 14 %
MCH RBC QN AUTO: 24.9 PG (ref 26.5–33)
MCHC RBC AUTO-ENTMCNC: 29.7 G/DL (ref 31.5–36.5)
MCV RBC AUTO: 84 FL (ref 78–100)
MONOCYTES # BLD AUTO: 0.4 10E3/UL (ref 0–1.3)
MONOCYTES NFR BLD AUTO: 5 %
MUCOUS THREADS #/AREA URNS LPF: PRESENT /LPF
NEUTROPHILS # BLD AUTO: 6.2 10E3/UL (ref 1.6–8.3)
NEUTROPHILS NFR BLD AUTO: 76 %
NITRATE UR QL: NEGATIVE
NRBC # BLD AUTO: 0 10E3/UL
NRBC BLD AUTO-RTO: 0 /100
P AXIS - MUSE: 25 DEGREES
PH UR STRIP: 5 [PH] (ref 5–7)
PLATELET # BLD AUTO: 335 10E3/UL (ref 150–450)
POTASSIUM BLD-SCNC: 3.9 MMOL/L (ref 3.4–5.3)
PR INTERVAL - MUSE: 142 MS
PROT SERPL-MCNC: 6 G/DL (ref 6.8–8.8)
QRS DURATION - MUSE: 80 MS
QT - MUSE: 366 MS
QTC - MUSE: 419 MS
R AXIS - MUSE: -45 DEGREES
RBC # BLD AUTO: 3.5 10E6/UL (ref 3.8–5.2)
RBC URINE: <1 /HPF
SODIUM SERPL-SCNC: 140 MMOL/L (ref 133–144)
SP GR UR STRIP: 1.01 (ref 1–1.03)
SYSTOLIC BLOOD PRESSURE - MUSE: NORMAL MMHG
T AXIS - MUSE: 14 DEGREES
UROBILINOGEN UR STRIP-MCNC: NORMAL MG/DL
VENTRICULAR RATE- MUSE: 79 BPM
WBC # BLD AUTO: 8.1 10E3/UL (ref 4–11)
WBC URINE: 4 /HPF

## 2021-10-14 PROCEDURE — 96361 HYDRATE IV INFUSION ADD-ON: CPT | Performed by: EMERGENCY MEDICINE

## 2021-10-14 PROCEDURE — 99284 EMERGENCY DEPT VISIT MOD MDM: CPT | Performed by: EMERGENCY MEDICINE

## 2021-10-14 PROCEDURE — 99284 EMERGENCY DEPT VISIT MOD MDM: CPT | Mod: 25 | Performed by: EMERGENCY MEDICINE

## 2021-10-14 PROCEDURE — 36415 COLL VENOUS BLD VENIPUNCTURE: CPT | Performed by: EMERGENCY MEDICINE

## 2021-10-14 PROCEDURE — 258N000003 HC RX IP 258 OP 636: Performed by: EMERGENCY MEDICINE

## 2021-10-14 PROCEDURE — 85025 COMPLETE CBC W/AUTO DIFF WBC: CPT | Performed by: EMERGENCY MEDICINE

## 2021-10-14 PROCEDURE — 81003 URINALYSIS AUTO W/O SCOPE: CPT | Performed by: EMERGENCY MEDICINE

## 2021-10-14 PROCEDURE — 96360 HYDRATION IV INFUSION INIT: CPT | Performed by: EMERGENCY MEDICINE

## 2021-10-14 PROCEDURE — 80053 COMPREHEN METABOLIC PANEL: CPT | Performed by: EMERGENCY MEDICINE

## 2021-10-14 PROCEDURE — 250N000013 HC RX MED GY IP 250 OP 250 PS 637: Performed by: EMERGENCY MEDICINE

## 2021-10-14 RX ORDER — ACETAMINOPHEN 500 MG
1000 TABLET ORAL ONCE
Status: COMPLETED | OUTPATIENT
Start: 2021-10-14 | End: 2021-10-14

## 2021-10-14 RX ORDER — SODIUM CHLORIDE 9 MG/ML
INJECTION, SOLUTION INTRAVENOUS CONTINUOUS
Status: DISCONTINUED | OUTPATIENT
Start: 2021-10-14 | End: 2021-10-14 | Stop reason: HOSPADM

## 2021-10-14 RX ORDER — OXYCODONE HYDROCHLORIDE 5 MG/1
5 TABLET ORAL ONCE
Status: COMPLETED | OUTPATIENT
Start: 2021-10-14 | End: 2021-10-14

## 2021-10-14 RX ADMIN — SODIUM CHLORIDE 1000 ML: 9 INJECTION, SOLUTION INTRAVENOUS at 10:09

## 2021-10-14 RX ADMIN — SODIUM CHLORIDE, POTASSIUM CHLORIDE, SODIUM LACTATE AND CALCIUM CHLORIDE 1000 ML: 600; 310; 30; 20 INJECTION, SOLUTION INTRAVENOUS at 12:45

## 2021-10-14 RX ADMIN — OXYCODONE HYDROCHLORIDE 5 MG: 5 TABLET ORAL at 14:01

## 2021-10-14 RX ADMIN — ACETAMINOPHEN 1000 MG: 500 TABLET ORAL at 14:07

## 2021-10-14 ASSESSMENT — ENCOUNTER SYMPTOMS
WEAKNESS: 1
APPETITE CHANGE: 1
FATIGUE: 1
FEVER: 0

## 2021-10-14 NOTE — ED TRIAGE NOTES
Pt arrived via ems. Reported to have stoke like s/s at home with rehab nurse. Ems reported negative scale and possible infection to knee.  Assessment to surgical knee from a week ago looks good and no s/s of infection. Pt reported some sob with walking and sats 86-91 on arrival. Placed on 2 liters.  Stroke scale questionable on arrival. MD evaled right away and said looks okay at this time.

## 2021-10-14 NOTE — DISCHARGE INSTRUCTIONS
Rest drink plenty of fluids follow-up with your orthopedic surgeon.  All of the labs and tests that were done today are normal.  No evidence for infection  I do think that dehydration was contributing to your current symptoms.

## 2021-10-14 NOTE — ED NOTES
Bed: ED05  Expected date: 10/14/21  Expected time:   Means of arrival:   Comments:  [id number], knee infection after surgery

## 2021-10-14 NOTE — ED PROVIDER NOTES
[facility] EMERGENCY DEPARTMENT ([place] [facility])   October 14, 2021  ED 5 9:49 AM   History     Chief Complaint   Patient presents with     Generalized Weakness     The history is provided by the patient and medical records.     Yumiko Mccartney is a 81 year old female who was brought in by ambulance for evaluation of generalized weakness, fatigue, and low O2 sats. Patient recently underwent left total knee arthroplasty on 10/4/21 by Dr. Glenn Calvin.  She has been convalescing at home where she lives alone.  Today she had in-home physical therapy and her physical therapist checked her O2 sats, found him to be low.  911 was called and she was brought here for evaluation. She was given a bolus of fluids prior to arrival.  Patient states that she gets out of breath just walking. All day yesterday her head was pounding. [employer] is putting in cable and patient thought her headache was due to construction noises.  She continued to have headache after [employer] was done.  She hasn't been hungry since surgery but does try to eat and stay hydrated. She states the headache has improved but she still feels generally weak. Feels very dehydrated, is not sure why. Her finger keeps jerking. No fevers. No urinary symptoms. Took oxycodone at 7:30 AM. She denies history of diabetes. She denies any history of lung disease. She is on aspirin but no other anticoagulants. Her next follow-up appointment is next Tuesday.  She has received both doses of the Pfizer COVID-19 vaccine, last dose on 2/13/2021.  She had a negative COVID-19 test Wednesday. No history of DVT/PE.     PAST MEDICAL HISTORY:   Past Medical History:   Diagnosis Date     Acne rosacea      Actinic keratosis      Amblyopia      Asthma, moderate persistent      Basal cell carcinoma 10/2010    back X2     Cataract, mod, od; mild-mod, os 1/12/2012     DJD (degenerative joint disease), lumbosacral 8/6/2014     Elevated cholesterol      Endometriosis      HTN  "(hypertension)      Moderate major depression (H)     \"Circumstances\"     Osteopenia        PAST SURGICAL HISTORY:   Past Surgical History:   Procedure Laterality Date     ARTHROPLASTY KNEE Left 10/4/2021    Procedure: ARTHROPLASTY, LEFT KNEE, TOTAL;  Surgeon: Glenn Calvin MD;  Location: [facility] OR     BLEPHAROPLASTY BILATERAL  3/9/2006; 2013    both eyes, upper lid; revision (EN)     C APPENDECTOMY       CATARACT IOL, RT/LT       HC REMOVAL GALLBLADDER       LASER YAG CAPSULOTOMY      left eye (AC lysis also)     PHACOEMULSIFICATION WITH STANDARD INTRAOCULAR LENS IMPLANT  1/2012; 4/2013    right eye; left eye     REPAIR PTOSIS       SURGICAL HISTORY OF -       endometriosis surgeriesx3     SURGICAL HISTORY OF -       ganiglion cyst onfoot     SURGICAL HISTORY OF -       Eye for \"droopy eye\"       Past medical history, past surgical history, medications, and allergies were reviewed with the patient. Additional pertinent items: None    FAMILY HISTORY:   Family History   Problem Relation Age of Onset     C.A.D. Father      C.A.D. Brother      Hypertension Mother      Cancer No family hx of      Diabetes No family hx of      Cerebrovascular Disease No family hx of      Thyroid Disease No family hx of      Glaucoma No family hx of      Macular Degeneration No family hx of        SOCIAL HISTORY:   Social History     Tobacco Use     Smoking status: Never Smoker     Smokeless tobacco: Never Used   Substance Use Topics     Alcohol use: Yes     Social history was reviewed with the patient. Additional pertinent items: None      Discharge Medication List as of 10/14/2021  2:50 PM      CONTINUE these medications which have NOT CHANGED    Details   acetaminophen (TYLENOL) 325 MG tablet Take 2 tablets (650 mg) by mouth every 4 hours as needed for other, Disp-60 tablet, R-0, E-PrescribeDischarge today      albuterol (PROAIR HFA/PROVENTIL HFA/VENTOLIN HFA) 108 (90 Base) MCG/ACT inhaler Inhale 2 puffs into the lungs every 6 " hours as needed for shortness of breath / dyspnea or wheezing, Disp-18 g, R-1, E-PrescribePharmacy may dispense brand covered by insurance (Proair, or proventil or ventolin or generic albuterol inhaler)      aspirin (ASA) 81 MG EC tablet Take 2 tablets (162 mg) by mouth daily, Disp-60 tablet, R-0, E-Prescribe      calcium citrate (CITRACAL) 950 (200 Ca) MG tablet Take 1 tablet by mouth 2 times daily, Historical      Cholecalciferol (VITAMIN D) 2000 UNIT CAPS Take 2,000 Units by mouth daily., 2,000 Units, Oral, DAILY, Until Discontinued, Historical      cyclobenzaprine (FLEXERIL) 10 MG tablet TAKE 1 TABLET BY MOUTH AT BEDTIME AS NEEDED, Disp-30 tablet, R-1, E-Prescribe      doxycycline hyclate (VIBRA-TABS) 100 MG tablet TAKE 1 TABLET (100 MG) BY MOUTH 2 TIMES DAILY, Disp-180 tablet, R-0, E-Prescribe      etodolac (LODINE) 400 MG tablet TAKE 1 TABLET (400 MG) BY MOUTH 2 TIMES DAILY AS NEEDED, Disp-60 tablet, R-0, E-Prescribe      gabapentin (NEURONTIN) 600 MG tablet Take 2 tablets (1,200 mg) by mouth At Bedtime, Disp-180 tablet,R-3, E-Prescribe      hydrOXYzine (ATARAX) 25 MG tablet Take 1 tablet (25 mg) by mouth every 6 hours as needed for itching, Disp-45 tablet, R-0, E-Prescribe      Multiple Vitamins-Iron TABS Take 1 tablet by mouth daily., 1 tablet, Oral, DAILY, Until Discontinued, Historical      ondansetron (ZOFRAN) 4 MG tablet Take 1 tablet (4 mg) by mouth every 8 hours as needed for nausea, Disp-30 tablet, R-0, E-Prescribe      !! order for DME Please measure and distribute 1 pair of 10mmHg - 20mmHg THIGH high open or closed toe compression stockings. Jobst ultrasheer or equivalent.Disp-1 each, R-3, Local Print      !! order for DME Please measure and distribute 1 pair of 10mmHg - 20mmHg KNEE high open or closed toe compression stockings. Jobst ultrasheer or equivalent.Disp-1 each, R-3, Local Print      !! order for DME Please measure and distribute 1 pair of 20mmHg - 30mmHg knee high open or closed toe  compression stockings. Jobst ultrasheer or equivalent.Disp-1 each, R-1, Local Print      !! oxyCODONE (ROXICODONE) 5 MG tablet Take 1-2 tablets (5-10 mg) by mouth every 4 hours as needed for pain, Disp-60 tablet, R-0, E-Prescribe      !! oxyCODONE (ROXICODONE) 5 MG tablet Take 1 tablet (5 mg) by mouth every 4 hours as needed, Disp-45 tablet, R-0, E-Prescribe      polyethylene glycol (MIRALAX) 17 g packet Take 17 g by mouth daily, Disp-7 packet, R-0, E-Prescribe      Propylene Glycol (SYSTANE BALANCE OP) Apply 1 drop to eye 4 times daily as needed (Dry eyes) , Historical      senna-docusate (SENOKOT-S/PERICOLACE) 8.6-50 MG tablet Take 1 tablet by mouth 2 times daily, Disp-30 tablet, R-0, E-Prescribe      tiZANidine (ZANAFLEX) 4 MG tablet Take 4 mg by mouth At Bedtime, Historical       !! - Potential duplicate medications found. Please discuss with provider.             Allergies   Allergen Reactions     Erythromycin Swelling and Other (See Comments)        Review of Systems   Constitutional: Positive for appetite change (Has been poor for the past 10 days) and fatigue. Negative for fever.   HENT: Negative.    Respiratory: Negative.    Cardiovascular: Negative.    Gastrointestinal: Negative.    Genitourinary: Negative.    Musculoskeletal: Negative.    Neurological: Positive for weakness (Generalized).   All other systems reviewed and are negative.    A complete review of systems was performed with pertinent positives and negatives noted in the HPI, and all other systems negative.    Physical Exam   BP: 98/53  Pulse: 87  Temp: 98.8  F (37.1  C)  Resp: 11  Weight: 90.7 kg (200 lb)  SpO2: (!) 86 %      Physical Exam  Vitals and nursing note reviewed.   Constitutional:       General: She is not in acute distress.     Appearance: She is well-developed.   HENT:      Head: Normocephalic and atraumatic.      Mouth/Throat:      Mouth: Mucous membranes are moist.   Eyes:      General: No scleral icterus.     Conjunctiva/sclera:  Conjunctivae normal.      Pupils: Pupils are equal, round, and reactive to light.   Cardiovascular:      Rate and Rhythm: Normal rate and regular rhythm.      Heart sounds: Normal heart sounds.   Pulmonary:      Effort: Pulmonary effort is normal. No respiratory distress.      Breath sounds: Normal breath sounds. No wheezing.   Musculoskeletal:      Cervical back: Neck supple.      Comments: The left knee surgical wound is normal in appearance there is no drainage the wound is intact expected swelling is present no erythema to suggest infection.   Skin:     General: Skin is warm and dry.   Neurological:      General: No focal deficit present.      Mental Status: She is alert and oriented to person, place, and time.      Cranial Nerves: No cranial nerve deficit.   Psychiatric:         Mood and Affect: Mood normal.         Behavior: Behavior normal.         ED Course        Procedures              Results for orders placed or performed during the hospital encounter of 10/14/21 (from the past 24 hour(s))   CBC with platelets differential    Narrative    The following orders were created for panel order CBC with platelets differential.  Procedure                               Abnormality         Status                     ---------                               -----------         ------                     CBC with platelets and d...[[id number]]  Abnormal            Final result                 Please view results for these tests on the individual orders.   Comprehensive metabolic panel   Result Value Ref Range    Sodium 140 133 - 144 mmol/L    Potassium 3.9 3.4 - 5.3 mmol/L    Chloride 105 94 - 109 mmol/L    Carbon Dioxide (CO2) 33 (H) 20 - 32 mmol/L    Anion Gap 2 (L) 3 - 14 mmol/L    Urea Nitrogen 10 7 - 30 mg/dL    Creatinine 0.83 0.52 - 1.04 mg/dL    Calcium 8.3 (L) 8.5 - 10.1 mg/dL    Glucose 109 (H) 70 - 99 mg/dL    Alkaline Phosphatase 75 40 - 150 U/L    AST 25 0 - 45 U/L    ALT 22 0 - 50 U/L    Protein Total  6.0 (L) 6.8 - 8.8 g/dL    Albumin 2.7 (L) 3.4 - 5.0 g/dL    Bilirubin Total 0.3 0.2 - 1.3 mg/dL    GFR Estimate 67 >60 mL/min/1.73m2   CBC with platelets and differential   Result Value Ref Range    WBC Count 8.1 4.0 - 11.0 10e3/uL    RBC Count 3.50 (L) 3.80 - 5.20 10e6/uL    Hemoglobin 8.7 (L) 11.7 - 15.7 g/dL    Hematocrit 29.3 (L) 35.0 - 47.0 %    MCV 84 78 - 100 fL    MCH 24.9 (L) 26.5 - 33.0 pg    MCHC 29.7 (L) 31.5 - 36.5 g/dL    RDW 13.6 10.0 - 15.0 %    Platelet Count 335 150 - 450 10e3/uL    % Neutrophils 76 %    % Lymphocytes 14 %    % Monocytes 5 %    % Eosinophils 4 %    % Basophils 0 %    % Immature Granulocytes 1 %    NRBCs per 100 WBC 0 <1 /100    Absolute Neutrophils 6.2 1.6 - 8.3 10e3/uL    Absolute Lymphocytes 1.1 0.8 - 5.3 10e3/uL    Absolute Monocytes 0.4 0.0 - 1.3 10e3/uL    Absolute Eosinophils 0.3 0.0 - 0.7 10e3/uL    Absolute Basophils 0.0 0.0 - 0.2 10e3/uL    Absolute Immature Granulocytes 0.1 (H) <=0.0 10e3/uL    Absolute NRBCs 0.0 10e3/uL   EKG 12-lead, tracing only   Result Value Ref Range    Systolic Blood Pressure  mmHg    Diastolic Blood Pressure  mmHg    Ventricular Rate 79 BPM    Atrial Rate 79 BPM    ME Interval 142 ms    QRS Duration 80 ms     ms    QTc 419 ms    P Axis 25 degrees    R AXIS -45 degrees    T Axis 14 degrees    Interpretation ECG       Sinus rhythm  Left axis deviation  Low voltage QRS  Nonspecific T wave abnormality  Abnormal ECG  Unconfirmed report - interpretation of this ECG is computer generated - see medical record for final interpretation  Confirmed by - EMERGENCY ROOM, PHYSICIAN ([id number]),  KOKI MEJIA ([id number]) on 10/14/2021 12:00:45 PM     [place] Draw    Narrative    The following orders were created for panel order [place] Draw.  Procedure                               Abnormality         Status                     ---------                               -----------         ------                     Extra Blue Top Tube[[id number]]                               Final result               Extra Red Top Tube[[id number]]                               Final result                 Please view results for these tests on the individual orders.   Extra Blue Top Tube   Result Value Ref Range    Hold Specimen JIC    Extra Red Top Tube   Result Value Ref Range    Hold Specimen JIC    UA with Microscopic reflex to Culture    Specimen: Urine, Clean Catch   Result Value Ref Range    Color Urine Light Yellow Colorless, Straw, Light Yellow, Yellow    Appearance Urine Clear Clear    Glucose Urine Negative Negative mg/dL    Bilirubin Urine Negative Negative    Ketones Urine Negative Negative mg/dL    Specific Gravity Urine 1.011 1.003 - 1.035    Blood Urine Negative Negative    pH Urine 5.0 5.0 - 7.0    Protein Albumin Urine Negative Negative mg/dL    Urobilinogen Urine Normal Normal, 2.0 mg/dL    Nitrite Urine Negative Negative    Leukocyte Esterase Urine Negative Negative    Mucus Urine Present (A) None Seen /LPF    RBC Urine <1 <=2 /HPF    WBC Urine 4 <=5 /HPF    Hyaline Casts Urine 8 (H) <=2 /LPF    Narrative    Urine Culture not indicated     Medications   0.9% sodium chloride BOLUS (0 mLs Intravenous Stopped 10/14/21 1245)     Followed by   sodium chloride 0.9% infusion (has no administration in time range)   lactated ringers BOLUS 1,000 mL (0 mLs Intravenous Stopped 10/14/21 1449)   oxyCODONE (ROXICODONE) tablet 5 mg (5 mg Oral Given 10/14/21 1401)   acetaminophen (TYLENOL) tablet 1,000 mg (1,000 mg Oral Given 10/14/21 1407)             Assessments & Plan (with Medical Decision Making)   81-year-old female presents complaining of feeling generally weak fatigue.  She is 11 days out from left total knee replacement she has been recovering at home where she lives alone she had in-home physical therapy and they checked her oxygen saturations and thought they were low and 911 was called.  Here she is not hypoxic oxygen saturations are 100% on room air she is afebrile.  Her  labs were checked and are unremarkable she was given IV fluids she did appear dehydrated after period of observation she felt she was well enough to discharge to home.  Do not see any evidence for hypoxia I suspect it was a false recording at home she has no shortness of breath.    I have reviewed the nursing notes.    I have reviewed the findings, diagnosis, plan and need for follow up with the patient.    Discharge Medication List as of 10/14/2021  2:50 PM          Final diagnoses:   Dehydration     I, Tiffanie Ly, am serving as a trained medical scribe to document services personally performed by Familia Patel MD based on the provider's statements to me on October 14, 2021.  This document has been checked and approved by the attending provider.    I, Familia Patel MD, was physically present and have reviewed and verified the accuracy of this note documented by Tiffanie Ly, medical scribe.      Familia Patel MD     10/14/2021   [facility] EMERGENCY DEPARTMENT     Familia Patel MD  10/15/21 0741

## 2021-10-14 NOTE — TELEPHONE ENCOUNTER
Orly, physical therapist [facility] calling  In home this am with physical therapy visit  Patient's blood pressure this morning = 144/81. Blood pressure = 160/90 yesterday  Pulse has ranged from upper 90s to 124  SaO2 - upper 70s to low 90s (with deep breaths)  Patient was instructed to hold her blood pressure med after surgery, patient just restarted this morning  Patient c/o head pounding, hands twitching, no blurred vision. Per physical therapist, patient does have left sided facial droopiness    Instructed Orly, needs to call 911 now, patient needs to go to ED to rule out stroke      Gricel PADILLAN, RN

## 2021-10-15 DIAGNOSIS — M25.562 CHRONIC PAIN OF LEFT KNEE: ICD-10-CM

## 2021-10-15 DIAGNOSIS — G89.29 CHRONIC PAIN OF LEFT KNEE: ICD-10-CM

## 2021-10-15 ASSESSMENT — ENCOUNTER SYMPTOMS
CARDIOVASCULAR NEGATIVE: 1
GASTROINTESTINAL NEGATIVE: 1
RESPIRATORY NEGATIVE: 1
MUSCULOSKELETAL NEGATIVE: 1

## 2021-10-15 NOTE — TELEPHONE ENCOUNTER
[facility] Call Center    Phone Message    May a detailed message be left on voicemail: yes     Reason for Call: Medication Refill Request    Has the patient contacted the pharmacy for the refill? Yes   Name of medication being requested: oxyCODONE (ROXICODONE)  Provider who prescribed the medication: Nikunj  Pharmacy:  [facility] [id number] [place] [facility], [place] - [address]  Date medication is needed: Please advise. Thank you.      Action Taken: Message routed to:  Adult Clinics: Orthopedics p [id number]    Travel Screening: Not Applicable

## 2021-10-15 NOTE — TELEPHONE ENCOUNTER
Medication routed to provider to refill.  Talked with Trell in PT about patient, she is doing well with PT and he wanted us to be aware that patient went to ER yesterday. I called patient and we discussed what type of drinks to have, like gatorade, propel or ensure. Advised that I sent the prescription to Dr. Calvin to sign and I will see her on Tuesday.  Jenny Cordoba RN

## 2021-10-17 RX ORDER — OXYCODONE HYDROCHLORIDE 5 MG/1
5-10 TABLET ORAL EVERY 4 HOURS PRN
Qty: 60 TABLET | Refills: 0 | Status: SHIPPED | OUTPATIENT
Start: 2021-10-17 | End: 2021-10-27

## 2021-10-19 ENCOUNTER — ALLIED HEALTH/NURSE VISIT (OUTPATIENT)
Dept: NURSING | Facility: CLINIC | Age: 81
End: 2021-10-19
Payer: COMMERCIAL

## 2021-10-19 DIAGNOSIS — Z51.89 VISIT FOR WOUND CHECK: Primary | ICD-10-CM

## 2021-10-19 DIAGNOSIS — Z48.02 VISIT FOR SUTURE REMOVAL: ICD-10-CM

## 2021-10-19 PROCEDURE — 99024 POSTOP FOLLOW-UP VISIT: CPT

## 2021-10-19 NOTE — PROGRESS NOTES
Yumiko Mccartney comes into clinic today at the request of Dr. Calvin for suture removal and wound check. The patient is status post LTKA on 10/4/21.     Incision clean, dry and intact. Cut bilateral suture ends to incision.  Steri-strips removed. Incision cleaned. Pt instructed on wound care and symptoms of infection to watch for.     Discussed anticoagulation. Pt is currently taking Aspirin 162. Pt advised against any NSAIDs while on any anticoagulation med (ASA, Lovenox,or Coumadin).      Discussed current pain medication regime. Pt currently taking Oxycodone 2 tabs every 4 hours, Tylenol 3-4 times per day, Hydroxyzine every 8 hours and her normal gabapentin dosing for RLS. Still complains of a shooting and burning pain on the lower part and behind her knee. We discussed nerve pain and what can be causing this.      Discussed current physical therapy program. Pt is getting home care therapy and will then transition to outpatient.     Patient using cane to aid in ambulation and states that when at home she does not really need it and goes without. She does not have a significant limp and we discussed proper use of cane.    Discussed edema. Patient has moderate edema at her knee, she has not been elevating a lot and wrapping a lot. We discussed doing elevation more and wrapping more to see if this helps with her pain. She will try this and see. She has had adverse reactions to narcotic medications in the past, not wanting to try a different medications right now.     Reviewed knee precautions.    Total time spent with Pt: 45 minutes.    Jenny Cordoba RN              
no

## 2021-10-19 NOTE — PATIENT INSTRUCTIONS
"No NSAIDs post op while on aspirin. This includes Advil (ibuprofen), Aleve (naproxen), and Mobic (meloxicam).    Continue anticoagulation plan of:  Aspirin 162mg once daily for 28 days.      If incision is dry, OK to leave open to air (no bandage). If incision is draining, cover with gauze and keep clean and dry and call the office. If steri strips (tapes) applied, let fall off on their own. Please do not apply any ointments or lotions to incision. No soaking in tubs or pools for 3 months after surgery.    If any swelling in leg(s), elevate surgical leg above heart (lay flat, elevate leg on blankets or pillows). Continue to wear compression stockings during the day as long you are having leg swelling. May stop wearing or wear intermittently if not having swelling.     *Please call if a sharp increase in pain, fall, change in movement or sensation, chest pain, calf pain, shortness of breath, fevers greater than 101.4, redness, erythema around the incision sites.    *If needing refills on pain meds, please call clinic at least 3 days before you run out.    *Continue physical therapy as directed.  If needing orders for Outpatient Physical therapy, please call the clinic.    *Continue to use assistive device: cane or crutch until no limp. Discuss with therapist if questions.    *Dr. Calvin advises no dental work or cleaning until 6 months after any surgery with implants to hips or knees unless emergent issue arises. This includes total joint surgeries. Once you are 6 months past your surgery, you will need prophylactic antibiotics for the rest of your lifetime with any cleaning or dental work.  This is also for any other \"dirty\" procedures that you may have, such as a colonoscopy.    If you have any questions, call the clinic at [phone] and ask for the Orthopaedics dept.     ----------------------------------------------------------------------          Thanks for coming today.  [facility]  [address] [place] [address]    To schedule future appointments in Ortho Clinic, you may call [phone].    To schedule ordered imaging by your provider:   Call [facility] [phone]    To schedule an injection ordered by your provider:  Call [facility] Injection scheduling line: [phone]  MyChart available online at:  [email or web address]    Please call if any further questions or concerns ([phone]).  Clinic hours 8 am to 5 pm.    Return to clinic (call) if symptoms worsen or fail to improve.

## 2021-10-25 DIAGNOSIS — Z53.9 DIAGNOSIS NOT YET DEFINED: Primary | ICD-10-CM

## 2021-10-25 PROCEDURE — G0180 MD CERTIFICATION HHA PATIENT: HCPCS | Performed by: PREVENTIVE MEDICINE

## 2021-10-27 ENCOUNTER — NURSE TRIAGE (OUTPATIENT)
Dept: NURSING | Facility: CLINIC | Age: 81
End: 2021-10-27

## 2021-10-27 DIAGNOSIS — M25.562 CHRONIC PAIN OF LEFT KNEE: ICD-10-CM

## 2021-10-27 DIAGNOSIS — G89.29 CHRONIC PAIN OF LEFT KNEE: ICD-10-CM

## 2021-10-27 RX ORDER — OXYCODONE HYDROCHLORIDE 5 MG/1
5-10 TABLET ORAL EVERY 6 HOURS PRN
Qty: 60 TABLET | Refills: 0 | Status: SHIPPED | OUTPATIENT
Start: 2021-10-27 | End: 2021-10-29

## 2021-10-27 NOTE — TELEPHONE ENCOUNTER
Patient needs refill on oxyCODONE (ROXICODONE) 5 MG tablet. Pharmacy: [facility] in [place]. Please advise. Thank you.

## 2021-10-27 NOTE — TELEPHONE ENCOUNTER
Patient is 3 weeks S/p Left total knee arthroplasty DOS: 10/04/2021.    Oxycodone 5 mg Qty #60 tabs with 0 refills Sig: Take 1-2 tablets (5-10 mg) by mouth every 4 hours as needed for pain, filled 10/17/2021.

## 2021-10-28 NOTE — TELEPHONE ENCOUNTER
Vi calling from [facility] pharmacy.  Says they do not have Oxycodone 5 mg in stock and cannot get until Friday.  Patient does not want to wait until Friday.  Requesting Oxycodone RX be sent to a different [facility] pharmacy nearby that does have this in stock:  [facility] at [address] in [place].      Message to care team.    Anju Contreras, RN  Triage Nurse Advisor    Reason for Disposition    [1] Caller has NON-URGENT medication question about med that PCP prescribed AND [2] triager unable to answer question    Protocols used: MEDICATION QUESTION CALL-A-

## 2021-10-29 RX ORDER — OXYCODONE HYDROCHLORIDE 5 MG/1
5-10 TABLET ORAL EVERY 6 HOURS PRN
Qty: 60 TABLET | Refills: 0 | Status: SHIPPED | OUTPATIENT
Start: 2021-10-29 | End: 2022-01-18

## 2021-11-03 ENCOUNTER — TELEPHONE (OUTPATIENT)
Dept: ORTHOPEDICS | Facility: CLINIC | Age: 81
End: 2021-11-03

## 2021-11-03 DIAGNOSIS — Z96.652 S/P TOTAL KNEE ARTHROPLASTY, LEFT: Primary | ICD-10-CM

## 2021-11-03 NOTE — TELEPHONE ENCOUNTER
Called patient to ask them to come in 15-20 minutes early prior to their appointment for xrays. They understood and will come in early.    Patient is having burning sensation. Will relay to nurse.   Nubia Huang MA, ATC

## 2021-11-05 DIAGNOSIS — M51.26 LUMBAR DISC HERNIATION: ICD-10-CM

## 2021-11-05 DIAGNOSIS — M51.369 DDD (DEGENERATIVE DISC DISEASE), LUMBAR: ICD-10-CM

## 2021-11-08 ENCOUNTER — THERAPY VISIT (OUTPATIENT)
Dept: PHYSICAL THERAPY | Facility: CLINIC | Age: 81
End: 2021-11-08
Attending: ORTHOPAEDIC SURGERY
Payer: COMMERCIAL

## 2021-11-08 DIAGNOSIS — M17.12 PRIMARY OSTEOARTHRITIS OF LEFT KNEE: ICD-10-CM

## 2021-11-08 PROCEDURE — 97530 THERAPEUTIC ACTIVITIES: CPT | Mod: GP | Performed by: PHYSICAL THERAPIST

## 2021-11-08 PROCEDURE — 97110 THERAPEUTIC EXERCISES: CPT | Mod: GP | Performed by: PHYSICAL THERAPIST

## 2021-11-08 PROCEDURE — 97161 PT EVAL LOW COMPLEX 20 MIN: CPT | Mod: GP | Performed by: PHYSICAL THERAPIST

## 2021-11-08 RX ORDER — CYCLOBENZAPRINE HCL 10 MG
TABLET ORAL
Qty: 30 TABLET | Refills: 1 | Status: SHIPPED | OUTPATIENT
Start: 2021-11-08 | End: 2022-05-17

## 2021-11-08 ASSESSMENT — ACTIVITIES OF DAILY LIVING (ADL)
LIMPING: THE SYMPTOM PREVENTS ME FROM ALL DAILY ACTIVITIES
RAW_SCORE: 34
KNEEL ON THE FRONT OF YOUR KNEE: I AM UNABLE TO DO THE ACTIVITY
STAND: ACTIVITY IS NOT DIFFICULT
PAIN: I HAVE THE SYMPTOM BUT IT DOES NOT AFFECT MY ACTIVITY
STIFFNESS: THE SYMPTOM AFFECTS MY ACTIVITY MODERATELY
WALK: ACTIVITY IS MINIMALLY DIFFICULT
KNEE_ACTIVITY_OF_DAILY_LIVING_SUM: 34
KNEE_ACTIVITY_OF_DAILY_LIVING_SCORE: 48.57
SWELLING: THE SYMPTOM AFFECTS MY ACTIVITY MODERATELY
GIVING WAY, BUCKLING OR SHIFTING OF KNEE: THE SYMPTOM PREVENTS ME FROM ALL DAILY ACTIVITIES
RISE FROM A CHAIR: ACTIVITY IS MINIMALLY DIFFICULT
SIT WITH YOUR KNEE BENT: ACTIVITY IS SOMEWHAT DIFFICULT
GO DOWN STAIRS: ACTIVITY IS MINIMALLY DIFFICULT
SQUAT: I AM UNABLE TO DO THE ACTIVITY
WEAKNESS: THE SYMPTOM AFFECTS MY ACTIVITY MODERATELY
GO UP STAIRS: ACTIVITY IS MINIMALLY DIFFICULT

## 2021-11-08 NOTE — PROGRESS NOTES
Physical Therapy Initial Evaluation  Subjective:    Patient Health History  Yumiko Mccartney being seen for Left knee.     Date of Onset: October 2021.      Pain is reported as 7/10 on pain scale.    Pertinent medical history includes: high blood pressure.            Current medications:  High blood pressure medication and other (Gabbapentin).    Current occupation is retired.                     Therapist Generated HPI Evaluation  Problem details: Patient reports having her left knee replaced on 10/4/2021. She reports having home PT up to this point  .         Type of problem:  Left knee.    This is a new condition.  Condition occurred with:  Degenerative joint disease.  Where condition occurred: for unknown reasons.  Patient reports pain:  Medial.  Pain is described as shooting and stabbing and is constant.  Pain is worse during the day.  Since onset symptoms are gradually improving.  Associated symptoms:  Loss of motion/stiffness and loss of strength. Symptoms are exacerbated by sitting, descending stairs, bending/squatting and ascending stairs  and relieved by muscle relaxants.  Special tests included:  X-ray (see epic).  Previous treatment includes physical therapy. There was mild improvement following previous treatment.  Work activity restrictions: retired.  Barriers include:  None as reported by patient.                        Objective:    Gait:    Gait Type:  Antalgic   Assistive Devices:  None  Deviations:  Knee:  Knee flexion decr LGeneral Deviations:  Stride length decr and stance time decr                                                      Knee Evaluation:  ROM:    AROM      Extension: Left: 5 deg from straight    Right:   Flexion: Left: 100 deg.    Right:  PROM      Extension: Left: 0 deg   Right:   Flexion: Left: 109 deg   Right:       Strength:     Extension:  Left: 4/5   Pain:      Right: 4/5   Pain:  Flexion:  Left: 4/5   Pain:      Right: 4/5   Pain:    Quad Set Left: Good and fair    Pain:    Quad Set Right: Good    Pain:  Ligament Testing:  Not Assessed                Special Tests: Not Assessed      Palpation:  Palpation of knee: medial joint line, medial hamstring.      Edema:  Edema of the knee: visible swelling throughout anteriomedial left knee.    Mobility Testing:  Normal                  General     ROS    Assessment/Plan:    Patient is a 81 year old female with left side knee complaints.    Patient has the following significant findings with corresponding treatment plan.                Diagnosis 1:  S/P Left TKA  Pain -  hot/cold therapy, self management, education and home program  Decreased ROM/flexibility - manual therapy, therapeutic exercise, therapeutic activity and home program  Decreased strength - therapeutic exercise, therapeutic activities and home program  Inflammation - cold therapy and self management/home program  Impaired gait - home program  Decreased function - therapeutic activities and home program    Therapy Evaluation Codes:   1) History comprised of:   Personal factors that impact the plan of care:      None.    Comorbidity factors that impact the plan of care are:      None.     Medications impacting care: Muscle relaxant.  2) Examination of Body Systems comprised of:   Body structures and functions that impact the plan of care:      Knee.   Activity limitations that impact the plan of care are:      Bathing, Cooking, Dressing, Jumping, Lifting, Running, Sitting, Sports, Squatting/kneeling, Stairs, Standing and Walking.  3) Clinical presentation characteristics are:   Stable/Uncomplicated.  4) Decision-Making    Low complexity using standardized patient assessment instrument and/or measureable assessment of functional outcome.  Cumulative Therapy Evaluation is: Low complexity.    Previous and current functional limitations:  (See Goal Flow Sheet for this information)    Short term and Long term goals: (See Goal Flow Sheet for this information)     Communication  ability:  Patient appears to be able to clearly communicate and understand verbal and written communication and follow directions correctly.  Treatment Explanation - The following has been discussed with the patient:   RX ordered/plan of care  Anticipated outcomes  Possible risks and side effects  This patient would benefit from PT intervention to resume normal activities.   Rehab potential is good.    Frequency:  1 X week, once daily  Duration:  for 6 weeks  Discharge Plan:  Achieve all LTG.  Independent in home treatment program.  Reach maximal therapeutic benefit.    Please refer to the daily flowsheet for treatment today, total treatment time and time spent performing 1:1 timed codes.

## 2021-11-08 NOTE — PROGRESS NOTES
[facility]    OUTPATIENT Physical Therapy ORTHOPEDIC EVALUATION  PLAN OF TREATMENT FOR OUTPATIENT REHABILITATION  (COMPLETE FOR INITIAL CLAIMS ONLY)  Patient's Last Name, First Name, M.I.  YOB: 1940  Yumiko Mccartney    Provider s Name:  [facility]   Medical Record No.  [id number]   Start of Care Date:  11/08/21   Onset Date:   10/04/21   Type:     _X__PT   ___OT Medical Diagnosis:    Encounter Diagnosis   Name Primary?    Primary osteoarthritis of left knee         Treatment Diagnosis:  SP left TKA        Goals:     11/08/21 0500   Body Part   Goals listed below are for S/P L TKA   Goal #1   Goal #1 stairs   Previous Functional Level No restrictions   Current Functional Level Ascend/descend stairs   Performance Level with step to pattern and 7/10 let knee pain   STG Target Performance Ascend/descend stairs   Performance Level with step through pattern and <7/10 left knee pain   Rationale to enter/leave the house safely;to reach upper level of home safely;to reach lower level of home safely;for safe community access to buildings;for safe community ambulaton   Due Date 11/29/21   LTG Target Performance Ascend/descend stairs   Performance Level with step through pattern and 0/10 left knee pain   Rationale to enter/leave the house safely;to reach upper level of home safely;to reach lower level of home safely;for safe community access to buildings;for safe community ambulaton   Due Date 12/20/21       Therapy Frequency:  1 per week  Predicted Duration of Therapy Intervention:  6 week    Wilmar Hernández, PT                 I CERTIFY THE NEED FOR THESE SERVICES FURNISHED UNDER        THIS PLAN OF TREATMENT AND WHILE UNDER MY CARE     (Physician attestation of this document indicates review and certification of the therapy plan).                       Certification Date  From:  11/08/21   Certification Date To:  02/06/22    Referring Provider:  Glenn Calvin    Initial Assessment        See Epic Evaluation SOC Date: 11/08/21

## 2021-11-09 ENCOUNTER — OFFICE VISIT (OUTPATIENT)
Dept: ORTHOPEDICS | Facility: CLINIC | Age: 81
End: 2021-11-09
Payer: COMMERCIAL

## 2021-11-09 ENCOUNTER — ANCILLARY PROCEDURE (OUTPATIENT)
Dept: GENERAL RADIOLOGY | Facility: CLINIC | Age: 81
End: 2021-11-09
Attending: ORTHOPAEDIC SURGERY
Payer: COMMERCIAL

## 2021-11-09 VITALS — WEIGHT: 195.8 LBS | BODY MASS INDEX: 32.58 KG/M2

## 2021-11-09 DIAGNOSIS — Z47.89 ORTHOPEDIC AFTERCARE: Primary | ICD-10-CM

## 2021-11-09 DIAGNOSIS — Z96.652 S/P TOTAL KNEE ARTHROPLASTY, LEFT: ICD-10-CM

## 2021-11-09 PROCEDURE — 99024 POSTOP FOLLOW-UP VISIT: CPT | Performed by: ORTHOPAEDIC SURGERY

## 2021-11-09 PROCEDURE — 73562 X-RAY EXAM OF KNEE 3: CPT | Mod: LT | Performed by: RADIOLOGY

## 2021-11-09 NOTE — PATIENT INSTRUCTIONS
Thanks for coming today.  [facility]  [address] [place] [address]    To schedule future appointments in Ortho Clinic, you may call [phone].    To schedule ordered imaging by your provider:   Call [facility] [phone]    To schedule an injection ordered by your provider:  Call [facility] Injection scheduling line: [phone]  [email or web address] available online at:  [email or web address]    Please call if any further questions or concerns ([phone]).  Clinic hours 8 am to 5 pm.    Return to clinic (call) if symptoms worsen or fail to improve.

## 2021-11-09 NOTE — NURSING NOTE
Yumiko Mccartney's chief complaint for this visit includes:  Chief Complaint   Patient presents with     Surgical Followup     5 wks s/p left total knee arthroplasty DOS: 10/4/21     PCP: Gavi Bear    Referring Provider:  No referring provider defined for this encounter.    Wt 88.8 kg (195 lb 12.8 oz)   BMI 32.58 kg/m    Data Unavailable     Do you need any medication refills at today's visit? No    Nubia Huang MA, ATC

## 2021-11-09 NOTE — LETTER
"    11/9/2021         RE: Yumiko Mccartney  [address]  [place] [address]        Dear Colleague,    Thank you for referring your patient, Yumiko Mccartney, to the [facility]. Please see a copy of my visit note below.    Chief Complaint: Surgical Followup (5 wks s/p left total knee arthroplasty DOS: 10/4/21)       HPI: Yumiko Mccartney returns today in follow-up for her left total knee. She reports that she is doing well. She is doing out pt PT and was measure at 2-109 yesterday She is using no pain medication. No cane and none for a \"long time.\" Happy with how she is doing so far.       Medications and allergies are documented in the EMR and have been reviewed.    Current Outpatient Medications:      acetaminophen (TYLENOL) 325 MG tablet, Take 2 tablets (650 mg) by mouth every 4 hours as needed for other, Disp: 60 tablet, Rfl: 0     albuterol (PROAIR HFA/PROVENTIL HFA/VENTOLIN HFA) 108 (90 Base) MCG/ACT inhaler, Inhale 2 puffs into the lungs every 6 hours as needed for shortness of breath / dyspnea or wheezing, Disp: 18 g, Rfl: 1     aspirin (ASA) 81 MG EC tablet, Take 2 tablets (162 mg) by mouth daily, Disp: 60 tablet, Rfl: 0     calcium citrate (CITRACAL) 950 (200 Ca) MG tablet, Take 1 tablet by mouth 2 times daily, Disp: , Rfl:      Cholecalciferol (VITAMIN D) 2000 UNIT CAPS, Take 2,000 Units by mouth daily., Disp: , Rfl:      cyclobenzaprine (FLEXERIL) 10 MG tablet, TAKE 1 TABLET BY MOUTH AT BEDTIME AS NEEDED, Disp: 30 tablet, Rfl: 1     doxycycline hyclate (VIBRA-TABS) 100 MG tablet, TAKE 1 TABLET (100 MG) BY MOUTH 2 TIMES DAILY, Disp: 180 tablet, Rfl: 0     etodolac (LODINE) 400 MG tablet, TAKE 1 TABLET (400 MG) BY MOUTH 2 TIMES DAILY AS NEEDED, Disp: 60 tablet, Rfl: 0     gabapentin (NEURONTIN) 600 MG tablet, Take 2 tablets (1,200 mg) by mouth At Bedtime, Disp: 180 tablet, Rfl: 3     hydrOXYzine (ATARAX) 25 MG tablet, Take 1 tablet (25 mg) by mouth every 6 hours as needed " for itching, Disp: 45 tablet, Rfl: 0     Multiple Vitamins-Iron TABS, Take 1 tablet by mouth daily., Disp: , Rfl:      ondansetron (ZOFRAN) 4 MG tablet, Take 1 tablet (4 mg) by mouth every 8 hours as needed for nausea, Disp: 30 tablet, Rfl: 0     order for DME, Please measure and distribute 1 pair of 10mmHg - 20mmHg THIGH high open or closed toe compression stockings. Jobst ultrasheer or equivalent., Disp: 1 each, Rfl: 3     order for DME, Please measure and distribute 1 pair of 10mmHg - 20mmHg KNEE high open or closed toe compression stockings. Jobst ultrasheer or equivalent., Disp: 1 each, Rfl: 3     order for DME, Please measure and distribute 1 pair of 20mmHg - 30mmHg knee high open or closed toe compression stockings. Jobst ultrasheer or equivalent., Disp: 1 each, Rfl: 1     oxyCODONE (ROXICODONE) 5 MG tablet, Take 1-2 tablets (5-10 mg) by mouth every 6 hours as needed for pain (Patient not taking: Reported on 11/9/2021), Disp: 60 tablet, Rfl: 0     oxyCODONE (ROXICODONE) 5 MG tablet, Take 1 tablet (5 mg) by mouth every 4 hours as needed (Patient not taking: Reported on 11/9/2021), Disp: 45 tablet, Rfl: 0     polyethylene glycol (MIRALAX) 17 g packet, Take 17 g by mouth daily, Disp: 7 packet, Rfl: 0     Propylene Glycol (SYSTANE BALANCE OP), Apply 1 drop to eye 4 times daily as needed (Dry eyes) , Disp: , Rfl:      senna-docusate (SENOKOT-S/PERICOLACE) 8.6-50 MG tablet, Take 1 tablet by mouth 2 times daily, Disp: 30 tablet, Rfl: 0     tiZANidine (ZANAFLEX) 4 MG tablet, Take 4 mg by mouth At Bedtime, Disp: , Rfl:   Allergies: Erythromycin    Physical Exam:  On physical examination the patient appears the stated age, is in no acute distress, affect is appropriate, and breathing is non-labored.  Wt 88.8 kg (195 lb 12.8 oz)   BMI 32.58 kg/m    Body mass index is 32.58 kg/m .  Gait: near normal   Incision: healed   ROM:0-100    X-rays:    I reviewed the x-rays dated today.  Previous films reviewed.    Findings:   Normal progression for a total knee arthroplasty without evidence of loosening or subsidence.    Assessment: doing very well   Plan: cont with PT and check in 6 weeks.     No ref. provider found        Again, thank you for allowing me to participate in the care of your patient.        Sincerely,        Glenn Calvin MD

## 2021-11-09 NOTE — PROGRESS NOTES
"Chief Complaint: Surgical Followup (5 wks s/p left total knee arthroplasty DOS: 10/4/21)       HPI: Yumiko Mccartney returns today in follow-up for her left total knee. She reports that she is doing well. She is doing out pt PT and was measure at 2-109 yesterday She is using no pain medication. No cane and none for a \"long time.\" Happy with how she is doing so far.       Medications and allergies are documented in the EMR and have been reviewed.    Current Outpatient Medications:      acetaminophen (TYLENOL) 325 MG tablet, Take 2 tablets (650 mg) by mouth every 4 hours as needed for other, Disp: 60 tablet, Rfl: 0     albuterol (PROAIR HFA/PROVENTIL HFA/VENTOLIN HFA) 108 (90 Base) MCG/ACT inhaler, Inhale 2 puffs into the lungs every 6 hours as needed for shortness of breath / dyspnea or wheezing, Disp: 18 g, Rfl: 1     aspirin (ASA) 81 MG EC tablet, Take 2 tablets (162 mg) by mouth daily, Disp: 60 tablet, Rfl: 0     calcium citrate (CITRACAL) 950 (200 Ca) MG tablet, Take 1 tablet by mouth 2 times daily, Disp: , Rfl:      Cholecalciferol (VITAMIN D) 2000 UNIT CAPS, Take 2,000 Units by mouth daily., Disp: , Rfl:      cyclobenzaprine (FLEXERIL) 10 MG tablet, TAKE 1 TABLET BY MOUTH AT BEDTIME AS NEEDED, Disp: 30 tablet, Rfl: 1     doxycycline hyclate (VIBRA-TABS) 100 MG tablet, TAKE 1 TABLET (100 MG) BY MOUTH 2 TIMES DAILY, Disp: 180 tablet, Rfl: 0     etodolac (LODINE) 400 MG tablet, TAKE 1 TABLET (400 MG) BY MOUTH 2 TIMES DAILY AS NEEDED, Disp: 60 tablet, Rfl: 0     gabapentin (NEURONTIN) 600 MG tablet, Take 2 tablets (1,200 mg) by mouth At Bedtime, Disp: 180 tablet, Rfl: 3     hydrOXYzine (ATARAX) 25 MG tablet, Take 1 tablet (25 mg) by mouth every 6 hours as needed for itching, Disp: 45 tablet, Rfl: 0     Multiple Vitamins-Iron TABS, Take 1 tablet by mouth daily., Disp: , Rfl:      ondansetron (ZOFRAN) 4 MG tablet, Take 1 tablet (4 mg) by mouth every 8 hours as needed for nausea, Disp: 30 tablet, Rfl: 0     order for " DME, Please measure and distribute 1 pair of 10mmHg - 20mmHg THIGH high open or closed toe compression stockings. Jobst ultrasheer or equivalent., Disp: 1 each, Rfl: 3     order for DME, Please measure and distribute 1 pair of 10mmHg - 20mmHg KNEE high open or closed toe compression stockings. Jobst ultrasheer or equivalent., Disp: 1 each, Rfl: 3     order for DME, Please measure and distribute 1 pair of 20mmHg - 30mmHg knee high open or closed toe compression stockings. Jobst ultrasheer or equivalent., Disp: 1 each, Rfl: 1     oxyCODONE (ROXICODONE) 5 MG tablet, Take 1-2 tablets (5-10 mg) by mouth every 6 hours as needed for pain (Patient not taking: Reported on 11/9/2021), Disp: 60 tablet, Rfl: 0     oxyCODONE (ROXICODONE) 5 MG tablet, Take 1 tablet (5 mg) by mouth every 4 hours as needed (Patient not taking: Reported on 11/9/2021), Disp: 45 tablet, Rfl: 0     polyethylene glycol (MIRALAX) 17 g packet, Take 17 g by mouth daily, Disp: 7 packet, Rfl: 0     Propylene Glycol (SYSTANE BALANCE OP), Apply 1 drop to eye 4 times daily as needed (Dry eyes) , Disp: , Rfl:      senna-docusate (SENOKOT-S/PERICOLACE) 8.6-50 MG tablet, Take 1 tablet by mouth 2 times daily, Disp: 30 tablet, Rfl: 0     tiZANidine (ZANAFLEX) 4 MG tablet, Take 4 mg by mouth At Bedtime, Disp: , Rfl:   Allergies: Erythromycin    Physical Exam:  On physical examination the patient appears the stated age, is in no acute distress, affect is appropriate, and breathing is non-labored.  Wt 88.8 kg (195 lb 12.8 oz)   BMI 32.58 kg/m    Body mass index is 32.58 kg/m .  Gait: near normal   Incision: healed   ROM:0-100    X-rays:    I reviewed the x-rays dated today.  Previous films reviewed.    Findings:  Normal progression for a total knee arthroplasty without evidence of loosening or subsidence.    Assessment: doing very well   Plan: cont with PT and check in 6 weeks.     No ref. provider found

## 2021-11-11 ENCOUNTER — DOCUMENTATION ONLY (OUTPATIENT)
Dept: ORTHOPEDICS | Facility: CLINIC | Age: 81
End: 2021-11-11
Payer: COMMERCIAL

## 2021-11-11 NOTE — PROGRESS NOTES
Physician Order for medication change from [facility] signed and faxed back to [phone]  Sent to be scanned into chart  Katia Winters

## 2021-11-12 ENCOUNTER — TELEPHONE (OUTPATIENT)
Dept: ORTHOPEDICS | Facility: CLINIC | Age: 81
End: 2021-11-12
Payer: COMMERCIAL

## 2021-11-12 NOTE — TELEPHONE ENCOUNTER
[facility]    Phone Message    May a detailed message be left on voicemail: yes     Reason for Call: Other: Kathi from [facility] home care will be faxing over an order for medication change for oxycodone for patient. Needs the doctor to sign and fax back or if he's not available another provider on his team to sign and fax. Fax# [phone]     Action Taken: Other: nohemy solis    Travel Screening: Not Applicable

## 2021-11-15 NOTE — TELEPHONE ENCOUNTER
No form was received. Called number and left message saying to refax the form.  Jenny Cordoba RN

## 2021-11-17 ENCOUNTER — THERAPY VISIT (OUTPATIENT)
Dept: PHYSICAL THERAPY | Facility: CLINIC | Age: 81
End: 2021-11-17
Payer: COMMERCIAL

## 2021-11-17 ENCOUNTER — IMMUNIZATION (OUTPATIENT)
Dept: NURSING | Facility: CLINIC | Age: 81
End: 2021-11-17
Payer: COMMERCIAL

## 2021-11-17 DIAGNOSIS — M17.12 PRIMARY OSTEOARTHRITIS OF LEFT KNEE: Primary | ICD-10-CM

## 2021-11-17 DIAGNOSIS — Z96.652 S/P TOTAL KNEE ARTHROPLASTY, LEFT: ICD-10-CM

## 2021-11-17 PROCEDURE — 97110 THERAPEUTIC EXERCISES: CPT | Mod: GP | Performed by: PHYSICAL THERAPIST

## 2021-11-17 PROCEDURE — 91300 PR COVID VAC PFIZER DIL RECON 30 MCG/0.3 ML IM: CPT

## 2021-11-17 PROCEDURE — 0004A PR COVID VAC PFIZER DIL RECON 30 MCG/0.3 ML IM: CPT

## 2021-11-17 PROCEDURE — 97530 THERAPEUTIC ACTIVITIES: CPT | Mod: GP | Performed by: PHYSICAL THERAPIST

## 2021-11-21 NOTE — PLAN OF CARE
[facility]      OUTPATIENT OCCUPATIONAL THERAPY  EVALUATION  PLAN OF TREATMENT FOR OUTPATIENT REHABILITATION  (COMPLETE FOR INITIAL CLAIMS ONLY)  Patient's Last Name, First Name, M.I.  YOB: 1940  Yumiko Mccartney                          Provider's Name  [facility] Medical Record No.  [id number]                               Onset Date:  10/04/21   Start of Care Date:   10/05/2021     Type:     ___PT   _X_OT   ___SLP Medical Diagnosis:                        S/p L TKA   OT Diagnosis:  ADL dysfunction   Visits from SOC:  1   _________________________________________________________________________________  Plan of Treatment/Functional Goals    Planned Interventions: ADL retraining,IADL retraining,home program guidelines,progressive activity/exercise,risk factor education   Goals: See Occupational Therapy Goals on Care Plan in [facility] electronic health record.    Therapy Frequency: 1x eval and treat  Predicted Duration of Therapy Intervention: 1 day  _________________________________________________________________________________    I CERTIFY THE NEED FOR THESE SERVICES FURNISHED UNDER        THIS PLAN OF TREATMENT AND WHILE UNDER MY CARE     (Physician co-signature of this document indicates review and certification of the therapy plan).                 ,      Referring Physician     : Cole Min MD       Initial Assessment        See Occupational Therapy evaluation dated   in Epic electronic health record.

## 2021-11-29 ENCOUNTER — THERAPY VISIT (OUTPATIENT)
Dept: PHYSICAL THERAPY | Facility: CLINIC | Age: 81
End: 2021-11-29
Payer: COMMERCIAL

## 2021-11-29 DIAGNOSIS — Z96.659 S/P TOTAL KNEE ARTHROPLASTY: Primary | ICD-10-CM

## 2021-11-29 PROCEDURE — 97530 THERAPEUTIC ACTIVITIES: CPT | Mod: GP | Performed by: PHYSICAL THERAPIST

## 2021-11-29 PROCEDURE — 97110 THERAPEUTIC EXERCISES: CPT | Mod: GP | Performed by: PHYSICAL THERAPIST

## 2021-11-30 ASSESSMENT — ACTIVITIES OF DAILY LIVING (ADL)
RAW_SCORE: 52
RISE FROM A CHAIR: ACTIVITY IS MINIMALLY DIFFICULT
LIMPING: I HAVE THE SYMPTOM BUT IT DOES NOT AFFECT MY ACTIVITY
STAND: ACTIVITY IS MINIMALLY DIFFICULT
SWELLING: THE SYMPTOM AFFECTS MY ACTIVITY MODERATELY
SIT WITH YOUR KNEE BENT: ACTIVITY IS MINIMALLY DIFFICULT
PAIN: THE SYMPTOM AFFECTS MY ACTIVITY MODERATELY
GO DOWN STAIRS: ACTIVITY IS MINIMALLY DIFFICULT
SQUAT: ACTIVITY IS MINIMALLY DIFFICULT
HOW_WOULD_YOU_RATE_THE_OVERALL_FUNCTION_OF_YOUR_KNEE_DURING_YOUR_USUAL_DAILY_ACTIVITIES?: NEARLY NORMAL
WALK: ACTIVITY IS MINIMALLY DIFFICULT
KNEEL ON THE FRONT OF YOUR KNEE: ACTIVITY IS MINIMALLY DIFFICULT
GIVING WAY, BUCKLING OR SHIFTING OF KNEE: I HAVE THE SYMPTOM BUT IT DOES NOT AFFECT MY ACTIVITY
AS_A_RESULT_OF_YOUR_KNEE_INJURY,_HOW_WOULD_YOU_RATE_YOUR_CURRENT_LEVEL_OF_DAILY_ACTIVITY?: NEARLY NORMAL
GO UP STAIRS: ACTIVITY IS MINIMALLY DIFFICULT
KNEE_ACTIVITY_OF_DAILY_LIVING_SUM: 52
STIFFNESS: THE SYMPTOM AFFECTS MY ACTIVITY SLIGHTLY
WEAKNESS: I DO NOT HAVE THE SYMPTOM
HOW_WOULD_YOU_RATE_THE_CURRENT_FUNCTION_OF_YOUR_KNEE_DURING_YOUR_USUAL_DAILY_ACTIVITIES_ON_A_SCALE_FROM_0_TO_100_WITH_100_BEING_YOUR_LEVEL_OF_KNEE_FUNCTION_PRIOR_TO_YOUR_INJURY_AND_0_BEING_THE_INABILITY_TO_PERFORM_ANY_OF_YOUR_USUAL_DAILY_ACTIVITIES?: 80
KNEE_ACTIVITY_OF_DAILY_LIVING_SCORE: 74.29

## 2021-12-08 ENCOUNTER — THERAPY VISIT (OUTPATIENT)
Dept: PHYSICAL THERAPY | Facility: CLINIC | Age: 81
End: 2021-12-08
Payer: COMMERCIAL

## 2021-12-08 DIAGNOSIS — Z96.659 S/P TOTAL KNEE ARTHROPLASTY: Primary | ICD-10-CM

## 2021-12-08 PROCEDURE — 97110 THERAPEUTIC EXERCISES: CPT | Mod: GP | Performed by: PHYSICAL THERAPIST

## 2021-12-15 ENCOUNTER — THERAPY VISIT (OUTPATIENT)
Dept: PHYSICAL THERAPY | Facility: CLINIC | Age: 81
End: 2021-12-15
Payer: COMMERCIAL

## 2021-12-15 DIAGNOSIS — Z96.652 S/P TOTAL KNEE REPLACEMENT, LEFT: Primary | ICD-10-CM

## 2021-12-15 PROCEDURE — 97530 THERAPEUTIC ACTIVITIES: CPT | Mod: GP | Performed by: PHYSICAL THERAPIST

## 2021-12-15 PROCEDURE — 97110 THERAPEUTIC EXERCISES: CPT | Mod: GP | Performed by: PHYSICAL THERAPIST

## 2021-12-20 ENCOUNTER — THERAPY VISIT (OUTPATIENT)
Dept: PHYSICAL THERAPY | Facility: CLINIC | Age: 81
End: 2021-12-20
Payer: COMMERCIAL

## 2021-12-20 DIAGNOSIS — Z96.652 S/P TOTAL KNEE REPLACEMENT, LEFT: Primary | ICD-10-CM

## 2021-12-20 PROCEDURE — 97110 THERAPEUTIC EXERCISES: CPT | Mod: GP | Performed by: PHYSICAL THERAPIST

## 2021-12-20 NOTE — PROGRESS NOTES
Subjective:  HPI  Physical Exam                    Objective:  System    Physical Exam    General     ROS    Assessment/Plan:    PROGRESS  REPORT    Progress reporting period is from 11/8/2021 to 12/20/2021.       SUBJECTIVE  Subjective changes noted by patient:    Patient reports an overall decrease in knee pain especially at night time which was her main complaint up to this point. She does report some stiffness, but says her home exercises help and as a result she has increased her compliance with her HEP.       Current pain level is  Current Pain level: 2/10.     Previous pain level was   Initial Pain level: 7/10.   Changes in function:  Yes (See Goal flowsheet attached for changes in current functional level)  Adverse reaction to treatment or activity: None    OBJECTIVE  Changes noted in objective findings:  Yes,   Left knee AROM 0-112deg. PROM 0-120 deg   Left knee strength grossly 4+/5. Good quad set  Ambulates without assistive deice   Negotiates stairs with a step through pattern and without a railing  Minimal swelling      ASSESSMENT/PLAN  Updated problem list and treatment plan: Diagnosis 1:  S/P left TKA  Pain -  hot/cold therapy, self management, education and home program  Decreased ROM/flexibility - manual therapy, therapeutic exercise, therapeutic activity and home program  Decreased strength - therapeutic exercise, therapeutic activities and home program  Impaired balance - neuro re-education, therapeutic activities and home program  Impaired muscle performance - neuro re-education and home program  Decreased function - therapeutic activities and home program  STG/LTGs have been met or progress has been made towards goals:  Yes (See Goal flow sheet completed today.)  Assessment of Progress: The patient's condition is improving.  Self Management Plans:  Patient has been instructed in a home treatment program.  Patient  has been instructed in self management of symptoms.  I have re-evaluated this  patient and find that the nature, scope, duration and intensity of the therapy is appropriate for the medical condition of the patient.  Yumiko continues to require the following intervention to meet STG and LTG's:  PT    Recommendations:  This patient would benefit from continued therapy.     Frequency:  1 X week, once daily  Duration:  for 2 weeks tapering to 2 X a month over 1- 2 months        Please refer to the daily flowsheet for treatment today, total treatment time and time spent performing 1:1 timed codes.

## 2021-12-21 ENCOUNTER — OFFICE VISIT (OUTPATIENT)
Dept: ORTHOPEDICS | Facility: CLINIC | Age: 81
End: 2021-12-21
Payer: OTHER GOVERNMENT

## 2021-12-21 DIAGNOSIS — Z47.89 ORTHOPEDIC AFTERCARE: Primary | ICD-10-CM

## 2021-12-21 PROCEDURE — 99024 POSTOP FOLLOW-UP VISIT: CPT | Performed by: ORTHOPAEDIC SURGERY

## 2021-12-21 NOTE — NURSING NOTE
Yumiko Mccartney's chief complaint for this visit includes:  Chief Complaint   Patient presents with     Surgical Followup     s/p left total knee arthroplasty DOS: 10/4/21 -Pain last week     PCP: Gavi Bear    Referring Provider:  No referring provider defined for this encounter.    There were no vitals taken for this visit.  Data Unavailable     Do you need any medication refills at today's visit? NO    Nubia Huang MA, ATC

## 2021-12-21 NOTE — LETTER
12/21/2021         RE: Yumiko Mccartney  [address]  [place] [address]        Dear Colleague,    Thank you for referring your patient, Yumiko Mccartney, to the [facility]. Please see a copy of my visit note below.    Chief Complaint: No chief complaint on file.   12 weeks left total knee.     HPI: Yumiko Mccartney returns today in follow-up for her left knee. She reports that she is doing well. She is using no pain medication and is advancing well in physical therapy. ROM measured at 0-112 activve and 0-120 passive. No cane. Normal stairs, no rail. Happy with how she is doing. Hosted a dinner party this past weekend.      Medications and allergies are documented in the EMR and have been reviewed.    Current Outpatient Medications:      acetaminophen (TYLENOL) 325 MG tablet, Take 2 tablets (650 mg) by mouth every 4 hours as needed for other, Disp: 60 tablet, Rfl: 0     albuterol (PROAIR HFA/PROVENTIL HFA/VENTOLIN HFA) 108 (90 Base) MCG/ACT inhaler, Inhale 2 puffs into the lungs every 6 hours as needed for shortness of breath / dyspnea or wheezing, Disp: 18 g, Rfl: 1     aspirin (ASA) 81 MG EC tablet, Take 2 tablets (162 mg) by mouth daily, Disp: 60 tablet, Rfl: 0     calcium citrate (CITRACAL) 950 (200 Ca) MG tablet, Take 1 tablet by mouth 2 times daily, Disp: , Rfl:      Cholecalciferol (VITAMIN D) 2000 UNIT CAPS, Take 2,000 Units by mouth daily., Disp: , Rfl:      cyclobenzaprine (FLEXERIL) 10 MG tablet, TAKE 1 TABLET BY MOUTH AT BEDTIME AS NEEDED, Disp: 30 tablet, Rfl: 1     doxycycline hyclate (VIBRA-TABS) 100 MG tablet, TAKE 1 TABLET (100 MG) BY MOUTH 2 TIMES DAILY, Disp: 180 tablet, Rfl: 0     etodolac (LODINE) 400 MG tablet, TAKE 1 TABLET (400 MG) BY MOUTH 2 TIMES DAILY AS NEEDED, Disp: 60 tablet, Rfl: 0     gabapentin (NEURONTIN) 600 MG tablet, Take 2 tablets (1,200 mg) by mouth At Bedtime, Disp: 180 tablet, Rfl: 3     hydrOXYzine (ATARAX) 25 MG tablet, Take 1 tablet (25  mg) by mouth every 6 hours as needed for itching, Disp: 45 tablet, Rfl: 0     Multiple Vitamins-Iron TABS, Take 1 tablet by mouth daily., Disp: , Rfl:      ondansetron (ZOFRAN) 4 MG tablet, Take 1 tablet (4 mg) by mouth every 8 hours as needed for nausea, Disp: 30 tablet, Rfl: 0     order for DME, Please measure and distribute 1 pair of 10mmHg - 20mmHg THIGH high open or closed toe compression stockings. Jobst ultrasheer or equivalent., Disp: 1 each, Rfl: 3     order for DME, Please measure and distribute 1 pair of 10mmHg - 20mmHg KNEE high open or closed toe compression stockings. Jobst ultrasheer or equivalent., Disp: 1 each, Rfl: 3     order for DME, Please measure and distribute 1 pair of 20mmHg - 30mmHg knee high open or closed toe compression stockings. Jobst ultrasheer or equivalent., Disp: 1 each, Rfl: 1     oxyCODONE (ROXICODONE) 5 MG tablet, Take 1-2 tablets (5-10 mg) by mouth every 6 hours as needed for pain (Patient not taking: Reported on 11/9/2021), Disp: 60 tablet, Rfl: 0     oxyCODONE (ROXICODONE) 5 MG tablet, Take 1 tablet (5 mg) by mouth every 4 hours as needed (Patient not taking: Reported on 11/9/2021), Disp: 45 tablet, Rfl: 0     polyethylene glycol (MIRALAX) 17 g packet, Take 17 g by mouth daily, Disp: 7 packet, Rfl: 0     Propylene Glycol (SYSTANE BALANCE OP), Apply 1 drop to eye 4 times daily as needed (Dry eyes) , Disp: , Rfl:      senna-docusate (SENOKOT-S/PERICOLACE) 8.6-50 MG tablet, Take 1 tablet by mouth 2 times daily, Disp: 30 tablet, Rfl: 0     tiZANidine (ZANAFLEX) 4 MG tablet, Take 4 mg by mouth At Bedtime, Disp: , Rfl:   Allergies: Erythromycin    Physical Exam:  On physical examination the patient appears the stated age, is in no acute distress, affect is appropriate, and breathing is non-labored.  There were no vitals taken for this visit.  There is no height or weight on file to calculate BMI.  Gait: normal   Incision:healed and benign  ROM:0-115  Distally, the circulatory,  motor, and sensation exam is intact with 5/5 EHL, gastroc-soleus, and tibialis anterior.  Sensation to light touch is intact.  Dorsalis pedis and posterior tibialis pulses are palpable.  There are no sores on the feet, no bruising, and no lymphedema.    X-rays:    I reviewed the x-rays dated today.  Previous films reviewed.    Findings:  Normal progression for a total knee arthroplasty without evidence of loosening or subsidence.    Assessment: doing well   Plan: appropriate for one year follow-up, sooner if issues.     No ref. provider found        Again, thank you for allowing me to participate in the care of your patient.        Sincerely,        Glenn Calvin MD

## 2021-12-21 NOTE — PROGRESS NOTES
Chief Complaint: No chief complaint on file.   12 weeks left total knee.     HPI: Yumiko Mccartney returns today in follow-up for her left knee. She reports that she is doing well. She is using no pain medication and is advancing well in physical therapy. ROM measured at 0-112 activve and 0-120 passive. No cane. Normal stairs, no rail. Happy with how she is doing. Hosted a dinner party this past weekend.      Medications and allergies are documented in the EMR and have been reviewed.    Current Outpatient Medications:      acetaminophen (TYLENOL) 325 MG tablet, Take 2 tablets (650 mg) by mouth every 4 hours as needed for other, Disp: 60 tablet, Rfl: 0     albuterol (PROAIR HFA/PROVENTIL HFA/VENTOLIN HFA) 108 (90 Base) MCG/ACT inhaler, Inhale 2 puffs into the lungs every 6 hours as needed for shortness of breath / dyspnea or wheezing, Disp: 18 g, Rfl: 1     aspirin (ASA) 81 MG EC tablet, Take 2 tablets (162 mg) by mouth daily, Disp: 60 tablet, Rfl: 0     calcium citrate (CITRACAL) 950 (200 Ca) MG tablet, Take 1 tablet by mouth 2 times daily, Disp: , Rfl:      Cholecalciferol (VITAMIN D) 2000 UNIT CAPS, Take 2,000 Units by mouth daily., Disp: , Rfl:      cyclobenzaprine (FLEXERIL) 10 MG tablet, TAKE 1 TABLET BY MOUTH AT BEDTIME AS NEEDED, Disp: 30 tablet, Rfl: 1     doxycycline hyclate (VIBRA-TABS) 100 MG tablet, TAKE 1 TABLET (100 MG) BY MOUTH 2 TIMES DAILY, Disp: 180 tablet, Rfl: 0     etodolac (LODINE) 400 MG tablet, TAKE 1 TABLET (400 MG) BY MOUTH 2 TIMES DAILY AS NEEDED, Disp: 60 tablet, Rfl: 0     gabapentin (NEURONTIN) 600 MG tablet, Take 2 tablets (1,200 mg) by mouth At Bedtime, Disp: 180 tablet, Rfl: 3     hydrOXYzine (ATARAX) 25 MG tablet, Take 1 tablet (25 mg) by mouth every 6 hours as needed for itching, Disp: 45 tablet, Rfl: 0     Multiple Vitamins-Iron TABS, Take 1 tablet by mouth daily., Disp: , Rfl:      ondansetron (ZOFRAN) 4 MG tablet, Take 1 tablet (4 mg) by mouth every 8 hours as needed for nausea,  Disp: 30 tablet, Rfl: 0     order for DME, Please measure and distribute 1 pair of 10mmHg - 20mmHg THIGH high open or closed toe compression stockings. Jobst ultrasheer or equivalent., Disp: 1 each, Rfl: 3     order for DME, Please measure and distribute 1 pair of 10mmHg - 20mmHg KNEE high open or closed toe compression stockings. Jobst ultrasheer or equivalent., Disp: 1 each, Rfl: 3     order for DME, Please measure and distribute 1 pair of 20mmHg - 30mmHg knee high open or closed toe compression stockings. Jobst ultrasheer or equivalent., Disp: 1 each, Rfl: 1     oxyCODONE (ROXICODONE) 5 MG tablet, Take 1-2 tablets (5-10 mg) by mouth every 6 hours as needed for pain (Patient not taking: Reported on 11/9/2021), Disp: 60 tablet, Rfl: 0     oxyCODONE (ROXICODONE) 5 MG tablet, Take 1 tablet (5 mg) by mouth every 4 hours as needed (Patient not taking: Reported on 11/9/2021), Disp: 45 tablet, Rfl: 0     polyethylene glycol (MIRALAX) 17 g packet, Take 17 g by mouth daily, Disp: 7 packet, Rfl: 0     Propylene Glycol (SYSTANE BALANCE OP), Apply 1 drop to eye 4 times daily as needed (Dry eyes) , Disp: , Rfl:      senna-docusate (SENOKOT-S/PERICOLACE) 8.6-50 MG tablet, Take 1 tablet by mouth 2 times daily, Disp: 30 tablet, Rfl: 0     tiZANidine (ZANAFLEX) 4 MG tablet, Take 4 mg by mouth At Bedtime, Disp: , Rfl:   Allergies: Erythromycin    Physical Exam:  On physical examination the patient appears the stated age, is in no acute distress, affect is appropriate, and breathing is non-labored.  There were no vitals taken for this visit.  There is no height or weight on file to calculate BMI.  Gait: normal   Incision:healed and benign  ROM:0-115  Distally, the circulatory, motor, and sensation exam is intact with 5/5 EHL, gastroc-soleus, and tibialis anterior.  Sensation to light touch is intact.  Dorsalis pedis and posterior tibialis pulses are palpable.  There are no sores on the feet, no bruising, and no  lymphedema.    X-rays:    I reviewed the x-rays dated today.  Previous films reviewed.    Findings:  Normal progression for a total knee arthroplasty without evidence of loosening or subsidence.    Assessment: doing well   Plan: appropriate for one year follow-up, sooner if issues.     No ref. provider found

## 2022-01-05 ENCOUNTER — THERAPY VISIT (OUTPATIENT)
Dept: PHYSICAL THERAPY | Facility: CLINIC | Age: 82
End: 2022-01-05
Payer: COMMERCIAL

## 2022-01-05 DIAGNOSIS — Z96.652 S/P TOTAL KNEE REPLACEMENT, LEFT: Primary | ICD-10-CM

## 2022-01-05 PROCEDURE — 97110 THERAPEUTIC EXERCISES: CPT | Mod: GP | Performed by: PHYSICAL THERAPIST

## 2022-01-05 PROCEDURE — 97112 NEUROMUSCULAR REEDUCATION: CPT | Mod: GP | Performed by: PHYSICAL THERAPIST

## 2022-01-18 ENCOUNTER — VIRTUAL VISIT (OUTPATIENT)
Dept: FAMILY MEDICINE | Facility: CLINIC | Age: 82
End: 2022-01-18
Payer: COMMERCIAL

## 2022-01-18 DIAGNOSIS — I10 ESSENTIAL HYPERTENSION WITH GOAL BLOOD PRESSURE LESS THAN 140/90: ICD-10-CM

## 2022-01-18 DIAGNOSIS — J45.30 MILD PERSISTENT ASTHMA WITHOUT COMPLICATION: Primary | ICD-10-CM

## 2022-01-18 PROCEDURE — 99213 OFFICE O/P EST LOW 20 MIN: CPT | Mod: 95 | Performed by: PREVENTIVE MEDICINE

## 2022-01-18 RX ORDER — LISINOPRIL AND HYDROCHLOROTHIAZIDE 12.5; 2 MG/1; MG/1
TABLET ORAL
Qty: 180 TABLET | Refills: 3 | Status: SHIPPED | OUTPATIENT
Start: 2022-01-18 | End: 2023-01-16

## 2022-01-18 NOTE — PROGRESS NOTES
"Tomi is a 81 year old who is being evaluated via a billable telephone visit.      What phone number would you like to be contacted at? Home number   How would you like to obtain your AVS? MyChart    Assessment & Plan       Mild persistent asthma without complication  -stable  -has been on Advair, symptoms may get worse in winter months with exposure to cold air     Essential hypertension with goal blood pressure less than 140/90  -at goal on current regimen  -refills provided   - lisinopril-hydrochlorothiazide (ZESTORETIC) 20-12.5 MG tablet  Dispense: 180 tablet; Refill: 3      Prescription drug management  20 minutes spent on the date of the encounter doing chart review, history and exam, documentation and further activities per the note       BMI:   Estimated body mass index is 32.58 kg/m  as calculated from the following:    Height as of 10/4/21: 1.651 m (5' 5\").    Weight as of 11/9/21: 88.8 kg (195 lb 12.8 oz).     Return in about 6 months (around 7/18/2022) for Follow up, with me, in person.    Gavi Bear MD MPH    [facility]    Felicia Krishna is a 81 year old who presents for the following health issues:    HPI     Hypertension Follow-up      Do you check your blood pressure regularly outside of the clinic? Yes     Are you following a low salt diet? Yes    Are your blood pressures ever more than 140 on the top number (systolic) OR more   than 90 on the bottom number (diastolic), for example 140/90? No   Medication was stopped around surgery due to low blood pressure but has been able to restart per hospital instructions   Has been taking for the last 3 days      Asthma Follow-Up    Was ACT completed today?    Yes    ACT Total Scores 1/18/2022   ACT TOTAL SCORE -   ASTHMA ER VISITS -   ASTHMA HOSPITALIZATIONS -   ACT TOTAL SCORE (Goal Greater than or Equal to 20) 22   In the past 12 months, how many times did you visit the emergency room for your asthma without being admitted " to the hospital? 0   In the past 12 months, how many times were you hospitalized overnight because of your asthma? 0          How many days per week do you miss taking your asthma controller medication?  0    Please describe any recent triggers for your asthma: cold air    Have you had any Emergency Room Visits, Urgent Care Visits, or Hospital Admissions since your last office visit?  No    Doing ok with recent knee replacement      Review of Systems   Constitutional, HEENT, cardiovascular, pulmonary, gi and gu systems are negative, except as otherwise noted.      Objective    Vitals - Patient Reported  Systolic (Patient Reported): 135  Diastolic (Patient Reported): 77      Vitals:  No vitals were obtained today due to virtual visit.    Physical Exam   healthy, alert and no distress  PSYCH: Alert and oriented times 3; coherent speech, normal   rate and volume, able to articulate logical thoughts, able   to abstract reason, no tangential thoughts, no hallucinations   or delusions  Her affect is normal  RESP: No cough, no audible wheezing, able to talk in full sentences  Remainder of exam unable to be completed due to telephone visits    No results found for any visits on 01/18/22.          Phone call duration: 8 minutes

## 2022-01-19 ASSESSMENT — ASTHMA QUESTIONNAIRES: ACT_TOTALSCORE: 22

## 2022-01-20 ENCOUNTER — OFFICE VISIT (OUTPATIENT)
Dept: ORTHOPEDICS | Facility: CLINIC | Age: 82
End: 2022-01-20
Payer: COMMERCIAL

## 2022-01-20 VITALS — WEIGHT: 193 LBS | HEIGHT: 65 IN | BODY MASS INDEX: 32.15 KG/M2

## 2022-01-20 DIAGNOSIS — M47.816 LUMBAR FACET ARTHROPATHY: ICD-10-CM

## 2022-01-20 DIAGNOSIS — M51.369 DDD (DEGENERATIVE DISC DISEASE), LUMBAR: Primary | ICD-10-CM

## 2022-01-20 DIAGNOSIS — M51.16 LUMBAR DISC HERNIATION WITH RADICULOPATHY: ICD-10-CM

## 2022-01-20 PROCEDURE — 99214 OFFICE O/P EST MOD 30 MIN: CPT | Performed by: PREVENTIVE MEDICINE

## 2022-01-20 RX ORDER — CELECOXIB 100 MG/1
100 CAPSULE ORAL 2 TIMES DAILY
Qty: 60 CAPSULE | Refills: 1 | Status: SHIPPED | OUTPATIENT
Start: 2022-01-20 | End: 2022-03-16

## 2022-01-20 RX ORDER — METHYLPREDNISOLONE 4 MG
TABLET, DOSE PACK ORAL
Qty: 21 TABLET | Refills: 0 | Status: SHIPPED | OUTPATIENT
Start: 2022-01-20 | End: 2022-05-17

## 2022-01-20 ASSESSMENT — MIFFLIN-ST. JEOR: SCORE: 1351.32

## 2022-01-20 NOTE — LETTER
1/20/2022         RE: Yumiko Mccartney  [address]  [place] [address]        Dear Colleague,    Thank you for referring your patient, Yumiko Mccartney, to the [facility] [place]. Please see a copy of my visit note below.    HISTORY OF PRESENT ILLNESS  Ms. Mccartney is a pleasant 81 year old year old female who presents to clinic today with ongoing low back pain  Yumiko explains that her low back continues to hurt  She doesn't want to discuss surgical options  Wants to discuss another injection  Location: low back, legs  Quality:  achy pain    Severity:8/10 at worst    Duration: over a year, worse over past several months  Timing: occurs intermittently  Context: occurs while sitting and standing  Modifying factors:  resting and non-use makes it better, movement and use makes it worse  Associated signs & symptoms: radiation from low back into hips     MEDICAL HISTORY  Patient Active Problem List   Diagnosis     Asthma, moderate persistent     Acne rosacea     CARDIOVASCULAR SCREENING; LDL GOAL LESS THAN 130     Osteopenia     Vitamin D deficiency     Advanced directives, counseling/discussion     Hx of pseudoexfoliation, os     Pseudophakia, ou; Yag Caps, os     HTN, goal below 140/90     Peptic ulcer     Health Care Home     History of blepharoplasty, upper lid, ou (elsewhere); revision (EN)     Obesity, Class II, BMI 35-39.9     Restless legs syndrome     DJD (degenerative joint disease), lumbosacral     Rosacea     Essential hypertension with goal blood pressure less than 140/90     Obesity, Class I, BMI 30-34.9     Posterior vitreous detachment, bilateral     Vertigo     Amblyopia, mild, of left eye     Chronic pain of left knee       Current Outpatient Medications   Medication Sig Dispense Refill     acetaminophen (TYLENOL) 325 MG tablet Take 2 tablets (650 mg) by mouth every 4 hours as needed for other 60 tablet 0     albuterol (PROAIR HFA/PROVENTIL HFA/VENTOLIN HFA)  108 (90 Base) MCG/ACT inhaler Inhale 2 puffs into the lungs every 6 hours as needed for shortness of breath / dyspnea or wheezing 18 g 1     aspirin (ASA) 81 MG EC tablet Take 2 tablets (162 mg) by mouth daily 60 tablet 0     calcium citrate (CITRACAL) 950 (200 Ca) MG tablet Take 1 tablet by mouth 2 times daily       Cholecalciferol (VITAMIN D) 2000 UNIT CAPS Take 2,000 Units by mouth daily.       cyclobenzaprine (FLEXERIL) 10 MG tablet TAKE 1 TABLET BY MOUTH AT BEDTIME AS NEEDED 30 tablet 1     doxycycline hyclate (VIBRA-TABS) 100 MG tablet TAKE 1 TABLET (100 MG) BY MOUTH 2 TIMES DAILY (Patient not taking: Reported on 12/21/2021) 180 tablet 0     etodolac (LODINE) 400 MG tablet TAKE 1 TABLET (400 MG) BY MOUTH 2 TIMES DAILY AS NEEDED 60 tablet 0     fluticasone-salmeterol (ADVAIR DISKUS) 250-50 MCG/DOSE inhaler INHALE ONE PUFF TWICE DAILY.  Rinse mouth after each use. 3 each 1     gabapentin (NEURONTIN) 600 MG tablet Take 2 tablets (1,200 mg) by mouth At Bedtime 180 tablet 3     hydrOXYzine (ATARAX) 25 MG tablet Take 1 tablet (25 mg) by mouth every 6 hours as needed for itching 45 tablet 0     lisinopril-hydrochlorothiazide (ZESTORETIC) 20-12.5 MG tablet TAKE 2 TABLETS BY MOUTH EVERY  tablet 3     Multiple Vitamins-Iron TABS Take 1 tablet by mouth daily.       ondansetron (ZOFRAN) 4 MG tablet Take 1 tablet (4 mg) by mouth every 8 hours as needed for nausea 30 tablet 0     order for DME Please measure and distribute 1 pair of 10mmHg - 20mmHg THIGH high open or closed toe compression stockings. Jobst ultrasheer or equivalent. 1 each 3     order for DME Please measure and distribute 1 pair of 10mmHg - 20mmHg KNEE high open or closed toe compression stockings. Jobst ultrasheer or equivalent. 1 each 3     order for DME Please measure and distribute 1 pair of 20mmHg - 30mmHg knee high open or closed toe compression stockings. Jobst ultrasheer or equivalent. 1 each 1     polyethylene glycol (MIRALAX) 17 g packet  Take 17 g by mouth daily 7 packet 0     Propylene Glycol (SYSTANE BALANCE OP) Apply 1 drop to eye 4 times daily as needed (Dry eyes)        senna-docusate (SENOKOT-S/PERICOLACE) 8.6-50 MG tablet Take 1 tablet by mouth 2 times daily 30 tablet 0     tiZANidine (ZANAFLEX) 4 MG tablet Take 4 mg by mouth At Bedtime         Allergies   Allergen Reactions     Erythromycin Swelling and Other (See Comments)       Family History   Problem Relation Age of Onset     C.A.D. Father      C.A.D. Brother      Hypertension Mother      Cancer No family hx of      Diabetes No family hx of      Cerebrovascular Disease No family hx of      Thyroid Disease No family hx of      Glaucoma No family hx of      Macular Degeneration No family hx of      Social History     Socioeconomic History     Marital status:      Spouse name: Not on file     Number of children: 3     Years of education: Not on file     Highest education level: Not on file   Occupational History     Employer: RETIRED   Tobacco Use     Smoking status: Never Smoker     Smokeless tobacco: Never Used   Substance and Sexual Activity     Alcohol use: Yes     Drug use: No     Sexual activity: Yes     Partners: Male   Other Topics Concern     Parent/sibling w/ CABG, MI or angioplasty before 65F 55M? Not Asked   Social History Narrative     Not on file     Social Determinants of Health     Financial Resource Strain: Not on file   Food Insecurity: Not on file   Transportation Needs: Not on file   Physical Activity: Not on file   Stress: Not on file   Social Connections: Not on file   Intimate Partner Violence: Not on file   Housing Stability: Not on file       Additional medical/Social/Surgical histories reviewed in [facility] and updated as appropriate.     REVIEW OF SYSTEMS (1/20/2022)  10 point ROS of systems including Constitutional, Eyes, Respiratory, Cardiovascular, Gastroenterology, Genitourinary, Integumentary, Musculoskeletal, Psychiatric, Allergic/Immunologic were all  "negative except for pertinent positives noted in my HPI.     PHYSICAL EXAM  Vitals:    01/20/22 0946   Weight: 87.5 kg (193 lb)   Height: 1.651 m (5' 5\")     Vital Signs: Ht 1.651 m (5' 5\")   Wt 87.5 kg (193 lb)   BMI 32.12 kg/m   Patient declined being weighed. Body mass index is 32.12 kg/m .    General  - normal appearance, in no obvious distress  HEENT  - conjunctivae not injected, moist mucous membranes, normocephalic/atraumatic head, ears normal appearance, no lesions, mouth normal appearance, no scars, normal dentition and teeth present  CV  - normal peripheral perfusion  Pulm  - normal respiratory pattern, non-labored  Musculoskeletal - lumbar spine  - stance: normal gait without limp, no obvious leg length discrepancy, normal heel and toe walk  - inspection: normal bone and joint alignment, no obvious scoliosis  - palpation: no paravertebral or bony tenderness  - ROM: flexion exacerbates pain, normal extension, sidebending, rotation  - strength: lower extremities 5/5 in all planes  - special tests:  (+) straight leg raise  (+) slump test  Neuro  - patellar and Achilles DTRs 2+ bilaterally, some lower extremity sensory deficit throughout L5 distribution, grossly normal coordination, normal muscle tone  Skin  - no ecchymosis, erythema, warmth, or induration, no obvious rash  Psych  - interactive, appropriate, normal mood and affect  ASSESSMENT & PLAN  82 yo female with lumbar ddd, disc herniations, facet arthropathy,  radicular pain, worse    I independently reviewed the following imaging studies:  Lumbar MRI: shows ddd, disc herniations, facet arthropathy  Discussed and ordered facet joint injections  RX given for celebrex, presnisone, tizanadine  Cont. HEP  F/u in 1-2 months  Appropriate PPE was utilized for prevention of spread of Covid-19.  Abad Armendariz MD, CAM        Again, thank you for allowing me to participate in the care of your patient.        Sincerely,        Abad Armendariz MD    "

## 2022-01-20 NOTE — PROGRESS NOTES
HISTORY OF PRESENT ILLNESS  Ms. Mccartney is a pleasant 81 year old year old female who presents to clinic today with ongoing low back pain  Yumiko explains that her low back continues to hurt  She doesn't want to discuss surgical options  Wants to discuss another injection  Location: low back, legs  Quality:  achy pain    Severity:8/10 at worst    Duration: over a year, worse over past several months  Timing: occurs intermittently  Context: occurs while sitting and standing  Modifying factors:  resting and non-use makes it better, movement and use makes it worse  Associated signs & symptoms: radiation from low back into hips     MEDICAL HISTORY  Patient Active Problem List   Diagnosis     Asthma, moderate persistent     Acne rosacea     CARDIOVASCULAR SCREENING; LDL GOAL LESS THAN 130     Osteopenia     Vitamin D deficiency     Advanced directives, counseling/discussion     Hx of pseudoexfoliation, os     Pseudophakia, ou; Yag Caps, os     HTN, goal below 140/90     Peptic ulcer     Health Care Home     History of blepharoplasty, upper lid, ou (elsewhere); revision (EN)     Obesity, Class II, BMI 35-39.9     Restless legs syndrome     DJD (degenerative joint disease), lumbosacral     Rosacea     Essential hypertension with goal blood pressure less than 140/90     Obesity, Class I, BMI 30-34.9     Posterior vitreous detachment, bilateral     Vertigo     Amblyopia, mild, of left eye     Chronic pain of left knee       Current Outpatient Medications   Medication Sig Dispense Refill     acetaminophen (TYLENOL) 325 MG tablet Take 2 tablets (650 mg) by mouth every 4 hours as needed for other 60 tablet 0     albuterol (PROAIR HFA/PROVENTIL HFA/VENTOLIN HFA) 108 (90 Base) MCG/ACT inhaler Inhale 2 puffs into the lungs every 6 hours as needed for shortness of breath / dyspnea or wheezing 18 g 1     aspirin (ASA) 81 MG EC tablet Take 2 tablets (162 mg) by mouth daily 60 tablet 0     calcium citrate (CITRACAL) 950 (200 Ca) MG  tablet Take 1 tablet by mouth 2 times daily       Cholecalciferol (VITAMIN D) 2000 UNIT CAPS Take 2,000 Units by mouth daily.       cyclobenzaprine (FLEXERIL) 10 MG tablet TAKE 1 TABLET BY MOUTH AT BEDTIME AS NEEDED 30 tablet 1     doxycycline hyclate (VIBRA-TABS) 100 MG tablet TAKE 1 TABLET (100 MG) BY MOUTH 2 TIMES DAILY (Patient not taking: Reported on 12/21/2021) 180 tablet 0     etodolac (LODINE) 400 MG tablet TAKE 1 TABLET (400 MG) BY MOUTH 2 TIMES DAILY AS NEEDED 60 tablet 0     fluticasone-salmeterol (ADVAIR DISKUS) 250-50 MCG/DOSE inhaler INHALE ONE PUFF TWICE DAILY.  Rinse mouth after each use. 3 each 1     gabapentin (NEURONTIN) 600 MG tablet Take 2 tablets (1,200 mg) by mouth At Bedtime 180 tablet 3     hydrOXYzine (ATARAX) 25 MG tablet Take 1 tablet (25 mg) by mouth every 6 hours as needed for itching 45 tablet 0     lisinopril-hydrochlorothiazide (ZESTORETIC) 20-12.5 MG tablet TAKE 2 TABLETS BY MOUTH EVERY  tablet 3     Multiple Vitamins-Iron TABS Take 1 tablet by mouth daily.       ondansetron (ZOFRAN) 4 MG tablet Take 1 tablet (4 mg) by mouth every 8 hours as needed for nausea 30 tablet 0     order for DME Please measure and distribute 1 pair of 10mmHg - 20mmHg THIGH high open or closed toe compression stockings. Jobst ultrasheer or equivalent. 1 each 3     order for DME Please measure and distribute 1 pair of 10mmHg - 20mmHg KNEE high open or closed toe compression stockings. Jobst ultrasheer or equivalent. 1 each 3     order for DME Please measure and distribute 1 pair of 20mmHg - 30mmHg knee high open or closed toe compression stockings. Jobst ultrasheer or equivalent. 1 each 1     polyethylene glycol (MIRALAX) 17 g packet Take 17 g by mouth daily 7 packet 0     Propylene Glycol (SYSTANE BALANCE OP) Apply 1 drop to eye 4 times daily as needed (Dry eyes)        senna-docusate (SENOKOT-S/PERICOLACE) 8.6-50 MG tablet Take 1 tablet by mouth 2 times daily 30 tablet 0     tiZANidine (ZANAFLEX) 4  "MG tablet Take 4 mg by mouth At Bedtime         Allergies   Allergen Reactions     Erythromycin Swelling and Other (See Comments)       Family History   Problem Relation Age of Onset     C.A.D. Father      C.A.D. Brother      Hypertension Mother      Cancer No family hx of      Diabetes No family hx of      Cerebrovascular Disease No family hx of      Thyroid Disease No family hx of      Glaucoma No family hx of      Macular Degeneration No family hx of      Social History     Socioeconomic History     Marital status:      Spouse name: Not on file     Number of children: 3     Years of education: Not on file     Highest education level: Not on file   Occupational History     Employer: RETIRED   Tobacco Use     Smoking status: Never Smoker     Smokeless tobacco: Never Used   Substance and Sexual Activity     Alcohol use: Yes     Drug use: No     Sexual activity: Yes     Partners: Male   Other Topics Concern     Parent/sibling w/ CABG, MI or angioplasty before 65F 55M? Not Asked   Social History Narrative     Not on file     Social Determinants of Health     Financial Resource Strain: Not on file   Food Insecurity: Not on file   Transportation Needs: Not on file   Physical Activity: Not on file   Stress: Not on file   Social Connections: Not on file   Intimate Partner Violence: Not on file   Housing Stability: Not on file       Additional medical/Social/Surgical histories reviewed in [facility] and updated as appropriate.     REVIEW OF SYSTEMS (1/20/2022)  10 point ROS of systems including Constitutional, Eyes, Respiratory, Cardiovascular, Gastroenterology, Genitourinary, Integumentary, Musculoskeletal, Psychiatric, Allergic/Immunologic were all negative except for pertinent positives noted in my HPI.     PHYSICAL EXAM  Vitals:    01/20/22 0946   Weight: 87.5 kg (193 lb)   Height: 1.651 m (5' 5\")     Vital Signs: Ht 1.651 m (5' 5\")   Wt 87.5 kg (193 lb)   BMI 32.12 kg/m   Patient declined being weighed. Body mass " index is 32.12 kg/m .    General  - normal appearance, in no obvious distress  HEENT  - conjunctivae not injected, moist mucous membranes, normocephalic/atraumatic head, ears normal appearance, no lesions, mouth normal appearance, no scars, normal dentition and teeth present  CV  - normal peripheral perfusion  Pulm  - normal respiratory pattern, non-labored  Musculoskeletal - lumbar spine  - stance: normal gait without limp, no obvious leg length discrepancy, normal heel and toe walk  - inspection: normal bone and joint alignment, no obvious scoliosis  - palpation: no paravertebral or bony tenderness  - ROM: flexion exacerbates pain, normal extension, sidebending, rotation  - strength: lower extremities 5/5 in all planes  - special tests:  (+) straight leg raise  (+) slump test  Neuro  - patellar and Achilles DTRs 2+ bilaterally, some lower extremity sensory deficit throughout L5 distribution, grossly normal coordination, normal muscle tone  Skin  - no ecchymosis, erythema, warmth, or induration, no obvious rash  Psych  - interactive, appropriate, normal mood and affect  ASSESSMENT & PLAN  82 yo female with lumbar ddd, disc herniations, facet arthropathy,  radicular pain, worse    I independently reviewed the following imaging studies:  Lumbar MRI: shows ddd, disc herniations, facet arthropathy  Discussed and ordered facet joint injections  RX given for celebrex, presnisone, tizanadine  Cont. HEP  F/u in 1-2 months  Appropriate PPE was utilized for prevention of spread of Covid-19.  Abad Armendariz MD, [facility]

## 2022-01-21 ENCOUNTER — MYC MEDICAL ADVICE (OUTPATIENT)
Dept: ORTHOPEDICS | Facility: CLINIC | Age: 82
End: 2022-01-21
Payer: COMMERCIAL

## 2022-01-21 NOTE — LETTER
February 1, 2022      Yumiko Mccartney  [address]  [place] [address]            To whom it may concern:  Tomi has lumbar degenerative disc disease and lumbar facet arthropathy. It is medically necessary for her to get lumbar facet joint injections as I ordered.  Please contact me with any questions or concerns.        Sincerely,      Abad Armendariz MD

## 2022-02-01 NOTE — TELEPHONE ENCOUNTER
[facility] Call Center    Phone Message    May a detailed message be left on voicemail: yes     Reason for Call: Other: patient would like a call back to discuss message she put in      Action Taken: Message routed to:  [facility] sports    Travel Screening: Not Applicable     In addition to discussing if Kala can write a letter to insurance to approve this injection , she says that Kala told her he would check w/ Dr. Calvin & if Nikunj does  Not give her a permanent Handicap sticker then he would give her a 6 mo. sticker -- she never heard back on that and says her old one  yesterday and she would like to discuss getting a new one as well

## 2022-02-01 NOTE — TELEPHONE ENCOUNTER
ATC spoke with patient regarding letter of medical necessity from Dr. Armendariz for lumbar injections. ATC confirmed that our clinic faxed over this letter to her insurance with the information provided in my chart message on 1/21/22.    Patient confirmed with patient that Dr. Armendariz will only be able to provide handicap driving for 6 months. Patient agreed and will  handicap parking form at clinic on 2/2/22 at scheduling pod A.    Patient was appreciative of the return call and had no further questions.    ETHAN Sinha

## 2022-02-02 NOTE — TELEPHONE ENCOUNTER
Patient came in this morning to collect the handicap pass and the letter of medical necessity. She was informed that she will have to get another handicap pass from her primary after the 6 months if she chooses to get another. She understood.       Nubia Huang MA, ATC

## 2022-02-11 DIAGNOSIS — M51.16 LUMBAR DISC HERNIATION WITH RADICULOPATHY: ICD-10-CM

## 2022-02-11 DIAGNOSIS — M51.369 DDD (DEGENERATIVE DISC DISEASE), LUMBAR: ICD-10-CM

## 2022-02-14 DIAGNOSIS — M51.369 DDD (DEGENERATIVE DISC DISEASE), LUMBAR: ICD-10-CM

## 2022-02-15 RX ORDER — GABAPENTIN 600 MG/1
1200 TABLET ORAL AT BEDTIME
Qty: 180 TABLET | Refills: 3 | Status: SHIPPED | OUTPATIENT
Start: 2022-02-15 | End: 2023-01-26

## 2022-02-15 NOTE — TELEPHONE ENCOUNTER
Prescription refill requested for:   tiZANidine (ZANAFLEX) 4 MG tablet      Last Written Prescription Date:  1/20/2022  Last Fill Quantity: 60,   # refills: 1  Last Office Visit: 1/20/2022  Future Office visit:       Nubia Huang ATC

## 2022-02-23 ENCOUNTER — TELEPHONE (OUTPATIENT)
Dept: ORTHOPEDICS | Facility: CLINIC | Age: 82
End: 2022-02-23
Payer: COMMERCIAL

## 2022-02-23 NOTE — TELEPHONE ENCOUNTER
Clinic received PA forms from [facility] for her upcoming facet injection to be done at [facility].  Forms were completed and faxed. Clinic received a fax back today stating that no PA is needed for the upcoming injection.     Mike ROMAN was contacted to discuss. The Caller was able to talk with someone in the insurance department. She states that they know no PA is needed, but currently there policy is to have patient sign a non-coverage waiver for facet injections. The patient has refused to sign the waiver and is discuss with her insurance to determine coverage.      All PA paperwork will be scanned into the patients chart.

## 2022-03-09 ENCOUNTER — TRANSFERRED RECORDS (OUTPATIENT)
Dept: HEALTH INFORMATION MANAGEMENT | Facility: CLINIC | Age: 82
End: 2022-03-09
Payer: COMMERCIAL

## 2022-03-10 DIAGNOSIS — M51.16 LUMBAR DISC HERNIATION WITH RADICULOPATHY: ICD-10-CM

## 2022-03-10 DIAGNOSIS — M51.369 DDD (DEGENERATIVE DISC DISEASE), LUMBAR: ICD-10-CM

## 2022-03-12 DIAGNOSIS — M51.16 LUMBAR DISC HERNIATION WITH RADICULOPATHY: ICD-10-CM

## 2022-03-12 DIAGNOSIS — M51.369 DDD (DEGENERATIVE DISC DISEASE), LUMBAR: ICD-10-CM

## 2022-03-14 NOTE — TELEPHONE ENCOUNTER
Prescription refill requested for:   tiZANidine (ZANAFLEX) 4 MG tablet      Last Written Prescription Date:  2/16/22  Last Fill Quantity: 60,   # refills: 1  Last Office Visit: 1/10/22  Future Office visit:       Nubia Huang ATC

## 2022-03-16 RX ORDER — CELECOXIB 100 MG/1
CAPSULE ORAL
Qty: 60 CAPSULE | Refills: 1 | Status: SHIPPED | OUTPATIENT
Start: 2022-03-16 | End: 2022-05-12

## 2022-04-10 DIAGNOSIS — M51.16 LUMBAR DISC HERNIATION WITH RADICULOPATHY: ICD-10-CM

## 2022-04-10 DIAGNOSIS — M51.369 DDD (DEGENERATIVE DISC DISEASE), LUMBAR: ICD-10-CM

## 2022-05-04 DIAGNOSIS — J45.30 MILD PERSISTENT ASTHMA WITHOUT COMPLICATION: ICD-10-CM

## 2022-05-04 RX ORDER — ALBUTEROL SULFATE 90 UG/1
AEROSOL, METERED RESPIRATORY (INHALATION)
Qty: 18 G | Refills: 1 | Status: SHIPPED | OUTPATIENT
Start: 2022-05-04 | End: 2023-01-18

## 2022-05-04 NOTE — TELEPHONE ENCOUNTER
Prescription approved per [facility] Refill Protocol.  Destiney Paiz RN, BSN   [facility]

## 2022-05-12 DIAGNOSIS — M51.369 DDD (DEGENERATIVE DISC DISEASE), LUMBAR: ICD-10-CM

## 2022-05-12 DIAGNOSIS — M51.16 LUMBAR DISC HERNIATION WITH RADICULOPATHY: ICD-10-CM

## 2022-05-12 RX ORDER — CELECOXIB 100 MG/1
100 CAPSULE ORAL 2 TIMES DAILY PRN
Qty: 60 CAPSULE | Refills: 0 | Status: SHIPPED | OUTPATIENT
Start: 2022-05-12 | End: 2022-05-17

## 2022-05-12 NOTE — TELEPHONE ENCOUNTER
Prescription refill requested for:   Celecoxib (Celebrex) 100MG      Last Written Prescription Date:  3/16/22  Last Fill Quantity: 60 caps,   # refills: 1  Last Office Visit: 1/20/22  Future Office visit:  None scheduled    Laura Cruz, ATC

## 2022-05-13 NOTE — TELEPHONE ENCOUNTER
Called and spoke with patient, let her know that her Celebrex would be filled for the next month, and any additional refills would require her to make an appointment with , patient said she had scheduling number and would set up an appointment later today.    Miguel López, EMT

## 2022-05-14 ENCOUNTER — OFFICE VISIT (OUTPATIENT)
Dept: URGENT CARE | Facility: URGENT CARE | Age: 82
End: 2022-05-14
Payer: COMMERCIAL

## 2022-05-14 VITALS
WEIGHT: 205.4 LBS | OXYGEN SATURATION: 96 % | SYSTOLIC BLOOD PRESSURE: 128 MMHG | TEMPERATURE: 98 F | HEART RATE: 67 BPM | BODY MASS INDEX: 34.18 KG/M2 | DIASTOLIC BLOOD PRESSURE: 76 MMHG

## 2022-05-14 DIAGNOSIS — M79.89 PAIN AND SWELLING OF RIGHT LOWER LEG: Primary | ICD-10-CM

## 2022-05-14 DIAGNOSIS — M79.661 PAIN AND SWELLING OF RIGHT LOWER LEG: Primary | ICD-10-CM

## 2022-05-14 PROCEDURE — 99214 OFFICE O/P EST MOD 30 MIN: CPT | Performed by: PHYSICIAN ASSISTANT

## 2022-05-14 ASSESSMENT — ENCOUNTER SYMPTOMS
COLOR CHANGE: 0
FEVER: 0
BACK PAIN: 0
JOINT SWELLING: 1
NECK PAIN: 0
WOUND: 0
CHEST TIGHTNESS: 0
ARTHRALGIAS: 1
FATIGUE: 0
MYALGIAS: 1
NECK STIFFNESS: 0
CHILLS: 0
SHORTNESS OF BREATH: 0
CARDIOVASCULAR NEGATIVE: 1
PALPITATIONS: 0
WHEEZING: 0
COUGH: 0
CONSTITUTIONAL NEGATIVE: 1

## 2022-05-14 NOTE — PROGRESS NOTES
Subjective   Tomi is a 81 year old who presents for the following health issues   HPI   Musculoskeletal problem/pain  Onset/Duration: 3weeks  Description  Location: R lower leg  Joint Swelling: Yes  Redness: no  Pain: YES  Warmth: no  Intensity:  moderate  Progression of Symptoms:  Worsening over the past 2days  Accompanying signs and symptoms:   Fevers: no  Numbness/tingling/weakness: Yes which is her normal baseline  History  Trauma to the area: no.  No recent airplane ride, surgeries or prolonged immobilization.  No chest pain, SOB, palpitations, orthopnea, PND or peripheral edema.   Recent illness:  no  Previous similar problem: no  Previous evaluation:  no  Precipitating or alleviating factors:  Aggravating factors include: pressure, palpation, walking, overuse  Therapies tried and outcome: rest/inactivity, heat, ice, immobilization, elevation with minimal relief    Patient Active Problem List   Diagnosis     Asthma, moderate persistent     Acne rosacea     CARDIOVASCULAR SCREENING; LDL GOAL LESS THAN 130     Osteopenia     Vitamin D deficiency     Advanced directives, counseling/discussion     Hx of pseudoexfoliation, os     Pseudophakia, ou; Yag Caps, os     HTN, goal below 140/90     Peptic ulcer     Health Care Home     History of blepharoplasty, upper lid, ou (elsewhere); revision (EN)     Obesity, Class II, BMI 35-39.9     Restless legs syndrome     DJD (degenerative joint disease), lumbosacral     Rosacea     Essential hypertension with goal blood pressure less than 140/90     Obesity, Class I, BMI 30-34.9     Posterior vitreous detachment, bilateral     Vertigo     Amblyopia, mild, of left eye     Chronic pain of left knee     Current Outpatient Medications   Medication     acetaminophen (TYLENOL) 325 MG tablet     albuterol (PROAIR HFA/PROVENTIL HFA/VENTOLIN HFA) 108 (90 Base) MCG/ACT inhaler     aspirin (ASA) 81 MG EC tablet     calcium citrate (CITRACAL) 950 (200 Ca) MG tablet     celecoxib  (CELEBREX) 100 MG capsule     Cholecalciferol (VITAMIN D) 2000 UNIT CAPS     cyclobenzaprine (FLEXERIL) 10 MG tablet     etodolac (LODINE) 400 MG tablet     fluticasone-salmeterol (ADVAIR DISKUS) 250-50 MCG/DOSE inhaler     gabapentin (NEURONTIN) 600 MG tablet     hydrOXYzine (ATARAX) 25 MG tablet     lisinopril-hydrochlorothiazide (ZESTORETIC) 20-12.5 MG tablet     Multiple Vitamins-Iron TABS     ondansetron (ZOFRAN) 4 MG tablet     polyethylene glycol (MIRALAX) 17 g packet     Propylene Glycol (SYSTANE BALANCE OP)     senna-docusate (SENOKOT-S/PERICOLACE) 8.6-50 MG tablet     tiZANidine (ZANAFLEX) 4 MG tablet     tiZANidine (ZANAFLEX) 4 MG tablet     doxycycline hyclate (VIBRA-TABS) 100 MG tablet     methylPREDNISolone (MEDROL) 4 MG tablet therapy pack     order for DME     order for DME     order for DME     No current facility-administered medications for this visit.        Allergies   Allergen Reactions     Erythromycin Swelling and Other (See Comments)       Review of Systems   Constitutional: Negative.  Negative for chills, fatigue and fever.   Respiratory: Negative for cough, chest tightness, shortness of breath and wheezing.    Cardiovascular: Negative.  Negative for chest pain, palpitations and peripheral edema.   Musculoskeletal: Positive for arthralgias, gait problem, joint swelling and myalgias. Negative for back pain, neck pain and neck stiffness.   Skin: Negative for color change, pallor, rash and wound.   All other systems reviewed and are negative.           Objective    /76   Pulse 67   Temp 98  F (36.7  C) (Tympanic)   Wt 93.2 kg (205 lb 6.4 oz)   SpO2 96%   BMI 34.18 kg/m    Body mass index is 34.18 kg/m .  Physical Exam  Vitals and nursing note reviewed.   Constitutional:       General: She is not in acute distress.     Appearance: Normal appearance. She is well-developed. She is obese. She is not ill-appearing.   Cardiovascular:      Rate and Rhythm: Normal rate and regular rhythm.       Pulses: Normal pulses.      Heart sounds: Normal heart sounds, S1 normal and S2 normal.      Comments: Negative homans sign  Pulmonary:      Effort: Pulmonary effort is normal.      Breath sounds: Normal breath sounds and air entry. No decreased breath sounds, wheezing, rhonchi or rales.   Musculoskeletal:      Right lower le+ Pitting Edema present.      Left lower leg: No edema.      Right ankle: Swelling present. Tenderness present. Decreased range of motion.      Right Achilles Tendon: Normal.      Left ankle: No swelling, deformity, ecchymosis or lacerations. No tenderness. No lateral malleolus, medial malleolus, CF ligament, posterior TF ligament or proximal fibula tenderness. Normal range of motion. Normal pulse.      Left Achilles Tendon: Normal.      Right foot: Normal capillary refill. Swelling present. No tenderness or bony tenderness. Normal pulse.      Left foot: Normal range of motion and normal capillary refill. No swelling, deformity, laceration, tenderness, bony tenderness or crepitus. Normal pulse.   Skin:     General: Skin is warm and dry.      Capillary Refill: Capillary refill takes less than 2 seconds.      Findings: Abrasion present.   Neurological:      Mental Status: She is alert and oriented to person, place, and time.      Sensory: Sensation is intact. No sensory deficit.      Motor: Motor function is intact.      Gait: Gait abnormal.      Deep Tendon Reflexes: Reflexes are normal and symmetric.   Psychiatric:         Mood and Affect: Mood normal.         Behavior: Behavior normal.         Thought Content: Thought content normal.         Judgment: Judgment normal.          Assessment/Plan:  Pain and swelling of right lower leg:  Along with edema, abrasions.  H&P is concerning for DVT vs cellulitis vs vascular issue vs CHF.  Recommend further evaluation and management in the ER.  Will most likely need further workup with labs and/or imaging.  Patient has declined transportation via  ambulance and will have family drive her/him.  Understands risks and benefits of ambulance transfer and s/he has declined.  Call 911 if worsening symptoms.  S/he plans to go to [facility].  S/he left in stable condition with AVS in hand.  F/u with PCP after ER visit.         Crystal Roger PA-C

## 2022-05-16 ENCOUNTER — TELEPHONE (OUTPATIENT)
Dept: FAMILY MEDICINE | Facility: CLINIC | Age: 82
End: 2022-05-16
Payer: COMMERCIAL

## 2022-05-16 NOTE — TELEPHONE ENCOUNTER
Patient was seen in Urgent care on Saturday and went to ER after and was diagnosed with DVT. Was told to Follow-up today with PCP. Dr. Bear is out so scheduled with [facility] [place] this morning.    Marilynn Cole RN [facility]

## 2022-05-17 ENCOUNTER — OFFICE VISIT (OUTPATIENT)
Dept: FAMILY MEDICINE | Facility: CLINIC | Age: 82
End: 2022-05-17
Payer: COMMERCIAL

## 2022-05-17 VITALS
SYSTOLIC BLOOD PRESSURE: 130 MMHG | HEART RATE: 74 BPM | TEMPERATURE: 97.5 F | BODY MASS INDEX: 33.49 KG/M2 | OXYGEN SATURATION: 98 % | RESPIRATION RATE: 20 BRPM | DIASTOLIC BLOOD PRESSURE: 77 MMHG | WEIGHT: 201 LBS | HEIGHT: 65 IN

## 2022-05-17 DIAGNOSIS — I10 ESSENTIAL HYPERTENSION WITH GOAL BLOOD PRESSURE LESS THAN 140/90: ICD-10-CM

## 2022-05-17 DIAGNOSIS — I82.411 ACUTE DEEP VEIN THROMBOSIS (DVT) OF FEMORAL VEIN OF RIGHT LOWER EXTREMITY (H): Primary | ICD-10-CM

## 2022-05-17 DIAGNOSIS — J45.30 MILD PERSISTENT ASTHMA WITHOUT COMPLICATION: ICD-10-CM

## 2022-05-17 LAB
ALBUMIN SERPL-MCNC: 3.9 G/DL (ref 3.4–5)
ALP SERPL-CCNC: 61 U/L (ref 40–150)
ALT SERPL W P-5'-P-CCNC: 22 U/L (ref 0–50)
ANION GAP SERPL CALCULATED.3IONS-SCNC: 6 MMOL/L (ref 3–14)
APTT PPP: 59 SECONDS (ref 22–38)
AST SERPL W P-5'-P-CCNC: 16 U/L (ref 0–45)
BASOPHILS # BLD AUTO: 0.1 10E3/UL (ref 0–0.2)
BASOPHILS NFR BLD AUTO: 1 %
BILIRUB SERPL-MCNC: 0.3 MG/DL (ref 0.2–1.3)
BUN SERPL-MCNC: 15 MG/DL (ref 7–30)
CALCIUM SERPL-MCNC: 9.3 MG/DL (ref 8.5–10.1)
CHLORIDE BLD-SCNC: 104 MMOL/L (ref 94–109)
CO2 SERPL-SCNC: 30 MMOL/L (ref 20–32)
CREAT SERPL-MCNC: 0.76 MG/DL (ref 0.52–1.04)
EOSINOPHIL # BLD AUTO: 0.3 10E3/UL (ref 0–0.7)
EOSINOPHIL NFR BLD AUTO: 5 %
ERYTHROCYTE [DISTWIDTH] IN BLOOD BY AUTOMATED COUNT: 13.6 % (ref 10–15)
FACTOR V INTERPRETATION: NORMAL
GFR SERPL CREATININE-BSD FRML MDRD: 79 ML/MIN/1.73M2
GLUCOSE BLD-MCNC: 105 MG/DL (ref 70–99)
HCT VFR BLD AUTO: 33.1 % (ref 35–47)
HGB BLD-MCNC: 9.8 G/DL (ref 11.7–15.7)
IMM GRANULOCYTES # BLD: 0 10E3/UL
IMM GRANULOCYTES NFR BLD: 0 %
INR PPP: 2.45 (ref 0.85–1.15)
LAB DIRECTOR COMMENTS: NORMAL
LAB DIRECTOR DISCLAIMER: NORMAL
LAB DIRECTOR INTERPRETATION: NORMAL
LAB DIRECTOR METHODOLOGY: NORMAL
LAB DIRECTOR RESULTS: NORMAL
LYMPHOCYTES # BLD AUTO: 1.4 10E3/UL (ref 0.8–5.3)
LYMPHOCYTES NFR BLD AUTO: 25 %
MCH RBC QN AUTO: 24.7 PG (ref 26.5–33)
MCHC RBC AUTO-ENTMCNC: 29.6 G/DL (ref 31.5–36.5)
MCV RBC AUTO: 84 FL (ref 78–100)
MONOCYTES # BLD AUTO: 0.3 10E3/UL (ref 0–1.3)
MONOCYTES NFR BLD AUTO: 6 %
NEUTROPHILS # BLD AUTO: 3.5 10E3/UL (ref 1.6–8.3)
NEUTROPHILS NFR BLD AUTO: 63 %
PLATELET # BLD AUTO: 245 10E3/UL (ref 150–450)
POTASSIUM BLD-SCNC: 3.9 MMOL/L (ref 3.4–5.3)
PROT SERPL-MCNC: 7.3 G/DL (ref 6.8–8.8)
RBC # BLD AUTO: 3.96 10E6/UL (ref 3.8–5.2)
SODIUM SERPL-SCNC: 140 MMOL/L (ref 133–144)
SPECIMEN DESCRIPTION: NORMAL
WBC # BLD AUTO: 5.5 10E3/UL (ref 4–11)

## 2022-05-17 PROCEDURE — 80053 COMPREHEN METABOLIC PANEL: CPT | Performed by: PREVENTIVE MEDICINE

## 2022-05-17 PROCEDURE — 86147 CARDIOLIPIN ANTIBODY EA IG: CPT | Mod: 59 | Performed by: PREVENTIVE MEDICINE

## 2022-05-17 PROCEDURE — 99214 OFFICE O/P EST MOD 30 MIN: CPT | Performed by: PREVENTIVE MEDICINE

## 2022-05-17 PROCEDURE — 81241 F5 GENE: CPT | Performed by: PREVENTIVE MEDICINE

## 2022-05-17 PROCEDURE — 85730 THROMBOPLASTIN TIME PARTIAL: CPT | Mod: 59 | Performed by: PREVENTIVE MEDICINE

## 2022-05-17 PROCEDURE — 85025 COMPLETE CBC W/AUTO DIFF WBC: CPT | Performed by: PREVENTIVE MEDICINE

## 2022-05-17 PROCEDURE — 85610 PROTHROMBIN TIME: CPT | Performed by: PREVENTIVE MEDICINE

## 2022-05-17 PROCEDURE — G0452 MOLECULAR PATHOLOGY INTERPR: HCPCS | Mod: 59 | Performed by: PATHOLOGY

## 2022-05-17 PROCEDURE — 85303 CLOT INHIBIT PROT C ACTIVITY: CPT | Performed by: PREVENTIVE MEDICINE

## 2022-05-17 PROCEDURE — 85390 FIBRINOLYSINS SCREEN I&R: CPT

## 2022-05-17 PROCEDURE — 85300 ANTITHROMBIN III ACTIVITY: CPT | Performed by: PREVENTIVE MEDICINE

## 2022-05-17 PROCEDURE — 85306 CLOT INHIBIT PROT S FREE: CPT | Performed by: PREVENTIVE MEDICINE

## 2022-05-17 PROCEDURE — 85613 RUSSELL VIPER VENOM DILUTED: CPT | Performed by: PREVENTIVE MEDICINE

## 2022-05-17 PROCEDURE — 85730 THROMBOPLASTIN TIME PARTIAL: CPT | Performed by: PREVENTIVE MEDICINE

## 2022-05-17 PROCEDURE — 36415 COLL VENOUS BLD VENIPUNCTURE: CPT | Performed by: PREVENTIVE MEDICINE

## 2022-05-17 RX ORDER — GABAPENTIN 600 MG/1
1200 TABLET ORAL AT BEDTIME
COMMUNITY
End: 2022-09-22

## 2022-05-17 ASSESSMENT — PAIN SCALES - GENERAL: PAINLEVEL: NO PAIN (0)

## 2022-05-17 NOTE — PROGRESS NOTES
"  Assessment & Plan     Acute deep vein thrombosis (DVT) of femoral vein of right lower extremity (H)  -labs for coagulation work up ordered  -placed referral for Hematology   -for now continue with Xarelto.  -duration of treatment to be determined once work up is complete   - CT Abdomen Pelvis w/o & w Contrast  - CBC with Platelets & Differential  - Comprehensive metabolic panel  - Protein S Antigen Free  - Protein C chromogenic  - Antithrombin III  - Cardiolipin Rolanda IgG and IgM  - Partial thromboplastin time  - Lupus Anticoagulant Panel  - Factor 5 leiden mutation analysis  - INR  -patient does not have any traditional risk factors for new onset DVT  -I ordered a CT scan of the abdomen and Pelvis due to history of asymmetric lower extremity swelling, need to rule out intra abdominal pathologies.     Essential hypertension with goal blood pressure less than 140/90  -at goal    Mild persistent asthma without complication  -stable       Ordering of each unique test  30 minutes spent on the date of the encounter doing chart review, history and exam, documentation and further activities per the note       BMI:   Estimated body mass index is 33.45 kg/m  as calculated from the following:    Height as of this encounter: 1.651 m (5' 5\").    Weight as of this encounter: 91.2 kg (201 lb).       Return in about 1 week (around 5/24/2022) if symptoms worsen or fail to improve.    Gavi Bear MD MPH    [facility]    Felicia Krishna is a 81 year old who presents for the following health issues  [facility] hospital.       History of Present Illness       Reason for visit:  Follow up  Symptom onset:  3-4 weeks ago  Symptoms include:  Swollen leg and ankle  Symptom intensity:  Mild  Symptom progression:  Staying the same  Had these symptoms before:  No  What makes it worse:  No  What makes it better:  No    She eats 2-3 servings of fruits and vegetables daily.She consumes 0 sweetened beverage(s) " "daily.She exercises with enough effort to increase her heart rate 9 or less minutes per day.  She exercises with enough effort to increase her heart rate 3 or less days per week. She is missing 1 dose(s) of medications per week.         Hospital Follow-up Visit:    Hospital/Nursing Home/IP Rehab Facility:   Date of Admission: May 14, 2022  Date of Discharge: May 14, 2022  Reason(s) for Admission: blood clot right leg      Was your hospitalization related to COVID-19? No   Problems taking medications regularly:  None  Medication changes since discharge: New meds addedProblems adhering to non-medication therapy:  None    Summary of hospitalization:  CareEverywhere information obtained and reviewed  Diagnostic Tests/Treatments reviewed.  Follow up needed: Yes  Other Healthcare Providers Involved in Patient s Care:         Labs and specialist  Update since discharge: improved.       Post Discharge Medication Reconciliation: discharge medications reconciled, continue medications without change.  Plan of care communicated with patient                Overall OK  No chest pain  No shortness of breath  No prolonged immobilization  Slight headache  No family history of DVT  Leg had been swollen even before she traveled to [facility] on vacation   No use of hormonal medication       The following is a summary from the ED visit:    \"Yumiko Mccartney is a 81 y.o. female who presents to the emergency department for evaluation of leg swelling. States that she reports right leg swelling for the past 3 weeks. She denies any history of DVT, no pain or redness but has noticed some warmth of the area. She just got back from a trip to [place] but flew there and it was less than a 2-hour flight. She has asthma and reports some mild intermittent shortness of breath but this is completely relieved with albuterol. She denies any chest pain fever or vomiting or abdominal pain. She reports that she has had diarrhea " "for the past several weeks but has not been on any recent antibiotics.  MDM:   Yumiko Mccartney is a 81 y.o. female who presents for evaluation of right leg pain, found to have an extensive DVT as above. She has good pulses, no significant pain, I discussed with vascular surgery, Dr. Paredes recommended outpatient management for hypercoagulable work-up, and DOAC. Was given a starter pack for Xarelto, will follow-up with primary care physician return for any worsening symptoms.\"    DIAGNOSIS:  ICD-10-CM   1. Acute deep vein thrombosis (DVT) of femoral vein of right lower extremity (HCC) I82.411        Review of Systems   Constitutional, HEENT, cardiovascular, pulmonary, gi and gu systems are negative, except as otherwise noted.      Objective    /77 (BP Location: Left arm, Patient Position: Sitting, Cuff Size: Adult Regular)   Pulse 74   Temp 97.5  F (36.4  C) (Tympanic)   Resp 20   Ht 1.651 m (5' 5\")   Wt 91.2 kg (201 lb)   SpO2 98%   BMI 33.45 kg/m    Body mass index is 33.45 kg/m .  Physical Exam   GENERAL APPEARANCE: healthy, alert and no distress  EYES: Eyes grossly normal to inspection and conjunctivae and sclerae normal  NECK: no adenopathy and trachea midline and normal to palpation  RESP: lungs clear to auscultation - no rales, rhonchi or wheezes  CV: regular rates and rhythm, normal S1 S2  ABDOMEN: soft, non-tender and no rebound or guarding   MS: edema and increased temperature of the right lower extremity+ scab+ on the anterior aspect.   SKIN: no suspicious lesions or rashes  NEURO: Normal strength and tone, mentation intact and speech normal  PSYCH: mentation appears normal      Results for orders placed or performed in visit on 05/17/22 (from the past 24 hour(s))   CBC with Platelets & Differential    Narrative    The following orders were created for panel order CBC with Platelets & Differential.  Procedure                               Abnormality         Status                   "   ---------                               -----------         ------                     CBC with platelets and d...[[id number]]  Abnormal            Final result                 Please view results for these tests on the individual orders.   CBC with platelets and differential   Result Value Ref Range    WBC Count 5.5 4.0 - 11.0 10e3/uL    RBC Count 3.96 3.80 - 5.20 10e6/uL    Hemoglobin 9.8 (L) 11.7 - 15.7 g/dL    Hematocrit 33.1 (L) 35.0 - 47.0 %    MCV 84 78 - 100 fL    MCH 24.7 (L) 26.5 - 33.0 pg    MCHC 29.6 (L) 31.5 - 36.5 g/dL    RDW 13.6 10.0 - 15.0 %    Platelet Count 245 150 - 450 10e3/uL    % Neutrophils 63 %    % Lymphocytes 25 %    % Monocytes 6 %    % Eosinophils 5 %    % Basophils 1 %    % Immature Granulocytes 0 %    Absolute Neutrophils 3.5 1.6 - 8.3 10e3/uL    Absolute Lymphocytes 1.4 0.8 - 5.3 10e3/uL    Absolute Monocytes 0.3 0.0 - 1.3 10e3/uL    Absolute Eosinophils 0.3 0.0 - 0.7 10e3/uL    Absolute Basophils 0.1 0.0 - 0.2 10e3/uL    Absolute Immature Granulocytes 0.0 <=0.4 10e3/uL

## 2022-05-17 NOTE — PATIENT INSTRUCTIONS
To schedule the CT scan of the abdomen and pelvis, please call [phone].     [facility]   [address] [place] [id number]   Phone: [phone]        Comment: Please be aware that coverage of these services is subject to the terms and limitations of your health insurance plan.  Call member services at your health plan with any benefit or coverage questions.   [facility] will call you to coordinate your care as prescribed by the provider.  If you don t hear from a representative within 2 business days, please call the number listed above.

## 2022-05-18 ENCOUNTER — TELEPHONE (OUTPATIENT)
Dept: ORTHOPEDICS | Facility: CLINIC | Age: 82
End: 2022-05-18
Payer: COMMERCIAL

## 2022-05-18 LAB
AT III ACT/NOR PPP CHRO: 100 % (ref 85–135)
LA PPP-IMP: NORMAL
PROT C ACT/NOR PPP CHRO: 97 % (ref 70–170)
PROT S FREE AG ACT/NOR PPP IA: 60 % (ref 55–125)

## 2022-05-18 NOTE — TELEPHONE ENCOUNTER
----- Message from Abad Armendariz MD sent at 5/12/2022  9:02 PM CDT -----  Regarding: refill on celebrex and need of follow up appointment  Can someone communicate with this patient that I refilled her celebrex x 1 month, however, I want her to make a followup appointment with me for ANY future refills from me as it has been about 6 months since our last office visit.  Thanks!  Dr Armendariz

## 2022-05-18 NOTE — RESULT ENCOUNTER NOTE
Yumiko,     Platelets (cells that help you clot) are normal.  Hemoglobin is low at 9.8, this is low compared to levels of 11 that were noted on 5/15/22.  Are you noticing bleeding from anywhere, black stools, blood in the urine?    Electrolytes, non fasting glucose, kidney function and liver function tests are normal.    Other coagulation studies are within normal limits.     I see that you are scheduled for the CT scan tomorrow.     Please do not hesitate to call us at [phone] if you have any questions or concerns.    Thank you,    Gavi Bear MD MPH

## 2022-05-18 NOTE — TELEPHONE ENCOUNTER
Spoke with patient on the phone and discussed her refill for celebrex, and told her that any additional refills would require an appointment with , as it has been 6 months since her last office visit. Patient stated that she may wait on scheduling a follow-up, as she recently found out she has a blood clot in her leg.    Miguel López, EMT

## 2022-05-19 ENCOUNTER — ANCILLARY PROCEDURE (OUTPATIENT)
Dept: CT IMAGING | Facility: CLINIC | Age: 82
End: 2022-05-19
Attending: PREVENTIVE MEDICINE
Payer: COMMERCIAL

## 2022-05-19 DIAGNOSIS — I82.411 ACUTE DEEP VEIN THROMBOSIS (DVT) OF FEMORAL VEIN OF RIGHT LOWER EXTREMITY (H): ICD-10-CM

## 2022-05-19 LAB
CARDIOLIPIN IGG SER IA-ACNC: <2 GPL-U/ML
CARDIOLIPIN IGG SER IA-ACNC: NEGATIVE
CARDIOLIPIN IGM SER IA-ACNC: <2 MPL-U/ML
CARDIOLIPIN IGM SER IA-ACNC: NEGATIVE

## 2022-05-19 PROCEDURE — 74177 CT ABD & PELVIS W/CONTRAST: CPT | Performed by: RADIOLOGY

## 2022-05-19 RX ORDER — IOPAMIDOL 755 MG/ML
123 INJECTION, SOLUTION INTRAVASCULAR ONCE
Status: COMPLETED | OUTPATIENT
Start: 2022-05-19 | End: 2022-05-19

## 2022-05-19 RX ADMIN — IOPAMIDOL 123 ML: 755 INJECTION, SOLUTION INTRAVASCULAR at 09:38

## 2022-05-20 NOTE — RESULT ENCOUNTER NOTE
Yumiko,     Ct scan did not show any acute abnormalities.     Please do not hesitate to call us at [phone] if you have any questions or concerns.    Thank you,    Gavi Bear MD MPH

## 2022-05-20 NOTE — RESULT ENCOUNTER NOTE
Yumiko,     Initial tests to see if you have any genetic reasons for blood clots are not showing any abnormalities.     Please do not hesitate to call us at [phone] if you have any questions or concerns.    Thank you,    Gavi Bear MD MPH

## 2022-06-13 ENCOUNTER — MYC MEDICAL ADVICE (OUTPATIENT)
Dept: FAMILY MEDICINE | Facility: CLINIC | Age: 82
End: 2022-06-13
Payer: COMMERCIAL

## 2022-06-21 ENCOUNTER — MYC MEDICAL ADVICE (OUTPATIENT)
Dept: FAMILY MEDICINE | Facility: CLINIC | Age: 82
End: 2022-06-21

## 2022-06-21 NOTE — TELEPHONE ENCOUNTER
Routed to provider for an FYI. Patient was seen in the ER for leg edema and was discharged  and advised to wear compression socks. Please advise.     Destiney Paiz RN, BSN   [facility]

## 2022-07-11 ENCOUNTER — OFFICE VISIT (OUTPATIENT)
Dept: ORTHOPEDICS | Facility: CLINIC | Age: 82
End: 2022-07-11
Payer: COMMERCIAL

## 2022-07-11 DIAGNOSIS — M51.16 LUMBAR DISC HERNIATION WITH RADICULOPATHY: ICD-10-CM

## 2022-07-11 DIAGNOSIS — Z96.652 HISTORY OF KNEE REPLACEMENT, TOTAL, LEFT: ICD-10-CM

## 2022-07-11 DIAGNOSIS — Z96.652 HISTORY OF LEFT KNEE REPLACEMENT: ICD-10-CM

## 2022-07-11 DIAGNOSIS — G89.29 CHRONIC PAIN OF LEFT KNEE: Primary | ICD-10-CM

## 2022-07-11 DIAGNOSIS — M25.562 CHRONIC PAIN OF LEFT KNEE: Primary | ICD-10-CM

## 2022-07-11 PROCEDURE — 99214 OFFICE O/P EST MOD 30 MIN: CPT | Performed by: PREVENTIVE MEDICINE

## 2022-07-11 RX ORDER — METHYLPREDNISOLONE 4 MG
TABLET, DOSE PACK ORAL
Qty: 21 TABLET | Refills: 0 | Status: SHIPPED | OUTPATIENT
Start: 2022-07-11 | End: 2022-09-22

## 2022-07-11 NOTE — PROGRESS NOTES
HISTORY OF PRESENT ILLNESS  Ms. Mccartney is a pleasant 82 year old year old female who presents to clinic today with   Yumiko explains that her lower back has been painful for her, she states that her left knee which she had replaced in October has been painful, her right leg has a blood clot in it, which was found in may  Continues to have left knee and leg pains and low back pain has persisted  Currently on xarelto for 1.5 more months due to DVT  Location: left knee, lumbar spine  Quality:  achy pain    Severity: 8/10 at worst      MEDICAL HISTORY  Patient Active Problem List   Diagnosis     Asthma, moderate persistent     Acne rosacea     CARDIOVASCULAR SCREENING; LDL GOAL LESS THAN 130     Osteopenia     Vitamin D deficiency     Advanced directives, counseling/discussion     Hx of pseudoexfoliation, os     Pseudophakia, ou; Yag Caps, os     HTN, goal below 140/90     Peptic ulcer     Health Care Home     History of blepharoplasty, upper lid, ou (elsewhere); revision (EN)     Obesity, Class II, BMI 35-39.9     Restless legs syndrome     DJD (degenerative joint disease), lumbosacral     Rosacea     Essential hypertension with goal blood pressure less than 140/90     Obesity, Class I, BMI 30-34.9     Posterior vitreous detachment, bilateral     Vertigo     Amblyopia, mild, of left eye     Chronic pain of left knee       Current Outpatient Medications   Medication Sig Dispense Refill     acetaminophen (TYLENOL) 325 MG tablet Take 2 tablets (650 mg) by mouth every 4 hours as needed for other 60 tablet 0     albuterol (PROAIR HFA/PROVENTIL HFA/VENTOLIN HFA) 108 (90 Base) MCG/ACT inhaler INHALE 2 PUFFS BY MOUTH EVERY 6 HOURS AS NEEDED FOR WHEEZE OR FOR SHORTNESS OF BREATH 18 g 1     calcium citrate (CITRACAL) 950 (200 Ca) MG tablet Take 1 tablet by mouth 2 times daily       Cholecalciferol (VITAMIN D) 2000 UNIT CAPS Take 2,000 Units by mouth daily. (Patient not taking: Reported on 5/17/2022)       doxycycline hyclate  (VIBRA-TABS) 100 MG tablet TAKE 1 TABLET (100 MG) BY MOUTH 2 TIMES DAILY (Patient not taking: No sig reported) 180 tablet 0     fluticasone-salmeterol (ADVAIR DISKUS) 250-50 MCG/DOSE inhaler INHALE ONE PUFF TWICE DAILY.  Rinse mouth after each use. (Patient not taking: Reported on 5/17/2022) 3 each 1     gabapentin (NEURONTIN) 600 MG tablet Take 1,200 % by mouth At Bedtime       gabapentin (NEURONTIN) 600 MG tablet TAKE 2 TABLETS (1,200 MG) BY MOUTH AT BEDTIME 180 tablet 3     lisinopril-hydrochlorothiazide (ZESTORETIC) 20-12.5 MG tablet TAKE 2 TABLETS BY MOUTH EVERY  tablet 3     order for DME Please measure and distribute 1 pair of 10mmHg - 20mmHg THIGH high open or closed toe compression stockings. Jobst ultrasheer or equivalent. 1 each 3     order for DME Please measure and distribute 1 pair of 10mmHg - 20mmHg KNEE high open or closed toe compression stockings. Jobst ultrasheer or equivalent. 1 each 3     order for DME Please measure and distribute 1 pair of 20mmHg - 30mmHg knee high open or closed toe compression stockings. Jobst ultrasheer or equivalent. 1 each 1     rivaroxaban ANTICOAGULANT (XARELTO ANTICOAGULANT) 20 MG TABS tablet Take 1 tablet (20 mg) by mouth daily (with dinner) 30 tablet 1     Rivaroxaban ANTICOAGULANT 15 & 20 MG TBPK Take 15 mg by mouth twice daily with food for 21 days, then 20 mg daily with evening meal.         Allergies   Allergen Reactions     Erythromycin Swelling and Other (See Comments)       Family History   Problem Relation Age of Onset     C.A.D. Father      C.A.D. Brother      Hypertension Mother      Cancer No family hx of      Diabetes No family hx of      Cerebrovascular Disease No family hx of      Thyroid Disease No family hx of      Glaucoma No family hx of      Macular Degeneration No family hx of      Social History     Socioeconomic History     Marital status:      Number of children: 3   Occupational History     Employer: RETIRED   Tobacco Use      Smoking status: Never Smoker     Smokeless tobacco: Never Used   Substance and Sexual Activity     Alcohol use: Yes     Comment: rarely     Drug use: No     Sexual activity: Yes     Partners: Male       Additional medical/Social/Surgical histories reviewed in [facility] and updated as appropriate.     REVIEW OF SYSTEMS (7/11/2022)  10 point ROS of systems including Constitutional, Eyes, Respiratory, Cardiovascular, Gastroenterology, Genitourinary, Integumentary, Musculoskeletal, Psychiatric, Allergic/Immunologic were all negative except for pertinent positives noted in my HPI.     PHYSICAL EXAM  Vital Signs: There were no vitals taken for this visit. Patient declined being weighed. There is no height or weight on file to calculate BMI.    General  - normal appearance, in no obvious distress  HEENT  - conjunctivae not injected, moist mucous membranes, normocephalic/atraumatic head, ears normal appearance, no lesions, mouth normal appearance, no scars, normal dentition and teeth present  CV  - normal peripheral perfusion  Pulm  - normal respiratory pattern, non-labored  Musculoskeletal - lumbar spine  - stance: normal gait without limp, no obvious leg length discrepancy, normal heel and toe walk  - inspection: normal bone and joint alignment, no obvious scoliosis  - palpation: no paravertebral or bony tenderness  - ROM: flexion exacerbates pain, normal extension, sidebending, rotation  - strength: lower extremities 5/5 in all planes  - special tests:  (+) straight leg raise  (+) slump test  Neuro  - patellar and Achilles DTRs 2+ bilaterally, lower extremity sensory deficit throughout L5 distribution, grossly normal coordination, normal muscle tone  Skin  - no ecchymosis, erythema, warmth, or induration, no obvious rash  Psych  - interactive, appropriate, normal mood and affect  Left knee: has full ROM, some pain with patellar compression, but no effusion, stable  ASSESSMENT & PLAN  83 yo female with left knee s/p  replacement last year, stable, and lumbar ddd, disc herniations, radicular pain, worse    I independently reviewed the following imaging studies:  Left knee xrays: shows stable joint replacement  Lumbar MRI: from last year: shows ddd, disc herniations  Discussed and ordered lumbar MRI as it has been 1 year since her previous  Will consider further treatments after MRI completed  Start pool therapy  rx given for medrol and tizanadine    Appropriate PPE was utilized for prevention of spread of Covid-19.  Abad Armendariz MD, CAM

## 2022-07-11 NOTE — PATIENT INSTRUCTIONS
Thanks for coming today.  Ortho/Sports Medicine Clinic  [address] [place] [address]    To schedule future appointments in Ortho Clinic, you may call [phone].    To schedule ordered imaging or an injection ordered by your provider:  Call [facility] scheduling line: [phone]    MyChart available online at:  [email or web address]    Please call if any further questions or concerns ([phone]).  Clinic hours 8 am to 5 pm.    Return to clinic (call) if symptoms worsen or fail to improve.

## 2022-07-11 NOTE — LETTER
7/11/2022         RE: Yumiko Mccartney  [address]  [place] [address]        Dear Colleague,    Thank you for referring your patient, Yumiko Mccartney, to the [facility] [place]. Please see a copy of my visit note below.    HISTORY OF PRESENT ILLNESS  Ms. Mccartney is a pleasant 82 year old year old female who presents to clinic today with   Yumiko explains that her lower back has been painful for her, she states that her left knee which she had replaced in October has been painful, her right leg has a blood clot in it, which was found in may  Continues to have left knee and leg pains and low back pain has persisted  Currently on xarelto for 1.5 more months due to DVT  Location: left knee, lumbar spine  Quality:  achy pain    Severity: 8/10 at worst      MEDICAL HISTORY  Patient Active Problem List   Diagnosis     Asthma, moderate persistent     Acne rosacea     CARDIOVASCULAR SCREENING; LDL GOAL LESS THAN 130     Osteopenia     Vitamin D deficiency     Advanced directives, counseling/discussion     Hx of pseudoexfoliation, os     Pseudophakia, ou; Yag Caps, os     HTN, goal below 140/90     Peptic ulcer     Health Care Home     History of blepharoplasty, upper lid, ou (elsewhere); revision (EN)     Obesity, Class II, BMI 35-39.9     Restless legs syndrome     DJD (degenerative joint disease), lumbosacral     Rosacea     Essential hypertension with goal blood pressure less than 140/90     Obesity, Class I, BMI 30-34.9     Posterior vitreous detachment, bilateral     Vertigo     Amblyopia, mild, of left eye     Chronic pain of left knee       Current Outpatient Medications   Medication Sig Dispense Refill     acetaminophen (TYLENOL) 325 MG tablet Take 2 tablets (650 mg) by mouth every 4 hours as needed for other 60 tablet 0     albuterol (PROAIR HFA/PROVENTIL HFA/VENTOLIN HFA) 108 (90 Base) MCG/ACT inhaler INHALE 2 PUFFS BY MOUTH EVERY 6 HOURS AS NEEDED FOR WHEEZE OR FOR  SHORTNESS OF BREATH 18 g 1     calcium citrate (CITRACAL) 950 (200 Ca) MG tablet Take 1 tablet by mouth 2 times daily       Cholecalciferol (VITAMIN D) 2000 UNIT CAPS Take 2,000 Units by mouth daily. (Patient not taking: Reported on 5/17/2022)       doxycycline hyclate (VIBRA-TABS) 100 MG tablet TAKE 1 TABLET (100 MG) BY MOUTH 2 TIMES DAILY (Patient not taking: No sig reported) 180 tablet 0     fluticasone-salmeterol (ADVAIR DISKUS) 250-50 MCG/DOSE inhaler INHALE ONE PUFF TWICE DAILY.  Rinse mouth after each use. (Patient not taking: Reported on 5/17/2022) 3 each 1     gabapentin (NEURONTIN) 600 MG tablet Take 1,200 % by mouth At Bedtime       gabapentin (NEURONTIN) 600 MG tablet TAKE 2 TABLETS (1,200 MG) BY MOUTH AT BEDTIME 180 tablet 3     lisinopril-hydrochlorothiazide (ZESTORETIC) 20-12.5 MG tablet TAKE 2 TABLETS BY MOUTH EVERY  tablet 3     order for DME Please measure and distribute 1 pair of 10mmHg - 20mmHg THIGH high open or closed toe compression stockings. Jobst ultrasheer or equivalent. 1 each 3     order for DME Please measure and distribute 1 pair of 10mmHg - 20mmHg KNEE high open or closed toe compression stockings. Jobst ultrasheer or equivalent. 1 each 3     order for DME Please measure and distribute 1 pair of 20mmHg - 30mmHg knee high open or closed toe compression stockings. Jobst ultrasheer or equivalent. 1 each 1     rivaroxaban ANTICOAGULANT (XARELTO ANTICOAGULANT) 20 MG TABS tablet Take 1 tablet (20 mg) by mouth daily (with dinner) 30 tablet 1     Rivaroxaban ANTICOAGULANT 15 & 20 MG TBPK Take 15 mg by mouth twice daily with food for 21 days, then 20 mg daily with evening meal.         Allergies   Allergen Reactions     Erythromycin Swelling and Other (See Comments)       Family History   Problem Relation Age of Onset     C.A.D. Father      C.A.D. Brother      Hypertension Mother      Cancer No family hx of      Diabetes No family hx of      Cerebrovascular Disease No family hx of       Thyroid Disease No family hx of      Glaucoma No family hx of      Macular Degeneration No family hx of      Social History     Socioeconomic History     Marital status:      Number of children: 3   Occupational History     Employer: RETIRED   Tobacco Use     Smoking status: Never Smoker     Smokeless tobacco: Never Used   Substance and Sexual Activity     Alcohol use: Yes     Comment: rarely     Drug use: No     Sexual activity: Yes     Partners: Male       Additional medical/Social/Surgical histories reviewed in [facility] and updated as appropriate.     REVIEW OF SYSTEMS (7/11/2022)  10 point ROS of systems including Constitutional, Eyes, Respiratory, Cardiovascular, Gastroenterology, Genitourinary, Integumentary, Musculoskeletal, Psychiatric, Allergic/Immunologic were all negative except for pertinent positives noted in my HPI.     PHYSICAL EXAM  Vital Signs: There were no vitals taken for this visit. Patient declined being weighed. There is no height or weight on file to calculate BMI.    General  - normal appearance, in no obvious distress  HEENT  - conjunctivae not injected, moist mucous membranes, normocephalic/atraumatic head, ears normal appearance, no lesions, mouth normal appearance, no scars, normal dentition and teeth present  CV  - normal peripheral perfusion  Pulm  - normal respiratory pattern, non-labored  Musculoskeletal - lumbar spine  - stance: normal gait without limp, no obvious leg length discrepancy, normal heel and toe walk  - inspection: normal bone and joint alignment, no obvious scoliosis  - palpation: no paravertebral or bony tenderness  - ROM: flexion exacerbates pain, normal extension, sidebending, rotation  - strength: lower extremities 5/5 in all planes  - special tests:  (+) straight leg raise  (+) slump test  Neuro  - patellar and Achilles DTRs 2+ bilaterally, lower extremity sensory deficit throughout L5 distribution, grossly normal coordination, normal muscle tone  Skin  - no  ecchymosis, erythema, warmth, or induration, no obvious rash  Psych  - interactive, appropriate, normal mood and affect  Left knee: has full ROM, some pain with patellar compression, but no effusion, stable  ASSESSMENT & PLAN  83 yo female with left knee s/p replacement last year, stable, and lumbar ddd, disc herniations, radicular pain, worse    I independently reviewed the following imaging studies:  Left knee xrays: shows stable joint replacement  Lumbar MRI: from last year: shows ddd, disc herniations  Discussed and ordered lumbar MRI as it has been 1 year since her previous  Will consider further treatments after MRI completed  Start pool therapy  rx given for medrol and tizanadine    Appropriate PPE was utilized for prevention of spread of Covid-19.  Abad Armendariz MD, [facility]          Again, thank you for allowing me to participate in the care of your patient.        Sincerely,        Abad Armednariz MD

## 2022-07-28 ENCOUNTER — ANCILLARY PROCEDURE (OUTPATIENT)
Dept: MRI IMAGING | Facility: CLINIC | Age: 82
End: 2022-07-28
Attending: PREVENTIVE MEDICINE
Payer: COMMERCIAL

## 2022-07-28 DIAGNOSIS — M51.16 LUMBAR DISC HERNIATION WITH RADICULOPATHY: ICD-10-CM

## 2022-07-28 DIAGNOSIS — G89.29 CHRONIC PAIN OF LEFT KNEE: ICD-10-CM

## 2022-07-28 DIAGNOSIS — M25.562 CHRONIC PAIN OF LEFT KNEE: ICD-10-CM

## 2022-07-28 PROCEDURE — 72148 MRI LUMBAR SPINE W/O DYE: CPT | Performed by: RADIOLOGY

## 2022-08-02 ENCOUNTER — VIRTUAL VISIT (OUTPATIENT)
Dept: FAMILY MEDICINE | Facility: CLINIC | Age: 82
End: 2022-08-02
Payer: COMMERCIAL

## 2022-08-02 DIAGNOSIS — G89.29 CHRONIC PAIN OF LEFT KNEE: Primary | ICD-10-CM

## 2022-08-02 DIAGNOSIS — I10 ESSENTIAL HYPERTENSION WITH GOAL BLOOD PRESSURE LESS THAN 140/90: ICD-10-CM

## 2022-08-02 DIAGNOSIS — M25.562 CHRONIC PAIN OF LEFT KNEE: ICD-10-CM

## 2022-08-02 DIAGNOSIS — I82.411 ACUTE DEEP VEIN THROMBOSIS (DVT) OF FEMORAL VEIN OF RIGHT LOWER EXTREMITY (H): ICD-10-CM

## 2022-08-02 DIAGNOSIS — M51.16 LUMBAR DISC HERNIATION WITH RADICULOPATHY: ICD-10-CM

## 2022-08-02 DIAGNOSIS — G89.29 CHRONIC PAIN OF LEFT KNEE: ICD-10-CM

## 2022-08-02 DIAGNOSIS — M47.817 DJD (DEGENERATIVE JOINT DISEASE), LUMBOSACRAL: ICD-10-CM

## 2022-08-02 DIAGNOSIS — M25.562 CHRONIC PAIN OF LEFT KNEE: Primary | ICD-10-CM

## 2022-08-02 PROCEDURE — 99214 OFFICE O/P EST MOD 30 MIN: CPT | Mod: 95 | Performed by: PREVENTIVE MEDICINE

## 2022-08-02 NOTE — TELEPHONE ENCOUNTER
Prescription refill requested for:    tiZANidine (ZANAFLEX) 4 MG tablet   Last Written Prescription Date:  7/11/22  Last Fill Quantity: 60,   # refills: 1  Last Office Visit: 7/11/22  Future Office visit:    Next 5 appointments (look out 90 days)    Aug 02, 2022  2:00 PM  (Arrive by 1:40 PM)  Provider Visit with Gavi Bear MD  [facility] ([facility] ) [address] [place] [id number] [phone]               Miguel López, EMT

## 2022-08-02 NOTE — PROGRESS NOTES
"Tomi is a 82 year old who is being evaluated via a billable telephone visit.      What phone number would you like to be contacted at? [phone]   How would you like to obtain your AVS? Monique    Assessment & Plan     Chronic pain of left knee  -history of knee replacement  -persistent pain  -being followed by Sports Medicine  -will be starting Pool Therapy at [facility]  -forms for parking completed, patient would like to  from the      Acute deep vein thrombosis (DVT) of femoral vein of right lower extremity (H)  -on Xarelto  -Has appointment with Hematology 8/15/22    Essential hypertension with goal blood pressure less than 140/90  -at goal  -continue current medication   -has had leg cramps intermittently for years, fluctuates in severity, we discussed using heating pad, doing gentle stretching exercises and Magnesium 400 mg daily for 7-10 days     DJD (degenerative joint disease), lumbosacral  -forms for parking completed       25 minutes spent on the date of the encounter doing chart review, history and exam, documentation and further activities per the note       BMI:   Estimated body mass index is 33.45 kg/m  as calculated from the following:    Height as of 5/17/22: 1.651 m (5' 5\").    Weight as of 5/17/22: 91.2 kg (201 lb).       Return in about 6 months (around 2/2/2023) for Follow up, with me.    Gavi Bear MD MPH    [facility]    Subjective   Tomi is a 82 year old presenting for the following health issues:  Forms (Disability form knees )      HPI   Concern - Disability Forms   -parking  -has had some temporary stickers but requesting a permanent parking tag  -would like to  the form     Knee is still hurting  Will be starting Physical therapy at [facility], pool therapy     Does feel good othersie  No bleeding  No chest pain  No shortness of breath     Has had cramps in both legs  Night time cramping  Both legs  Warm " compresses+  Intermittent+  Fluctuates in severity   [place]      Blood pressure at home, 127/58. Not dizzy.    Review of Systems   Constitutional, HEENT, cardiovascular, pulmonary, gi and gu systems are negative, except as otherwise noted.      Objective    Vitals - Patient Reported  Systolic (Patient Reported): 127  Diastolic (Patient Reported): 58  Pulse (Patient Reported): 78  Pain Score: No Pain (0)      Vitals:  No vitals were obtained today due to virtual visit.    Physical Exam   healthy, alert and no distress  PSYCH: Alert and oriented times 3; coherent speech, normal   rate and volume, able to articulate logical thoughts, able   to abstract reason, no tangential thoughts, no hallucinations   or delusions  Her affect is normal  RESP: No cough, no audible wheezing, able to talk in full sentences  Remainder of exam unable to be completed due to telephone visits        Phone call duration: 8 minutes    .  ..

## 2022-08-03 ENCOUNTER — VIRTUAL VISIT (OUTPATIENT)
Dept: ORTHOPEDICS | Facility: CLINIC | Age: 82
End: 2022-08-03
Payer: COMMERCIAL

## 2022-08-03 ENCOUNTER — TELEPHONE (OUTPATIENT)
Dept: ORTHOPEDICS | Facility: CLINIC | Age: 82
End: 2022-08-03

## 2022-08-03 DIAGNOSIS — M51.16 LUMBAR DISC HERNIATION WITH RADICULOPATHY: Primary | ICD-10-CM

## 2022-08-03 PROCEDURE — 99442 PR PHYSICIAN TELEPHONE EVALUATION 11-20 MIN: CPT | Mod: 95 | Performed by: PREVENTIVE MEDICINE

## 2022-08-03 RX ORDER — METHYLPREDNISOLONE 4 MG
TABLET, DOSE PACK ORAL
Qty: 21 TABLET | Refills: 0 | Status: SHIPPED | OUTPATIENT
Start: 2022-08-03 | End: 2022-09-22

## 2022-08-03 NOTE — PROGRESS NOTES
Patient is a  82   year old who is being evaluated via a billable telephone visit.      What phone number would you like to be contacted at? CELL  How would you like to obtain your AVS? EMILY        Subjective   Patient is a   82  year old who presents by phone call visit for the following:   Wants to review lumbar MRI  Continues to have pain in low back that radiates into legs    HPI       Review of Systems   Constitutional, HEENT, cardiovascular, pulmonary, gi and gu systems are negative, except as otherwise noted.      Objective           Vitals:  No vitals were obtained today due to virtual visit.    Physical Exam   healthy, alert and no distress  PSYCH: Alert and oriented times 3; coherent speech, normal   rate and volume, able to articulate logical thoughts, able   to abstract reason, no tangential thoughts, no hallucinations   or delusions  His affect is normal  RESP: No cough, no audible wheezing, able to talk in full sentences  Remainder of exam unable to be completed due to telephone visits    Assessment/Plan  83 yo female with lumbar ddd, disc herniations, radicular pain, worse    I independently reviewed the following imaging studies and discussed with patient:  Lumbar MRI: shows ddd, disc herniations  Discussed and ordered lumbar TANK          Phone call duration: 20 minutes  Phone call start: 940am  Phone call end: 10am  Dr Armendariz

## 2022-08-03 NOTE — LETTER
8/3/2022         RE: Yumiko Mccartney  [address]  [place] [address]        Dear Colleague,    Thank you for referring your patient, Yumiko Mccartney, to the [facility] [place]. Please see a copy of my visit note below.    Patient is a  82   year old who is being evaluated via a billable telephone visit.      What phone number would you like to be contacted at? CELL  How would you like to obtain your AVS? MYCHART        Subjective   Patient is a   82  year old who presents by phone call visit for the following:   Wants to review lumbar MRI  Continues to have pain in low back that radiates into legs    HPI       Review of Systems   Constitutional, HEENT, cardiovascular, pulmonary, gi and gu systems are negative, except as otherwise noted.      Objective           Vitals:  No vitals were obtained today due to virtual visit.    Physical Exam   healthy, alert and no distress  PSYCH: Alert and oriented times 3; coherent speech, normal   rate and volume, able to articulate logical thoughts, able   to abstract reason, no tangential thoughts, no hallucinations   or delusions  His affect is normal  RESP: No cough, no audible wheezing, able to talk in full sentences  Remainder of exam unable to be completed due to telephone visits    Assessment/Plan  83 yo female with lumbar ddd, disc herniations, radicular pain, worse    I independently reviewed the following imaging studies and discussed with patient:  Lumbar MRI: shows ddd, disc herniations  Discussed and ordered lumbar TANK          Phone call duration: 20 minutes  Phone call start: 940am  Phone call end: 10am  Dr Armendariz      Again, thank you for allowing me to participate in the care of your patient.        Sincerely,        Abad Armendariz MD

## 2022-08-03 NOTE — TELEPHONE ENCOUNTER
Left Voicemail (1st Attempt) for the patient to call back and schedule the following:    Appointment type: return   Provider: dr. alicea   Return date: 2 weeks after epidural injection  Specialty phone number: [phone]   Additional appointment(s) needed: epidural injection prior    [facility] l Procedure   Orthopedics, Podiatry, Sports Medicine, ENT/Eye Specialties  [facility]   [phone]

## 2022-08-03 NOTE — LETTER
8/3/2022         RE: Yumiko Mccartney  [address]  [place] [address]        Dear Colleague,    Thank you for referring your patient, Yumiko Mccartney, to the [facility] [place]. Please see a copy of my visit note below.    Patient is a  82   year old who is being evaluated via a billable telephone visit.      What phone number would you like to be contacted at? CELL  How would you like to obtain your AVS? MYCHART        Subjective   Patient is a   82  year old who presents by phone call visit for the following:   Wants to review lumbar MRI  Continues to have pain in low back that radiates into legs    HPI       Review of Systems   Constitutional, HEENT, cardiovascular, pulmonary, gi and gu systems are negative, except as otherwise noted.      Objective           Vitals:  No vitals were obtained today due to virtual visit.    Physical Exam   healthy, alert and no distress  PSYCH: Alert and oriented times 3; coherent speech, normal   rate and volume, able to articulate logical thoughts, able   to abstract reason, no tangential thoughts, no hallucinations   or delusions  His affect is normal  RESP: No cough, no audible wheezing, able to talk in full sentences  Remainder of exam unable to be completed due to telephone visits    Assessment/Plan  81 yo female with lumbar ddd, disc herniations, radicular pain, worse    I independently reviewed the following imaging studies and discussed with patient:  Lumbar MRI: shows ddd, disc herniations  Discussed and ordered lumbar TANK          Phone call duration: 20 minutes  Phone call start: 940am  Phone call end: 10am  Dr Armendariz      Again, thank you for allowing me to participate in the care of your patient.        Sincerely,        Abad Armendariz MD

## 2022-08-04 ENCOUNTER — TELEPHONE (OUTPATIENT)
Dept: FAMILY MEDICINE | Facility: CLINIC | Age: 82
End: 2022-08-04

## 2022-08-08 NOTE — TELEPHONE ENCOUNTER
Left Voicemail (2nd Attempt) for the patient to call back and schedule the following:    Appointment type: return   Provider: dr. alicea   Return date: 2 weeks after epidural injection  Specialty phone number: [phone]   Additional appointment(s) needed: epidural injection prior     Et[facility]ina [facility] l Procedure   Orthopedics, Podiatry, Sports Medicine, ENT/Eye Specialties  [facility]   [phone]

## 2022-08-11 NOTE — PROGRESS NOTES
[facility]  [address] [place] [id number]  Phone: [phone], Fax: [phone]    Outpatient Visit Note:    Patient: Yumiko Mccartney  MRN: [id number]  : 1940  MUSTAPHA: Aug 15, 2022    Reason for Consultation:  Yumiko Mccartney is a referred by Dr. Bear for evaluation and treatment of deep vein thrombosis.    Assessment:  In summary, Yumiko Mccartney is a 82 year old female with past medical history significant for hypertension, degenerative disc disease, basal cell carcinoma, asthma and osteopenia. She developed right lower extremity DVT diagnosed on 5/15/2022. She had been having right ankle swelling and calf pain for three weeks. She had then traveled to [place] (2.5 hour by air) however her symptoms had already started. No other provoking risk factors identified. She was started on Xarelto. She represented to the ER a month later on 2022 for evaluation of worsening right leg pain and edema. Ultrasound was repeated and again showed extensive DVT with slight improvement in thrombus burden with resolution of peroneal vein thrombus. She has been tolerating Xarelto with minimal ecchymosis and slight abdominal bloating. She does take it with food.      Plan:  1. Majority of today's visit was spent counseling the patient regarding unprovoked VTE including pathophysiology, risk factors, anticoagulation options, risks/benefits and duration of therapy.   2. I have recommended long term anticoagulation given unprovoked venous thromboembolism. Will complete her thrombophilia evaluation and test for prothrombin gene mutation as it would be important for her children to be tested if positive.   3. Repeat ultrasound now to reassess thrombus burden. Given ongoing symptoms of edema, I counseled the patient that she may have some residual thrombus. Recommend compression garments, elevation, and exercise to help mitigate post thrombotic syndrome symptoms. Tubigrip  provided to patient today.   4. Serial hemoglobin. She denies any bleeding symptoms.   5. For upcoming spinal injection, hold Xarelto 48 hours and resume 12-24 hours after her injection.   6. Xarelto prescription refilled. If GI symptoms persist, discussed trial of Eliquis. She declines at this time.     The patient is given our center's contact information and is instructed to call if she should have any further questions or concerns.  Otherwise, we will plan on seeing her back in 1 year.      Patient understands and agrees with the above plan and recommendation.      Anaid Duron, MPH, PA-C  [facility]    60 minutes spent on the date of the encounter doing chart review, review of outside records, review of test results, interpretation of tests, patient visit and documentation     ----------------------------------------------------------------------------------------------------------------------  History of Present Illness:  Yumiko Mccartney is a 82 year old female with past medical history significant for hypertension, degenerative disc disease, basal cell carcinoma, and osteopenia.     She presented to the ER on 5/15/2022 with three week onset of right lower extremity swelling and pain. She did travel to [place] by air (2.5 hour flight) however this was after her symptoms began. US showed acute DVT of right femoral, popliteal, posterior tibial and peroneal veins. She was started on Xarelto and recommended to use compression garments for edema. She represented to the ER on 6/18/2022 with worsening right leg pain and swelling while in [place] (has a second home 2 hours away). She reported that she had been taking Xarelto as prescribed. Repeat ultrasound showed DVT of right femoral vein of the thigh extending into the popliteal and proximal posterior tibial vein. She was instructed to remain on her anticoagulation.     Tomi denies any other  "trauma, illnesses, procedures, immobilization or hospitalizations in the weeks leading up to her venous thromboembolism. She had a left total knee replacement last October and has had a slower recovery. She has been in physical therapy and reports an active lifestyle. She has young grandchildren that she spends time with and likes to walk. She has never had a venous thromboembolism episode before. There is no family history of venous thromboembolism in her family. She has never had any miscarriages. She has two biological sons and an adopted daughter. Her sons have not had any clotting events.     She still is having some intermittent right ankle swelling. She has compression garments but has difficulty getting them on and cannot wear the closed toe ones due to hammer toe. She does have varicosities of bilateral lower extremities but denies any history of superficial thrombophlebitis.    Initial hypercoagulable work up was negative for factor V Leiden. Her protein C, protein S, and antithrombin III levels were normal. Lupus inhibitor screening was negative. Cardiolipin antibody testing was negative. She did not have testing for prothrombin gene mutation.     Surgical history includes left total knee arthroplasty 10/2021 (received aspirin after x 1 month), cataract surgery and ptosis repair, cholecystectomy, and endometrial surgeries. She does have degenerative disc disease and gets steroid injections.     Past Medical History:  Past Medical History:   Diagnosis Date     Acne rosacea      Actinic keratosis      Amblyopia      Asthma, moderate persistent      Basal cell carcinoma 10/2010    back X2     Cataract, mod, od; mild-mod, os 1/12/2012     DJD (degenerative joint disease), lumbosacral 8/6/2014     Elevated cholesterol      Endometriosis      HTN (hypertension)      Moderate major depression (H)     \"Circumstances\"     Osteopenia        Past Surgical History:  Past Surgical History:   Procedure Laterality Date " "    ARTHROPLASTY KNEE Left 10/4/2021    Procedure: ARTHROPLASTY, LEFT KNEE, TOTAL;  Surgeon: Glenn Calvin MD;  Location: [facility] OR     BLEPHAROPLASTY BILATERAL  3/9/2006; 2013    both eyes, upper lid; revision (EN)     CATARACT IOL, RT/LT       HC REMOVAL GALLBLADDER       LASER YAG CAPSULOTOMY      left eye (AC lysis also)     PHACOEMULSIFICATION WITH STANDARD INTRAOCULAR LENS IMPLANT  1/2012; 4/2013    right eye; left eye     REPAIR PTOSIS       SURGICAL HISTORY OF -       endometriosis surgeriesx3     SURGICAL HISTORY OF -       ganiglion cyst onfoot     SURGICAL HISTORY OF -       Eye for \"droopy eye\"     ZZC APPENDECTOMY         Medications:  Current Outpatient Medications   Medication Sig Dispense Refill     acetaminophen (TYLENOL) 325 MG tablet Take 2 tablets (650 mg) by mouth every 4 hours as needed for other 60 tablet 0     albuterol (PROAIR HFA/PROVENTIL HFA/VENTOLIN HFA) 108 (90 Base) MCG/ACT inhaler INHALE 2 PUFFS BY MOUTH EVERY 6 HOURS AS NEEDED FOR WHEEZE OR FOR SHORTNESS OF BREATH 18 g 1     calcium citrate (CITRACAL) 950 (200 Ca) MG tablet Take 1 tablet by mouth 2 times daily       Cholecalciferol (VITAMIN D) 2000 UNIT CAPS Take 2,000 Units by mouth daily       gabapentin (NEURONTIN) 600 MG tablet TAKE 2 TABLETS (1,200 MG) BY MOUTH AT BEDTIME 180 tablet 3     lisinopril-hydrochlorothiazide (ZESTORETIC) 20-12.5 MG tablet TAKE 2 TABLETS BY MOUTH EVERY  tablet 3     methylPREDNISolone (MEDROL) 4 MG tablet therapy pack Follow Package Directions 21 tablet 0     Multiple Vitamin (MULTIVITAMIN ADULT PO) Take by mouth daily       order for DME Please measure and distribute 1 pair of 10mmHg - 20mmHg KNEE high open or closed toe compression stockings. Jobst ultrasheer or equivalent. 1 each 3     order for DME Please measure and distribute 1 pair of 20mmHg - 30mmHg knee high open or closed toe compression stockings. Jobst ultrasheer or equivalent. 1 each 1     rivaroxaban ANTICOAGULANT (XARELTO " ANTICOAGULANT) 20 MG TABS tablet Take 1 tablet (20 mg) by mouth daily (with dinner) 30 tablet 1     rivaroxaban ANTICOAGULANT (XARELTO) 20 MG TABS tablet Take 1 tablet (20 mg) by mouth daily (with dinner) 90 tablet 3     tiZANidine (ZANAFLEX) 4 MG tablet TAKE 1-2 TABLETS (4-8 MG) BY MOUTH NIGHTLY AS NEEDED FOR MUSCLE SPASMS 60 tablet 1     doxycycline hyclate (VIBRA-TABS) 100 MG tablet TAKE 1 TABLET (100 MG) BY MOUTH 2 TIMES DAILY (Patient not taking: Reported on 8/15/2022) 180 tablet 0     fluticasone-salmeterol (ADVAIR DISKUS) 250-50 MCG/DOSE inhaler INHALE ONE PUFF TWICE DAILY.  Rinse mouth after each use. (Patient not taking: Reported on 8/15/2022) 3 each 1     gabapentin (NEURONTIN) 600 MG tablet Take 1,200 % by mouth At Bedtime (Patient not taking: Reported on 8/15/2022)       methylPREDNISolone (MEDROL) 4 MG tablet therapy pack Follow Package Directions (Patient not taking: Reported on 8/15/2022) 21 tablet 0     order for DME Please measure and distribute 1 pair of 10mmHg - 20mmHg THIGH high open or closed toe compression stockings. Jobst ultrasheer or equivalent. (Patient not taking: Reported on 8/15/2022) 1 each 3        Allergies:  Allergies   Allergen Reactions     Erythromycin Swelling and Other (See Comments)       ROS:  Remainder of a comprehensive 14 point ROS is negative unless noted above.    Social History:  Patient is retired.   She is a   Denies any tobacco use.  No significant alcohol use.  Denies any illicit drug use.     Family History:  Denies any family history of bleeding or clotting disorders.       Objectives:  Vitals: /77   Pulse 67   Temp 98.7  F (37.1  C)   Wt 92.5 kg (204 lb)   BMI 33.95 kg/m     Exam:   Constitutional: Appears well, no distress  HEENT: Pupils equal and round. No scleral icterus or hemorrhage.   Respiratory: no increased work of breathing.   Mus/Skele: L ankle edema. Bilateral varicosities.   Skin: no petechiae, no ecchymosis on exposed dermis.  Neuro:  CN II-XII intact. Normal gait. AOx3      Labs:  CBC RESULTS:   Recent Labs   Lab Test 05/17/22  0922   WBC 5.5   RBC 3.96   HGB 9.8*   HCT 33.1*   MCV 84   MCH 24.7*   MCHC 29.6*   RDW 13.6        Last Comprehensive Metabolic Panel:  Sodium   Date Value Ref Range Status   05/17/2022 140 133 - 144 mmol/L Final   08/14/2020 140 133 - 144 mmol/L Final     Potassium   Date Value Ref Range Status   05/17/2022 3.9 3.4 - 5.3 mmol/L Final   08/14/2020 3.9 3.4 - 5.3 mmol/L Final     Chloride   Date Value Ref Range Status   05/17/2022 104 94 - 109 mmol/L Final   08/14/2020 106 94 - 109 mmol/L Final     Carbon Dioxide   Date Value Ref Range Status   08/14/2020 30 20 - 32 mmol/L Final     Carbon Dioxide (CO2)   Date Value Ref Range Status   05/17/2022 30 20 - 32 mmol/L Final     Anion Gap   Date Value Ref Range Status   05/17/2022 6 3 - 14 mmol/L Final   08/14/2020 4 3 - 14 mmol/L Final     Glucose   Date Value Ref Range Status   05/17/2022 105 (H) 70 - 99 mg/dL Final   08/14/2020 97 70 - 99 mg/dL Final     Comment:     Non Fasting     Urea Nitrogen   Date Value Ref Range Status   05/17/2022 15 7 - 30 mg/dL Final   08/14/2020 16 7 - 30 mg/dL Final     Creatinine   Date Value Ref Range Status   05/17/2022 0.76 0.52 - 1.04 mg/dL Final   08/14/2020 0.91 0.52 - 1.04 mg/dL Final     GFR Estimate   Date Value Ref Range Status   05/17/2022 79 >60 mL/min/1.73m2 Final     Comment:     Effective December 21, 2021 eGFRcr in adults is calculated using the 2021 CKD-EPI creatinine equation which includes age and gender (Jonh terry al., NEJM, DOI: 10.1056/EXUSfq5620611)   08/14/2020 60 (L) >60 mL/min/[1.73_m2] Final     Comment:     Non  GFR Calc  Starting 12/18/2018, serum creatinine based estimated GFR (eGFR) will be   calculated using the Chronic Kidney Disease Epidemiology Collaboration   (CKD-EPI) equation.       Calcium   Date Value Ref Range Status   05/17/2022 9.3 8.5 - 10.1 mg/dL Final   08/14/2020 9.7 8.5 - 10.1  mg/dL Final     Liver Function Studies -   Recent Labs   Lab Test 05/17/22  0922   PROTTOTAL 7.3   ALBUMIN 3.9   BILITOTAL 0.3   ALKPHOS 61   AST 16   ALT 22         Imaging:  Recent Results (from the past 744 hour(s))   MR Lumbar Spine w/o Contrast    Narrative    MR LUMBAR SPINE W/O CONTRAST 7/28/2022 10:08 AM    Provided History: Low back pain; Low back pain, no complicating  feature; Chronic LBP duration >= 3 months; Worsening or not improving;  Follow-up in the last 28 - 60 days for conservative therapy; No  known/automatically detected potential contraindications to imaging;  Chronic pain of left knee; Chronic pain of left knee; Lumbar disc  herniation with radiculopathy    ICD-10: Chronic pain of left knee; Chronic pain of left knee; Lumbar  disc herniation with radiculopathy    Comparison: 7/12/2021    Technique: Sagittal T1-weighted, sagittal STIR, sagittal  diffusion-weighted (with ADC map), axial T1-weighted, and 3D  volumetric axial and sagittal reconstructed T2-weighted images of the  lumbar spine were obtained without intravenous contrast.     Findings: There are 5 lumbar-type vertebrae assumed for the purposes  of this dictation.  The tip of the conus medullaris is at L1.  Convex  dextroscoliosis with apex at L2; levoscoliosis with apex at L5.  Minimal  retrolisthesis of L1 on L2, L2 on L3, and L5 on S1 unchanged. Minimal  anterolisthesis of L4 on L5, also unchanged. Multilevel disc  desiccation and asymmetric loss of intervertebral disc height,  unchanged. L4 vertebral hemangioma. Multilevel degenerative endplate  changes.    On a level by level basis:    T12-L1: Circumferential disc osteophyte complex without spinal canal  stenosis. The neural foramen are mildly narrowed bilaterally.    L1-2: Circumferential disc osteophyte complex with facet arthropathy  without significant spinal canal stenosis. Mild effacement of the left  lateral recess without neural impingement. The left neural foramen  is  moderately narrowed and the right neural foramen is mildly narrowed.    L2-3: Circumferential disc osteophyte complex with facet arthropathy  without significant spinal canal stenosis. The right neural foramen is  mildly narrows the left neural foramen is mildly to moderately  narrowed.    L3-4: Circumferential disc osteophyte complex with facet arthropathy  without spinal canal stenosis. Moderate left and mild right foraminal  stenosis.    L4-5: Minimal anterolisthesis with facet arthropathy without  significant spinal canal stenosis. Neural foramen are mildly narrowed  bilaterally    L5-S1: Anterolisthesis with facet arthropathy no spinal canal  stenosis. Moderate left and moderate severe right foraminal stenosis.    Diffuse fatty atrophy of the erector spinae muscles.      Impression    Impression: No significant change from the prior study multilevel  lumbar spondylosis as detailed above.    LYNETTE RUDOLPH MD         SYSTEM ID:  [id number]       US RLE 5/15/2022  The right common femoral and greater saphenous veins at the groin are patent with normal compressibility and augmentative flow. The femoral, popliteal, posterior tibial and peroneal veins are noncompressible and without intraluminal flow.       6/18/2022 RLE US   IMPRESSION:   Deep venous thrombosis present across the right superficial femoral vein of   the thigh extending into the popliteal vein and proximal posterior tibial   vein.

## 2022-08-15 ENCOUNTER — OFFICE VISIT (OUTPATIENT)
Dept: HEMATOLOGY | Facility: CLINIC | Age: 82
End: 2022-08-15
Attending: PREVENTIVE MEDICINE
Payer: COMMERCIAL

## 2022-08-15 VITALS
BODY MASS INDEX: 33.95 KG/M2 | WEIGHT: 204 LBS | DIASTOLIC BLOOD PRESSURE: 77 MMHG | TEMPERATURE: 98.7 F | HEART RATE: 67 BPM | SYSTOLIC BLOOD PRESSURE: 127 MMHG

## 2022-08-15 DIAGNOSIS — I82.411 ACUTE DEEP VEIN THROMBOSIS (DVT) OF FEMORAL VEIN OF RIGHT LOWER EXTREMITY (H): ICD-10-CM

## 2022-08-15 DIAGNOSIS — I82.411 ACUTE DEEP VEIN THROMBOSIS (DVT) OF FEMORAL VEIN OF RIGHT LOWER EXTREMITY (H): Primary | ICD-10-CM

## 2022-08-15 LAB
ERYTHROCYTE [DISTWIDTH] IN BLOOD BY AUTOMATED COUNT: 13 % (ref 10–15)
FACTOR 2 INTERPRETATION: NORMAL
HCT VFR BLD AUTO: 35.4 % (ref 35–47)
HGB BLD-MCNC: 11.1 G/DL (ref 11.7–15.7)
LAB DIRECTOR COMMENTS: NORMAL
LAB DIRECTOR DISCLAIMER: NORMAL
LAB DIRECTOR INTERPRETATION: NORMAL
LAB DIRECTOR METHODOLOGY: NORMAL
LAB DIRECTOR RESULTS: NORMAL
MCH RBC QN AUTO: 25 PG (ref 26.5–33)
MCHC RBC AUTO-ENTMCNC: 31.4 G/DL (ref 31.5–36.5)
MCV RBC AUTO: 80 FL (ref 78–100)
PLATELET # BLD AUTO: 252 10E3/UL (ref 150–450)
RBC # BLD AUTO: 4.44 10E6/UL (ref 3.8–5.2)
SPECIMEN DESCRIPTION: NORMAL
WBC # BLD AUTO: 5.8 10E3/UL (ref 4–11)

## 2022-08-15 PROCEDURE — 81240 F2 GENE: CPT | Performed by: PHYSICIAN ASSISTANT

## 2022-08-15 PROCEDURE — G0452 MOLECULAR PATHOLOGY INTERPR: HCPCS | Mod: 26 | Performed by: STUDENT IN AN ORGANIZED HEALTH CARE EDUCATION/TRAINING PROGRAM

## 2022-08-15 PROCEDURE — 99215 OFFICE O/P EST HI 40 MIN: CPT | Performed by: PHYSICIAN ASSISTANT

## 2022-08-15 PROCEDURE — G0463 HOSPITAL OUTPT CLINIC VISIT: HCPCS

## 2022-08-15 PROCEDURE — 36415 COLL VENOUS BLD VENIPUNCTURE: CPT

## 2022-08-15 PROCEDURE — 85014 HEMATOCRIT: CPT | Performed by: PHYSICIAN ASSISTANT

## 2022-08-15 ASSESSMENT — PAIN SCALES - GENERAL: PAINLEVEL: MODERATE PAIN (4)

## 2022-08-15 NOTE — PATIENT INSTRUCTIONS
[facility]  [facility]  [facility], [address], [place] [address]  Main: [phone], Fax: [phone]    Yumiko,   It was a pleasure seeing you today.  Thank you for allowing us to be involved in your care.  Please let us know if there is anything else we can do for you, so that we can be sure you are leaving completely satisfied with your care experience. Below is the plan that we discussed.     Continue Xarelto 20mg each morning with food.   Use the compression stockings daily, OK to take off at night   Labs today - Will release them to [facility] for you   Schedule ultrasound to reassess if you have residual clot or not.   For your upcoming injection, hold the Xarelto 48 hours prior to the injection, resume within 12-24 hours afterwards.   Please call if you have any other upcoming procedures or if you aren't tolerating the Xarelto and we could try the Eliquis.       We would like a provider on our team to see you at least annually for optimal care and to allow us to continue to prescribe for you.      Return to clinic in 1 year.      Your nurse clinician is Rayne Gonzales, MSN, RN, [phone].   If they are unavailable and you have immediate concerns, please call [phone] and ask for a nurse.     Anaid Duron, MPH, PA-C  [facility]

## 2022-08-15 NOTE — NURSING NOTE
Yumiko SUNG Suhaswisam is here for a new consult visit.    Vital signs were taken and assessed.  Allergies and medications were reviewed and updated by [facility] staff.    The follow up plan was developed and we reviewed this plan point by point and all questions answered.  The patient verbalized understanding of  and agreement with plan.  The AVS instructions were  given to the patient.     Assisted patient to schedule follow up US and rossy her blood.    I  thanked them for coming and encouraged them to call with any concerns or questions.    Desi Armijo, RN - Nurse Clinician - [facility] - [phone]

## 2022-08-16 ENCOUNTER — ANCILLARY PROCEDURE (OUTPATIENT)
Dept: ULTRASOUND IMAGING | Facility: CLINIC | Age: 82
End: 2022-08-16
Attending: PHYSICIAN ASSISTANT
Payer: COMMERCIAL

## 2022-08-16 DIAGNOSIS — I82.411 ACUTE DEEP VEIN THROMBOSIS (DVT) OF FEMORAL VEIN OF RIGHT LOWER EXTREMITY (H): Primary | ICD-10-CM

## 2022-08-16 DIAGNOSIS — I82.411 ACUTE DEEP VEIN THROMBOSIS (DVT) OF FEMORAL VEIN OF RIGHT LOWER EXTREMITY (H): ICD-10-CM

## 2022-08-16 PROCEDURE — 93971 EXTREMITY STUDY: CPT | Mod: RT | Performed by: RADIOLOGY

## 2022-08-30 DIAGNOSIS — M25.562 CHRONIC PAIN OF LEFT KNEE: ICD-10-CM

## 2022-08-30 DIAGNOSIS — G89.29 CHRONIC PAIN OF LEFT KNEE: ICD-10-CM

## 2022-08-30 DIAGNOSIS — M51.16 LUMBAR DISC HERNIATION WITH RADICULOPATHY: ICD-10-CM

## 2022-08-30 NOTE — TELEPHONE ENCOUNTER
Prescription refill requested for:   tiZANidine (ZANAFLEX) 4 MG tablet   Last Written Prescription Date:  8/3/22  Last Fill Quantity: 60,   # refills: 1  Last Office Visit: 8/3/22 virtual visit.  Future Office visit:           Miguel López, EMT

## 2022-09-08 ENCOUNTER — TELEPHONE (OUTPATIENT)
Dept: CARDIOLOGY | Facility: CLINIC | Age: 82
End: 2022-09-08

## 2022-09-08 NOTE — TELEPHONE ENCOUNTER
Spoke with Pt and Pt will hold Xarelto the required time of 1 day prior to injection on Sept 13th, 22

## 2022-09-09 DIAGNOSIS — M51.16 LUMBAR DISC HERNIATION WITH RADICULOPATHY: Primary | ICD-10-CM

## 2022-09-09 RX ORDER — METHYLPREDNISOLONE 4 MG
TABLET, DOSE PACK ORAL
Qty: 21 TABLET | Refills: 0 | Status: SHIPPED | OUTPATIENT
Start: 2022-09-09 | End: 2023-08-23

## 2022-09-13 ENCOUNTER — ANCILLARY PROCEDURE (OUTPATIENT)
Dept: GENERAL RADIOLOGY | Facility: CLINIC | Age: 82
End: 2022-09-13
Attending: PREVENTIVE MEDICINE
Payer: COMMERCIAL

## 2022-09-13 DIAGNOSIS — M51.16 LUMBAR DISC HERNIATION WITH RADICULOPATHY: ICD-10-CM

## 2022-09-13 PROCEDURE — 62323 NJX INTERLAMINAR LMBR/SAC: CPT | Performed by: RADIOLOGY

## 2022-09-13 RX ORDER — METHYLPREDNISOLONE ACETATE 80 MG/ML
80 INJECTION, SUSPENSION INTRA-ARTICULAR; INTRALESIONAL; INTRAMUSCULAR; SOFT TISSUE ONCE
Status: COMPLETED | OUTPATIENT
Start: 2022-09-13 | End: 2022-09-13

## 2022-09-13 RX ORDER — IOPAMIDOL 408 MG/ML
10 INJECTION, SOLUTION INTRATHECAL ONCE
Status: COMPLETED | OUTPATIENT
Start: 2022-09-13 | End: 2022-09-13

## 2022-09-13 RX ORDER — BUPIVACAINE HYDROCHLORIDE 5 MG/ML
2 INJECTION, SOLUTION PERINEURAL ONCE
Status: COMPLETED | OUTPATIENT
Start: 2022-09-13 | End: 2022-09-13

## 2022-09-13 RX ADMIN — IOPAMIDOL 2 ML: 408 INJECTION, SOLUTION INTRATHECAL at 15:48

## 2022-09-13 RX ADMIN — BUPIVACAINE HYDROCHLORIDE 10 MG: 5 INJECTION, SOLUTION PERINEURAL at 15:48

## 2022-09-13 RX ADMIN — METHYLPREDNISOLONE ACETATE 80 MG: 80 INJECTION, SUSPENSION INTRA-ARTICULAR; INTRALESIONAL; INTRAMUSCULAR; SOFT TISSUE at 15:48

## 2022-09-13 NOTE — PROGRESS NOTES
AFTER YOU GO HOME    ? DO relax; minimize your activity for 24 hours  ? You may resume normal activity tomorrow  ? You may remove the bandage in the evening or next morning  ? You may resume bathing the next day  ? Drink at least 4 extra glasses of fluid today if not on fluid restrictions  ? DO NOT drive or operate machinery at home or at work for at least 24 hours      VISIT THE EMERGENCY ROOM OR URGENT CARE IF:    ? There is redness or swelling at the injection site  ? There is discharge from the injection site  ? You develop a temperature of 101  F or greater      ADDITIONAL INSTRUCTIONS:     ? You may resume your Coumadin or other blood thinner at your regular dose today.  Follow up with your physician to have your INR rechecked if indicated.  ? If you gain no relief from the injection after two (2) weeks, follow-up with your provider for your options.        Contacts:    During business hours from 8 to 5 pm, you may call [phone] to reach a nurse advisor at [facility].  After hours, call [facility]  [phone].  Ask for the Radiologist on-call.  Someone is on-call 24 hrs/day.  [facility] Toll Free Number   [phone]

## 2022-09-13 NOTE — PROGRESS NOTES
Tomi was seen in X-ray today for a lumbar epidural injection. Patient rated pain before procedure 8/10. After procedure patient rated pain 0/10.   This pain level is acceptable to patient. Patient discharged home with .

## 2022-10-21 ENCOUNTER — MYC MEDICAL ADVICE (OUTPATIENT)
Dept: ORTHOPEDICS | Facility: CLINIC | Age: 82
End: 2022-10-21

## 2022-12-05 ENCOUNTER — ANCILLARY PROCEDURE (OUTPATIENT)
Dept: ULTRASOUND IMAGING | Facility: CLINIC | Age: 82
End: 2022-12-05
Attending: PHYSICIAN ASSISTANT
Payer: COMMERCIAL

## 2022-12-05 PROCEDURE — 93971 EXTREMITY STUDY: CPT | Mod: RT | Performed by: RADIOLOGY

## 2022-12-19 ENCOUNTER — DOCUMENTATION ONLY (OUTPATIENT)
Dept: ORTHOPEDICS | Facility: CLINIC | Age: 82
End: 2022-12-19

## 2022-12-19 ENCOUNTER — OFFICE VISIT (OUTPATIENT)
Dept: OPHTHALMOLOGY | Facility: CLINIC | Age: 82
End: 2022-12-19
Payer: COMMERCIAL

## 2022-12-19 DIAGNOSIS — Z98.890 HISTORY OF BLEPHAROPLASTY: ICD-10-CM

## 2022-12-19 DIAGNOSIS — Z96.1 PSEUDOPHAKIA: ICD-10-CM

## 2022-12-19 DIAGNOSIS — H26.8 PXF (PSEUDOEXFOLIATION OF LENS CAPSULE): ICD-10-CM

## 2022-12-19 DIAGNOSIS — Z01.01 ENCOUNTER FOR EXAMINATION OF EYES AND VISION WITH ABNORMAL FINDINGS: Primary | ICD-10-CM

## 2022-12-19 DIAGNOSIS — H53.002 AMBLYOPIA OF LEFT EYE: ICD-10-CM

## 2022-12-19 DIAGNOSIS — L71.9 ACNE ROSACEA: ICD-10-CM

## 2022-12-19 DIAGNOSIS — H43.813 POSTERIOR VITREOUS DETACHMENT, BILATERAL: ICD-10-CM

## 2022-12-19 DIAGNOSIS — H52.4 PRESBYOPIA: ICD-10-CM

## 2022-12-19 PROCEDURE — 92014 COMPRE OPH EXAM EST PT 1/>: CPT | Performed by: OPHTHALMOLOGY

## 2022-12-19 PROCEDURE — 92015 DETERMINE REFRACTIVE STATE: CPT | Performed by: OPHTHALMOLOGY

## 2022-12-19 ASSESSMENT — SLIT LAMP EXAM - LIDS
COMMENTS: GOOD COSMESIS
COMMENTS: GOOD COSMESIS

## 2022-12-19 ASSESSMENT — CONF VISUAL FIELD
OD_SUPERIOR_TEMPORAL_RESTRICTION: 0
OD_NORMAL: 1
OD_INFERIOR_TEMPORAL_RESTRICTION: 0
OS_SUPERIOR_TEMPORAL_RESTRICTION: 0
OS_INFERIOR_TEMPORAL_RESTRICTION: 0
OS_INFERIOR_NASAL_RESTRICTION: 0
OS_SUPERIOR_NASAL_RESTRICTION: 0
OD_SUPERIOR_NASAL_RESTRICTION: 0
OS_NORMAL: 1
METHOD: COUNTING FINGERS
OD_INFERIOR_NASAL_RESTRICTION: 0

## 2022-12-19 ASSESSMENT — TONOMETRY
IOP_METHOD: APPLANATION
OD_IOP_MMHG: 18
OS_IOP_MMHG: 26

## 2022-12-19 ASSESSMENT — EXTERNAL EXAM - RIGHT EYE: OD_EXAM: 1+ BROW PTOSIS

## 2022-12-19 ASSESSMENT — REFRACTION_MANIFEST
OD_CYLINDER: +1.25
OS_AXIS: 160
OD_AXIS: 035
OS_CYLINDER: +1.00
OS_ADD: +2.75
OD_SPHERE: PLANO
OD_ADD: +2.75
OS_SPHERE: -1.50

## 2022-12-19 ASSESSMENT — VISUAL ACUITY
OD_CC+: -2
OS_CC: 20/60
OD_CC: 20/25
OS_CC+: -1
METHOD: SNELLEN - LINEAR

## 2022-12-19 ASSESSMENT — CUP TO DISC RATIO
OD_RATIO: 0.4
OS_RATIO: 0.3

## 2022-12-19 ASSESSMENT — EXTERNAL EXAM - LEFT EYE: OS_EXAM: 1+ BROW PTOSIS

## 2022-12-19 NOTE — PROGRESS NOTES
Received Completed forms Yes   Faxed Forms Faxed To: reanna lucas  Fax Number: [phone]   Sent to HIM (Date) 12/16/22

## 2022-12-19 NOTE — PROGRESS NOTES
Current Eye Medications:  Systane PRN     Subjective:  Yumiko Mccartney is a(n) 82 year old female who presents for a comprehensive exam. Last eye exam was over 1 year ago. Since exam, vision in the left have worsened in the left and about the same in the right. Uses lubricating drops as needed. No flashes or floaters. No eye pain.     States has blurring, tearing and foreign body sensation left eye in AM.     Objective:  See Ophthalmology Exam.       Assessment:  Stable eye exam.      ICD-10-CM    1. Encounter for examination of eyes and vision with abnormal findings  Z01.01       2. Presbyopia  H52.4       3. Pseudophakia, ou; Yag Caps, os  Z96.1       4. Acne rosacea  L71.9       5. Amblyopia, mild, of left eye  H53.002       6. History of blepharoplasty, upper lid, ou (elsewhere); revision (EN)  Z98.890       7. 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 Posterior vitreous detachment, bilateral  H43.813            Plan:  Glasses Rx given - optional   Possible clouding of posterior capsule right eye discussed.   May use artificial tears up to 4 times daily both eyes. (Refresh Tears, Systane Ultra/Balance, or Theratears.  Could try Celluvisc or Refresh PM left eye at bedtime.  Call in August 2023 for an appointment in December 2023 for Complete Exam    Dr. Gonzales [phone]

## 2022-12-19 NOTE — PATIENT INSTRUCTIONS
Glasses Rx given - optional   Possible clouding of posterior capsule right eye discussed.   May use artificial tears up to 4 times daily both eyes. (Refresh Tears, Systane Ultra/Balance, or Theratears.  Could try Celluvisc or Refresh PM left eye at bedtime.  Call in August 2023 for an appointment in December 2023 for Complete Exam    Dr. Gonzales [phone]

## 2022-12-19 NOTE — LETTER
12/19/2022         RE: Yumiko Mccartney  [address]  [place] [address]        Dear Colleague,    Thank you for referring your patient, Yumiko Mccartney, to the [facility]. Please see a copy of my visit note below.     Current Eye Medications:  Systane PRN     Subjective:  Yumiko Mccartney is a(n) 82 year old female who presents for a comprehensive exam. Last eye exam was over 1 year ago. Since exam, vision in the left have worsened in the left and about the same in the right. Uses lubricating drops as needed. No flashes or floaters. No eye pain.     States has blurring, tearing and foreign body sensation left eye in AM.     Objective:  See Ophthalmology Exam.       Assessment:  Stable eye exam.      ICD-10-CM    1. Encounter for examination of eyes and vision with abnormal findings  Z01.01       2. Presbyopia  H52.4       3. Pseudophakia, ou; Yag Caps, os  Z96.1       4. Acne rosacea  L71.9       5. Amblyopia, mild, of left eye  H53.002       6. History of blepharoplasty, upper lid, ou (elsewhere); revision (EN)  Z98.890       7. 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 Posterior vitreous detachment, bilateral  H43.813            Plan:  Glasses Rx given - optional   Possible clouding of posterior capsule right eye discussed.   May use artificial tears up to 4 times daily both eyes. (Refresh Tears, Systane Ultra/Balance, or Theratears.  Could try Celluvisc or Refresh PM left eye at bedtime.  Call in August 2023 for an appointment in December 2023 for Complete Exam    Dr. Gonzales [phone]              Again, thank you for allowing me to participate in the care of your patient.        Sincerely,        Ashu Gonzales MD

## 2023-01-08 ENCOUNTER — HEALTH MAINTENANCE LETTER (OUTPATIENT)
Age: 83
End: 2023-01-08

## 2023-01-12 DIAGNOSIS — Z96.652 S/P TOTAL KNEE ARTHROPLASTY, LEFT: Primary | ICD-10-CM

## 2023-01-17 ENCOUNTER — OFFICE VISIT (OUTPATIENT)
Dept: FAMILY MEDICINE | Facility: CLINIC | Age: 83
End: 2023-01-17
Payer: COMMERCIAL

## 2023-01-17 DIAGNOSIS — I10 ESSENTIAL HYPERTENSION WITH GOAL BLOOD PRESSURE LESS THAN 140/90: ICD-10-CM

## 2023-01-17 DIAGNOSIS — Z13.220 LIPID SCREENING: ICD-10-CM

## 2023-01-17 DIAGNOSIS — E28.39 ESTROGEN DEFICIENCY: ICD-10-CM

## 2023-01-17 DIAGNOSIS — J45.30 MILD PERSISTENT ASTHMA WITHOUT COMPLICATION: ICD-10-CM

## 2023-01-17 DIAGNOSIS — Z41.1 ENCOUNTER FOR COSMETIC PROCEDURE: ICD-10-CM

## 2023-01-17 DIAGNOSIS — Z00.00 ENCOUNTER FOR MEDICARE ANNUAL WELLNESS EXAM: Primary | ICD-10-CM

## 2023-01-17 DIAGNOSIS — Z01.818 PRE-OPERATIVE EXAMINATION: ICD-10-CM

## 2023-01-17 DIAGNOSIS — M47.817 DJD (DEGENERATIVE JOINT DISEASE), LUMBOSACRAL: ICD-10-CM

## 2023-01-17 DIAGNOSIS — Z23 ENCOUNTER FOR IMMUNIZATION: ICD-10-CM

## 2023-01-17 DIAGNOSIS — I82.411 ACUTE DEEP VEIN THROMBOSIS (DVT) OF FEMORAL VEIN OF RIGHT LOWER EXTREMITY (H): ICD-10-CM

## 2023-01-17 LAB
ERYTHROCYTE [DISTWIDTH] IN BLOOD BY AUTOMATED COUNT: 12.7 % (ref 10–15)
HCT VFR BLD AUTO: 35.9 % (ref 35–47)
HGB BLD-MCNC: 11 G/DL (ref 11.7–15.7)
MCH RBC QN AUTO: 25.2 PG (ref 26.5–33)
MCHC RBC AUTO-ENTMCNC: 30.6 G/DL (ref 31.5–36.5)
MCV RBC AUTO: 82 FL (ref 78–100)
PLATELET # BLD AUTO: 246 10E3/UL (ref 150–450)
RBC # BLD AUTO: 4.36 10E6/UL (ref 3.8–5.2)
WBC # BLD AUTO: 7.5 10E3/UL (ref 4–11)

## 2023-01-17 PROCEDURE — 36415 COLL VENOUS BLD VENIPUNCTURE: CPT | Performed by: PREVENTIVE MEDICINE

## 2023-01-17 PROCEDURE — 82570 ASSAY OF URINE CREATININE: CPT | Performed by: PREVENTIVE MEDICINE

## 2023-01-17 PROCEDURE — 90471 IMMUNIZATION ADMIN: CPT | Performed by: PREVENTIVE MEDICINE

## 2023-01-17 PROCEDURE — 82043 UR ALBUMIN QUANTITATIVE: CPT | Performed by: PREVENTIVE MEDICINE

## 2023-01-17 PROCEDURE — 90714 TD VACC NO PRESV 7 YRS+ IM: CPT | Performed by: PREVENTIVE MEDICINE

## 2023-01-17 PROCEDURE — 80053 COMPREHEN METABOLIC PANEL: CPT | Performed by: PREVENTIVE MEDICINE

## 2023-01-17 PROCEDURE — 99214 OFFICE O/P EST MOD 30 MIN: CPT | Mod: 25 | Performed by: PREVENTIVE MEDICINE

## 2023-01-17 PROCEDURE — 85027 COMPLETE CBC AUTOMATED: CPT | Performed by: PREVENTIVE MEDICINE

## 2023-01-17 PROCEDURE — 80061 LIPID PANEL: CPT | Performed by: PREVENTIVE MEDICINE

## 2023-01-17 PROCEDURE — G0439 PPPS, SUBSEQ VISIT: HCPCS | Performed by: PREVENTIVE MEDICINE

## 2023-01-17 ASSESSMENT — ASTHMA QUESTIONNAIRES
QUESTION_3 LAST FOUR WEEKS HOW OFTEN DID YOUR ASTHMA SYMPTOMS (WHEEZING, COUGHING, SHORTNESS OF BREATH, CHEST TIGHTNESS OR PAIN) WAKE YOU UP AT NIGHT OR EARLIER THAN USUAL IN THE MORNING: NOT AT ALL
QUESTION_4 LAST FOUR WEEKS HOW OFTEN HAVE YOU USED YOUR RESCUE INHALER OR NEBULIZER MEDICATION (SUCH AS ALBUTEROL): NOT AT ALL
QUESTION_1 LAST FOUR WEEKS HOW MUCH OF THE TIME DID YOUR ASTHMA KEEP YOU FROM GETTING AS MUCH DONE AT WORK, SCHOOL OR AT HOME: NONE OF THE TIME
ACUTE_EXACERBATION_TODAY: NO
ACT_TOTALSCORE: 23
QUESTION_2 LAST FOUR WEEKS HOW OFTEN HAVE YOU HAD SHORTNESS OF BREATH: NOT AT ALL
QUESTION_5 LAST FOUR WEEKS HOW WOULD YOU RATE YOUR ASTHMA CONTROL: SOMEWHAT CONTROLLED
ACT_TOTALSCORE: 23

## 2023-01-17 ASSESSMENT — PAIN SCALES - GENERAL: PAINLEVEL: NO PAIN (0)

## 2023-01-17 NOTE — NURSING NOTE
Prior to immunization administration, verified patients identity using patient s name and date of birth. Please see Immunization Activity for additional information.     Screening Questionnaire for Adult Immunization    Are you sick today?   No   Do you have allergies to medications, food, a vaccine component or latex?   Yes   Have you ever had a serious reaction after receiving a vaccination?   No   Do you have a long-term health problem with heart, lung, kidney, or metabolic disease (e.g., diabetes), asthma, a blood disorder, no spleen, complement component deficiency, a cochlear implant, or a spinal fluid leak?  Are you on long-term aspirin therapy?   Yes   Do you have cancer, leukemia, HIV/AIDS, or any other immune system problem?   No   Do you have a parent, brother, or sister with an immune system problem?   No   In the past 3 months, have you taken medications that affect  your immune system, such as prednisone, other steroids, or anticancer drugs; drugs for the treatment of rheumatoid arthritis, Crohn s disease, or psoriasis; or have you had radiation treatments?   No   Have you had a seizure, or a brain or other nervous system problem?   No   During the past year, have you received a transfusion of blood or blood    products, or been given immune (gamma) globulin or antiviral drug?   No   For women: Are you pregnant or is there a chance you could become       pregnant during the next month?   No   Have you received any vaccinations in the past 4 weeks?   No     Immunization questionnaire was positive for at least one answer.  Notified PROVIDER AWARE.         Patient instructed to remain in clinic for 15 minutes afterwards, and to report any adverse reaction to me immediately.       Screening performed by Gopi Clinton MA on 1/17/2023 at 3:24 PM.

## 2023-01-17 NOTE — PROGRESS NOTES
SUBJECTIVE:   Tomi is a 82 year old who presents for Preventive Visit.    Yes understands    Are you in the first 12 months of your Medicare coverage?  No    HPI    Have you ever done Advance Care Planning? (For example, a Health Directive, POLST, or a discussion with a medical provider or your loved ones about your wishes): Discussed with patient   hears well  Fall risk  No falls this past year  Fall Risk Assessment not completed.    Cognitive Screening   1) Repeat 3 items (Leader, Season, Table)    2) Clock draw: NORMAL  3) 3 item recall: Recalls 3 objects  Results: Normal Clock     Mini-CogTM Copyright S Twila. Licensed by the author for use in [facility]; reprinted with permission (jorge@[facility]). All rights reserved.      Do you have sleep apnea, excessive snoring or daytime drowsiness?: no    Reviewed and updated as needed this visit by clinical staff   Tobacco  Allergies  Meds  Problems  Med Hx  Surg Hx  Fam Hx          Reviewed and updated as needed this visit by Provider   Tobacco  Allergies  Meds  Problems  Med Hx  Surg Hx  Fam Hx         Social History     Tobacco Use     Smoking status: Never     Passive exposure: Never     Smokeless tobacco: Never   Substance Use Topics     Alcohol use: Yes     Comment: rarely     If you drink alcohol do you typically have >3 drinks per day or >7 drinks per week? No    Alcohol Use 6/12/2019   Prescreen: >3 drinks/day or >7 drinks/week? No       Hypertension Follow-up      Do you check your blood pressure regularly outside of the clinic? Yes     Are you following a low salt diet? Yes    Are your blood pressures ever more than 140 on the top number (systolic) OR more   than 90 on the bottom number (diastolic), for example 140/90? Yes    Blood pressure maximum 145 systolic     Asthma:  Breathing OK  Not needed Albuterol  Vacation in [place] recently  No chest pain   No syncope    History of DVT:  Taking xarelto, has been seen by Heme   No side  effects  At first was bloated but now tolerating   No bleeding in the urine or stool     During the visit, patient stated she had seen a Plastic surgeon and was scheduled for a cosmetic procedure.  She requested completion of a pre op.    PRE OP   Will be having cosmetic surgery on the neck.   Procedure on 2/16/22  Fax # [phone] Dr Oconnell   Type of anesthesia: unknown       HPI: 82 year old female scheduled for an elective cosmetic procedure.    1. Have you ever had a heart attack or stroke? No   2. Have you ever had surgery on your heart or blood vessels, such as a stent placement, a coronary artery bypass, or surgery on an artery in your head, neck, heart, or legs? No   3. Do you have chest pain with activity? No   4. Do you have a history of  heart failure? No   5. Do you currently have a cold, bronchitis or symptoms of other infection? No   6. Do you have a cough, shortness of breath, or wheezing? No   7. Do you or anyone in your family have previous history of blood clots? No   8. Do you or does anyone in your family have a serious bleeding problem such as prolonged bleeding following surgeries or cuts? Yes, patient with history of unprovoked DVT, is on Xarelto life long.    9. Have you ever had problems with anemia or been told to take iron pills? No   10. Have you had any abnormal blood loss such as black, tarry or bloody stools, or abnormal vaginal bleeding? No   11. Have you ever had a blood transfusion? No   12. Are you willing to have a blood transfusion if it is medically needed before, during, or after your surgery? Yes   13. Have you or any of your relatives ever had problems with anesthesia? No   14. Do you have sleep apnea, excessive snoring or daytime drowsiness? No   15. Do you have any artifical heart valves or other implanted medical devices like a pacemaker, defibrillator, or continuous glucose monitor? No   16. Do you have artificial joints? No   17. Are you allergic to latex? No          Health Care Directive:  Patient does not have a Health Care Directive or Living Will: Discussed advance care planning with patient; information given to patient to review.     Preoperative Review of :   reviewed - no record of controlled substances prescribed.    Wt Readings from Last 2 Encounters:   01/17/23 92.5 kg (204 lb)   08/15/22 92.5 kg (204 lb)     Current providers sharing in care for this patient include:   Patient Care Team:  Gavi Bear MD as PCP - General (Family Practice)  Jesse Huitron DPM (Podiatry)  Gavi Bear MD as MD (Family Practice)  Gavi Bear MD as Assigned PCP  Abad Armendariz MD as Assigned Musculoskeletal Provider  Anaid Redd PA-C as Assigned Cancer Care Provider  Sayra No PA-C as Physician Assistant (Dermatology)  Ashu Gonzales MD as MD (Ophthalmology)  Ashu Gonzales MD as Assigned Surgical Provider    The following health maintenance items are reviewed in Epic and correct as of today:  Health Maintenance   Topic Date Due     URINE DRUG SCREEN  Never done     ASTHMA ACTION PLAN  07/21/2017     ASTHMA CONTROL TEST  07/17/2023     EYE EXAM  12/19/2023     MEDICARE ANNUAL WELLNESS VISIT  01/17/2024     ANNUAL REVIEW OF HM ORDERS  01/17/2024     FALL RISK ASSESSMENT  01/17/2024     ADVANCE CARE PLANNING  08/14/2025     LIPID  01/17/2028     DEXA  12/12/2031     DTAP/TDAP/TD IMMUNIZATION (4 - Td or Tdap) 01/17/2033     PHQ-2 (once per calendar year)  Completed     INFLUENZA VACCINE  Completed     Pneumococcal Vaccine: 65+ Years  Completed     ZOSTER IMMUNIZATION  Completed     COVID-19 Vaccine  Completed     IPV IMMUNIZATION  Aged Out     MENINGITIS IMMUNIZATION  Aged Out     MAMMO SCREENING  Discontinued     Lab work is in process  Labs reviewed in EPIC  BP Readings from Last 3 Encounters:   01/18/23 138/85   08/15/22 127/77   05/17/22 130/77    Wt Readings from Last 3 Encounters:   01/17/23 92.5 kg (204 lb)   08/15/22  "92.5 kg (204 lb)   05/17/22 91.2 kg (201 lb)                  Patient Active Problem List   Diagnosis     Asthma, moderate persistent     Acne rosacea     CARDIOVASCULAR SCREENING; LDL GOAL LESS THAN 130     Osteopenia     Vitamin D deficiency     Advanced directives, counseling/discussion     Hx of pseudoexfoliation, os     Pseudophakia, ou; Yag Caps, os     HTN, goal below 140/90     Peptic ulcer     Health Care Home     History of blepharoplasty, upper lid, ou (elsewhere); revision (EN)     Obesity, Class II, BMI 35-39.9     Restless legs syndrome     DJD (degenerative joint disease), lumbosacral     Rosacea     Essential hypertension with goal blood pressure less than 140/90     Obesity, Class I, BMI 30-34.9     Posterior vitreous detachment, bilateral     Vertigo     Amblyopia, mild, of left eye     Chronic pain of left knee     Past Surgical History:   Procedure Laterality Date     ARTHROPLASTY KNEE Left 10/4/2021    Procedure: ARTHROPLASTY, LEFT KNEE, TOTAL;  Surgeon: Glenn Calvin MD;  Location: [facility] OR     BLEPHAROPLASTY BILATERAL  3/9/2006; 2013    both eyes, upper lid; revision (EN)     CATARACT IOL, RT/LT       HC REMOVAL GALLBLADDER       LASER YAG CAPSULOTOMY      left eye (AC lysis also)     PHACOEMULSIFICATION WITH STANDARD INTRAOCULAR LENS IMPLANT  1/2012; 4/2013    right eye; left eye     REPAIR PTOSIS       SURGICAL HISTORY OF -       endometriosis surgeriesx3     SURGICAL HISTORY OF -       ganiglion cyst onfoot     SURGICAL HISTORY OF -       Eye for \"droopy eye\"     ZZC APPENDECTOMY         Social History     Tobacco Use     Smoking status: Never     Passive exposure: Never     Smokeless tobacco: Never   Substance Use Topics     Alcohol use: Yes     Comment: rarely     Family History   Problem Relation Age of Onset     C.A.D. Father      C.A.D. Brother      Hypertension Mother      Cancer No family hx of      Diabetes No family hx of      Cerebrovascular Disease No family hx of      " "Thyroid Disease No family hx of      Glaucoma No family hx of      Macular Degeneration No family hx of          Current Outpatient Medications   Medication Sig Dispense Refill     acetaminophen (TYLENOL) 325 MG tablet Take 2 tablets (650 mg) by mouth every 4 hours as needed for other 60 tablet 0     calcium citrate (CITRACAL) 950 (200 Ca) MG tablet Take 1 tablet by mouth 2 times daily       Cholecalciferol (VITAMIN D) 2000 UNIT CAPS Take 2,000 Units by mouth daily       gabapentin (NEURONTIN) 600 MG tablet TAKE 2 TABLETS (1,200 MG) BY MOUTH AT BEDTIME 180 tablet 3     lisinopril-hydrochlorothiazide (ZESTORETIC) 20-12.5 MG tablet TAKE 2 TABLETS BY MOUTH EVERY  tablet 0     methylPREDNISolone (MEDROL DOSEPAK) 4 MG tablet therapy pack Follow Package Directions 21 tablet 0     Multiple Vitamin (MULTIVITAMIN ADULT PO) Take by mouth daily       rivaroxaban ANTICOAGULANT (XARELTO) 20 MG TABS tablet Take 1 tablet (20 mg) by mouth daily (with dinner) 90 tablet 3     tiZANidine (ZANAFLEX) 4 MG tablet TAKE 1-2 TABLETS (4-8 MG) BY MOUTH NIGHTLY AS NEEDED FOR MUSCLE SPASMS 60 tablet 1     Allergies   Allergen Reactions     Erythromycin Swelling and Other (See Comments)       Mammogram Screening - Patient over age 75, has elected to discontinue screenings.  Pertinent mammograms are reviewed under the imaging tab.    Review of Systems  Constitutional, HEENT, cardiovascular, pulmonary, gi and gu systems are negative, except as otherwise noted.    OBJECTIVE:   /85   Pulse 73   Temp 97.2  F (36.2  C) (Tympanic)   Resp 16   Ht 1.635 m (5' 4.37\")   Wt 92.5 kg (204 lb)   SpO2 97%   BMI 34.62 kg/m   Estimated body mass index is 34.62 kg/m  as calculated from the following:    Height as of this encounter: 1.635 m (5' 4.37\").    Weight as of this encounter: 92.5 kg (204 lb).  Physical Exam  GENERAL APPEARANCE: healthy, alert and no distress  EYES: Eyes grossly normal to inspection and conjunctivae and sclerae " normal  NECK: no adenopathy and trachea midline and normal to palpation  RESP: lungs clear to auscultation - no rales, rhonchi or wheezes  CV: regular rates and rhythm, normal S1 S2  ABDOMEN: soft, non-tender and no rebound or guarding   MS: extremities normal- no gross deformities noted  SKIN: no suspicious lesions or rashes  NEURO: Normal strength and tone, mentation intact and speech normal  PSYCH: mentation appears normal      Diagnostic Test Results:  Labs reviewed in Epic  No results found for this or any previous visit (from the past 24 hour(s)).    ASSESSMENT / PLAN:   Yumiko was seen today for annual visit.    Diagnoses and all orders for this visit:    Encounter for Medicare annual wellness exam  -     REVIEW OF HEALTH MAINTENANCE PROTOCOL ORDERS    Pre-operative examination  -procedure scheduled for 2/26/23    Encounter for cosmetic procedure  -undergoing a neck procedure     Acute deep vein thrombosis (DVT) of femoral vein of right lower extremity (H)  -     Seen by Hematology   -     Comprehensive metabolic panel  -     CBC with platelets  -plan is for long term anti coagulation for unprovoked DVT     Essential hypertension with goal blood pressure less than 140/90  -     Isolated elevated reading  -will monitor at home and update via My Chart   -     Albumin Random Urine Quantitative with Creat Ratio  -     Comprehensive metabolic panel    Mild persistent asthma without complication  -no recent exacerbation    DJD (degenerative joint disease), lumbosacral  - stable  -     Comprehensive metabolic panel    Estrogen deficiency  -     DX Hip/Pelvis/Spine; Future    Encounter for immunization  -     TD PRESERV FREE, IM (7+ YRS) (DECAVAC/TENIVAC)    Lipid screening  -     Lipid panel reflex to direct LDL Non-fasting      Assessment & Plan  for Pre op    The proposed surgical procedure is considered INTERMEDIATE risk.      Risks and Recommendations:  The patient has the following additional risks and  "recommendations for perioperative complications:    Anemia/Bleeding/Clotting:    - History of DVT or PE, consider DVT prevention postoperatively   - Anemia and does not require treatment prior to surgery. Monitor hemoglobin postoperatively    Medication Instructions:  Patient is to take all scheduled medications on the day of surgery EXCEPT for modifications listed below:   - apixaban (Eliquis), edoxaban (Savaysa), rivaroxaban (Xarelto): Bleeding risk is low for this procedure AND CrCl (>=) 50 mL/min. HOLD 1 day before surgery.     - ACE/ARB: May be continued on the day of surgery.    - Diuretics: May continue due to combination blood pressure .   - pregabalin, gabapentin: Continue without modification.   - rescue Inhaler: Continue PRN. Bring to hospital on the day of surgery.    RECOMMENDATION:  APPROVAL GIVEN to proceed with proposed procedure, without further diagnostic evaluation.    Ordering of each unique test  Prescription drug management  40 minutes spent on the date of the encounter doing chart review, history and exam, documentation and further activities per the note        COUNSELING:  Reviewed preventive health counseling, as reflected in patient instructions       Regular exercise       Healthy diet/nutrition       Vision screening      BMI:   Estimated body mass index is 34.62 kg/m  as calculated from the following:    Height as of this encounter: 1.635 m (5' 4.37\").    Weight as of this encounter: 92.5 kg (204 lb).   Weight management plan: Discussed healthy diet and exercise guidelines      She reports that she has never smoked. She has never been exposed to tobacco smoke. She has never used smokeless tobacco.      Appropriate preventive services were discussed with this patient, including applicable screening as appropriate for cardiovascular disease, diabetes, osteopenia/osteoporosis, and glaucoma.  As appropriate for age/gender, discussed screening for colorectal cancer, prostate cancer, breast " cancer, and cervical cancer. Checklist reviewing preventive services available has been given to the patient.    Reviewed patients plan of care and provided an AVS. The Basic Care Plan (routine screening as documented in Health Maintenance) for Yumiko meets the Care Plan requirement. This Care Plan has been established and reviewed with the Patient.          Gavi Bear MD MPH    [facility]    Identified Health Risks:

## 2023-01-18 VITALS
OXYGEN SATURATION: 97 % | TEMPERATURE: 97.2 F | WEIGHT: 204 LBS | RESPIRATION RATE: 16 BRPM | DIASTOLIC BLOOD PRESSURE: 85 MMHG | HEIGHT: 64 IN | HEART RATE: 73 BPM | SYSTOLIC BLOOD PRESSURE: 138 MMHG | BODY MASS INDEX: 34.83 KG/M2

## 2023-01-18 LAB
ALBUMIN SERPL-MCNC: 4.1 G/DL (ref 3.4–5)
ALP SERPL-CCNC: 76 U/L (ref 40–150)
ALT SERPL W P-5'-P-CCNC: 22 U/L (ref 0–50)
ANION GAP SERPL CALCULATED.3IONS-SCNC: 9 MMOL/L (ref 3–14)
AST SERPL W P-5'-P-CCNC: 20 U/L (ref 0–45)
BILIRUB SERPL-MCNC: 0.2 MG/DL (ref 0.2–1.3)
BUN SERPL-MCNC: 16 MG/DL (ref 7–30)
CALCIUM SERPL-MCNC: 10 MG/DL (ref 8.5–10.1)
CHLORIDE BLD-SCNC: 102 MMOL/L (ref 94–109)
CHOLEST SERPL-MCNC: 166 MG/DL
CO2 SERPL-SCNC: 31 MMOL/L (ref 20–32)
CREAT SERPL-MCNC: 0.72 MG/DL (ref 0.52–1.04)
CREAT UR-MCNC: 28 MG/DL
FASTING STATUS PATIENT QL REPORTED: NO
GFR SERPL CREATININE-BSD FRML MDRD: 84 ML/MIN/1.73M2
GLUCOSE BLD-MCNC: 98 MG/DL (ref 70–99)
HDLC SERPL-MCNC: 45 MG/DL
LDLC SERPL CALC-MCNC: 70 MG/DL
MICROALBUMIN UR-MCNC: <5 MG/L
MICROALBUMIN/CREAT UR: NORMAL MG/G{CREAT}
NONHDLC SERPL-MCNC: 121 MG/DL
POTASSIUM BLD-SCNC: 4 MMOL/L (ref 3.4–5.3)
PROT SERPL-MCNC: 7.5 G/DL (ref 6.8–8.8)
SODIUM SERPL-SCNC: 142 MMOL/L (ref 133–144)
TRIGL SERPL-MCNC: 256 MG/DL

## 2023-01-19 NOTE — RESULT ENCOUNTER NOTE
Yumiko,     Urine sample is not showing any abnormal protein.  Electrolytes, glucose, kidney function and liver function tests are normal.  Non fasting cholesterol is at goal for you.  Basic blood count is showing slight anemia, this is stable for you.     Please do not hesitate to call us at [phone] if you have any questions or concerns.    Thank you,    Gavi Bear MD MPH

## 2023-01-20 ENCOUNTER — ANCILLARY ORDERS (OUTPATIENT)
Dept: FAMILY MEDICINE | Facility: CLINIC | Age: 83
End: 2023-01-20

## 2023-01-20 ENCOUNTER — ANCILLARY PROCEDURE (OUTPATIENT)
Dept: BONE DENSITY | Facility: CLINIC | Age: 83
End: 2023-01-20
Attending: PREVENTIVE MEDICINE
Payer: COMMERCIAL

## 2023-01-20 DIAGNOSIS — E28.39 ESTROGEN DEFICIENCY: ICD-10-CM

## 2023-01-20 PROCEDURE — 77080 DXA BONE DENSITY AXIAL: CPT | Performed by: RADIOLOGY

## 2023-01-20 NOTE — RESULT ENCOUNTER NOTE
Yumiko,     The bone density test shows osteopenia. This is an intermediate category that is in between normal and osteoporosis.  People with osteopenia should work on taking in 5995-3362 mg of calcium with vitamin D daily. You should also do weight bearing exercises at least 2 times a week.    We can recheck the Bone density in 2 years.     Please do not hesitate to call us at [phone] if you have any questions or concerns.    Thank you,    Gavi Bear MD MPH

## 2023-01-25 DIAGNOSIS — M51.369 DDD (DEGENERATIVE DISC DISEASE), LUMBAR: ICD-10-CM

## 2023-01-26 RX ORDER — GABAPENTIN 600 MG/1
1200 TABLET ORAL AT BEDTIME
Qty: 180 TABLET | Refills: 3 | Status: SHIPPED | OUTPATIENT
Start: 2023-01-26 | End: 2024-02-12

## 2023-02-07 ENCOUNTER — OFFICE VISIT (OUTPATIENT)
Dept: ORTHOPEDICS | Facility: CLINIC | Age: 83
End: 2023-02-07
Payer: COMMERCIAL

## 2023-02-07 ENCOUNTER — ANCILLARY PROCEDURE (OUTPATIENT)
Dept: GENERAL RADIOLOGY | Facility: CLINIC | Age: 83
End: 2023-02-07
Attending: ORTHOPAEDIC SURGERY
Payer: COMMERCIAL

## 2023-02-07 DIAGNOSIS — Z96.652 S/P TOTAL KNEE ARTHROPLASTY, LEFT: ICD-10-CM

## 2023-02-07 DIAGNOSIS — M24.661 ARTHROFIBROSIS OF KNEE JOINT, RIGHT: Primary | ICD-10-CM

## 2023-02-07 PROCEDURE — 99213 OFFICE O/P EST LOW 20 MIN: CPT | Performed by: ORTHOPAEDIC SURGERY

## 2023-02-07 PROCEDURE — 73562 X-RAY EXAM OF KNEE 3: CPT | Mod: LT | Performed by: RADIOLOGY

## 2023-02-07 ASSESSMENT — PAIN SCALES - GENERAL: PAINLEVEL: MODERATE PAIN (5)

## 2023-02-07 NOTE — PROGRESS NOTES
"Chief Complaint: Surgical Followup (1yr s/p LTKA. 10/4/21)       HPI: Yumiko Mccartney returns today in follow-up for her left knee. At her 3 month visit she had 122 degrees of flexion. She reports that this year she has had increasing back issues, had an back injection that helped \"for a little while\" and increasing right lateral radicular symptoms and a right DVT and was started on Xarelto. During this time she has had a decrease in knee ROM not feeling \"really tight.\"     Medications and allergies are documented in the EMR and have been reviewed.    Current Outpatient Medications:      acetaminophen (TYLENOL) 325 MG tablet, Take 2 tablets (650 mg) by mouth every 4 hours as needed for other, Disp: 60 tablet, Rfl: 0     calcium citrate (CITRACAL) 950 (200 Ca) MG tablet, Take 1 tablet by mouth 2 times daily, Disp: , Rfl:      Cholecalciferol (VITAMIN D) 2000 UNIT CAPS, Take 2,000 Units by mouth daily, Disp: , Rfl:      gabapentin (NEURONTIN) 600 MG tablet, TAKE 2 TABLETS (1,200 MG) BY MOUTH AT BEDTIME, Disp: 180 tablet, Rfl: 3     lisinopril-hydrochlorothiazide (ZESTORETIC) 20-12.5 MG tablet, TAKE 2 TABLETS BY MOUTH EVERY DAY, Disp: 180 tablet, Rfl: 0     methylPREDNISolone (MEDROL DOSEPAK) 4 MG tablet therapy pack, Follow Package Directions, Disp: 21 tablet, Rfl: 0     Multiple Vitamin (MULTIVITAMIN ADULT PO), Take by mouth daily, Disp: , Rfl:      rivaroxaban ANTICOAGULANT (XARELTO) 20 MG TABS tablet, Take 1 tablet (20 mg) by mouth daily (with dinner), Disp: 90 tablet, Rfl: 3     tiZANidine (ZANAFLEX) 4 MG tablet, TAKE 1-2 TABLETS (4-8 MG) BY MOUTH NIGHTLY AS NEEDED FOR MUSCLE SPASMS, Disp: 60 tablet, Rfl: 1  Allergies: Erythromycin    Physical Exam:  On physical examination the patient appears the stated age, is in no acute distress, affect is appropriate, and breathing is non-labored.  There were no vitals taken for this visit.  There is no height or weight on file to calculate BMI.  Gait: normal   Incision: " "benign    Left Knee  Appearance: benign  Clinical alignment: neutral   Effusion:no  Tenderness to palpation: min  Extension: 0  Flexion: 95  Collateral ligaments: intact  Cruciate ligaments: grossly intact     Hip examination: benign hip ROM with groin or anterior thigh symptoms.     Distally, the circulatory, motor, and sensation exam is intact with 5/5 EHL, gastroc-soleus, and tibialis anterior.  Sensation to light touch is intact.  Dorsalis pedis and posterior tibialis pulses are palpable.  There are no sores on the feet, no bruising, and no lymphedema.    X-rays:    I reviewed the x-rays dated today.  Previous films reviewed.    Findings:  Normal progression for a total knee arthroplasty without evidence of loosening or subsidence.    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 We discussed the diagnosis and the treatment options. We discussed that JENNIFER is not appropriate a year out from surgery. We discussed PT and she \"hates PT... it's so time consuming.\"     Plan: PT order placed.    No ref. provider found    "

## 2023-02-07 NOTE — NURSING NOTE
Yumiko Mccartney's chief complaint for this visit includes:  Chief Complaint   Patient presents with     Surgical Followup     1yr s/p LTKA. 10/4/21       Referring Provider:  No referring provider defined for this encounter.    There were no vitals taken for this visit.  Moderate Pain (5)     Pain increases with: Laying in bed, driving, sitting on tall stools/chairs  Previous surgeries: LTKA 10/4/21  Previous injections within last 6 months: NO  Treatments done:   Imaging completed: XR Today  Pain: 5/10  Concerns: Wants to know why shes still having pain/wants pain to go away.     Mariusz Rosenberg, ATC

## 2023-02-07 NOTE — LETTER
"    2/7/2023         RE: Yumiko Mccartney  [address]  [place] [address]        Dear Colleague,    Thank you for referring your patient, Yumiko Mccartney, to the [facility]. Please see a copy of my visit note below.    Chief Complaint: Surgical Followup (1yr s/p LTKA. 10/4/21)       HPI: Yumiko Mccartney returns today in follow-up for her left knee. At her 3 month visit she had 122 degrees of flexion. She reports that this year she has had increasing back issues, had an back injection that helped \"for a little while\" and increasing right lateral radicular symptoms and a right DVT and was started on Xarelto. During this time she has had a decrease in knee ROM not feeling \"really tight.\"     Medications and allergies are documented in the EMR and have been reviewed.    Current Outpatient Medications:      acetaminophen (TYLENOL) 325 MG tablet, Take 2 tablets (650 mg) by mouth every 4 hours as needed for other, Disp: 60 tablet, Rfl: 0     calcium citrate (CITRACAL) 950 (200 Ca) MG tablet, Take 1 tablet by mouth 2 times daily, Disp: , Rfl:      Cholecalciferol (VITAMIN D) 2000 UNIT CAPS, Take 2,000 Units by mouth daily, Disp: , Rfl:      gabapentin (NEURONTIN) 600 MG tablet, TAKE 2 TABLETS (1,200 MG) BY MOUTH AT BEDTIME, Disp: 180 tablet, Rfl: 3     lisinopril-hydrochlorothiazide (ZESTORETIC) 20-12.5 MG tablet, TAKE 2 TABLETS BY MOUTH EVERY DAY, Disp: 180 tablet, Rfl: 0     methylPREDNISolone (MEDROL DOSEPAK) 4 MG tablet therapy pack, Follow Package Directions, Disp: 21 tablet, Rfl: 0     Multiple Vitamin (MULTIVITAMIN ADULT PO), Take by mouth daily, Disp: , Rfl:      rivaroxaban ANTICOAGULANT (XARELTO) 20 MG TABS tablet, Take 1 tablet (20 mg) by mouth daily (with dinner), Disp: 90 tablet, Rfl: 3     tiZANidine (ZANAFLEX) 4 MG tablet, TAKE 1-2 TABLETS (4-8 MG) BY MOUTH NIGHTLY AS NEEDED FOR MUSCLE SPASMS, Disp: 60 tablet, Rfl: 1  Allergies: Erythromycin    Physical Exam:  On physical " "examination the patient appears the stated age, is in no acute distress, affect is appropriate, and breathing is non-labored.  There were no vitals taken for this visit.  There is no height or weight on file to calculate BMI.  Gait: normal   Incision: benign    Left Knee  Appearance: benign  Clinical alignment: neutral   Effusion:no  Tenderness to palpation: min  Extension: 0  Flexion: 95  Collateral ligaments: intact  Cruciate ligaments: grossly intact     Hip examination: benign hip ROM with groin or anterior thigh symptoms.     Distally, the circulatory, motor, and sensation exam is intact with 5/5 EHL, gastroc-soleus, and tibialis anterior.  Sensation to light touch is intact.  Dorsalis pedis and posterior tibialis pulses are palpable.  There are no sores on the feet, no bruising, and no lymphedema.    X-rays:    I reviewed the x-rays dated today.  Previous films reviewed.    Findings:  Normal progression for a total knee arthroplasty without evidence of loosening or subsidence.    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 We discussed the diagnosis and the treatment options. We discussed that JENNIFER is not appropriate a year out from surgery. We discussed PT and she \"hates PT... it's so time consuming.\"     Plan: PT order placed.    No ref. provider found        Again, thank you for allowing me to participate in the care of your patient.        Sincerely,        Glenn Calvin MD    "

## 2023-02-10 ENCOUNTER — OFFICE VISIT (OUTPATIENT)
Dept: FAMILY MEDICINE | Facility: CLINIC | Age: 83
End: 2023-02-10
Payer: COMMERCIAL

## 2023-02-10 VITALS
TEMPERATURE: 97.3 F | OXYGEN SATURATION: 98 % | SYSTOLIC BLOOD PRESSURE: 124 MMHG | HEIGHT: 64 IN | DIASTOLIC BLOOD PRESSURE: 70 MMHG | WEIGHT: 201 LBS | RESPIRATION RATE: 18 BRPM | HEART RATE: 78 BPM | BODY MASS INDEX: 34.31 KG/M2

## 2023-02-10 DIAGNOSIS — Z01.818 PRE-OP EVALUATION: Primary | ICD-10-CM

## 2023-02-10 PROCEDURE — 99212 OFFICE O/P EST SF 10 MIN: CPT | Performed by: FAMILY MEDICINE

## 2023-02-10 PROCEDURE — 93000 ELECTROCARDIOGRAM COMPLETE: CPT | Performed by: FAMILY MEDICINE

## 2023-02-10 NOTE — PROGRESS NOTES
"  Assessment & Plan     (Z01.818) Pre-op evaluation  (primary encounter diagnosis)  Plan: EKG 12-lead complete w/read - Clinics        EKG shows sinus bradycardia, no signs of ischemia                   No follow-ups on file.    MARLEE CALDERON MD  [facility] DEVI Krishna is a 82 year old, presenting for the following health issues:  EKG      HPI     Patient is here for an EKG for Surgery to have her \"Turkey Neck\" removed on 02/16/2023 already had her pre op done on 01/17/2023. Needs EKG faxed to Dr. Oconnell at [facility]. Fax # [phone].      Review of Systems   No Chest pain      Objective    BP (!) 148/76 (BP Location: Right arm, Patient Position: Chair, Cuff Size: Adult Regular)   Pulse 78   Temp 97.3  F (36.3  C) (Oral)   Resp 18   Ht 1.635 m (5' 4.37\")   Wt 91.2 kg (201 lb)   SpO2 98%   BMI 34.11 kg/m    Body mass index is 34.11 kg/m .  Physical Exam   GENERAL: healthy, alert and no distress    EKG - Reviewed and interpreted by me: sinus bradycardia, no signs of acute ischemia                "

## 2023-02-24 DIAGNOSIS — G89.29 CHRONIC PAIN OF LEFT KNEE: ICD-10-CM

## 2023-02-24 DIAGNOSIS — M25.562 CHRONIC PAIN OF LEFT KNEE: ICD-10-CM

## 2023-02-24 DIAGNOSIS — M51.16 LUMBAR DISC HERNIATION WITH RADICULOPATHY: ICD-10-CM

## 2023-02-24 NOTE — TELEPHONE ENCOUNTER
Please offer this patient a follow up appointment, ok if it is telephone, I did refill her tizanadine x 1  Thanks, Dr Armendariz

## 2023-03-02 ENCOUNTER — THERAPY VISIT (OUTPATIENT)
Dept: PHYSICAL THERAPY | Facility: CLINIC | Age: 83
End: 2023-03-02
Payer: COMMERCIAL

## 2023-03-02 DIAGNOSIS — M24.661 ARTHROFIBROSIS OF KNEE JOINT, RIGHT: ICD-10-CM

## 2023-03-02 PROCEDURE — 97110 THERAPEUTIC EXERCISES: CPT | Mod: GP | Performed by: PHYSICAL THERAPIST

## 2023-03-02 PROCEDURE — 97161 PT EVAL LOW COMPLEX 20 MIN: CPT | Mod: GP | Performed by: PHYSICAL THERAPIST

## 2023-03-02 ASSESSMENT — ACTIVITIES OF DAILY LIVING (ADL)
KNEE_ACTIVITY_OF_DAILY_LIVING_SCORE: 67.14
KNEEL ON THE FRONT OF YOUR KNEE: I AM UNABLE TO DO THE ACTIVITY
STIFFNESS: THE SYMPTOM AFFECTS MY ACTIVITY SEVERELY
LIMPING: I DO NOT HAVE THE SYMPTOM
HOW_WOULD_YOU_RATE_THE_CURRENT_FUNCTION_OF_YOUR_KNEE_DURING_YOUR_USUAL_DAILY_ACTIVITIES_ON_A_SCALE_FROM_0_TO_100_WITH_100_BEING_YOUR_LEVEL_OF_KNEE_FUNCTION_PRIOR_TO_YOUR_INJURY_AND_0_BEING_THE_INABILITY_TO_PERFORM_ANY_OF_YOUR_USUAL_DAILY_ACTIVITIES?: 55
STAND: ACTIVITY IS NOT DIFFICULT
RISE FROM A CHAIR: ACTIVITY IS NOT DIFFICULT
GIVING WAY, BUCKLING OR SHIFTING OF KNEE: I DO NOT HAVE THE SYMPTOM
KNEE_ACTIVITY_OF_DAILY_LIVING_SUM: 47
SIT WITH YOUR KNEE BENT: ACTIVITY IS NOT DIFFICULT
HOW_WOULD_YOU_RATE_THE_OVERALL_FUNCTION_OF_YOUR_KNEE_DURING_YOUR_USUAL_DAILY_ACTIVITIES?: NEARLY NORMAL
PAIN: THE SYMPTOM AFFECTS MY ACTIVITY MODERATELY
SQUAT: ACTIVITY IS SOMEWHAT DIFFICULT
GO DOWN STAIRS: ACTIVITY IS VERY DIFFICULT
SWELLING: I DO NOT HAVE THE SYMPTOM
WEAKNESS: THE SYMPTOM AFFECTS MY ACTIVITY SEVERELY
RAW_SCORE: 47
AS_A_RESULT_OF_YOUR_KNEE_INJURY,_HOW_WOULD_YOU_RATE_YOUR_CURRENT_LEVEL_OF_DAILY_ACTIVITY?: NEARLY NORMAL
WALK: ACTIVITY IS NOT DIFFICULT
GO UP STAIRS: ACTIVITY IS MINIMALLY DIFFICULT

## 2023-03-02 NOTE — PROGRESS NOTES
Physical Therapy Initial Evaluation  Subjective:  The history is provided by the patient.   Therapist Generated HPI Evaluation  Problem details: Left knee has been stiffening up on her. A few months after her rehab stopped for her total knee she had a DVT 5/15/22. Then 6/18/22 her leg swelled up again on her so went in again for DVT. Then on top of that she started getting sciatica and that has not resolved.  Epidural injection in back help but only for a little while. Now her knee is just feeling tighter and tigher. .         Type of problem:  Left knee.    This is a new (2/7/23) condition.  Condition occurred with:  Other reason (other medical complication).  Where condition occurred: for unknown reasons.  Patient reports pain:  Anterior, posterior and in the joint.  Pain is described as aching and is intermittent.  Pain radiates to:  Lower leg and thigh. Pain is the same all the time.  Since onset symptoms are gradually improving (just the pain).  Associated symptoms:  Loss of motion/stiffness. Exacerbated by: getting in and out of the car, getting in bed, going down the stairs.  sleeping at night.  and relieved by nothing.  Special tests included:  X-ray (arthrofibrosis).  Previous treatment includes physical therapy. There was mild (aquatic therapy. ) improvement following previous treatment.  Barriers include:  None as reported by patient.    Patient Health History  Yumiko Mccartney being seen for left knee pain.     Problem began: 10/4/2021.   Problem occurred: had a total knee replacement   Pain is reported as 5/10 on pain scale.  General health as reported by patient is good.  Pertinent medical history includes: asthma, implanted device and high blood pressure.   Red flags:  None as reported by patient.  Medical allergies: none.   Surgeries include:  Orthopedic surgery. Other surgery history details: 10/4/22 knee replacement, 2-12-23 skin removed. .    Current medications:  Anti-inflammatory, high blood  pressure medication, muscle relaxants and pain medication.    Current occupation is retired. .   Primary job tasks include:  Driving and lifting/carrying.                                    Objective:  System                                                Knee Evaluation:  ROM:    AROM      Extension:  Left: 0    Right:  0  Flexion: Left: 105    Right: 125        Strength:     Extension:  Left: 5/5   Strong/pain free  Pain:      Right: 5/5   Strong/pain free  Pain:  Flexion:  Left: 5/5   Strong/pain free  Pain:      Right: 5/5   Strong/pain free  Pain:    Quad Set Left: Good    Pain:   Quad Set Right: Good    Pain:  Ligament Testing:  Normal                  Palpation:    Left knee tenderness present at:  Medial Joint Line; Lateral Joint Line and Patellar Tendon  Left knee tenderness not present at:  Patellar Medial; Patellar Lateral; Patellar Superior and Patellar Inferior              General     ROS    Assessment/Plan:    Patient is a 82 year old female with left side knee complaints.    Patient has the following significant findings with corresponding treatment plan.                Diagnosis 1:  Left knee pain   Pain -  hot/cold therapy, manual therapy, self management, education and home program  Decreased ROM/flexibility - manual therapy, therapeutic exercise, therapeutic activity and home program  Decreased joint mobility - manual therapy, therapeutic exercise, therapeutic activity and home program  Decreased strength - therapeutic exercise, therapeutic activities and home program  Decreased function - therapeutic activities and home program    Therapy Evaluation Codes:   1) History comprised of:   Personal factors that impact the plan of care:      None.    Comorbidity factors that impact the plan of care are:      blood clots, sciatica.     Medications impacting care: Anti-inflammatory.  2) Examination of Body Systems comprised of:   Body structures and functions that impact the plan of care:       Knee.   Activity limitations that impact the plan of care are:      Squatting/kneeling, Stairs, Walking and Sleeping.  3) Clinical presentation characteristics are:   Stable/Uncomplicated.  4) Decision-Making    Low complexity using standardized patient assessment instrument and/or measureable assessment of functional outcome.  Cumulative Therapy Evaluation is: Low complexity.    Previous and current functional limitations:  (See Goal Flow Sheet for this information)    Short term and Long term goals: (See Goal Flow Sheet for this information)     Communication ability:  Patient appears to be able to clearly communicate and understand verbal and written communication and follow directions correctly.  Treatment Explanation - The following has been discussed with the patient:   RX ordered/plan of care  Anticipated outcomes  Possible risks and side effects  This patient would benefit from PT intervention to resume normal activities.   Rehab potential is good.    Frequency:  1 X week, once daily  Duration:  for 8 weeks  Discharge Plan:  Achieve all LTG.  Independent in home treatment program.  Reach maximal therapeutic benefit.    Please refer to the daily flowsheet for treatment today, total treatment time and time spent performing 1:1 timed codes.

## 2023-03-02 NOTE — PROGRESS NOTES
[facility]    OUTPATIENT Physical Therapy ORTHOPEDIC EVALUATION  PLAN OF TREATMENT FOR OUTPATIENT REHABILITATION  (COMPLETE FOR INITIAL CLAIMS ONLY)  Patient's Last Name, First Name, M.I.  YOB: 1940  SherrillYumiko SUNG    Provider s Name:  [facility]   Medical Record No.  [id number]   Start of Care Date:  03/02/23   Onset Date:   10/04/21   Treatment Diagnosis:  left knee pain Medical Diagnosis:  Arthrofibrosis of knee joint, right       Goals:     03/02/23 0500   Body Part   Goals listed below are for left knee   Goal #1   Goal #1 stairs   Previous Functional Level No restrictions   Current Functional Level Descend stairs;one step at a time   STG Target Performance Descend stairs   Performance Level 5 inch step 2/10 PL   Rationale to reach upper level of home safely;to reach lower level of home safely   Due Date 03/30/23   LTG Target Performance Descend stairs;in a normal reciprocal pattern   Performance Level 2/10 PL 7 inch step   Rationale to reach upper level of home safely;to reach lower level of home safely   Due Date 04/27/23         Therapy Frequency:  1 x/ week  Predicted Duration of Therapy Intervention:  8 weeks    Homer Suarez, PT                 I CERTIFY THE NEED FOR THESE SERVICES FURNISHED UNDER        THIS PLAN OF TREATMENT AND WHILE UNDER MY CARE     (Physician attestation of this document indicates review and certification of the therapy plan).                     Certification Date From:  03/02/23   Certification Date To:  04/27/23    Referring Provider:  Glenn Calvin    Initial Assessment        See Epic Evaluation SOC Date: 03/02/23

## 2023-03-09 ENCOUNTER — THERAPY VISIT (OUTPATIENT)
Dept: PHYSICAL THERAPY | Facility: CLINIC | Age: 83
End: 2023-03-09
Payer: COMMERCIAL

## 2023-03-09 DIAGNOSIS — M25.562 CHRONIC PAIN OF LEFT KNEE: Primary | ICD-10-CM

## 2023-03-09 DIAGNOSIS — G89.29 CHRONIC PAIN OF LEFT KNEE: Primary | ICD-10-CM

## 2023-03-09 PROCEDURE — 97110 THERAPEUTIC EXERCISES: CPT | Mod: GP | Performed by: PHYSICAL THERAPIST

## 2023-03-09 PROCEDURE — 97140 MANUAL THERAPY 1/> REGIONS: CPT | Mod: GP | Performed by: PHYSICAL THERAPIST

## 2023-03-16 ENCOUNTER — THERAPY VISIT (OUTPATIENT)
Dept: PHYSICAL THERAPY | Facility: CLINIC | Age: 83
End: 2023-03-16
Payer: COMMERCIAL

## 2023-03-16 DIAGNOSIS — M25.562 CHRONIC PAIN OF LEFT KNEE: Primary | ICD-10-CM

## 2023-03-16 DIAGNOSIS — G89.29 CHRONIC PAIN OF LEFT KNEE: Primary | ICD-10-CM

## 2023-03-16 PROCEDURE — 97140 MANUAL THERAPY 1/> REGIONS: CPT | Mod: GP | Performed by: PHYSICAL THERAPIST

## 2023-03-16 PROCEDURE — 97110 THERAPEUTIC EXERCISES: CPT | Mod: GP | Performed by: PHYSICAL THERAPIST

## 2023-03-19 DIAGNOSIS — M51.16 LUMBAR DISC HERNIATION WITH RADICULOPATHY: ICD-10-CM

## 2023-03-19 DIAGNOSIS — G89.29 CHRONIC PAIN OF LEFT KNEE: ICD-10-CM

## 2023-03-19 DIAGNOSIS — M25.562 CHRONIC PAIN OF LEFT KNEE: ICD-10-CM

## 2023-03-21 NOTE — TELEPHONE ENCOUNTER
Prescription refill requested for:   tiZANidine (ZANAFLEX) 4 MG tablet      Last Written Prescription Date:  2/24/23  Last Fill Quantity: 60,   # refills: 1  Last Office Visit: 8/15/22  Future Office visit:           Mariusz Rosenberg ATC

## 2023-03-22 NOTE — TELEPHONE ENCOUNTER
Please contact patient and document in their chart that you contacted them to let them know that they will need a followup appointment in order to get refills from me.  Thanks, Dr Armendariz

## 2023-03-23 ENCOUNTER — THERAPY VISIT (OUTPATIENT)
Dept: PHYSICAL THERAPY | Facility: CLINIC | Age: 83
End: 2023-03-23
Payer: COMMERCIAL

## 2023-03-23 DIAGNOSIS — G89.29 CHRONIC PAIN OF LEFT KNEE: Primary | ICD-10-CM

## 2023-03-23 DIAGNOSIS — M25.562 CHRONIC PAIN OF LEFT KNEE: Primary | ICD-10-CM

## 2023-03-23 PROCEDURE — 97110 THERAPEUTIC EXERCISES: CPT | Mod: GP | Performed by: PHYSICAL THERAPIST

## 2023-03-23 PROCEDURE — 97140 MANUAL THERAPY 1/> REGIONS: CPT | Mod: GP | Performed by: PHYSICAL THERAPIST

## 2023-03-30 ENCOUNTER — THERAPY VISIT (OUTPATIENT)
Dept: PHYSICAL THERAPY | Facility: CLINIC | Age: 83
End: 2023-03-30
Payer: COMMERCIAL

## 2023-03-30 DIAGNOSIS — M25.562 CHRONIC PAIN OF LEFT KNEE: Primary | ICD-10-CM

## 2023-03-30 DIAGNOSIS — G89.29 CHRONIC PAIN OF LEFT KNEE: Primary | ICD-10-CM

## 2023-03-30 PROCEDURE — 97140 MANUAL THERAPY 1/> REGIONS: CPT | Mod: GP | Performed by: PHYSICAL THERAPIST

## 2023-03-30 PROCEDURE — 97110 THERAPEUTIC EXERCISES: CPT | Mod: GP | Performed by: PHYSICAL THERAPIST

## 2023-04-13 ENCOUNTER — THERAPY VISIT (OUTPATIENT)
Dept: PHYSICAL THERAPY | Facility: CLINIC | Age: 83
End: 2023-04-13
Payer: COMMERCIAL

## 2023-04-13 ENCOUNTER — OFFICE VISIT (OUTPATIENT)
Dept: ORTHOPEDICS | Facility: CLINIC | Age: 83
End: 2023-04-13
Payer: COMMERCIAL

## 2023-04-13 DIAGNOSIS — M46.1 ARTHRITIS OF SACROILIAC JOINT OF BOTH SIDES (H): ICD-10-CM

## 2023-04-13 DIAGNOSIS — M51.369 DDD (DEGENERATIVE DISC DISEASE), LUMBAR: ICD-10-CM

## 2023-04-13 DIAGNOSIS — M25.562 CHRONIC PAIN OF LEFT KNEE: Primary | ICD-10-CM

## 2023-04-13 DIAGNOSIS — M47.816 LUMBAR FACET ARTHROPATHY: ICD-10-CM

## 2023-04-13 DIAGNOSIS — M51.26 LUMBAR DISC HERNIATION: Primary | ICD-10-CM

## 2023-04-13 DIAGNOSIS — G89.29 CHRONIC PAIN OF LEFT KNEE: Primary | ICD-10-CM

## 2023-04-13 PROCEDURE — 99214 OFFICE O/P EST MOD 30 MIN: CPT | Performed by: PREVENTIVE MEDICINE

## 2023-04-13 PROCEDURE — 97140 MANUAL THERAPY 1/> REGIONS: CPT | Mod: GP | Performed by: PHYSICAL THERAPIST

## 2023-04-13 PROCEDURE — 97110 THERAPEUTIC EXERCISES: CPT | Mod: GP | Performed by: PHYSICAL THERAPIST

## 2023-04-13 RX ORDER — METHYLPREDNISOLONE 4 MG
TABLET, DOSE PACK ORAL
Qty: 21 TABLET | Refills: 0 | Status: SHIPPED | OUTPATIENT
Start: 2023-04-13 | End: 2024-04-05

## 2023-04-13 NOTE — LETTER
4/13/2023         RE: Yumiko Mccartney  [address]  [place] [address]        Dear Colleague,    Thank you for referring your patient, Yumiko Mccartney, to the [facility] [place]. Please see a copy of my visit note below.    HISTORY OF PRESENT ILLNESS  Ms. Mccartney is a pleasant 82 year old year old female who presents to clinic today with the following:  What problem are you here for? Lumbar pain, discuss tizanidine refill    How long have you had this problem? 10 years    Have you had any recent imaging of this problem? Xrays/MRI/CT scans? yes    Have you had treatments for this problem in the past?  -Medications? yes  -Physical therapy? yes  -Injections? yes  -Surgery? no    How severe is this problem today? 0-10 scale? 5    How severe has this problem been at WORST in the past? 0-10 scale? 10    What do you think caused this problem? Has lumbar radiculopathy    Does this problem or its symptoms cause difficulty for you falling asleep or staying asleep? When she is not taking her medicine    Anything else you want us to know about this problem? no          MEDICAL HISTORY  Patient Active Problem List   Diagnosis     Asthma, moderate persistent     Acne rosacea     CARDIOVASCULAR SCREENING; LDL GOAL LESS THAN 130     Osteopenia     Vitamin D deficiency     Advanced directives, counseling/discussion     Hx of pseudoexfoliation, os     Pseudophakia, ou; Yag Caps, os     HTN, goal below 140/90     Peptic ulcer     Health Care Home     History of blepharoplasty, upper lid, ou (elsewhere); revision (EN)     Obesity, Class II, BMI 35-39.9     Restless legs syndrome     DJD (degenerative joint disease), lumbosacral     Rosacea     Essential hypertension with goal blood pressure less than 140/90     Obesity, Class I, BMI 30-34.9     Posterior vitreous detachment, bilateral     Vertigo     Amblyopia, mild, of left eye     Chronic pain of left knee       Current Outpatient  Medications   Medication Sig Dispense Refill     acetaminophen (TYLENOL) 325 MG tablet Take 2 tablets (650 mg) by mouth every 4 hours as needed for other 60 tablet 0     calcium citrate (CITRACAL) 950 (200 Ca) MG tablet Take 1 tablet by mouth 2 times daily       Cholecalciferol (VITAMIN D) 2000 UNIT CAPS Take 2,000 Units by mouth daily       gabapentin (NEURONTIN) 600 MG tablet TAKE 2 TABLETS (1,200 MG) BY MOUTH AT BEDTIME 180 tablet 3     lisinopril-hydrochlorothiazide (ZESTORETIC) 20-12.5 MG tablet TAKE 2 TABLETS BY MOUTH EVERY  tablet 0     methylPREDNISolone (MEDROL DOSEPAK) 4 MG tablet therapy pack Follow Package Directions 21 tablet 0     Multiple Vitamin (MULTIVITAMIN ADULT PO) Take by mouth daily       rivaroxaban ANTICOAGULANT (XARELTO) 20 MG TABS tablet Take 1 tablet (20 mg) by mouth daily (with dinner) 90 tablet 3     tiZANidine (ZANAFLEX) 4 MG tablet TAKE 1-2 TABLETS (4-8 MG) BY MOUTH NIGHTLY AS NEEDED FOR MUSCLE SPASMS 60 tablet 1       Allergies   Allergen Reactions     Erythromycin Swelling and Other (See Comments)       Family History   Problem Relation Age of Onset     C.A.D. Father      C.A.D. Brother      Hypertension Mother      Cancer No family hx of      Diabetes No family hx of      Cerebrovascular Disease No family hx of      Thyroid Disease No family hx of      Glaucoma No family hx of      Macular Degeneration No family hx of      Social History     Socioeconomic History     Marital status:      Number of children: 3   Occupational History     Employer: RETIRED   Tobacco Use     Smoking status: Never     Passive exposure: Never     Smokeless tobacco: Never   Vaping Use     Vaping status: Never Used     Passive vaping exposure: Yes   Substance and Sexual Activity     Alcohol use: Yes     Comment: rarely     Drug use: No     Sexual activity: Yes     Partners: Male       Additional medical/Social/Surgical histories reviewed in [facility] and updated as appropriate.     REVIEW OF  SYSTEMS (4/13/2023)  10 point ROS of systems including Constitutional, Eyes, Respiratory, Cardiovascular, Gastroenterology, Genitourinary, Integumentary, Musculoskeletal, Psychiatric, Allergic/Immunologic were all negative except for pertinent positives noted in my HPI.     PHYSICAL EXAM  VSS  Vital Signs: There were no vitals taken for this visit. Patient declined being weighed. There is no height or weight on file to calculate BMI.    General  - normal appearance, in no obvious distress  HEENT  - conjunctivae not injected, moist mucous membranes, normocephalic/atraumatic head, ears normal appearance, no lesions, mouth normal appearance, no scars, normal dentition and teeth present  CV  - normal peripheral perfusion  Pulm  - normal respiratory pattern, non-labored  Musculoskeletal - lumbar spine  - stance: normal gait without limp, no obvious leg length discrepancy, normal heel and toe walk  - inspection: normal bone and joint alignment, no obvious scoliosis  - palpation: no paravertebral or bony tenderness  - ROM: flexion exacerbates pain, normal extension, sidebending, rotation  - strength: lower extremities 5/5 in all planes  - special tests:  (+) straight leg raise  (+) slump test  Neuro  - patellar and Achilles DTRs 2+ bilaterally, some lower extremity sensory deficit throughout L5 distribution, grossly normal coordination, normal muscle tone  Skin  - no ecchymosis, erythema, warmth, or induration, no obvious rash  Psych  - interactive, appropriate, normal mood and affect  Has ttp over bilateral SI joints and pain with extension and external rotation of bilateral hips causes SI joint pain  ASSESSMENT & PLAN  81 yo female with lumbar ddd, disc herniations, radicular pain and bilateral SI joint arthritis, worse    I independently reviewed the following imaging studies:  Lumbar MRI: shows ddd, disc herniations  Discussed and ordered therapeutic injections for SI joints  Cont. Medications  RX given for medrol and  refilled tizanadine  Cont. HEP  F/u after SI joint injections  Will consider future injections for lumbar spine      Appropriate PPE was utilized for prevention of spread of Covid-19.  Abad Armendariz MD, CAM        Again, thank you for allowing me to participate in the care of your patient.        Sincerely,        Abad Armendariz MD

## 2023-04-13 NOTE — PROGRESS NOTES
HISTORY OF PRESENT ILLNESS  Ms. Mccartney is a pleasant 82 year old year old female who presents to clinic today with the following:  What problem are you here for? Lumbar pain, discuss tizanidine refill    How long have you had this problem? 10 years    Have you had any recent imaging of this problem? Xrays/MRI/CT scans? yes    Have you had treatments for this problem in the past?  -Medications? yes  -Physical therapy? yes  -Injections? yes  -Surgery? no    How severe is this problem today? 0-10 scale? 5    How severe has this problem been at WORST in the past? 0-10 scale? 10    What do you think caused this problem? Has lumbar radiculopathy    Does this problem or its symptoms cause difficulty for you falling asleep or staying asleep? When she is not taking her medicine    Anything else you want us to know about this problem? no          MEDICAL HISTORY  Patient Active Problem List   Diagnosis     Asthma, moderate persistent     Acne rosacea     CARDIOVASCULAR SCREENING; LDL GOAL LESS THAN 130     Osteopenia     Vitamin D deficiency     Advanced directives, counseling/discussion     Hx of pseudoexfoliation, os     Pseudophakia, ou; Yag Caps, os     HTN, goal below 140/90     Peptic ulcer     Health Care Home     History of blepharoplasty, upper lid, ou (elsewhere); revision (EN)     Obesity, Class II, BMI 35-39.9     Restless legs syndrome     DJD (degenerative joint disease), lumbosacral     Rosacea     Essential hypertension with goal blood pressure less than 140/90     Obesity, Class I, BMI 30-34.9     Posterior vitreous detachment, bilateral     Vertigo     Amblyopia, mild, of left eye     Chronic pain of left knee       Current Outpatient Medications   Medication Sig Dispense Refill     acetaminophen (TYLENOL) 325 MG tablet Take 2 tablets (650 mg) by mouth every 4 hours as needed for other 60 tablet 0     calcium citrate (CITRACAL) 950 (200 Ca) MG tablet Take 1 tablet by mouth 2 times daily       Cholecalciferol  (VITAMIN D) 2000 UNIT CAPS Take 2,000 Units by mouth daily       gabapentin (NEURONTIN) 600 MG tablet TAKE 2 TABLETS (1,200 MG) BY MOUTH AT BEDTIME 180 tablet 3     lisinopril-hydrochlorothiazide (ZESTORETIC) 20-12.5 MG tablet TAKE 2 TABLETS BY MOUTH EVERY  tablet 0     methylPREDNISolone (MEDROL DOSEPAK) 4 MG tablet therapy pack Follow Package Directions 21 tablet 0     Multiple Vitamin (MULTIVITAMIN ADULT PO) Take by mouth daily       rivaroxaban ANTICOAGULANT (XARELTO) 20 MG TABS tablet Take 1 tablet (20 mg) by mouth daily (with dinner) 90 tablet 3     tiZANidine (ZANAFLEX) 4 MG tablet TAKE 1-2 TABLETS (4-8 MG) BY MOUTH NIGHTLY AS NEEDED FOR MUSCLE SPASMS 60 tablet 1       Allergies   Allergen Reactions     Erythromycin Swelling and Other (See Comments)       Family History   Problem Relation Age of Onset     C.A.D. Father      C.A.D. Brother      Hypertension Mother      Cancer No family hx of      Diabetes No family hx of      Cerebrovascular Disease No family hx of      Thyroid Disease No family hx of      Glaucoma No family hx of      Macular Degeneration No family hx of      Social History     Socioeconomic History     Marital status:      Number of children: 3   Occupational History     Employer: RETIRED   Tobacco Use     Smoking status: Never     Passive exposure: Never     Smokeless tobacco: Never   Vaping Use     Vaping status: Never Used     Passive vaping exposure: Yes   Substance and Sexual Activity     Alcohol use: Yes     Comment: rarely     Drug use: No     Sexual activity: Yes     Partners: Male       Additional medical/Social/Surgical histories reviewed in [facility] and updated as appropriate.     REVIEW OF SYSTEMS (4/13/2023)  10 point ROS of systems including Constitutional, Eyes, Respiratory, Cardiovascular, Gastroenterology, Genitourinary, Integumentary, Musculoskeletal, Psychiatric, Allergic/Immunologic were all negative except for pertinent positives noted in my HPI.     PHYSICAL  EXAM  VSS  Vital Signs: There were no vitals taken for this visit. Patient declined being weighed. There is no height or weight on file to calculate BMI.    General  - normal appearance, in no obvious distress  HEENT  - conjunctivae not injected, moist mucous membranes, normocephalic/atraumatic head, ears normal appearance, no lesions, mouth normal appearance, no scars, normal dentition and teeth present  CV  - normal peripheral perfusion  Pulm  - normal respiratory pattern, non-labored  Musculoskeletal - lumbar spine  - stance: normal gait without limp, no obvious leg length discrepancy, normal heel and toe walk  - inspection: normal bone and joint alignment, no obvious scoliosis  - palpation: no paravertebral or bony tenderness  - ROM: flexion exacerbates pain, normal extension, sidebending, rotation  - strength: lower extremities 5/5 in all planes  - special tests:  (+) straight leg raise  (+) slump test  Neuro  - patellar and Achilles DTRs 2+ bilaterally, some lower extremity sensory deficit throughout L5 distribution, grossly normal coordination, normal muscle tone  Skin  - no ecchymosis, erythema, warmth, or induration, no obvious rash  Psych  - interactive, appropriate, normal mood and affect  Has ttp over bilateral SI joints and pain with extension and external rotation of bilateral hips causes SI joint pain  ASSESSMENT & PLAN  81 yo female with lumbar ddd, disc herniations, radicular pain and bilateral SI joint arthritis, worse    I independently reviewed the following imaging studies:  Lumbar MRI: shows ddd, disc herniations  Discussed and ordered therapeutic injections for SI joints  Cont. Medications  RX given for medrol and refilled tizanadine  Cont. HEP  F/u after SI joint injections  Will consider future injections for lumbar spine      Appropriate PPE was utilized for prevention of spread of Covid-19.  Abad Armendariz MD, CAM

## 2023-04-28 ENCOUNTER — THERAPY VISIT (OUTPATIENT)
Dept: PHYSICAL THERAPY | Facility: CLINIC | Age: 83
End: 2023-04-28
Payer: COMMERCIAL

## 2023-04-28 DIAGNOSIS — M25.562 CHRONIC PAIN OF LEFT KNEE: Primary | ICD-10-CM

## 2023-04-28 DIAGNOSIS — G89.29 CHRONIC PAIN OF LEFT KNEE: Primary | ICD-10-CM

## 2023-04-28 PROCEDURE — 97110 THERAPEUTIC EXERCISES: CPT | Mod: GP | Performed by: PHYSICAL THERAPIST

## 2023-04-28 NOTE — PROGRESS NOTES
[facility]    OUTPATIENT Physical Therapy ORTHOPEDIC EVALUATION  PLAN OF TREATMENT FOR OUTPATIENT REHABILITATION  (COMPLETE FOR INITIAL CLAIMS ONLY)  Patient's Last Name, First Name, M.I.  YOB: 1940  SherrillYumiko SUNG    Provider s Name:  [facility]   Medical Record No.  [id number]   Start of Care Date:  03/02/23   Onset Date:   10/04/21   Treatment Diagnosis:  left knee pain Medical Diagnosis:  Chronic pain of left knee       Goals:     04/28/23 0500   Body Part   Goals listed below are for left knee   Goal #1   Goal #1 stairs   Previous Functional Level No restrictions   Current Functional Level Descend stairs;in a normal reciprocal pattern   Performance Level 7 inch step 3/10 PL   STG Target Performance Descend stairs   Performance Level 5 inch step 2/10 PL   Rationale to reach upper level of home safely;to reach lower level of home safely   Due Date 03/30/23   Date Goal Met 03/23/23   LTG Target Performance Descend stairs;in a normal reciprocal pattern   Performance Level 2/10 PL 7 inch step   Rationale to reach upper level of home safely;to reach lower level of home safely   Due Date 05/26/23   If goal not met, Why? Is progressing well but aslower than anticipated goal extended 4 weeks.         Therapy Frequency:  1 x/ week  Predicted Duration of Therapy Intervention:  4 weeks    Homer Suarez, PT                 I CERTIFY THE NEED FOR THESE SERVICES FURNISHED UNDER        THIS PLAN OF TREATMENT AND WHILE UNDER MY CARE     (Physician attestation of this document indicates review and certification of the therapy plan).                     Certification Date From:  04/28/23   Certification Date To:  05/26/23    Referring Provider:  No ref. provider found    Initial Assessment        See Epic Evaluation SOC Date: 03/02/23

## 2023-04-28 NOTE — PROGRESS NOTES
Subjective:  HPI  Physical Exam                    Objective:  System    Physical Exam    General     ROS    Assessment/Plan:    PROGRESS  REPORT    Progress reporting period is from 3/2/23 to 4/28/23.       SUBJECTIVE  Patient reports she feels like she is better. still having pain but the movement is better.    Current Pain level: 2/10.     Initial Pain level: 5/10.   Changes in function:  Yes (See Goal flowsheet attached for changes in current functional level)  Adverse reaction to treatment or activity: None    OBJECTIVE  Changes noted in objective findings:  Yes,  Ascneding 7 inch step  with joint line pain 2/10 PL, knee extension 0 deg, 116 deg knee flexion. flexinator start 19.75 inch gladys, at end 21.25 inch gladys 121 deg flexion, knee extension 4/5, knee flexion 4/5 with pain, hip abd 5/5, hip adduction 5/5.     ASSESSMENT/PLAN  Updated problem list and treatment plan: Diagnosis 1:  Chronic left knee pain s/p total knee replacement  Pain -  hot/cold therapy, manual therapy, self management, education and home program  Decreased ROM/flexibility - manual therapy, therapeutic exercise, therapeutic activity and home program  Decreased joint mobility - manual therapy, therapeutic exercise, therapeutic activity and home program  Decreased strength - therapeutic exercise, therapeutic activities and home program  Decreased function - therapeutic activities and home program  STG/LTGs have been met or progress has been made towards goals:  Yes (See Goal flow sheet completed today.)  Assessment of Progress: The patient's condition is improving.  Self Management Plans:  Patient is independent in a home treatment program.  I have re-evaluated this patient and find that the nature, scope, duration and intensity of the therapy is appropriate for the medical condition of the patient.  Yumiko continues to require the following intervention to meet STG and LTG's:  PT    Recommendations:  This patient would benefit from  continued therapy.     Frequency:  1 X week, once daily  Duration:  for 4 weeks        Please refer to the daily flowsheet for treatment today, total treatment time and time spent performing 1:1 timed codes.

## 2023-05-01 ENCOUNTER — ANCILLARY PROCEDURE (OUTPATIENT)
Dept: GENERAL RADIOLOGY | Facility: CLINIC | Age: 83
End: 2023-05-01
Attending: NURSE PRACTITIONER
Payer: COMMERCIAL

## 2023-05-01 ENCOUNTER — OFFICE VISIT (OUTPATIENT)
Dept: FAMILY MEDICINE | Facility: CLINIC | Age: 83
End: 2023-05-01
Payer: COMMERCIAL

## 2023-05-01 VITALS
SYSTOLIC BLOOD PRESSURE: 128 MMHG | RESPIRATION RATE: 14 BRPM | HEIGHT: 65 IN | HEART RATE: 78 BPM | BODY MASS INDEX: 34.55 KG/M2 | OXYGEN SATURATION: 100 % | WEIGHT: 207.4 LBS | TEMPERATURE: 97.8 F | DIASTOLIC BLOOD PRESSURE: 69 MMHG

## 2023-05-01 DIAGNOSIS — M47.817 DJD (DEGENERATIVE JOINT DISEASE), LUMBOSACRAL: ICD-10-CM

## 2023-05-01 DIAGNOSIS — E66.01 MORBID OBESITY (H): ICD-10-CM

## 2023-05-01 DIAGNOSIS — R07.81 RIB PAIN ON LEFT SIDE: ICD-10-CM

## 2023-05-01 DIAGNOSIS — J06.9 VIRAL URI WITH COUGH: ICD-10-CM

## 2023-05-01 DIAGNOSIS — R07.81 RIB PAIN ON LEFT SIDE: Primary | ICD-10-CM

## 2023-05-01 PROCEDURE — 99214 OFFICE O/P EST MOD 30 MIN: CPT | Performed by: NURSE PRACTITIONER

## 2023-05-01 PROCEDURE — 71101 X-RAY EXAM UNILAT RIBS/CHEST: CPT | Mod: TC | Performed by: RADIOLOGY

## 2023-05-01 ASSESSMENT — ENCOUNTER SYMPTOMS
COUGH: 1
SHORTNESS OF BREATH: 1

## 2023-05-01 NOTE — PROGRESS NOTES
Assessment & Plan     Rib pain on left side  No fracture, OK to continue with tylenol for pain,Zanaflex for muscle spasms,  reviewed splinting with coughing/movement, indications for return to clinic/ UC reviewed   - XR Ribs & Chest Left G/E 3 Views    Viral URI with cough  No consolidation or effusion, supportive care reviewed in detail alsong with indications for return to clinic for new/worsening symptoms.   - XR Ribs & Chest Left G/E 3 Views    Morbid obesity (H)  Benefits of weight loss reviewed in detail, encouraged her to cut back on the carbohydrates in the diet, consume more fruits and vegetables, drink plenty of water, avoid fruit juices, sodas, get 150 min moderate exercise/week.  Recheck weight in 6 months.      DJD (degenerative joint disease), lumbosacral    - [id number] - Urine Drug Confirmation Panel (Comprehensive)      Ordering of each unique test  Prescription drug management  21 minutes spent by me on the date of the encounter doing chart review, history and exam, documentation and further activities per the note       Work on weight loss  Regular exercise  See Patient Instructions    KALIN Dotson [facility]    Felicia Krishna is a 82 year old, presenting for the following health issues:  Flank Pain, Shortness of Breath, and Cough        5/1/2023     1:41 PM   Additional Questions   Roomed by irma   Accompanied by self         5/1/2023     1:41 PM   Patient Reported Additional Medications   Patient reports taking the following new medications none     Shortness of Breath  Associated symptoms include coughing.   Cough  Associated symptoms include shortness of breath.   History of Present Illness       Reason for visit:  ANABEL fell and have pain on my side and developed a cough and short of bteath  Symptom onset:  3-7 days ago    She eats 2-3 servings of fruits and vegetables daily.She consumes 0 sweetened beverage(s) daily.She exercises with enough effort to  "increase her heart rate 9 or less minutes per day.  She exercises with enough effort to increase her heart rate 5 days per week. She is missing 1 dose(s) of medications per week.       Acute Illness  Acute illness concerns: Shortness of breath and cough  Onset/Duration: 3 days  Symptoms:  Fever: No  Chills/Sweats: YES, feeling cold   Headache (location?): YES, Mild   Sinus Pressure: No  Conjunctivitis:  No  Ear Pain: no  Rhinorrhea: No  Congestion: YES  Sore Throat: YES, mild   Cough: YES-non-productive, Shortness of breath   Wheeze: No  Decreased Appetite: YES  Nausea: No  Vomiting: No  Diarrhea: No  Dysuria/Freq.: No  Dysuria or Hematuria: No  Fatigue/Achiness: YES  Sick/Strep Exposure: No  Therapies tried and outcome: None      Pain History:  When did you first notice your pain?  5 days  Have you seen anyone else for your pain? No  How has your pain affected your ability to work? Not applicable  Where in your body do you have pain? left flank pain due to a fall. Unable to touch. no warmth to the touch. no bruise. 10/10 when moving.         Review of Systems   Respiratory: Positive for cough and shortness of breath.       Constitutional, HEENT, cardiovascular, pulmonary, gi and gu systems are negative, except as otherwise noted.      Objective    /69 (BP Location: Left arm, Patient Position: Sitting, Cuff Size: Adult Regular)   Pulse 78   Temp 97.8  F (36.6  C) (Oral)   Resp 14   Ht 1.638 m (5' 4.5\")   Wt 94.1 kg (207 lb 6.4 oz)   SpO2 100%   BMI 35.05 kg/m    Body mass index is 35.05 kg/m .  Physical Exam   GENERAL: healthy, alert and no distress  EYES: Eyes grossly normal to inspection, PERRL and conjunctivae and sclerae normal  HENT: ear canals and TM's normal, nose and mouth without ulcers or lesions  NECK: no adenopathy, no asymmetry, masses, or scars and thyroid normal to palpation  RESP: lungs clear to auscultation - no rales, rhonchi or wheezes  CV: regular rate and rhythm, normal S1 S2, no S3 " or S4, no murmur, click or rub, no peripheral edema and peripheral pulses strong  ABDOMEN: soft, nontender, no hepatosplenomegaly, no masses and bowel sounds normal  MS: TTP along left costal margin MCL to axilla, does not radiate to back, no crepitus/step off, no bruising, no gross musculoskeletal defects noted, no edema  SKIN: no suspicious lesions or rashes  NEURO: Normal strength and tone, mentation intact and speech normal  BACK: no CVA tenderness, no paralumbar tenderness  PSYCH: mentation appears normal, affect normal/bright  LYMPH: normal ant/post cervical, supraclavicular nodes    Results for orders placed or performed in visit on 05/01/23   XR Ribs & Chest Left G/E 3 Views     Status: None    Narrative    XR LEFT RIBS AND CHEST THREE VIEWS   5/1/2023 2:57 PM     HISTORY: Fell on stairs  4/26/23, cough, shortness of breath, evaluate  for fracture, pneumonia, on Xarelto; Rib pain on left side; Viral URI  with cough    Comparison: Radiographs from 9/3/2019.      Impression    IMPRESSION: The cardiomediastinal silhouette and pulmonary vasculature  appear normal. No infiltrates or effusions. There is no evidence of  fracture or pneumothorax.    YIMI VARGAS MD         SYSTEM ID:  [id number]

## 2023-05-02 ENCOUNTER — MYC MEDICAL ADVICE (OUTPATIENT)
Dept: FAMILY MEDICINE | Facility: CLINIC | Age: 83
End: 2023-05-02
Payer: COMMERCIAL

## 2023-05-03 NOTE — TELEPHONE ENCOUNTER
She called about continued rib pain following a fall. Pain continues, is somewhat improved but she is frustrated that she is nto better, asking for steroid which I declined and explained why.  She does take steroids for back pain flares but we do not want to use steroids too often due to multiple drug side effects. She is OK with this.    Darlene GAGNON, CNP

## 2023-05-08 ENCOUNTER — MYC MEDICAL ADVICE (OUTPATIENT)
Dept: FAMILY MEDICINE | Facility: CLINIC | Age: 83
End: 2023-05-08
Payer: COMMERCIAL

## 2023-05-08 DIAGNOSIS — J45.41 MODERATE PERSISTENT ASTHMA WITH ACUTE EXACERBATION: ICD-10-CM

## 2023-05-08 RX ORDER — ALBUTEROL SULFATE 0.83 MG/ML
2.5 SOLUTION RESPIRATORY (INHALATION) EVERY 6 HOURS PRN
Qty: 90 ML | Refills: 0 | Status: SHIPPED | OUTPATIENT
Start: 2023-05-08

## 2023-05-10 ENCOUNTER — TRANSFERRED RECORDS (OUTPATIENT)
Dept: MULTI SPECIALTY CLINIC | Facility: CLINIC | Age: 83
End: 2023-05-10

## 2023-05-10 LAB — RETINOPATHY: NORMAL

## 2023-05-25 ENCOUNTER — THERAPY VISIT (OUTPATIENT)
Dept: PHYSICAL THERAPY | Facility: CLINIC | Age: 83
End: 2023-05-25
Payer: COMMERCIAL

## 2023-05-25 DIAGNOSIS — M25.562 CHRONIC PAIN OF LEFT KNEE: Primary | ICD-10-CM

## 2023-05-25 DIAGNOSIS — G89.29 CHRONIC PAIN OF LEFT KNEE: Primary | ICD-10-CM

## 2023-05-25 PROCEDURE — 97110 THERAPEUTIC EXERCISES: CPT | Mod: GP | Performed by: PHYSICAL THERAPIST

## 2023-05-25 NOTE — PROGRESS NOTES
05/25/23 0500   Appointment Info   Signing clinician's name / credentials oHmer Suarez DPT   Total/Authorized Visits 11 E&T   Visits Used 7   Medical Diagnosis Arthrofibrosis of knee joint, right   PT Tx Diagnosis left knee pain   Quick Adds Certification   Progress Note/Certification   Start of Care Date 03/02/23   Onset of illness/injury or Date of Surgery 10/04/21   Therapy Frequency 1 x/ week   Predicted Duration 4 weeks   Certification date from 05/27/23   Certification date to 06/22/23   Progress Note Due Date 05/25/23   Progress Note Completed Date 04/28/23       Present No   GOALS   PT Goals 2   PT Goal 1   Goal Identifier stair   Goal Description descending stairs in a normal reciprocal pattern / 5inch step 2/10 PL or less   Rationale to maximize safety and independence with performance of ADLs and functional tasks   Goal Progress desnding 5 inch step in a normal fashion   Target Date 03/23/23   Date Met 03/30/23   PT Goal 2   Goal Identifier stair long   Goal Description Descend 7 inch stairs in a normal reciprocal pattern 2/10 PL or less   Rationale to maximize safety and independence within the community;to maximize safety and independence with performance of ADLs and functional tasks   Goal Progress pt had set back after return to strengthening. ROM decreased to 104 knee flexion descneding a 5 inch step only.   Target Date 06/22/23   Subjective Report   Subjective Report started going to the health club and doing the exercises. Was sick for 3 weeks  and fell and hit her ribs. Knee pain did not start until she started working out.   Objective Measures   Objective Measures Objective Measure 1;Objective Measure 3;Objective Measure 2   Objective Measure 1   Objective Measure Knee ROM,   Details flex 104, knee extension 0 deg.   Objective Measure 2   Objective Measure knee strength   Details knee fleixion 4/5 with postieror thigh pain, hip flexion 5/5, hip abd 5/5, hip ext 4/5,  adduction 5/5 .   Objective Measure 3   Objective Measure stairs   Details ascending 7 inch step normal reciprocal pattnern, descneding 5 inch step noraml reciprocal pattern.   Treatment Interventions (PT)   Interventions Therapeutic Procedure/Exercise   Therapeutic Procedure/Exercise   PTRx Ther Proc 1 Heel Slides   PTRx Ther Proc 1 - Details 2 x 10 ROM improved from 104 to 108   PTRx Ther Proc 2 Toe Raises   PTRx Ther Proc 2 - Details 3 x 15    PTRx Ther Proc 3 Hip Flexion Straight Leg Raise   PTRx Ther Proc 3 - Details 3 x 20    PTRx Ther Proc 4 Hip Abduction Straight Leg Raise   PTRx Ther Proc 4 - Details 3 x 20    PTRx Ther Proc 5 Seated Hamstring Curl with Tubing   PTRx Ther Proc 5 - Details YTB x 20 GTB was too painful.    Skilled Intervention guidance with HEP. not strengthening at gym for 1 weeks . no longer doing knee extension exercise.   Patient Response/Progress pt understood goal of taking away irritation and trying to regain motion back.   Education   Learner/Method Patient;Demonstration;Pictures/Video;No Barriers to Learning   Plan   Home program adjusted to open chain exercises, progress back to heelslides.   Updates to plan of care 1 x/ week for 4 weeks as pt return due to exacerbation   Plan for next session re-assess ROM to see if pt is beginning to improve again.         [facility]                                                                                   OUTPATIENT PHYSICAL THERAPY    PLAN OF TREATMENT FOR OUTPATIENT REHABILITATION   Patient's Last Name, First Name, Yumiko Deng YOB: 1940   Provider's Name   [facility]   Medical Record No.  [id number]     Onset Date: 10/04/21  Start of Care Date: 03/02/23     Medical Diagnosis:  Arthrofibrosis of knee joint, right      PT Treatment Diagnosis:  left knee pain Plan of Treatment  Frequency/Duration: 1 x/ week/ 4 weeks    Certification date from 05/27/23 to  06/22/23         See note for plan of treatment details and functional goals     Homer Suarez, PT                         I CERTIFY THE NEED FOR THESE SERVICES FURNISHED UNDER        THIS PLAN OF TREATMENT AND WHILE UNDER MY CARE     (Physician attestation of this document indicates review and certification of the therapy plan).                Referring Provider:  No ref. provider found      Initial Assessment  See Epic Evaluation- Start of Care Date: 03/02/23            PLAN  Extended therapy 1 x/ week for 4 weeks due to exacerbation of symptoms / ROM loss from strengthening and daily activities.     Beginning/End Dates of Progress Note Reporting Period:  04/28/23 to 05/25/2023    Referring Provider:  No ref. provider found

## 2023-05-31 ENCOUNTER — THERAPY VISIT (OUTPATIENT)
Dept: PHYSICAL THERAPY | Facility: CLINIC | Age: 83
End: 2023-05-31
Payer: COMMERCIAL

## 2023-05-31 DIAGNOSIS — G89.29 CHRONIC PAIN OF LEFT KNEE: Primary | ICD-10-CM

## 2023-05-31 DIAGNOSIS — M25.562 CHRONIC PAIN OF LEFT KNEE: Primary | ICD-10-CM

## 2023-05-31 PROCEDURE — 97110 THERAPEUTIC EXERCISES: CPT | Mod: GP | Performed by: PHYSICAL THERAPY ASSISTANT

## 2023-05-31 PROCEDURE — 97140 MANUAL THERAPY 1/> REGIONS: CPT | Mod: GP | Performed by: PHYSICAL THERAPY ASSISTANT

## 2023-06-06 ENCOUNTER — TRANSFERRED RECORDS (OUTPATIENT)
Dept: HEALTH INFORMATION MANAGEMENT | Facility: CLINIC | Age: 83
End: 2023-06-06

## 2023-06-07 ENCOUNTER — THERAPY VISIT (OUTPATIENT)
Dept: PHYSICAL THERAPY | Facility: CLINIC | Age: 83
End: 2023-06-07
Payer: COMMERCIAL

## 2023-06-07 DIAGNOSIS — M25.562 CHRONIC PAIN OF LEFT KNEE: Primary | ICD-10-CM

## 2023-06-07 DIAGNOSIS — G89.29 CHRONIC PAIN OF LEFT KNEE: Primary | ICD-10-CM

## 2023-06-07 PROCEDURE — 97110 THERAPEUTIC EXERCISES: CPT | Mod: GP | Performed by: PHYSICAL THERAPY ASSISTANT

## 2023-06-07 PROCEDURE — 97140 MANUAL THERAPY 1/> REGIONS: CPT | Mod: GP | Performed by: PHYSICAL THERAPY ASSISTANT

## 2023-06-13 NOTE — PROGRESS NOTES
[facility] Dermatology Note  Encounter Date: Jun 14, 2023  Office Visit     Dermatology Problem List:  Last skin check: 6/14/23  1. NMSC  -BCC, left upper back, s/p WLE 1/4/2018   -BCC, left mid back, s/p ED&C 10/25/2010  -BCC, mid back, s/p ED&C 10/25/2010  2. AKs cryotherapy recommended, but deferred forehead and nasal tip due to upcoming trip.    ____________________________________________    Assessment & Plan:    # History of nonmelanoma skin cancer, no clinical evidence of recurrence.  - ABCDEs: Counseled ABCDEs of melanoma: Asymmetry, Border (irregularity), Color (not uniform, changes in color), Diameter (greater than 6 mm which is about the size of a pencil eraser), and Evolving (any changes in preexisting moles).  - Sun protection: Counseled SPF30+ sunscreen, UPF clothing, sun avoidance, tanning bed avoidance.   - Recommended regular skin checks.    # Multiple clinically benign nevi on the trunk and extremities. No treatment is necessary at this time unless the lesion changes or becomes symptomatic.    - ABCDEs of melanoma were discussed and self skin checks were advised.    # Seborrheic keratosis, non irritated on the trunk and extremities. Explained to patient benign nature of lesion. No treatment is necessary at this time unless the lesion changes or becomes symptomatic.     - Monitor for changes.    # Cherry Angiomas - trunk and extremities. Explained to patient benign nature of lesion. No treatment is necessary at this time unless the lesion changes or becomes symptomatic.      # Solar lentigines on the sun exposed skin areas. Benign nature was discussed. No further intervention required at this time.    - Sun precaution was advised including the use of sun screens of SPF 30 or higher, sun protective clothing, and avoidance of tanning beds.     # Actinic keratosis - left forehead, left eyebrow, nasal tip x3 total.  -  Pt defers treatment at this time.    - Plan to treat with cryo on  follow up.       Procedures Performed:   None.     Follow-up: 1 year(s) in-person, or earlier for new or changing lesions    Staff and Scribe:     Scribe Disclosure:   I, Isabel Jesus, am serving as a scribe to document services personally performed by this physician, Sayra No PA-C, based on data collection and the provider's statements to me.   Provider Disclosure:   The documentation recorded by the scribe accurately reflects the services I personally performed and the decisions made by me.    All risks, benefits and alternatives were discussed with patient.  Patient is in agreement and understands the assessment and plan.  All questions were answered.    Sayra No PA-C, [facility]  [facility] [place]: Phone: [phone], Fax: [phone]  [facility]: Phone: [phone],  Fax: [phone]  [facility]: Phone: [phone], Fax: [phone]  __________________________________    CC: Skin Check (Full body skin check. Last skin check in 2018. Spot on central chest and under left breast. Personal history of NMSC.)    HPI:  Ms. Yumiko Mccartney is a(n) 82 year old female who presents today as a new patient for a skin check. Pt reports a     Last seen Dr. Wagner in 2019. Was supposed to hae cryo to nose but never did due to the pandemic. Notes spot on nose is still present.  Patient is otherwise feeling well, without additional skin concerns.    Labs Reviewed:  N/A    Physical Exam:  Vitals: There were no vitals taken for this visit.  SKIN: Total skin excluding the undergarment areas was performed. The exam included the head/face, neck, both arms, chest, back, abdomen, both legs, digits and/or nails.   - Ellis type I.  - There are erythematous macules with overyling adherent scale on the left forehead, left eyebrow, nasal tip x3.    - There are dome shaped bright red papules on the trunk and  extremities.   - Multiple regular brown pigmented macules and papules are identified on the trunk and extremities, >100.  - Scattered brown macules on sun exposed areas.  - There are waxy stuck on tan to brown papules on the trunk and extremities.    -Well healed scars at sites of previous NMSC, no repigmentation or nodularity noted.  - No other lesions of concern on areas examined.     Medications:  Current Outpatient Medications   Medication     acetaminophen (TYLENOL) 325 MG tablet     albuterol (PROVENTIL) (2.5 MG/3ML) 0.083% neb solution     calcium citrate (CITRACAL) 950 (200 Ca) MG tablet     Cholecalciferol (VITAMIN D) 2000 UNIT CAPS     gabapentin (NEURONTIN) 600 MG tablet     lisinopril-hydrochlorothiazide (ZESTORETIC) 20-12.5 MG tablet     Multiple Vitamin (MULTIVITAMIN ADULT PO)     rivaroxaban ANTICOAGULANT (XARELTO) 20 MG TABS tablet     tiZANidine (ZANAFLEX) 4 MG tablet     methylPREDNISolone (MEDROL DOSEPAK) 4 MG tablet therapy pack     methylPREDNISolone (MEDROL) 4 MG tablet therapy pack     tiZANidine (ZANAFLEX) 4 MG tablet     No current facility-administered medications for this visit.      Past Medical History:   Patient Active Problem List   Diagnosis     Asthma, moderate persistent     Acne rosacea     CARDIOVASCULAR SCREENING; LDL GOAL LESS THAN 130     Osteopenia     Vitamin D deficiency     Advanced directives, counseling/discussion     Hx of pseudoexfoliation, os     Pseudophakia, ou; Yag Caps, os     HTN, goal below 140/90     Peptic ulcer     Health Care Home     History of blepharoplasty, upper lid, ou (elsewhere); revision (EN)     Obesity, Class II, BMI 35-39.9     Restless legs syndrome     DJD (degenerative joint disease), lumbosacral     Rosacea     Essential hypertension with goal blood pressure less than 140/90     Obesity, Class I, BMI 30-34.9     Posterior vitreous detachment, bilateral     Vertigo     Amblyopia, mild, of left eye     Chronic pain of left knee     Morbid  "obesity (H)     Past Medical History:   Diagnosis Date     Acne rosacea      Actinic keratosis      Amblyopia      Asthma, moderate persistent      Basal cell carcinoma 10/2010    back X2     Cataract, mod, od; mild-mod, os 1/12/2012     DJD (degenerative joint disease), lumbosacral 8/6/2014     Elevated cholesterol      Endometriosis      HTN (hypertension)      Moderate major depression (H)     \"Circumstances\"     Osteopenia      "

## 2023-06-14 ENCOUNTER — OFFICE VISIT (OUTPATIENT)
Dept: DERMATOLOGY | Facility: CLINIC | Age: 83
End: 2023-06-14
Payer: COMMERCIAL

## 2023-06-14 DIAGNOSIS — D22.9 MULTIPLE BENIGN NEVI: ICD-10-CM

## 2023-06-14 DIAGNOSIS — L57.0 ACTINIC KERATOSIS: ICD-10-CM

## 2023-06-14 DIAGNOSIS — D18.01 CHERRY ANGIOMA: ICD-10-CM

## 2023-06-14 DIAGNOSIS — L81.4 SOLAR LENTIGO: ICD-10-CM

## 2023-06-14 DIAGNOSIS — Z85.828 HISTORY OF BASAL CELL CARCINOMA: Primary | ICD-10-CM

## 2023-06-14 DIAGNOSIS — Z85.828 HISTORY OF NONMELANOMA SKIN CANCER: ICD-10-CM

## 2023-06-14 DIAGNOSIS — L82.1 SEBORRHEIC KERATOSIS: ICD-10-CM

## 2023-06-14 PROCEDURE — 99204 OFFICE O/P NEW MOD 45 MIN: CPT | Performed by: PHYSICIAN ASSISTANT

## 2023-06-14 NOTE — LETTER
6/14/2023         RE: Yumiko Mccartney  [address]  [place] [address]        Dear Colleague,    Thank you for referring your patient, Yumiko Mccartney, to the [facility]. Please see a copy of my visit note below.    [facility] Dermatology Note  Encounter Date: Jun 14, 2023  Office Visit     Dermatology Problem List:  Last skin check: 6/14/23  1. NMSC  -BCC, left upper back, s/p WLE 1/4/2018   -BCC, left mid back, s/p ED&C 10/25/2010  -BCC, mid back, s/p ED&C 10/25/2010  2. AKs cryotherapy recommended, but deferred forehead and nasal tip due to upcoming trip.    ____________________________________________    Assessment & Plan:    # History of nonmelanoma skin cancer, no clinical evidence of recurrence.  - ABCDEs: Counseled ABCDEs of melanoma: Asymmetry, Border (irregularity), Color (not uniform, changes in color), Diameter (greater than 6 mm which is about the size of a pencil eraser), and Evolving (any changes in preexisting moles).  - Sun protection: Counseled SPF30+ sunscreen, UPF clothing, sun avoidance, tanning bed avoidance.   - Recommended regular skin checks.    # Multiple clinically benign nevi on the trunk and extremities. No treatment is necessary at this time unless the lesion changes or becomes symptomatic.    - ABCDEs of melanoma were discussed and self skin checks were advised.    # Seborrheic keratosis, non irritated on the trunk and extremities. Explained to patient benign nature of lesion. No treatment is necessary at this time unless the lesion changes or becomes symptomatic.     - Monitor for changes.    # Cherry Angiomas - trunk and extremities. Explained to patient benign nature of lesion. No treatment is necessary at this time unless the lesion changes or becomes symptomatic.      # Solar lentigines on the sun exposed skin areas. Benign nature was discussed. No further intervention required at this time.    - Sun precaution was  advised including the use of sun screens of SPF 30 or higher, sun protective clothing, and avoidance of tanning beds.     # Actinic keratosis - left forehead, left eyebrow, nasal tip x3 total.  -  Pt defers treatment at this time.    - Plan to treat with cryo on follow up.       Procedures Performed:   None.     Follow-up: 1 year(s) in-person, or earlier for new or changing lesions    Staff and Scribe:     Scribe Disclosure:   I, Isabel Lee, am serving as a scribe to document services personally performed by this physician, Sayra No PA-C, based on data collection and the provider's statements to me.   Provider Disclosure:   The documentation recorded by the scribe accurately reflects the services I personally performed and the decisions made by me.    All risks, benefits and alternatives were discussed with patient.  Patient is in agreement and understands the assessment and plan.  All questions were answered.    Sayra No PA-C, [facility]  [facility] [place]: Phone: [phone], Fax: [phone]  [facility]: Phone: [phone],  Fax: [phone]  [facility]: Phone: [phone], Fax: [phone]  __________________________________    CC: Skin Check (Full body skin check. Last skin check in 2018. Spot on central chest and under left breast. Personal history of NMSC.)    HPI:  Ms. Yumiko Mccartney is a(n) 82 year old female who presents today as a new patient for a skin check. Pt reports a     Last seen Dr. Wagner in 2019. Was supposed to hae cryo to nose but never did due to the pandemic. Notes spot on nose is still present.  Patient is otherwise feeling well, without additional skin concerns.    Labs Reviewed:  N/A    Physical Exam:  Vitals: There were no vitals taken for this visit.  SKIN: Total skin excluding the undergarment areas was performed. The exam included the head/face, neck, both  arms, chest, back, abdomen, both legs, digits and/or nails.   - Ellis type I.  - There are erythematous macules with overyling adherent scale on the left forehead, left eyebrow, nasal tip x3.    - There are dome shaped bright red papules on the trunk and extremities.   - Multiple regular brown pigmented macules and papules are identified on the trunk and extremities, >100.  - Scattered brown macules on sun exposed areas.  - There are waxy stuck on tan to brown papules on the trunk and extremities.    -Well healed scars at sites of previous NMSC, no repigmentation or nodularity noted.  - No other lesions of concern on areas examined.     Medications:  Current Outpatient Medications   Medication     acetaminophen (TYLENOL) 325 MG tablet     albuterol (PROVENTIL) (2.5 MG/3ML) 0.083% neb solution     calcium citrate (CITRACAL) 950 (200 Ca) MG tablet     Cholecalciferol (VITAMIN D) 2000 UNIT CAPS     gabapentin (NEURONTIN) 600 MG tablet     lisinopril-hydrochlorothiazide (ZESTORETIC) 20-12.5 MG tablet     Multiple Vitamin (MULTIVITAMIN ADULT PO)     rivaroxaban ANTICOAGULANT (XARELTO) 20 MG TABS tablet     tiZANidine (ZANAFLEX) 4 MG tablet     methylPREDNISolone (MEDROL DOSEPAK) 4 MG tablet therapy pack     methylPREDNISolone (MEDROL) 4 MG tablet therapy pack     tiZANidine (ZANAFLEX) 4 MG tablet     No current facility-administered medications for this visit.      Past Medical History:   Patient Active Problem List   Diagnosis     Asthma, moderate persistent     Acne rosacea     CARDIOVASCULAR SCREENING; LDL GOAL LESS THAN 130     Osteopenia     Vitamin D deficiency     Advanced directives, counseling/discussion     Hx of pseudoexfoliation, os     Pseudophakia, ou; Yag Caps, os     HTN, goal below 140/90     Peptic ulcer     Health Care Home     History of blepharoplasty, upper lid, ou (elsewhere); revision (EN)     Obesity, Class II, BMI 35-39.9     Restless legs syndrome     DJD (degenerative joint disease),  "lumbosacral     Rosacea     Essential hypertension with goal blood pressure less than 140/90     Obesity, Class I, BMI 30-34.9     Posterior vitreous detachment, bilateral     Vertigo     Amblyopia, mild, of left eye     Chronic pain of left knee     Morbid obesity (H)     Past Medical History:   Diagnosis Date     Acne rosacea      Actinic keratosis      Amblyopia      Asthma, moderate persistent      Basal cell carcinoma 10/2010    back X2     Cataract, mod, od; mild-mod, os 1/12/2012     DJD (degenerative joint disease), lumbosacral 8/6/2014     Elevated cholesterol      Endometriosis      HTN (hypertension)      Moderate major depression (H)     \"Circumstances\"     Osteopenia        Again, thank you for allowing me to participate in the care of your patient.        Sincerely,        Sayra No PA-C    "

## 2023-06-14 NOTE — PATIENT INSTRUCTIONS
Patient Education     Checking for Skin Cancer  You can find cancer early by checking your skin each month. There are 3 kinds of skin cancer. They are melanoma, basal cell carcinoma, and squamous cell carcinoma. Doing monthly skin checks is the best way to find new marks or skin changes. Follow the instructions below for checking your skin.   The ABCDEs of checking moles for melanoma   Check your moles or growths for signs of melanoma using ABCDE:   Asymmetry: the sides of the mole or growth don t match  Border: the edges are ragged, notched, or blurred  Color: the color within the mole or growth varies  Diameter: the mole or growth is larger than 6 mm (size of a pencil eraser)  Evolving: the size, shape, or color of the mole or growth is changing (evolving is not shown in the images below)    Checking for other types of skin cancer  Basal cell carcinoma or squamous cell carcinoma have symptoms such as:     A spot or mole that looks different from all other marks on your skin  Changes in how an area feels, such as itching, tenderness, or pain  Changes in the skin's surface, such as oozing, bleeding, or scaliness  A sore that does not heal  New swelling or redness beyond the border of a mole    Who s at risk?  Anyone can get skin cancer. But you are at greater risk if you have:   Fair skin, light-colored hair, or light-colored eyes  Many moles or abnormal moles on your skin  A history of sunburns from sunlight or tanning beds  A family history of skin cancer  A history of exposure to radiation or chemicals  A weakened immune system  If you have had skin cancer in the past, you are at risk for recurring skin cancer.   How to check your skin  Do your monthly skin checkups in front of a full-length mirror. Check all parts of your body, including your:   Head (ears, face, neck, and scalp)  Torso (front, back, and sides)  Arms (tops, undersides, upper, and lower armpits)  Hands (palms, backs, and fingers, including  under the nails)  Buttocks and genitals  Legs (front, back, and sides)  Feet (tops, soles, toes, including under the nails, and between toes)  If you have a lot of moles, take digital photos of them each month. Make sure to take photos both up close and from a distance. These can help you see if any moles change over time.   Most skin changes are not cancer. But if you see any changes in your skin, call your doctor right away. Only he or she can diagnose a problem. If you have skin cancer, seeing your doctor can be the first step toward getting the treatment that could save your life.   Curbside last reviewed this educational content on 4/1/2019 2000-2020 [employer]. [address] [place] [address], [place] [address]. All rights reserved. This information is not intended as a substitute for professional medical care. Always follow your healthcare professional's instructions.       When should I call my doctor?  If you are worsening or not improving, please, contact us or seek urgent care as noted below.     Who should I call with questions (adults)?  [facility] (adult and pediatric): [phone]  [facility] (adult): [phone]  For urgent needs outside of business hours call the [facility] at [phone] and ask for the dermatology resident on call to be paged  If this is a medical emergency and you are unable to reach an ER, Call [phone]    Who should I call with questions (pediatric)?  [facility]- Pediatric Dermatology  Dr. Radha Baumann, Dr. Heraclio Gonsales, Dr. Wanda Manrique, MALCOLM Uriostegui, Dr. Shabnam Clark, Dr. Kimberly Thrasher & Dr. Abad Gee  Non-urgent nurse triage line; [phone]- Hermila and Alejandra POWELL Care Coordinatorelvis [place] (/Complex ) [phone]    If you need a prescription refill, please contact your pharmacy. Refills are approved or denied by our  Physicians during normal business hours, Monday through Fridays  Per office policy, refills will not be granted if you have not been seen within the past year (or sooner depending on your child's condition)    Scheduling Information:  Pediatric Appointment Scheduling and Call Center [phone]  Radiology Scheduling- [phone]  Sedation Unit Scheduling- [phone]  [place] Scheduling- General [phone]; Pediatric Dermatology [phone]  Main  Services: [phone]  [place]: [phone]  [place]er: [phone]  Hmong/Croatian/Arabic: [phone]  Preadmission Nursing Department Fax Number: [phone] (Fax all pre-operative paperwork to this number)    For urgent matters arising during evenings, weekends, or holidays that cannot wait for normal business hours please call [phone] and ask for the dermatology resident on call to be paged.

## 2023-06-14 NOTE — NURSING NOTE
Yumiko Mccartney's goals for this visit include:   Chief Complaint   Patient presents with     Skin Check     Full body skin check. Last skin check in 2018. Spot on central chest and under left breast. Personal history of NMSC.       She requests these members of her care team be copied on today's visit information:     PCP: Gavi Bear    Referring Provider:  Referred Self, MD  No address on file    There were no vitals taken for this visit.    Do you need any medication refills at today's visit? Princess Holliday CMA

## 2023-06-15 ENCOUNTER — TELEPHONE (OUTPATIENT)
Dept: CARDIOLOGY | Facility: CLINIC | Age: 83
End: 2023-06-15

## 2023-06-20 ENCOUNTER — ANCILLARY PROCEDURE (OUTPATIENT)
Dept: GENERAL RADIOLOGY | Facility: CLINIC | Age: 83
End: 2023-06-20
Attending: PREVENTIVE MEDICINE
Payer: COMMERCIAL

## 2023-06-20 DIAGNOSIS — M46.1 ARTHRITIS OF SACROILIAC JOINT OF BOTH SIDES (H): ICD-10-CM

## 2023-06-20 PROCEDURE — 27096 INJECT SACROILIAC JOINT: CPT | Mod: 50 | Performed by: RADIOLOGY

## 2023-06-20 RX ORDER — LIDOCAINE HYDROCHLORIDE 10 MG/ML
5 INJECTION, SOLUTION EPIDURAL; INFILTRATION; INTRACAUDAL; PERINEURAL ONCE
Status: COMPLETED | OUTPATIENT
Start: 2023-06-20 | End: 2023-06-20

## 2023-06-20 RX ORDER — IOPAMIDOL 408 MG/ML
2 INJECTION, SOLUTION INTRATHECAL ONCE
Status: COMPLETED | OUTPATIENT
Start: 2023-06-20 | End: 2023-06-20

## 2023-06-20 RX ORDER — METHYLPREDNISOLONE ACETATE 40 MG/ML
40 INJECTION, SUSPENSION INTRA-ARTICULAR; INTRALESIONAL; INTRAMUSCULAR; SOFT TISSUE ONCE
Status: COMPLETED | OUTPATIENT
Start: 2023-06-20 | End: 2023-06-20

## 2023-06-20 RX ORDER — BUPIVACAINE HYDROCHLORIDE 2.5 MG/ML
4 INJECTION, SOLUTION INFILTRATION; PERINEURAL ONCE
Status: COMPLETED | OUTPATIENT
Start: 2023-06-20 | End: 2023-06-20

## 2023-06-20 RX ADMIN — METHYLPREDNISOLONE ACETATE 80 MG: 40 INJECTION, SUSPENSION INTRA-ARTICULAR; INTRALESIONAL; INTRAMUSCULAR; SOFT TISSUE at 13:44

## 2023-06-20 RX ADMIN — LIDOCAINE HYDROCHLORIDE 10 ML: 10 INJECTION, SOLUTION EPIDURAL; INFILTRATION; INTRACAUDAL; PERINEURAL at 13:44

## 2023-06-20 RX ADMIN — BUPIVACAINE HYDROCHLORIDE 10 MG: 2.5 INJECTION, SOLUTION INFILTRATION; PERINEURAL at 13:44

## 2023-06-20 RX ADMIN — IOPAMIDOL 2 ML: 408 INJECTION, SOLUTION INTRATHECAL at 13:44

## 2023-06-20 NOTE — PROGRESS NOTES
Yumiko was seen in X-ray today for a Lumbar SI injection. Patient rated pain before procedure 9/10. After procedure patient rated pain 3/10.   This pain level is acceptable to patient. Patient discharged home with .

## 2023-06-20 NOTE — DISCHARGE SUMMARY
AFTER YOU GO HOME    ? DO relax; minimize your activity for 24 hours  ? You may resume normal activity tomorrow  ? You may remove the bandage in the evening or next morning  ? You may resume bathing the next day  ? Drink at least 4 extra glasses of fluid today if not on fluid restrictions  ? DO NOT drive or operate machinery at home or at work for at least 24 hours      VISIT THE EMERGENCY ROOM OR URGENT CARE IF:    ? There is redness or swelling at the injection site  ? There is discharge from the injection site  ? You develop a temperature of 101  F or greater      ADDITIONAL INSTRUCTIONS:     ? You may resume your Coumadin or other blood thinner at your regular dose today.  Follow up with your physician to have your INR rechecked if indicated.  ? If you gain no relief from the injection after two (2) weeks, follow-up with your provider for your options.        Contacts:    During business hours from 8 to 5 pm, you may call [phone] to reach a nurse advisor at [facility].  After hours, call [facility]  [phone].  Ask for the Radiologist on-call.  Someone is on-call 24 hrs/day.  [facility] Toll Free Number   [phone]

## 2023-06-26 NOTE — TELEPHONE ENCOUNTER
Prescription refill requested for:   tiZANidine (ZANAFLEX) 4 MG tablet      Last Written Prescription Date:  3/16/22  Last Fill Quantity: 60,   # refills: 1  Last Office Visit: 1/20/22  Future Office visit:           Nubia Huang ATC   [FreeTextEntry1] : This note was written by Barb Bermeo on 06/26/2023 acting as a scribe for HÉCTOR DA SILVA III, MD

## 2023-07-05 ENCOUNTER — VIRTUAL VISIT (OUTPATIENT)
Dept: ORTHOPEDICS | Facility: CLINIC | Age: 83
End: 2023-07-05
Payer: COMMERCIAL

## 2023-07-05 DIAGNOSIS — G89.29 CHRONIC BILATERAL LOW BACK PAIN WITH SCIATICA, SCIATICA LATERALITY UNSPECIFIED: ICD-10-CM

## 2023-07-05 DIAGNOSIS — M54.40 CHRONIC BILATERAL LOW BACK PAIN WITH SCIATICA, SCIATICA LATERALITY UNSPECIFIED: ICD-10-CM

## 2023-07-05 DIAGNOSIS — M51.369 DDD (DEGENERATIVE DISC DISEASE), LUMBAR: ICD-10-CM

## 2023-07-05 DIAGNOSIS — M51.26 LUMBAR DISC HERNIATION: Primary | ICD-10-CM

## 2023-07-05 DIAGNOSIS — M51.17 INTERVERTEBRAL DISC DISORDERS WITH RADICULOPATHY, LUMBOSACRAL REGION: ICD-10-CM

## 2023-07-05 DIAGNOSIS — M47.816 LUMBAR FACET ARTHROPATHY: ICD-10-CM

## 2023-07-05 PROCEDURE — 99442 PR PHYSICIAN TELEPHONE EVALUATION 11-20 MIN: CPT | Mod: 95 | Performed by: PREVENTIVE MEDICINE

## 2023-07-05 RX ORDER — METHYLPREDNISOLONE 4 MG
TABLET, DOSE PACK ORAL
Qty: 21 TABLET | Refills: 0 | Status: SHIPPED | OUTPATIENT
Start: 2023-07-05 | End: 2024-04-05

## 2023-07-05 NOTE — LETTER
7/5/2023         RE: Yumiko Mccartney  [address]  [place] [address]        Dear Colleague,    Thank you for referring your patient, Yumiko Mccartney, to the [facility] [place]. Please see a copy of my visit note below.    Patient is a   83  year old who is being evaluated via a billable telephone visit.      What phone number would you like to be contacted at? CELL  How would you like to obtain your AVS? MYCHART        Subjective   Patient is a  83   year old who presents by phone call visit for the following:     HPI   F/u for bilateral SI joint injections  Had about 2 weeks ago  Have not improved her low back pain  Has pain with standing and walking and radiation into legs  Wants to know what else she can do at this point  Medrol pack has improved her pain and function for several weeks in the past      Review of Systems   Constitutional, HEENT, cardiovascular, pulmonary, gi and gu systems are negative, except as otherwise noted.      Objective           Vitals:  No vitals were obtained today due to virtual visit.    Physical Exam   healthy, alert and no distress  PSYCH: Alert and oriented times 3; coherent speech, normal   rate and volume, able to articulate logical thoughts, able   to abstract reason, no tangential thoughts, no hallucinations   or delusions  His affect is normal  RESP: No cough, no audible wheezing, able to talk in full sentences  Remainder of exam unable to be completed due to telephone visits    Assessment/Plan  84 yo female with lumbar ddd, disc herniations, facet arthropathy, radicular pain, worse, bilateral SI joint arthritis, not resolved    I independently reviewed the following imaging studies and discussed with patient:  Lumbar MRI: shows ddd, disc herniations, facet arthropathy  Discussed and ordered TF injection for L5/S1  rx given for medrol pack  Cont. HEP  Referred to pain clinic for further options          Phone call duration: 20  minutes  Phone call start: 1125am  Phone call end: 1145am  Dr Armendariz      Again, thank you for allowing me to participate in the care of your patient.        Sincerely,        Abad Armendariz MD

## 2023-07-05 NOTE — PROGRESS NOTES
Patient is a   83  year old who is being evaluated via a billable telephone visit.      What phone number would you like to be contacted at? CELL  How would you like to obtain your AVS? EMILY        Subjective   Patient is a  83   year old who presents by phone call visit for the following:     HPI   F/u for bilateral SI joint injections  Had about 2 weeks ago  Have not improved her low back pain  Has pain with standing and walking and radiation into legs  Wants to know what else she can do at this point  Medrol pack has improved her pain and function for several weeks in the past      Review of Systems   Constitutional, HEENT, cardiovascular, pulmonary, gi and gu systems are negative, except as otherwise noted.      Objective           Vitals:  No vitals were obtained today due to virtual visit.    Physical Exam   healthy, alert and no distress  PSYCH: Alert and oriented times 3; coherent speech, normal   rate and volume, able to articulate logical thoughts, able   to abstract reason, no tangential thoughts, no hallucinations   or delusions  His affect is normal  RESP: No cough, no audible wheezing, able to talk in full sentences  Remainder of exam unable to be completed due to telephone visits    Assessment/Plan  84 yo female with lumbar ddd, disc herniations, facet arthropathy, radicular pain, worse, bilateral SI joint arthritis, not resolved    I independently reviewed the following imaging studies and discussed with patient:  Lumbar MRI: shows ddd, disc herniations, facet arthropathy  Discussed and ordered TF injection for L5/S1  rx given for medrol pack  Cont. HEP  Referred to pain clinic for further options          Phone call duration: 20 minutes  Phone call start: 1125am  Phone call end: 1145am  Dr Armendariz

## 2023-07-05 NOTE — LETTER
7/5/2023         RE: Yumiko Mccartney  [address]  [place] [address]        Dear Colleague,    Thank you for referring your patient, uYmiko Mccartney, to the [facility] [place]. Please see a copy of my visit note below.    Patient is a   83  year old who is being evaluated via a billable telephone visit.      What phone number would you like to be contacted at? CELL  How would you like to obtain your AVS? MYCHART        Subjective   Patient is a  83   year old who presents by phone call visit for the following:     HPI   F/u for bilateral SI joint injections  Had about 2 weeks ago  Have not improved her low back pain  Has pain with standing and walking and radiation into legs  Wants to know what else she can do at this point  Medrol pack has improved her pain and function for several weeks in the past      Review of Systems   Constitutional, HEENT, cardiovascular, pulmonary, gi and gu systems are negative, except as otherwise noted.      Objective           Vitals:  No vitals were obtained today due to virtual visit.    Physical Exam   healthy, alert and no distress  PSYCH: Alert and oriented times 3; coherent speech, normal   rate and volume, able to articulate logical thoughts, able   to abstract reason, no tangential thoughts, no hallucinations   or delusions  His affect is normal  RESP: No cough, no audible wheezing, able to talk in full sentences  Remainder of exam unable to be completed due to telephone visits    Assessment/Plan  84 yo female with lumbar ddd, disc herniations, facet arthropathy, radicular pain, worse, bilateral SI joint arthritis, not resolved    I independently reviewed the following imaging studies and discussed with patient:  Lumbar MRI: shows ddd, disc herniations, facet arthropathy  Discussed and ordered TF injection for L5/S1  rx given for medrol pack  Cont. HEP  Referred to pain clinic for further options          Phone call duration: 20  minutes  Phone call start: 1125am  Phone call end: 1145am  Dr Armendariz      Again, thank you for allowing me to participate in the care of your patient.        Sincerely,        Abad Armendariz MD

## 2023-07-10 NOTE — TELEPHONE ENCOUNTER
RECORDS RECEIVED FROM: Internal   REASON FOR VISIT: Lumbar disc herniation [M51.26]; Lumbar facet arthropathy [M47.816]; DDD (degenerative disc disease), lumbar [M51.36]; Chronic bilateral low back pain with sciatica, sciatica laterality unspecified [M54.40, G89.29]   Date of Appt: 11/02/2023   NOTES (FOR ALL VISITS) STATUS DETAILS   OFFICE NOTE from referring provider Internal 07/05/2023 Dr Armendariz [facility]    OFFICE NOTE from other specialist N/A    DISCHARGE SUMMARY from hospital N/A    DISCHARGE REPORT from the ER N/A    OPERATIVE REPORT N/A    [facility] (MS ONLY) N/A    EMG N/A    EEG N/A    MEDICATION LIST N/A    IMAGING  (FOR ALL VISITS)     LUMBAR PUNCTURE N/A    JEWEL SCAN (MOVEMENT) N/A    ULTRASOUND (CAROTID BILAT) *VASCULAR* N/A    MRI (HEAD, NECK, SPINE) Internal 07/28/2022 lumbar spine  07/12/2021 lumbar spine   CT (HEAD, NECK, SPINE) N/A

## 2023-07-12 ENCOUNTER — THERAPY VISIT (OUTPATIENT)
Dept: PHYSICAL THERAPY | Facility: CLINIC | Age: 83
End: 2023-07-12
Payer: COMMERCIAL

## 2023-07-12 DIAGNOSIS — M25.562 CHRONIC PAIN OF LEFT KNEE: Primary | ICD-10-CM

## 2023-07-12 DIAGNOSIS — G89.29 CHRONIC PAIN OF LEFT KNEE: Primary | ICD-10-CM

## 2023-07-12 PROCEDURE — 97110 THERAPEUTIC EXERCISES: CPT | Mod: GP | Performed by: PHYSICAL THERAPIST

## 2023-07-12 PROCEDURE — 97140 MANUAL THERAPY 1/> REGIONS: CPT | Mod: GP | Performed by: PHYSICAL THERAPIST

## 2023-08-15 DIAGNOSIS — M46.1 ARTHRITIS OF SACROILIAC JOINT OF BOTH SIDES (H): ICD-10-CM

## 2023-08-15 DIAGNOSIS — M47.816 LUMBAR FACET ARTHROPATHY: ICD-10-CM

## 2023-08-15 DIAGNOSIS — M51.369 DDD (DEGENERATIVE DISC DISEASE), LUMBAR: ICD-10-CM

## 2023-08-15 NOTE — TELEPHONE ENCOUNTER
Prescription refill requested for:   tiZANidine (ZANAFLEX) 4 MG tablet   Last Written Prescription Date:  4/13/23  Last Fill Quantity: 180,   # refills: 1  Last Office Visit: 7/5/23  Future Office visit:    Next 5 appointments (look out 90 days)      Aug 16, 2023 11:00 AM  (Arrive by 10:45 AM)  Return Visit with Sayra No PA-C  [facility] ([facility] [address] [address] [place] [address] [phone]                 Miguel López, EMT

## 2023-08-16 ENCOUNTER — OFFICE VISIT (OUTPATIENT)
Dept: HEMATOLOGY | Facility: CLINIC | Age: 83
End: 2023-08-16
Attending: PHYSICIAN ASSISTANT
Payer: COMMERCIAL

## 2023-08-16 ENCOUNTER — OFFICE VISIT (OUTPATIENT)
Dept: DERMATOLOGY | Facility: CLINIC | Age: 83
End: 2023-08-16
Payer: COMMERCIAL

## 2023-08-16 VITALS
BODY MASS INDEX: 34.95 KG/M2 | SYSTOLIC BLOOD PRESSURE: 150 MMHG | HEART RATE: 96 BPM | DIASTOLIC BLOOD PRESSURE: 60 MMHG | OXYGEN SATURATION: 96 % | WEIGHT: 206.8 LBS | TEMPERATURE: 97.5 F

## 2023-08-16 DIAGNOSIS — I82.411 ACUTE DEEP VEIN THROMBOSIS (DVT) OF FEMORAL VEIN OF RIGHT LOWER EXTREMITY (H): Primary | ICD-10-CM

## 2023-08-16 DIAGNOSIS — D64.9 ANEMIA, UNSPECIFIED TYPE: ICD-10-CM

## 2023-08-16 DIAGNOSIS — L57.0 ACTINIC KERATOSIS: Primary | ICD-10-CM

## 2023-08-16 LAB
ALBUMIN SERPL BCG-MCNC: 4.5 G/DL (ref 3.5–5.2)
ALP SERPL-CCNC: 85 U/L (ref 35–104)
ALT SERPL W P-5'-P-CCNC: 15 U/L (ref 0–50)
ANION GAP SERPL CALCULATED.3IONS-SCNC: 7 MMOL/L (ref 7–15)
AST SERPL W P-5'-P-CCNC: 21 U/L (ref 0–45)
BILIRUB SERPL-MCNC: 0.2 MG/DL
BUN SERPL-MCNC: 22 MG/DL (ref 8–23)
CALCIUM SERPL-MCNC: 9.8 MG/DL (ref 8.8–10.2)
CHLORIDE SERPL-SCNC: 104 MMOL/L (ref 98–107)
CREAT SERPL-MCNC: 1.08 MG/DL (ref 0.51–0.95)
DEPRECATED HCO3 PLAS-SCNC: 33 MMOL/L (ref 22–29)
ERYTHROCYTE [DISTWIDTH] IN BLOOD BY AUTOMATED COUNT: 13.4 % (ref 10–15)
FERRITIN SERPL-MCNC: 134 NG/ML (ref 11–328)
GFR SERPL CREATININE-BSD FRML MDRD: 51 ML/MIN/1.73M2
GLUCOSE SERPL-MCNC: 122 MG/DL (ref 70–99)
HCT VFR BLD AUTO: 34.5 % (ref 35–47)
HGB BLD-MCNC: 10.5 G/DL (ref 11.7–15.7)
IRON BINDING CAPACITY (ROCHE): 262 UG/DL (ref 240–430)
IRON SATN MFR SERPL: 37 % (ref 15–46)
IRON SERPL-MCNC: 96 UG/DL (ref 37–145)
MCH RBC QN AUTO: 25.4 PG (ref 26.5–33)
MCHC RBC AUTO-ENTMCNC: 30.4 G/DL (ref 31.5–36.5)
MCV RBC AUTO: 83 FL (ref 78–100)
PLATELET # BLD AUTO: 307 10E3/UL (ref 150–450)
POTASSIUM SERPL-SCNC: 4.3 MMOL/L (ref 3.4–5.3)
PROT SERPL-MCNC: 6.9 G/DL (ref 6.4–8.3)
RBC # BLD AUTO: 4.14 10E6/UL (ref 3.8–5.2)
SODIUM SERPL-SCNC: 144 MMOL/L (ref 136–145)
WBC # BLD AUTO: 7 10E3/UL (ref 4–11)

## 2023-08-16 PROCEDURE — 83550 IRON BINDING TEST: CPT | Performed by: PHYSICIAN ASSISTANT

## 2023-08-16 PROCEDURE — 85027 COMPLETE CBC AUTOMATED: CPT | Performed by: PHYSICIAN ASSISTANT

## 2023-08-16 PROCEDURE — 82728 ASSAY OF FERRITIN: CPT | Performed by: PHYSICIAN ASSISTANT

## 2023-08-16 PROCEDURE — 80053 COMPREHEN METABOLIC PANEL: CPT | Performed by: PHYSICIAN ASSISTANT

## 2023-08-16 PROCEDURE — 17000 DESTRUCT PREMALG LESION: CPT | Performed by: PHYSICIAN ASSISTANT

## 2023-08-16 PROCEDURE — 36415 COLL VENOUS BLD VENIPUNCTURE: CPT | Performed by: PHYSICIAN ASSISTANT

## 2023-08-16 PROCEDURE — 17003 DESTRUCT PREMALG LES 2-14: CPT | Performed by: PHYSICIAN ASSISTANT

## 2023-08-16 PROCEDURE — 99214 OFFICE O/P EST MOD 30 MIN: CPT | Performed by: PHYSICIAN ASSISTANT

## 2023-08-16 PROCEDURE — G0463 HOSPITAL OUTPT CLINIC VISIT: HCPCS | Performed by: PHYSICIAN ASSISTANT

## 2023-08-16 NOTE — LETTER
8/16/2023         RE: Yumiko Mccartney  [address]  [place] [address]        Dear Colleague,    Thank you for referring your patient, Yumiko Mccartney, to the [facility]. Please see a copy of my visit note below.    [facility] Dermatology Note  Encounter Date: Aug 16, 2023  Office Visit      Dermatology Problem List:  Last skin check: 6/14/23  1. NMSC  -BCC, left upper back, s/p WLE 1/4/2018   -BCC, left mid back, s/p ED&C 10/25/2010  -BCC, mid back, s/p ED&C 10/25/2010  2. AKs-  cryotherapy   _________________________________    Assessment & Plan:  # Actinic keratosis - left forehead, left eyebrow, nasal tip x3 total.  - cryo - see below    Procedures Performed:   - Cryotherapy procedure note, location(s): see above. After verbal consent and discussion of risks and benefits including, but not limited to, dyspigmentation/scar, blister, and pain, 5 lesion(s) was(were) treated with 1-2 mm freeze border for 1-2 cycles with liquid nitrogen. Post cryotherapy instructions were provided.     Follow-up: 10 month(s) in-person, or earlier for new or changing lesions    Staff:     All risks, benefits and alternatives were discussed with patient.  Patient is in agreement and understands the assessment and plan.  All questions were answered.    Sayra No PA-C, MPAS  [facility]: Phone: [phone], Fax: [phone]  [facility]: Phone: [phone],  Fax: [phone]  [facility]: Phone: [phone], Fax: [phone]  ____________________________________________    CC: Derm Problem (Patient here for cryo on the face)      HPI:  Ms. Yumiko Mccartney is a 83 year old female who presents today as a return patient for cryo to previously identified AKs. She deferred last visit due to upcoming trip. No other concerns.     Patient is otherwise feeling well,  without additional concerns.    Labs:  none    Physical Exam:  Vitals: There were no vitals taken for this visit.  SKIN: Focused examination of face was performed.   - There are erythematous macules with overyling adherent scale on the face x5 - see above for specific locations.    - No other lesions of concern on areas examined.     Medications:  Current Outpatient Medications   Medication     acetaminophen (TYLENOL) 325 MG tablet     albuterol (PROVENTIL) (2.5 MG/3ML) 0.083% neb solution     calcium citrate (CITRACAL) 950 (200 Ca) MG tablet     Cholecalciferol (VITAMIN D) 2000 UNIT CAPS     gabapentin (NEURONTIN) 600 MG tablet     lisinopril-hydrochlorothiazide (ZESTORETIC) 20-12.5 MG tablet     methylPREDNISolone (MEDROL DOSEPAK) 4 MG tablet therapy pack     methylPREDNISolone (MEDROL) 4 MG tablet therapy pack     methylPREDNISolone (MEDROL) 4 MG tablet therapy pack     Multiple Vitamin (MULTIVITAMIN ADULT PO)     rivaroxaban ANTICOAGULANT (XARELTO) 20 MG TABS tablet     tiZANidine (ZANAFLEX) 4 MG tablet     tiZANidine (ZANAFLEX) 4 MG tablet     No current facility-administered medications for this visit.      Past Medical/Surgical History:   Patient Active Problem List   Diagnosis     Asthma, moderate persistent     Acne rosacea     CARDIOVASCULAR SCREENING; LDL GOAL LESS THAN 130     Osteopenia     Vitamin D deficiency     Advanced directives, counseling/discussion     Hx of pseudoexfoliation, os     Pseudophakia, ou; Yag Caps, os     HTN, goal below 140/90     Peptic ulcer     Health Care Home     History of blepharoplasty, upper lid, ou (elsewhere); revision (EN)     Obesity, Class II, BMI 35-39.9     Restless legs syndrome     DJD (degenerative joint disease), lumbosacral     Rosacea     Essential hypertension with goal blood pressure less than 140/90     Obesity, Class I, BMI 30-34.9     Posterior vitreous detachment, bilateral     Vertigo     Amblyopia, mild, of left eye     Chronic pain of left knee      "Morbid obesity (H)     Past Medical History:   Diagnosis Date     Acne rosacea      Actinic keratosis      Amblyopia      Asthma, moderate persistent      Basal cell carcinoma 10/2010    back X2     Cataract, mod, od; mild-mod, os 1/12/2012     DJD (degenerative joint disease), lumbosacral 8/6/2014     Elevated cholesterol      Endometriosis      HTN (hypertension)      Moderate major depression (H)     \"Circumstances\"     Osteopenia                         Again, thank you for allowing me to participate in the care of your patient.        Sincerely,        Sayra No PA-C  "

## 2023-08-16 NOTE — PROGRESS NOTES
[facility]  [address] [place] [address] [address], [place] [address]  Main: [phone], Fax: [phone]    Patient seen at: [facility] at [address] [place] [address]    Outpatient Visit Note:    Patient: Yumiko Mccartney  MRN: [id number]  : 1940  MUSTAPHA: August 15, 2023    Reason for visit: Follow up, history of venous thromboembolism     Clinical History Summary:  Yumiko Mccartney is a 83 year old female with past medical history significant for hypertension, degenerative disc disease, basal cell carcinoma, asthma and osteopenia. She developed right lower extremity DVT diagnosed on 5/15/2022. She had been having right ankle swelling and calf pain for three weeks. She had then traveled to [place] (2.5 hour by air) however her symptoms had already started. No other provoking risk factors identified. She was started on Xarelto with plan for long term anticoagulation given unprovoked nature of her venous thromboembolism. She represented to the ER a month later on 2022 for evaluation of worsening right leg pain and edema. Ultrasound was repeated and again showed extensive DVT with slight improvement in thrombus burden with resolution of peroneal vein thrombus. Follow up ultrasound on 2022 showed nonocclusive thrombus in the right distal femoral and popliteal veins. Overall, clot burden is decreased compared to prior study.    Interim History:  Today, Tomi notes that she has been doing well. She denies any recurrent venous thromboembolism symptoms nor bleeding on her Xarelto. She initially had some abdominal bloating but this has subsided. She denies any frequently missed doses. She notes some bruising of her legs and arms at times. She notes cramping of her legs at night. She notes occasional dependent edema for which she wears compression socks or tubigirp. She doesn't tolerate closed toe socks due to hammer toe. She notes prominent  varicosities of right anterior shin. She is known to have venous incompetency but has not had any interventions. She was offered ablation vs sclerotherapy in 2019.     She did have surgery on her neck for excessive skin this last year and held Xarelto for 48 hours prior to procedure. She tolerated procedure well without any bleeding. She has no plans for any future procedures at the moment     She does inquire if she needs to stay on blood thinners. Discussed options for prophy dose reduction.     ROS:  Denies any bleeding complications. Specifically, no frequent epistaxis. No issues with oral mucosal bleeding. Denies any hematuria or blood in stools. Denies any shortness of breath. No chest pain. No cough. No fever. No leg pain. Does have leg cramping at night. Occasional leg swelling.       Medications:   Current Outpatient Medications   Medication Sig Dispense Refill    acetaminophen (TYLENOL) 325 MG tablet Take 2 tablets (650 mg) by mouth every 4 hours as needed for other 60 tablet 0    albuterol (PROVENTIL) (2.5 MG/3ML) 0.083% neb solution Take 1 vial (2.5 mg) by nebulization every 6 hours as needed for shortness of breath or wheezing 90 mL 0    calcium citrate (CITRACAL) 950 (200 Ca) MG tablet Take 1 tablet by mouth 2 times daily      Cholecalciferol (VITAMIN D) 2000 UNIT CAPS Take 2,000 Units by mouth daily      gabapentin (NEURONTIN) 600 MG tablet TAKE 2 TABLETS (1,200 MG) BY MOUTH AT BEDTIME 180 tablet 3    lisinopril-hydrochlorothiazide (ZESTORETIC) 20-12.5 MG tablet TAKE 2 TABLETS BY MOUTH EVERY  tablet 1    methylPREDNISolone (MEDROL DOSEPAK) 4 MG tablet therapy pack Follow Package Directions 21 tablet 0    methylPREDNISolone (MEDROL) 4 MG tablet therapy pack Follow Package Directions 21 tablet 0    methylPREDNISolone (MEDROL) 4 MG tablet therapy pack Follow Package Directions 21 tablet 0    Multiple Vitamin (MULTIVITAMIN ADULT PO) Take by mouth daily      rivaroxaban ANTICOAGULANT (XARELTO) 20 MG  "TABS tablet Take 1 tablet (20 mg) by mouth daily (with dinner) 90 tablet 3    tiZANidine (ZANAFLEX) 4 MG tablet Take 1-2 tablets (4-8 mg) by mouth nightly as needed for muscle spasms 180 tablet 1    tiZANidine (ZANAFLEX) 4 MG tablet TAKE 1-2 TABLETS (4-8 MG) BY MOUTH NIGHTLY AS NEEDED FOR MUSCLE SPASMS 60 tablet 1        Allergies:      Allergies   Allergen Reactions    Erythromycin Swelling and Other (See Comments)       PMH:  Past Medical History:   Diagnosis Date    Acne rosacea     Actinic keratosis     Amblyopia     Asthma, moderate persistent     Basal cell carcinoma 10/2010    back X2    Cataract, mod, od; mild-mod, os 1/12/2012    DJD (degenerative joint disease), lumbosacral 8/6/2014    Elevated cholesterol     Endometriosis     HTN (hypertension)     Moderate major depression (H)     \"Circumstances\"    Osteopenia         Social History:   Social History     Tobacco Use    Smoking status: Never     Passive exposure: Never    Smokeless tobacco: Never   Vaping Use    Vaping Use: Never used   Substance Use Topics    Alcohol use: Yes     Comment: rarely    Drug use: No       Family History:  Deferred.    Objective:  Vitals: BP (!) 150/60 (BP Location: Right arm, Patient Position: Sitting, Cuff Size: Adult Large)   Pulse 96   Temp 97.5  F (36.4  C) (Tympanic)   Wt 93.8 kg (206 lb 12.8 oz)   SpO2 96%   BMI 34.95 kg/m       Exam:   Constitutional: Appears well, no distress  HEENT: Pupils equal and round. No scleral icterus or hemorrhage.   Respiratory: no increased work of breathing.   Mus/Skele: trace edema of lower extremities  Skin: no petechiae, no significant ecchymosis on exposed dermis. Derm biopsies lesions of face present  Neuro: CN II-XII intact. Normal gait. AOx3      Labs:  CBC RESULTS:   Recent Labs   Lab Test 01/17/23  1530   WBC 7.5   RBC 4.36   HGB 11.0*   HCT 35.9   MCV 82   MCH 25.2*   MCHC 30.6*   RDW 12.7        Last Comprehensive Metabolic Panel:  Sodium   Date Value Ref Range " Status   2023 142 133 - 144 mmol/L Final   2020 140 133 - 144 mmol/L Final     Potassium   Date Value Ref Range Status   2023 4.0 3.4 - 5.3 mmol/L Final   2020 3.9 3.4 - 5.3 mmol/L Final     Chloride   Date Value Ref Range Status   2023 102 94 - 109 mmol/L Final   2020 106 94 - 109 mmol/L Final     Carbon Dioxide   Date Value Ref Range Status   2020 30 20 - 32 mmol/L Final     Carbon Dioxide (CO2)   Date Value Ref Range Status   2023 31 20 - 32 mmol/L Final     Anion Gap   Date Value Ref Range Status   2023 9 3 - 14 mmol/L Final   2020 4 3 - 14 mmol/L Final     Glucose   Date Value Ref Range Status   2023 98 70 - 99 mg/dL Final   2020 97 70 - 99 mg/dL Final     Comment:     Non Fasting     Urea Nitrogen   Date Value Ref Range Status   2023 16 7 - 30 mg/dL Final   2020 16 7 - 30 mg/dL Final     Creatinine   Date Value Ref Range Status   2023 0.72 0.52 - 1.04 mg/dL Final   2020 0.91 0.52 - 1.04 mg/dL Final     GFR Estimate   Date Value Ref Range Status   2023 84 >60 mL/min/1.73m2 Final     Comment:     Effective 2021 eGFRcr in adults is calculated using the  CKD-EPI creatinine equation which includes age and gender (Jonh et al., NEJM, DOI: 10.1056/AHAPab7083230)   2020 60 (L) >60 mL/min/[1.73_m2] Final     Comment:     Non  GFR Calc  Starting 2018, serum creatinine based estimated GFR (eGFR) will be   calculated using the Chronic Kidney Disease Epidemiology Collaboration   (CKD-EPI) equation.       Calcium   Date Value Ref Range Status   2023 10.0 8.5 - 10.1 mg/dL Final   2020 9.7 8.5 - 10.1 mg/dL Final     Liver Function Studies -   Recent Labs   Lab Test 23  1530   PROTTOTAL 7.5   ALBUMIN 4.1   BILITOTAL 0.2   ALKPHOS 76   AST 20   ALT 22        Imagin2022 RLE US   IMPRESSION: Nonocclusive thrombus in the right distal femoral and  popliteal  veins. Overall, clot burden is decreased compared to prior  study.       Assessment:  Yumiko Mccartney is a 83 year old female with history of hypertension, DDD, asthma, venous incompetency who had an 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 She was recommended to stay on anticoagulation lifelong for secondary prevention of venous thromboembolism. She had repeat ultrasound in 12/2022 that showed improvement in thrombus burden with residual thrombus of right distal femoral vein and popliteal vein. Hypercoagulable work up was unrevealing. She has been on Xarelto without recurrent venous thromboembolism symptoms nor significant bleeding. She does get easy bruising. Hemoglobin has been slowly dropping without signs of acute blood loss. She does have a history of iron deficiency.     Plan:  Tomi remains a good candidate to stay on long term anticoagulation for secondary prevention of venous thromboembolism. I discussed option to stay at current dose of 20mg once daily vs reduce to prophylactic intensity which is 10mg once daily. She would like to stay on 20mg once daily for now but if any increaesd bruising/bleeding symptoms would consider reductions. CBC, CMP today. Monitor anemia. If hemoglobin low, check iron panel and ferritin.     Patient instructed to contact the clinic should they require any surgical procedures for perioperative planning.     Return to clinic in 1 year, sooner if any concerns.     If she wishes to have her primary care provider take over prescription and medication management, that is reasonable and she can follow up as needed.       Anaid Duron, MPH, PA-C  [facility]    30 minutes spent by me on the date of the encounter doing chart review, review of test results, interpretation of tests, patient visit, and documentation      14-Apr-2019 02:57

## 2023-08-16 NOTE — PROGRESS NOTES
[facility] Dermatology Note  Encounter Date: Aug 16, 2023  Office Visit      Dermatology Problem List:  Last skin check: 6/14/23  1. NMSC  -BCC, left upper back, s/p WLE 1/4/2018   -BCC, left mid back, s/p ED&C 10/25/2010  -BCC, mid back, s/p ED&C 10/25/2010  2. AKs-  cryotherapy   _________________________________    Assessment & Plan:  # Actinic keratosis - left forehead, left eyebrow, nasal tip x3 total.  - cryo - see below    Procedures Performed:   - Cryotherapy procedure note, location(s): see above. After verbal consent and discussion of risks and benefits including, but not limited to, dyspigmentation/scar, blister, and pain, 5 lesion(s) was(were) treated with 1-2 mm freeze border for 1-2 cycles with liquid nitrogen. Post cryotherapy instructions were provided.     Follow-up: 10 month(s) in-person, or earlier for new or changing lesions    Staff:     All risks, benefits and alternatives were discussed with patient.  Patient is in agreement and understands the assessment and plan.  All questions were answered.    Sayra No PA-C, MPAS  [facility]: Phone: [phone], Fax: [phone]  [facility]: Phone: [phone],  Fax: [phone]  [facility]: Phone: [phone], Fax: [phone]  ____________________________________________    CC: Derm Problem (Patient here for cryo on the face)      HPI:  Ms. Yumiko Mccartney is a 83 year old female who presents today as a return patient for cryo to previously identified AKs. She deferred last visit due to upcoming trip. No other concerns.     Patient is otherwise feeling well, without additional concerns.    Labs:  none    Physical Exam:  Vitals: There were no vitals taken for this visit.  SKIN: Focused examination of face was performed.   - There are erythematous macules with overyling adherent scale on the face x5 - see above for  specific locations.    - No other lesions of concern on areas examined.     Medications:  Current Outpatient Medications   Medication    acetaminophen (TYLENOL) 325 MG tablet    albuterol (PROVENTIL) (2.5 MG/3ML) 0.083% neb solution    calcium citrate (CITRACAL) 950 (200 Ca) MG tablet    Cholecalciferol (VITAMIN D) 2000 UNIT CAPS    gabapentin (NEURONTIN) 600 MG tablet    lisinopril-hydrochlorothiazide (ZESTORETIC) 20-12.5 MG tablet    methylPREDNISolone (MEDROL DOSEPAK) 4 MG tablet therapy pack    methylPREDNISolone (MEDROL) 4 MG tablet therapy pack    methylPREDNISolone (MEDROL) 4 MG tablet therapy pack    Multiple Vitamin (MULTIVITAMIN ADULT PO)    rivaroxaban ANTICOAGULANT (XARELTO) 20 MG TABS tablet    tiZANidine (ZANAFLEX) 4 MG tablet    tiZANidine (ZANAFLEX) 4 MG tablet     No current facility-administered medications for this visit.      Past Medical/Surgical History:   Patient Active Problem List   Diagnosis    Asthma, moderate persistent    Acne rosacea    CARDIOVASCULAR SCREENING; LDL GOAL LESS THAN 130    Osteopenia    Vitamin D deficiency    Advanced directives, counseling/discussion    Hx of pseudoexfoliation, os    Pseudophakia, ou; Yag Caps, os    HTN, goal below 140/90    Peptic ulcer    Health Care Home    History of blepharoplasty, upper lid, ou (elsewhere); revision (EN)    Obesity, Class II, BMI 35-39.9    Restless legs syndrome    DJD (degenerative joint disease), lumbosacral    Rosacea    Essential hypertension with goal blood pressure less than 140/90    Obesity, Class I, BMI 30-34.9    Posterior vitreous detachment, bilateral    Vertigo    Amblyopia, mild, of left eye    Chronic pain of left knee    Morbid obesity (H)     Past Medical History:   Diagnosis Date    Acne rosacea     Actinic keratosis     Amblyopia     Asthma, moderate persistent     Basal cell carcinoma 10/2010    back X2    Cataract, mod, od; mild-mod, os 1/12/2012    DJD (degenerative joint disease), lumbosacral 8/6/2014     "Elevated cholesterol     Endometriosis     HTN (hypertension)     Moderate major depression (H)     \"Circumstances\"    Osteopenia                       "

## 2023-08-16 NOTE — NURSING NOTE
Yumiko Mccartney's goals for this visit include:   Chief Complaint   Patient presents with    Derm Problem     Patient here for cryo on the face       She requests these members of her care team be copied on today's visit information:     PCP: Gavi Bear    Referring Provider:  No referring provider defined for this encounter.    There were no vitals taken for this visit.    Do you need any medication refills at today's visit?       Arianna Garcai EMT

## 2023-08-16 NOTE — PATIENT INSTRUCTIONS
[facility]  [facility]  [facility], [address], [place] [address]  Main: [phone], Fax: [phone]    Yumiko,   It was a pleasure seeing you today.  Thank you for allowing us to be involved in your care.  Please let us know if there is anything else we can do for you, so that we can be sure you are leaving completely satisfied with your care experience. Below is the plan that we discussed.     Continue to take Xarelto 20mg once daily with food. Call me if you want to reduce the dose to 10mg once daily which is the prevention dose.   Continue to wear compression daily to help with blood flow and swelling symptoms.   Call if any procedures or surgeries planned to discuss holding your blood thinner.      We would like a provider on our team to see you at least annually for optimal care and to allow us to continue to prescribe for you.      Return to clinic in 1 year.    Your nurse clinician is Rayne Gonzales, MSN, RN, [phone].   If they are unavailable and you have immediate concerns, please call [phone] and ask for a nurse.     Anaid Duron, MPH, PA-C  [facility]

## 2023-08-17 ENCOUNTER — THERAPY VISIT (OUTPATIENT)
Dept: PHYSICAL THERAPY | Facility: CLINIC | Age: 83
End: 2023-08-17
Payer: COMMERCIAL

## 2023-08-17 DIAGNOSIS — G89.29 CHRONIC PAIN OF LEFT KNEE: Primary | ICD-10-CM

## 2023-08-17 DIAGNOSIS — M25.562 CHRONIC PAIN OF LEFT KNEE: Primary | ICD-10-CM

## 2023-08-17 PROCEDURE — 97110 THERAPEUTIC EXERCISES: CPT | Mod: GP | Performed by: PHYSICAL THERAPIST

## 2023-08-17 NOTE — PROGRESS NOTES
08/17/23 0500   Appointment Info   Signing clinician's name / credentials Homer Suarez DPT   Total/Authorized Visits 11 E[employer]T   Visits Used 11   Medical Diagnosis Arthrofibrosis of knee joint, Left   PT Tx Diagnosis left knee pain   Quick Adds Certification   Progress Note/Certification   Start of Care Date 03/02/23   Onset of illness/injury or Date of Surgery 10/04/21   Therapy Frequency 2x/ month   Predicted Duration 6 weeks.   Certification date from 06/23/23   Certification date to 08/23/23   Progress Note Due Date 05/25/23   Progress Note Completed Date 04/28/23       Present No   GOALS   PT Goals 2   PT Goal 1   Goal Identifier stair   Goal Description descending stairs in a normal reciprocal pattern / 5inch step 2/10 PL or less   Rationale to maximize safety and independence with performance of ADLs and functional tasks   Goal Progress desnding 5 inch step in a normal fashion   Target Date 03/23/23   Date Met 03/30/23   PT Goal 2   Goal Identifier stair long   Goal Description Descend 7 inch stairs in a normal reciprocal pattern 2/10 PL or less   Rationale to maximize safety and independence within the community;to maximize safety and independence with performance of ADLs and functional tasks   Goal Progress Ascending and Descending & inch step in reciprocal pattern with 3/10 PL   Target Date 08/23/23   Subjective Report   Subjective Report Patient reports overall just does not feel progress, still hurts going downstairs, can go down stairs it just hurts. If she does exercise it is sore for 2 days.Does everything she wants to do now but still just has pain   Objective Measures   Objective Measures Objective Measure 1;Objective Measure 3;Objective Measure 2   Objective Measure 1   Objective Measure Knee ROM,   Details 118 on 7/12/23 but has stiffened up to 112 today, after bike improved to 118.   Objective Measure 2   Objective Measure knee strength   Details knee fleixion 4+/5 with  postieror thigh pain, hip flexion 5/5, hip abd 5/5, hip ext 4+/5, adduction 5/5 .   Objective Measure 3   Objective Measure stairs   Details ascending stair painfree, descending 7 inch step normal reciprocal pattern with 3/10 PL   Treatment Interventions (PT)   Interventions Therapeutic Procedure/Exercise;Manual Therapy   Therapeutic Procedure/Exercise   Therapeutic Procedures: strength, endurance, ROM, flexibillity minutes ([id number]) 38   Ther Proc 1 Bike SH 3 7' L 5   PTRx Ther Proc 1 Supine Butterfly   PTRx Ther Proc 1 - Details 3 x 30 sec.    PTRx Ther Proc 2 Heel Slides   PTRx Ther Proc 2 - Details Flexinator: started at 20.5 inch  worked to 21.25inch   PTRx Ther Proc 3 Toe Raises   PTRx Ther Proc 3 - Details x30   PTRx Ther Proc 4 Hip Flexion Straight Leg Raise   PTRx Ther Proc 4 - Details 3x10   PTRx Ther Proc 5 Hip Abduction Straight Leg Raise   PTRx Ther Proc 5 - Details 3x10   PTRx Ther Proc 6 Seated Hamstring Curl with Tubing   PTRx Ther Proc 6 - Details RTB 3x10   PTRx Ther Proc 7 Short Arc Knee Extension   PTRx Ther Proc 7 - Details x30   PTRx Ther Proc 8 Hip Adduction Straight Leg Raise   PTRx Ther Proc 8 - Details 2 x 20   Skilled Intervention reviewed long term plan with continued strengthening and cardiovasucular exercies to see if pain improves. shooting for 3-5 days / week of exercises. If pain continues to limit success with exercise and improvement return back to doctor.   Patient Response/Progress ROM improved overall, descending stairs better but continued pain. Has pain even when at the bottom of step with no load on knee so still seems ROM / mobility is more the pain factor.   Education   Learner/Method Patient;Demonstration;Pictures/Video;No Barriers to Learning   Plan   Home program DC with HEP   Updates to plan of care DC   Total Session Time   Timed Code Treatment Minutes 38   Total Treatment Time (sum of timed and untimed services) 38         DISCHARGE  Reason for Discharge: No further  expectation of progress. ROM improved but now plateaued. Potential for further improvement with HEP so pt will continue with HEP. Return to MD if pain is limiting success with HEP.     Equipment Issued:     Discharge Plan: Patient to continue home program.    Referring Provider:  Glenn Calvin

## 2023-08-21 ENCOUNTER — TELEPHONE (OUTPATIENT)
Dept: CARDIOLOGY | Facility: CLINIC | Age: 83
End: 2023-08-21
Payer: COMMERCIAL

## 2023-08-22 ENCOUNTER — ANCILLARY PROCEDURE (OUTPATIENT)
Dept: GENERAL RADIOLOGY | Facility: CLINIC | Age: 83
End: 2023-08-22
Attending: PREVENTIVE MEDICINE
Payer: COMMERCIAL

## 2023-08-22 DIAGNOSIS — M51.26 LUMBAR DISC HERNIATION: ICD-10-CM

## 2023-08-22 DIAGNOSIS — G89.29 CHRONIC BILATERAL LOW BACK PAIN WITH SCIATICA, SCIATICA LATERALITY UNSPECIFIED: ICD-10-CM

## 2023-08-22 DIAGNOSIS — M47.816 LUMBAR FACET ARTHROPATHY: ICD-10-CM

## 2023-08-22 DIAGNOSIS — M51.369 DDD (DEGENERATIVE DISC DISEASE), LUMBAR: ICD-10-CM

## 2023-08-22 DIAGNOSIS — M54.40 CHRONIC BILATERAL LOW BACK PAIN WITH SCIATICA, SCIATICA LATERALITY UNSPECIFIED: ICD-10-CM

## 2023-08-22 DIAGNOSIS — M51.17 INTERVERTEBRAL DISC DISORDERS WITH RADICULOPATHY, LUMBOSACRAL REGION: ICD-10-CM

## 2023-08-22 PROCEDURE — 64483 NJX AA&/STRD TFRM EPI L/S 1: CPT | Mod: RT | Performed by: RADIOLOGY

## 2023-08-22 RX ORDER — IOPAMIDOL 408 MG/ML
2 INJECTION, SOLUTION INTRATHECAL ONCE
Status: COMPLETED | OUTPATIENT
Start: 2023-08-22 | End: 2023-08-22

## 2023-08-22 RX ORDER — METHYLPREDNISOLONE ACETATE 80 MG/ML
80 INJECTION, SUSPENSION INTRA-ARTICULAR; INTRALESIONAL; INTRAMUSCULAR; SOFT TISSUE ONCE
Status: COMPLETED | OUTPATIENT
Start: 2023-08-22 | End: 2023-08-22

## 2023-08-22 RX ORDER — LIDOCAINE HYDROCHLORIDE 10 MG/ML
5 INJECTION, SOLUTION EPIDURAL; INFILTRATION; INTRACAUDAL; PERINEURAL ONCE
Status: COMPLETED | OUTPATIENT
Start: 2023-08-22 | End: 2023-08-22

## 2023-08-22 RX ORDER — BUPIVACAINE HYDROCHLORIDE 5 MG/ML
2 INJECTION, SOLUTION PERINEURAL ONCE
Status: COMPLETED | OUTPATIENT
Start: 2023-08-22 | End: 2023-08-22

## 2023-08-22 RX ADMIN — METHYLPREDNISOLONE ACETATE 80 MG: 80 INJECTION, SUSPENSION INTRA-ARTICULAR; INTRALESIONAL; INTRAMUSCULAR; SOFT TISSUE at 15:51

## 2023-08-22 RX ADMIN — LIDOCAINE HYDROCHLORIDE 5 ML: 10 INJECTION, SOLUTION EPIDURAL; INFILTRATION; INTRACAUDAL; PERINEURAL at 15:51

## 2023-08-22 RX ADMIN — BUPIVACAINE HYDROCHLORIDE 10 MG: 5 INJECTION, SOLUTION PERINEURAL at 15:51

## 2023-08-22 RX ADMIN — IOPAMIDOL 2 ML: 408 INJECTION, SOLUTION INTRATHECAL at 15:51

## 2023-08-22 NOTE — PROGRESS NOTES
Yumiko was seen in X-ray today for a right transforaminal lumbar injection. Patient rated pain before procedure 4/10. After procedure patient rated pain 1/10.   This pain level is acceptable to patient. Patient discharged home with .

## 2023-08-22 NOTE — DISCHARGE SUMMARY
AFTER YOU GO HOME    DO relax; minimize your activity for 24 hours  You may resume normal activity tomorrow  You may remove the bandage in the evening or next morning  You may resume bathing the next day  Drink at least 4 extra glasses of fluid today if not on fluid restrictions  DO NOT drive or operate machinery at home or at work for at least 24 hours      VISIT THE EMERGENCY ROOM OR URGENT CARE IF:    There is redness or swelling at the injection site  There is discharge from the injection site  You develop a temperature of 101  F or greater      ADDITIONAL INSTRUCTIONS:     You may resume your Coumadin or other blood thinner at your regular dose today.  Follow up with your physician to have your INR rechecked if indicated.  If you gain no relief from the injection after two (2) weeks, follow-up with your provider for your options.        Contacts:    During business hours from 8 to 5 pm, you may call [phone] to reach a nurse advisor at [facility].  After hours, call [facility]  [phone].  Ask for the Radiologist on-call.  Someone is on-call 24 hrs/day.  [facility] Toll Free Number   [phone]

## 2023-08-23 ENCOUNTER — OFFICE VISIT (OUTPATIENT)
Dept: URGENT CARE | Facility: URGENT CARE | Age: 83
End: 2023-08-23
Payer: COMMERCIAL

## 2023-08-23 ENCOUNTER — MYC MEDICAL ADVICE (OUTPATIENT)
Dept: FAMILY MEDICINE | Facility: CLINIC | Age: 83
End: 2023-08-23
Payer: COMMERCIAL

## 2023-08-23 VITALS
HEART RATE: 63 BPM | SYSTOLIC BLOOD PRESSURE: 135 MMHG | DIASTOLIC BLOOD PRESSURE: 79 MMHG | BODY MASS INDEX: 34.97 KG/M2 | TEMPERATURE: 98.5 F | OXYGEN SATURATION: 96 % | WEIGHT: 206.9 LBS

## 2023-08-23 DIAGNOSIS — R30.0 DYSURIA: Primary | ICD-10-CM

## 2023-08-23 LAB
ALBUMIN UR-MCNC: NEGATIVE MG/DL
APPEARANCE UR: CLEAR
BACTERIA #/AREA URNS HPF: ABNORMAL /HPF
BILIRUB UR QL STRIP: NEGATIVE
COLOR UR AUTO: YELLOW
GLUCOSE UR STRIP-MCNC: NEGATIVE MG/DL
HGB UR QL STRIP: NEGATIVE
KETONES UR STRIP-MCNC: NEGATIVE MG/DL
LEUKOCYTE ESTERASE UR QL STRIP: ABNORMAL
NITRATE UR QL: NEGATIVE
PH UR STRIP: 6 [PH] (ref 5–7)
RBC #/AREA URNS AUTO: ABNORMAL /HPF
SP GR UR STRIP: 1.01 (ref 1–1.03)
UROBILINOGEN UR STRIP-ACNC: 0.2 E.U./DL
WBC #/AREA URNS AUTO: ABNORMAL /HPF

## 2023-08-23 PROCEDURE — 99213 OFFICE O/P EST LOW 20 MIN: CPT | Mod: 24 | Performed by: NURSE PRACTITIONER

## 2023-08-23 PROCEDURE — 81001 URINALYSIS AUTO W/SCOPE: CPT | Performed by: NURSE PRACTITIONER

## 2023-08-23 PROCEDURE — 87086 URINE CULTURE/COLONY COUNT: CPT | Performed by: NURSE PRACTITIONER

## 2023-08-23 NOTE — PATIENT INSTRUCTIONS
Continue to monitor symptoms.   No infection today. We will call you if the culture requires treatment  Follow up with your primary provider for ongoing concerns.

## 2023-08-23 NOTE — PROGRESS NOTES
"SUBJECTIVE:   Yumiko Mccartney is a 83 year old female who  presents today for a possible UTI. Symptoms of urgency and frequency have been going on for 2day(s).  Hematuria no.  sudden onsetand mild.  There is no history of fever, chills, nausea or vomiting.  No history of vaginal or penile discharge. This patient does not have a history of urinary tract infections.     Past Medical History:   Diagnosis Date    Acne rosacea     Actinic keratosis     Amblyopia     Asthma, moderate persistent     Basal cell carcinoma 10/2010    back X2    Cataract, mod, od; mild-mod, os 01/12/2012    DJD (degenerative joint disease), lumbosacral 08/06/2014    DVT (deep venous thrombosis) (H)     Elevated cholesterol     Endometriosis     HTN (hypertension)     Moderate major depression (H)     \"Circumstances\"    Osteopenia      Current Outpatient Medications   Medication Sig Dispense Refill    acetaminophen (TYLENOL) 325 MG tablet Take 2 tablets (650 mg) by mouth every 4 hours as needed for other 60 tablet 0    albuterol (PROVENTIL) (2.5 MG/3ML) 0.083% neb solution Take 1 vial (2.5 mg) by nebulization every 6 hours as needed for shortness of breath or wheezing 90 mL 0    calcium citrate (CITRACAL) 950 (200 Ca) MG tablet Take 1 tablet by mouth 2 times daily      Cholecalciferol (VITAMIN D) 2000 UNIT CAPS Take 2,000 Units by mouth daily      gabapentin (NEURONTIN) 600 MG tablet TAKE 2 TABLETS (1,200 MG) BY MOUTH AT BEDTIME 180 tablet 3    lisinopril-hydrochlorothiazide (ZESTORETIC) 20-12.5 MG tablet TAKE 2 TABLETS BY MOUTH EVERY  tablet 1    methylPREDNISolone (MEDROL) 4 MG tablet therapy pack Follow Package Directions 21 tablet 0    methylPREDNISolone (MEDROL) 4 MG tablet therapy pack Follow Package Directions 21 tablet 0    Multiple Vitamin (MULTIVITAMIN ADULT PO) Take by mouth daily      rivaroxaban ANTICOAGULANT (XARELTO) 20 MG TABS tablet Take 1 tablet (20 mg) by mouth daily (with dinner) 90 tablet 3    tiZANidine (ZANAFLEX) 4 " MG tablet TAKE 1-2 TABLETS (4-8 MG) BY MOUTH NIGHTLY AS NEEDED FOR MUSCLE SPASMS 180 tablet 1     Social History     Tobacco Use    Smoking status: Never     Passive exposure: Never    Smokeless tobacco: Never   Substance Use Topics    Alcohol use: Yes     Comment: rarely       OBJECTIVE:  /79 (BP Location: Left arm, Patient Position: Sitting, Cuff Size: Adult Regular)   Pulse 63   Temp 98.5  F (36.9  C) (Tympanic)   Wt 93.8 kg (206 lb 14.4 oz)   SpO2 96%   BMI 34.97 kg/m    GENERAL APPEARANCE: healthy, alert and no distress  RESP: lungs clear to auscultation - no rales, rhonchi or wheezes  CV: regular rates and rhythm, normal S1 S2, no murmur noted  ABDOMEN:  soft, nontender, no HSM or masses and bowel sounds normal  BACK: No CVA tenderness  SKIN: no suspicious lesions or rashes    ASSESSMENT:   1. Dysuria    - UA Macroscopic with reflex to Microscopic and Culture - Lab Collect; Future  - UA Macroscopic with reflex to Microscopic and Culture - Lab Collect  - UA Microscopic with Reflex to Culture  - Urine Culture Aerobic Bacterial      PLAN:  Continue to monitor symptoms.   No infection today. We will call you if the culture requires treatment  Follow up with your primary provider for ongoing concerns.

## 2023-08-25 LAB — BACTERIA UR CULT: NO GROWTH

## 2023-11-02 ENCOUNTER — OFFICE VISIT (OUTPATIENT)
Dept: PALLIATIVE MEDICINE | Facility: CLINIC | Age: 83
End: 2023-11-02
Payer: COMMERCIAL

## 2023-11-02 ENCOUNTER — PRE VISIT (OUTPATIENT)
Dept: PALLIATIVE MEDICINE | Facility: CLINIC | Age: 83
End: 2023-11-02

## 2023-11-02 VITALS
WEIGHT: 200 LBS | SYSTOLIC BLOOD PRESSURE: 129 MMHG | DIASTOLIC BLOOD PRESSURE: 85 MMHG | BODY MASS INDEX: 33.8 KG/M2 | HEART RATE: 67 BPM

## 2023-11-02 DIAGNOSIS — M51.26 LUMBAR DISC HERNIATION: ICD-10-CM

## 2023-11-02 DIAGNOSIS — M54.40 CHRONIC BILATERAL LOW BACK PAIN WITH SCIATICA, SCIATICA LATERALITY UNSPECIFIED: ICD-10-CM

## 2023-11-02 DIAGNOSIS — M47.817 LUMBOSACRAL SPONDYLOSIS WITHOUT MYELOPATHY: Primary | ICD-10-CM

## 2023-11-02 DIAGNOSIS — G89.29 CHRONIC BILATERAL LOW BACK PAIN WITH SCIATICA, SCIATICA LATERALITY UNSPECIFIED: ICD-10-CM

## 2023-11-02 DIAGNOSIS — M47.816 LUMBAR FACET ARTHROPATHY: ICD-10-CM

## 2023-11-02 DIAGNOSIS — M51.369 DDD (DEGENERATIVE DISC DISEASE), LUMBAR: ICD-10-CM

## 2023-11-02 PROCEDURE — 99204 OFFICE O/P NEW MOD 45 MIN: CPT

## 2023-11-02 ASSESSMENT — PAIN SCALES - GENERAL: PAINLEVEL: MODERATE PAIN (5)

## 2023-11-02 NOTE — PROGRESS NOTES
Yumiko Mccartney is a 83 year old female with a past medical history significant for hypertension,asthma, h/o DVT (on Xarelto) who presents with a chief complaint of low back pain.  The pain has been present for several years.  She states it affects every part of her life.  There is a radicular component on the right.  She denies any leg weakness.  The back pain is the worse pain. She does not take NSAIDs because she takes Xarelto.  The patient had a left knee replacement in 2021.  She continues to have pain.  The range of motion is good, but the pain persists.  The pain has been present for several years .    The pain is Moderate Pain (5) in severity.    The pain is described as intense, annoying.   The pain is alleviated by stretching, .    It is exacerbated by sitting, standing for prolonged periods.    Modalities that have been utilized in the past which were helpful include gabapentin, tizanidine, prednisone.    Things that were not helpful, but tried ,include therapy.    She has tried topical preparations.  She is not sure if they help or not      Previous Treatments:  Bilateral SI joint injections  Epidural Steroid Injections  Medrol Dose Packs    Current Outpatient Medications   Medication    acetaminophen (TYLENOL) 325 MG tablet    albuterol (PROVENTIL) (2.5 MG/3ML) 0.083% neb solution    calcium citrate (CITRACAL) 950 (200 Ca) MG tablet    Cholecalciferol (VITAMIN D) 2000 UNIT CAPS    gabapentin (NEURONTIN) 600 MG tablet    lisinopril-hydrochlorothiazide (ZESTORETIC) 20-12.5 MG tablet    Multiple Vitamin (MULTIVITAMIN ADULT PO)    rivaroxaban ANTICOAGULANT (XARELTO) 20 MG TABS tablet    tiZANidine (ZANAFLEX) 4 MG tablet    methylPREDNISolone (MEDROL) 4 MG tablet therapy pack    methylPREDNISolone (MEDROL) 4 MG tablet therapy pack     No current facility-administered medications for this visit.     Allergies   Allergen Reactions    Erythromycin Swelling and Other (See Comments)      Past Medical History:  "  Diagnosis Date    Acne rosacea     Actinic keratosis     Amblyopia     Asthma, moderate persistent     Basal cell carcinoma 10/2010    back X2    Cataract, mod, od; mild-mod, os 01/12/2012    DJD (degenerative joint disease), lumbosacral 08/06/2014    DVT (deep venous thrombosis) (H)     Elevated cholesterol     Endometriosis     HTN (hypertension)     Moderate major depression (H)     \"Circumstances\"    Osteopenia      Past Surgical History:   Procedure Laterality Date    ARTHROPLASTY KNEE Left 10/4/2021    Procedure: ARTHROPLASTY, LEFT KNEE, TOTAL;  Surgeon: Glenn Calvin MD;  Location: [facility]    BLEPHAROPLASTY BILATERAL  3/9/2006; 2013    both eyes, upper lid; revision (EN)    CATARACT IOL, RT/LT      HC REMOVAL GALLBLADDER      LASER YAG CAPSULOTOMY      left eye (AC lysis also)    PHACOEMULSIFICATION WITH STANDARD INTRAOCULAR LENS IMPLANT  1/2012; 4/2013    right eye; left eye    REPAIR PTOSIS      SURGICAL HISTORY OF -       endometriosis surgeriesx3    SURGICAL HISTORY OF -       ganiglion cyst onfoot    SURGICAL HISTORY OF -       Eye for \"droopy eye\"    ZZC APPENDECTOMY       Family History   Problem Relation Age of Onset    C.A.D. Father     C.A.D. Brother     Hypertension Mother     Cancer No family hx of     Diabetes No family hx of     Cerebrovascular Disease No family hx of     Thyroid Disease No family hx of     Glaucoma No family hx of     Macular Degeneration No family hx of      Social History     Socioeconomic History    Marital status:     Number of children: 3   Occupational History     Employer: RETIRED   Tobacco Use    Smoking status: Never     Passive exposure: Never    Smokeless tobacco: Never   Vaping Use    Vaping Use: Never used   Substance and Sexual Activity    Alcohol use: Yes     Comment: rarely    Drug use: No    Sexual activity: Yes     Partners: Male      ROS: 10 point ROS neg other than the symptoms noted above in the HPI.  Physical Exam  Vitals and nursing " note reviewed. Exam conducted with a chaperone present.   Musculoskeletal:         General: Tenderness present. No swelling, deformity or signs of injury.      Lumbar back: Spasms, tenderness and bony tenderness present. No swelling, deformity or lacerations. Decreased range of motion. Positive left straight leg raise test. Negative right straight leg raise test. No scoliosis.      Right lower leg: No edema.      Left lower leg: No edema.      Comments: Patient has facet loading with hyperextension and lateral bending.            Provided History: Low back pain; Low back pain, no complicating  feature; Chronic LBP duration >= 3 months; Worsening or not improving;  Follow-up in the last 28 - 60 days for conservative therapy; No  known/automatically detected potential contraindications to imaging;  Chronic pain of left knee; Chronic pain of left knee; Lumbar disc  herniation with radiculopathy     ICD-10: Chronic pain of left knee; Chronic pain of left knee; Lumbar  disc herniation with radiculopathy     Comparison: 7/12/2021     Technique: Sagittal T1-weighted, sagittal STIR, sagittal  diffusion-weighted (with ADC map), axial T1-weighted, and 3D  volumetric axial and sagittal reconstructed T2-weighted images of the  lumbar spine were obtained without intravenous contrast.      Findings: There are 5 lumbar-type vertebrae assumed for the purposes  of this dictation.  The tip of the conus medullaris is at L1.  Convex  dextroscoliosis with apex at L2; levoscoliosis with apex at L5.  Minimal  retrolisthesis of L1 on L2, L2 on L3, and L5 on S1 unchanged. Minimal  anterolisthesis of L4 on L5, also unchanged. Multilevel disc  desiccation and asymmetric loss of intervertebral disc height,  unchanged. L4 vertebral hemangioma. Multilevel degenerative endplate  changes.     On a level by level basis:     T12-L1: Circumferential disc osteophyte complex without spinal canal  stenosis. The neural foramen are mildly narrowed  bilaterally.     L1-2: Circumferential disc osteophyte complex with facet arthropathy  without significant spinal canal stenosis. Mild effacement of the left  lateral recess without neural impingement. The left neural foramen is  moderately narrowed and the right neural foramen is mildly narrowed.     L2-3: Circumferential disc osteophyte complex with facet arthropathy  without significant spinal canal stenosis. The right neural foramen is  mildly narrows the left neural foramen is mildly to moderately  narrowed.     L3-4: Circumferential disc osteophyte complex with facet arthropathy  without spinal canal stenosis. Moderate left and mild right foraminal  stenosis.     L4-5: Minimal anterolisthesis with facet arthropathy without  significant spinal canal stenosis. Neural foramen are mildly narrowed  bilaterally     L5-S1: Anterolisthesis with facet arthropathy no spinal canal  stenosis. Moderate left and moderate severe right foraminal stenosis.     Diffuse fatty atrophy of the erector spinae muscles.                                                                      Impression: No significant change from the prior study multilevel  lumbar spondylosis as detailed above.       A/P:Patient is an 84 y/o lady with low back pain. History, Physical and radiographic data suggests that her pain may be secondary to facet arthropathy.  I recommend medial branch blocks with the plan to perform RF denervation of the lumbar facets, if warranted  Start Date of Pain:several years  Is pain associated with radiculopathy? No.  Is pain associated with neurogenic claudication? No.  Is the pain aggravated by extension, rotation, or lateral bending? Yes.  Does the physical exam and assessment indicate facet disease? Yes.  Has the patient had radiographic imaging within the past 12 months that excludes other causes of cervical or lumbar pain? Yes.  Has the patient had 3 levels of RFA performed on the same side in the previous 6 months?  No.  Conservative Therapy  Has the patient failed to respond to any of the following?  Oral pain medications?  Medications taken she canot take NSAIDs as she takes Xarelto  Physical therapy? Yes  Manipulative therapy? No.  Location of manipulative therapy N/A  Dates of visits N/A  Steroid injections: No

## 2023-11-02 NOTE — PATIENT INSTRUCTIONS
Procedures:    Dr. Proctor wants to do Medial branch blocks     Call to schedule your procedure at [phone]. All procedure now require that you have a  with you when you check in for your appointment.      Your pre-procedure instructions are below, please call our clinic if you have any questions.    Treatment planning:        Follow up:        To speak with a nurse, schedule/reschedule/cancel a clinic appointment, or request a medication refill call: [phone].    You can also reach us by MyChart: [email or web address]

## 2023-11-03 ENCOUNTER — TELEPHONE (OUTPATIENT)
Dept: ANESTHESIOLOGY | Facility: CLINIC | Age: 83
End: 2023-11-03
Payer: COMMERCIAL

## 2023-11-03 PROBLEM — M47.817 LUMBOSACRAL SPONDYLOSIS WITHOUT MYELOPATHY: Status: ACTIVE | Noted: 2023-11-02

## 2023-11-03 NOTE — TELEPHONE ENCOUNTER
Patient is scheduled for surgery with Dr. Proctor    Spoke with: Patient    Date of Surgery: 12/07/23    Location: [facility]    Informed patient they will need an adult  yes    Additional comments: Patient is aware of date and time of the procedure.        Milli Sagastume MA on 11/3/2023 at 5:15 PM

## 2023-11-06 NOTE — TELEPHONE ENCOUNTER
Patient is on Xarelto. Forwarded to Dr. Proctor to advise on medication hold prior to procedure.       Ana Rosa Moreno, RNCC  Neurology/Neurosurgery

## 2023-12-01 NOTE — TELEPHONE ENCOUNTER
Writer confirmed with Dr. Proctor that pt is recommended hold Xarelto 3 days prior to lumbar MBB on 12/7/23 and may restart 24hrs after procedure. Also routed to prescribing provider for clearance.       Ana Rosa Moreno, RNCC  Neurology/Neurosurgery        Patient w/ COPD

## 2023-12-04 NOTE — TELEPHONE ENCOUNTER
Writer clarified with Dr. Proctor that pt does not need to hold her Xarelto prior to the MBB procedure. Writer discussed with the patient who also confirmed that this was discussed during her prior visit. She will continue her Xarleto as prescribed since hold is not necessary.         Ana Rosa Moreno, RNCC  Neurology/Neurosurgery

## 2023-12-06 ENCOUNTER — ANCILLARY ORDERS (OUTPATIENT)
Dept: ANESTHESIOLOGY | Facility: CLINIC | Age: 83
End: 2023-12-06

## 2023-12-06 DIAGNOSIS — R52 PAIN: Primary | ICD-10-CM

## 2023-12-07 ENCOUNTER — HOSPITAL ENCOUNTER (OUTPATIENT)
Facility: AMBULATORY SURGERY CENTER | Age: 83
Discharge: HOME | End: 2023-12-07
Payer: COMMERCIAL

## 2023-12-07 ENCOUNTER — ANCILLARY PROCEDURE (OUTPATIENT)
Dept: GENERAL RADIOLOGY | Facility: CLINIC | Age: 83
End: 2023-12-07
Payer: COMMERCIAL

## 2023-12-07 VITALS
SYSTOLIC BLOOD PRESSURE: 121 MMHG | RESPIRATION RATE: 16 BRPM | OXYGEN SATURATION: 96 % | DIASTOLIC BLOOD PRESSURE: 66 MMHG | TEMPERATURE: 98.4 F

## 2023-12-07 DIAGNOSIS — R52 PAIN: ICD-10-CM

## 2023-12-07 PROCEDURE — 64493 INJ PARAVERT F JNT L/S 1 LEV: CPT | Mod: RT,KX

## 2023-12-07 PROCEDURE — G8918 PT W/O PREOP ORDER IV AB PRO: HCPCS

## 2023-12-07 PROCEDURE — G8907 PT DOC NO EVENTS ON DISCHARG: HCPCS

## 2023-12-07 PROCEDURE — 64494 INJ PARAVERT F JNT L/S 2 LEV: CPT | Mod: LT,KX

## 2023-12-07 RX ORDER — LIDOCAINE HYDROCHLORIDE 10 MG/ML
INJECTION, SOLUTION EPIDURAL; INFILTRATION; INTRACAUDAL; PERINEURAL PRN
Status: DISCONTINUED | OUTPATIENT
Start: 2023-12-07 | End: 2023-12-07 | Stop reason: HOSPADM

## 2023-12-07 RX ORDER — BUPIVACAINE HYDROCHLORIDE 2.5 MG/ML
INJECTION, SOLUTION EPIDURAL; INFILTRATION; INTRACAUDAL PRN
Status: DISCONTINUED | OUTPATIENT
Start: 2023-12-07 | End: 2023-12-07 | Stop reason: HOSPADM

## 2023-12-07 NOTE — DISCHARGE INSTRUCTIONS
"PAIN INJECTION HOME CARE INSTRUCTIONS  Activity  -Rest today  -Do not work today  -Resume normal activity tomorrow  -DO NOT shower for 24 hours  -DO NOT remove bandaid for 24 hours    Pain  -You may experience soreness at the injection site for one or two days  -You may use an ice pack for 20 minutes every 2 hours for the first 24 hours  -You may use a heating pad after the first 24 hours  -You may use Tylenol (acetaminophen) every 4 hours or other pain medicines as directed by your physician    You may experience numbness radiating into your legs or arms (depending on the procedure location). This numbness may last several hours. Until sensation returns to normal; please use caution in walking, climbing stairs, and stepping out of your vehicle, etc.    DID YOU RECEIVE SEDATION TODAY?  {YES / NO:179769::\"Yes\"}No      Safety  Sedation medicine, if given, may remain active for many hours. It is important for the next 24 hours that you do not:  -Drive a car  -Operate machines or power tools  -Consume alcohol, including beer  -Sign any important papers or legal documents    DID YOU RECEIVE STEROIDS TODAY?  {YES / NO:740946::\"Yes\"} No    Common side effects of steroids:  Not everyone will experience corticosteroid side effects. If side effects are experienced, they will gradually subside in the 7-10 day period following an injection. Most common side effects include:  -Flushed face and/or chest  -Feeling of warmth, particularly in the face but could be an overall feeling of warmth  -Increased blood sugar in diabetic patients  -Menstrual irregularities my occur. If taking hormone-based birth control an alternate method of birth control is recommended  -Sleep disturbances and/or mood swings are possible  -Leg cramps    Please contact us if you have:  -Severe pain  -Fever more than 101.5 degrees Fahrenheit  -Signs of infection at the injection site (redness, swelling, or drainage)    FOR PAIN CENTER PATIENTS:  If you have " questions, please contact the Pain Clinic at [phone] Option #1 between the hours of 7:00 am and 3:00 pm Monday through Friday. After office hours you can contact the on call provider by dialing [phone]. If you need immediate attention, we recommend that you go to a hospital emergency room or dial [phone].      FOR PM&R PATIENTS:  For patients seen by the PM and R service, please call [phone]. (Monday through Friday 8a-5p.  After business hours and weekends call [phone] and ask for the PM and R doctor on call). If you need to fax a pain diary to PM&R the fax number is [phone]. If you are unable to fax, uploading to [employer] is encouraged, then send to provider. If you have procedure scheduling questions please call [phone] Option #2.

## 2023-12-14 NOTE — OP NOTE
Diagnostic Medial Branch Block    The patient s identity, the procedure to be performed and the specific site of the procedure was verified in accordance with [facility] Protocol.  Diagnosis: 1.  Sondyloarthropathy                     2.  Facet Arthropathy  Pre-procedure Pain Score: 7/10           Procedure Note:  Informed consent was obtained.  The patient was positioned comfortably in the prone position.  There was no evidence of infection at the sites of needle insertion.  The patient was prepped and draped in a sterile fashion.  Skeletal landmarks were identified with the fluoroscope guidance.  25 gauge spinal needles were then placed at the junction of the superior articulating process and the transverse process for lumbar and thoracic levels and centrally on the lateral mass in the cervical region.  Medication was then injected after negative aspiration. The patient was then observed for 30 minutes.  No complications were noted.      Levels:Bilateral   L3/4/ALA  Medication (per site): 0.5cc   0.25% Bupivacaine      Post Procedure Pain Score: 0/10    The patient was given discharge instructions and verbalizes understanding, including understanding of those signs and symptoms that would require emergency care.     Plan:   The patient will fill out a pain diary for the remainder of the day and will either fax, email or bring it to the pain clinic.      Counseling: Greater than 50% of this patient visit was spent in counseling the patient regarding the treatment of their pain, coordinating their overall treatment plan and assessing their progress.

## 2024-01-12 ENCOUNTER — VIRTUAL VISIT (OUTPATIENT)
Dept: ORTHOPEDICS | Facility: CLINIC | Age: 84
End: 2024-01-12
Payer: COMMERCIAL

## 2024-01-12 DIAGNOSIS — M47.816 LUMBAR FACET ARTHROPATHY: ICD-10-CM

## 2024-01-12 DIAGNOSIS — M51.369 DDD (DEGENERATIVE DISC DISEASE), LUMBAR: Primary | ICD-10-CM

## 2024-01-12 PROCEDURE — 99442 PR PHYSICIAN TELEPHONE EVALUATION 11-20 MIN: CPT | Mod: 93 | Performed by: PREVENTIVE MEDICINE

## 2024-01-12 RX ORDER — METHYLPREDNISOLONE 4 MG
TABLET, DOSE PACK ORAL
Qty: 21 TABLET | Refills: 1 | Status: SHIPPED | OUTPATIENT
Start: 2024-01-12

## 2024-01-12 NOTE — LETTER
1/12/2024       RE: Yumiko Mccartney  [address]  [place] [address]     Dear Colleague,    Thank you for referring your patient, Yumiko Mccartney, to the [facility] [place] at [facility]. Please see a copy of my visit note below.    Patient is a   83  year old who is being evaluated via a billable telephone visit.      What phone number would you like to be contacted at? CELL  How would you like to obtain your AVS? MYCHART        Subjective   Patient is a  83   year old who presents by phone call visit for the following:     HPI   Followup for injection lumbar  No relief  Wants to discuss medrol pack prescription   And wants to followup in 1-2 months    Review of Systems   Constitutional, HEENT, cardiovascular, pulmonary, gi and gu systems are negative, except as otherwise noted.      Objective           Vitals:  No vitals were obtained today due to virtual visit.    Physical Exam   healthy, alert, and no distress  PSYCH: Alert and oriented times 3; coherent speech, normal   rate and volume, able to articulate logical thoughts, able   to abstract reason, no tangential thoughts, no hallucinations   or delusions  His affect is normal  RESP: No cough, no audible wheezing, able to talk in full sentences  Remainder of exam unable to be completed due to telephone visits    Assessment/Plan  84 yo female with lumbar ddd, disc herniations, radicular pain, not resolved    I independently reviewed the following imaging studies and discussed with patient:    Lumbar MRI: shows ddd, disc herniations  Discussed and ordered medrol pack  followup in 1-2 months  Consider further treatments         Phone call duration: 20 minutes  Phone call start: 420pm  Phone call end: 440pm  Dr Armendariz      Again, thank you for allowing me to participate in the care of your patient.      Sincerely,    Abad Armendariz MD

## 2024-01-12 NOTE — PROGRESS NOTES
Patient is a   83  year old who is being evaluated via a billable telephone visit.      What phone number would you like to be contacted at? CELL  How would you like to obtain your AVS? EMILY        Subjective   Patient is a  83   year old who presents by phone call visit for the following:     HPI   Followup for injection lumbar  No relief  Wants to discuss medrol pack prescription   And wants to followup in 1-2 months    Review of Systems   Constitutional, HEENT, cardiovascular, pulmonary, gi and gu systems are negative, except as otherwise noted.      Objective           Vitals:  No vitals were obtained today due to virtual visit.    Physical Exam   healthy, alert, and no distress  PSYCH: Alert and oriented times 3; coherent speech, normal   rate and volume, able to articulate logical thoughts, able   to abstract reason, no tangential thoughts, no hallucinations   or delusions  His affect is normal  RESP: No cough, no audible wheezing, able to talk in full sentences  Remainder of exam unable to be completed due to telephone visits    Assessment/Plan  84 yo female with lumbar ddd, disc herniations, radicular pain, not resolved    I independently reviewed the following imaging studies and discussed with patient:    Lumbar MRI: shows ddd, disc herniations  Discussed and ordered medrol pack  followup in 1-2 months  Consider further treatments         Phone call duration: 20 minutes  Phone call start: 420pm  Phone call end: 440pm  Dr Armendariz

## 2024-02-09 ENCOUNTER — MYC REFILL (OUTPATIENT)
Dept: FAMILY MEDICINE | Facility: CLINIC | Age: 84
End: 2024-02-09
Payer: COMMERCIAL

## 2024-02-09 DIAGNOSIS — M51.369 DDD (DEGENERATIVE DISC DISEASE), LUMBAR: ICD-10-CM

## 2024-02-09 DIAGNOSIS — I10 ESSENTIAL HYPERTENSION WITH GOAL BLOOD PRESSURE LESS THAN 140/90: ICD-10-CM

## 2024-02-12 RX ORDER — GABAPENTIN 600 MG/1
1200 TABLET ORAL AT BEDTIME
Qty: 180 TABLET | Refills: 0 | Status: SHIPPED | OUTPATIENT
Start: 2024-02-12 | End: 2024-04-05

## 2024-02-12 RX ORDER — LISINOPRIL AND HYDROCHLOROTHIAZIDE 12.5; 2 MG/1; MG/1
TABLET ORAL
Qty: 180 TABLET | Refills: 0 | Status: SHIPPED | OUTPATIENT
Start: 2024-02-12 | End: 2024-04-05

## 2024-02-12 NOTE — TELEPHONE ENCOUNTER
Called and left a voicemail message to return our call to schedule an appt before the next refill is needed.  Yumiko Avery MA  [facility]   Primary Care

## 2024-02-12 NOTE — TELEPHONE ENCOUNTER
Called and left a voicemail message to return our call to schedule an appt before the next refill is needed.  Yumiko Avery MA  [facility]   Primary Care

## 2024-02-12 NOTE — TELEPHONE ENCOUNTER
90 days refill provided. Needs follow up for further refills since over a year since last visit. Thank you, Gavi Bear MD MPH

## 2024-02-12 NOTE — TELEPHONE ENCOUNTER
90 day refill provided. Will need follow up for further refills since over a year since last visit. Thank you, Gavi Bear MD MPH

## 2024-03-16 ENCOUNTER — OFFICE VISIT (OUTPATIENT)
Dept: URGENT CARE | Facility: URGENT CARE | Age: 84
End: 2024-03-16
Payer: COMMERCIAL

## 2024-03-16 VITALS
OXYGEN SATURATION: 98 % | HEART RATE: 73 BPM | TEMPERATURE: 97.8 F | SYSTOLIC BLOOD PRESSURE: 167 MMHG | DIASTOLIC BLOOD PRESSURE: 76 MMHG | BODY MASS INDEX: 34.83 KG/M2 | WEIGHT: 206.1 LBS

## 2024-03-16 DIAGNOSIS — M79.89 SWELLING OF LEFT FOOT: Primary | ICD-10-CM

## 2024-03-16 DIAGNOSIS — M79.672 LEFT FOOT PAIN: ICD-10-CM

## 2024-03-16 DIAGNOSIS — I82.411 DEEP VEIN THROMBOSIS (DVT) OF FEMORAL VEIN OF RIGHT LOWER EXTREMITY, UNSPECIFIED CHRONICITY (H): ICD-10-CM

## 2024-03-16 DIAGNOSIS — Z79.01 ANTICOAGULATED: ICD-10-CM

## 2024-03-16 PROCEDURE — 99214 OFFICE O/P EST MOD 30 MIN: CPT | Performed by: PHYSICIAN ASSISTANT

## 2024-03-16 NOTE — PROGRESS NOTES
"   Chief Complaint   Patient presents with    Foot Swelling     Patient seen today for left swollen foot. She states it's been 3 weeks and just the last few days it has become very painful.           ASSESSMENT:    ICD-10-CM    1. Swelling of left foot  M79.89       2. Left foot pain  M79.672       3. Anticoagulated  Z79.01       4. Deep vein thrombosis (DVT) of femoral vein of right lower extremity, unspecified chronicity (H)  I82.411             PLAN: Left foot pain and swelling-DVT versus cellulitis versus osteomyelitis versus stress fracture.  On Xarelto for prior right leg DVT.  Limited on resources here.   Cannot rule out left lower extremity DVT as no ultrasound available here.  Less likely being she is on Xarelto but it can happen.  Recommend she go to the [facility] for evaluation and treatment.    Darlene Corado PA-C        SUBJECTIVE:  Yumiko Mccartney is an 83 year old female who presents with right foot pain and swelling for 3 days.  Getting very hard  to bear weight on it.  No chest pain or shortness of breath.  On Xarelto since August 2023 for right lower extremity DVT.  No fever or chills.    Past Medical History:   Diagnosis Date    Acne rosacea     Actinic keratosis     Amblyopia     Asthma, moderate persistent     Basal cell carcinoma 10/2010    back X2    Cataract, mod, od; mild-mod, os 01/12/2012    DJD (degenerative joint disease), lumbosacral 08/06/2014    DVT (deep venous thrombosis) (H)     Elevated cholesterol     Endometriosis     HTN (hypertension)     Moderate major depression (H)     \"Circumstances\"    Osteopenia      History   Smoking Status    Never   Smokeless Tobacco    Never       ROS:  GEN no fevers  SKIN no erythema  Musculoskeletal:  See HPI.      OBJECTIVE:  Blood pressure (!) 167/76, pulse 73, temperature 97.8  F (36.6  C), temperature source Tympanic, weight 93.5 kg (206 lb 1.6 oz), SpO2 98%, not currently breastfeeding.  Patient is alert and NAD.  EYES: conjunctiva " clear  Ankle/foot exam (left):  Inspection: Entire foot is swollen.  No redness.  Palpation: Tender over foot especially medial dorsal aspect all the way to the big toe.  Foot does feel warm to palpation compared to left.  Cap refill intact.    Good doralis pedis.  Neurovascularly Intact Distally.         Darlene Corado PA-C

## 2024-04-05 ENCOUNTER — ANCILLARY PROCEDURE (OUTPATIENT)
Dept: GENERAL RADIOLOGY | Facility: CLINIC | Age: 84
End: 2024-04-05
Attending: PREVENTIVE MEDICINE
Payer: COMMERCIAL

## 2024-04-05 ENCOUNTER — OFFICE VISIT (OUTPATIENT)
Dept: FAMILY MEDICINE | Facility: CLINIC | Age: 84
End: 2024-04-05
Payer: COMMERCIAL

## 2024-04-05 VITALS
WEIGHT: 205 LBS | DIASTOLIC BLOOD PRESSURE: 75 MMHG | HEIGHT: 64 IN | BODY MASS INDEX: 35 KG/M2 | RESPIRATION RATE: 16 BRPM | HEART RATE: 79 BPM | TEMPERATURE: 97.9 F | SYSTOLIC BLOOD PRESSURE: 115 MMHG | OXYGEN SATURATION: 98 %

## 2024-04-05 DIAGNOSIS — Z00.00 ENCOUNTER FOR MEDICARE ANNUAL WELLNESS EXAM: Primary | ICD-10-CM

## 2024-04-05 DIAGNOSIS — M79.672 LEFT FOOT PAIN: ICD-10-CM

## 2024-04-05 DIAGNOSIS — M51.369 DDD (DEGENERATIVE DISC DISEASE), LUMBAR: ICD-10-CM

## 2024-04-05 DIAGNOSIS — I10 ESSENTIAL HYPERTENSION WITH GOAL BLOOD PRESSURE LESS THAN 140/90: ICD-10-CM

## 2024-04-05 DIAGNOSIS — J45.30 MILD PERSISTENT ASTHMA WITHOUT COMPLICATION: ICD-10-CM

## 2024-04-05 DIAGNOSIS — E66.01 SEVERE OBESITY WITH BODY MASS INDEX (BMI) OF 35.0 TO 35.9 AND COMORBIDITY (H): ICD-10-CM

## 2024-04-05 DIAGNOSIS — I82.411 ACUTE DEEP VEIN THROMBOSIS (DVT) OF FEMORAL VEIN OF RIGHT LOWER EXTREMITY (H): ICD-10-CM

## 2024-04-05 PROCEDURE — 80061 LIPID PANEL: CPT | Performed by: PREVENTIVE MEDICINE

## 2024-04-05 PROCEDURE — 99214 OFFICE O/P EST MOD 30 MIN: CPT | Mod: 25 | Performed by: PREVENTIVE MEDICINE

## 2024-04-05 PROCEDURE — 82043 UR ALBUMIN QUANTITATIVE: CPT | Performed by: PREVENTIVE MEDICINE

## 2024-04-05 PROCEDURE — G0439 PPPS, SUBSEQ VISIT: HCPCS | Performed by: PREVENTIVE MEDICINE

## 2024-04-05 PROCEDURE — 36415 COLL VENOUS BLD VENIPUNCTURE: CPT | Performed by: PREVENTIVE MEDICINE

## 2024-04-05 PROCEDURE — 73630 X-RAY EXAM OF FOOT: CPT | Mod: TC | Performed by: RADIOLOGY

## 2024-04-05 PROCEDURE — 80053 COMPREHEN METABOLIC PANEL: CPT | Performed by: PREVENTIVE MEDICINE

## 2024-04-05 PROCEDURE — 82570 ASSAY OF URINE CREATININE: CPT | Performed by: PREVENTIVE MEDICINE

## 2024-04-05 RX ORDER — GABAPENTIN 600 MG/1
TABLET ORAL
Qty: 270 TABLET | Refills: 1 | Status: SHIPPED | OUTPATIENT
Start: 2024-04-05 | End: 2024-09-13

## 2024-04-05 RX ORDER — LISINOPRIL AND HYDROCHLOROTHIAZIDE 12.5; 2 MG/1; MG/1
TABLET ORAL
Qty: 180 TABLET | Refills: 3 | Status: SHIPPED | OUTPATIENT
Start: 2024-04-05

## 2024-04-05 RX ORDER — RESPIRATORY SYNCYTIAL VIRUS VACCINE 120MCG/0.5
0.5 KIT INTRAMUSCULAR ONCE
Qty: 1 EACH | Refills: 0 | Status: CANCELLED | OUTPATIENT
Start: 2024-04-05 | End: 2024-04-05

## 2024-04-05 SDOH — HEALTH STABILITY: PHYSICAL HEALTH: ON AVERAGE, HOW MANY DAYS PER WEEK DO YOU ENGAGE IN MODERATE TO STRENUOUS EXERCISE (LIKE A BRISK WALK)?: 1 DAY

## 2024-04-05 ASSESSMENT — ASTHMA QUESTIONNAIRES
ACT_TOTALSCORE: 22
ACT_TOTALSCORE: 22
QUESTION_5 LAST FOUR WEEKS HOW WOULD YOU RATE YOUR ASTHMA CONTROL: WELL CONTROLLED
QUESTION_1 LAST FOUR WEEKS HOW MUCH OF THE TIME DID YOUR ASTHMA KEEP YOU FROM GETTING AS MUCH DONE AT WORK, SCHOOL OR AT HOME: A LITTLE OF THE TIME
QUESTION_3 LAST FOUR WEEKS HOW OFTEN DID YOUR ASTHMA SYMPTOMS (WHEEZING, COUGHING, SHORTNESS OF BREATH, CHEST TIGHTNESS OR PAIN) WAKE YOU UP AT NIGHT OR EARLIER THAN USUAL IN THE MORNING: NOT AT ALL
QUESTION_4 LAST FOUR WEEKS HOW OFTEN HAVE YOU USED YOUR RESCUE INHALER OR NEBULIZER MEDICATION (SUCH AS ALBUTEROL): NOT AT ALL
QUESTION_2 LAST FOUR WEEKS HOW OFTEN HAVE YOU HAD SHORTNESS OF BREATH: ONCE OR TWICE A WEEK

## 2024-04-05 ASSESSMENT — PAIN SCALES - GENERAL: PAINLEVEL: MODERATE PAIN (5)

## 2024-04-05 NOTE — PATIENT INSTRUCTIONS
Caloric restriction, portion control and physical activity are evidence based strategies for life style changes. Here is a starting point in your case:      REDUCED calorie meal plan; Goal is to lose 5-10% weight in the next six months.   Please keep a food journal of what you eat, calories in what you eat, and bring the journal with you to your next appointment.  Consider using applications for smart phones such as [employer], ChaCha, PhizzboRecipes, LoseIt, Tap&Track, and RelaxM. To keep food journal and track your exercise.   Focus on wet volumetrics, meaning, eat more foods that are high in water and fiber such as fruits and vegetables in order to get that full feeling. These are also good for your overall health as well.  Progressively increase physical activity to 45 minutes, including combination of cardio & resistance training x 5 days weekly. Start at 10-15 minutes per day and progressively work towards this goal.   Some insurance covers programs like weight watchers and I encourage you to contact your insurance.  Some individuals find programs like weight watchers helpful in their journey to a healthy life style.     A diet of around 1200 calories per day would be expected to lead to weight loss based upon your resting energy expenditure.      Use meal replacements such as Naz's meals, Lean Cuisines, Healthy Choice, Smart Ones, Weight Watchers Meals, and Slim Fast and supplement with fresh fruits and vegetables     Please drink a lot of water daily. Most people typically need about 2 liters of water daily and more if they are exercising, have a large weight, or have a fever or illness.     Please keep a food journal of what you eat, calories in what you eat, and mood and bring the journal with you to your next appointment. Keeping a personal food journal has been shown to reduce food intake by 20% even if no one looks at it besides you.      Consider using applications for smart phones such as  MyFitnessPal, LifeFitness, SparkRecipes, LoseIt, Tap&Track, and RelaxM.     Focus on wet volumetrics, meaning, eat more foods that are high in water and fiber such as fruits and vegetables in order to get that full feeling. These are also good for your overall health as well.     Check out Dr. Jailene Mayer from [facility] - she has cookbooks with low calorie volumetric recipes     You can try Let's Dish to help you prepare meals for you and your family. Often times, the caloric and nutrition data and serving sizes are available for this food. This can be a time saving maneuver. The website can give you more information http://www.[employer]/     [employer] has Let's Dish & fresh low calorie salads     Check out Hello Fresh at https://www.[employer]/food-boxes/classic-box/     Try Cooking Light recipes for low calorie meal preparation and planning     Other food plan options you can search for on the internet and check out include: Brittany Melendez MD, [facility]       Preventive Care Advice   This is general advice given by our system to help you stay healthy. However, your care team may have specific advice just for you. Please talk to your care team about your preventive care needs.  Nutrition  Eat 5 or more servings of fruits and vegetables each day.  Try wheat bread, brown rice and whole grain pasta (instead of white bread, rice, and pasta).  Get enough calcium and vitamin D. Check the label on foods and aim for 100% of the RDA (recommended daily allowance).  Lifestyle  Exercise at least 150 minutes each week   (30 minutes a day, 5 days a week).  Do muscle strengthening activities 2 days a week. These help control your weight and prevent disease.  No smoking.  Wear sunscreen to prevent skin cancer.  Have a dental exam and cleaning every 6 months.  Yearly exams  See your health care team every year to talk about:  Any changes in your health.  Any medicines your care team has  prescribed.  Preventive care, family planning, and ways to prevent chronic diseases.  Shots (vaccines)   HPV shots (up to age 26), if you've never had them before.  Hepatitis B shots (up to age 59), if you've never had them before.  COVID-19 shot: Get this shot when it's due.  Flu shot: Get a flu shot every year.  Tetanus shot: Get a tetanus shot every 10 years.  Pneumococcal, hepatitis A, and RSV shots: Ask your care team if you need these based on your risk.  Shingles shot (for age 50 and up).  General health tests  Diabetes screening:  Starting at age 35, Get screened for diabetes at least every 3 years.  If you are younger than age 35, ask your care team if you should be screened for diabetes.  Cholesterol test: At age 39, start having a cholesterol test every 5 years, or more often if advised.  Bone density scan (DEXA): At age 50, ask your care team if you should have this scan for osteoporosis (brittle bones).  Hepatitis C: Get tested at least once in your life.  STIs (sexually transmitted infections)  Before age 24: Ask your care team if you should be screened for STIs.  After age 24: Get screened for STIs if you're at risk. You are at risk for STIs (including HIV) if:  You are sexually active with more than one person.  You don't use condoms every time.  You or a partner was diagnosed with a sexually transmitted infection.  If you are at risk for HIV, ask about PrEP medicine to prevent HIV.  Get tested for HIV at least once in your life, whether you are at risk for HIV or not.  Cancer screening tests  Cervical cancer screening: If you have a cervix, begin getting regular cervical cancer screening tests at age 21. Most people who have regular screenings with normal results can stop after age 65. Talk about this with your provider.  Breast cancer scan (mammogram): If you've ever had breasts, begin having regular mammograms starting at age 40. This is a scan to check for breast cancer.  Colon cancer screening:  It is important to start screening for colon cancer at age 45.  Have a colonoscopy test every 10 years (or more often if you're at risk) Or, ask your provider about stool tests like a FIT test every year or Cologuard test every 3 years.  To learn more about your testing options, visit: https://www.[employer]/309919.pdf.  For help making a decision, visit: https://bit.ly/ef23485.  Prostate cancer screening test: If you have a prostate and are age 55 to 69, ask your provider if you would benefit from a yearly prostate cancer screening test.  Lung cancer screening: If you are a current or former smoker age 50 to 80, ask your care team if ongoing lung cancer screenings are right for you.  For informational purposes only. Not to replace the advice of your health care provider. Copyright   2023 [employer]. All rights reserved. Clinically reviewed by the [facility]. [employer] [id number] - REV 01/24.    Learning About Sleeping Well  What does sleeping well mean?     Sleeping well means getting enough sleep to feel good and stay healthy. How much sleep is enough varies among people.  The number of hours you sleep and how you feel when you wake up are both important. If you do not feel refreshed, you probably need more sleep. Another sign of not getting enough sleep is feeling tired during the day.  Experts recommend that adults get at least 7 or more hours of sleep per day. Children and older adults need more sleep.  Why is getting enough sleep important?  Getting enough quality sleep is a basic part of good health. When your sleep suffers, your physical health, mood, and your thoughts can suffer too. You may find yourself feeling more grumpy or stressed. Not getting enough sleep also can lead to serious problems, including injury, accidents, anxiety, and depression.  What might cause poor sleeping?  Many things can cause sleep problems, including:  Changes to your sleep  "schedule.  Stress. Stress can be caused by fear about a single event, such as giving a speech. Or you may have ongoing stress, such as worry about work or school.  Depression, anxiety, and other mental or emotional conditions.  Changes in your sleep habits or surroundings. This includes changes that happen where you sleep, such as noise, light, or sleeping in a different bed. It also includes changes in your sleep pattern, such as having jet lag or working a late shift.  Health problems, such as pain, breathing problems, and restless legs syndrome.  Lack of regular exercise.  Using alcohol, nicotine, or caffeine before bed.  How can you help yourself?  Here are some tips that may help you sleep more soundly and wake up feeling more refreshed.  Your sleeping area   Use your bedroom only for sleeping and sex. A bit of light reading may help you fall asleep. But if it doesn't, do your reading elsewhere in the house. Try not to use your TV, computer, smartphone, or tablet while you are in bed.  Be sure your bed is big enough to stretch out comfortably, especially if you have a sleep partner.  Keep your bedroom quiet, dark, and cool. Use curtains, blinds, or a sleep mask to block out light. To block out noise, use earplugs, soothing music, or a \"white noise\" machine.  Your evening and bedtime routine   Create a relaxing bedtime routine. You might want to take a warm shower or bath, or listen to soothing music.  Go to bed at the same time every night. And get up at the same time every morning, even if you feel tired.  What to avoid   Limit caffeine (coffee, tea, caffeinated sodas) during the day, and don't have any for at least 6 hours before bedtime.  Avoid drinking alcohol before bedtime. Alcohol can cause you to wake up more often during the night.  Try not to smoke or use tobacco, especially in the evening. Nicotine can keep you awake.  Limit naps during the day, especially close to bedtime.  Avoid lying in bed awake " "for too long. If you can't fall asleep or if you wake up in the middle of the night and can't get back to sleep within about 20 minutes, get out of bed and go to another room until you feel sleepy.  Avoid taking medicine right before bed that may keep you awake or make you feel hyper or energized. Your doctor can tell you if your medicine may do this and if you can take it earlier in the day.  If you can't sleep   Imagine yourself in a peaceful, pleasant scene. Focus on the details and feelings of being in a place that is relaxing.  Get up and do a quiet or boring activity until you feel sleepy.  Avoid drinking any liquids before going to bed to help prevent waking up often to use the bathroom.  Where can you learn more?  Go to https://www.[employer].com.net/patiented  Enter J942 in the search box to learn more about \"Learning About Sleeping Well.\"  Current as of: July 10, 2023               Content Version: 14.0    8156-7846 [employer].   Care instructions adapted under license by your healthcare professional. If you have questions about a medical condition or this instruction, always ask your healthcare professional. [employer] disclaims any warranty or liability for your use of this information.      "

## 2024-04-05 NOTE — RESULT ENCOUNTER NOTE
Yumiko,     X-rays of the foot did not show any fractures, dislocations, or significant arthritis.  If the left foot pain continues, we can have you see podiatry for further evaluation.    Please do not hesitate to call us at [phone] if you have any questions or concerns.    Thank you,    Gavi Bear MD MPH

## 2024-04-05 NOTE — PROGRESS NOTES
Preventive Care Visit  [facility] ANDRAE Bear MD, Family Medicine  Apr 5, 2024      Assessment & Plan     Encounter for Medicare annual wellness exam  -Preventive guidelines reviewed  -Patient will get pneumococcal 20 and RSV at her pharmacy    Essential hypertension with goal blood pressure less than 140/90  -Blood pressure is at goal  -Continue current medication  -Refills provided  - lisinopril-hydrochlorothiazide (ZESTORETIC) 20-12.5 MG tablet  Dispense: 180 tablet; Refill: 3  - Lipid panel reflex to direct LDL Non-fasting  - Albumin Random Urine Quantitative with Creat Ratio  - Comprehensive metabolic panel    Acute deep vein thrombosis (DVT) of femoral vein of right lower extremity (H)  -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 She was recommended to stay on anticoagulation lifelong for secondary prevention of venous thromboembolism.   -She had repeat ultrasound in 12/2022 that showed improvement in thrombus burden with residual thrombus of right distal femoral vein and popliteal vein. Hypercoagulable work up was unrevealing.  -At last hematology visit, reducing dose of anticoagulation from 20 mg to 10 mg was discussed.  At that time, patient had chosen to continue with 20 mg, however now she would like to reduce dose to 10 mg (prophylactic intensity)  -No significant bruising or bleeding  - Comprehensive metabolic panel  - rivaroxaban ANTICOAGULANT (XARELTO) 10 MG TABS tablet  Dispense: 90 tablet; Refill: 3    DDD (degenerative disc disease), lumbar  -Has been followed by sports medicine and has had injections from the pain clinic  - gabapentin (NEURONTIN) 600 MG tablet  Dispense: 270 tablet; Refill: 1    Mild persistent asthma without complication  -Stable    Left foot pain  -Recent evaluation in the emergency room for significant swelling and pain  -Symptoms are overall better  -Ultrasound in the emergency room was negative for DVT  -No  "trauma  -Await results of imaging  -Described as a sharp stabbing pain, may be related to neuropathy from degenerative disc disease in the lumbar spine  - XR Foot Left G/E 3 Views    Severe obesity with body mass index (BMI) of 35.0 to 35.9 and comorbidity (H)  Wt Readings from Last 2 Encounters:   04/05/24 93 kg (205 lb)   03/16/24 93.5 kg (206 lb 1.6 oz)      -Patient had questions about weight loss medications  -Discussed that GLP-1 receptor agonists are not covered by Medicare for weight loss at this time  -We also discussed the chronic, lifelong nature of these medications  -We discussed weight watchers  -Discussed caloric restriction  -Defer medications like phentermine as patient is on blood pressure medication  -Offered referral to the weight clinic, patient would like to wait on this for now    Review of external notes as documented elsewhere in note  Ordering of each unique test  Prescription drug management  45 minutes spent by me on the date of the encounter doing chart review, history and exam, documentation and further activities per the note      BMI  Estimated body mass index is 35 kg/m  as calculated from the following:    Height as of this encounter: 1.63 m (5' 4.17\").    Weight as of this encounter: 93 kg (205 lb).   Weight management plan: Discussed healthy diet and exercise guidelines    Counseling  Appropriate preventive services were discussed with this patient, including applicable screening as appropriate for fall prevention, nutrition, physical activity, Tobacco-use cessation, weight loss and cognition.  Checklist reviewing preventive services available has been given to the patient.  Reviewed patient's diet, addressing concerns and/or questions.   She is at risk for lack of exercise and has been provided with information to increase physical activity for the benefit of her well-being.   Discussed possible causes of fatigue.     Work on weight loss  Regular exercise    Subjective   Tomi is a " "83 year old, presenting for the following:  Physical        4/5/2024     2:00 PM   Additional Questions   Roomed by Kelsey   Accompanied by self         Via the Health Maintenance questionnaire, the patient has reported the following services have been completed -Eye Exam, this information has been sent to the abstraction team.  Health Care Directive  Patient does not have a Health Care Directive or Living Will: Advance Directive received and scanned. Click on Code in the patient header to view.    HPI      Feels bloated all the time:  -had questions about weight loss medication such as Wegovy, discussed not covered by medicare and these are lifelong medications, not meant for short term use, as weight gain recurs with stoppage  -had a problem with diarrhea a few years ago  -Regular since the last few weeks  -Fiber+  -Psyllium husk   -has been on weight watchers in the past   -declined weight referral     Numbness in the left foot+  Gabapentin 1200 mg at night , the pain is most then    Left foot pain:  -sharp stabbing pain in the left foot  -was seen in the ED 3/24, notes reviewed  -no trauma       Followed by sports medicine and the pain clinic for degenerative disc disease and lumbosacral spondylosis. Has also had knee pain and had therapy.  Reviewed ED notes from 3/16/24, swelling is better but   EYE exam in the last year    Able to get some chores done.  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 She was recommended to stay on anticoagulation lifelong for secondary prevention of venous thromboembolism. She had repeat ultrasound in 12/2022 that showed improvement in thrombus burden with residual thrombus of right distal femoral vein and popliteal vein. Hypercoagulable work up was unrevealing. She has been on Xarelto without recurrent venous thromboembolism symptoms nor significant bleeding. She does get easy " "bruising. Hemoglobin has been slowly dropping without signs of acute blood loss. She does have a history of iron deficiency.      Plan:  Tomi remains a good candidate to stay on long term anticoagulation for secondary prevention of venous thromboembolism. I discussed option to stay at current dose of 20mg once daily vs reduce to prophylactic intensity which is 10mg once daily. She would like to stay on 20mg once daily for now but if any increaesd bruising/bleeding symptoms would consider reductions. \"      Is also followed by dermatology for history of basal cell carcinoma.    Wt Readings from Last 2 Encounters:   04/05/24 93 kg (205 lb)   03/16/24 93.5 kg (206 lb 1.6 oz)      Hypertension Follow-up     Do you check your blood pressure regularly outside of the clinic? Yes   Are you following a low salt diet? Yes  Are your blood pressures ever more than 140 on the top number (systolic) OR more       than 90 on the bottom number (diastolic), for example 140/90? Yes     Blood pressure maximum 145 systolic      Asthma:  Breathing OK  Not needed Albuterol  Vacation in [place] recently  No chest pain   No syncope     History of DVT:  Taking xarelto, has been seen by Heme   No side effects  At first was bloated but now tolerating   No bleeding in the urine or stool         4/5/2024   General Health   How would you rate your overall physical health? Good   Feel stress (tense, anxious, or unable to sleep) Not at all         4/5/2024   Nutrition   Diet: Regular (no restrictions)         4/5/2024   Exercise   Days per week of moderate/strenous exercise 1 day   (!) EXERCISE CONCERN      4/5/2024   Social Factors   Worry food won't last until get money to buy more No   Food not last or not have enough money for food? No   Do you have housing?  Yes   Are you worried about losing your housing? No   Lack of transportation? No   Unable to get utilities (heat,electricity)? No         4/5/2024   Fall Risk   Fallen 2 or more times " in the past year? No   Trouble with walking or balance? No          4/5/2024   Activities of Daily Living- Home Safety   Needs help with the following daily activites None of the above   Safety concerns in the home None of the above         4/5/2024   Dental   Dentist two times every year? Yes         4/5/2024   Hearing Screening   Hearing concerns? None of the above         4/5/2024   Driving Risk Screening   Patient/family members have concerns about driving No         4/5/2024   General Alertness/Fatigue Screening   Have you been more tired than usual lately? (!) YES         4/5/2024   Urinary Incontinence Screening   Bothered by leaking urine in past 6 months No         4/5/2024   TB Screening   Were you born outside of the [place]? No         Today's PHQ-2 Score:       4/5/2024     2:04 PM   PHQ-2 ( 1999 [employer])   Q1: Little interest or pleasure in doing things 0   Q2: Feeling down, depressed or hopeless 0   PHQ-2 Score 0   Q1: Little interest or pleasure in doing things Not at all   Q2: Feeling down, depressed or hopeless Not at all   PHQ-2 Score 0           4/5/2024   Substance Use   Alcohol more than 3/day or more than 7/wk No   Do you have a current opioid prescription? No   How severe/bad is pain from 1 to 10? 6/10   Do you use any other substances recreationally? No     Social History     Tobacco Use    Smoking status: Never     Passive exposure: Never    Smokeless tobacco: Never   Vaping Use    Vaping Use: Never used   Substance Use Topics    Alcohol use: Yes     Comment: rarely    Drug use: No          Mammogram Screening - After age 74- determine frequency with patient based on health status, life expectancy and patient goals          Reviewed and updated as needed this visit by Provider   Tobacco  Allergies  Meds  Problems  Med Hx  Surg Hx  Fam Hx            Past Medical History:   Diagnosis Date    Acne rosacea     Actinic keratosis     Amblyopia     Asthma, moderate persistent     Basal cell  "carcinoma 10/2010    back X2    Cataract, mod, od; mild-mod, os 01/12/2012    DJD (degenerative joint disease), lumbosacral 08/06/2014    DVT (deep venous thrombosis) (H)     Elevated cholesterol     Endometriosis     HTN (hypertension)     Moderate major depression (H)     \"Circumstances\"    Osteopenia      Past Surgical History:   Procedure Laterality Date    ARTHROPLASTY KNEE Left 10/4/2021    Procedure: ARTHROPLASTY, LEFT KNEE, TOTAL;  Surgeon: Glenn Calvin MD;  Location: [facility] OR    BLEPHAROPLASTY BILATERAL  3/9/2006; 2013    both eyes, upper lid; revision (EN)    CATARACT IOL, RT/LT      HC REMOVAL GALLBLADDER      INJECT BLOCK MEDIAL BRANCH CERVICAL/THORACIC/LUMBAR Bilateral 12/7/2023    Procedure: BLOCK, NERVE, FACET JOINT, MEDIAL BRANCH, DIAGNOSTICL3/4/ALA;  Surgeon: Dinesh Proctor MD;  Location: [facility] OR    LASER YAG CAPSULOTOMY      left eye (AC lysis also)    PHACOEMULSIFICATION WITH STANDARD INTRAOCULAR LENS IMPLANT  1/2012; 4/2013    right eye; left eye    REPAIR PTOSIS      SURGICAL HISTORY OF -       endometriosis surgeriesx3    SURGICAL HISTORY OF -       ganiglion cyst onfoot    SURGICAL HISTORY OF -       Eye for \"droopy eye\"    [facility] APPENDECTOMY       Lab work is in process  Labs reviewed in EPIC  BP Readings from Last 3 Encounters:   04/05/24 115/75   03/16/24 (!) 167/76   12/07/23 121/66    Wt Readings from Last 3 Encounters:   04/05/24 93 kg (205 lb)   03/16/24 93.5 kg (206 lb 1.6 oz)   11/02/23 90.7 kg (200 lb)                  Patient Active Problem List   Diagnosis    Asthma, moderate persistent    Acne rosacea    CARDIOVASCULAR SCREENING; LDL GOAL LESS THAN 130    Osteopenia    Vitamin D deficiency    Advanced directives, counseling/discussion    Hx of pseudoexfoliation, os    Pseudophakia, ou; Yag Caps, os    HTN, goal below 140/90    Peptic ulcer    Health Care Home    History of blepharoplasty, upper lid, ou (elsewhere); revision (EN)    Obesity, Class II, BMI 35-39.9    " "Restless legs syndrome    DJD (degenerative joint disease), lumbosacral    Rosacea    Essential hypertension with goal blood pressure less than 140/90    Obesity, Class I, BMI 30-34.9    Posterior vitreous detachment, bilateral    Vertigo    Amblyopia, mild, of left eye    Chronic pain of left knee    Morbid obesity (H)    Lumbosacral spondylosis without myelopathy     Past Surgical History:   Procedure Laterality Date    ARTHROPLASTY KNEE Left 10/4/2021    Procedure: ARTHROPLASTY, LEFT KNEE, TOTAL;  Surgeon: Glenn Calvin MD;  [facility]: [facility]    BLEPHAROPLASTY BILATERAL  3/9/2006; 2013    both eyes, upper lid; revision (EN)    CATARACT IOL, RT/LT      HC REMOVAL GALLBLADDER      INJECT BLOCK MEDIAL BRANCH CERVICAL/THORACIC/LUMBAR Bilateral 12/7/2023    Procedure: BLOCK, NERVE, FACET JOINT, MEDIAL BRANCH, DIAGNOSTICL3/4/ALA;  Surgeon: Dinesh Proctor MD;  Location: [facility]    LASER YAG CAPSULOTOMY      left eye (AC lysis also)    PHACOEMULSIFICATION WITH STANDARD INTRAOCULAR LENS IMPLANT  1/2012; 4/2013    right eye; left eye    REPAIR PTOSIS      SURGICAL HISTORY OF -       endometriosis surgeriesx3    SURGICAL HISTORY OF -       ganiglion cyst onfoot    SURGICAL HISTORY OF -       Eye for \"droopy eye\"    [facility] APPENDECTOMY         Social History     Tobacco Use    Smoking status: Never     Passive exposure: Never    Smokeless tobacco: Never   Substance Use Topics    Alcohol use: Yes     Comment: rarely     Family History   Problem Relation Age of Onset    C.A.D. Father     C.A.D. Brother     Hypertension Mother     Cancer No family hx of     Diabetes No family hx of     Cerebrovascular Disease No family hx of     Thyroid Disease No family hx of     Glaucoma No family hx of     Macular Degeneration No family hx of          Current Outpatient Medications   Medication Sig Dispense Refill    acetaminophen (TYLENOL) 325 MG tablet Take 2 tablets (650 mg) by mouth every 4 hours as needed for other 60 tablet 0 "    albuterol (PROVENTIL) (2.5 MG/3ML) 0.083% neb solution Take 1 vial (2.5 mg) by nebulization every 6 hours as needed for shortness of breath or wheezing 90 mL 0    calcium citrate (CITRACAL) 950 (200 Ca) MG tablet Take 1 tablet by mouth 2 times daily      Cholecalciferol (VITAMIN D) 2000 UNIT CAPS Take 2,000 Units by mouth daily      gabapentin (NEURONTIN) 600 MG tablet 300 mg in the morning and 1200 mg at bedtime. 270 tablet 1    lisinopril-hydrochlorothiazide (ZESTORETIC) 20-12.5 MG tablet TAKE 2 TABLETS BY MOUTH EVERY  tablet 3    methylPREDNISolone (MEDROL DOSEPAK) 4 MG tablet therapy pack Follow Package Directions 21 tablet 1    Multiple Vitamin (MULTIVITAMIN ADULT PO) Take by mouth daily      rivaroxaban ANTICOAGULANT (XARELTO) 10 MG TABS tablet Take 1 tablet (10 mg) by mouth daily (with dinner) 90 tablet 3    tiZANidine (ZANAFLEX) 4 MG tablet TAKE 1-2 TABLETS (4-8 MG) BY MOUTH NIGHTLY AS NEEDED FOR MUSCLE SPASMS 180 tablet 1     Allergies   Allergen Reactions    Erythromycin Swelling and Other (See Comments)     Current providers sharing in care for this patient include:  Patient Care Team:  Gavi Bear MD as PCP - General (Family Practice)  Jesse Huitron DPM (Podiatry)  Gavi Bear MD as MD (Family Practice)  Gavi Bear MD as Assigned PCP  Anaid Redd PA-C as Assigned Cancer Care Provider  Sayra No PA-C as Physician Assistant (Dermatology)  Ashu Gonzales MD as MD (Ophthalmology)  Abad Armendariz MD as Assigned Musculoskeletal Provider  Sayra No PA-C as Assigned Surgical Provider    The following health maintenance items are reviewed in Epic and correct as of today:  Health Maintenance   Topic Date Due    URINE DRUG SCREEN  Never done    RSV VACCINE (Pregnancy & 60+) (1 - 1-dose 60+ series) Never done    ASTHMA ACTION PLAN  07/21/2017    EYE EXAM  12/19/2023    ASTHMA CONTROL TEST  10/05/2024    MEDICARE ANNUAL WELLNESS VISIT  04/05/2025     "ANNUAL REVIEW OF  ORDERS  04/05/2025    FALL RISK ASSESSMENT  04/05/2025    ADVANCE CARE PLANNING  08/14/2025    DTAP/TDAP/TD IMMUNIZATION (4 - Td or Tdap) 01/17/2033    DEXA  01/20/2038    PHQ-2 (once per calendar year)  Completed    INFLUENZA VACCINE  Completed    Pneumococcal Vaccine: 65+ Years  Completed    ZOSTER IMMUNIZATION  Completed    COVID-19 Vaccine  Completed    IPV IMMUNIZATION  Aged Out    HPV IMMUNIZATION  Aged Out    MENINGITIS IMMUNIZATION  Aged Out    RSV MONOCLONAL ANTIBODY  Aged Out    MAMMO SCREENING  Discontinued         Review of Systems  Constitutional, HEENT, cardiovascular, pulmonary, gi and gu systems are negative, except as otherwise noted.     Objective    Exam  /75 (BP Location: Left arm, Patient Position: Sitting, Cuff Size: Adult Large)   Pulse 79   Temp 97.9  F (36.6  C) (Tympanic)   Resp 16   Ht 1.63 m (5' 4.17\")   Wt 93 kg (205 lb)   SpO2 98%   BMI 35.00 kg/m     Estimated body mass index is 35 kg/m  as calculated from the following:    Height as of this encounter: 1.63 m (5' 4.17\").    Weight as of this encounter: 93 kg (205 lb).    Physical Exam  GENERAL APPEARANCE: healthy, alert and no distress  EYES: Eyes grossly normal to inspection and conjunctivae and sclerae normal  RESP: lungs clear to auscultation - no rales, rhonchi or wheezes  CV: regular rates and rhythm, normal S1 S2  ABDOMEN: soft, non-tender and no rebound or guarding   MS: extremities normal- no gross deformities noted  SKIN: no suspicious lesions or rashes  NEURO: Normal strength and tone, mentation intact and speech normal  PSYCH: mentation appears normal  Left foot: no gross deformities, some edema on the dorsum, no erythema, no rash, strength and range of motion intact, neurovascular intact           4/5/2024   Mini Cog   Clock Draw Score 2 Normal   3 Item Recall 3 objects recalled   Mini Cog Total Score 5              Signed Electronically by: Gavi Bear MD MPH      "

## 2024-04-06 LAB
ALBUMIN SERPL BCG-MCNC: 4.7 G/DL (ref 3.5–5.2)
ALP SERPL-CCNC: 84 U/L (ref 40–150)
ALT SERPL W P-5'-P-CCNC: 15 U/L (ref 0–50)
ANION GAP SERPL CALCULATED.3IONS-SCNC: 9 MMOL/L (ref 7–15)
AST SERPL W P-5'-P-CCNC: 24 U/L (ref 0–45)
BILIRUB SERPL-MCNC: 0.3 MG/DL
BUN SERPL-MCNC: 20 MG/DL (ref 8–23)
CALCIUM SERPL-MCNC: 10 MG/DL (ref 8.8–10.2)
CHLORIDE SERPL-SCNC: 100 MMOL/L (ref 98–107)
CHOLEST SERPL-MCNC: 176 MG/DL
CREAT SERPL-MCNC: 1.05 MG/DL (ref 0.51–0.95)
CREAT UR-MCNC: 157 MG/DL
DEPRECATED HCO3 PLAS-SCNC: 28 MMOL/L (ref 22–29)
EGFRCR SERPLBLD CKD-EPI 2021: 53 ML/MIN/1.73M2
FASTING STATUS PATIENT QL REPORTED: ABNORMAL
GLUCOSE SERPL-MCNC: 102 MG/DL (ref 70–99)
HDLC SERPL-MCNC: 46 MG/DL
LDLC SERPL CALC-MCNC: 88 MG/DL
MICROALBUMIN UR-MCNC: 31.6 MG/L
MICROALBUMIN/CREAT UR: 20.13 MG/G CR (ref 0–25)
NONHDLC SERPL-MCNC: 130 MG/DL
POTASSIUM SERPL-SCNC: 4.4 MMOL/L (ref 3.4–5.3)
PROT SERPL-MCNC: 7.4 G/DL (ref 6.4–8.3)
SODIUM SERPL-SCNC: 137 MMOL/L (ref 135–145)
TRIGL SERPL-MCNC: 209 MG/DL

## 2024-04-11 ENCOUNTER — MYC MEDICAL ADVICE (OUTPATIENT)
Dept: FAMILY MEDICINE | Facility: CLINIC | Age: 84
End: 2024-04-11
Payer: COMMERCIAL

## 2024-04-11 DIAGNOSIS — E66.01 SEVERE OBESITY WITH BODY MASS INDEX (BMI) OF 35.0 TO 35.9 AND COMORBIDITY (H): Primary | ICD-10-CM

## 2024-04-11 NOTE — RESULT ENCOUNTER NOTE
Yumiko,     LDL cholesterol is at goal for you.  Try and get 150 minutes of exercise per week to improve good cholesterol HDL.  Urine sample is not showing any abnormal protein.  Electrolytes, non fasting glucose and liver function tests are normal.  Kidney function is low but stable for you.     Please do not hesitate to call us at [phone] if you have any questions or concerns.    Thank you,    Gavi Bear MD MPH

## 2024-04-12 RX ORDER — BUPROPION HYDROCHLORIDE 150 MG/1
150 TABLET ORAL EVERY MORNING
Qty: 30 TABLET | Refills: 1 | Status: SHIPPED | OUTPATIENT
Start: 2024-04-12 | End: 2024-05-08

## 2024-04-12 NOTE — TELEPHONE ENCOUNTER
Script sent. Please schedule a Virtual follow up in 4 weeks to assess progress and to make sure no side effects.  Thank you,  Gavi Bear MD MPH

## 2024-05-02 NOTE — PROGRESS NOTES
07/12/23 0500   Appointment Info   Signing clinician's name / credentials Homer Suarez DPT   Total/Authorized Visits 11 E[employer]T   Visits Used 10   Medical Diagnosis Arthrofibrosis of knee joint, right   PT Tx Diagnosis left knee pain   Quick Adds Certification   Progress Note/Certification   Start of Care Date 03/02/23   Onset of illness/injury or Date of Surgery 10/04/21   Therapy Frequency 2x/ month   Predicted Duration 6 weeks.   Certification date from 06/23/23   Certification date to 08/23/23   Progress Note Due Date 05/25/23   Progress Note Completed Date 04/28/23       Present No   GOALS   PT Goals 2   PT Goal 1   Goal Identifier stair   Goal Description descending stairs in a normal reciprocal pattern / 5inch step 2/10 PL or less   Rationale to maximize safety and independence with performance of ADLs and functional tasks   Goal Progress desnding 5 inch step in a normal fashion   Target Date 03/23/23   Date Met 03/30/23   PT Goal 2   Goal Identifier stair long   Goal Description Descend 7 inch stairs in a normal reciprocal pattern 2/10 PL or less   Rationale to maximize safety and independence within the community;to maximize safety and independence with performance of ADLs and functional tasks   Goal Progress Ascending and Descending & inch step in reciprocal pattern with 3/10 PL   Target Date 08/23/23   Subjective Report   Subjective Report Patient since her last visit she has been dealing with sciatica. Had an injection for her low back 3 weeks ago which only helped for a few days so now on predinsone.   Objective Measures   Objective Measures Objective Measure 1;Objective Measure 3;Objective Measure 2   Objective Measure 1   Objective Measure Knee ROM,   Details 118 knee flexion before exericses. 123 degrees flexion after flexinator; Full extension   Objective Measure 2   Objective Measure knee strength   Details knee fleixion 4+/5 with postieror thigh pain, hip flexion 5/5, hip  MCHAT-R Screening Score & Risk Level  Total MCHAT-R Score: 0  Risk level based on score: Low Risk     abd 5/5, hip ext 4+/5, adduction 5/5 .   Objective Measure 3   Objective Measure stairs   Details ascending stair painfree, descending 7 inch step normal reciprocal pattern with 3/10 PL   Treatment Interventions (PT)   Interventions Therapeutic Procedure/Exercise;Manual Therapy   Therapeutic Procedure/Exercise   Therapeutic Procedures: strength, endurance, ROM, flexibillity minutes (47573) 28   Ther Proc 1 Bike SH 3 7' L 5   PTRx Ther Proc 1 Supine Butterfly   PTRx Ther Proc 1 - Details 3 x 30 sec.    PTRx Ther Proc 2 Heel Slides   PTRx Ther Proc 2 - Details Flexinator: started at 20.5 inch  worked to 21.25inch   PTRx Ther Proc 3 Toe Raises   PTRx Ther Proc 3 - Details x30   PTRx Ther Proc 4 Hip Flexion Straight Leg Raise   PTRx Ther Proc 4 - Details 3x15   PTRx Ther Proc 5 Hip Abduction Straight Leg Raise   PTRx Ther Proc 5 - Details 3x15   PTRx Ther Proc 6 Seated Hamstring Curl with Tubing   PTRx Ther Proc 6 - Details RTB 3x15   Skilled Intervention continue with strengthening re-educated patient on progressing reps.   Patient Response/Progress tolerated well with inc in ROM   PTRx Ther Proc 7 Short Arc Knee Extension   PTRx Ther Proc 7 - Details x30   PTRx Ther Proc 8 Hip Adduction Straight Leg Raise   PTRx Ther Proc 8 - Details 2 x 20   Manual Therapy   Manual Therapy: Mobilization, MFR, MLD, friction massage minutes (21628) 10   Manual Therapy 1 STM to medial knee and medial hamstring   Manual Therapy 1 - Details supine   Skilled Intervention for manual for tightness and pain control   Patient Response/Progress dec PL  after   Education   Learner/Method Patient;Demonstration;Pictures/Video;No Barriers to Learning   Plan   Home program progressed load on exercises. trialing 2 weeks independent to see if ROM maintains the same   Updates to plan of care 2x / month   Plan for next session advanced to closed chain / finalized HEP.   Total Session Time   Timed Code Treatment Minutes 38   Total Treatment Time (sum of  timed and untimed services) 38       [facility]                                                                                   OUTPATIENT PHYSICAL THERAPY    PLAN OF TREATMENT FOR OUTPATIENT REHABILITATION   Patient's Last Name, First Name, Yumiko Deng YOB: 1940   Provider's Name   [facility]   Medical Record No.  [id number]     Onset Date: 10/04/21  Start of Care Date: 03/02/23     Medical Diagnosis:  Arthrofibrosis of knee joint, right      PT Treatment Diagnosis:  left knee pain Plan of Treatment  Frequency/Duration: 2x/ month/ 6 weeks.    Certification date from 06/23/23 to 08/23/23         See note for plan of treatment details and functional goals     Homer Suarez, PT                         I CERTIFY THE NEED FOR THESE SERVICES FURNISHED UNDER        THIS PLAN OF TREATMENT AND WHILE UNDER MY CARE     (Physician attestation of this document indicates review and certification of the therapy plan).                Referring Provider:  Glenn Calvin      Initial Assessment  See Epic Evaluation- Start of Care Date: 03/02/23            PLAN  2 x/ month for 6 weeks    Beginning/End Dates of Progress Note Reporting Period:  04/28/23 to 07/12/2023    Referring Provider:  Glenn Calvin

## 2024-05-11 DIAGNOSIS — I82.411 ACUTE DEEP VEIN THROMBOSIS (DVT) OF FEMORAL VEIN OF RIGHT LOWER EXTREMITY (H): Primary | ICD-10-CM

## 2024-05-13 RX ORDER — RIVAROXABAN 20 MG/1
20 TABLET, FILM COATED ORAL
Qty: 90 TABLET | Refills: 1 | Status: SHIPPED | OUTPATIENT
Start: 2024-05-13 | End: 2024-08-07 | Stop reason: DRUGHIGH

## 2024-05-14 ENCOUNTER — ANCILLARY PROCEDURE (OUTPATIENT)
Dept: GENERAL RADIOLOGY | Facility: CLINIC | Age: 84
End: 2024-05-14
Attending: PODIATRIST
Payer: COMMERCIAL

## 2024-05-14 ENCOUNTER — OFFICE VISIT (OUTPATIENT)
Dept: PODIATRY | Facility: CLINIC | Age: 84
End: 2024-05-14
Payer: COMMERCIAL

## 2024-05-14 VITALS — HEART RATE: 76 BPM | SYSTOLIC BLOOD PRESSURE: 130 MMHG | DIASTOLIC BLOOD PRESSURE: 76 MMHG

## 2024-05-14 DIAGNOSIS — M79.672 LEFT FOOT PAIN: Primary | ICD-10-CM

## 2024-05-14 DIAGNOSIS — M79.672 LEFT FOOT PAIN: ICD-10-CM

## 2024-05-14 PROCEDURE — 73630 X-RAY EXAM OF FOOT: CPT | Mod: TC | Performed by: RADIOLOGY

## 2024-05-14 PROCEDURE — 84550 ASSAY OF BLOOD/URIC ACID: CPT | Performed by: PODIATRIST

## 2024-05-14 PROCEDURE — 36415 COLL VENOUS BLD VENIPUNCTURE: CPT | Performed by: PODIATRIST

## 2024-05-14 PROCEDURE — 99204 OFFICE O/P NEW MOD 45 MIN: CPT | Performed by: PODIATRIST

## 2024-05-14 NOTE — LETTER
5/14/2024         RE: Yumiko Mccartney  [address]  [place] [address]        Dear Colleague,    Thank you for referring your patient, Yumiko Mccartney, to the [facility]. Please see a copy of my visit note below.    Subjective:    Pt is seen today as a new pt referral with the c/c of painful left foot.  This has been symptomatic since early March 2024.  Pain aggravated by activity and relieved by rest.  Denies any history of trauma or new shoes.  Patient has swelling with this.  Points to all around ankle and foot.  Denies ecchymosis.  Denies erythema.  Denies weakness or increased deformity.  She denies any history of gout.  Denies calf pain.  Denies fever or chills.  Is going on vacation in a few days.  Has never had this before.    ROS:   see above       Allergies   Allergen Reactions     Erythromycin Swelling and Other (See Comments)       Current Outpatient Medications   Medication Sig Dispense Refill     acetaminophen (TYLENOL) 325 MG tablet Take 2 tablets (650 mg) by mouth every 4 hours as needed for other 60 tablet 0     albuterol (PROVENTIL) (2.5 MG/3ML) 0.083% neb solution Take 1 vial (2.5 mg) by nebulization every 6 hours as needed for shortness of breath or wheezing 90 mL 0     buPROPion (WELLBUTRIN XL) 150 MG 24 hr tablet Take 1 tablet (150 mg) by mouth every morning 90 tablet 0     calcium citrate (CITRACAL) 950 (200 Ca) MG tablet Take 1 tablet by mouth 2 times daily       Cholecalciferol (VITAMIN D) 2000 UNIT CAPS Take 2,000 Units by mouth daily       gabapentin (NEURONTIN) 600 MG tablet 300 mg in the morning and 1200 mg at bedtime. 270 tablet 1     lisinopril-hydrochlorothiazide (ZESTORETIC) 20-12.5 MG tablet TAKE 2 TABLETS BY MOUTH EVERY  tablet 3     methylPREDNISolone (MEDROL DOSEPAK) 4 MG tablet therapy pack Follow Package Directions 21 tablet 1     Multiple Vitamin (MULTIVITAMIN ADULT PO) Take by mouth daily       rivaroxaban ANTICOAGULANT (XARELTO  "ANTICOAGULANT) 20 MG TABS tablet TAKE 1 TABLET BY MOUTH DAILY WITH DINNER 90 tablet 1     rivaroxaban ANTICOAGULANT (XARELTO) 10 MG TABS tablet Take 1 tablet (10 mg) by mouth daily (with dinner) 90 tablet 3     tiZANidine (ZANAFLEX) 4 MG tablet Take 1-2 tablets (4-8 mg) by mouth nightly as needed for muscle spasms 180 tablet 0       Patient Active Problem List   Diagnosis     Asthma, moderate persistent     Acne rosacea     CARDIOVASCULAR SCREENING; LDL GOAL LESS THAN 130     Osteopenia     Vitamin D deficiency     Advanced directives, counseling/discussion     Hx of pseudoexfoliation, os     Pseudophakia, ou; Yag Caps, os     HTN, goal below 140/90     Peptic ulcer     Health Care Home     History of blepharoplasty, upper lid, ou (elsewhere); revision (EN)     Obesity, Class II, BMI 35-39.9     Restless legs syndrome     DJD (degenerative joint disease), lumbosacral     Rosacea     Essential hypertension with goal blood pressure less than 140/90     Obesity, Class I, BMI 30-34.9     Posterior vitreous detachment, bilateral     Vertigo     Amblyopia, mild, of left eye     Chronic pain of left knee     Morbid obesity (H)     Lumbosacral spondylosis without myelopathy       Past Medical History:   Diagnosis Date     Acne rosacea      Actinic keratosis      Amblyopia      Asthma, moderate persistent      Basal cell carcinoma 10/2010    back X2     Cataract, mod, od; mild-mod, os 01/12/2012     DJD (degenerative joint disease), lumbosacral 08/06/2014     DVT (deep venous thrombosis) (H)      Elevated cholesterol      Endometriosis      HTN (hypertension)      Moderate major depression (H)     \"Circumstances\"     Osteopenia        Past Surgical History:   Procedure Laterality Date     ARTHROPLASTY KNEE Left 10/4/2021    Procedure: ARTHROPLASTY, LEFT KNEE, TOTAL;  Surgeon: Glenn Calvin MD;  Location: [facility]     BLEPHAROPLASTY BILATERAL  3/9/2006; 2013    both eyes, upper lid; revision (EN)     CATARACT IOL, RT/LT   " "    HC REMOVAL GALLBLADDER       INJECT BLOCK MEDIAL BRANCH CERVICAL/THORACIC/LUMBAR Bilateral 12/7/2023    Procedure: BLOCK, NERVE, FACET JOINT, MEDIAL BRANCH, DIAGNOSTICL3/4/ALA;  Surgeon: Dinesh Proctor MD;  Location: [facility] OR     LASER YAG CAPSULOTOMY      left eye (AC lysis also)     PHACOEMULSIFICATION WITH STANDARD INTRAOCULAR LENS IMPLANT  1/2012; 4/2013    right eye; left eye     REPAIR PTOSIS       SURGICAL HISTORY OF -       endometriosis surgeriesx3     SURGICAL HISTORY OF -       ganiglion cyst onfoot     SURGICAL HISTORY OF -       Eye for \"droopy eye\"     ZZC APPENDECTOMY         Family History   Problem Relation Age of Onset     C.A.D. Father      C.A.D. Brother      Hypertension Mother      Cancer No family hx of      Diabetes No family hx of      Cerebrovascular Disease No family hx of      Thyroid Disease No family hx of      Glaucoma No family hx of      Macular Degeneration No family hx of        Social History     Tobacco Use     Smoking status: Never     Passive exposure: Never     Smokeless tobacco: Never   Substance Use Topics     Alcohol use: Yes     Comment: rarely             O:  /76   Pulse 76 .       Constitutional/ general:  Pt is in no apparent distress, appears well-nourished.  Cooperative with history and physical exam.     Psych:  The patient answered questions appropriately.  Normal affect.  Seems to have reasonable expectations, in terms of treatment.     Lungs:  Non labored breathing, non labored speech. No cough.  No audible wheezing. Even, quiet breathing.       Vascular:  Pedal pulses are palpable bilaterally for both the DP and PT arteries.  CFT < 3 sec.  No edema.  Pedal hair growth noted.     Neuro:  Alert and oriented x 3. Coordinated gait.  Light touch sensation is intact     Derm: Normal texture and turgor.  No erythema, ecchymosis, or cyanosis.  No open lesions.     Musculoskeletal:   Cavus foot with weightbearing bilateral.  No forefoot or rear foot " deformities noted.  MS 5/5 all compartments.  No pain on palpation of any tendons.  No pain with stressing any tendons.  Normal ROM all fore foot and rearfoot joints with no pain or crepitus.  No equinus.  Pain mostly located lateral and plantar styloid process left foot.  Especially painful plantar.  The skin is intact.  No underlying masses.  No pain on the tarsometatarsal joint.  No pain over the peroneal tendons lateral calcaneus.  No pain with stressing peroneal tendons.  Slight pain dorsal medial tarsal bones but hard to localize.  No pain on tibialis anterior tendon.  No pain over posterior tibial tendon.  Edema noted on left ankle and foot.  No erythema or ecchymosis.  No crepitus.    Radiographic Exam:  X-Ray Findings:  I personally reviewed the films,  Narrative & Impression   FOOT LEFT THREE OR MORE VIEWS  DATE/TIME: 5/14/2024 3:39 PM     INDICATION: Left foot pain.  COMPARISON: 4/5/2024                                                                      IMPRESSION: No acute change. No acute displaced left foot fracture or  dislocation. Joint spaces are unchanged. No localizing left foot soft  tissue swelling. Small heel spur with distal Achilles insertional  enthesophyte. Small dorsal navicular spur proximally. Overall, no  significant interval change.       Recent venous ultrasound unremarkable    Component  Ref Range & Units 2 mo ago   C Reactive Protein  <=0.5 mg/dL <0.5     Component  Ref Range & Units 2 mo ago   WBC  4.3 - 10.8 K/uL 8.1     A: Left lower extremity pain and swelling    Plan:  X-rays taken today of left foot.  Personally reviewed past left x-rays.  Reviewed recent labs.  Reviewed recent notes regarding her foot pain.  Discussed gout is a possibility.  Will send to lab to evaluate uric acid.  If uric acid normal most likely from stress reaction.  Discussed cause of this.  Discussed sometimes these can only be seen on MRI.  Discussed treatment of stress reaction with offloading  strategies.  Will check uric acid first and decide on how to proceed.    Addendum: Uric acid results were high.  Will start treating as gout.  She has tolerated steroids well in the past.  Discussed possible side effects during clinic visit.  Will start prednisone tapered over 12 days.  Discussed foods to avoid.  Discussed staying well-hydrated and could possibly try concentrated cherry juice.  If still having problems will return to clinic otherwise as needed    Jesse Huitron DPM, FACFAS         Again, thank you for allowing me to participate in the care of your patient.        Sincerely,        Jesse Huitron DPM

## 2024-05-14 NOTE — PATIENT INSTRUCTIONS
We wish you continued good healing. If you have any questions or concerns, please do not hesitate to contact us at  [phone]    [facility] (secure e-mail communication and access to your chart) to send a message or to make an appointment.    Please remember to call and schedule a follow up appointment if one was recommended at your earliest convenience.     PODIATRY CLINIC HOURS  TELEPHONE NUMBER    Dr. Jesse MARTÍNEZPRACHEAL FACFAS        Clinics:  [facility]   [place]  Tuesday 1PM-6PM  [place]  Wednesday 745AM-330PM  [place]  Monday 2nd,4th  830AM-4PM  Thursday/Friday 745AM-230PM     [place] APPOINTMENTS  [phone]    [place] APPOINTMENTS  [phone]          If you need a medication refill, please contact us you may need lab work and/or a follow up visit prior to your refill (i.e. Antifungal medications).  If MRI needed please call Imaging at [phone]   HOW DO I GET MY KNEE SCOOTER? Knee scooters can be picked up at ANY Medical Supply stores with your knee scooter Prescription.  OR  Bring your signed prescription to an [facility] showroom.   Set up an appointment for your custom Orthotics. Call any Orthotics locations call [phone]

## 2024-05-15 ENCOUNTER — TELEPHONE (OUTPATIENT)
Dept: PODIATRY | Facility: CLINIC | Age: 84
End: 2024-05-15
Payer: COMMERCIAL

## 2024-05-15 LAB — URATE SERPL-MCNC: 7.2 MG/DL (ref 2.4–5.7)

## 2024-05-15 RX ORDER — PREDNISONE 20 MG/1
TABLET ORAL
Qty: 20 TABLET | Refills: 0 | Status: SHIPPED | OUTPATIENT
Start: 2024-05-15

## 2024-05-15 NOTE — PROGRESS NOTES
Subjective:    Pt is seen today as a new pt referral with the c/c of painful left foot.  This has been symptomatic since early March 2024.  Pain aggravated by activity and relieved by rest.  Denies any history of trauma or new shoes.  Patient has swelling with this.  Points to all around ankle and foot.  Denies ecchymosis.  Denies erythema.  Denies weakness or increased deformity.  She denies any history of gout.  Denies calf pain.  Denies fever or chills.  Is going on vacation in a few days.  Has never had this before.    ROS:   see above       Allergies   Allergen Reactions    Erythromycin Swelling and Other (See Comments)       Current Outpatient Medications   Medication Sig Dispense Refill    acetaminophen (TYLENOL) 325 MG tablet Take 2 tablets (650 mg) by mouth every 4 hours as needed for other 60 tablet 0    albuterol (PROVENTIL) (2.5 MG/3ML) 0.083% neb solution Take 1 vial (2.5 mg) by nebulization every 6 hours as needed for shortness of breath or wheezing 90 mL 0    buPROPion (WELLBUTRIN XL) 150 MG 24 hr tablet Take 1 tablet (150 mg) by mouth every morning 90 tablet 0    calcium citrate (CITRACAL) 950 (200 Ca) MG tablet Take 1 tablet by mouth 2 times daily      Cholecalciferol (VITAMIN D) 2000 UNIT CAPS Take 2,000 Units by mouth daily      gabapentin (NEURONTIN) 600 MG tablet 300 mg in the morning and 1200 mg at bedtime. 270 tablet 1    lisinopril-hydrochlorothiazide (ZESTORETIC) 20-12.5 MG tablet TAKE 2 TABLETS BY MOUTH EVERY  tablet 3    methylPREDNISolone (MEDROL DOSEPAK) 4 MG tablet therapy pack Follow Package Directions 21 tablet 1    Multiple Vitamin (MULTIVITAMIN ADULT PO) Take by mouth daily      rivaroxaban ANTICOAGULANT (XARELTO ANTICOAGULANT) 20 MG TABS tablet TAKE 1 TABLET BY MOUTH DAILY WITH DINNER 90 tablet 1    rivaroxaban ANTICOAGULANT (XARELTO) 10 MG TABS tablet Take 1 tablet (10 mg) by mouth daily (with dinner) 90 tablet 3    tiZANidine (ZANAFLEX) 4 MG tablet Take 1-2 tablets (4-8  "mg) by mouth nightly as needed for muscle spasms 180 tablet 0       Patient Active Problem List   Diagnosis    Asthma, moderate persistent    Acne rosacea    CARDIOVASCULAR SCREENING; LDL GOAL LESS THAN 130    Osteopenia    Vitamin D deficiency    Advanced directives, counseling/discussion    Hx of pseudoexfoliation, os    Pseudophakia, ou; Yag Caps, os    HTN, goal below 140/90    Peptic ulcer    Health Care Home    History of blepharoplasty, upper lid, ou (elsewhere); revision (EN)    Obesity, Class II, BMI 35-39.9    Restless legs syndrome    DJD (degenerative joint disease), lumbosacral    Rosacea    Essential hypertension with goal blood pressure less than 140/90    Obesity, Class I, BMI 30-34.9    Posterior vitreous detachment, bilateral    Vertigo    Amblyopia, mild, of left eye    Chronic pain of left knee    Morbid obesity (H)    Lumbosacral spondylosis without myelopathy       Past Medical History:   Diagnosis Date    Acne rosacea     Actinic keratosis     Amblyopia     Asthma, moderate persistent     Basal cell carcinoma 10/2010    back X2    Cataract, mod, od; mild-mod, os 01/12/2012    DJD (degenerative joint disease), lumbosacral 08/06/2014    DVT (deep venous thrombosis) (H)     Elevated cholesterol     Endometriosis     HTN (hypertension)     Moderate major depression (H)     \"Circumstances\"    Osteopenia        Past Surgical History:   Procedure Laterality Date    ARTHROPLASTY KNEE Left 10/4/2021    Procedure: ARTHROPLASTY, LEFT KNEE, TOTAL;  Surgeon: Glenn Calvin MD;  [facility]: [facility]    BLEPHAROPLASTY BILATERAL  3/9/2006; 2013    both eyes, upper lid; revision (EN)    CATARACT IOL, RT/LT      HC REMOVAL GALLBLADDER      INJECT BLOCK MEDIAL BRANCH CERVICAL/THORACIC/LUMBAR Bilateral 12/7/2023    Procedure: BLOCK, NERVE, FACET JOINT, MEDIAL BRANCH, DIAGNOSTICL3/4/ALA;  Surgeon: Dinesh Proctor MD;  Location: [facility] OR    LASER YAG CAPSULOTOMY      left eye (AC lysis also)    " "PHACOEMULSIFICATION WITH STANDARD INTRAOCULAR LENS IMPLANT  1/2012; 4/2013    right eye; left eye    REPAIR PTOSIS      SURGICAL HISTORY OF -       endometriosis surgeriesx3    SURGICAL HISTORY OF -       ganiglion cyst onfoot    SURGICAL HISTORY OF -       Eye for \"droopy eye\"    C APPENDECTOMY         Family History   Problem Relation Age of Onset    C.A.D. Father     C.A.D. Brother     Hypertension Mother     Cancer No family hx of     Diabetes No family hx of     Cerebrovascular Disease No family hx of     Thyroid Disease No family hx of     Glaucoma No family hx of     Macular Degeneration No family hx of        Social History     Tobacco Use    Smoking status: Never     Passive exposure: Never    Smokeless tobacco: Never   Substance Use Topics    Alcohol use: Yes     Comment: rarely             O:  /76   Pulse 76 .       Constitutional/ general:  Pt is in no apparent distress, appears well-nourished.  Cooperative with history and physical exam.     Psych:  The patient answered questions appropriately.  Normal affect.  Seems to have reasonable expectations, in terms of treatment.     Lungs:  Non labored breathing, non labored speech. No cough.  No audible wheezing. Even, quiet breathing.       Vascular:  Pedal pulses are palpable bilaterally for both the DP and PT arteries.  CFT < 3 sec.  No edema.  Pedal hair growth noted.     Neuro:  Alert and oriented x 3. Coordinated gait.  Light touch sensation is intact     Derm: Normal texture and turgor.  No erythema, ecchymosis, or cyanosis.  No open lesions.     Musculoskeletal:   Cavus foot with weightbearing bilateral.  No forefoot or rear foot deformities noted.  MS 5/5 all compartments.  No pain on palpation of any tendons.  No pain with stressing any tendons.  Normal ROM all fore foot and rearfoot joints with no pain or crepitus.  No equinus.  Pain mostly located lateral and plantar styloid process left foot.  Especially painful plantar.  The skin is " intact.  No underlying masses.  No pain on the tarsometatarsal joint.  No pain over the peroneal tendons lateral calcaneus.  No pain with stressing peroneal tendons.  Slight pain dorsal medial tarsal bones but hard to localize.  No pain on tibialis anterior tendon.  No pain over posterior tibial tendon.  Edema noted on left ankle and foot.  No erythema or ecchymosis.  No crepitus.    Radiographic Exam:  X-Ray Findings:  I personally reviewed the films,  Narrative & Impression   FOOT LEFT THREE OR MORE VIEWS  DATE/TIME: 5/14/2024 3:39 PM     INDICATION: Left foot pain.  COMPARISON: 4/5/2024                                                                      IMPRESSION: No acute change. No acute displaced left foot fracture or  dislocation. Joint spaces are unchanged. No localizing left foot soft  tissue swelling. Small heel spur with distal Achilles insertional  enthesophyte. Small dorsal navicular spur proximally. Overall, no  significant interval change.       Recent venous ultrasound unremarkable    Component  Ref Range & Units 2 mo ago   C Reactive Protein  <=0.5 mg/dL <0.5     Component  Ref Range & Units 2 mo ago   WBC  4.3 - 10.8 K/uL 8.1     A: Left lower extremity pain and swelling    Plan:  X-rays taken today of left foot.  Personally reviewed past left x-rays.  Reviewed recent labs.  Reviewed recent notes regarding her foot pain.  Discussed gout is a possibility.  Will send to lab to evaluate uric acid.  If uric acid normal most likely from stress reaction.  Discussed cause of this.  Discussed sometimes these can only be seen on MRI.  Discussed treatment of stress reaction with offloading strategies.  Will check uric acid first and decide on how to proceed.    Addendum: Uric acid results were high.  Will start treating as gout.  She has tolerated steroids well in the past.  Discussed possible side effects during clinic visit.  Will start prednisone tapered over 12 days.  Discussed foods to avoid.   Discussed staying well-hydrated and could possibly try concentrated cherry juice.  If still having problems will return to clinic otherwise as needed    Jesse Huitron DPM, FACFAS

## 2024-05-17 ENCOUNTER — TELEPHONE (OUTPATIENT)
Dept: HEMATOLOGY | Facility: CLINIC | Age: 84
End: 2024-05-17
Payer: COMMERCIAL

## 2024-05-20 ENCOUNTER — TELEPHONE (OUTPATIENT)
Dept: FAMILY MEDICINE | Facility: CLINIC | Age: 84
End: 2024-05-20
Payer: COMMERCIAL

## 2024-05-20 NOTE — TELEPHONE ENCOUNTER
Patient called in stating that she is on vacation in [place].  Patient states she was recently diagnosed with gout and given a course of Prednisone.  Patient states the pain is still very bad and she is not able to walk much.  She states she took extra Tylenol yesterday to help her walk just 2 blocks.  She asked for Allopurinol.  Discussed with patient that since she is out of the [place], per policy a new medication could not be prescribed.  Patient asked about other medications she could use such as ibuprofen even though she is on a blood thinner.      Talked with patient that she should be seen by Urgent Care where she is and they could prescribe Allopurinol and advise on other pain medications. Patient stated that if it became bad enough she would go to Urgent Care there.      Kristina Kjellberg, MSN, RN

## 2024-06-19 ENCOUNTER — OFFICE VISIT (OUTPATIENT)
Dept: ORTHOPEDICS | Facility: CLINIC | Age: 84
End: 2024-06-19
Payer: COMMERCIAL

## 2024-06-19 DIAGNOSIS — M54.16 LUMBAR RADICULOPATHY: ICD-10-CM

## 2024-06-19 DIAGNOSIS — M79.672 LEFT FOOT PAIN: Primary | ICD-10-CM

## 2024-06-19 PROCEDURE — 99214 OFFICE O/P EST MOD 30 MIN: CPT | Performed by: PREVENTIVE MEDICINE

## 2024-06-19 RX ORDER — PREDNISONE 20 MG/1
40 TABLET ORAL DAILY
Qty: 14 TABLET | Refills: 0 | Status: SHIPPED | OUTPATIENT
Start: 2024-06-19

## 2024-06-19 NOTE — LETTER
6/19/2024      Yumiko Mccartney  [address]  [place] [address]      Dear Colleague,    Thank you for referring your patient, Yumiko Mccartney, to the [facility] [place]. Please see a copy of my visit note below.    HISTORY OF PRESENT ILLNESS  Ms. Mccartney is a pleasant 83 year old year old female who presents to clinic today with the following:    What problem are you here for: Evaluation for her left foot and follow up for low back. Symptoms consist of pain, swelling in left foot, sensitivity to palpation and numbness in her left great and second toe.     She states pain started over her left 5th metatarsal but now is more over the plantar aspect of her left foot due to walking boot.     Patient has been seen multiple times for this issue:  - 3/16/2024 seen in ED due to acute left foot swelling. US was completed to rule out a fracture.  - 4/5/2024 seen with PCP, Dr. Bear for this issue where an XR was taken.   - 6/3/2024 seen at [facility] where she was told she has a stress fracture in her left foot.   - 5/14/2024 seen with Dr. Huitron in Podiatry who diagnosed the patient with gout as uric acid levels were elevated. She was prescribed prednisone but states this medication was not helpful.   - 5/30/2024 seen at ED where an MRI was completed and diagnosed with tenosynovitis.     How long have you had this problem: 4 months, February 2024    Have you had any recent imaging of this problem? Xrays/MRI/CT scans:  - MRI of left foot completed at ED on 5/30/2024  - XR of left foot completed on 5/14/2024 and on 4/5/2024  - US of left lower leg completed on 3/16/2024  - MRI of lumbar spine completed on 7/28/2022    Have you had treatments for this problem in the past?  -Medications: gabapentin daily, tizanidine daily at bedtime and Tylenol three times daily.   -Physical therapy: None   -Injections: None  -Surgery: None   - Walking Boot: since ED visit on 5/30/2024,  she states the walking boot causes an increase of pain.     How severe is this problem today? 0-10 scale: 5-6/10    How severe has this problem been at WORST in the past? 0-10 scale: 10/10    What do you think caused this problem: Unknown     Does this problem or its symptoms cause difficulty for you falling asleep or staying asleep: Yes    MEDICAL HISTORY  Patient Active Problem List   Diagnosis     Asthma, moderate persistent     Acne rosacea     CARDIOVASCULAR SCREENING; LDL GOAL LESS THAN 130     Osteopenia     Vitamin D deficiency     Hx of pseudoexfoliation, os     Pseudophakia, ou; Yag Caps, os     HTN, goal below 140/90     Peptic ulcer     History of blepharoplasty, upper lid, ou (elsewhere); revision (EN)     Obesity, Class II, BMI 35-39.9     Restless legs syndrome     DJD (degenerative joint disease), lumbosacral     Rosacea     Essential hypertension with goal blood pressure less than 140/90     Obesity, Class I, BMI 30-34.9     Posterior vitreous detachment, bilateral     Vertigo     Amblyopia, mild, of left eye     Chronic pain of left knee     Morbid obesity (H)     Lumbosacral spondylosis without myelopathy       Current Outpatient Medications   Medication Sig Dispense Refill     acetaminophen (TYLENOL) 325 MG tablet Take 2 tablets (650 mg) by mouth every 4 hours as needed for other 60 tablet 0     albuterol (PROVENTIL) (2.5 MG/3ML) 0.083% neb solution Take 1 vial (2.5 mg) by nebulization every 6 hours as needed for shortness of breath or wheezing 90 mL 0     buPROPion (WELLBUTRIN XL) 150 MG 24 hr tablet Take 1 tablet (150 mg) by mouth every morning 90 tablet 0     calcium citrate (CITRACAL) 950 (200 Ca) MG tablet Take 1 tablet by mouth 2 times daily       Cholecalciferol (VITAMIN D) 2000 UNIT CAPS Take 2,000 Units by mouth daily       gabapentin (NEURONTIN) 600 MG tablet 300 mg in the morning and 1200 mg at bedtime. 270 tablet 1     lisinopril-hydrochlorothiazide (ZESTORETIC) 20-12.5 MG tablet TAKE  2 TABLETS BY MOUTH EVERY  tablet 3     methylPREDNISolone (MEDROL DOSEPAK) 4 MG tablet therapy pack Follow Package Directions 21 tablet 1     Multiple Vitamin (MULTIVITAMIN ADULT PO) Take by mouth daily       predniSONE (DELTASONE) 20 MG tablet Take 3 tabs by mouth daily x 3 days, then 2 tabs daily x 3 days, then 1 tab daily x 3 days, then 1/2 tab daily x 3 days. 20 tablet 0     rivaroxaban ANTICOAGULANT (XARELTO ANTICOAGULANT) 20 MG TABS tablet TAKE 1 TABLET BY MOUTH DAILY WITH DINNER 90 tablet 1     rivaroxaban ANTICOAGULANT (XARELTO) 10 MG TABS tablet Take 1 tablet (10 mg) by mouth daily (with dinner) 90 tablet 3     tiZANidine (ZANAFLEX) 4 MG tablet Take 1-2 tablets (4-8 mg) by mouth nightly as needed for muscle spasms 180 tablet 0       Allergies   Allergen Reactions     Erythromycin Swelling and Other (See Comments)       Family History   Problem Relation Age of Onset     C.A.D. Father      C.A.D. Brother      Hypertension Mother      Cancer No family hx of      Diabetes No family hx of      Cerebrovascular Disease No family hx of      Thyroid Disease No family hx of      Glaucoma No family hx of      Macular Degeneration No family hx of      Social History     Socioeconomic History     Marital status:      Number of children: 3   Occupational History     Employer: RETIRED   Tobacco Use     Smoking status: Never     Passive exposure: Never     Smokeless tobacco: Never   Vaping Use     Vaping status: Never Used   Substance and Sexual Activity     Alcohol use: Yes     Comment: rarely     Drug use: No     Sexual activity: Yes     Partners: Male     Social Determinants of Health     Financial Resource Strain: Low Risk  (4/5/2024)    Financial Resource Strain      Within the past 12 months, have you or your family members you live with been unable to get utilities (heat, electricity) when it was really needed?: No   Food Insecurity: Low Risk  (4/5/2024)    Food Insecurity      Within the past 12  months, did you worry that your food would run out before you got money to buy more?: No      Within the past 12 months, did the food you bought just not last and you didn t have money to get more?: No   Transportation Needs: Low Risk  (4/5/2024)    Transportation Needs      Within the past 12 months, has lack of transportation kept you from medical appointments, getting your medicines, non-medical meetings or appointments, work, or from getting things that you need?: No   Physical Activity: Unknown (4/5/2024)    Exercise Vital Sign      Days of Exercise per Week: 1 day   Stress: No Stress Concern Present (4/5/2024)    Citizen of [place] of Occupational Health - Occupational Stress Questionnaire      Feeling of Stress : Not at all   Interpersonal Safety: Not At Risk (5/30/2024)    [facility] from [facility]     Humiliation, Afraid, Rape, and Kick questionnaire      Fear of Current or Ex-Partner: No      Emotionally Abused: No      Physically Abused: No      Sexually Abused: No   Housing Stability: Low Risk  (4/5/2024)    Housing Stability      Do you have housing? : Yes      Are you worried about losing your housing?: No       Additional medical/Social/Surgical histories reviewed in [facility] and updated as appropriate.     REVIEW OF SYSTEMS (6/19/2024)  10 point ROS of systems including Constitutional, Eyes, Respiratory, Cardiovascular, Gastroenterology, Genitourinary, Integumentary, Musculoskeletal, Psychiatric, Allergic/Immunologic were all negative except for pertinent positives noted in my HPI.     PHYSICAL EXAM  VSS    General  - normal appearance, in no obvious distress  HEENT  - conjunctivae not injected, moist mucous membranes, normocephalic/atraumatic head, ears normal appearance, no lesions, mouth normal appearance, no scars, normal dentition and teeth present  CV  - normal peripheral perfusion  Pulm  - normal respiratory pattern, non-labored  Musculoskeletal - lumbar spine  - stance: normal gait  without limp, no obvious leg length discrepancy, normal heel and toe walk  - inspection: normal bone and joint alignment, no obvious scoliosis  - palpation: no paravertebral or bony tenderness  - ROM: flexion exacerbates pain, normal extension, sidebending, rotation  - strength: lower extremities 5/5 in all planes  - special tests:  (+) straight leg raise- left   (+) slump test  Neuro  - patellar and Achilles DTRs 2+ bilaterally, some left  lower extremity sensory deficit throughout L5 distribution, grossly normal coordination, normal muscle tone  Skin  - no ecchymosis, erythema, warmth, or induration, no obvious rash  Psych  - interactive, appropriate, normal mood and affect  Left foot/ankle: no pain with ROM testing of ankle, has some ttp over plantar fascia/arch, no bony tenderness   ASSESSMENT & PLAN  82 yo female with lumbar ddd, disc herniations, facet arthropathy, left leg radiculopathy, worse and left foot pain    I independently reviewed the following imaging studies:  Left foot MRI shows no stress fractures or significant problems, some arthritis  Lumbar MRI from over 2 years prior shows ddd, disc herniations  Discussed and ordered repeat MRI lumbar  Followup after MRI  Consider lumbar TANK  Discontinue walking boot  Rx given for prednisone and tizanadine  Patient has been doing home exercise physical therapy program for this problem      Appropriate PPE was utilized for prevention of spread of Covid-19.  Abad Armendariz MD, [facility]      Again, thank you for allowing me to participate in the care of your patient.        Sincerely,        Abad Armendariz MD

## 2024-06-19 NOTE — PROGRESS NOTES
HISTORY OF PRESENT ILLNESS  Ms. Mccartney is a pleasant 83 year old year old female who presents to clinic today with the following:    What problem are you here for: Evaluation for her left foot and follow up for low back. Symptoms consist of pain, swelling in left foot, sensitivity to palpation and numbness in her left great and second toe.     She states pain started over her left 5th metatarsal but now is more over the plantar aspect of her left foot due to walking boot.     Patient has been seen multiple times for this issue:  - 3/16/2024 seen in ED due to acute left foot swelling. US was completed to rule out a fracture.  - 4/5/2024 seen with PCP, Dr. Bear for this issue where an XR was taken.   - 6/3/2024 seen at [facility] where she was told she has a stress fracture in her left foot.   - 5/14/2024 seen with Dr. Huitron in Podiatry who diagnosed the patient with gout as uric acid levels were elevated. She was prescribed prednisone but states this medication was not helpful.   - 5/30/2024 seen at ED where an MRI was completed and diagnosed with tenosynovitis.     How long have you had this problem: 4 months, February 2024    Have you had any recent imaging of this problem? Xrays/MRI/CT scans:  - MRI of left foot completed at ED on 5/30/2024  - XR of left foot completed on 5/14/2024 and on 4/5/2024  - US of left lower leg completed on 3/16/2024  - MRI of lumbar spine completed on 7/28/2022    Have you had treatments for this problem in the past?  -Medications: gabapentin daily, tizanidine daily at bedtime and Tylenol three times daily.   -Physical therapy: None   -Injections: None  -Surgery: None   - Walking Boot: since ED visit on 5/30/2024, she states the walking boot causes an increase of pain.     How severe is this problem today? 0-10 scale: 5-6/10    How severe has this problem been at WORST in the past? 0-10 scale: 10/10    What do you think caused this problem: Unknown     Does this problem  or its symptoms cause difficulty for you falling asleep or staying asleep: Yes    MEDICAL HISTORY  Patient Active Problem List   Diagnosis    Asthma, moderate persistent    Acne rosacea    CARDIOVASCULAR SCREENING; LDL GOAL LESS THAN 130    Osteopenia    Vitamin D deficiency    Hx of pseudoexfoliation, os    Pseudophakia, ou; Yag Caps, os    HTN, goal below 140/90    Peptic ulcer    History of blepharoplasty, upper lid, ou (elsewhere); revision (EN)    Obesity, Class II, BMI 35-39.9    Restless legs syndrome    DJD (degenerative joint disease), lumbosacral    Rosacea    Essential hypertension with goal blood pressure less than 140/90    Obesity, Class I, BMI 30-34.9    Posterior vitreous detachment, bilateral    Vertigo    Amblyopia, mild, of left eye    Chronic pain of left knee    Morbid obesity (H)    Lumbosacral spondylosis without myelopathy       Current Outpatient Medications   Medication Sig Dispense Refill    acetaminophen (TYLENOL) 325 MG tablet Take 2 tablets (650 mg) by mouth every 4 hours as needed for other 60 tablet 0    albuterol (PROVENTIL) (2.5 MG/3ML) 0.083% neb solution Take 1 vial (2.5 mg) by nebulization every 6 hours as needed for shortness of breath or wheezing 90 mL 0    buPROPion (WELLBUTRIN XL) 150 MG 24 hr tablet Take 1 tablet (150 mg) by mouth every morning 90 tablet 0    calcium citrate (CITRACAL) 950 (200 Ca) MG tablet Take 1 tablet by mouth 2 times daily      Cholecalciferol (VITAMIN D) 2000 UNIT CAPS Take 2,000 Units by mouth daily      gabapentin (NEURONTIN) 600 MG tablet 300 mg in the morning and 1200 mg at bedtime. 270 tablet 1    lisinopril-hydrochlorothiazide (ZESTORETIC) 20-12.5 MG tablet TAKE 2 TABLETS BY MOUTH EVERY  tablet 3    methylPREDNISolone (MEDROL DOSEPAK) 4 MG tablet therapy pack Follow Package Directions 21 tablet 1    Multiple Vitamin (MULTIVITAMIN ADULT PO) Take by mouth daily      predniSONE (DELTASONE) 20 MG tablet Take 3 tabs by mouth daily x 3 days,  then 2 tabs daily x 3 days, then 1 tab daily x 3 days, then 1/2 tab daily x 3 days. 20 tablet 0    rivaroxaban ANTICOAGULANT (XARELTO ANTICOAGULANT) 20 MG TABS tablet TAKE 1 TABLET BY MOUTH DAILY WITH DINNER 90 tablet 1    rivaroxaban ANTICOAGULANT (XARELTO) 10 MG TABS tablet Take 1 tablet (10 mg) by mouth daily (with dinner) 90 tablet 3    tiZANidine (ZANAFLEX) 4 MG tablet Take 1-2 tablets (4-8 mg) by mouth nightly as needed for muscle spasms 180 tablet 0       Allergies   Allergen Reactions    Erythromycin Swelling and Other (See Comments)       Family History   Problem Relation Age of Onset    C.A.D. Father     C.A.D. Brother     Hypertension Mother     Cancer No family hx of     Diabetes No family hx of     Cerebrovascular Disease No family hx of     Thyroid Disease No family hx of     Glaucoma No family hx of     Macular Degeneration No family hx of      Social History     Socioeconomic History    Marital status:     Number of children: 3   Occupational History     Employer: RETIRED   Tobacco Use    Smoking status: Never     Passive exposure: Never    Smokeless tobacco: Never   Vaping Use    Vaping status: Never Used   Substance and Sexual Activity    Alcohol use: Yes     Comment: rarely    Drug use: No    Sexual activity: Yes     Partners: Male     Social Determinants of Health     Financial Resource Strain: Low Risk  (4/5/2024)    Financial Resource Strain     Within the past 12 months, have you or your family members you live with been unable to get utilities (heat, electricity) when it was really needed?: No   Food Insecurity: Low Risk  (4/5/2024)    Food Insecurity     Within the past 12 months, did you worry that your food would run out before you got money to buy more?: No     Within the past 12 months, did the food you bought just not last and you didn t have money to get more?: No   Transportation Needs: Low Risk  (4/5/2024)    Transportation Needs     Within the past 12 months, has lack of  transportation kept you from medical appointments, getting your medicines, non-medical meetings or appointments, work, or from getting things that you need?: No   Physical Activity: Unknown (4/5/2024)    Exercise Vital Sign     Days of Exercise per Week: 1 day   Stress: No Stress Concern Present (4/5/2024)    [place] of Occupational Health - Occupational Stress Questionnaire     Feeling of Stress : Not at all   Interpersonal Safety: Not At Risk (5/30/2024)    Received from [facility]     Humiliation, Afraid, Rape, and Kick questionnaire     Fear of Current or Ex-Partner: No     Emotionally Abused: No     Physically Abused: No     Sexually Abused: No   Housing Stability: Low Risk  (4/5/2024)    Housing Stability     Do you have housing? : Yes     Are you worried about losing your housing?: No       Additional medical/Social/Surgical histories reviewed in [facility] and updated as appropriate.     REVIEW OF SYSTEMS (6/19/2024)  10 point ROS of systems including Constitutional, Eyes, Respiratory, Cardiovascular, Gastroenterology, Genitourinary, Integumentary, Musculoskeletal, Psychiatric, Allergic/Immunologic were all negative except for pertinent positives noted in my HPI.     PHYSICAL EXAM  VSS    General  - normal appearance, in no obvious distress  HEENT  - conjunctivae not injected, moist mucous membranes, normocephalic/atraumatic head, ears normal appearance, no lesions, mouth normal appearance, no scars, normal dentition and teeth present  CV  - normal peripheral perfusion  Pulm  - normal respiratory pattern, non-labored  Musculoskeletal - lumbar spine  - stance: normal gait without limp, no obvious leg length discrepancy, normal heel and toe walk  - inspection: normal bone and joint alignment, no obvious scoliosis  - palpation: no paravertebral or bony tenderness  - ROM: flexion exacerbates pain, normal extension, sidebending, rotation  - strength: lower extremities 5/5 in all planes  - special  tests:  (+) straight leg raise- left   (+) slump test  Neuro  - patellar and Achilles DTRs 2+ bilaterally, some left  lower extremity sensory deficit throughout L5 distribution, grossly normal coordination, normal muscle tone  Skin  - no ecchymosis, erythema, warmth, or induration, no obvious rash  Psych  - interactive, appropriate, normal mood and affect  Left foot/ankle: no pain with ROM testing of ankle, has some ttp over plantar fascia/arch, no bony tenderness   ASSESSMENT & PLAN  84 yo female with lumbar ddd, disc herniations, facet arthropathy, left leg radiculopathy, worse and left foot pain    I independently reviewed the following imaging studies:  Left foot MRI shows no stress fractures or significant problems, some arthritis  Lumbar MRI from over 2 years prior shows ddd, disc herniations  Discussed and ordered repeat MRI lumbar  Followup after MRI  Consider lumbar TANK  Discontinue walking boot  Rx given for prednisone and tizanadine  Patient has been doing home exercise physical therapy program for this problem      Appropriate PPE was utilized for prevention of spread of Covid-19.  Abad Armendariz MD, [facility]

## 2024-06-25 ENCOUNTER — MYC MEDICAL ADVICE (OUTPATIENT)
Dept: ORTHOPEDICS | Facility: CLINIC | Age: 84
End: 2024-06-25
Payer: COMMERCIAL

## 2024-06-26 DIAGNOSIS — M79.672 LEFT FOOT PAIN: ICD-10-CM

## 2024-06-26 DIAGNOSIS — M54.16 LUMBAR RADICULOPATHY: ICD-10-CM

## 2024-07-18 ENCOUNTER — ANCILLARY PROCEDURE (OUTPATIENT)
Dept: MRI IMAGING | Facility: CLINIC | Age: 84
End: 2024-07-18
Attending: PREVENTIVE MEDICINE
Payer: COMMERCIAL

## 2024-07-18 DIAGNOSIS — M79.672 LEFT FOOT PAIN: ICD-10-CM

## 2024-07-18 DIAGNOSIS — M54.16 LUMBAR RADICULOPATHY: ICD-10-CM

## 2024-07-18 PROCEDURE — 72148 MRI LUMBAR SPINE W/O DYE: CPT | Mod: GC | Performed by: RADIOLOGY

## 2024-08-07 ENCOUNTER — DOCUMENTATION ONLY (OUTPATIENT)
Dept: ANTICOAGULATION | Facility: CLINIC | Age: 84
End: 2024-08-07
Payer: COMMERCIAL

## 2024-08-07 NOTE — PROGRESS NOTES
Anticoagulant Therapeutic Duplication    Duplicate orders identified: same medication but different dose, form, frequency or route    Duplicate orders appropriate starting dose and ongoing therapeutic dose    Active anticoagulant: rivaroxaban (Xarelto)    Plan made per [facility] anticoagulation protocol.    Ernie Joel RN  8/7/2024

## 2024-08-13 ENCOUNTER — VIRTUAL VISIT (OUTPATIENT)
Dept: ORTHOPEDICS | Facility: CLINIC | Age: 84
End: 2024-08-13
Payer: COMMERCIAL

## 2024-08-13 ENCOUNTER — ANCILLARY PROCEDURE (OUTPATIENT)
Dept: ULTRASOUND IMAGING | Facility: CLINIC | Age: 84
End: 2024-08-13
Attending: PREVENTIVE MEDICINE
Payer: COMMERCIAL

## 2024-08-13 DIAGNOSIS — M54.16 LUMBAR RADICULOPATHY: ICD-10-CM

## 2024-08-13 DIAGNOSIS — M51.369 DDD (DEGENERATIVE DISC DISEASE), LUMBAR: ICD-10-CM

## 2024-08-13 DIAGNOSIS — Z86.718 HISTORY OF DVT (DEEP VEIN THROMBOSIS): ICD-10-CM

## 2024-08-13 DIAGNOSIS — M47.816 LUMBAR FACET ARTHROPATHY: ICD-10-CM

## 2024-08-13 DIAGNOSIS — I82.461 ACUTE DEEP VEIN THROMBOSIS (DVT) OF CALF MUSCLE VEIN OF RIGHT LOWER EXTREMITY (H): Primary | ICD-10-CM

## 2024-08-13 DIAGNOSIS — I82.461 ACUTE DEEP VEIN THROMBOSIS (DVT) OF CALF MUSCLE VEIN OF RIGHT LOWER EXTREMITY (H): ICD-10-CM

## 2024-08-13 PROCEDURE — 93971 EXTREMITY STUDY: CPT | Mod: RT | Performed by: RADIOLOGY

## 2024-08-13 PROCEDURE — 99442 PR PHYSICIAN TELEPHONE EVALUATION 11-20 MIN: CPT | Mod: 93 | Performed by: PREVENTIVE MEDICINE

## 2024-08-13 NOTE — PROGRESS NOTES
Patient is a  84   year old who is being evaluated via a billable telephone visit.      What phone number would you like to be contacted at? CELL  How would you like to obtain your AVS? EMILY        Subjective   Patient is a   84  year old who presents by phone call visit for the following:     HPI   Followup for lumbar MRI  Continues to have pain in low back and radiates into legs   Last epidural injection last year improved her pain and symptoms for over 4 months greater than 50%   Wants to discuss another  She states that she has also recently lowered her xarelto and has concerns about right calf DVT which she has had in the past and was going to go to an urgent care today and asks what I think she should do    Review of Systems   Constitutional, HEENT, cardiovascular, pulmonary, gi and gu systems are negative, except as otherwise noted.      Objective           Vitals:  No vitals were obtained today due to virtual visit.    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 HEP  Given RX for prednisone  Discussed her concern for DVT and worry that she has a new one after recently lowering her dose of xarelto, will order ultrasound for evaluation for dvt of right lower extremity  Followup after TANK        Phone call duration: 20  minutes  Phone call start: 850am  Phone call end: 910am  Dr Armendariz

## 2024-08-13 NOTE — LETTER
8/13/2024      Yumiko Mccartney  [address]  [place] [address]      Dear Colleague,    Thank you for referring your patient, Yumiko Mccartney, to the [facility] [place]. Please see a copy of my visit note below.    Patient is a  84   year old who is being evaluated via a billable telephone visit.      What phone number would you like to be contacted at? CELL  How would you like to obtain your AVS? MYCHART        Subjective   Patient is a   84  year old who presents by phone call visit for the following:     HPI   Followup for lumbar MRI  Continues to have pain in low back and radiates into legs   Last epidural injection last year improved her pain and symptoms for over 4 months greater than 50%   Wants to discuss another  She states that she has also recently lowered her xarelto and has concerns about right calf DVT which she has had in the past and was going to go to an urgent care today and asks what I think she should do    Review of Systems   Constitutional, HEENT, cardiovascular, pulmonary, gi and gu systems are negative, except as otherwise noted.      Objective           Vitals:  No vitals were obtained today due to virtual visit.    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  HEP  Given RX for prednisone  Discussed her concern for DVT and worry that she has a new one after recently lowering her dose of xarelto, will order ultrasound for evaluation for dvt of right lower extremity  Followup after TANK        Phone call duration: 20 minutes  Phone call start: 850am  Phone call end: 910am  Dr Armendariz      Again, thank you for allowing me to participate in the care of your patient.        Sincerely,        Abad Armendariz MD

## 2024-08-13 NOTE — LETTER
8/13/2024      Yumiko Mccartney  [address]  [place] [address]      Dear Colleague,    Thank you for referring your patient, Yumiko Mccartney, to the [facility] [place]. Please see a copy of my visit note below.    Patient is a  84   year old who is being evaluated via a billable telephone visit.      What phone number would you like to be contacted at? CELL  How would you like to obtain your AVS? MYCHART        Subjective   Patient is a   84  year old who presents by phone call visit for the following:     HPI   Followup for lumbar MRI  Continues to have pain in low back and radiates into legs   Last epidural injection last year improved her pain and symptoms for over 4 months greater than 50%   Wants to discuss another  She states that she has also recently lowered her xarelto and has concerns about right calf DVT which she has had in the past and was going to go to an urgent care today and asks what I think she should do    Review of Systems   Constitutional, HEENT, cardiovascular, pulmonary, gi and gu systems are negative, except as otherwise noted.      Objective           Vitals:  No vitals were obtained today due to virtual visit.    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  HEP  Given RX for prednisone  Discussed her concern for DVT and worry that she has a new one after recently lowering her dose of xarelto, will order ultrasound for evaluation for dvt of right lower extremity  Followup after TANK        Phone call duration: 20 minutes  Phone call start: 850am  Phone call end: 910am  Dr Armendariz      Again, thank you for allowing me to participate in the care of your patient.        Sincerely,        Abad Armendariz MD

## 2024-08-14 DIAGNOSIS — I82.411 ACUTE DEEP VEIN THROMBOSIS (DVT) OF FEMORAL VEIN OF RIGHT LOWER EXTREMITY (H): ICD-10-CM

## 2024-08-14 DIAGNOSIS — M54.16 LUMBAR RADICULOPATHY: ICD-10-CM

## 2024-08-14 DIAGNOSIS — E66.01 SEVERE OBESITY WITH BODY MASS INDEX (BMI) OF 35.0 TO 35.9 AND COMORBIDITY (H): ICD-10-CM

## 2024-08-14 DIAGNOSIS — M79.672 LEFT FOOT PAIN: ICD-10-CM

## 2024-08-14 RX ORDER — BUPROPION HYDROCHLORIDE 150 MG/1
150 TABLET ORAL EVERY MORNING
Qty: 90 TABLET | Refills: 0 | Status: SHIPPED | OUTPATIENT
Start: 2024-08-14

## 2024-08-14 NOTE — PROGRESS NOTES
"At the direction of EMY Packer I reached out to the patient to discuss recent R leg symptoms (ultrasound showed thrombosed varicose vein). Tomi reports pain and a swollen \"spot\" on her R leg since Monday. The \"spot\" has improved but she continues to have swelling, warmth, and pain in her R foot. Yesterday it was difficult to walk due to the pain so Tomi has been using compression socks and resting. Tomi does report missing three doses of Xarelto leading up to symptom onset.    Per Anaid's recommendations I advised Tomi to increase her Xarelto to 20mg daily. She will follow up on 09/11 with Anaid. Xarelto dosing can be further discussed at that time. Tomi will call [facility] if she has ongoing or worsening symptoms.     Cr CORRALES RN  [facility]  [facility]  Office: [phone]  Clinic: [phone]  Fax: [phone]    "

## 2024-08-14 NOTE — TELEPHONE ENCOUNTER
Prescription refill requested for:       tiZANidine (ZANAFLEX) 4 MG tablet   Last Written Prescription Date:  7/8/2024  Last Fill Quantity: 180,   # refills: 0  Last Office Visit: 8/13/2024  Future Office visit:   Lumbar TANK 9/24/2024, follow up 10/9/2024    Ernestine Cabrera, ATC

## 2024-09-09 NOTE — PROGRESS NOTES
[facility]  [address] [place] [address], [place] [address]  Main: [phone], Fax: [phone]    Patient seen at: [facility] at [address] [place] [address]    Outpatient Visit Note:    Patient: Yumiko Mccartney  MRN: [id number]  : 1940  MUSTAPHA: 2024    Reason for visit:  Follow up, history of venous thromboembolism     Clinical History Summary:  Yumiko Mccartney is a 84 year old female with past medical history significant for hypertension, degenerative disc disease, basal cell carcinoma, asthma and osteopenia. She developed right lower extremity DVT diagnosed on 5/15/2022. She had been having right ankle swelling and calf pain for three weeks. She had then traveled to [place] (2.5 hour by air) however her symptoms had already started. No other provoking risk factors identified. She was started on Xarelto with plan for long term anticoagulation given unprovoked nature of her venous thromboembolism. She represented to the ER a month later on 2022 for evaluation of worsening right leg pain and edema. Ultrasound was repeated and again showed extensive DVT with slight improvement in thrombus burden with resolution of peroneal vein thrombus. Follow up ultrasound on 2022 showed nonocclusive thrombus in the right distal femoral and popliteal veins. Overall, clot burden decreased compared to prior study.      She has known venous insufficiency and was offered intervention in 2019 but declined.     More recently in 2024, she has had worsening symptoms. She had missed about two weeks of Xarelto leading to symptom onset. She had also been traveling at that time to the [facility] about 2-3 hours away. Venous competency US showed persistent chronic partially occlusive DVT of the paired mid femoral veins and popliteal vein without any new or acute DVT. She was found to have acute superficial vein thrombosis of a varicosity of  the right distal calf.  She was recommended to resume Xarelto and use warm compresses as well as elevation.       Interim History:  Today, Tomi notes that she is doing fair. She notes that she is back on Xarelto and has not had any recurrent episodes. She still has some residual firmness but less prominence of the right sided superficial vein thrombosis. She notes ongoing bilateral ankle edema. She has been wearing compression socks. She notes that she is struggling right left foot pain and recently had steroid injections. She notes they have helped slightly. This foot pain has decreased her mobility.     She has upcoming spinal injection planned.     She notes future travel plans to [facility] for her daughter's wedding.     Last CBC with normocytic anemia. Will recheck. No obvious source of blood loss.     ROS:  Denies any bleeding complications. Specifically, no frequent epistaxis. No issues with oral mucosal bleeding. Denies any hematuria or blood in stools. Denies any shortness of breath. No chest pain. No cough. No fever.      Medications:   Current Outpatient Medications   Medication Sig Dispense Refill    acetaminophen (TYLENOL) 325 MG tablet Take 2 tablets (650 mg) by mouth every 4 hours as needed for other 60 tablet 0    albuterol (PROVENTIL) (2.5 MG/3ML) 0.083% neb solution Take 1 vial (2.5 mg) by nebulization every 6 hours as needed for shortness of breath or wheezing 90 mL 0    buPROPion (WELLBUTRIN XL) 150 MG 24 hr tablet TAKE 1 TABLET BY MOUTH EVERY MORNING 90 tablet 0    calcium citrate (CITRACAL) 950 (200 Ca) MG tablet Take 1 tablet by mouth 2 times daily      Cholecalciferol (VITAMIN D) 2000 UNIT CAPS Take 2,000 Units by mouth daily      gabapentin (NEURONTIN) 600 MG tablet 300 mg in the morning and 1200 mg at bedtime. 270 tablet 1    lisinopril-hydrochlorothiazide (ZESTORETIC) 20-12.5 MG tablet TAKE 2 TABLETS BY MOUTH EVERY  tablet 3    methylPREDNISolone (MEDROL DOSEPAK) 4 MG tablet therapy  "pack Follow Package Directions 21 tablet 1    Multiple Vitamin (MULTIVITAMIN ADULT PO) Take by mouth daily      predniSONE (DELTASONE) 20 MG tablet Take 2 tablets (40 mg) by mouth daily 14 tablet 0    predniSONE (DELTASONE) 20 MG tablet Take 3 tabs by mouth daily x 3 days, then 2 tabs daily x 3 days, then 1 tab daily x 3 days, then 1/2 tab daily x 3 days. 20 tablet 0    rivaroxaban ANTICOAGULANT (XARELTO) 20 MG TABS tablet Take 1 tablet (20 mg) by mouth daily with food. 90 tablet 3    tiZANidine (ZANAFLEX) 4 MG tablet TAKE 1-2 TABLETS (4-8 MG) BY MOUTH NIGHTLY AS NEEDED FOR MUSCLE SPASMS 180 tablet 0    tiZANidine (ZANAFLEX) 4 MG tablet Take 1-2 tablets (4-8 mg) by mouth nightly as needed for muscle spasms 180 tablet 0        Allergies:      Allergies   Allergen Reactions    Erythromycin Swelling and Other (See Comments)       PMH:  Past Medical History:   Diagnosis Date    Acne rosacea     Actinic keratosis     Amblyopia     Asthma, moderate persistent     Basal cell carcinoma 10/2010    back X2    Cataract, mod, od; mild-mod, os 01/12/2012    DJD (degenerative joint disease), lumbosacral 08/06/2014    DVT (deep venous thrombosis) (H)     Elevated cholesterol     Endometriosis     HTN (hypertension)     Moderate major depression (H)     \"Circumstances\"    Osteopenia         Social History:   Social History     Tobacco Use    Smoking status: Never     Passive exposure: Never    Smokeless tobacco: Never   Vaping Use    Vaping status: Never Used   Substance Use Topics    Alcohol use: Yes     Comment: rarely    Drug use: No       Family History:  Deferred.    Objective:  Vitals: /74   Pulse 82   Temp 98.4  F (36.9  C) (Oral)   Ht 1.651 m (5' 5\")   Wt 90.8 kg (200 lb 1.6 oz)   SpO2 96%   BMI 33.30 kg/m       Exam:   Constitutional: Appears well, no distress  HEENT: Pupils equal and round. No scleral icterus or hemorrhage.   Respiratory: no increased work of breathing.   Mus/Skele: bilateral ankle edema   P " Vasc: thrombosed superficial varicose caryl that is firm without overlying warmth, erythema. Tender to palpation +  Skin: no petechiae, no ecchymosis on exposed dermis.  Neuro: CN II-XII intact. Normal gait. AOx3      Labs:  CBC RESULTS:   Recent Labs   Lab Test 08/16/23  1427   WBC 7.0   RBC 4.14   HGB 10.5*   HCT 34.5*   MCV 83   MCH 25.4*   MCHC 30.4*   RDW 13.4        Last Comprehensive Metabolic Panel:  Sodium   Date Value Ref Range Status   04/05/2024 137 135 - 145 mmol/L Final     Comment:     Reference intervals for this test were updated on 09/26/2023 to more accurately reflect our healthy population. There may be differences in the flagging of prior results with similar values performed with this method. Interpretation of those prior results can be made in the context of the updated reference intervals.    08/14/2020 140 133 - 144 mmol/L Final     Potassium   Date Value Ref Range Status   04/05/2024 4.4 3.4 - 5.3 mmol/L Final   01/17/2023 4.0 3.4 - 5.3 mmol/L Final   08/14/2020 3.9 3.4 - 5.3 mmol/L Final     Chloride   Date Value Ref Range Status   04/05/2024 100 98 - 107 mmol/L Final   01/17/2023 102 94 - 109 mmol/L Final   08/14/2020 106 94 - 109 mmol/L Final     Carbon Dioxide   Date Value Ref Range Status   08/14/2020 30 20 - 32 mmol/L Final     Carbon Dioxide (CO2)   Date Value Ref Range Status   04/05/2024 28 22 - 29 mmol/L Final   01/17/2023 31 20 - 32 mmol/L Final     Anion Gap   Date Value Ref Range Status   04/05/2024 9 7 - 15 mmol/L Final   01/17/2023 9 3 - 14 mmol/L Final   08/14/2020 4 3 - 14 mmol/L Final     Glucose   Date Value Ref Range Status   04/05/2024 102 (H) 70 - 99 mg/dL Final   01/17/2023 98 70 - 99 mg/dL Final   08/14/2020 97 70 - 99 mg/dL Final     Comment:     Non Fasting     Urea Nitrogen   Date Value Ref Range Status   04/05/2024 20.0 8.0 - 23.0 mg/dL Final   01/17/2023 16 7 - 30 mg/dL Final   08/14/2020 16 7 - 30 mg/dL Final     Creatinine   Date Value Ref Range Status    04/05/2024 1.05 (H) 0.51 - 0.95 mg/dL Final   08/14/2020 0.91 0.52 - 1.04 mg/dL Final     GFR Estimate   Date Value Ref Range Status   04/05/2024 53 (L) >60 mL/min/1.73m2 Final   08/14/2020 60 (L) >60 mL/min/[1.73_m2] Final     Comment:     Non  GFR Calc  Starting 12/18/2018, serum creatinine based estimated GFR (eGFR) will be   calculated using the Chronic Kidney Disease Epidemiology Collaboration   (CKD-EPI) equation.       Calcium   Date Value Ref Range Status   04/05/2024 10.0 8.8 - 10.2 mg/dL Final   08/14/2020 9.7 8.5 - 10.1 mg/dL Final     Liver Function Studies -   Recent Labs   Lab Test 04/05/24  1519   PROTTOTAL 7.4   ALBUMIN 4.7   BILITOTAL 0.3   ALKPHOS 84   AST 24   ALT 15        Imaging:    Recent Results (from the past 744 hour(s))   US Lower Extremity Venous Duplex Right    Narrative    Exam: Ultrasound of the deep venous system of right leg dated  8/13/2024 10:48 AM    Clinical information: Acute deep vein thrombosis (DVT) of catheter  muscle vein of right lower extremity; history of DVT    Comparison: Ultrasound 12/5/2022, 8/16/2022    Ordering provider: YIMI BARRY    Technique: Gray-scale evaluation with compression and Doppler  assessment of deep venous system for spontaneous and phasic flow, as  well as the presence of distal augmentation. Color flow images  obtained as needed. Gray-scale images with compression of the great  saphenous vein obtained as needed.    Findings:    Right leg:    CFV: Thrombus: No, Phasic: Yes  Femoral vein, proximal: Thrombus: No, Phasic: Yes  Femoral vein, mid paired: Chronic partially occlusive thrombus present  in one of the paired mid femoral vein   Femoral vein, distal.: Thrombus: No, Phasic: Yes  Popliteal vein: Chronic partially occlusive thrombus, similar compared  to prior   PTV: Thrombus: No  Peroneal vein: Thrombus: Not well-visualized  GSV: Thrombus: No    In the right distal calf at the site of patient concern there is  "a  thrombosed varicose vein that extends into a  vein.      Impression    Impression:    Right le. Persistent chronic partially occlusive thrombus in one of the  paired mid femoral veins and popliteal vein similar compared to  2022. No new deep vein thrombosis identified in the right lower  extremity.  2. In the right distal calf at the site of patient concern there is a  thrombosed varicose vein that extends into a  vessel.    Reference: \"Duplex Ultrasound in the Diagnosis of Lower-Extremity Deep  Venous Thrombosis\"- Sally Malave MD, S; Dean Canales MD  (Circulation. 2014;129:917-921. http://circ.ahajournals.org )    I have personally reviewed the examination and initial interpretation  and I agree with the findings.    ROSALBA HAMPTON MD         SYSTEM ID:  [id number]        Assessment:  Yumiko Mccartney is a 84 year old woman with history of hypertension, DDD, asthma, venous incompetency who had an unprovoked extensive DVT of the right lower extremity diagnosed in 2022. She was recommended to stay on anticoagulation lifelong for secondary prevention of venous thromboembolism.     Follow up imaging has shown chronic residual thrombus of right distal femoral vein and popliteal vein. Hypercoagulable work up was unrevealing.     She has been on Xarelto without significant bleeding. She did have a recent thrombosed right calf varicosity after missing two weeks of Xarelto and prolonged travel. Symptoms improved with resuming Xarelto and conservative treatment measures.     She does get easy bruising. She does have a history of normocytic anemia.         Plan:  CBC today.   She remains an appropriate candidate to stay on Xarelto 20mg once daily as long as renal function is reasonable.    Continue warm compresses over the right thrombosed superficial vein  She would like to revisit with IR vascular team for intervention consideration as symptoms are progressing and she has been " wearing compression garments.   Follow up in 1 year     Prior to spinal injection, hold Xarelto x 72 hours. Resume within 12-24 hours after. She should wear compression, ambulate frequently and stay hydrated while off anticoagulation.    Patient instructed to contact the clinic should they require any surgical procedures for perioperative planning.     Return to clinic in 1 year, sooner if any concerns.       Anaid Duron, MPH, PA-C  [facility]    30 minutes spent by me on the date of the encounter doing chart review, review of outside records, review of test results, interpretation of tests, patient visit, and documentation    The longitudinal plan of care for the diagnosis(es)/condition(s) as documented were addressed during this visit. Due to the added complexity in care, I will continue to support Tomi in the subsequent management and with ongoing continuity of care.

## 2024-09-11 ENCOUNTER — OFFICE VISIT (OUTPATIENT)
Dept: HEMATOLOGY | Facility: CLINIC | Age: 84
End: 2024-09-11
Attending: PHYSICIAN ASSISTANT
Payer: COMMERCIAL

## 2024-09-11 VITALS
TEMPERATURE: 98.4 F | BODY MASS INDEX: 33.34 KG/M2 | DIASTOLIC BLOOD PRESSURE: 74 MMHG | SYSTOLIC BLOOD PRESSURE: 129 MMHG | HEART RATE: 82 BPM | WEIGHT: 200.1 LBS | OXYGEN SATURATION: 96 % | HEIGHT: 65 IN

## 2024-09-11 DIAGNOSIS — D64.9 NORMOCYTIC ANEMIA: ICD-10-CM

## 2024-09-11 DIAGNOSIS — I80.01 THROMBOPHLEBITIS OF SUPERFICIAL VEINS OF RIGHT LOWER EXTREMITY: ICD-10-CM

## 2024-09-11 DIAGNOSIS — D64.9 ANEMIA, UNSPECIFIED TYPE: ICD-10-CM

## 2024-09-11 DIAGNOSIS — I82.411 ACUTE DEEP VEIN THROMBOSIS (DVT) OF FEMORAL VEIN OF RIGHT LOWER EXTREMITY (H): Primary | ICD-10-CM

## 2024-09-11 DIAGNOSIS — Z71.89 ENCOUNTER FOR ANTICOAGULATION DISCUSSION AND COUNSELING: ICD-10-CM

## 2024-09-11 LAB
ERYTHROCYTE [DISTWIDTH] IN BLOOD BY AUTOMATED COUNT: 13.9 % (ref 10–15)
HCT VFR BLD AUTO: 32.1 % (ref 35–47)
HGB BLD-MCNC: 10.1 G/DL (ref 11.7–15.7)
MCH RBC QN AUTO: 26.2 PG (ref 26.5–33)
MCHC RBC AUTO-ENTMCNC: 31.5 G/DL (ref 31.5–36.5)
MCV RBC AUTO: 83 FL (ref 78–100)
PLATELET # BLD AUTO: 339 10E3/UL (ref 150–450)
RBC # BLD AUTO: 3.86 10E6/UL (ref 3.8–5.2)
WBC # BLD AUTO: 10.9 10E3/UL (ref 4–11)

## 2024-09-11 PROCEDURE — 99214 OFFICE O/P EST MOD 30 MIN: CPT | Performed by: PHYSICIAN ASSISTANT

## 2024-09-11 PROCEDURE — G2211 COMPLEX E/M VISIT ADD ON: HCPCS | Performed by: PHYSICIAN ASSISTANT

## 2024-09-11 PROCEDURE — 85045 AUTOMATED RETICULOCYTE COUNT: CPT | Performed by: PHYSICIAN ASSISTANT

## 2024-09-11 PROCEDURE — 85027 COMPLETE CBC AUTOMATED: CPT | Performed by: PHYSICIAN ASSISTANT

## 2024-09-11 PROCEDURE — 36415 COLL VENOUS BLD VENIPUNCTURE: CPT | Performed by: PHYSICIAN ASSISTANT

## 2024-09-11 PROCEDURE — G0463 HOSPITAL OUTPT CLINIC VISIT: HCPCS | Performed by: PHYSICIAN ASSISTANT

## 2024-09-11 NOTE — PATIENT INSTRUCTIONS
[facility]  [facility]  [facility], [address], [place] [address]  Main: [phone], Fax: [phone]    Yumiko,   It was a pleasure seeing you today.  Thank you for allowing us to be involved in your care.  Please let us know if there is anything else we can do for you, so that we can be sure you are leaving completely satisfied with your care experience. Below is the plan that we discussed.     Continue Xarelto 20mg once daily with food.  Knee high compression stockings or sleeves that are 20-30mmHg compression grade which is equivalent to medium compression on most of the [employer] options.      We would like a provider on our team to see you at least annually for optimal care and to allow us to continue to prescribe for you.        Return to clinic in  in one year.    If you have questions or concerns, please don't hesitate to send a Bapul Message for non urgent matters or contact my nurse clinician, Neris. Her number is [phone]. If they are unavailable and you have immediate concerns, please call [phone] and ask for a nurse.     Anaid Duron, MPH, PA-C  [facility]

## 2024-09-12 DIAGNOSIS — D64.9 NORMOCYTIC ANEMIA: Primary | ICD-10-CM

## 2024-09-12 LAB
RETICS # AUTO: 0.1 10E6/UL (ref 0.03–0.1)
RETICS/RBC NFR AUTO: 2.7 % (ref 0.5–2)

## 2024-09-13 ENCOUNTER — TELEPHONE (OUTPATIENT)
Dept: VASCULAR SURGERY | Facility: CLINIC | Age: 84
End: 2024-09-13
Payer: COMMERCIAL

## 2024-09-13 DIAGNOSIS — E66.01 SEVERE OBESITY WITH BODY MASS INDEX (BMI) OF 35.0 TO 35.9 AND COMORBIDITY (H): ICD-10-CM

## 2024-09-13 DIAGNOSIS — M51.369 DDD (DEGENERATIVE DISC DISEASE), LUMBAR: ICD-10-CM

## 2024-09-13 DIAGNOSIS — I83.893 SYMPTOMATIC VARICOSE VEINS OF BOTH LOWER EXTREMITIES: Primary | ICD-10-CM

## 2024-09-13 RX ORDER — BUPROPION HYDROCHLORIDE 150 MG/1
150 TABLET ORAL EVERY MORNING
Qty: 90 TABLET | Refills: 0 | OUTPATIENT
Start: 2024-09-13

## 2024-09-13 RX ORDER — GABAPENTIN 600 MG/1
TABLET ORAL
Qty: 225 TABLET | Refills: 1 | Status: SHIPPED | OUTPATIENT
Start: 2024-09-13

## 2024-09-13 NOTE — TELEPHONE ENCOUNTER
Called pt to fup on referral from Bleeding and Clotting disorder clinic.     She is interested in treatment again.     Informed her that we'd like to have her see our doctor in IR at [place] for vein treatment.   Also will get an updated US prior to her appt to further assess.       She states that her left leg has prominent varicose veins in which she she'd like to have this evaluated also     Informed her that we will go ahead and order the US to have both legs reviewed.     She agrees to plan.     I did ask if she's wearing her stockings in which she states that she's not been able to b/c she's been sitting due to having leg problems.   She did have left knee surgery in the past and is having problems.     Will take note and also pass the information along.     Roxana ALVARADO RN, BSN  Interventional Radiology/Vascular  Nurse Coordinator   Phone: [phone]

## 2024-09-16 DIAGNOSIS — I80.01 THROMBOPHLEBITIS OF SUPERFICIAL VEINS OF RIGHT LOWER EXTREMITY: Primary | ICD-10-CM

## 2024-09-16 DIAGNOSIS — I87.2 VENOUS (PERIPHERAL) INSUFFICIENCY: ICD-10-CM

## 2024-09-19 ENCOUNTER — MYC MEDICAL ADVICE (OUTPATIENT)
Dept: CARDIOLOGY | Facility: CLINIC | Age: 84
End: 2024-09-19
Payer: COMMERCIAL

## 2024-09-24 ENCOUNTER — LAB (OUTPATIENT)
Dept: LAB | Facility: CLINIC | Age: 84
End: 2024-09-24
Payer: COMMERCIAL

## 2024-09-24 ENCOUNTER — ANCILLARY PROCEDURE (OUTPATIENT)
Dept: GENERAL RADIOLOGY | Facility: CLINIC | Age: 84
End: 2024-09-24
Attending: PREVENTIVE MEDICINE
Payer: COMMERCIAL

## 2024-09-24 DIAGNOSIS — D64.9 NORMOCYTIC ANEMIA: ICD-10-CM

## 2024-09-24 DIAGNOSIS — M54.16 LUMBAR RADICULOPATHY: ICD-10-CM

## 2024-09-24 DIAGNOSIS — M47.816 LUMBAR FACET ARTHROPATHY: ICD-10-CM

## 2024-09-24 DIAGNOSIS — M51.369 DDD (DEGENERATIVE DISC DISEASE), LUMBAR: ICD-10-CM

## 2024-09-24 LAB
ALBUMIN SERPL BCG-MCNC: 4.5 G/DL (ref 3.5–5.2)
ALP SERPL-CCNC: 78 U/L (ref 40–150)
ALT SERPL W P-5'-P-CCNC: <5 U/L (ref 0–50)
ANION GAP SERPL CALCULATED.3IONS-SCNC: 11 MMOL/L (ref 7–15)
AST SERPL W P-5'-P-CCNC: 19 U/L (ref 0–45)
BASOPHILS # BLD AUTO: 0.1 10E3/UL (ref 0–0.2)
BASOPHILS NFR BLD AUTO: 1 %
BILIRUB SERPL-MCNC: <0.2 MG/DL
BUN SERPL-MCNC: 22.7 MG/DL (ref 8–23)
CALCIUM SERPL-MCNC: 9.8 MG/DL (ref 8.8–10.4)
CHLORIDE SERPL-SCNC: 104 MMOL/L (ref 98–107)
CREAT SERPL-MCNC: 1.21 MG/DL (ref 0.51–0.95)
EGFRCR SERPLBLD CKD-EPI 2021: 45 ML/MIN/1.73M2
EOSINOPHIL # BLD AUTO: 0.2 10E3/UL (ref 0–0.7)
EOSINOPHIL NFR BLD AUTO: 2 %
ERYTHROCYTE [DISTWIDTH] IN BLOOD BY AUTOMATED COUNT: 13.6 % (ref 10–15)
FERRITIN SERPL-MCNC: 159 NG/ML (ref 11–328)
FOLATE SERPL-MCNC: 34.7 NG/ML (ref 4.6–34.8)
GLUCOSE SERPL-MCNC: 113 MG/DL (ref 70–99)
HCO3 SERPL-SCNC: 27 MMOL/L (ref 22–29)
HCT VFR BLD AUTO: 36.5 % (ref 35–47)
HGB BLD-MCNC: 10.7 G/DL (ref 11.7–15.7)
IMM GRANULOCYTES # BLD: 0 10E3/UL
IMM GRANULOCYTES NFR BLD: 0 %
IRON BINDING CAPACITY (ROCHE): 254 UG/DL (ref 240–430)
IRON SATN MFR SERPL: 33 % (ref 15–46)
IRON SERPL-MCNC: 84 UG/DL (ref 37–145)
LYMPHOCYTES # BLD AUTO: 1.9 10E3/UL (ref 0.8–5.3)
LYMPHOCYTES NFR BLD AUTO: 24 %
MCH RBC QN AUTO: 24.9 PG (ref 26.5–33)
MCHC RBC AUTO-ENTMCNC: 29.3 G/DL (ref 31.5–36.5)
MCV RBC AUTO: 85 FL (ref 78–100)
MONOCYTES # BLD AUTO: 0.4 10E3/UL (ref 0–1.3)
MONOCYTES NFR BLD AUTO: 5 %
NEUTROPHILS # BLD AUTO: 5.6 10E3/UL (ref 1.6–8.3)
NEUTROPHILS NFR BLD AUTO: 69 %
NRBC # BLD AUTO: 0 10E3/UL
NRBC BLD AUTO-RTO: 0 /100
PLATELET # BLD AUTO: 347 10E3/UL (ref 150–450)
POTASSIUM SERPL-SCNC: 4.2 MMOL/L (ref 3.4–5.3)
PROT SERPL-MCNC: 7.1 G/DL (ref 6.4–8.3)
RBC # BLD AUTO: 4.29 10E6/UL (ref 3.8–5.2)
RETICS # AUTO: 0.07 10E6/UL
RETICS/RBC NFR AUTO: 1.6 %
SODIUM SERPL-SCNC: 142 MMOL/L (ref 135–145)
VIT B12 SERPL-MCNC: 948 PG/ML (ref 232–1245)
WBC # BLD AUTO: 8.2 10E3/UL (ref 4–11)

## 2024-09-24 PROCEDURE — 62323 NJX INTERLAMINAR LMBR/SAC: CPT | Performed by: RADIOLOGY

## 2024-09-24 PROCEDURE — 99207 BLOOD MORPHOLOGY PATHOLOGIST REVIEW: CPT | Performed by: STUDENT IN AN ORGANIZED HEALTH CARE EDUCATION/TRAINING PROGRAM

## 2024-09-24 PROCEDURE — 80053 COMPREHEN METABOLIC PANEL: CPT

## 2024-09-24 PROCEDURE — 82746 ASSAY OF FOLIC ACID SERUM: CPT

## 2024-09-24 PROCEDURE — 82728 ASSAY OF FERRITIN: CPT

## 2024-09-24 PROCEDURE — 82607 VITAMIN B-12: CPT

## 2024-09-24 PROCEDURE — 85045 AUTOMATED RETICULOCYTE COUNT: CPT

## 2024-09-24 PROCEDURE — 83540 ASSAY OF IRON: CPT

## 2024-09-24 PROCEDURE — 85025 COMPLETE CBC W/AUTO DIFF WBC: CPT

## 2024-09-24 PROCEDURE — 83550 IRON BINDING TEST: CPT

## 2024-09-24 PROCEDURE — 36415 COLL VENOUS BLD VENIPUNCTURE: CPT

## 2024-09-24 RX ORDER — BUPIVACAINE HYDROCHLORIDE 5 MG/ML
2 INJECTION, SOLUTION PERINEURAL ONCE
Status: COMPLETED | OUTPATIENT
Start: 2024-09-24 | End: 2024-09-24

## 2024-09-24 RX ORDER — METHYLPREDNISOLONE ACETATE 80 MG/ML
80 INJECTION, SUSPENSION INTRA-ARTICULAR; INTRALESIONAL; INTRAMUSCULAR; SOFT TISSUE ONCE
Status: COMPLETED | OUTPATIENT
Start: 2024-09-24 | End: 2024-09-24

## 2024-09-24 RX ORDER — LIDOCAINE HYDROCHLORIDE 10 MG/ML
5 INJECTION, SOLUTION INFILTRATION; PERINEURAL ONCE
Status: COMPLETED | OUTPATIENT
Start: 2024-09-24 | End: 2024-09-24

## 2024-09-24 RX ORDER — IOPAMIDOL 408 MG/ML
2 INJECTION, SOLUTION INTRATHECAL ONCE
Status: COMPLETED | OUTPATIENT
Start: 2024-09-24 | End: 2024-09-24

## 2024-09-24 RX ADMIN — BUPIVACAINE HYDROCHLORIDE 10 MG: 5 INJECTION, SOLUTION PERINEURAL at 15:01

## 2024-09-24 RX ADMIN — METHYLPREDNISOLONE ACETATE 80 MG: 80 INJECTION, SUSPENSION INTRA-ARTICULAR; INTRALESIONAL; INTRAMUSCULAR; SOFT TISSUE at 15:01

## 2024-09-24 RX ADMIN — IOPAMIDOL 2 ML: 408 INJECTION, SOLUTION INTRATHECAL at 15:02

## 2024-09-24 RX ADMIN — LIDOCAINE HYDROCHLORIDE 5 ML: 10 INJECTION, SOLUTION INFILTRATION; PERINEURAL at 15:01

## 2024-09-24 NOTE — PROGRESS NOTES
Imaging Discharge Instructions - Post Injection       After you go home:  Be cautious when walking. Numbness or weakness in the lower extremities may occur for up to 6-8 hours after the procedure due to the effects of the local anesthetic   Minimize your activity for 24 hours  You may resume normal activity the following day   You may remove the bandage in the evening or the next morning   You may resume bathing the following day   Drink 2 extra glasses of fluid today (unless on fluid restrictions)   DO NOT drive or operate machinery at home or work for 24 hours after injections The use of local anesthetic can slow your reflexes  You may experience mild to moderate increase in pain for several days following your injection. You may use ice packs and/or over the counter pain relievers if needed   Visit an Urgent Care or Emergency Room if you experience the following:  Worsening redness or swelling at the injections site   Discharge from the injection site   You develop a temperature of 101  F or higher   Additional Instructions:  You may resume blood thinners at your regular dose the day following your procedure. Follow up with your physician to have your INR checked if indicated   Follow up with your ordering provider 2 weeks after injection regarding pain relief from this procedure   Contact Information:   To reach a nurse advisor at the [facility] during regular business hours please call [phone].  After hours, call [facility] at [phone]/Toll Free [phone] and ask for the Radiologist on call.

## 2024-09-24 NOTE — PROGRESS NOTES
Tomi was seen in X-ray today for a epidural injection. Patient rated pain before procedure 5/10. After procedure patient rated pain 0/10.   This pain level is acceptable to patient. Patient discharged home with .

## 2024-09-26 DIAGNOSIS — D64.9 NORMOCYTIC ANEMIA: Primary | ICD-10-CM

## 2024-09-26 DIAGNOSIS — N18.31 STAGE 3A CHRONIC KIDNEY DISEASE (H): ICD-10-CM

## 2024-09-26 LAB
PATH REPORT.COMMENTS IMP SPEC: NORMAL
PATH REPORT.COMMENTS IMP SPEC: NORMAL
PATH REPORT.FINAL DX SPEC: NORMAL
PATH REPORT.MICROSCOPIC SPEC OTHER STN: NORMAL
PATH REPORT.MICROSCOPIC SPEC OTHER STN: NORMAL
PATH REPORT.RELEVANT HX SPEC: NORMAL

## 2024-09-30 ENCOUNTER — OFFICE VISIT (OUTPATIENT)
Dept: VASCULAR SURGERY | Facility: CLINIC | Age: 84
End: 2024-09-30
Attending: PHYSICIAN ASSISTANT
Payer: COMMERCIAL

## 2024-09-30 DIAGNOSIS — I87.2 VENOUS (PERIPHERAL) INSUFFICIENCY: ICD-10-CM

## 2024-09-30 DIAGNOSIS — I80.01 THROMBOPHLEBITIS OF SUPERFICIAL VEINS OF RIGHT LOWER EXTREMITY: ICD-10-CM

## 2024-09-30 PROCEDURE — 99203 OFFICE O/P NEW LOW 30 MIN: CPT | Performed by: SURGERY

## 2024-09-30 PROCEDURE — 99204 OFFICE O/P NEW MOD 45 MIN: CPT | Performed by: SURGERY

## 2024-09-30 PROCEDURE — G2211 COMPLEX E/M VISIT ADD ON: HCPCS | Performed by: SURGERY

## 2024-09-30 NOTE — PROGRESS NOTES
The same  [facility] CONSULTATION    HPI:    Yumiko Mccartney is a pleasant 84 year old female referred by Anaid Taylor PA-C for evaluation of right lower extremity chronic venous insufficiency and a history of deep vein thrombosis and superficial thrombophlebitis.  She was evaluated by Dr. Barton in 2019 and found to have right great saphenous vein incompetence with treatment recommended to her.  Unfortunately, she never followed through with treatment.    In May, 2022 she developed a right femoral and popliteal vein thrombosis believed to be nonprovoked but exacerbated by a flight to [place].  A hypercoagulable workup has been obtained which did not reveal an underlying hypercoagulable state.  She has been maintained on Xarelto anticoagulation since that time but, recently, ran out of Xarelto unknowingly and did not take it for 2 weeks, provoking a superficial blood clot from right great saphenous vein varicosities on the medial right calf.  This varicosity communicated with a medial leg  but no new deep vein thrombosis was noted.    She describes aching, tiredness and heaviness in her legs as well as cramps.  She is troubled by left foot pain when she stands and is having this evaluated.  The symptoms make it hard for her to stand for long periods of time.  The symptoms are worse after standing for extended periods, located primarily in the right calf, improving with elevation of the legs.  Symptoms interfere with actives of daily living because it makes it difficult for her to stand for long periods of time, forcing her to stop what she is doing to sit and elevate her legs.  She has worn compression hose for more than 3 months on a daily basis.    Family history is negative for varicose veins and there is no family history of venous thromboembolism.    PAST MEDICAL HISTORY:   Past Medical History:   Diagnosis Date    Acne rosacea     Actinic keratosis     Amblyopia     Asthma, moderate  "persistent     Basal cell carcinoma 10/2010    back X2    Cataract, mod, od; mild-mod, os 01/12/2012    DJD (degenerative joint disease), lumbosacral 08/06/2014    DVT (deep venous thrombosis) (H)     Elevated cholesterol     Endometriosis     HTN (hypertension)     Moderate major depression (H)     \"Circumstances\"    Osteopenia        PAST SURGICAL HISTORY:   Past Surgical History:   Procedure Laterality Date    ARTHROPLASTY KNEE Left 10/4/2021    Procedure: ARTHROPLASTY, LEFT KNEE, TOTAL;  Surgeon: Glenn Calvin MD;  Location: [facility]    BLEPHAROPLASTY BILATERAL  3/9/2006; 2013    both eyes, upper lid; revision (EN)    CATARACT IOL, RT/LT      HC REMOVAL GALLBLADDER      INJECT BLOCK MEDIAL BRANCH CERVICAL/THORACIC/LUMBAR Bilateral 12/7/2023    Procedure: BLOCK, NERVE, FACET JOINT, MEDIAL BRANCH, DIAGNOSTICL3/4/ALA;  Surgeon: Dinesh Proctor MD;  Location: [facility]    LASER YAG CAPSULOTOMY      left eye (AC lysis also)    PHACOEMULSIFICATION WITH STANDARD INTRAOCULAR LENS IMPLANT  1/2012; 4/2013    right eye; left eye    REPAIR PTOSIS      SURGICAL HISTORY OF -       endometriosis surgeriesx3    SURGICAL HISTORY OF -       ganiglion cyst onfoot    SURGICAL HISTORY OF -       Eye for \"droopy eye\"    ZZC APPENDECTOMY         FAMILY HISTORY:   Family History   Problem Relation Age of Onset    C.A.D. Father     C.A.D. Brother     Hypertension Mother     Cancer No family hx of     Diabetes No family hx of     Cerebrovascular Disease No family hx of     Thyroid Disease No family hx of     Glaucoma No family hx of     Macular Degeneration No family hx of        SOCIAL HISTORY:   Social History     Tobacco Use    Smoking status: Never     Passive exposure: Never    Smokeless tobacco: Never   Substance Use Topics    Alcohol use: Yes     Comment: rarely     Vital signs:  There were no vitals taken for this visit.    Current Outpatient Medications   Medication Sig Dispense Refill    acetaminophen (TYLENOL) 325 MG " tablet Take 2 tablets (650 mg) by mouth every 4 hours as needed for other 60 tablet 0    albuterol (PROVENTIL) (2.5 MG/3ML) 0.083% neb solution Take 1 vial (2.5 mg) by nebulization every 6 hours as needed for shortness of breath or wheezing 90 mL 0    buPROPion (WELLBUTRIN XL) 150 MG 24 hr tablet TAKE 1 TABLET BY MOUTH EVERY MORNING 90 tablet 0    calcium citrate (CITRACAL) 950 (200 Ca) MG tablet Take 1 tablet by mouth 2 times daily      Cholecalciferol (VITAMIN D) 2000 UNIT CAPS Take 2,000 Units by mouth daily      gabapentin (NEURONTIN) 600 MG tablet TAKE 0.5 TAB BY MOUTH IN MORNING & 2 TABS AT BEDTIME 225 tablet 1    lisinopril-hydrochlorothiazide (ZESTORETIC) 20-12.5 MG tablet TAKE 2 TABLETS BY MOUTH EVERY  tablet 3    methylPREDNISolone (MEDROL DOSEPAK) 4 MG tablet therapy pack Follow Package Directions 21 tablet 1    Multiple Vitamin (MULTIVITAMIN ADULT PO) Take by mouth daily      predniSONE (DELTASONE) 20 MG tablet Take 2 tablets (40 mg) by mouth daily 14 tablet 0    predniSONE (DELTASONE) 20 MG tablet Take 3 tabs by mouth daily x 3 days, then 2 tabs daily x 3 days, then 1 tab daily x 3 days, then 1/2 tab daily x 3 days. 20 tablet 0    rivaroxaban ANTICOAGULANT (XARELTO) 20 MG TABS tablet Take 1 tablet (20 mg) by mouth daily with food. 90 tablet 3    tiZANidine (ZANAFLEX) 4 MG tablet TAKE 1-2 TABLETS (4-8 MG) BY MOUTH NIGHTLY AS NEEDED FOR MUSCLE SPASMS 180 tablet 0    tiZANidine (ZANAFLEX) 4 MG tablet Take 1-2 tablets (4-8 mg) by mouth nightly as needed for muscle spasms 180 tablet 0       PHYSICAL EXAM:  General: Pleasant, NAD.   HEENT: Normocephalic, atraumatic, external ears and nose normal.   Respiratory: Normal respiratory effort.   Cardiovascular: Pulse is regular.   Musculoskeletal: Gait and station normal.  The joints of her fingers and toes without deformity.  There is no cyanosis of her nailbeds.   EXTREMITIES: Scattered, 3 to 4 mm varicosities over the right distal medial thigh and  proximal medial calf.  On the more distal medial calf is an area of induration but no erythema, the area of superficial thrombophlebitis.  No significant tenderness.  1+ edema of the ankle    Left lower extremity: No significant varicose veins, stasis changes.  1+ edema.  PULSES: R/L (3=normal pulse, 0=no palpable pulse) dorsalis pedis: 3/0; posterior tibial: 3/0.   3+ right popliteal pulse, 1-2+ left popliteal pulse  Neurologic: Grossly normal  Psychiatric: Mood, affect, judgment and insight are normal     Venous Duplex Ultrasound: 11/13/19  Ordering provider: JOSÉ MIGUEL METCALF      Venous Competency Diagnostic Criteria Adopted 11/29/11.     Venous competency criteria defining abnormal reflux duration:  Femoral - popliteal reflux > 1.0 sec.  Deep femoral, deep calf veins, and superficial vein reflux > 0.5 sec.   vein reflux > 0.35 sec.     Supporting document: J Vasc Surg 2003; 38:793-8. Definition of reflux  in lower extremity veins.     Findings:      Right Lower Extremity:      Duplex Evaluation for Deep Vein Thrombus : None.     Common femoral vein: Incompetent     Deep femoral vein: Competent     Femoral vein:      proximal thigh: Competent      mid thigh: Competent      distal thigh: Competent     Popliteal vein: Competent     Posterior tibial veins at the ankle: Competent     Superficial Venous Incompetency Study:        Right Lower Extremity:      Duplex Evaluation for Superficial Vein Thrombus (which includes GSV,  SSV, AASV, and PASV): None.      Anterior accessory saphenous (AASV): Competent     Posterior accessory saphenous (PASV): Competent     Great saphenous vein (GSV)      sapheno-femoral junction: Incompetent      prox thigh: Incompetent      mid thigh: Incompetent      dist thigh: Incompetent       knee: Incompetent      prox calf: Incompetent      mid calf: Incompetent      ankle: Competent     Vein of Giacomini (V of G):      distal thigh: Competent      mid thigh: Competent      prox  "thigh: Not seen     Small saphenous vein:      sapheno-popliteal junction: Competent      prox calf: Competent      mid calf: Competent     Diameters of superficial veins:     AASV: Diameter 1.5 mm     PASV: Not measured     SFJ: Diameter* 9 mm  GSV prox thigh*: Diameter 5.1 mm   GSV mid thigh: Diameter 4.6 mm  GSV dist thigh: Diameter 5.3 mm  GSV knee*: Diameter 5 mm  GSV Prox Calf: Diameter 3.2 mm  GSV Mid Calf: Diameter 1.3 mm     V. of Giacomini (V of G) dist thigh: Not seen  V. of Giacomini (V of G) mid thigh: Not seen  V. of Giacomini (V of G) prox thigh: Not seen     SSV SPJ*: Diameter 2.2 mm  SSV prox calf: Not seen  SSV mid calf: Not seen     There is 1 incompetent perforators identified.      vein #1: Diameter 4 x 13 mm,  Location 14 cm from medial  malleolus.                                                                      IMPRESSION:     1. Right lea. Venous incompetency observed of the common femoral vein and great  saphenous vein from the saphenofemoral junction to the mid calf, the  ankle is competent.  1b.  veins observed, incompetent  proximal to the  medial malleolus as described above.  1c. No deep or superficial thrombus seen.        Reference: \"Duplex Ultrasound in the Diagnosis of Lower-Extremity Deep  Venous Thrombosis\"- Sally Malave MD, S; Dean Canales MD  (Circulation. 2014;129:917-921. http://circ.ahajournals.org )     I have personally reviewed the examination and initial interpretation  and I agree with the findings.     SPEEDY LOGAN MD    ASSESSMENT:  Unprovoked right lower extremity deep vein thrombosis in  with more recent right medial calf superficial thrombophlebitis.  The patient had not taken Xarelto for 2 weeks prior to the episode of superficial thrombophlebitis.    We discussed the lower extremity vein anatomy and the pathophysiology of venous insufficiency utilizing a leg vein drawing.  We also reviewed her venous " competency study from 2019 which revealed incompetence of the right great saphenous vein from the proximal thigh through the mid calf with a large varicosity coursing from the knee level great saphenous vein down onto the medial calf.  There is also a sizable  in the distal medial right calf.    We will obtain a right lower extremity venous competency study to be discussed via a virtual visit.  We discussed options for treating superficial, actual incompetence utilizing office-based endovenous ablation with thermal and nonthermal techniques.  Risks and benefits of each were discussed.    She would be a good candidate for cyanoacrylate glue ablation as she would not need to wear a thigh-high compression sock postoperatively, would not need tumescent anesthetic and likely would not need oral sedation.  Regardless of the technique used to close the vein, we will continue her anticoagulation throughout the procedure.    PLAN:  Right leg venous competency study with virtual visit to discuss results     Heri Morales MD, FACS    Dictated using Dragon voice recognition software which may result in transcription errors        VEIN CLINIC LEG DRAWING:

## 2024-09-30 NOTE — LETTER
9/30/2024      Yumiko Mccartney  [address]  [place] [address]      Dear Colleague,    Thank you for referring your patient, Yumiko Mccartney, to the [facility] [place]. Please see a copy of my visit note below.    The same  [facility] CONSULTATION    HPI:    Yumiko Mccartney is a pleasant 84 year old female referred by Anaid Taylor PA-C for evaluation of right lower extremity chronic venous insufficiency and a history of deep vein thrombosis and superficial thrombophlebitis.  She was evaluated by Dr. Barton in 2019 and found to have right great saphenous vein incompetence with treatment recommended to her.  Unfortunately, she never followed through with treatment.    In May, 2022 she developed a right femoral and popliteal vein thrombosis believed to be nonprovoked but exacerbated by a flight to [place].  A hypercoagulable workup has been obtained which did not reveal an underlying hypercoagulable state.  She has been maintained on Xarelto anticoagulation since that time but, recently, ran out of Xarelto unknowingly and did not take it for 2 weeks, provoking a superficial blood clot from right great saphenous vein varicosities on the medial right calf.  This varicosity communicated with a medial leg  but no new deep vein thrombosis was noted.    She describes aching, tiredness and heaviness in her legs as well as cramps.  She is troubled by left foot pain when she stands and is having this evaluated.  The symptoms make it hard for her to stand for long periods of time.  The symptoms are worse after standing for extended periods, located primarily in the right calf, improving with elevation of the legs.  Symptoms interfere with actives of daily living because it makes it difficult for her to stand for long periods of time, forcing her to stop what she is doing to sit and elevate her legs.  She has worn compression hose for more than 3 months on a daily  "basis.    Family history is negative for varicose veins and there is no family history of venous thromboembolism.    PAST MEDICAL HISTORY:   Past Medical History:   Diagnosis Date     Acne rosacea      Actinic keratosis      Amblyopia      Asthma, moderate persistent      Basal cell carcinoma 10/2010    back X2     Cataract, mod, od; mild-mod, os 01/12/2012     DJD (degenerative joint disease), lumbosacral 08/06/2014     DVT (deep venous thrombosis) (H)      Elevated cholesterol      Endometriosis      HTN (hypertension)      Moderate major depression (H)     \"Circumstances\"     Osteopenia        PAST SURGICAL HISTORY:   Past Surgical History:   Procedure Laterality Date     ARTHROPLASTY KNEE Left 10/4/2021    Procedure: ARTHROPLASTY, LEFT KNEE, TOTAL;  Surgeon: Glenn Calvin MD;  Location: [facility]     BLEPHAROPLASTY BILATERAL  3/9/2006; 2013    both eyes, upper lid; revision (EN)     CATARACT IOL, RT/LT       HC REMOVAL GALLBLADDER       INJECT BLOCK MEDIAL BRANCH CERVICAL/THORACIC/LUMBAR Bilateral 12/7/2023    Procedure: BLOCK, NERVE, FACET JOINT, MEDIAL BRANCH, DIAGNOSTICL3/4/ALA;  Surgeon: Dinesh Proctor MD;  Location: [facility]     LASER YAG CAPSULOTOMY      left eye (AC lysis also)     PHACOEMULSIFICATION WITH STANDARD INTRAOCULAR LENS IMPLANT  1/2012; 4/2013    right eye; left eye     REPAIR PTOSIS       SURGICAL HISTORY OF -       endometriosis surgeriesx3     SURGICAL HISTORY OF -       ganiglion cyst onfoot     SURGICAL HISTORY OF -       Eye for \"droopy eye\"     ZZC APPENDECTOMY         FAMILY HISTORY:   Family History   Problem Relation Age of Onset     C.A.D. Father      C.A.D. Brother      Hypertension Mother      Cancer No family hx of      Diabetes No family hx of      Cerebrovascular Disease No family hx of      Thyroid Disease No family hx of      Glaucoma No family hx of      Macular Degeneration No family hx of        SOCIAL HISTORY:   Social History     Tobacco Use     Smoking status: " Never     Passive exposure: Never     Smokeless tobacco: Never   Substance Use Topics     Alcohol use: Yes     Comment: rarely     Vital signs:  There were no vitals taken for this visit.    Current Outpatient Medications   Medication Sig Dispense Refill     acetaminophen (TYLENOL) 325 MG tablet Take 2 tablets (650 mg) by mouth every 4 hours as needed for other 60 tablet 0     albuterol (PROVENTIL) (2.5 MG/3ML) 0.083% neb solution Take 1 vial (2.5 mg) by nebulization every 6 hours as needed for shortness of breath or wheezing 90 mL 0     buPROPion (WELLBUTRIN XL) 150 MG 24 hr tablet TAKE 1 TABLET BY MOUTH EVERY MORNING 90 tablet 0     calcium citrate (CITRACAL) 950 (200 Ca) MG tablet Take 1 tablet by mouth 2 times daily       Cholecalciferol (VITAMIN D) 2000 UNIT CAPS Take 2,000 Units by mouth daily       gabapentin (NEURONTIN) 600 MG tablet TAKE 0.5 TAB BY MOUTH IN MORNING & 2 TABS AT BEDTIME 225 tablet 1     lisinopril-hydrochlorothiazide (ZESTORETIC) 20-12.5 MG tablet TAKE 2 TABLETS BY MOUTH EVERY  tablet 3     methylPREDNISolone (MEDROL DOSEPAK) 4 MG tablet therapy pack Follow Package Directions 21 tablet 1     Multiple Vitamin (MULTIVITAMIN ADULT PO) Take by mouth daily       predniSONE (DELTASONE) 20 MG tablet Take 2 tablets (40 mg) by mouth daily 14 tablet 0     predniSONE (DELTASONE) 20 MG tablet Take 3 tabs by mouth daily x 3 days, then 2 tabs daily x 3 days, then 1 tab daily x 3 days, then 1/2 tab daily x 3 days. 20 tablet 0     rivaroxaban ANTICOAGULANT (XARELTO) 20 MG TABS tablet Take 1 tablet (20 mg) by mouth daily with food. 90 tablet 3     tiZANidine (ZANAFLEX) 4 MG tablet TAKE 1-2 TABLETS (4-8 MG) BY MOUTH NIGHTLY AS NEEDED FOR MUSCLE SPASMS 180 tablet 0     tiZANidine (ZANAFLEX) 4 MG tablet Take 1-2 tablets (4-8 mg) by mouth nightly as needed for muscle spasms 180 tablet 0       PHYSICAL EXAM:  General: Pleasant, NAD.   HEENT: Normocephalic, atraumatic, external ears and nose normal.    Respiratory: Normal respiratory effort.   Cardiovascular: Pulse is regular.   Musculoskeletal: Gait and station normal.  The joints of her fingers and toes without deformity.  There is no cyanosis of her nailbeds.   EXTREMITIES: Scattered, 3 to 4 mm varicosities over the right distal medial thigh and proximal medial calf.  On the more distal medial calf is an area of induration but no erythema, the area of superficial thrombophlebitis.  No significant tenderness.  1+ edema of the ankle    Left lower extremity: No significant varicose veins, stasis changes.  1+ edema.  PULSES: R/L (3=normal pulse, 0=no palpable pulse) dorsalis pedis: 3/0; posterior tibial: 3/0.   3+ right popliteal pulse, 1-2+ left popliteal pulse  Neurologic: Grossly normal  Psychiatric: Mood, affect, judgment and insight are normal     Venous Duplex Ultrasound: 11/13/19  Ordering provider: JOSÉ MIGUEL METCALF      Venous Competency Diagnostic Criteria Adopted 11/29/11.     Venous competency criteria defining abnormal reflux duration:  Femoral - popliteal reflux > 1.0 sec.  Deep femoral, deep calf veins, and superficial vein reflux > 0.5 sec.   vein reflux > 0.35 sec.     Supporting document: J Vasc Surg 2003; 38:793-8. Definition of reflux  in lower extremity veins.     Findings:      Right Lower Extremity:      Duplex Evaluation for Deep Vein Thrombus : None.     Common femoral vein: Incompetent     Deep femoral vein: Competent     Femoral vein:      proximal thigh: Competent      mid thigh: Competent      distal thigh: Competent     Popliteal vein: Competent     Posterior tibial veins at the ankle: Competent     Superficial Venous Incompetency Study:        Right Lower Extremity:      Duplex Evaluation for Superficial Vein Thrombus (which includes GSV,  SSV, AASV, and PASV): None.      Anterior accessory saphenous (AASV): Competent     Posterior accessory saphenous (PASV): Competent     Great saphenous vein (GSV)      sapheno-femoral  "junction: Incompetent      prox thigh: Incompetent      mid thigh: Incompetent      dist thigh: Incompetent       knee: Incompetent      prox calf: Incompetent      mid calf: Incompetent      ankle: Competent     Vein of Giacomini (V of G):      distal thigh: Competent      mid thigh: Competent      prox thigh: Not seen     Small saphenous vein:      sapheno-popliteal junction: Competent      prox calf: Competent      mid calf: Competent     Diameters of superficial veins:     AASV: Diameter 1.5 mm     PASV: Not measured     SFJ: Diameter* 9 mm  GSV prox thigh*: Diameter 5.1 mm   GSV mid thigh: Diameter 4.6 mm  GSV dist thigh: Diameter 5.3 mm  GSV knee*: Diameter 5 mm  GSV Prox Calf: Diameter 3.2 mm  GSV Mid Calf: Diameter 1.3 mm     V. of Giacomini (V of G) dist thigh: Not seen  V. of Giacomini (V of G) mid thigh: Not seen  V. of Giacomini (V of G) prox thigh: Not seen     SSV SPJ*: Diameter 2.2 mm  SSV prox calf: Not seen  SSV mid calf: Not seen     There is 1 incompetent perforators identified.      vein #1: Diameter 4 x 13 mm,  Location 14 cm from medial  malleolus.                                                                      IMPRESSION:     1. Right lea. Venous incompetency observed of the common femoral vein and great  saphenous vein from the saphenofemoral junction to the mid calf, the  ankle is competent.  1b.  veins observed, incompetent  proximal to the  medial malleolus as described above.  1c. No deep or superficial thrombus seen.        Reference: \"Duplex Ultrasound in the Diagnosis of Lower-Extremity Deep  Venous Thrombosis\"- Sally Malave MD, S; Dean Canales MD  (Circulation. 2014;129:917-921. http://circ.ahajournals.org )     I have personally reviewed the examination and initial interpretation  and I agree with the findings.     SPEEDY LOGAN MD    ASSESSMENT:  Unprovoked right lower extremity deep vein thrombosis in  with more recent right " medial calf superficial thrombophlebitis.  The patient had not taken Xarelto for 2 weeks prior to the episode of superficial thrombophlebitis.    We discussed the lower extremity vein anatomy and the pathophysiology of venous insufficiency utilizing a leg vein drawing.  We also reviewed her venous competency study from 2019 which revealed incompetence of the right great saphenous vein from the proximal thigh through the mid calf with a large varicosity coursing from the knee level great saphenous vein down onto the medial calf.  There is also a sizable  in the distal medial right calf.    We will obtain a right lower extremity venous competency study to be discussed via a virtual visit.  We discussed options for treating superficial, actual incompetence utilizing office-based endovenous ablation with thermal and nonthermal techniques.  Risks and benefits of each were discussed.    She would be a good candidate for cyanoacrylate glue ablation as she would not need to wear a thigh-high compression sock postoperatively, would not need tumescent anesthetic and likely would not need oral sedation.  Regardless of the technique used to close the vein, we will continue her anticoagulation throughout the procedure.    PLAN:  Right leg venous competency study with virtual visit to discuss results     Heri Morales MD, FACS    Dictated using Dragon voice recognition software which may result in transcription errors        VEIN [facility]INIC LEG DRAWING:                Again, thank you for allowing me to participate in the care of your patient.        Sincerely,        Heri Morales MD

## 2024-10-09 ENCOUNTER — VIRTUAL VISIT (OUTPATIENT)
Dept: ORTHOPEDICS | Facility: CLINIC | Age: 84
End: 2024-10-09
Attending: PREVENTIVE MEDICINE
Payer: COMMERCIAL

## 2024-10-09 DIAGNOSIS — M54.40 CHRONIC BILATERAL LOW BACK PAIN WITH SCIATICA, SCIATICA LATERALITY UNSPECIFIED: ICD-10-CM

## 2024-10-09 DIAGNOSIS — G89.29 CHRONIC BILATERAL LOW BACK PAIN WITH SCIATICA, SCIATICA LATERALITY UNSPECIFIED: ICD-10-CM

## 2024-10-09 DIAGNOSIS — M51.26 LUMBAR DISC HERNIATION: ICD-10-CM

## 2024-10-09 DIAGNOSIS — M54.16 LUMBAR RADICULOPATHY: Primary | ICD-10-CM

## 2024-10-09 PROCEDURE — 99442 PR PHYSICIAN TELEPHONE EVALUATION 11-20 MIN: CPT | Mod: 93 | Performed by: PREVENTIVE MEDICINE

## 2024-10-09 NOTE — LETTER
10/9/2024      Yumiko Mccartney  [address]  [place] [address]      Dear Colleague,    Thank you for referring your patient, Yumiko Mccartney, to the [facility] [place]. Please see a copy of my visit note below.    Patient is a  84   year old who is being evaluated via a billable telephone visit.      What phone number would you like to be contacted at? CELL  How would you like to obtain your AVS? MYCHART        Subjective   Patient is a   84  year old who presents by phone call visit for the following:     HPI   Followup for lumbar injection  Overall feels that the right low back pain and sciatica has improved but the left low back still hurts, only about 50% improvement to this point  Injection was 2 weeks prior      Review of Systems   Constitutional, HEENT, cardiovascular, pulmonary, gi and gu systems are negative, except as otherwise noted.      Objective           Vitals:  No vitals were obtained today due to virtual visit.    Physical Exam   healthy, alert, and no distress  PSYCH: Alert and oriented times 3; coherent speech, normal   rate and volume, able to articulate logical thoughts, able   to abstract reason, no tangential thoughts, no hallucinations   or delusions  His affect is normal  RESP: No cough, no audible wheezing, able to talk in full sentences  Remainder of exam unable to be completed due to telephone visits    Assessment/Plan  83 yo female with lumbar ddd, disc herniatons, radiculopathy, not resolved      I independently reviewed the following imaging studies and discussed with patient:  Lumbar MRI shows ddd, disc herniations  Discussed and can consider TANK repeated in 2-3 months  Consider surgical referral   Consider referral to pain clinic for discussion about alternative options for treatments  She will let me know when she wants to take more steps        Phone call duration: 20 minutes  Phone call start: 200pm  Phone call end: 220pm    Kala      Again, thank you for allowing me to participate in the care of your patient.        Sincerely,        Abad Armendariz MD

## 2024-10-09 NOTE — PROGRESS NOTES
Patient is a  84   year old who is being evaluated via a billable telephone visit.      What phone number would you like to be contacted at? CELL  How would you like to obtain your AVS? EMILY        Subjective   Patient is a   84  year old who presents by phone call visit for the following:     HPI   Followup for lumbar injection  Overall feels that the right low back pain and sciatica has improved but the left low back still hurts, only about 50% improvement to this point  Injection was 2 weeks prior      Review of Systems   Constitutional, HEENT, cardiovascular, pulmonary, gi and gu systems are negative, except as otherwise noted.      Objective           Vitals:  No vitals were obtained today due to virtual visit.    Physical Exam   healthy, alert, and no distress  PSYCH: Alert and oriented times 3; coherent speech, normal   rate and volume, able to articulate logical thoughts, able   to abstract reason, no tangential thoughts, no hallucinations   or delusions  His affect is normal  RESP: No cough, no audible wheezing, able to talk in full sentences  Remainder of exam unable to be completed due to telephone visits    Assessment/Plan  83 yo female with lumbar ddd, disc herniatons, radiculopathy, not resolved      I independently reviewed the following imaging studies and discussed with patient:  Lumbar MRI shows ddd, disc herniations  Discussed and can consider TANK repeated in 2-3 months  Consider surgical referral   Consider referral to pain clinic for discussion about alternative options for treatments  She will let me know when she wants to take more steps        Phone call duration: 20 minutes  Phone call start: 200pm  Phone call end: 220pm  Dr Armendariz

## 2024-10-09 NOTE — LETTER
10/9/2024      Yumiko Mccartney  [address]  [place] [address]      Dear Colleague,    Thank you for referring your patient, Yumiko Mccartney, to the [facility] [place]. Please see a copy of my visit note below.    Patient is a  84   year old who is being evaluated via a billable telephone visit.      What phone number would you like to be contacted at? CELL  How would you like to obtain your AVS? MYCHART        Subjective   Patient is a   84  year old who presents by phone call visit for the following:     HPI   Followup for lumbar injection  Overall feels that the right low back pain and sciatica has improved but the left low back still hurts, only about 50% improvement to this point  Injection was 2 weeks prior      Review of Systems   Constitutional, HEENT, cardiovascular, pulmonary, gi and gu systems are negative, except as otherwise noted.      Objective           Vitals:  No vitals were obtained today due to virtual visit.    Physical Exam   healthy, alert, and no distress  PSYCH: Alert and oriented times 3; coherent speech, normal   rate and volume, able to articulate logical thoughts, able   to abstract reason, no tangential thoughts, no hallucinations   or delusions  His affect is normal  RESP: No cough, no audible wheezing, able to talk in full sentences  Remainder of exam unable to be completed due to telephone visits    Assessment/Plan  85 yo female with lumbar ddd, disc herniatons, radiculopathy, not resolved      I independently reviewed the following imaging studies and discussed with patient:  Lumbar MRI shows ddd, disc herniations  Discussed and can consider TANK repeated in 2-3 months  Consider surgical referral   Consider referral to pain clinic for discussion about alternative options for treatments  She will let me know when she wants to take more steps        Phone call duration: 20 minutes  Phone call start: 200pm  Phone call end: 220pm    Kala      Again, thank you for allowing me to participate in the care of your patient.        Sincerely,        Abad Armendariz MD

## 2024-10-14 ENCOUNTER — VIRTUAL VISIT (OUTPATIENT)
Dept: VASCULAR SURGERY | Facility: CLINIC | Age: 84
End: 2024-10-14
Attending: SURGERY
Payer: COMMERCIAL

## 2024-10-14 ENCOUNTER — ANCILLARY PROCEDURE (OUTPATIENT)
Dept: ULTRASOUND IMAGING | Facility: CLINIC | Age: 84
End: 2024-10-14
Attending: SURGERY
Payer: COMMERCIAL

## 2024-10-14 DIAGNOSIS — I87.2 VENOUS (PERIPHERAL) INSUFFICIENCY: ICD-10-CM

## 2024-10-14 DIAGNOSIS — I80.01 THROMBOPHLEBITIS OF SUPERFICIAL VEINS OF RIGHT LOWER EXTREMITY: Primary | ICD-10-CM

## 2024-10-14 DIAGNOSIS — I80.01 THROMBOPHLEBITIS OF SUPERFICIAL VEINS OF RIGHT LOWER EXTREMITY: ICD-10-CM

## 2024-10-14 PROCEDURE — 99213 OFFICE O/P EST LOW 20 MIN: CPT | Mod: 95 | Performed by: SURGERY

## 2024-10-14 PROCEDURE — 93971 EXTREMITY STUDY: CPT | Mod: RT | Performed by: SURGERY

## 2024-10-14 NOTE — LETTER
10/14/2024      Yumiko Mccartney  [address]  [place] [address]      Dear Colleague,    Thank you for referring your patient, Yumiko Mccartney, to the [facility] [place]. Please see a copy of my visit note below.    Tomi is a 84 year old who is being evaluated via a billable video visit.    How would you like to obtain your AVS? MyChart  If the video visit is dropped, the invitation should be resent by: Text to cell phone: [phone]  Will anyone else be joining your video visit? No    Video-Visit Details    Type of service:  Video Visit  Video End Time: 3:29 PM  Video visit start time: 3:16 PM  Originating Location (pt. Location): Home    Distant Location (provider location):  On-site  Platform used for Video Visit: [employer]  Signed Electronically by: Heri Morales MD    [facility] [place] Progress Note    Yumiko Mccartney presents in follow-up of right leg varicose veins with pain and complications of superficial thrombophlebitis please see my consultation of 2024 for details..  She returned 10/14/2024 for a right lower extremity venous competency study, results which were discussed at today's virtual visit.    Physical Exam  General: Pleasant female in no acute distress.  Blood pressure 149/71, pulse 72  Extremities: Scattered, 3 to 4 mm varicosities over the right distal medial thigh and proximal medial calf.  On the more distal medial calf is an area of induration but no erythema, the area of superficial thrombophlebitis.  No significant tenderness.  1+ edema of the ankle     Left lower extremity: No significant varicose veins, stasis changes.  1+ edema.    Ultrasound:  Name:  Yumiko Mccartney                                              Patient ID: [id number]  Date: 2024                                              : 1940  Sex: female                                                                 Examined by: SNEHA  Michael, RVT  Age:  84 year old                                                         Reading MD: SANDHYA Morales MD     INDICATION: venous insufficiency, superficial thrombophlebitis of the right lower extremity, history of DVT     EXAM TYPE  RIGHT LOWER EXTREMITY VENOUS DUPLEX FOR VENOUS INSUFFICIENCY  TECHNICAL SUMMARY     A duplex ultrasound study using color flow was performed to evaluate the right lower extremity veins for valvular incompetence with the patient in a steep reversed Trendelenburg.      RIGHT:     Chronic non-occlusive thrombus is visualized in one of the duplicated femoral veins from mid to distal thigh, and in the popliteal vein.  The proximal femoral vein is incompetent and free of thrombus. The remaining deep veins are competent and free of thrombus.      The GSV demonstrates phasic flow, compresses, and responds to augmentations from the saphenofemoral junction to the ankle with no evidence of thrombus. The great saphenous vein measures 7.7 mm at the saphenofemoral junction, 6.5 mm at the proximal thigh, and 6.4 mm at the knee. The GSV is incompetent from SFJ to Proximal Calf, with the greatest reflux time of 2.9 seconds.  The GSV gives rise to multiple incompetent varicose veins, the largest measures 2.7 mm off the Proximal Calf that courses Anteromedial with a reflux time of 1.2 seconds.     Acute occlusive thrombus is visualized in the varicose veins at the medial calf from the mid calf to the ankle.       The AASV is competent (1.9 mm) draining into the saphenofemoral junction.      The Giacomini vein is competent (1.5 mm) communicating with the small saphenous vein at the knee level.      The SSV demonstrates phasic flow, compresses, and responds to augmentations from the popliteal space to the ankle.  No reflux or thrombus is seen. The SPJ is not visualized.      Perforators: There is no evidence of incompetent  veins at any level.      LEFT:     The CFV demonstrates  phasic flow, compresses, and responds to augmentations.  There is no DVT.       FINAL SUMMARY:  Right lower extremity:  Deep veins  1.  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  Proximal femoral vein incompetence, otherwise deep veins are competent     Superficial veins  1.  Acute, occlusive thrombus of right medial calf varicose veins  2.  Saphenofemoral junction through proximal calf great saphenous vein incompetence, the source of incompetent varicosities in the calf  3.  Remaining superficial, truncal veins are patent and competent      veins  No incompetent perforators     Left lower extremity:  Normal compression and flow of common femoral vein with no evidence of common femoral vein thrombosis     Incompetence Criteria: Greater than 0.5 seconds in the superficial and  veins and greater than 1.0 second in the deep veins.     ALVARO Morales MD, FACS     Assessment:  Right leg varicose veins with pain and recent episode of superficial thrombophlebitis.  The superficial thrombophlebitis occurred when the patient was off of anticoagulation.    We discussed her lower extremity venous competency study utilizing a leg vein drawing.  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  Her right great saphenous vein is incompetent and is the source of the varicosities in the calf, 1 of which has occlusive thrombus within it.    We discussed the option of continued conservative management with ongoing anticoagulation and compression.  The patient does not feel that she is troubled enough by the veins to need to have treatment at this time.  I stressed the importance of maintaining anticoagulation and wearing compression.  Risk of conservative management include recurrent superficial thrombophlebitis, bleeding and progression of the disease process.    We also  discussed the option of treating her great saphenous vein utilizing endovenous ablation in the office setting.  Risk details were discussed.  She voiced understanding of our discussion and her questions were answered.    Plan:  Continue conservative measures.  Return on an as-needed basis.    Heri Morales MD, FACS    Dictated using Dragon voice recognition software which may result in transcription errors          Again, thank you for allowing me to participate in the care of your patient.        Sincerely,        Heri Morales MD

## 2024-10-14 NOTE — PROGRESS NOTES
Tomi is a 84 year old who is being evaluated via a billable video visit.    How would you like to obtain your AVS? MyChart  If the video visit is dropped, the invitation should be resent by: Text to cell phone: [phone]  Will anyone else be joining your video visit? No    Video-Visit Details    Type of service:  Video Visit  Video End Time: 3:29 PM  Video visit start time: 3:16 PM  Originating Location (pt. Location): Home    Distant Location (provider location):  On-site  Platform used for Video Visit: [facility]  Signed Electronically by: Heri Morales MD    [facility] [place] Progress Note    Yumiko Mccartney presents in follow-up of right leg varicose veins with pain and complications of superficial thrombophlebitis please see my consultation of 2024 for details..  She returned 10/14/2024 for a right lower extremity venous competency study, results which were discussed at today's virtual visit.    Physical Exam  General: Pleasant female in no acute distress.  Blood pressure 149/71, pulse 72  Extremities: Scattered, 3 to 4 mm varicosities over the right distal medial thigh and proximal medial calf.  On the more distal medial calf is an area of induration but no erythema, the area of superficial thrombophlebitis.  No significant tenderness.  1+ edema of the ankle     Left lower extremity: No significant varicose veins, stasis changes.  1+ edema.    Ultrasound:  Name:  Yumiko Mccartney                                              Patient ID: [id number]  Date: 2024                                              : 1940  Sex: female                                                                 Examined by: SNEHA Rodriguez RVT  Age:  84 year old                                                         Reading MD: SANDHYA Morales MD     INDICATION: venous insufficiency, superficial thrombophlebitis of the right lower extremity, history of DVT     EXAM TYPE  RIGHT  LOWER EXTREMITY VENOUS DUPLEX FOR VENOUS INSUFFICIENCY  TECHNICAL SUMMARY     A duplex ultrasound study using color flow was performed to evaluate the right lower extremity veins for valvular incompetence with the patient in a steep reversed Trendelenburg.      RIGHT:     Chronic non-occlusive thrombus is visualized in one of the duplicated femoral veins from mid to distal thigh, and in the popliteal vein.  The proximal femoral vein is incompetent and free of thrombus. The remaining deep veins are competent and free of thrombus.      The GSV demonstrates phasic flow, compresses, and responds to augmentations from the saphenofemoral junction to the ankle with no evidence of thrombus. The great saphenous vein measures 7.7 mm at the saphenofemoral junction, 6.5 mm at the proximal thigh, and 6.4 mm at the knee. The GSV is incompetent from SFJ to Proximal Calf, with the greatest reflux time of 2.9 seconds.  The GSV gives rise to multiple incompetent varicose veins, the largest measures 2.7 mm off the Proximal Calf that courses Anteromedial with a reflux time of 1.2 seconds.     Acute occlusive thrombus is visualized in the varicose veins at the medial calf from the mid calf to the ankle.       The AASV is competent (1.9 mm) draining into the saphenofemoral junction.      The Giacomini vein is competent (1.5 mm) communicating with the small saphenous vein at the knee level.      The SSV demonstrates phasic flow, compresses, and responds to augmentations from the popliteal space to the ankle.  No reflux or thrombus is seen. The SPJ is not visualized.      Perforators: There is no evidence of incompetent  veins at any level.      LEFT:     The CFV demonstrates phasic flow, compresses, and responds to augmentations.  There is no DVT.       FINAL SUMMARY:  Right lower extremity:  Deep veins  1.  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  Proximal femoral vein incompetence,  otherwise deep veins are competent     Superficial veins  1.  Acute, occlusive thrombus of right medial calf varicose veins  2.  Saphenofemoral junction through proximal calf great saphenous vein incompetence, the source of incompetent varicosities in the calf  3.  Remaining superficial, truncal veins are patent and competent      veins  No incompetent perforators     Left lower extremity:  Normal compression and flow of common femoral vein with no evidence of common femoral vein thrombosis     Incompetence Criteria: Greater than 0.5 seconds in the superficial and  veins and greater than 1.0 second in the deep veins.     ALVARO Morales MD, FACS     Assessment:  Right leg varicose veins with pain and recent episode of superficial thrombophlebitis.  The superficial thrombophlebitis occurred when the patient was off of anticoagulation.    We discussed her lower extremity venous competency study utilizing a leg vein drawing.  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  Her right great saphenous vein is incompetent and is the source of the varicosities in the calf, 1 of which has occlusive thrombus within it.    We discussed the option of continued conservative management with ongoing anticoagulation and compression.  The patient does not feel that she is troubled enough by the veins to need to have treatment at this time.  I stressed the importance of maintaining anticoagulation and wearing compression.  Risk of conservative management include recurrent superficial thrombophlebitis, bleeding and progression of the disease process.    We also discussed the option of treating her great saphenous vein utilizing endovenous ablation in the office setting.  Risk details were discussed.  She voiced understanding of our discussion and her questions were answered.    Plan:  Continue conservative measures.   Return on an as-needed basis.    Heri Morales MD, FACS    Dictated using Dragon voice recognition software which may result in transcription errors

## 2024-10-29 ENCOUNTER — OFFICE VISIT (OUTPATIENT)
Dept: FAMILY MEDICINE | Facility: CLINIC | Age: 84
End: 2024-10-29
Payer: COMMERCIAL

## 2024-10-29 VITALS
RESPIRATION RATE: 16 BRPM | OXYGEN SATURATION: 97 % | HEIGHT: 64 IN | HEART RATE: 80 BPM | DIASTOLIC BLOOD PRESSURE: 72 MMHG | BODY MASS INDEX: 33.63 KG/M2 | SYSTOLIC BLOOD PRESSURE: 121 MMHG | WEIGHT: 197 LBS

## 2024-10-29 DIAGNOSIS — M54.16 LUMBAR RADICULOPATHY: ICD-10-CM

## 2024-10-29 DIAGNOSIS — L65.9 HAIR LOSS: ICD-10-CM

## 2024-10-29 DIAGNOSIS — E66.01 SEVERE OBESITY WITH BODY MASS INDEX (BMI) OF 35.0 TO 35.9 AND COMORBIDITY (H): ICD-10-CM

## 2024-10-29 DIAGNOSIS — M79.672 LEFT FOOT PAIN: Primary | ICD-10-CM

## 2024-10-29 DIAGNOSIS — I73.9 PAD (PERIPHERAL ARTERY DISEASE) (H): ICD-10-CM

## 2024-10-29 DIAGNOSIS — R09.89 OTHER SPECIFIED SYMPTOMS AND SIGNS INVOLVING THE CIRCULATORY AND RESPIRATORY SYSTEMS: ICD-10-CM

## 2024-10-29 DIAGNOSIS — I82.411 ACUTE DEEP VEIN THROMBOSIS (DVT) OF FEMORAL VEIN OF RIGHT LOWER EXTREMITY (H): ICD-10-CM

## 2024-10-29 PROCEDURE — G2211 COMPLEX E/M VISIT ADD ON: HCPCS | Performed by: PREVENTIVE MEDICINE

## 2024-10-29 PROCEDURE — 99214 OFFICE O/P EST MOD 30 MIN: CPT | Performed by: PREVENTIVE MEDICINE

## 2024-10-29 RX ORDER — BUPROPION HYDROCHLORIDE 150 MG/1
150 TABLET ORAL EVERY MORNING
Qty: 90 TABLET | Refills: 1 | Status: SHIPPED | OUTPATIENT
Start: 2024-10-29

## 2024-10-29 ASSESSMENT — ASTHMA QUESTIONNAIRES
QUESTION_1 LAST FOUR WEEKS HOW MUCH OF THE TIME DID YOUR ASTHMA KEEP YOU FROM GETTING AS MUCH DONE AT WORK, SCHOOL OR AT HOME: NONE OF THE TIME
ACT_TOTALSCORE: 25
QUESTION_2 LAST FOUR WEEKS HOW OFTEN HAVE YOU HAD SHORTNESS OF BREATH: NOT AT ALL
QUESTION_4 LAST FOUR WEEKS HOW OFTEN HAVE YOU USED YOUR RESCUE INHALER OR NEBULIZER MEDICATION (SUCH AS ALBUTEROL): NOT AT ALL
ACT_TOTALSCORE: 25
QUESTION_3 LAST FOUR WEEKS HOW OFTEN DID YOUR ASTHMA SYMPTOMS (WHEEZING, COUGHING, SHORTNESS OF BREATH, CHEST TIGHTNESS OR PAIN) WAKE YOU UP AT NIGHT OR EARLIER THAN USUAL IN THE MORNING: NOT AT ALL
QUESTION_5 LAST FOUR WEEKS HOW WOULD YOU RATE YOUR ASTHMA CONTROL: COMPLETELY CONTROLLED

## 2024-10-29 ASSESSMENT — PAIN SCALES - GENERAL: PAINLEVEL_OUTOF10: EXTREME PAIN (8)

## 2024-10-29 NOTE — PATIENT INSTRUCTIONS
Call [phone] to schedule LEFT leg arterial ultrasound       At [facility], we strive to deliver an exceptional experience to you, every time we see you. If you receive a survey, please let us know what we are doing well and/or what we could improve upon, as we do value your feedback.  If you have MyChart, you can expect to receive results automatically within 24 hours of their completion.  Your provider will send a note interpreting your results as well.   If you do not have MyChart, you should receive your results in about a week by mail.    Your care team:                            Family Medicine Internal Medicine   MD David Puckett, MD Mary Alice Matta, MD Robinson Samuels, MD Radha Vegas, PAYayaC    Trevin Washburn, MD Pediatrics   Gavi Bear, MD Huma Alarcon, MD Betty Rangel, APRN CNP Shara Bautista APRN CNP   Sharon Reynoso, MD Jacqueline Serrano, MD Erma Minaya, CNP     Freeman Tomlin, CNP Same-Day Provider (No follow-up visits)   Diana Ng, KALIN, DNP EMY Camarillo APRN, FNP, BC DULCE MARIA MckenzieC     Clinic hours: Monday - Thursday 7 am-6 pm; Fridays 7 am-5 pm.   Urgent care: Monday - Friday 10 am- 8 pm; Saturday and Sunday 9 am-5 pm.    Clinic: [phone]       [facility]: Monday - Thursday 8 am - 7 pm; Friday 8 am - 6 pm  [facility]: [phone]

## 2024-10-29 NOTE — PROGRESS NOTES
"  Assessment & Plan     Left foot pain  -ongoing symptoms  -has been seen several times  -seen at Ortho as well  -MRI of the Left foot was also done  -will check an US of the arteries of the lower leg to ensure no peripheral arterial disease. The other differential to consider is neuropathy   - tiZANidine (ZANAFLEX) 4 MG tablet  Dispense: 180 tablet; Refill: 0    Severe obesity with body mass index (BMI) of 35.0 to 35.9 and comorbidity (H)  -refill on medication provided   - buPROPion (WELLBUTRIN XL) 150 MG 24 hr tablet  Dispense: 90 tablet; Refill: 1    Wt Readings from Last 2 Encounters:   10/29/24 89.4 kg (197 lb)   09/11/24 90.8 kg (200 lb 1.6 oz)        Lumbar radiculopathy  -discussed to use sparingly due to risk of sedation and falls   - tiZANidine (ZANAFLEX) 4 MG tablet  Dispense: 180 tablet; Refill: 0    Acute deep vein thrombosis (DVT) of femoral vein of right lower extremity (H)  -seen by [facility]  -On Xarelto     Other specified symptoms and signs involving the circulatory and respiratory systems  -await results   - US Lower Extremity Arterial Duplex Left    Hair loss  -discussed using Rogaine foam for women over the counter      BMI  Estimated body mass index is 33.81 kg/m  as calculated from the following:    Height as of this encounter: 1.626 m (5' 4\").    Weight as of this encounter: 89.4 kg (197 lb).     Subjective   Tomi is a 84 year old, presenting for the following health issues:  Leg Swelling        10/29/2024    12:41 PM   Additional Questions   Roomed by husam     History of Present Illness       Reason for visit:  Hair loss foot and toe pain leg pain  Symptom onset:  More than a month  Symptom intensity:  Severe  Symptom progression:  Worsening  Had these symptoms before:  No  What makes it worse:  Walking  What makes it better:  Sleeping   She is taking medications regularly.     Wt Readings from Last 2 Encounters:   10/29/24 89.4 kg (197 lb)   09/11/24 90.8 kg (200 lb 1.6 oz)      Has been seen " "by Vascular surgery  Also follows with Sports medicine for Lumbar radiculopathy.  Saw [facility] for DVT history, on Xarelto.  Follow up imaging has shown chronic residual thrombus of right distal femoral vein and popliteal vein. Hypercoagulable work up was unrevealing.      Has been seen by several specialist  Saw Podiatry and [facility].  Vascular specialist focused on the right side       LEFT foot is the one that is painful, 1st and 2nd toes are becoming blue.  Feels very sensitive  US at [facility] was negative for DVT.  Feels cold+  Pain not so much numbness  No skin ulceration.  Has had shots in the toe and tendon as well.  More pain with ambulation.  Right below left knee there is an area of tenderness too   Having a hard time walking   Becoming more swollen  No rash  Able to sleep at night if takes muscle relaxer      5/30/24  MR Foot Left w/o Contrast  Order: [id number]  Impression    IMPRESSION:    1.  Tenosynovitis of the peroneus longus extending from the lateral hindfoot to the plantar aspect of the medial forefoot.  2.  Degenerative changes without fracture or dislocation.  3.  If there is clinical concern for occult foreign body consider plain film evaluation in concert with contrast-enhanced MRI of the foot.  4.  No stress reaction or occult fracture.    Hair loss:  -brush is full  -lots of hair shedding  -thinning everywhere  -no scalp rash        Objective    /72   Pulse 80   Resp 16   Ht 1.626 m (5' 4\")   Wt 89.4 kg (197 lb)   SpO2 97%   BMI 33.81 kg/m    Body mass index is 33.81 kg/m .  Physical Exam   GENERAL APPEARANCE: healthy, alert and no distress  EYES: Eyes grossly normal to inspection and conjunctivae and sclerae normal  RESP: lungs clear to auscultation - no rales, rhonchi or wheezes  CV: regular rates and rhythm, normal S1 S2, no S3 or S4 and no murmur, click or rub  ABDOMEN: soft, non-tender and no rebound or guarding   SKIN: no suspicious lesions or rashes  NEURO: Normal " strength and tone, mentation intact and speech normal  Scalp: no patches of alopecia   PSYCH: mentation appears normal  Left foot and lower extremity: peripheral pulses seem diminished, capillary refill over 2 seconds, no rash, no skin ulceration etc.     No results found for this or any previous visit (from the past 24 hours).        Signed Electronically by: Gavi Bear MD MPH

## 2024-11-05 ENCOUNTER — ANCILLARY PROCEDURE (OUTPATIENT)
Dept: ULTRASOUND IMAGING | Facility: CLINIC | Age: 84
End: 2024-11-05
Attending: PREVENTIVE MEDICINE
Payer: COMMERCIAL

## 2024-11-05 ENCOUNTER — ANCILLARY ORDERS (OUTPATIENT)
Dept: FAMILY MEDICINE | Facility: CLINIC | Age: 84
End: 2024-11-05

## 2024-11-05 DIAGNOSIS — R09.89 OTHER SPECIFIED SYMPTOMS AND SIGNS INVOLVING THE CIRCULATORY AND RESPIRATORY SYSTEMS: ICD-10-CM

## 2024-11-05 DIAGNOSIS — R09.89 OTHER SPECIFIED SYMPTOMS AND SIGNS INVOLVING THE CIRCULATORY AND RESPIRATORY SYSTEMS: Primary | ICD-10-CM

## 2024-11-05 PROCEDURE — 93926 LOWER EXTREMITY STUDY: CPT | Mod: LT | Performed by: RADIOLOGY

## 2024-11-05 PROCEDURE — 93922 UPR/L XTREMITY ART 2 LEVELS: CPT | Mod: GC | Performed by: RADIOLOGY

## 2024-11-06 ENCOUNTER — TELEPHONE (OUTPATIENT)
Dept: OTHER | Facility: CLINIC | Age: 84
End: 2024-11-06
Payer: COMMERCIAL

## 2024-11-06 NOTE — RESULT ENCOUNTER NOTE
Yumiko,     Ultrasound of the legs is showing possible blockages in the arteries of the left side.  I would like for you to see a vascular specialist.  Referral has been placed:    Referral Details    Referred By  Referred To  Gavi Bear MD  [id number]  [facility] [address]  Phone: [phone]  Fax: [phone]     Diagnoses: PAD (peripheral artery disease) (H)  Order: Vascular Surgery Referral      Vascular Center  [facility] PASCUAL [address] [place] [id number]  Phone: [phone]  Fax: [phone]    Comment: Please be aware that coverage of these services is subject to the terms and limitations of your health insurance plan.  Call member services at your health plan with any benefit or coverage questions.  Please call to schedule your appointment      Please do not hesitate to call us at [phone] if you have any questions or concerns.    Thank you,    Gavi Bear MD MPH

## 2024-11-06 NOTE — TELEPHONE ENCOUNTER
Referral received via [employer] on 11/6/24.    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 Patient will need to be seen first and provider seeing patient can order if appropriate.    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

## 2024-11-21 ENCOUNTER — OFFICE VISIT (OUTPATIENT)
Dept: OTHER | Facility: CLINIC | Age: 84
End: 2024-11-21
Attending: PREVENTIVE MEDICINE
Payer: COMMERCIAL

## 2024-11-21 VITALS
SYSTOLIC BLOOD PRESSURE: 151 MMHG | BODY MASS INDEX: 33.81 KG/M2 | WEIGHT: 197 LBS | OXYGEN SATURATION: 97 % | DIASTOLIC BLOOD PRESSURE: 83 MMHG | HEART RATE: 75 BPM

## 2024-11-21 DIAGNOSIS — I82.890 SUPERFICIAL VEIN THROMBOSIS: ICD-10-CM

## 2024-11-21 DIAGNOSIS — I82.511 CHRONIC DEEP VEIN THROMBOSIS (DVT) OF FEMORAL VEIN OF RIGHT LOWER EXTREMITY (H): ICD-10-CM

## 2024-11-21 DIAGNOSIS — I10 BENIGN ESSENTIAL HYPERTENSION: ICD-10-CM

## 2024-11-21 DIAGNOSIS — R73.01 IFG (IMPAIRED FASTING GLUCOSE): ICD-10-CM

## 2024-11-21 DIAGNOSIS — I73.9 PAD (PERIPHERAL ARTERY DISEASE) (H): Primary | ICD-10-CM

## 2024-11-21 DIAGNOSIS — I70.591: ICD-10-CM

## 2024-11-21 PROCEDURE — G0463 HOSPITAL OUTPT CLINIC VISIT: HCPCS | Performed by: INTERNAL MEDICINE

## 2024-11-21 RX ORDER — HEPARIN SODIUM 200 [USP'U]/100ML
1 INJECTION, SOLUTION INTRAVENOUS EVERY 5 MIN PRN
Status: CANCELLED | OUTPATIENT
Start: 2024-11-21

## 2024-11-21 RX ORDER — ACETAMINOPHEN AND CODEINE PHOSPHATE 300; 30 MG/1; MG/1
1 TABLET ORAL EVERY 8 HOURS PRN
Qty: 21 TABLET | Refills: 0 | Status: SHIPPED | OUTPATIENT
Start: 2024-11-21 | End: 2024-11-28

## 2024-11-21 NOTE — PROGRESS NOTES
[facility]        Patient is here for a consult to discuss occlusive thrombus of right medical calf varicose vein    Pt is currently taking Xarelto.    BP (!) 151/83 (BP Location: Left arm, Patient Position: Sitting, Cuff Size: Adult Regular)   Pulse 75   Wt 197 lb (89.4 kg)   SpO2 97%   BMI 33.81 kg/m      The provider has been notified that the patient has no concerns.     Questions patient would like addressed today are: N/A.    Refills are needed: N/A    Has homecare services and agency name:  Princess Lagunas MA

## 2024-11-21 NOTE — PATIENT INSTRUCTIONS
We will arrange Left leg angiogram , our staff will work with you to schedule etc     Take tylenol with codeine every 8 hours as needed for severe pain     Hold Xarelto 2 days before the procedure (your procedure is scheduled on November 25, 2024, last dose of the Xarelto should be on November 22, 2024)    N.p.o. after midnight day before the procedure    Hold all a.m. medications except okay to take lisinopril with hydrochlorothiazide with sip of water

## 2024-11-21 NOTE — PROGRESS NOTES
[facility] INITIAL VASCULAR MEDICINE CONSULT    ( New Patient Visit)     Assessment and Plan:     (I73.9) PAD (peripheral artery disease) (H) with Rest pain/CLI  (primary encounter diagnosis)    Plan: acetaminophen-codeine (TYLENOL #3) 300-30 MG         per tablet, IR Lower Extremity Angiogram Left          (I10) Benign essential hypertension  Plan: CBC with platelets differential, Basic         metabolic panel          (I82.511) Chronic deep vein thrombosis (DVT) of femoral vein of right lower extremity (H)    (I82.890) Superficial vein thrombosis, Rt leg    (I70.591) 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 recently underwent resting MEL and arterial duplex revealed 0.49 left lower extremity MEL and also dorsalis pedis artery tardus parvus consistent with post occlusive/stenotic waveforms, anterior tibial disease.  Dr. Coates briefly seen her in the clinic .  She has no DVT on the left lower extremity  She has a history of right lower extremity DVT and SVT currently taking Xarelto 20 mg daily  She never smoked  Her exam consistent with CLI with skin lesions under the left great toe and second toe area  Elevation pallor and dependent rubor  Unable to palpate DP and PT pulses on the left side  She is tired of seeing multiple specialists and multiple imaging studies and she wants to get to the bottom of this problem and Tylenol is not helping and requesting some stronger pain medication.  Her blood pressure is well-controlled with lisinopril and  hydrochlorothiazide  She Takes gabapentin and periodically steroids for back issues    Reviewed recent imaging studies with the patient given her progressive symptoms of CLI  with rest pain and now with skin lesions underneath the left great toe second toe, she will benefit with left lower extremity angiogram with possible intervention.  She wanted this to be done as soon as possible and she cannot take anymore the pain    At present my recommendations,    Arranged left lower extremity angiogram on November 25, 2024  Hold Xarelto 2 days before the procedure last dose should be on November 22, 2024  N.p.o. after midnight/day before procedure  Hold all a.m. medications on the day of the procedure except okay to take lisinopril with hydrochlorothiazide with sip of water  For her severe pain prescribed Tylenol with codeine take with food  Follow-up with  after angiogram  Follow-up with me in 2 to 3 weeks  This note can be utilized for preop for upcoming leg angiogram  AVS with written instructions done            90 minutes spent on the date of the encounter doing chart review, history and exam, documentation, and further activities as noted above.  Prolonged service is due to medical complexity review of recent multiple imaging studies, coordinated left lower extremity angiogram, lab orders placed discussed with vascular surgery service.  AVS with written instructions given medications refilled.    The longitudinal care of plan for the above diagnoses was addressed during this visit. Due to added complexity of care, we will continue to supprt Yumiko Mccartney and the subsequent management of this/these conditions and with ongoing continuity of care for this/these conditions.      Thank you for the consultation !  This note was dictated by utilizing Dragon software  Copy of this note to primary care physician and Dr. Aminata Park MD,FAHA,FSVM,FNLA, FACP  Vascular Medicine  Clinical Hypertension  "Specialist   Clinical Lipidologist          PRIMARY HEALTH CARE PROVIDER:  Gavi Bear MD      REFERRING HEALTH CARE PROVIDER;  Gavi Bear MD      REASON FOR CONSULT: Evaluation and management of ongoing severe left foot pain , rest pain ,discoloration, Skin lesions since March 2024       HPI: Yumiko Mccartney is a 84 year old very pleasant female with past medical history of DJD of lumbosacral spine, deep venous thrombosis of right lower extremity, superficial vein thrombosis of right lower extremity currently on Xarelto 20 mg daily, hypertension, hyperlipidemia, osteopenia etc. gives a history of left leg/foot pain for the last 8 months she underwent multiple imaging studies seen and evaluated at [facility] and also underwent venous competency studies in the past seen at [facility] etc. her left foot pain is severe tender to touch , MRI plain x-rays were unremarkable and no fractures.  1 week ago she underwent MEL resting and also leg arterial duplex results as delineated below  Moderate arterial insufficiency on the left side with resting MEL 0.49     \"A.  LLE Resting MEL is ABNORMAL, 0.49, monophasic waveforms       B. Anterior tibial artery disease.       C. 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  waveform and slow flow\"    She has been taking Tylenol for pain which is not helping requesting stronger pain medication and looking for quick evaluation and treatment and she is tired of this pain and seen multiple different doctors over the last few months          PAST MEDICAL HISTORY  Past Medical History:   Diagnosis Date    Acne rosacea     Actinic keratosis     Amblyopia     Asthma, moderate persistent     Basal cell carcinoma 10/2010    back X2    Cataract, mod, od; mild-mod, os 01/12/2012    DJD (degenerative joint disease), lumbosacral 08/06/2014    DVT (deep venous thrombosis) (H)     Elevated cholesterol     Endometriosis     HTN (hypertension)     Moderate major depression (H)     " "\"Circumstances\"    Osteopenia        CURRENT MEDICATIONS  Current Outpatient Medications   Medication Sig Dispense Refill    acetaminophen (TYLENOL) 325 MG tablet Take 2 tablets (650 mg) by mouth every 4 hours as needed for other 60 tablet 0    acetaminophen-codeine (TYLENOL #3) 300-30 MG per tablet Take 1 tablet by mouth every 8 hours as needed for severe pain. 21 tablet 0    albuterol (PROVENTIL) (2.5 MG/3ML) 0.083% neb solution Take 1 vial (2.5 mg) by nebulization every 6 hours as needed for shortness of breath or wheezing 90 mL 0    buPROPion (WELLBUTRIN XL) 150 MG 24 hr tablet Take 1 tablet (150 mg) by mouth every morning. 90 tablet 1    calcium citrate (CITRACAL) 950 (200 Ca) MG tablet Take 1 tablet by mouth 2 times daily      Cholecalciferol (VITAMIN D) 2000 UNIT CAPS Take 2,000 Units by mouth daily      gabapentin (NEURONTIN) 600 MG tablet TAKE 0.5 TAB BY MOUTH IN MORNING & 2 TABS AT BEDTIME 225 tablet 1    lisinopril-hydrochlorothiazide (ZESTORETIC) 20-12.5 MG tablet TAKE 2 TABLETS BY MOUTH EVERY  tablet 3    methylPREDNISolone (MEDROL DOSEPAK) 4 MG tablet therapy pack Follow Package Directions 21 tablet 1    Multiple Vitamin (MULTIVITAMIN ADULT PO) Take by mouth daily      predniSONE (DELTASONE) 20 MG tablet Take 2 tablets (40 mg) by mouth daily 14 tablet 0    predniSONE (DELTASONE) 20 MG tablet Take 3 tabs by mouth daily x 3 days, then 2 tabs daily x 3 days, then 1 tab daily x 3 days, then 1/2 tab daily x 3 days. 20 tablet 0    rivaroxaban ANTICOAGULANT (XARELTO) 20 MG TABS tablet Take 1 tablet (20 mg) by mouth daily with food. 90 tablet 3    tiZANidine (ZANAFLEX) 4 MG tablet Take 1-2 tablets (4-8 mg) by mouth nightly as needed for muscle spasms. 180 tablet 0    tiZANidine (ZANAFLEX) 4 MG tablet Take 1-2 tablets (4-8 mg) by mouth nightly as needed for muscle spasms 180 tablet 0     No current facility-administered medications for this visit.       PAST SURGICAL HISTORY:  Past Surgical History: " "  Procedure Laterality Date    ARTHROPLASTY KNEE Left 10/4/2021    Procedure: ARTHROPLASTY, LEFT KNEE, TOTAL;  Surgeon: Glenn Calvin MD;  Location: [facility] OR    BLEPHAROPLASTY BILATERAL  3/9/2006; 2013    both eyes, upper lid; revision (EN)    CATARACT IOL, RT/LT      HC REMOVAL GALLBLADDER      INJECT BLOCK MEDIAL BRANCH CERVICAL/THORACIC/LUMBAR Bilateral 12/7/2023    Procedure: BLOCK, NERVE, FACET JOINT, MEDIAL BRANCH, DIAGNOSTICL3/4/ALA;  Surgeon: Dinesh Proctor MD;  Location: [facility] OR    LASER YAG CAPSULOTOMY      left eye (AC lysis also)    PHACOEMULSIFICATION WITH STANDARD INTRAOCULAR LENS IMPLANT  1/2012; 4/2013    right eye; left eye    REPAIR PTOSIS      SURGICAL HISTORY OF -       endometriosis surgeriesx3    SURGICAL HISTORY OF -       ganiglion cyst onfoot    SURGICAL HISTORY OF -       Eye for \"droopy eye\"    ZZC APPENDECTOMY         ALLERGIES     Allergies   Allergen Reactions    Erythromycin Swelling and Other (See Comments)       FAMILY HISTORY  Family History   Problem Relation Age of Onset    C.A.D. Father     C.A.D. Brother     Hypertension Mother     Cancer No family hx of     Diabetes No family hx of     Cerebrovascular Disease No family hx of     Thyroid Disease No family hx of     Glaucoma No family hx of     Macular Degeneration No family hx of          SOCIAL HISTORY  Social History     Socioeconomic History    Marital status:      Spouse name: Not on file    Number of children: 3    Years of education: Not on file    Highest education level: Not on file   Occupational History     Employer: RETIRED   Tobacco Use    Smoking status: Never     Passive exposure: Never    Smokeless tobacco: Never   Vaping Use    Vaping status: Never Used   Substance and Sexual Activity    Alcohol use: Yes     Comment: rarely    Drug use: No    Sexual activity: Yes     Partners: Male   Other Topics Concern    Parent/sibling w/ CABG, MI or angioplasty before 65F 55M? Not Asked   Social History " Narrative    Not on file     Social Drivers of Health     Financial Resource Strain: Low Risk  (4/5/2024)    Financial Resource Strain     Within the past 12 months, have you or your family members you live with been unable to get utilities (heat, electricity) when it was really needed?: No   Food Insecurity: Low Risk  (4/5/2024)    Food Insecurity     Within the past 12 months, did you worry that your food would run out before you got money to buy more?: No     Within the past 12 months, did the food you bought just not last and you didn t have money to get more?: No   Transportation Needs: Low Risk  (4/5/2024)    Transportation Needs     Within the past 12 months, has lack of transportation kept you from medical appointments, getting your medicines, non-medical meetings or appointments, work, or from getting things that you need?: No   Physical Activity: Unknown (4/5/2024)    Exercise Vital Sign     Days of Exercise per Week: 1 day     Minutes of Exercise per Session: Not on file   Stress: No Stress Concern Present (4/5/2024)    [employer] - Occupational Stress Questionnaire     Feeling of Stress : Not at all   Social Connections: Not on file   Interpersonal Safety: Low Risk  (10/29/2024)    Interpersonal Safety     Do you feel physically and emotionally safe where you currently live?: Yes     Within the past 12 months, have you been hit, slapped, kicked or otherwise physically hurt by someone?: No     Within the past 12 months, have you been humiliated or emotionally abused in other ways by your partner or ex-partner?: No   Housing Stability: Low Risk  (4/5/2024)    Housing Stability     Do you have housing? : Yes     Are you worried about losing your housing?: No       ROS:   General: No change in weight, sleep or appetite.  Normal energy.  No fever or chills  Eyes: Negative for vision changes or eye problems  ENT: No problems with ears, nose or throat.  No difficulty  swallowing.  Resp: No coughing, wheezing or shortness of breath  CV: No chest pains or palpitations  GI: No nausea, vomiting,  heartburn, abdominal pain, diarrhea, constipation or change in bowel habits  : No urinary frequency or dysuria, bladder or kidney problems  Musculoskeletal: Ongoing left foot pain since March 2024 and also known history of a back issues  Neurologic: No headaches, numbness, tingling, weakness, problems with balance or coordination  Psychiatric: No problems with anxiety, depression or mental health  Heme/immune/allergy: No history of bleeding or clotting problems or anemia.  No allergies or immune system problems  Endocrine: No history of thyroid disease, diabetes or other endocrine disorders  Vascular: Left foot pain, rest pain and skin lesions difficulty to routine activities for the last few months  Discoloration of the foot, sensitive to touch   During nighttime she is getting up and dangling her foot on the left side    EXAM:  BP (!) 151/83 (BP Location: Left arm, Patient Position: Sitting, Cuff Size: Adult Regular)   Pulse 75   Wt 197 lb (89.4 kg)   SpO2 97%   BMI 33.81 kg/m    In general, the patient is a pleasant female in no apparent distress.    HEENT: NC/AT.  PERRLA.  EOMI.    Neck: No adenopathy.  No thyromegaly. Carotids +2/2 bilaterally without bruits.  No jugular venous distension.   Heart: RRR. Normal S1, S2, no murmurs, no gallop no rub  Lungs: CTA.  No ronchi, wheezes, rales.  No dullness to percussion.   Abdomen: Soft, nontender, nondistended. No organomegaly. No AAA.  No bruits.   Extremities: Excellent palpable right DP and PT pulses  Left foot dependent rubor elevation Palor , skin lesions underneath the left great toe and second toe area  Unable to palpate left DP and PT pulses  Varicose veins in both lower extremities      Labs:  LIPID RESULTS:  Lab Results   Component Value Date    CHOL 176 04/05/2024    CHOL 187 08/14/2020    HDL 46 (L) 04/05/2024    HDL 61  08/14/2020    LDL 88 04/05/2024    LDL 96 08/14/2020    TRIG 209 (H) 04/05/2024    TRIG 148 08/14/2020    CHOLHDLRATIO 3.7 07/14/2015       LIVER ENZYME RESULTS:  Lab Results   Component Value Date    AST 19 09/24/2024    AST 25 08/14/2020    ALT <5 09/24/2024    ALT 30 08/14/2020       CBC RESULTS:  Lab Results   Component Value Date    WBC 8.2 09/24/2024    WBC 6.6 08/14/2020    RBC 4.29 09/24/2024    RBC 4.68 08/14/2020    HGB 10.7 (L) 09/24/2024    HGB 11.7 08/14/2020    HCT 36.5 09/24/2024    HCT 37.3 08/14/2020    MCV 85 09/24/2024    MCV 80 08/14/2020    MCH 24.9 (L) 09/24/2024    MCH 25.0 (L) 08/14/2020    MCHC 29.3 (L) 09/24/2024    MCHC 31.4 (L) 08/14/2020    RDW 13.6 09/24/2024    RDW 13.0 08/14/2020     09/24/2024     08/14/2020       BMP RESULTS:  Lab Results   Component Value Date     09/24/2024     08/14/2020    POTASSIUM 4.2 09/24/2024    POTASSIUM 4.0 01/17/2023    POTASSIUM 3.9 08/14/2020    CHLORIDE 104 09/24/2024    CHLORIDE 102 01/17/2023    CHLORIDE 106 08/14/2020    CO2 27 09/24/2024    CO2 31 01/17/2023    CO2 30 08/14/2020    ANIONGAP 11 09/24/2024    ANIONGAP 9 01/17/2023    ANIONGAP 4 08/14/2020     (H) 09/24/2024    GLC 98 01/17/2023    GLC 97 08/14/2020    BUN 22.7 09/24/2024    BUN 16 01/17/2023    BUN 16 08/14/2020    CR 1.21 (H) 09/24/2024    CR 0.91 08/14/2020    GFRESTIMATED 45 (L) 09/24/2024    GFRESTIMATED 60 (L) 08/14/2020    GFRESTBLACK 70 08/14/2020    NGOC 9.8 09/24/2024    NGOC 9.7 08/14/2020        A1C RESULTS:  Lab Results   Component Value Date    A1C 5.6 07/14/2015       THYROID RESULTS:  Lab Results   Component Value Date    TSH 0.51 07/31/2018       Procedures:     Narrative & Impression   Examinations 11/5/2024 2:08 PM:  1. MEL  2. Ultrasound lower extremity arterial duplex left     CLINICAL HISTORY: Other specified symptoms and signs involving the  circulatory and respiratory systems.      COMPARISONS: None available.     REFERRING  PROVIDER: CEDRICK DICKERSON     TECHNIQUE: Bilateral MEL obtained.     Left leg arteries evaluated with grayscale, color Doppler, and  spectral pulsed wave Doppler ultrasound.     FINDINGS:  RIGHT:       Brachial: 122 mmHg       Ankle (PT): 130 mmHg       Ankle (DP): 128 mmHg          MEL: 1.07     LEFT:       Brachial: 100 mmHg       Ankle (PT): 60 mmHg       Ankle (DP): 40 mmHg          MEL: 0.49     External iliac artery: 149/0 cm/s, triphasic  Common femoral artery: 149/0 cm/s, triphasic  Profundus femoral artery: 69/0 cm/s, triphasic     Superficial femoral artery, origin: 128/0 cm/s, triphasic  Superficial femoral artery, proximal: 82/0 cm/s, triphasic  Superficial femoral artery, mid: 87/10 cm/s, arterial monophasic  Superficial femoral artery, distal: 80/0 cm/s, triphasic     Popliteal artery, proximal: 55/0 cm/s, triphasic  Popliteal artery, distal: 45/0 cm/s, triphasic     Posterior tibial artery, ankle: 64/0 cm/s, triphasic  Anterior tibial artery, ankle: 119/30 cm/s, arterial monophasic  Dorsalis pedis artery: 10 cm/s, tardus parvus                                                                      IMPRESSION:  1. RIGHT:       A. Resting MEL is normal, 1.07.     2. LEFT:       A. Resting MEL is ABNORMAL, 0.49.       B. Anterior tibial artery disease.       C. 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     Guidelines:     MEL Diagnostic Criteria (Based on criteria published in Circulation  2011; 124: 4528-4790):    > 1.4: Non compressible    1.00 - 1.40: Normal    0.91 - 0.99: Borderline    At or below 0.90: Abnormal     I have personally reviewed the examination and initial interpretation  and I agree with the findings.     SPEEDY LOGAN MD

## 2024-11-21 NOTE — Clinical Note
Please explain preop instructions updated in the AVS and ask patient to complete labs before the procedure Thank you LG

## 2024-11-21 NOTE — NURSING NOTE
Patient education completed with patient in clinic on 11/21/2024.    Procedure: LEFT LOWER EXTREMITY ANGIOGRAM  Diagnosis: PAD   Anticoagulation Instruction: Per Dr. Park, hold xarelto for 2 days  GLP-1 Agonists Instruction: na  Pre-Operative Physical Exam: Patient instructed they will need to have a pre-op physical exam within 30 days of your procedure.   Allergies:  Updated in Epic  Bowel Prep: NPO for solid 8 hours prior to arrival time and NPO for clear liquids 2 hours prior to arrival time   Post Procedure Education: [facility] post-procedure fact sheet reviewed with patient.  Showering instructions provided: Yes  Learner(s): patient  Method: Listening, Reading  Barriers to Learning: No Barrier  Outcome: Patient did verbalize understanding of above education.

## 2024-11-22 ENCOUNTER — TELEPHONE (OUTPATIENT)
Dept: OTHER | Facility: CLINIC | Age: 84
End: 2024-11-22
Payer: COMMERCIAL

## 2024-11-22 RX ORDER — LIDOCAINE 40 MG/G
CREAM TOPICAL
Status: CANCELLED | OUTPATIENT
Start: 2024-11-22

## 2024-11-22 RX ORDER — FLUMAZENIL 0.1 MG/ML
0.2 INJECTION, SOLUTION INTRAVENOUS
Status: CANCELLED | OUTPATIENT
Start: 2024-11-22

## 2024-11-22 RX ORDER — NALOXONE HYDROCHLORIDE 0.4 MG/ML
0.4 INJECTION, SOLUTION INTRAMUSCULAR; INTRAVENOUS; SUBCUTANEOUS
Status: CANCELLED | OUTPATIENT
Start: 2024-11-22

## 2024-11-22 RX ORDER — FENTANYL CITRATE 50 UG/ML
25-50 INJECTION, SOLUTION INTRAMUSCULAR; INTRAVENOUS EVERY 5 MIN PRN
Status: CANCELLED | OUTPATIENT
Start: 2024-11-22

## 2024-11-22 RX ORDER — HEPARIN SODIUM 200 [USP'U]/100ML
1 INJECTION, SOLUTION INTRAVENOUS CONTINUOUS PRN
Status: CANCELLED | OUTPATIENT
Start: 2024-11-22

## 2024-11-22 RX ORDER — NALOXONE HYDROCHLORIDE 0.4 MG/ML
0.2 INJECTION, SOLUTION INTRAMUSCULAR; INTRAVENOUS; SUBCUTANEOUS
Status: CANCELLED | OUTPATIENT
Start: 2024-11-22

## 2024-11-22 NOTE — TELEPHONE ENCOUNTER
Angiogram instructions sent to patient in [employer] message.  Attempted to call patient and discuss as well but her number goes straight to voicemail.    Routing to scheduling to coordinate the following:    Fasting labs today or tomorrow  Will call with results     Ernestine BLOUNT, SHERRY    [facility]  Office: [phone]  Fax: [phone]

## 2024-11-23 ENCOUNTER — HOSPITAL ENCOUNTER (EMERGENCY)
Facility: CLINIC | Age: 84
Discharge: LEFT BEFORE DISCHARGE | End: 2024-11-23
Admitting: EMERGENCY MEDICINE
Payer: COMMERCIAL

## 2024-11-23 ENCOUNTER — NURSE TRIAGE (OUTPATIENT)
Dept: NURSING | Facility: CLINIC | Age: 84
End: 2024-11-23
Payer: COMMERCIAL

## 2024-11-23 VITALS
TEMPERATURE: 97.9 F | BODY MASS INDEX: 33.29 KG/M2 | WEIGHT: 195 LBS | OXYGEN SATURATION: 98 % | SYSTOLIC BLOOD PRESSURE: 129 MMHG | RESPIRATION RATE: 20 BRPM | HEIGHT: 64 IN | HEART RATE: 75 BPM | DIASTOLIC BLOOD PRESSURE: 74 MMHG

## 2024-11-23 PROCEDURE — 99281 EMR DPT VST MAYX REQ PHY/QHP: CPT

## 2024-11-23 ASSESSMENT — ACTIVITIES OF DAILY LIVING (ADL)
ADLS_ACUITY_SCORE: 0

## 2024-11-23 ASSESSMENT — COLUMBIA-SUICIDE SEVERITY RATING SCALE - C-SSRS
1. IN THE PAST MONTH, HAVE YOU WISHED YOU WERE DEAD OR WISHED YOU COULD GO TO SLEEP AND NOT WAKE UP?: NO
6. HAVE YOU EVER DONE ANYTHING, STARTED TO DO ANYTHING, OR PREPARED TO DO ANYTHING TO END YOUR LIFE?: NO
2. HAVE YOU ACTUALLY HAD ANY THOUGHTS OF KILLING YOURSELF IN THE PAST MONTH?: NO

## 2024-11-23 NOTE — TELEPHONE ENCOUNTER
Nurse Triage SBAR    Is this a 2nd Level Triage? NO    Situation: Son calling for triage, states pt's foot pain is uncontrolled this morning.       Background & Assessment:     Current foot pain level is 7/10. Took Tylenol with Codeine recently, has taken the edge off some. Pain was a 10/10 earlier this morning, was unable to handle the pain, so son called FNA.     Pt saw a provider on 11/21, given Rx for Tylenol with Codeine, helped some. Provider wanted to do an angiogram that day but since pt had taken her blood thinner, angiogram had to be scheduled for Monday.     Foot/toe symptoms:   Toes is purple (great and second toes)  Sores are dark red  Ankle and shin are very warm.     Pt's son thinks her foot looks worse today than when he saw it last (1.5 weeks ago). He is worried. Pt is unsure if it's worsened since clinic visit on 11/21.     Protocol Recommended Disposition:   ED now. Patient verbalized understanding and had no further questions.     Meka Garrett RN  [facility] Nurse Advisor  11/23/2024 11:08 AM       Reason for Disposition   Purple or black skin on foot or toe    Additional Information   Negative: Followed a foot injury   Negative: Diabetes mellitus   Negative: Toe pain is main symptom   Negative: Ankle pain is main symptom   Negative: Thigh or calf pain is main symptom   Negative: Entire foot is cool or blue in comparison to other foot    Protocols used: Foot Pain-A-AH

## 2024-11-23 NOTE — ED TRIAGE NOTES
"Sent to ED from nurse triage line. Patient states that her foot pain is worse than it was when she was here on Thursday. Additionally, it is more \"purple\"        "

## 2024-11-25 ENCOUNTER — HOSPITAL ENCOUNTER (OUTPATIENT)
Facility: CLINIC | Age: 84
Discharge: HOME | End: 2024-11-25
Admitting: RADIOLOGY
Payer: COMMERCIAL

## 2024-11-25 ENCOUNTER — HOSPITAL ENCOUNTER (OUTPATIENT)
Dept: INTERVENTIONAL RADIOLOGY/VASCULAR | Facility: CLINIC | Age: 84
Discharge: HOME | End: 2024-11-25
Attending: INTERNAL MEDICINE | Admitting: RADIOLOGY
Payer: COMMERCIAL

## 2024-11-25 VITALS
DIASTOLIC BLOOD PRESSURE: 57 MMHG | HEART RATE: 61 BPM | SYSTOLIC BLOOD PRESSURE: 113 MMHG | RESPIRATION RATE: 14 BRPM | OXYGEN SATURATION: 95 %

## 2024-11-25 VITALS
DIASTOLIC BLOOD PRESSURE: 58 MMHG | HEART RATE: 73 BPM | HEIGHT: 64 IN | TEMPERATURE: 98.4 F | WEIGHT: 195 LBS | SYSTOLIC BLOOD PRESSURE: 122 MMHG | BODY MASS INDEX: 33.29 KG/M2 | OXYGEN SATURATION: 97 % | RESPIRATION RATE: 16 BRPM

## 2024-11-25 DIAGNOSIS — I73.9 PAD (PERIPHERAL ARTERY DISEASE) (H): ICD-10-CM

## 2024-11-25 DIAGNOSIS — I73.9 PAD (PERIPHERAL ARTERY DISEASE) (H): Primary | ICD-10-CM

## 2024-11-25 DIAGNOSIS — I70.591: ICD-10-CM

## 2024-11-25 LAB
ACT BLD: 262 SECONDS (ref 74–150)
ANION GAP SERPL CALCULATED.3IONS-SCNC: 12 MMOL/L (ref 7–15)
APTT PPP: 32 SECONDS (ref 22–38)
BUN SERPL-MCNC: 23.4 MG/DL (ref 8–23)
CALCIUM SERPL-MCNC: 9.6 MG/DL (ref 8.8–10.4)
CHLORIDE SERPL-SCNC: 101 MMOL/L (ref 98–107)
CREAT SERPL-MCNC: 1.16 MG/DL (ref 0.51–0.95)
EGFRCR SERPLBLD CKD-EPI 2021: 47 ML/MIN/1.73M2
ERYTHROCYTE [DISTWIDTH] IN BLOOD BY AUTOMATED COUNT: 13.5 % (ref 10–15)
GLUCOSE SERPL-MCNC: 108 MG/DL (ref 70–99)
HCO3 SERPL-SCNC: 26 MMOL/L (ref 22–29)
HCT VFR BLD AUTO: 32.7 % (ref 35–47)
HGB BLD-MCNC: 9.8 G/DL (ref 11.7–15.7)
INR PPP: 1.1 (ref 0.85–1.15)
MCH RBC QN AUTO: 24.7 PG (ref 26.5–33)
MCHC RBC AUTO-ENTMCNC: 30 G/DL (ref 31.5–36.5)
MCV RBC AUTO: 82 FL (ref 78–100)
PLATELET # BLD AUTO: 280 10E3/UL (ref 150–450)
POTASSIUM SERPL-SCNC: 4.5 MMOL/L (ref 3.4–5.3)
RBC # BLD AUTO: 3.97 10E6/UL (ref 3.8–5.2)
SODIUM SERPL-SCNC: 139 MMOL/L (ref 135–145)
WBC # BLD AUTO: 8.5 10E3/UL (ref 4–11)

## 2024-11-25 PROCEDURE — 37226 IR LOWER EXTREMITY ANGIOGRAM LEFT: CPT

## 2024-11-25 PROCEDURE — 255N000002 HC RX 255 OP 636: Performed by: RADIOLOGY

## 2024-11-25 PROCEDURE — 272N000566 HC SHEATH CR3

## 2024-11-25 PROCEDURE — 85018 HEMOGLOBIN: CPT | Performed by: RADIOLOGY

## 2024-11-25 PROCEDURE — 250N000009 HC RX 250: Performed by: PHYSICIAN ASSISTANT

## 2024-11-25 PROCEDURE — 250N000011 HC RX IP 250 OP 636: Performed by: RADIOLOGY

## 2024-11-25 PROCEDURE — 80048 BASIC METABOLIC PNL TOTAL CA: CPT | Performed by: RADIOLOGY

## 2024-11-25 PROCEDURE — 85730 THROMBOPLASTIN TIME PARTIAL: CPT | Performed by: RADIOLOGY

## 2024-11-25 PROCEDURE — 272N000116 HC CATH CR1

## 2024-11-25 PROCEDURE — 36415 COLL VENOUS BLD VENIPUNCTURE: CPT | Performed by: RADIOLOGY

## 2024-11-25 PROCEDURE — 999N000012 HC STATISTIC ANGIOGRAM, STENT, VERTEBRO PLASTY

## 2024-11-25 PROCEDURE — 82565 ASSAY OF CREATININE: CPT | Performed by: RADIOLOGY

## 2024-11-25 PROCEDURE — 272N000302 HC DEVICE INFLATION CR5

## 2024-11-25 PROCEDURE — C1769 GUIDE WIRE: HCPCS

## 2024-11-25 PROCEDURE — 85610 PROTHROMBIN TIME: CPT | Performed by: RADIOLOGY

## 2024-11-25 PROCEDURE — 85347 COAGULATION TIME ACTIVATED: CPT

## 2024-11-25 PROCEDURE — 272N000196 HC ACCESSORY CR5

## 2024-11-25 PROCEDURE — 272N000571 HC SHEATH CR8

## 2024-11-25 PROCEDURE — 250N000013 HC RX MED GY IP 250 OP 250 PS 637: Performed by: RADIOLOGY

## 2024-11-25 PROCEDURE — 250N000011 HC RX IP 250 OP 636: Performed by: PHYSICIAN ASSISTANT

## 2024-11-25 PROCEDURE — 75710 ARTERY X-RAYS ARM/LEG: CPT | Mod: LT

## 2024-11-25 RX ORDER — FLUMAZENIL 0.1 MG/ML
0.2 INJECTION, SOLUTION INTRAVENOUS
Status: DISCONTINUED | OUTPATIENT
Start: 2024-11-25 | End: 2024-11-26 | Stop reason: HOSPADM

## 2024-11-25 RX ORDER — ASPIRIN 81 MG/1
81 TABLET, CHEWABLE ORAL DAILY
Status: DISCONTINUED | OUTPATIENT
Start: 2024-11-25 | End: 2024-11-26 | Stop reason: HOSPADM

## 2024-11-25 RX ORDER — CLOPIDOGREL 300 MG/1
300 TABLET, FILM COATED ORAL ONCE
Status: DISCONTINUED | OUTPATIENT
Start: 2024-11-25 | End: 2024-11-25 | Stop reason: HOSPADM

## 2024-11-25 RX ORDER — SODIUM CHLORIDE 9 MG/ML
INJECTION, SOLUTION INTRAVENOUS CONTINUOUS
Status: DISCONTINUED | OUTPATIENT
Start: 2024-11-25 | End: 2024-11-25 | Stop reason: HOSPADM

## 2024-11-25 RX ORDER — NALOXONE HYDROCHLORIDE 0.4 MG/ML
0.4 INJECTION, SOLUTION INTRAMUSCULAR; INTRAVENOUS; SUBCUTANEOUS
Status: DISCONTINUED | OUTPATIENT
Start: 2024-11-25 | End: 2024-11-26 | Stop reason: HOSPADM

## 2024-11-25 RX ORDER — ASPIRIN 81 MG/1
81 TABLET ORAL DAILY
Qty: 30 TABLET | Refills: 0 | Status: SHIPPED | OUTPATIENT
Start: 2024-11-25

## 2024-11-25 RX ORDER — LIDOCAINE 40 MG/G
CREAM TOPICAL
Status: DISCONTINUED | OUTPATIENT
Start: 2024-11-25 | End: 2024-11-25 | Stop reason: HOSPADM

## 2024-11-25 RX ORDER — IODIXANOL 320 MG/ML
100 INJECTION, SOLUTION INTRAVASCULAR ONCE
Status: COMPLETED | OUTPATIENT
Start: 2024-11-25 | End: 2024-11-25

## 2024-11-25 RX ORDER — HEPARIN SODIUM 200 [USP'U]/100ML
1 INJECTION, SOLUTION INTRAVENOUS CONTINUOUS PRN
Status: DISCONTINUED | OUTPATIENT
Start: 2024-11-25 | End: 2024-11-26 | Stop reason: HOSPADM

## 2024-11-25 RX ORDER — CLOPIDOGREL BISULFATE 75 MG/1
75 TABLET ORAL DAILY
Status: DISCONTINUED | OUTPATIENT
Start: 2024-11-26 | End: 2024-11-26 | Stop reason: HOSPADM

## 2024-11-25 RX ORDER — NALOXONE HYDROCHLORIDE 0.4 MG/ML
0.2 INJECTION, SOLUTION INTRAMUSCULAR; INTRAVENOUS; SUBCUTANEOUS
Status: DISCONTINUED | OUTPATIENT
Start: 2024-11-25 | End: 2024-11-26 | Stop reason: HOSPADM

## 2024-11-25 RX ORDER — FENTANYL CITRATE 50 UG/ML
25-50 INJECTION, SOLUTION INTRAMUSCULAR; INTRAVENOUS EVERY 5 MIN PRN
Status: DISCONTINUED | OUTPATIENT
Start: 2024-11-25 | End: 2024-11-26 | Stop reason: HOSPADM

## 2024-11-25 RX ORDER — LIDOCAINE 40 MG/G
CREAM TOPICAL
Status: DISCONTINUED | OUTPATIENT
Start: 2024-11-25 | End: 2024-11-26 | Stop reason: HOSPADM

## 2024-11-25 RX ORDER — CLOPIDOGREL BISULFATE 75 MG/1
75 TABLET ORAL DAILY
Qty: 30 TABLET | Refills: 0 | Status: SHIPPED | OUTPATIENT
Start: 2024-11-25

## 2024-11-25 RX ORDER — HEPARIN SODIUM 1000 [USP'U]/ML
12000 INJECTION, SOLUTION INTRAVENOUS; SUBCUTANEOUS
Status: COMPLETED | OUTPATIENT
Start: 2024-11-25 | End: 2024-11-25

## 2024-11-25 RX ORDER — HEPARIN SODIUM 200 [USP'U]/100ML
1 INJECTION, SOLUTION INTRAVENOUS EVERY 5 MIN PRN
Status: DISCONTINUED | OUTPATIENT
Start: 2024-11-25 | End: 2024-11-26 | Stop reason: HOSPADM

## 2024-11-25 RX ORDER — CLOPIDOGREL 300 MG/1
300 TABLET, FILM COATED ORAL ONCE
Status: COMPLETED | OUTPATIENT
Start: 2024-11-25 | End: 2024-11-25

## 2024-11-25 RX ADMIN — MIDAZOLAM 1 MG: 1 INJECTION INTRAMUSCULAR; INTRAVENOUS at 11:33

## 2024-11-25 RX ADMIN — IODIXANOL 70 ML: 320 INJECTION, SOLUTION INTRAVASCULAR at 12:38

## 2024-11-25 RX ADMIN — MIDAZOLAM 1 MG: 1 INJECTION INTRAMUSCULAR; INTRAVENOUS at 11:28

## 2024-11-25 RX ADMIN — LIDOCAINE HYDROCHLORIDE 5 ML: 10 INJECTION, SOLUTION INFILTRATION; PERINEURAL at 11:34

## 2024-11-25 RX ADMIN — MIDAZOLAM 0.5 MG: 1 INJECTION INTRAMUSCULAR; INTRAVENOUS at 12:17

## 2024-11-25 RX ADMIN — FENTANYL CITRATE 25 MCG: 50 INJECTION, SOLUTION INTRAMUSCULAR; INTRAVENOUS at 12:17

## 2024-11-25 RX ADMIN — MIDAZOLAM 1 MG: 1 INJECTION INTRAMUSCULAR; INTRAVENOUS at 11:59

## 2024-11-25 RX ADMIN — FENTANYL CITRATE 25 MCG: 50 INJECTION, SOLUTION INTRAMUSCULAR; INTRAVENOUS at 12:00

## 2024-11-25 RX ADMIN — CLOPIDOGREL BISULFATE 300 MG: 300 TABLET, FILM COATED ORAL at 14:14

## 2024-11-25 RX ADMIN — FENTANYL CITRATE 50 MCG: 50 INJECTION, SOLUTION INTRAMUSCULAR; INTRAVENOUS at 11:28

## 2024-11-25 RX ADMIN — HEPARIN SODIUM 12000 UNITS: 1000 INJECTION INTRAVENOUS; SUBCUTANEOUS at 11:48

## 2024-11-25 RX ADMIN — HEPARIN SODIUM 4 BAG: 200 INJECTION, SOLUTION INTRAVENOUS at 12:14

## 2024-11-25 RX ADMIN — MIDAZOLAM 0.5 MG: 1 INJECTION INTRAMUSCULAR; INTRAVENOUS at 11:43

## 2024-11-25 ASSESSMENT — ACTIVITIES OF DAILY LIVING (ADL)
ADLS_ACUITY_SCORE: 0
ADLS_ACUITY_SCORE: 0
ADLS_ACUITY_SCORE: 46

## 2024-11-25 NOTE — PROGRESS NOTES
[facility] Post Procedure Note    Patient Information  Name: Yumiko Mccartney  Age: 84 year old    Post Procedure  Time patient returned to Care Suit[facility]: 1245  Concerns/abnormal assessment: NA  If abnormal assessment, provider notified: N/A  Plan/Other: bedrest x 2 hours, right groin CDI/soft with angioseal, denies pain.     Radha Mckeon RN

## 2024-11-25 NOTE — PROGRESS NOTES
Care Suites Discharge Nursing Note    Patient Information  Name: Yumiko Mccartney  Age: 84 year old    Discharge Education:  Discharge instructions reviewed: Yes  Additional education/resources provided: NA  Patient/patient representative verbalizes understanding: Yes  Patient discharging on new medications: Yes, plavix and asa  Medication education completed: Yes    Discharge Plans:   Discharge location: home  Discharge ride contacted: N/A  Approximate discharge time: 1525    Discharge Criteria:  Discharge criteria met and vital signs stable: Yes. Right groin site CDI/soft, denies pain at site, ambulated/voided/ate/PIV pulled. Stent card, plavix and asa sent home with patient.     Patient Belongs:  Patient belongings returned to patient: Yes    Radha Mckeon RN

## 2024-11-25 NOTE — DISCHARGE INSTRUCTIONS
Peripheral Angiogram Discharge Instructions - Femoral     After you go home:    Have an adult stay with you until tomorrow.  Drink extra fluids for 2 days.  You may resume your normal diet.  No smoking       For 24 hours - due to the sedation you received:  Relax and take it easy.  Do NOT make any important or legal decisions.  Do NOT drive or operate machines at home or at work.  Do NOT drink alcohol.    Care of Groin Puncture Site:    For the first 24 hrs - check the puncture site every 1-2 hours while awake.  For 2 days, when you cough, sneeze, laugh or move your bowels, hold your hand over the puncture site and press firmly.  Remove the bandaid after 24 hours. If there is minor oozing, apply another bandaid and remove it after 12 hours.  It is normal to have a small bruise or pea size lump at the site.  You may shower tomorrow.  Do NOT take a bath, or use a hot tub or pool for at least 3 days. Do NOT scrub the site. Do not use lotion or powder near the puncture site.     Activity:            For 2 days:  No stooping or squatting  Do NOT do any heavy activity such as exercise, lifting, or straining.   No housework, yard work or any activity that make you sweat  Do NOT lift more than 10 pounds    Bleeding:    If you start bleeding from the site in your groin, lie down flat and press firmly on/above the site for 10 minutes.   Once bleeding stops, lay flat for 2 hours.   [facility] as soon as you can.       Call 911 right away if you have heavy bleeding or bleeding that does not stop.      Medicines:    If you are on Metformin (Glucophage) and your GFR (kidney function level) is >30, you may continue taking your Metformin.  If you are on Metformin (Glucophage) and your GFR (kidney function level) is <30, do not restart the Metformin for 48 hours after your procedure. Check with your primary care giver before restarting the Metformin to see if you need to have blood drawn to recheck your kidney  function (GFR).  If you are taking an antiplatelet medication such as Plavix, do not stop taking it until you talk to your provider.     Take your medications, including blood thinners, unless your provider tells you not to.    If you take Coumadin (Warfarin), have your INR checked by your provider in  3-5 days. Call your clinic to schedule this.  If you have stopped any medicines, check with your provider about when to restart them.        Follow Up Appointments:    Follow [facility] with [facility] as directed.    Call the clinic if:    You have increased pain or a large or growing hard lump around the site.  The site is red, swollen, hot or tender.  Blood or fluid is draining from the site.  You have chills or a fever greater than 101 F (38 C).  Your leg feels numb, cool or changes color.  You have hives, a rash or unusual itching.  New pain in the back or belly that you cannot control with Tylenol.  Any questions or concerns.    Other Instructions:    If you received a stent - carry your stent card with you at all times.      If you have questions or your original symptoms do not improve, call:         [facility] @ [phone]    Or you may contact your provider via My Chart

## 2024-11-25 NOTE — PROGRESS NOTES
Care Suites Admission Nursing Note    Patient Information  Name: Yumiko Mccartney  Age: 84 year old  Reason for admission: LE Angiogram  Care Suites arrival time: 0915    Visitor Information  Name: NA     Patient Admission/Assessment   Pre-procedure assessment complete: Yes  If abnormal assessment/labs, provider notified: N/A  NPO: Yes  Medications held per instructions/orders: Yes, xarelto  Consent: obtained  If applicable, pregnancy test status: deferred  Patient oriented to room: Yes  Education/questions answered: Yes  Plan/other: proceed as scheduled    Discharge Planning  Discharge name/phone number: Eduarda spain- [phone]  Overnight post sedation caregiver: Eduarda  Discharge location: home    Radha Mckeon RN

## 2024-11-25 NOTE — PRE-PROCEDURE
GENERAL PRE-PROCEDURE:   Procedure:  Left lower extremity angiogram with possible intervention  Date/Time:  11/25/2024 10:03 AM    Written consent obtained?: Yes    Risks and benefits: Risks, benefits and alternatives were discussed    Consent given by:  Patient  Patient states understanding of procedure being performed: Yes    Patient's understanding of procedure matches consent: Yes    Procedure consent matches procedure scheduled: Yes    Expected level of sedation:  Moderate  Appropriately NPO:  Yes  ASA Class:  2  Mallampati  :  Grade 3- soft palate visible, posterior pharyngeal wall not visible  Lungs:  Lungs clear with good breath sounds bilaterally  Heart:  Normal heart sounds and rate  History & Physical reviewed:  History and physical reviewed and no updates needed  Statement of review:  I have reviewed the lab findings, diagnostic data, medications, and the plan for sedation

## 2024-11-25 NOTE — PROCEDURES
Interventional Radiology Post-Procedure Note   ?   Brief Procedure Note:   Patient name: Yumiko Mccartney  Pt MRN:[id number]   Date of procedure: 11/25/2024     Procedure(s): LLE angiogram, SFA stenting  Sedation method: Moderate sedation was employed. The patient was monitored by an interventional radiology nurse at all times throughout the procedure under my direct guidance.  Pre Procedure Diagnosis: PAD, left toe/calf pain  Post Procedure Diagnosis: Same  Indications: Left toe/calf pain, PAD   ?   Specimen(s) removed: None   Additional studies ordered: None  Drains: None   Estimated Blood Loss: Minimal  Complications: None  Vascular closure method: 8F angioseal     Findings/Notes/Comments: No inflow disease. Focal highly stenotic lesion distal SFA, age indeterminate. Short segment occlusion distal BERTA and long segment occlusion proximal to distal PTA, both of which reconstitute distally. Microvascular disease in the foot. Stent placed across distal SFA lesion with improved filling of the pedal vessels. Given improved filling, decision made to conclude the procedure. If the patient's symptoms do not improve, can return for tibial intervention.      PLAN:  Loaded with 300mg plavix. Begin daily aspirin and plavix. Patient to follow up with vascular surgery.   ?   Please see dictation in PACS or under the Imaging tab in [facility] for detailed procedure note.     Kalia Pang M.D.   Vascular and Interventional Radiology   Pager: [phone]   After Hours / Scheduling: [phone]     11/25/2024  12:38 PM

## 2024-11-25 NOTE — IR NOTE
Addended by: JAH APRIL on: 10/31/2023 02:50 PM     Modules accepted: Orders     Patient Name: Yumiko Mccartney  Medical Record Number: [id number]  Today's Date: 11/25/2024    Procedure: Left Lower Extremity Angiogram  Proceduralist: Dr. Pang  Pathology present: no    Procedure Start: 1127  Procedure end: 1230  Sedation medications administered: Last Dose: 1217, Fentanyl 100 mcg, Versed 4 mg, Lidocaine 5 ml's, Heparin 12,000 units.    Report given to: CRUZ Garcia Rn  : no    Other Notes: Pt will require 2 bedrest post procedure. Pt arrived to IR room 1 from CS 11. Consent reviewed. Pt denies any questions or concerns regarding procedure. Pt positioned supine and monitored per protocol. Pt tolerated procedure without any noted complications.

## 2024-11-26 ENCOUNTER — TELEPHONE (OUTPATIENT)
Dept: OTHER | Facility: CLINIC | Age: 84
End: 2024-11-26
Payer: COMMERCIAL

## 2024-11-26 ENCOUNTER — TELEPHONE (OUTPATIENT)
Dept: OTHER | Facility: CLINIC | Age: 84
End: 2024-11-26

## 2024-11-26 DIAGNOSIS — I73.9 PAD (PERIPHERAL ARTERY DISEASE): Primary | ICD-10-CM

## 2024-11-26 NOTE — TELEPHONE ENCOUNTER
----- Message from Siri Park sent at 11/26/2024  8:43 AM CST -----  Regarding: RE: follow up  Ernestine,     Please make follow up appointment with me next available    Thanks    LG  ----- Message -----  From: Kathi Brock RN  Sent: 11/25/2024   3:58 PM CST  To: Ernestine Hinds RN; #  Subject: follow up                                        Charlene Park and Dr. Bird Sinha did her angiogram today and stenting the SFA.  I am assuming you will plan to follow since he doesn't have clinic here.    Thanks Christina

## 2024-11-26 NOTE — TELEPHONE ENCOUNTER
MARIA ALEJANDRA for patient to call us back to schedule:    an in person or virtual follow up appointment with Dr. Park 2 weeks post-angiogram. (11/25/24)

## 2024-11-26 NOTE — TELEPHONE ENCOUNTER
S/p left leg angiogram with SFA stenting with Dr. Pang.  Spoke with patient.  Slept well last night.    Pain 4/5, but controlled with Tylenol/Codeine.  Foot feels better, still red, warm.Sensation and motor function intact.  Left great toe ulcer is sore.  Discussed to keep ulcer clean, use antibiotic ointment and keep covered.  Inspect ulcer daily. Puncture site is dry and without bleeding or swelling.  Started Plavix/ ASA.  Reviewed signs with patient and when to seek medical attention immediately.  Worsening pain, cold, pallor, loss of sensation or function.  Discussed patient will follow up with Dr. Park in 2 weeks  Patient has my contact and office number if concerns.    Christina Brock RN  IR nurse clinician  [phone]

## 2024-11-26 NOTE — TELEPHONE ENCOUNTER
Please see below. Patient needs a in person or virtual follow up appointment with Dr. Park 2 weeks post-angiogram.    Ernestine BLOUNT, RN    [facility]  [facility]  Office: [phone]  Fax: [phone]

## 2024-11-27 NOTE — TELEPHONE ENCOUNTER
LM asking patient to call to schedule and stated this is our 2nd and final attempt to reach out but that patient should feel free to call our clinic at any point in the future to schedule.

## 2024-12-02 ENCOUNTER — TELEPHONE (OUTPATIENT)
Dept: OTHER | Facility: CLINIC | Age: 84
End: 2024-12-02
Payer: COMMERCIAL

## 2024-12-02 DIAGNOSIS — I73.9 PAD (PERIPHERAL ARTERY DISEASE) (H): Primary | ICD-10-CM

## 2024-12-02 NOTE — TELEPHONE ENCOUNTER
Left message for patient to call back to schedule appointments.     Refer to 11/26/24 encounter for additional calls to patient with no response.

## 2024-12-02 NOTE — TELEPHONE ENCOUNTER
Patient called and sent pictures to my personal cell.  Patient briefed on HIPAA. Patient verbalized understanding that she is communicating through personal cell and information is not secured.  Patient pain is better but continues to have discoloration in the left toe with wound.  Still having some pain but improved.   Recommend patient to be seen by Dr. Park with repeat ABIs.  Patient states she has not had any VM from our office.    Will route to Schedulers to have patient be seen ASAP.    Christina Brock RN  IR nurse clinician  [phone]

## 2024-12-03 RX ORDER — ACETAMINOPHEN AND CODEINE PHOSPHATE 300; 30 MG/1; MG/1
1 TABLET ORAL EVERY 6 HOURS PRN
Qty: 20 TABLET | Refills: 0 | Status: SHIPPED | OUTPATIENT
Start: 2024-12-03 | End: 2024-12-08

## 2024-12-03 NOTE — TELEPHONE ENCOUNTER
Patient is calling today and stated that she is in extreme pain. Pt stated that her big toe is pulsating and its like its on fire. It feels wil numb on the ball of her toe and its hard to walk on.  Patient can be reached at [phone].

## 2024-12-03 NOTE — TELEPHONE ENCOUNTER
Patient reports she is experiencing intermittent shooting pain in her left big toe.  However, patient states she has not taken her tylenol/codeine yet today.  Patient reports she does not like to take too many medications.    Writer advised patient to take her medication and to monitor her pain.      Of note, patient reports she only has 4 pills of tylenol/codeine left.  She is requesting a refill to at least get through until her follow up appointment on 12/12/24.    Routing to Dr. Park to review for refill of acetaminophen-codeine (TYLENOL #3) 300-30 MG per tablet .

## 2024-12-12 ENCOUNTER — TELEPHONE (OUTPATIENT)
Dept: PODIATRY | Facility: CLINIC | Age: 84
End: 2024-12-12

## 2024-12-12 ENCOUNTER — OFFICE VISIT (OUTPATIENT)
Dept: OTHER | Facility: CLINIC | Age: 84
End: 2024-12-12
Payer: COMMERCIAL

## 2024-12-12 VITALS — DIASTOLIC BLOOD PRESSURE: 70 MMHG | SYSTOLIC BLOOD PRESSURE: 142 MMHG | HEART RATE: 73 BPM

## 2024-12-12 DIAGNOSIS — I73.9 PAD (PERIPHERAL ARTERY DISEASE) (H): ICD-10-CM

## 2024-12-12 DIAGNOSIS — L03.032 CELLULITIS OF TOE OF LEFT FOOT: Primary | ICD-10-CM

## 2024-12-12 PROCEDURE — G0463 HOSPITAL OUTPT CLINIC VISIT: HCPCS

## 2024-12-12 RX ORDER — CEPHALEXIN 500 MG/1
500 CAPSULE ORAL 2 TIMES DAILY
Qty: 20 CAPSULE | Refills: 0 | Status: SHIPPED | OUTPATIENT
Start: 2024-12-12

## 2024-12-12 NOTE — TELEPHONE ENCOUNTER
Other: RED FLAG    PATIENT HAS CELLULITIS APPT ON 12/31/24    Could we send this information to you in [employer] [employer] or would you prefer to receive a phone call?:   Patient would prefer a phone call   Okay to leave a detailed message?: Yes at Cell number on file:    Telephone Information:   Mobile [phone]

## 2024-12-12 NOTE — PROGRESS NOTES
"    VASCULAR SURGERY PROGRESS NOTE    [facility]    Yumiko Mccartney  Medical Record #: [id number]  YOB: 1940  Age: 84 year old     Date of Service: 12/12/2024    PRIMARY CARE PROVIDER: Gavi Bear    Reason for visit:  post-op    Tomi Mccartney is a 84 year old female with a hx of rest pain in her left lower extremity and discoloration who underwent a LLE angiogram on 11/25/2024 in which VBX stent placement of a foal lesion within the distal SFA was severely stenosed. Found to have long segment occlusion of proximal to distal PTA with focal short segment occlusion of the distal BERTA which both reconstitute near the ankle.     Imaging today shows improved ABIs of the left lower extremity and improved even more so on ambulation.     On examination biphasic strong PT with biphasic DP on the left. Since Monday has had significant pain and redness of the left first and second toe, with inflammation.     RECOMMENDATION/RISKS: Follow-up in 3 months with left lower extremity arterial ultrasound and ABIs with exercise. Continue aspirin 81 mg, plavix that should be ending on 12/25. On Xarelto.     Started on Keflex for concern of cellulitis. Discussed if she were to have worsening redness, drainage, fevers, chills, to proceed to ED. Referral sent for podiatry follow-up given longevity of small wounds on the left great toe+cellulitis.     REVIEW OF SYSTEMS:    A 12 point ROS was reviewed and is negative except for what is listed above in HPI.    PHH:    Past Medical History:   Diagnosis Date    Acne rosacea     Actinic keratosis     Amblyopia     Asthma, moderate persistent     Basal cell carcinoma 10/2010    back X2    Cataract, mod, od; mild-mod, os 01/12/2012    DJD (degenerative joint disease), lumbosacral 08/06/2014    DVT (deep venous thrombosis) (H)     Elevated cholesterol     Endometriosis     HTN (hypertension)     Moderate major depression (H)     \"Circumstances\"    Osteopenia  " "         Past Surgical History:   Procedure Laterality Date    ARTHROPLASTY KNEE Left 10/4/2021    Procedure: ARTHROPLASTY, LEFT KNEE, TOTAL;  Surgeon: Glenn Calvin MD;  Location: [facility] OR    BLEPHAROPLASTY BILATERAL  3/9/2006; 2013    both eyes, upper lid; revision (EN)    CATARACT IOL, RT/LT      HC REMOVAL GALLBLADDER      INJECT BLOCK MEDIAL BRANCH CERVICAL/THORACIC/LUMBAR Bilateral 12/7/2023    Procedure: BLOCK, NERVE, FACET JOINT, MEDIAL BRANCH, DIAGNOSTICL3/4/ALA;  Surgeon: Dinesh Proctor MD;  Location: [facility] OR    IR LOWER EXTREMITY ANGIOGRAM LEFT  11/25/2024    LASER YAG CAPSULOTOMY      left eye (AC lysis also)    PHACOEMULSIFICATION WITH STANDARD INTRAOCULAR LENS IMPLANT  1/2012; 4/2013    right eye; left eye    REPAIR PTOSIS      SURGICAL HISTORY OF -       endometriosis surgeriesx3    SURGICAL HISTORY OF -       ganiglion cyst onfoot    SURGICAL HISTORY OF -       Eye for \"droopy eye\"    ZZC APPENDECTOMY         ALLERGIES:  Erythromycin    MEDS:    Current Outpatient Medications:     acetaminophen (TYLENOL) 325 MG tablet, Take 2 tablets (650 mg) by mouth every 4 hours as needed for other, Disp: 60 tablet, Rfl: 0    albuterol (PROVENTIL) (2.5 MG/3ML) 0.083% neb solution, Take 1 vial (2.5 mg) by nebulization every 6 hours as needed for shortness of breath or wheezing, Disp: 90 mL, Rfl: 0    aspirin 81 MG EC tablet, Take 1 tablet (81 mg) by mouth daily., Disp: 30 tablet, Rfl: 0    buPROPion (WELLBUTRIN XL) 150 MG 24 hr tablet, Take 1 tablet (150 mg) by mouth every morning., Disp: 90 tablet, Rfl: 1    calcium citrate (CITRACAL) 950 (200 Ca) MG tablet, Take 1 tablet by mouth 2 times daily, Disp: , Rfl:     Cholecalciferol (VITAMIN D) 2000 UNIT CAPS, Take 2,000 Units by mouth daily, Disp: , Rfl:     clopidogrel (PLAVIX) 75 MG tablet, Take 1 tablet (75 mg) by mouth daily., Disp: 30 tablet, Rfl: 0    gabapentin (NEURONTIN) 600 MG tablet, TAKE 0.5 TAB BY MOUTH IN MORNING & 2 TABS AT BEDTIME, Disp: 225 " tablet, Rfl: 1    lisinopril-hydrochlorothiazide (ZESTORETIC) 20-12.5 MG tablet, TAKE 2 TABLETS BY MOUTH EVERY DAY, Disp: 180 tablet, Rfl: 3    Multiple Vitamin (MULTIVITAMIN ADULT PO), Take by mouth daily, Disp: , Rfl:     predniSONE (DELTASONE) 20 MG tablet, Take 2 tablets (40 mg) by mouth daily, Disp: 14 tablet, Rfl: 0    rivaroxaban ANTICOAGULANT (XARELTO) 20 MG TABS tablet, Take 1 tablet (20 mg) by mouth daily with food., Disp: 90 tablet, Rfl: 3    tiZANidine (ZANAFLEX) 4 MG tablet, Take 1-2 tablets (4-8 mg) by mouth nightly as needed for muscle spasms., Disp: 180 tablet, Rfl: 0    tiZANidine (ZANAFLEX) 4 MG tablet, Take 1-2 tablets (4-8 mg) by mouth nightly as needed for muscle spasms, Disp: 180 tablet, Rfl: 0    SOCIAL HABITS:    History   Smoking Status    Never   Smokeless Tobacco    Never     Social History    Substance and Sexual Activity      Alcohol use: Yes        Comment: rarely      History   Drug Use No       FAMILY HISTORY:    Family History   Problem Relation Age of Onset    C.A.D. Father     C.A.D. Brother     Hypertension Mother     Cancer No family hx of     Diabetes No family hx of     Cerebrovascular Disease No family hx of     Thyroid Disease No family hx of     Glaucoma No family hx of     Macular Degeneration No family hx of        PE:  There were no vitals taken for this visit.  Wt Readings from Last 1 Encounters:   11/25/24 195 lb (88.5 kg)     There is no height or weight on file to calculate BMI.    DIAGNOSTIC STUDIES:     Images:  IR Lower Extremity Angiogram Left    Result Date: 11/25/2024  LOCATION: [facility] DATE: 11/25/2024 PROCEDURE: ABDOMINAL AORTOGRAM AND LEFT LOWER EXTREMITY ANGIOGRAM, SFA STENT PLACEMENT INTERVENTIONAL RADIOLOGIST: Kalia Pang MD INDICATION: 84-year-old female with a history of peripheral artery disease and tibial disease on recent ultrasound imaging complaining of left calf/foot pain, likely ischemic in nature. Patient presents for angiogram and  intervention as appropriate. CONSENT: The risks, benefits and alternatives of the procedure were discussed with the patient  in detail. All questions were answered. Informed consent was given to proceed with the procedure. MODERATE SEDATION: IV fentanyl and Versed were administered for sedation. During the time out, immediately prior to the administration of medications, the patient was reassessed for adequacy to receive conscious sedation.  Under physician supervision, Versed and fentanyl were administered for moderate sedation. Pulse oximetry, heart rate and blood pressure were continuously monitored by an independent trained observer. The physician spent 63 minutes of face-to-face sedation time with the patient. CONTRAST: 70 cc Visipaque ANTIBIOTICS: None. ADDITIONAL MEDICATIONS: 12,000 units IV heparin. FLUOROSCOPIC TIME: 10.2 minutes. RADIATION DOSE: Air Kerma: 82 mGy. COMPLICATIONS: No immediate complications. UNIVERSAL PROTOCOL: The operative site was marked and any prior imaging was reviewed. Required items including blood products, implants, devices and special equipment was made available. Patient identity was confirmed either verbally, with demographic information, hospital assigned identification or other identification markers. A timeout was performed immediately prior to the procedure. STERILE BARRIER TECHNIQUE: Maximum sterile barrier technique was used. Cutaneous antisepsis was performed at the operative site with application of 2% chlorhexidine and large sterile drape. Prior to the procedure, the  and assistant performed hand hygiene and wore hat, mask, sterile gown, and sterile gloves during the entire procedure. PROCEDURE: After informed consent was obtained, the bilateral groins were prepped and draped in sterile fashion. Using local anesthesia and a micropuncture set, access was obtained into the right common femoral artery. Through the micropuncture sheath, an 0.035 Bentson guidewire  was advanced to the abdominal aorta. Over the guidewire, a 5 Guamanian vascular sheath was placed within the common femoral artery. Over the guidewire, a 5 Guamanian Omni Flush catheter was placed at the level of the renal arteries. An AP abdominal aortogram was obtained. Next, over the Bentson  guidewire, the Omni flush catheter was advanced to the distal left external iliac artery and a sequential lower extremity arteriogram obtained. The 6 Guamanian sheath was exchanged for a 7 Guamanian 65 cm destination sheath which was positioned in the mid SFA. Contrast was injected to confirm positioning. A Glidewire was used to cross the SFA lesion and an 8 x 60 mm VBX stent graft was deployed across the lesion. Follow-up angiography was performed. Finally repeat angiography in stations to the calf and foot was performed. Catheters and sheath removed and hemostasis obtained using an Angio-Seal device. FINDINGS: 1. AORTA: Mild ectasia of the infrarenal abdominal aorta. Otherwise widely patent. 2. LEFT LOWER EXTREMITY: No significant inflow disease. The proximal SVC is widely patent. There is a focal lesion in the distal SFA resulting in severe focal stenosis. The popliteal artery otherwise appears patent. The proximal BERTA and peroneal artery are patent. There is occlusion of the PTA at its origin with reconstitution at the level of the calf. There is short segment occlusion of the distal BERTA with distal reconstitution. The plantar vessels are patent. The DP is poorly opacified. Following stenting of the distal SFA lesion, there is improved blood flow down to the foot.     IMPRESSION: 1.  Focal lesion in the distal SFA resulting in severe stenosis, successfully treated with VBX stent placement. 2.  Long segment occlusion of the proximal to distal PTA with focal short segment occlusion of the distal BERTA, both of which reconstitute near the level of the ankle. 3.  Given improved flow following SFA stenting, the decision was made to  conclude the procedure. If the patient continues to experience symptoms, she may return for tibial intervention. PHYLLIS HARDWICK MD   SYSTEM ID:  [id number]    LABS:      Sodium   Date Value Ref Range Status   11/25/2024 139 135 - 145 mmol/L Final   09/24/2024 142 135 - 145 mmol/L Final   04/05/2024 137 135 - 145 mmol/L Final     Comment:     Reference intervals for this test were updated on 09/26/2023 to more accurately reflect our healthy population. There may be differences in the flagging of prior results with similar values performed with this method. Interpretation of those prior results can be made in the context of the updated reference intervals.    08/14/2020 140 133 - 144 mmol/L Final   06/12/2019 140 133 - 144 mmol/L Final   07/31/2018 140 133 - 144 mmol/L Final     Urea Nitrogen   Date Value Ref Range Status   11/25/2024 23.4 (H) 8.0 - 23.0 mg/dL Final   09/24/2024 22.7 8.0 - 23.0 mg/dL Final   04/05/2024 20.0 8.0 - 23.0 mg/dL Final   01/17/2023 16 7 - 30 mg/dL Final   05/17/2022 15 7 - 30 mg/dL Final   10/14/2021 10 7 - 30 mg/dL Final   08/14/2020 16 7 - 30 mg/dL Final   06/12/2019 16 7 - 30 mg/dL Final   07/31/2018 20 7 - 30 mg/dL Final     Hemoglobin   Date Value Ref Range Status   11/25/2024 9.8 (L) 11.7 - 15.7 g/dL Final   09/24/2024 10.7 (L) 11.7 - 15.7 g/dL Final   09/11/2024 10.1 (L) 11.7 - 15.7 g/dL Final   08/14/2020 11.7 11.7 - 15.7 g/dL Final   06/12/2019 11.9 11.7 - 15.7 g/dL Final   07/31/2018 11.8 11.7 - 15.7 g/dL Final     Platelet Count   Date Value Ref Range Status   11/25/2024 280 150 - 450 10e3/uL Final   09/24/2024 347 150 - 450 10e3/uL Final   09/11/2024 339 150 - 450 10e3/uL Final   08/14/2020 245 150 - 450 10e9/L Final   06/12/2019 231 150 - 450 10e9/L Final   07/31/2018 175 150 - 450 10e9/L Final     INR   Date Value Ref Range Status   11/25/2024 1.10 0.85 - 1.15 Final   05/17/2022 2.45 (H) 0.85 - 1.15 Final       30 minutes spent on the day of encounter doing chart review, history  and exam, documentation, and further activities as noted.     Charlee Vargas, NP  VASCULAR SURGERY

## 2024-12-12 NOTE — PROGRESS NOTES
[facility]        Patient is here for a post-op.    Pt is currently taking Aspirin, Plavix, and Xarelto.    BP (!) 142/70 (BP Location: Right arm, Patient Position: Chair, Cuff Size: Adult Regular)   Pulse 73     The provider has been notified that the patient has no concerns.     Questions patient would like addressed today are: N/A.    Refills are needed: N/A    Has homecare services and agency name:  Princess Garrido MA

## 2024-12-13 ENCOUNTER — APPOINTMENT (OUTPATIENT)
Dept: ULTRASOUND IMAGING | Facility: CLINIC | Age: 84
DRG: 253 | End: 2024-12-13
Attending: STUDENT IN AN ORGANIZED HEALTH CARE EDUCATION/TRAINING PROGRAM
Payer: COMMERCIAL

## 2024-12-13 ENCOUNTER — HOSPITAL ENCOUNTER (INPATIENT)
Facility: CLINIC | Age: 84
End: 2024-12-13
Attending: INTERNAL MEDICINE
Payer: COMMERCIAL

## 2024-12-13 ENCOUNTER — ANCILLARY PROCEDURE (OUTPATIENT)
Dept: GENERAL RADIOLOGY | Facility: CLINIC | Age: 84
End: 2024-12-13
Attending: PODIATRIST
Payer: COMMERCIAL

## 2024-12-13 DIAGNOSIS — L97.522 SKIN ULCER OF LEFT GREAT TOE WITH FAT LAYER EXPOSED (H): ICD-10-CM

## 2024-12-13 DIAGNOSIS — M79.672 LEFT FOOT PAIN: ICD-10-CM

## 2024-12-13 DIAGNOSIS — I73.9 PVD (PERIPHERAL VASCULAR DISEASE) WITH CLAUDICATION (H): Primary | ICD-10-CM

## 2024-12-13 DIAGNOSIS — I73.9 PAD (PERIPHERAL ARTERY DISEASE) (H): ICD-10-CM

## 2024-12-13 DIAGNOSIS — L03.032 CELLULITIS OF GREAT TOE OF LEFT FOOT: ICD-10-CM

## 2024-12-13 DIAGNOSIS — G89.18 POSTOPERATIVE PAIN: ICD-10-CM

## 2024-12-13 DIAGNOSIS — L03.90 CELLULITIS, UNSPECIFIED CELLULITIS SITE: ICD-10-CM

## 2024-12-13 DIAGNOSIS — D64.9 ANEMIA, UNSPECIFIED TYPE: ICD-10-CM

## 2024-12-13 DIAGNOSIS — K59.00 CONSTIPATION, UNSPECIFIED CONSTIPATION TYPE: ICD-10-CM

## 2024-12-13 DIAGNOSIS — I10 ESSENTIAL HYPERTENSION WITH GOAL BLOOD PRESSURE LESS THAN 140/90: ICD-10-CM

## 2024-12-13 LAB
ANION GAP SERPL CALCULATED.3IONS-SCNC: 11 MMOL/L (ref 7–15)
BUN SERPL-MCNC: 16.6 MG/DL (ref 8–23)
CALCIUM SERPL-MCNC: 9.9 MG/DL (ref 8.8–10.4)
CHLORIDE SERPL-SCNC: 102 MMOL/L (ref 98–107)
CREAT SERPL-MCNC: 1.06 MG/DL (ref 0.51–0.95)
CRP SERPL-MCNC: <3 MG/L
EGFRCR SERPLBLD CKD-EPI 2021: 52 ML/MIN/1.73M2
ERYTHROCYTE [DISTWIDTH] IN BLOOD BY AUTOMATED COUNT: 14 % (ref 10–15)
GLUCOSE SERPL-MCNC: 105 MG/DL (ref 70–99)
HCO3 SERPL-SCNC: 26 MMOL/L (ref 22–29)
HCT VFR BLD AUTO: 36.8 % (ref 35–47)
HGB BLD-MCNC: 10.9 G/DL (ref 11.7–15.7)
MCH RBC QN AUTO: 24.8 PG (ref 26.5–33)
MCHC RBC AUTO-ENTMCNC: 29.6 G/DL (ref 31.5–36.5)
MCV RBC AUTO: 84 FL (ref 78–100)
PLATELET # BLD AUTO: 480 10E3/UL (ref 150–450)
POTASSIUM SERPL-SCNC: 4.5 MMOL/L (ref 3.4–5.3)
RBC # BLD AUTO: 4.4 10E6/UL (ref 3.8–5.2)
SODIUM SERPL-SCNC: 139 MMOL/L (ref 135–145)
WBC # BLD AUTO: 8.1 10E3/UL (ref 4–11)

## 2024-12-13 PROCEDURE — 86140 C-REACTIVE PROTEIN: CPT | Performed by: INTERNAL MEDICINE

## 2024-12-13 PROCEDURE — 93970 EXTREMITY STUDY: CPT

## 2024-12-13 PROCEDURE — 85041 AUTOMATED RBC COUNT: CPT | Performed by: INTERNAL MEDICINE

## 2024-12-13 PROCEDURE — 99222 1ST HOSP IP/OBS MODERATE 55: CPT | Performed by: INTERNAL MEDICINE

## 2024-12-13 PROCEDURE — 120N000001 HC R&B MED SURG/OB

## 2024-12-13 PROCEDURE — 73630 X-RAY EXAM OF FOOT: CPT | Mod: TC | Performed by: RADIOLOGY

## 2024-12-13 PROCEDURE — 36415 COLL VENOUS BLD VENIPUNCTURE: CPT | Performed by: INTERNAL MEDICINE

## 2024-12-13 PROCEDURE — 80048 BASIC METABOLIC PNL TOTAL CA: CPT | Performed by: INTERNAL MEDICINE

## 2024-12-13 PROCEDURE — 258N000003 HC RX IP 258 OP 636: Performed by: INTERNAL MEDICINE

## 2024-12-13 PROCEDURE — 250N000011 HC RX IP 250 OP 636: Performed by: INTERNAL MEDICINE

## 2024-12-13 PROCEDURE — 93926 LOWER EXTREMITY STUDY: CPT | Mod: LT

## 2024-12-13 PROCEDURE — 250N000013 HC RX MED GY IP 250 OP 250 PS 637: Performed by: INTERNAL MEDICINE

## 2024-12-13 RX ORDER — CALCIUM CARBONATE 500 MG/1
1000 TABLET, CHEWABLE ORAL 4 TIMES DAILY PRN
Status: ACTIVE | OUTPATIENT
Start: 2024-12-13

## 2024-12-13 RX ORDER — ACETAMINOPHEN AND CODEINE PHOSPHATE 300; 30 MG/1; MG/1
1 TABLET ORAL EVERY 6 HOURS PRN
Status: ON HOLD | COMMUNITY

## 2024-12-13 RX ORDER — AMOXICILLIN 250 MG
1 CAPSULE ORAL 2 TIMES DAILY PRN
Status: ACTIVE | OUTPATIENT
Start: 2024-12-13

## 2024-12-13 RX ORDER — LIDOCAINE 40 MG/G
CREAM TOPICAL
Status: DISCONTINUED | OUTPATIENT
Start: 2024-12-13 | End: 2024-12-15

## 2024-12-13 RX ORDER — NALOXONE HYDROCHLORIDE 0.4 MG/ML
0.2 INJECTION, SOLUTION INTRAMUSCULAR; INTRAVENOUS; SUBCUTANEOUS
Status: ACTIVE | OUTPATIENT
Start: 2024-12-13

## 2024-12-13 RX ORDER — ONDANSETRON 2 MG/ML
4 INJECTION INTRAMUSCULAR; INTRAVENOUS EVERY 6 HOURS PRN
Status: DISCONTINUED | OUTPATIENT
Start: 2024-12-13 | End: 2024-12-15

## 2024-12-13 RX ORDER — AMOXICILLIN 250 MG
2 CAPSULE ORAL 2 TIMES DAILY PRN
Status: ACTIVE | OUTPATIENT
Start: 2024-12-13

## 2024-12-13 RX ORDER — NALOXONE HYDROCHLORIDE 0.4 MG/ML
0.4 INJECTION, SOLUTION INTRAMUSCULAR; INTRAVENOUS; SUBCUTANEOUS
Status: ACTIVE | OUTPATIENT
Start: 2024-12-13

## 2024-12-13 RX ORDER — HYDROMORPHONE HYDROCHLORIDE 2 MG/1
2 TABLET ORAL EVERY 4 HOURS PRN
Status: DISPENSED | OUTPATIENT
Start: 2024-12-13

## 2024-12-13 RX ORDER — ONDANSETRON 4 MG/1
4 TABLET, ORALLY DISINTEGRATING ORAL EVERY 6 HOURS PRN
Status: DISCONTINUED | OUTPATIENT
Start: 2024-12-13 | End: 2024-12-15

## 2024-12-13 RX ORDER — PIPERACILLIN SODIUM, TAZOBACTAM SODIUM 3; .375 G/15ML; G/15ML
3.38 INJECTION, POWDER, LYOPHILIZED, FOR SOLUTION INTRAVENOUS EVERY 6 HOURS
Status: DISCONTINUED | OUTPATIENT
Start: 2024-12-13 | End: 2024-12-16

## 2024-12-13 RX ADMIN — HYDROMORPHONE HYDROCHLORIDE 1 MG: 2 TABLET ORAL at 22:00

## 2024-12-13 RX ADMIN — VANCOMYCIN HYDROCHLORIDE 1500 MG: 10 INJECTION, POWDER, LYOPHILIZED, FOR SOLUTION INTRAVENOUS at 20:18

## 2024-12-13 RX ADMIN — HYDROMORPHONE HYDROCHLORIDE 1 MG: 2 TABLET ORAL at 17:54

## 2024-12-13 RX ADMIN — PIPERACILLIN AND TAZOBACTAM 3.38 G: 3; .375 INJECTION, POWDER, FOR SOLUTION INTRAVENOUS at 18:50

## 2024-12-13 ASSESSMENT — ACTIVITIES OF DAILY LIVING (ADL)
ADLS_ACUITY_SCORE: 47
ADLS_ACUITY_SCORE: 49
ADLS_ACUITY_SCORE: 47
ADLS_ACUITY_SCORE: 46
ADLS_ACUITY_SCORE: 47

## 2024-12-13 NOTE — H&P
[facility]    History and Physical  Hospitalist       Date of Admission:  12/13/2024    Assessment & Plan   Yumiko Mccartney is a 84 year old female with known history of peripheral vascular disease who presents from her podiatrist office with worsening pain of her left great toe.    Peripheral vascular disease  Skin ulcer of the left great toe with exposed fat layer  Cellulitis of the left great toe  -Recently underwent left lower extremity angiogram 11/25/2024 revascularization of the left SFA  -Patient reports worsening pain for the last few days although she has had pain of the left toe for several weeks.  She also noticed some erythema with puslike drainage  -Saw vascular surgery yesterday and was started on Keflex with concerns for cellulitis  -Was evaluated by  at podiatry clinic today where x-ray did not show deeper infection.  She recommends MRI of the left foot which will be ordered.  -Arterial duplex of the left lower extremity  -Podiatry and vascular surgery consult  -Will hold Xarelto patient took her Xarelto this morning at home.  Will hold tomorrow morning dose until evaluated by podiatry and vascular surgery  -N.p.o. after midnight in case-surgical procedure needed  -Broad-spectrum IV antibiotics with vancomycin and Zosyn  -Check CRP    Essential hypertension  -Will resume prior to admission lisinopril 20 mg daily  -Hold hydrochlorothiazide    History of DVT  -As mentioned above will hold Xarelto until evaluated by vascular surgery and podiatry tomorrow morning    Asthma, moderate persistent  -No active issues, currently does not take any inhalers    Major depression  -Resume prior to admission Wellbutrin    CKD stage IIIa  -Baseline creatinine is between 1-1.2, currently at baseline    Clinically Significant Risk Factors Present on Admission                # Drug Induced Coagulation Defect: home medication list includes an anticoagulant medication  # Drug Induced  "Platelet Defect: home medication list includes an antiplatelet medication   # Hypertension: Noted on problem list           # Obesity: Estimated body mass index is 33.47 kg/m  as calculated from the following:    Height as of 11/25/24: 1.626 m (5' 4\").    Weight as of an earlier encounter on 12/13/24: 88.5 kg (195 lb).       # Asthma: noted on problem list          Medical Decision Making       **CLEAR ALL SELECTIONS**        DVT Prophylaxis: DOAC  Code Status: Full Code    Disposition: Expected discharge in 2+ days    Chart documentation was completed, in part, with aaTag voice-recognition software. Even though reviewed, some grammatical, spelling, and word errors may remain.    Werner Gonzalez MD, MD    Primary Care Physician   Gavi Bear    Chief Complaint   Left great toe pain    History is obtained from the patient    History of Present Illness   Yumiko Mccartney is a 84 year old female with known history of peripheral vascular disease s/p recent revascularization of the left SFA on 1120 5/2024 who presents as a direct admission from a podiatrist office with intractable left toe pain.  Patient reports that for the last few days her left toe pain has really escalated and it is 10/10 in intensity.  She was seen by vascular surgery on 12/12 and was started on Keflex with concerns for cellulitis.  They recommended that she follow-up with podiatry and the patient saw Dr. Nguyen today.  An x-ray was done which was essentially unremarkable but given the significant pain, surrounding cellulitis there was concern for possibly deep-seated infection and she was advised admission for further evaluation, antibiotics and vascular surgery reevaluation.  She denies fever or chills.  No hematochezia, melena or bleeding from any source.  No chest pain, shortness of breath or palpitations. No nausea, vomiting or diarrhea.  Denies dysuria urinary urgency or frequency.  Her last  dose of Xarelto was earlier this morning.    Past " "Medical History    I have reviewed this patient's medical history and updated it with pertinent information if needed.   Past Medical History:   Diagnosis Date    Acne rosacea     Actinic keratosis     Amblyopia     Asthma, moderate persistent     Basal cell carcinoma 10/2010    back X2    Cataract, mod, od; mild-mod, os 01/12/2012    DJD (degenerative joint disease), lumbosacral 08/06/2014    DVT (deep venous thrombosis) (H)     Elevated cholesterol     Endometriosis     HTN (hypertension)     Moderate major depression (H)     \"Circumstances\"    Osteopenia        Past Surgical History   I have reviewed this patient's surgical history and updated it with pertinent information if needed.  Past Surgical History:   Procedure Laterality Date    ARTHROPLASTY KNEE Left 10/4/2021    Procedure: ARTHROPLASTY, LEFT KNEE, TOTAL;  Surgeon: Glenn Calvin MD;  Location: [facility] OR    BLEPHAROPLASTY BILATERAL  3/9/2006; 2013    both eyes, upper lid; revision (EN)    CATARACT IOL, RT/LT      HC REMOVAL GALLBLADDER      INJECT BLOCK MEDIAL BRANCH CERVICAL/THORACIC/LUMBAR Bilateral 12/7/2023    Procedure: BLOCK, NERVE, FACET JOINT, MEDIAL BRANCH, DIAGNOSTICL3/4/ALA;  Surgeon: Dinesh Proctor MD;  Location: [facility] OR    IR LOWER EXTREMITY ANGIOGRAM LEFT  11/25/2024    LASER YAG CAPSULOTOMY      left eye (AC lysis also)    PHACOEMULSIFICATION WITH STANDARD INTRAOCULAR LENS IMPLANT  1/2012; 4/2013    right eye; left eye    REPAIR PTOSIS      SURGICAL HISTORY OF -       endometriosis surgeriesx3    SURGICAL HISTORY OF -       ganiglion cyst onfoot    SURGICAL HISTORY OF -       Eye for \"droopy eye\"    ZZC APPENDECTOMY         Prior to Admission Medications   Prior to Admission Medications   Prescriptions Last Dose Informant Patient Reported? Taking?   Cholecalciferol (VITAMIN D) 2000 UNIT CAPS   Yes No   Sig: Take 2,000 Units by mouth daily   Multiple Vitamin (MULTIVITAMIN ADULT PO)   Yes No   Sig: Take by mouth daily "   acetaminophen (TYLENOL) 325 MG tablet   No No   Sig: Take 2 tablets (650 mg) by mouth every 4 hours as needed for other   albuterol (PROVENTIL) (2.5 MG/3ML) 0.083% neb solution   No No   Sig: Take 1 vial (2.5 mg) by nebulization every 6 hours as needed for shortness of breath or wheezing   aspirin 81 MG EC tablet   No No   Sig: Take 1 tablet (81 mg) by mouth daily.   buPROPion (WELLBUTRIN XL) 150 MG 24 hr tablet   No No   Sig: Take 1 tablet (150 mg) by mouth every morning.   calcium citrate (CITRACAL) 950 (200 Ca) MG tablet   Yes No   Sig: Take 1 tablet by mouth 2 times daily   cephALEXin (KEFLEX) 500 MG capsule   No No   Sig: Take 1 capsule (500 mg) by mouth 2 times daily.   clopidogrel (PLAVIX) 75 MG tablet   No No   Sig: Take 1 tablet (75 mg) by mouth daily.   gabapentin (NEURONTIN) 600 MG tablet   No No   Sig: TAKE 0.5 TAB BY MOUTH IN MORNING & 2 TABS AT BEDTIME   lisinopril-hydrochlorothiazide (ZESTORETIC) 20-12.5 MG tablet   No No   Sig: TAKE 2 TABLETS BY MOUTH EVERY DAY   predniSONE (DELTASONE) 20 MG tablet   No No   Sig: Take 2 tablets (40 mg) by mouth daily   rivaroxaban ANTICOAGULANT (XARELTO) 20 MG TABS tablet   No No   Sig: Take 1 tablet (20 mg) by mouth daily with food.   tiZANidine (ZANAFLEX) 4 MG tablet   No No   Sig: Take 1-2 tablets (4-8 mg) by mouth nightly as needed for muscle spasms   tiZANidine (ZANAFLEX) 4 MG tablet   No No   Sig: Take 1-2 tablets (4-8 mg) by mouth nightly as needed for muscle spasms.      Facility-Administered Medications: None     Allergies   Allergies   Allergen Reactions    Erythromycin Swelling and Other (See Comments)       Social History   I have reviewed this patient's social history and updated it with pertinent information if needed. Yumiko PINEDA Mccartney  reports that she has never smoked. She has never been exposed to tobacco smoke. She has never used smokeless tobacco. She reports current alcohol use. She reports that she does not use drugs.    Family History   I have  reviewed this patient's family history and updated it with pertinent information if needed.   Family History   Problem Relation Age of Onset    C.A.D. Father     C.A.D. Brother     Hypertension Mother     Cancer No family hx of     Diabetes No family hx of     Cerebrovascular Disease No family hx of     Thyroid Disease No family hx of     Glaucoma No family hx of     Macular Degeneration No family hx of        Review of Systems   The 10 point Review of Systems is negative other than noted in the HPI or here.     Physical Exam   Temp: 98.5  F (36.9  C) Temp src: Oral BP: (!) 156/77 Pulse: 81   Resp: 16 SpO2: 97 % O2 Device: None (Room air)    Vital Signs with Ranges  Temp:  [98.5  F (36.9  C)] 98.5  F (36.9  C)  Pulse:  [81] 81  Resp:  [16] 16  BP: (126-156)/(72-77) 156/77  SpO2:  [97 %] 97 %  0 lbs 0 oz    Gen: AAOX3, NAD, comfortable  HEENT: Moist oral mucosa, no pallor or icterus  Neck: Supple neck, good ROM, no stridor  Resp: CTA B/L, normal WOB; no crackles; no wheezes  CVS- RRR, no murmur, no edema  Abd/GI: Soft, non-tender. BS- normoactive  Skin- Warm, dry, no rashes  MSK: Left big toe is slightly swollen with minimal erythema and minimal drainage.  There is an ulcer on the plantar and dorsal aspect.  Dorsalis pedis pulse not palpable.  Neuro- CN- intact. Moving X 4; No focal deficits.     Data   Data reviewed today:  I personally reviewed no images or EKG's today.        No lab results found in last 7 days.    Recent Results (from the past 24 hours)   XR Foot Left G/E 3 Views    Narrative    EXAM: FOOT LEFT THREE OR MORE VIEWS  DATE/TIME: 12/13/2024 2:05 PM    INDICATION: Non weightbearing. Left foot pain. PAD (peripheral artery  disease) (H). Skin ulcer of left great toe with fat layer exposed (H).  Cellulitis of great toe of left foot.  COMPARISON: None available.       Impression    IMPRESSION: No acute displaced left foot fracture or dislocation is  identified. Mild degenerative change at the left first  MTP joint and  scattered at the IP joints of the lesser toes. Mild degenerative  change in the left mid foot with dorsal fragmented spur at the  proximal left navicular and dorsal spurring at the talar neck. Heel  spur with distal Achilles insertional enthesophyte. Nothing definitive  for acute left foot osteomyelitis radiographically. There may be an  ulcer/wound at the distal great toe. Forefoot MRI without and with  intravenous contrast could further assess if there is continued  clinical concern for forefoot osteomyelitis.       JACQUELYN QUEZADA MD         SYSTEM ID:  [id number]

## 2024-12-14 ENCOUNTER — APPOINTMENT (OUTPATIENT)
Dept: ULTRASOUND IMAGING | Facility: CLINIC | Age: 84
DRG: 253 | End: 2024-12-14
Attending: STUDENT IN AN ORGANIZED HEALTH CARE EDUCATION/TRAINING PROGRAM
Payer: COMMERCIAL

## 2024-12-14 ENCOUNTER — APPOINTMENT (OUTPATIENT)
Dept: MRI IMAGING | Facility: CLINIC | Age: 84
DRG: 253 | End: 2024-12-14
Attending: INTERNAL MEDICINE
Payer: COMMERCIAL

## 2024-12-14 ENCOUNTER — PREP FOR PROCEDURE (OUTPATIENT)
Dept: SURGERY | Facility: CLINIC | Age: 84
End: 2024-12-14
Payer: COMMERCIAL

## 2024-12-14 ENCOUNTER — ANESTHESIA EVENT (OUTPATIENT)
Dept: SURGERY | Facility: CLINIC | Age: 84
End: 2024-12-14
Payer: COMMERCIAL

## 2024-12-14 ENCOUNTER — APPOINTMENT (OUTPATIENT)
Dept: CARDIOLOGY | Facility: CLINIC | Age: 84
End: 2024-12-14
Attending: STUDENT IN AN ORGANIZED HEALTH CARE EDUCATION/TRAINING PROGRAM
Payer: COMMERCIAL

## 2024-12-14 DIAGNOSIS — I73.9 PAD (PERIPHERAL ARTERY DISEASE) (H): Primary | ICD-10-CM

## 2024-12-14 LAB
CREAT SERPL-MCNC: 1.15 MG/DL (ref 0.51–0.95)
EGFRCR SERPLBLD CKD-EPI 2021: 47 ML/MIN/1.73M2
LVEF ECHO: NORMAL

## 2024-12-14 PROCEDURE — 99222 1ST HOSP IP/OBS MODERATE 55: CPT | Performed by: PODIATRIST

## 2024-12-14 PROCEDURE — 93306 TTE W/DOPPLER COMPLETE: CPT | Mod: 26 | Performed by: INTERNAL MEDICINE

## 2024-12-14 PROCEDURE — 120N000001 HC R&B MED SURG/OB

## 2024-12-14 PROCEDURE — 36415 COLL VENOUS BLD VENIPUNCTURE: CPT | Performed by: INTERNAL MEDICINE

## 2024-12-14 PROCEDURE — A9585 GADOBUTROL INJECTION: HCPCS | Performed by: INTERNAL MEDICINE

## 2024-12-14 PROCEDURE — 73720 MRI LWR EXTREMITY W/O&W/DYE: CPT | Mod: LT

## 2024-12-14 PROCEDURE — 250N000013 HC RX MED GY IP 250 OP 250 PS 637: Performed by: INTERNAL MEDICINE

## 2024-12-14 PROCEDURE — 258N000003 HC RX IP 258 OP 636: Performed by: INTERNAL MEDICINE

## 2024-12-14 PROCEDURE — 250N000011 HC RX IP 250 OP 636: Performed by: INTERNAL MEDICINE

## 2024-12-14 PROCEDURE — 93880 EXTRACRANIAL BILAT STUDY: CPT

## 2024-12-14 PROCEDURE — 258N000003 HC RX IP 258 OP 636: Performed by: STUDENT IN AN ORGANIZED HEALTH CARE EDUCATION/TRAINING PROGRAM

## 2024-12-14 PROCEDURE — 255N000002 HC RX 255 OP 636: Performed by: STUDENT IN AN ORGANIZED HEALTH CARE EDUCATION/TRAINING PROGRAM

## 2024-12-14 PROCEDURE — 99232 SBSQ HOSP IP/OBS MODERATE 35: CPT | Performed by: INTERNAL MEDICINE

## 2024-12-14 PROCEDURE — 82565 ASSAY OF CREATININE: CPT | Performed by: INTERNAL MEDICINE

## 2024-12-14 PROCEDURE — 255N000002 HC RX 255 OP 636: Performed by: INTERNAL MEDICINE

## 2024-12-14 PROCEDURE — 99232 SBSQ HOSP IP/OBS MODERATE 35: CPT | Mod: 57 | Performed by: SURGERY

## 2024-12-14 RX ORDER — SODIUM CHLORIDE 9 MG/ML
INJECTION, SOLUTION INTRAVENOUS CONTINUOUS
Status: DISCONTINUED | OUTPATIENT
Start: 2024-12-15 | End: 2024-12-16

## 2024-12-14 RX ORDER — ALBUTEROL SULFATE 0.83 MG/ML
2.5 SOLUTION RESPIRATORY (INHALATION) EVERY 6 HOURS PRN
Status: ACTIVE | OUTPATIENT
Start: 2024-12-14

## 2024-12-14 RX ORDER — GADOBUTROL 604.72 MG/ML
9 INJECTION INTRAVENOUS ONCE
Status: COMPLETED | OUTPATIENT
Start: 2024-12-14 | End: 2024-12-14

## 2024-12-14 RX ORDER — BUPROPION HYDROCHLORIDE 150 MG/1
150 TABLET ORAL EVERY MORNING
Status: DISPENSED | OUTPATIENT
Start: 2024-12-14

## 2024-12-14 RX ORDER — CEFAZOLIN SODIUM 2 G/100ML
2 INJECTION, SOLUTION INTRAVENOUS
Status: DISCONTINUED | OUTPATIENT
Start: 2024-12-14 | End: 2024-12-17

## 2024-12-14 RX ADMIN — PIPERACILLIN AND TAZOBACTAM 3.38 G: 3; .375 INJECTION, POWDER, FOR SOLUTION INTRAVENOUS at 06:38

## 2024-12-14 RX ADMIN — GADOBUTROL 9 ML: 604.72 INJECTION INTRAVENOUS at 13:02

## 2024-12-14 RX ADMIN — PIPERACILLIN AND TAZOBACTAM 3.38 G: 3; .375 INJECTION, POWDER, FOR SOLUTION INTRAVENOUS at 00:13

## 2024-12-14 RX ADMIN — VANCOMYCIN HYDROCHLORIDE 1500 MG: 10 INJECTION, POWDER, LYOPHILIZED, FOR SOLUTION INTRAVENOUS at 18:57

## 2024-12-14 RX ADMIN — PIPERACILLIN AND TAZOBACTAM 3.38 G: 3; .375 INJECTION, POWDER, FOR SOLUTION INTRAVENOUS at 12:00

## 2024-12-14 RX ADMIN — SODIUM CHLORIDE: 9 INJECTION, SOLUTION INTRAVENOUS at 23:39

## 2024-12-14 RX ADMIN — HYDROMORPHONE HYDROCHLORIDE 2 MG: 2 TABLET ORAL at 23:39

## 2024-12-14 RX ADMIN — PIPERACILLIN AND TAZOBACTAM 3.38 G: 3; .375 INJECTION, POWDER, FOR SOLUTION INTRAVENOUS at 20:50

## 2024-12-14 RX ADMIN — BUPROPION HYDROCHLORIDE 150 MG: 150 TABLET, EXTENDED RELEASE ORAL at 15:23

## 2024-12-14 RX ADMIN — HYDROMORPHONE HYDROCHLORIDE 2 MG: 2 TABLET ORAL at 06:44

## 2024-12-14 RX ADMIN — HYDROMORPHONE HYDROCHLORIDE 2 MG: 2 TABLET ORAL at 02:09

## 2024-12-14 RX ADMIN — HYDROMORPHONE HYDROCHLORIDE 2 MG: 2 TABLET ORAL at 17:40

## 2024-12-14 RX ADMIN — HYDROMORPHONE HYDROCHLORIDE 2 MG: 2 TABLET ORAL at 11:54

## 2024-12-14 RX ADMIN — PERFLUTREN 4 ML: 6.52 INJECTION, SUSPENSION INTRAVENOUS at 11:19

## 2024-12-14 ASSESSMENT — ACTIVITIES OF DAILY LIVING (ADL)
ADLS_ACUITY_SCORE: 51
ADLS_ACUITY_SCORE: 51
ADLS_ACUITY_SCORE: 50
ADLS_ACUITY_SCORE: 51
ADLS_ACUITY_SCORE: 51
ADLS_ACUITY_SCORE: 50
ADLS_ACUITY_SCORE: 51
ADLS_ACUITY_SCORE: 50
ADLS_ACUITY_SCORE: 50
ADLS_ACUITY_SCORE: 51
ADLS_ACUITY_SCORE: 49
ADLS_ACUITY_SCORE: 50
ADLS_ACUITY_SCORE: 51
ADLS_ACUITY_SCORE: 50
ADLS_ACUITY_SCORE: 51
ADLS_ACUITY_SCORE: 51
ADLS_ACUITY_SCORE: 50
ADLS_ACUITY_SCORE: 50

## 2024-12-14 NOTE — PROGRESS NOTES
VASCULAR SURGERY    Patient examined this morning with Dr. Keller.  Reviewed angiogram from approximately 6 weeks ago.  Has a stent in the left SFA at [facility].  Significant tibial disease noted.  Runoff via the anterior tibial artery which is occluded distally and peroneal artery.  Collaterals refilled the left ankle, and posterior tibial artery that has fairly good filling to the medial lateral plantar branches.    Nonhealing great toe ulcer that has been evaluated by Dr. Nguyen that we will at least require limited amputation.  Obviously improved blood supply is critical for wound healing.    Vein mapping was performed revealing excellent left greater saphenous vein.    We would recommend a left common femoral to ankle posterior tibial in situ bypass graft to improve blood supply to the foot.  Risks and benefits of procedure discussed with patient.  We do have concerns about the stent in the SFA and possibility this may be eventually crushed due to its location.  Since will have open exposure of the common femoral artery plan to realign the stent with a self-expanding stent which would be much more durable.    This has been discussed with the patient.  She is very interested in proceeding.  Will try to have this scheduled in the main OR under general anesthetic tomorrow morning with interoperative angiogram and C arm.    Over 35 minutes with patient this morning.         Leonard Coates MD

## 2024-12-14 NOTE — PLAN OF CARE
Goal Outcome Evaluation:         Direct admit. patient is alert and oriented .Tolerating diet The patient left the office before the visit was finished. Left great toe has wound .covered with dressing .Pulse not palpable and with doppler  too,MD aware. Ultrasound on the foot ordered. MRI to be done.Started on antibiotic. PRN pain medication.NPO 12 MN.Vascular consult.podiatry consult .

## 2024-12-14 NOTE — PROGRESS NOTES
[facility]    Medicine Progress Note - Hospitalist Service    Date of Admission:  12/13/2024    Assessment & Plan   Yumiko Mccartney is a 84 year old female with known history of peripheral vascular disease who presents from her podiatrist office with worsening pain of her left great toe.     Peripheral vascular disease  Skin ulcer of the left great toe with exposed fat layer  Cellulitis of the left great toe  -Recently underwent left lower extremity angiogram 11/25/2024 revascularization of the left SFA  -Patient reports worsening pain for the last few days although she has had pain of the left toe for several weeks.  She also noticed some erythema with puslike drainage  -Saw vascular surgery 12/12 and was started on Keflex with concerns for cellulitis  -Was evaluated by  at podiatry clinic 12/13 where x-ray did not show deeper infection.  Recommended admission to the hospital and MRI of the left foot  -MRI of the foot with no evidence for osteomyelitis, soft tissue stranding of the great toe can be seen with a cellulitis, no soft tissue abscess.  CRP low  -Podiatry and vascular surgery following, appreciate input  -Plan is for left common femoral to ankle posterior tibial in situ bypass graft to improve blood supply to the foot likely 12/15  -continue to hold PTA Xarelto aspirin and Plavix  -Continue broad-spectrum IV antibiotics with vancomycin and Zosyn at least until further recommendation from podiatry and vascular surgery     Essential hypertension  -PTA on lisinopril/HCTZ  daily  -Blood pressures normal to soft without PTA antihypertensives so will continue to hold until blood pressure consistently elevated and monitor blood pressure     History of DVT  -PTA Xarelto currently on hold for anticipated procedure     Asthma, moderate persistent  -Resume PTA as needed nebulizers     Major depression  -Resume prior to admission Wellbutrin     CKD stage IIIa  -Baseline creatinine is  "between 1-1.2, currently at baseline      Diet: NPO per Anesthesia Guidelines for Procedure/Surgery Except for: Meds    DVT Prophylaxis: Pneumatic Compression Devices  Elizondo Catheter: Not present  Lines: None     Cardiac Monitoring: None  Code Status: Full Code      Clinically Significant Risk Factors Present on Admission                # Drug Induced Coagulation Defect: home medication list includes an anticoagulant medication  # Drug Induced Platelet Defect: home medication list includes an antiplatelet medication   # Hypertension: Noted on problem list      # Anemia: based on hgb <11       # Obesity: Estimated body mass index is 33.47 kg/m  as calculated from the following:    Height as of 11/25/24: 1.626 m (5' 4\").    Weight as of an earlier encounter on 12/13/24: 88.5 kg (195 lb).       # Asthma: noted on problem list        Social Drivers of Health    Physical Activity: Unknown (4/5/2024)    Exercise Vital Sign     Days of Exercise per Week: 1 day          Disposition Plan     Medically Ready for Discharge: Anticipated in 2-4 Days             Janene Fernandez MD  Hospitalist Service  [facility]  Securely message with [employer] (more info)  Text page via [employer] Paging/Directory   ______________________________________________________________________    Interval History   Care assumed, chart reviewed.  Patient reports ongoing pain on her toe.  No other nursing concerns.  Aware that she will get vascular surgery tomorrow    Physical Exam   Vital Signs: Temp: 98.7  F (37.1  C) Temp src: Oral BP: 119/54 Pulse: 62   Resp: 16 SpO2: 96 % O2 Device: None (Room air)    Weight: 0 lbs 0 oz    Exam:  Constitutional: Awake, alert and no distress. Appears comfortable  Head: Normocephalic. No masses, lesions, tenderness or abnormalities  ENMT: ENT exam normal, no neck nodes or sinus tenderness  Cardiovascular: RRR.  no.  Murmurs, no rubs or JVD  Respiratory: Normal WOB,b/l equal air entry, no wheezes or " crackles   Gastrointestinal: Abdomen soft, non-tender. BS normal. No masses, organomegaly  : Deferred  Extremities : Dressing in place, no edema , no clubbing or cyanosis        Medical Decision Making       48 MINUTES SPENT BY ME on the date of service doing chart review, history, exam, documentation & further activities per the note.      Data     I have personally reviewed the following data over the past 24 hrs:    8.1  \   10.9 (L)   / 480 (H)     139 102 16.6 /  105 (H)   4.5 26 1.15 (H) \     Procal: N/A CRP: <3.00 Lactic Acid: N/A         Imaging results reviewed over the past 24 hrs:   Recent Results (from the past 24 hours)   US Lower Extremity Arterial Duplex Left    Narrative    EXAM: US LOWER EXTREMITY ARTERIAL DUPLEX LEFT  LOCATION: [facility]  DATE: 12/13/2024    INDICATION: Concern for acute limb ischemia  COMPARISON: None.  TECHNIQUE: Duplex utilizing 2D gray-scale imaging, Doppler interrogation with color-flow and spectral waveform analysis.    FINDINGS:    LEFT LOWER EXTREMITY ARTERIAL ASSESSMENT:  Common femoral artery: 105 cm/s  Profunda femoris artery: 62 cm/s  SFA (proximal): 93 cm/s  SFA (mid): 87 cm/s    SFA (distal): Velocities within the distal SFA stent range from 72-82 cm/s    Popliteal artery: 46-61 cm/s  Anterior tibial artery: 127 cm/s  Posterior tibial artery: 62 cm/s  peroneal artery: 69 centimeters per second  Dorsalis pedis artery: 21 cm/s    Waveforms are multiphasic from the CFA through the popliteal artery. Waveforms are monophasic in the tibial arteries.      Impression    IMPRESSION:  1.  Distal left SFA stent is widely patent.  2.  Multiphasic waveforms from the CFA through the popliteal artery with transition to monophasic waveforms in the tibial arteries. This suggests stenosis of the distal popliteal or proximal tibial arteries.   US Lower Extremity Venous Mapping Bilateral    Narrative    EXAM: US LOWER EXTREMITY VENOUS MAPPING  BILATERAL  LOCATION: [facility]  DATE: 2024    INDICATION: Pre op for bypass  COMPARISON: None.  TECHNIQUE: Ultrasound examination of the lower extremity veins was performed, including gray-scale and compression imaging.     LOWER  EXTREMITY FINDINGS:       VENOUS DIAMETERS  RIGHT GREAT SAPHENOUS VEIN  Upper Thigh: 8.2 mm  Mid Thigh: 6.2 mm  Lower Thigh: 6 mm  Knee: 4.6 mm  Upper Calf: 3.6 mm  Mid Calf: 3.4 mm  Lower Calf: 3.0 mm  Ankle: 2.9 mm    LEFT GREAT SAPHENOUS VEIN  Upper Thigh: 5.9 mm  Mid Thigh: 4.4 mm  Lower Thigh: 4.9 mm  Knee: 4.0 mm  Upper Calf: 3.5 mm  Mid Calf: 3.4 mm  Lower Calf: 2.8 mm  Ankle: 2.8 mm        Impression    IMPRESSION:  Bilateral great saphenous veins are patent. Measurements as above.   Echocardiogram Complete   Result Value    LVEF  55-60%    Narrative    [id number]  [id number]  [id number]  [id number]^DARLENE^CASSIE^JAYLEN     [facility]  Echocardiography Laboratory  [address] [place] [address]     Name: MELODIE ZAPATA  MRN: [id number]  : 1940  Study Date: 2024 10:09 AM  Age: 84 yrs  Gender: Female  Patient Location: [facility]  Reason For Study: Abn EKG  Ordering Physician: CASSIE COLON  Referring Physician: PETER PAEZ  Performed By: Sandra Meneses     BSA: 1.9 m2  Height: 64 in  Weight: 195 lb  HR: 66  BP: 119/54 mmHg  ______________________________________________________________________________  Procedure  Complete Echo Adult. Definity (NDC #30241-324) given intravenously.  ______________________________________________________________________________  Interpretation Summary     The left ventricle is normal in size.  There is normal left ventricular wall thickness.  The visual ejection fraction is 55-60%.  No regional wall motion abnormalities noted.  There is no significant valve disease  No prior studies for comparison  ______________________________________________________________________________  Left Ventricle  The  left ventricle is normal in size. There is normal left ventricular wall  thickness. Left ventricular systolic function is normal. The visual ejection  fraction is 55-60%. No regional wall motion abnormalities noted.     Right Ventricle  The right ventricle is borderline dilated. The right ventricular systolic  function is normal.     Atria  The left atrium is moderately dilated. The right atrium is moderately dilated.     Mitral Valve  The mitral valve is normal in structure and function. There is physiologic  mitral regurgitation.     Tricuspid Valve  The tricuspid valve is not well visualized, but is grossly normal. There is  trace tricuspid regurgitation.     Aortic Valve  The aortic valve is trileaflet with aortic valve sclerosis. No aortic  regurgitation is present. No aortic stenosis is present.     Pulmonic Valve  The pulmonic valve is not well seen, but is grossly normal.     Vessels  Normal size aorta.     Pericardium  The pericardium appears normal.     ______________________________________________________________________________  MMode/2D Measurements & Calculations  IVSd: 0.70 cm  LVIDd: 5.1 cm  LVIDs: 3.4 cm  LVPWd: 1.1 cm  FS: 32.6 %  LV mass(C)d: 163.6 grams  LV mass(C)dI: 84.5 grams/m2     Ao root diam: 3.2 cm  LA dimension: 3.5 cm  asc Aorta Diam: 3.6 cm  LA/Ao: 1.1  LVOT diam: 1.9 cm  LVOT area: 2.8 cm2  Ao root diam index Ht(cm/m): 2.0  Ao root diam index BSA (cm/m2): 1.6  Asc Ao diam index BSA (cm/m2): 1.9  Asc Ao diam index Ht(cm/m): 2.2  LA Volume (BP): 69.3 ml  LA Volume Index (BP): 35.7 ml/m2  RV Base: 4.4 cm     RWT: 0.43  TAPSE: 2.9 cm     Doppler Measurements & Calculations  MV E max markus: 88.8 cm/sec  MV A max markus: 65.5 cm/sec  MV E/A: 1.4  MV dec time: 0.22 sec  E/E' av.9  Lateral E/e': 7.4  Medial E/e': 10.5  RV S Markus: 13.2 cm/sec     ______________________________________________________________________________  Report approved by: Astrid Angela MD on 2024 11:40 AM          MR Foot Left w/o & w Contrast    Narrative    EXAM: MR FOOT LEFT W/O and W CONTRAST  LOCATION: [facility]  DATE: 12/14/2024    INDICATION: Peripheral vascular disease, cellulitis and toe ulcer.  COMPARISON: 12/13/2024.  TECHNIQUE: Routine. Additional postgadolinium T1 sequences were obtained.  IV CONTRAST: 9mL Gadavist    FINDINGS:     JOINTS AND BONES:   -No evidence of osteomyelitis. There is no marrow edema, enhancement or cortical destructive change. No marrow replacement with attention to the toes. There is no evidence of an acute fracture. Localized partial-thickness cartilage loss with subchondral   cystic change along the naviculocuneiform articulation. No joint effusion or synovitis on postcontrast sequences.    TENDONS:   -No tendon tear, tendinopathy, or tenosynovitis.     LIGAMENTS:   -Lisfranc ligament: Intact. No subluxation.    MUSCLES AND SOFT TISSUES:   -There is scattered soft tissue edema about the visualized foot. There is some more localized reticulation and edema within the soft tissues of the great toe. No rim-enhancing abscess or soft tissue mass. Partial fatty atrophy intrinsic musculature of   the foot.      Impression    IMPRESSION:  1.  No evidence for osteomyelitis with attention to the the great toe.    2.  Soft tissue stranding great toe can be seen with cellulitis. No soft tissue abscess.     Recent Labs   Lab 12/14/24  0904 12/13/24  1731   WBC  --  8.1   HGB  --  10.9*   MCV  --  84   PLT  --  480*   NA  --  139   POTASSIUM  --  4.5   CHLORIDE  --  102   CO2  --  26   BUN  --  16.6   CR 1.15* 1.06*   ANIONGAP  --  11   NGOC  --  9.9   GLC  --  105*

## 2024-12-14 NOTE — CONSULTS
"[facility]  CONSULTATION NOTE    CHIEF COMPLAINT:      I was asked by Werner Gonzalez MD  to evaluate this patient for left foot.    PATIENT HISTORY:  Yumiko Mccartney is a 84 year old female  with a past medical history significant for what's listed below, presented following evaluation with Dr. Nguyen, for her left foot.  Per Dr. Nguyen, patient had significant pain while in the office that had been worsening. There was concern for worsening arterial disease.   -Currently patient states the color to her toe has improved, but it's still quite painful. States it's been hurting for approx 9 months now. Had an angiogram on 11/25 which helped some temporarily. States became more worried with toe began draining a few days ago.         Review of Systems:  A 10 point review of systems was performed and is positive for that noted above in the patient history.  All other areas are negative.     PAST MEDICAL HISTORY:   Past Medical History:   Diagnosis Date    Acne rosacea     Actinic keratosis     Amblyopia     Asthma, moderate persistent     Basal cell carcinoma 10/2010    back X2    Cataract, mod, od; mild-mod, os 01/12/2012    DJD (degenerative joint disease), lumbosacral 08/06/2014    DVT (deep venous thrombosis) (H)     Elevated cholesterol     Endometriosis     HTN (hypertension)     Moderate major depression (H)     \"Circumstances\"    Osteopenia         PAST SURGICAL HISTORY:   Past Surgical History:   Procedure Laterality Date    ARTHROPLASTY KNEE Left 10/4/2021    Procedure: ARTHROPLASTY, LEFT KNEE, TOTAL;  Surgeon: Glenn Calvin MD;  Location: [facility] OR    BLEPHAROPLASTY BILATERAL  3/9/2006; 2013    both eyes, upper lid; revision (EN)    CATARACT IOL, RT/LT      HC REMOVAL GALLBLADDER      INJECT BLOCK MEDIAL BRANCH CERVICAL/THORACIC/LUMBAR Bilateral 12/7/2023    Procedure: BLOCK, NERVE, FACET JOINT, MEDIAL BRANCH, DIAGNOSTICL3/4/ALA;  Surgeon: Dinesh Proctor MD;  Location:  OR    " "IR LOWER EXTREMITY ANGIOGRAM LEFT  11/25/2024    LASER YAG CAPSULOTOMY      left eye (AC lysis also)    PHACOEMULSIFICATION WITH STANDARD INTRAOCULAR LENS IMPLANT  1/2012; 4/2013    right eye; left eye    REPAIR PTOSIS      SURGICAL HISTORY OF -       endometriosis surgeriesx3    SURGICAL HISTORY OF -       ganiglion cyst onfoot    SURGICAL HISTORY OF -       Eye for \"droopy eye\"    ZZC APPENDECTOMY          MEDICATIONS:  Reviewed in Epic. Current.     ALLERGIES:    Allergies   Allergen Reactions    Erythromycin Swelling and Other (See Comments)        SOCIAL HISTORY:   Social History     Socioeconomic History    Marital status:      Spouse name: Not on file    Number of children: 3    Years of education: Not on file    Highest education level: Not on file   Occupational History     Employer: RETIRED   Tobacco Use    Smoking status: Never     Passive exposure: Never    Smokeless tobacco: Never   Vaping Use    Vaping status: Never Used   Substance and Sexual Activity    Alcohol use: Yes     Comment: rarely    Drug use: No    Sexual activity: Yes     Partners: Male   Other Topics Concern    Parent/sibling w/ CABG, MI or angioplasty before 65F 55M? Not Asked   Social History Narrative    Not on file     Social Drivers of Health     Financial Resource Strain: Low Risk  (4/5/2024)    Financial Resource Strain     Within the past 12 months, have you or your family members you live with been unable to get utilities (heat, electricity) when it was really needed?: No   Food Insecurity: Low Risk  (4/5/2024)    Food Insecurity     Within the past 12 months, did you worry that your food would run out before you got money to buy more?: No     Within the past 12 months, did the food you bought just not last and you didn t have money to get more?: No   Transportation Needs: Low Risk  (4/5/2024)    Transportation Needs     Within the past 12 months, has lack of transportation kept you from medical appointments, " getting your medicines, non-medical meetings or appointments, work, or from getting things that you need?: No   Physical Activity: Unknown (4/5/2024)    Exercise Vital Sign     Days of Exercise per Week: 1 day     Minutes of Exercise per Session: Not on file   Stress: No Stress Concern Present (4/5/2024)    [employer] - Occupational Stress Questionnaire     Feeling of Stress : Not at all   Social Connections: Not on file   Interpersonal Safety: Low Risk  (11/25/2024)    Interpersonal Safety     Do you feel physically and emotionally safe where you currently live?: Yes     Within the past 12 months, have you been hit, slapped, kicked or otherwise physically hurt by someone?: No     Within the past 12 months, have you been humiliated or emotionally abused in other ways by your partner or ex-partner?: No   Housing Stability: Low Risk  (4/5/2024)    Housing Stability     Do you have housing? : Yes     Are you worried about losing your housing?: No        FAMILY HISTORY:   Family History   Problem Relation Age of Onset    C.A.D. Father     C.A.D. Brother     Hypertension Mother     Cancer No family hx of     Diabetes No family hx of     Cerebrovascular Disease No family hx of     Thyroid Disease No family hx of     Glaucoma No family hx of     Macular Degeneration No family hx of         EXAM:Vitals: /54 (BP Location: Right arm)   Pulse 62   Temp 98.7  F (37.1  C) (Oral)   Resp 16   SpO2 96%   BMI= There is no height or weight on file to calculate BMI.    LABS:   .a1c  Last Comprehensive Metabolic Panel:  Sodium   Date Value Ref Range Status   12/13/2024 139 135 - 145 mmol/L Final   08/14/2020 140 133 - 144 mmol/L Final     Potassium   Date Value Ref Range Status   12/13/2024 4.5 3.4 - 5.3 mmol/L Final   01/17/2023 4.0 3.4 - 5.3 mmol/L Final   08/14/2020 3.9 3.4 - 5.3 mmol/L Final     Chloride   Date Value Ref Range Status   12/13/2024 102 98 - 107 mmol/L Final   01/17/2023 102 94  - 109 mmol/L Final   08/14/2020 106 94 - 109 mmol/L Final     Carbon Dioxide   Date Value Ref Range Status   08/14/2020 30 20 - 32 mmol/L Final     Carbon Dioxide (CO2)   Date Value Ref Range Status   12/13/2024 26 22 - 29 mmol/L Final   01/17/2023 31 20 - 32 mmol/L Final     Anion Gap   Date Value Ref Range Status   12/13/2024 11 7 - 15 mmol/L Final   01/17/2023 9 3 - 14 mmol/L Final   08/14/2020 4 3 - 14 mmol/L Final     Glucose   Date Value Ref Range Status   12/13/2024 105 (H) 70 - 99 mg/dL Final   01/17/2023 98 70 - 99 mg/dL Final   08/14/2020 97 70 - 99 mg/dL Final     Comment:     Non Fasting     Urea Nitrogen   Date Value Ref Range Status   12/13/2024 16.6 8.0 - 23.0 mg/dL Final   01/17/2023 16 7 - 30 mg/dL Final   08/14/2020 16 7 - 30 mg/dL Final     Creatinine   Date Value Ref Range Status   12/13/2024 1.06 (H) 0.51 - 0.95 mg/dL Final   08/14/2020 0.91 0.52 - 1.04 mg/dL Final     GFR Estimate   Date Value Ref Range Status   12/13/2024 52 (L) >60 mL/min/1.73m2 Final     Comment:     eGFR calculated using 2021 CKD-EPI equation.   08/14/2020 60 (L) >60 mL/min/[1.73_m2] Final     Comment:     Non  GFR Calc  Starting 12/18/2018, serum creatinine based estimated GFR (eGFR) will be   calculated using the Chronic Kidney Disease Epidemiology Collaboration   (CKD-EPI) equation.       Calcium   Date Value Ref Range Status   12/13/2024 9.9 8.8 - 10.4 mg/dL Final     Comment:     Reference intervals for this test were updated on 7/16/2024 to reflect our healthy population more accurately. There may be differences in the flagging of prior results with similar values performed with this method. Those prior results can be interpreted in the context of the updated reference intervals.   08/14/2020 9.7 8.5 - 10.1 mg/dL Final     Lab Results   Component Value Date    WBC 8.1 12/13/2024    WBC 6.6 08/14/2020     Lab Results   Component Value Date    RBC 4.40 12/13/2024    RBC 4.68 08/14/2020     Lab Results  "  Component Value Date    HGB 10.9 12/13/2024    HGB 11.7 08/14/2020     Lab Results   Component Value Date    HCT 36.8 12/13/2024    HCT 37.3 08/14/2020     Lab Results   Component Value Date     12/13/2024     08/14/2020      Lab Results   Component Value Date    INR 1.10 11/25/2024    INR 2.45 05/17/2022        General appearance: Patient is alert and fully cooperative with history & exam.  No sign of distress is noted during the visit.      Respiratory: Breathing is regular & unlabored while sitting.      HEENT: Hearing is intact to spoken word.  Speech is clear.  No gross evidence of visual impairment that would impact ambulation.      Dermatologic: Left foot: hallux with several dried small eschars. No significant cellulitis. Tender to touch. Some hyperemia/violaceous color changes.      Vascular: Dorsalis pedis and posterior tibial pulses are weakly palpable.     Neurologic: Lower extremity sensation is intact, bilateral foot, to light touch.  No evidence of neurological-based weakness or contracture in the lower extremities.       Musculoskeletal: Patient is ambulatory without an assistive device or brace.  No gross foot or ankle deformity noted.       Psychiatric: Affect is pleasant & appropriate.      All cultures:  No results for input(s): \"CULTURE\" in the last 168 hours.     IMAGING:     IMPRESSION: No acute displaced left foot fracture or dislocation is  identified. Mild degenerative change at the left first MTP joint and  scattered at the IP joints of the lesser toes. Mild degenerative  change in the left mid foot with dorsal fragmented spur at the  proximal left navicular and dorsal spurring at the talar neck. Heel  spur with distal Achilles insertional enthesophyte. Nothing definitive  for acute left foot osteomyelitis radiographically. There may be an  ulcer/wound at the distal great toe. Forefoot MRI without and with  intravenous contrast could further assess if there is " continued  clinical concern for forefoot osteomyelitis.    I personally reviewed the images and agree with the reports as stated.  No obvious osteomyelitis     ABIs:   FINDINGS:  Right MEL:   DP: 1.4  PT: 1.3.     Left MEL:   DP: 0.5   PT: 0.8.     Right Digital brachial index: 0.85.  Left Digital Brachial index: 0.51     Waveforms: Triphasic throughout the right lower extremity. Triphasic  to the level of the left popliteal artery. Biphasic to monophasic in  the left distal tibial arteries. Left digital waveforms are dampened.     Exercise: The patient was exercised on a treadmill for 5 minutes at  1.5 miles per hour and at a 10% incline. Patient had no symptoms with  exercise.     Right exercise MEL: 1.24.  Left exercise MEL: 0.76                                                                      IMPRESSION: Normal ankle-brachial indices in the right lower  extremity. Moderate arterial insufficiency in the left lower extremity  at rest not significantly changed following exercise.    ASSESSMENT:  Left hallux pain, small eschars to plantar and distal toe   Peripheral Arterial Disease s/p left SFA stent, ongoing tibial disease      MEDICAL DECISION MAKING:   -Discussed all findings with patient. Chart and imaging reviewed.   -Plans underway for patient to have further vascular intervention with Dr. Coates.     -Reviewed hallux and clinically without significant signs of infection, but difficult to know for certain given chronicity of ulcers and erythema. MRI ordered though to evaluate fully. Discussed with her possible need for amputation if noted osteomyelitis. Patient agrees to this plan.     -Will follow up with patient after MRI is completed.     -No WB restrictions to left foot at this time.       Thank you for the consultation request and the opportunity to participate in the care of Yumiko Davis DPM   [facility]/Foot & Ankle Surgery  Available via [employer]

## 2024-12-14 NOTE — PROGRESS NOTES
Date/Time:12/13 ,1900-0730    Trauma/Ortho/Medical (Choose one) :Ortho    Diagnosis:Left great toe wound  Mental Status:A&O x 4  Activity/dangle:SBA  Diet:NPO @ midnight  Pain:PRN Dilaudid  Elizondo/Voiding:Ambulating to BR  Tele/Restraints/Iso:NA  02/LDA:JAYLEN VICTORIA SL  D/C Date:TBD  Pulse not palpable and with doppler   Other Info:MRI checklist completed and faxed,writer attempted to send pt to MRI,but MRI staff stated that they were screening another pt at that time and that they would call later.Pt was already being up in wheel chair, being helped by charge nurse.MRI staff stated that they will call again later,but it was late at night and pt wanted to sleep and have it done this morning.

## 2024-12-14 NOTE — PHARMACY-ADMISSION MEDICATION HISTORY
Pharmacist Admission Medication History    Admission medication history is complete. The information provided in this note is only as accurate as the sources available at the time of the update.    Information Source(s): Patient and CareEverywhere/SureScripts via in-person    Pertinent Information: Tizanidine prescribed PRN, patient takes 1-2 tabs QHS. Patient takes Xarelto in the morning with food.     Changes made to PTA medication list:  Added: tylenol 3  Deleted: prednisone (completed), duplicate tizanidine  Changed: citracal bid to qd    Allergies reviewed with patient and updates made in EHR: yes    Medication History Completed By: Marianne Nance [facility] 12/13/2024 6:10 PM    PTA Med List   Medication Sig Note Last Dose/Taking    acetaminophen (TYLENOL) 325 MG tablet Take 2 tablets (650 mg) by mouth every 4 hours as needed for other  Taking As Needed    acetaminophen-codeine (TYLENOL #3) 300-30 MG per tablet Take 1 tablet by mouth every 6 hours as needed for severe pain.  Taking As Needed    albuterol (PROVENTIL) (2.5 MG/3ML) 0.083% neb solution Take 1 vial (2.5 mg) by nebulization every 6 hours as needed for shortness of breath or wheezing  Taking As Needed    aspirin 81 MG EC tablet Take 1 tablet (81 mg) by mouth daily.  12/13/2024    buPROPion (WELLBUTRIN XL) 150 MG 24 hr tablet Take 1 tablet (150 mg) by mouth every morning.  12/13/2024    calcium citrate (CITRACAL) 950 (200 Ca) MG tablet Take 1 tablet by mouth daily.  12/13/2024    cephALEXin (KEFLEX) 500 MG capsule Take 1 capsule (500 mg) by mouth 2 times daily. 12/13/2024: Started 12/12, has 17 doses remaining 12/13/2024 Morning    Cholecalciferol (VITAMIN D) 2000 UNIT CAPS Take 2,000 Units by mouth daily  12/13/2024    clopidogrel (PLAVIX) 75 MG tablet Take 1 tablet (75 mg) by mouth daily.  12/13/2024    gabapentin (NEURONTIN) 600 MG tablet TAKE 0.5 TAB BY MOUTH IN MORNING & 2 TABS AT BEDTIME  12/13/2024 Morning    lisinopril-hydrochlorothiazide (ZESTORETIC)  20-12.5 MG tablet TAKE 2 TABLETS BY MOUTH EVERY DAY  12/13/2024    Multiple Vitamin (MULTIVITAMIN ADULT PO) Take by mouth daily  12/13/2024    rivaroxaban ANTICOAGULANT (XARELTO) 20 MG TABS tablet Take 1 tablet (20 mg) by mouth daily with food.  12/13/2024 Morning    tiZANidine (ZANAFLEX) 4 MG tablet Take 1-2 tablets (4-8 mg) by mouth nightly as needed for muscle spasms.  12/12/2024 Bedtime

## 2024-12-14 NOTE — PLAN OF CARE
A/OX4,VSS.Up independent in room. Pain control with PO dilaudid . MRI and ultrasound done today. On IV antibiotics. Voiding well,regular diet. NPO from midnight to have a vascular procedure tomorrow. Vascular surgery and podiatry following. Left great toe dreg CDI.

## 2024-12-14 NOTE — PROGRESS NOTES
Attempted to get patient down for mri. Nurse stated patient is sleeping and did not want to disturb patient at this hour.

## 2024-12-14 NOTE — ANESTHESIA PREPROCEDURE EVALUATION
"Anesthesia Pre-Procedure Evaluation    Patient: Yumiko Mccartney   MRN: [id number] : 1940        Procedure : Procedure(s):  CREATION, BYPASS, VASCULAR, FEMORAL TO TIBIAL.  Left SFA stent placement          Past Medical History:   Diagnosis Date    Acne rosacea     Actinic keratosis     Amblyopia     Asthma, moderate persistent     Basal cell carcinoma 10/2010    back X2    Cataract, mod, od; mild-mod, os 2012    DJD (degenerative joint disease), lumbosacral 2014    DVT (deep venous thrombosis) (H)     Elevated cholesterol     Endometriosis     HTN (hypertension)     Moderate major depression (H)     \"Circumstances\"    Osteopenia       Past Surgical History:   Procedure Laterality Date    ARTHROPLASTY KNEE Left 10/4/2021    Procedure: ARTHROPLASTY, LEFT KNEE, TOTAL;  Surgeon: Glenn Calvin MD;  Location: [facility] OR    BLEPHAROPLASTY BILATERAL  3/9/2006;     both eyes, upper lid; revision (EN)    CATARACT IOL, RT/LT      HC REMOVAL GALLBLADDER      INJECT BLOCK MEDIAL BRANCH CERVICAL/THORACIC/LUMBAR Bilateral 2023    Procedure: BLOCK, NERVE, FACET JOINT, MEDIAL BRANCH, DIAGNOSTICL3/4/ALA;  Surgeon: Dinesh Proctor MD;  Location: [facility] OR    IR LOWER EXTREMITY ANGIOGRAM LEFT  2024    LASER YAG CAPSULOTOMY      left eye (AC lysis also)    PHACOEMULSIFICATION WITH STANDARD INTRAOCULAR LENS IMPLANT  2012; 2013    right eye; left eye    REPAIR PTOSIS      SURGICAL HISTORY OF -       endometriosis surgeriesx3    SURGICAL HISTORY OF -       ganiglion cyst onfoot    SURGICAL HISTORY OF -       Eye for \"droopy eye\"    ZZC APPENDECTOMY        Allergies   Allergen Reactions    Erythromycin Swelling and Other (See Comments)      Social History     Tobacco Use    Smoking status: Never     Passive exposure: Never    Smokeless tobacco: Never   Substance Use Topics    Alcohol use: Yes     Comment: rarely      Wt Readings from Last 1 Encounters:   24 88.5 kg (195 lb)        Anesthesia " "Evaluation   Pt has had prior anesthetic.     No history of anesthetic complications       ROS/MED HX  ENT/Pulmonary:     (+)                     Intermittent, asthma                  Neurologic:       Cardiovascular:     (+) Dyslipidemia hypertension- Peripheral Vascular Disease (peripheral vascular disease s/p recent revascularization of the left SFA on 1120 5/2024)-   -  - -                                      METS/Exercise Tolerance:     Hematologic:     (+) History of blood clots (h/o DVT),               Musculoskeletal: Comment: MRI negative for osteomyelitis of great toe      GI/Hepatic:       Renal/Genitourinary:       Endo:     (+)               Obesity,       Psychiatric/Substance Use:       Infectious Disease:       Malignancy:       Other:            Physical Exam    Airway        Mallampati: II   TM distance: > 3 FB   Neck ROM: full   Mouth opening: > 3 cm    Respiratory Devices and Support         Dental           Cardiovascular          Rhythm and rate: regular and normal     Pulmonary   pulmonary exam normal        breath sounds clear to auscultation           OUTSIDE LABS:  CBC:   Lab Results   Component Value Date    WBC 8.1 12/13/2024    WBC 8.5 11/25/2024    HGB 10.9 (L) 12/13/2024    HGB 9.8 (L) 11/25/2024    HCT 36.8 12/13/2024    HCT 32.7 (L) 11/25/2024     (H) 12/13/2024     11/25/2024     BMP:   Lab Results   Component Value Date     12/13/2024     11/25/2024    POTASSIUM 4.5 12/13/2024    POTASSIUM 4.5 11/25/2024    CHLORIDE 102 12/13/2024    CHLORIDE 101 11/25/2024    CO2 26 12/13/2024    CO2 26 11/25/2024    BUN 16.6 12/13/2024    BUN 23.4 (H) 11/25/2024    CR 1.15 (H) 12/14/2024    CR 1.06 (H) 12/13/2024     (H) 12/13/2024     (H) 11/25/2024     COAGS:   Lab Results   Component Value Date    PTT 32 11/25/2024    INR 1.10 11/25/2024     POC: No results found for: \"BGM\", \"HCG\", \"HCGS\"  HEPATIC:   Lab Results   Component Value Date    ALBUMIN 4.5 " "09/24/2024    PROTTOTAL 7.1 09/24/2024    ALT <5 09/24/2024    AST 19 09/24/2024    ALKPHOS 78 09/24/2024    BILITOTAL <0.2 09/24/2024     OTHER:   Lab Results   Component Value Date    A1C 5.6 07/14/2015    NGOC 9.9 12/13/2024    PHOS 3.9 04/12/2013    TSH 0.51 07/31/2018    SED 8 06/19/2012       Anesthesia Plan    ASA Status:  3, emergent       Anesthesia Type: General.     - Airway: ETT   Induction: Intravenous.   Maintenance: Balanced.   Techniques and Equipment:     - Lines/Monitors: 2nd IV     Consents    Anesthesia Plan(s) and associated risks, benefits, and realistic alternatives discussed. Questions answered and patient/representative(s) expressed understanding.     - Discussed:     - Discussed with:  Patient            Postoperative Care    Pain management: IV analgesics, Oral pain medications, Multi-modal analgesia.   PONV prophylaxis: Ondansetron (or other 5HT-3), Dexamethasone or Solumedrol     Comments:               Matt Bazan MD    I have reviewed the pertinent notes and labs in the chart from the past 30 days and (re)examined the patient.  Any updates or changes from those notes are reflected in this note.               # Hypertension: Noted on problem list            # Obesity: Estimated body mass index is 33.47 kg/m  as calculated from the following:    Height as of 11/25/24: 1.626 m (5' 4\").    Weight as of an earlier encounter on 12/13/24: 88.5 kg (195 lb)., PRESENT ON ADMISSION     # Asthma: noted on problem list       "

## 2024-12-14 NOTE — PHARMACY-VANCOMYCIN DOSING SERVICE
"Pharmacy Vancomycin Initial Note  Date of Service 2024  Patient's  1940  84 year old, female    Indication: Skin and Soft Tissue Infection    Current estimated CrCl = Estimated Creatinine Clearance: 44.1 mL/min (A) (based on SCr of 1.06 mg/dL (H)).    Creatinine for last 3 days  2024:  5:31 PM Creatinine 1.06 mg/dL    Recent Vancomycin Level(s) for last 3 days  No results found for requested labs within last 3 days.      Vancomycin IV Administrations (past 72 hours)        No vancomycin orders with administrations in past 72 hours.                    Nephrotoxins and other renal medications (From now, onward)      Start     Dose/Rate Route Frequency Ordered Stop    24 183  piperacillin-tazobactam (ZOSYN) 3.375 g vial to attach to  mL bag        Note to Pharmacy: For [facility], [facility] and [facility]: For Zosyn-naive patients, use the \"Zosyn initial dose + extended infusion\" order panel.    3.375 g  over 30 Minutes Intravenous EVERY 6 HOURS 24 1824              Contrast Orders - past 72 hours (72h ago, onward)      None            InsightRX Prediction of Planned Initial Vancomycin Regimen  Loading dose: N/A  Regimen: 1500 mg IV every 24 hours.  Start time: 18:25 on 2024  Exposure target: AUC24 (range)400-600 mg/L.hr   AUC24,ss: 560 mg/L.hr  Probability of AUC24 > 400: 85 %  Ctrough,ss: 17.3 mg/L  Probability of Ctrough,ss > 20: 35 %  Probability of nephrotoxicity (Lodise CLINT ): 13 %        Plan:  Start vancomycin  1500 mg IV q24h.   Vancomycin monitoring method: AUC  Vancomycin therapeutic monitoring goal: 400-600 mg*h/L  Pharmacy will check vancomycin levels as appropriate in 3-5 Days.    Serum creatinine levels will be ordered a minimum of twice weekly.      Marianne Nance RPH    "

## 2024-12-14 NOTE — CONSULTS
Vascular surgery consultation    This is a 84-year-old female who was consulted for worsening of limb ischemia on the left side.  Patient has been dealing with left great toe wound for a while.  Patient underwent distal SFA stenting with VBX in November 25.  However, the patient still had not healed the wound since then.    Patient does not have rest pain in the foot.  He is able to ambulate.  Otherwise, healthy and able to carry out most of activities without any issues.    Past medical history is significant for hypertension and DVT on Xarelto    On exam,   NAD  Regular rate and rhythm  Palpable popliteal pulse distal to the knee joint on the left, triphasic DP PT on the right, biphasic PT and monophasic DP  Left great toe erythematous and painful, with mild purulence    Images were reviewed from past angiogram in November.  Also, ABIs reviewed.    From angiogram, successful treatment of SFA with VBX stent.  However, questionable choice of stent as this is not self-expanding stent.  There is no inline flow to the foot.  There is a long segment PT occlusion with reconstitution below the ankle and focal AT distal occlusion with no reconstitution.    Impression: Left great toe likely infection from CL TI due to nonhealing wound.  There was no toe pressure measurement on this left side.    -Patient will need antibiotics and evaluation of podiatry for debridement/amputation of the great toe if necessary.  -Order duplex to make sure the stent is patent.  Clinically, there is no evidence of stent occlusion.  -Also ordered vein mapping in case that the patient does not heal the wound and need a bypass.    Will follow-up on the duplex result.  Will discuss with the staff surgeon.    Faby Keller MD  Vascular Surgery Fellow

## 2024-12-15 ENCOUNTER — APPOINTMENT (OUTPATIENT)
Dept: GENERAL RADIOLOGY | Facility: CLINIC | Age: 84
DRG: 253 | End: 2024-12-15
Attending: SURGERY
Payer: COMMERCIAL

## 2024-12-15 ENCOUNTER — ANESTHESIA (OUTPATIENT)
Dept: SURGERY | Facility: CLINIC | Age: 84
End: 2024-12-15
Payer: COMMERCIAL

## 2024-12-15 LAB
ABO + RH BLD: NORMAL
ANION GAP SERPL CALCULATED.3IONS-SCNC: 9 MMOL/L (ref 7–15)
BASOPHILS # BLD AUTO: 0 10E3/UL (ref 0–0.2)
BASOPHILS NFR BLD AUTO: 1 %
BLD GP AB SCN SERPL QL: NEGATIVE
BUN SERPL-MCNC: 12 MG/DL (ref 8–23)
CALCIUM SERPL-MCNC: 9.1 MG/DL (ref 8.8–10.4)
CHLORIDE SERPL-SCNC: 107 MMOL/L (ref 98–107)
CREAT SERPL-MCNC: 1.03 MG/DL (ref 0.51–0.95)
EGFRCR SERPLBLD CKD-EPI 2021: 54 ML/MIN/1.73M2
EOSINOPHIL # BLD AUTO: 0.2 10E3/UL (ref 0–0.7)
EOSINOPHIL NFR BLD AUTO: 3 %
ERYTHROCYTE [DISTWIDTH] IN BLOOD BY AUTOMATED COUNT: 13.9 % (ref 10–15)
GLUCOSE SERPL-MCNC: 105 MG/DL (ref 70–99)
HCO3 SERPL-SCNC: 27 MMOL/L (ref 22–29)
HCT VFR BLD AUTO: 31.9 % (ref 35–47)
HGB BLD-MCNC: 9.2 G/DL (ref 11.7–15.7)
HOLD SPECIMEN: NORMAL
IMM GRANULOCYTES # BLD: 0 10E3/UL
IMM GRANULOCYTES NFR BLD: 0 %
LYMPHOCYTES # BLD AUTO: 1.7 10E3/UL (ref 0.8–5.3)
LYMPHOCYTES NFR BLD AUTO: 26 %
MCH RBC QN AUTO: 24.6 PG (ref 26.5–33)
MCHC RBC AUTO-ENTMCNC: 28.8 G/DL (ref 31.5–36.5)
MCV RBC AUTO: 85 FL (ref 78–100)
MONOCYTES # BLD AUTO: 0.4 10E3/UL (ref 0–1.3)
MONOCYTES NFR BLD AUTO: 6 %
NEUTROPHILS # BLD AUTO: 4.1 10E3/UL (ref 1.6–8.3)
NEUTROPHILS NFR BLD AUTO: 64 %
NRBC # BLD AUTO: 0 10E3/UL
NRBC BLD AUTO-RTO: 0 /100
PLATELET # BLD AUTO: 396 10E3/UL (ref 150–450)
PLATELET # BLD AUTO: 424 10E3/UL (ref 150–450)
POTASSIUM SERPL-SCNC: 4.4 MMOL/L (ref 3.4–5.3)
RBC # BLD AUTO: 3.74 10E6/UL (ref 3.8–5.2)
SODIUM SERPL-SCNC: 143 MMOL/L (ref 135–145)
SPECIMEN EXP DATE BLD: NORMAL
VANCOMYCIN SERPL-MCNC: 12.6 UG/ML
WBC # BLD AUTO: 6.5 10E3/UL (ref 4–11)

## 2024-12-15 PROCEDURE — 360N000075 HC SURGERY LEVEL 2, PER MIN: Performed by: SURGERY

## 2024-12-15 PROCEDURE — 041L09N BYPASS LEFT FEMORAL ARTERY TO POSTERIOR TIBIAL ARTERY WITH AUTOLOGOUS VENOUS TISSUE, OPEN APPROACH: ICD-10-PCS | Performed by: SURGERY

## 2024-12-15 PROCEDURE — 85004 AUTOMATED DIFF WBC COUNT: CPT | Performed by: STUDENT IN AN ORGANIZED HEALTH CARE EDUCATION/TRAINING PROGRAM

## 2024-12-15 PROCEDURE — 250N000009 HC RX 250: Performed by: SURGERY

## 2024-12-15 PROCEDURE — 258N000003 HC RX IP 258 OP 636: Performed by: SURGERY

## 2024-12-15 PROCEDURE — 250N000013 HC RX MED GY IP 250 OP 250 PS 637: Performed by: INTERNAL MEDICINE

## 2024-12-15 PROCEDURE — 370N000017 HC ANESTHESIA TECHNICAL FEE, PER MIN: Performed by: SURGERY

## 2024-12-15 PROCEDURE — 271N000002 HC RX 271: Performed by: SURGERY

## 2024-12-15 PROCEDURE — C1894 INTRO/SHEATH, NON-LASER: HCPCS | Performed by: SURGERY

## 2024-12-15 PROCEDURE — 80202 ASSAY OF VANCOMYCIN: CPT | Performed by: PODIATRIST

## 2024-12-15 PROCEDURE — C1769 GUIDE WIRE: HCPCS | Performed by: SURGERY

## 2024-12-15 PROCEDURE — 272N000001 HC OR GENERAL SUPPLY STERILE: Performed by: SURGERY

## 2024-12-15 PROCEDURE — 250N000025 HC SEVOFLURANE, PER MIN: Performed by: SURGERY

## 2024-12-15 PROCEDURE — 250N000011 HC RX IP 250 OP 636: Performed by: ANESTHESIOLOGY

## 2024-12-15 PROCEDURE — 85041 AUTOMATED RBC COUNT: CPT | Performed by: STUDENT IN AN ORGANIZED HEALTH CARE EDUCATION/TRAINING PROGRAM

## 2024-12-15 PROCEDURE — 047L3DZ DILATION OF LEFT FEMORAL ARTERY WITH INTRALUMINAL DEVICE, PERCUTANEOUS APPROACH: ICD-10-PCS | Performed by: SURGERY

## 2024-12-15 PROCEDURE — 120N000001 HC R&B MED SURG/OB

## 2024-12-15 PROCEDURE — 86900 BLOOD TYPING SEROLOGIC ABO: CPT | Performed by: STUDENT IN AN ORGANIZED HEALTH CARE EDUCATION/TRAINING PROGRAM

## 2024-12-15 PROCEDURE — 258N000003 HC RX IP 258 OP 636: Performed by: ANESTHESIOLOGY

## 2024-12-15 PROCEDURE — 250N000011 HC RX IP 250 OP 636: Performed by: INTERNAL MEDICINE

## 2024-12-15 PROCEDURE — 250N000011 HC RX IP 250 OP 636: Performed by: STUDENT IN AN ORGANIZED HEALTH CARE EDUCATION/TRAINING PROGRAM

## 2024-12-15 PROCEDURE — 255N000002 HC RX 255 OP 636: Performed by: SURGERY

## 2024-12-15 PROCEDURE — 258N000003 HC RX IP 258 OP 636: Performed by: STUDENT IN AN ORGANIZED HEALTH CARE EDUCATION/TRAINING PROGRAM

## 2024-12-15 PROCEDURE — C1725 CATH, TRANSLUMIN NON-LASER: HCPCS | Performed by: ANESTHESIOLOGY

## 2024-12-15 PROCEDURE — 710N000009 HC RECOVERY PHASE 1, LEVEL 1, PER MIN: Performed by: SURGERY

## 2024-12-15 PROCEDURE — 250N000009 HC RX 250: Performed by: ANESTHESIOLOGY

## 2024-12-15 PROCEDURE — 35585 VEIN BYP FEM-TIBIAL PERONEAL: CPT | Mod: LT | Performed by: SURGERY

## 2024-12-15 PROCEDURE — 250N000011 HC RX IP 250 OP 636: Performed by: SURGERY

## 2024-12-15 PROCEDURE — C1887 CATHETER, GUIDING: HCPCS | Performed by: SURGERY

## 2024-12-15 PROCEDURE — 999N000141 HC STATISTIC PRE-PROCEDURE NURSING ASSESSMENT: Performed by: SURGERY

## 2024-12-15 PROCEDURE — 99232 SBSQ HOSP IP/OBS MODERATE 35: CPT | Performed by: INTERNAL MEDICINE

## 2024-12-15 PROCEDURE — 999N000182 XR SURGERY CARM FLUORO GREATER THAN 5 MIN

## 2024-12-15 PROCEDURE — 86901 BLOOD TYPING SEROLOGIC RH(D): CPT | Performed by: STUDENT IN AN ORGANIZED HEALTH CARE EDUCATION/TRAINING PROGRAM

## 2024-12-15 PROCEDURE — 250N000013 HC RX MED GY IP 250 OP 250 PS 637: Performed by: STUDENT IN AN ORGANIZED HEALTH CARE EDUCATION/TRAINING PROGRAM

## 2024-12-15 PROCEDURE — 80048 BASIC METABOLIC PNL TOTAL CA: CPT | Performed by: STUDENT IN AN ORGANIZED HEALTH CARE EDUCATION/TRAINING PROGRAM

## 2024-12-15 PROCEDURE — C1876 STENT, NON-COA/NON-COV W/DEL: HCPCS

## 2024-12-15 PROCEDURE — 37224 PR REVASCULARIZE FEM/POP ARTERY, ANGIOPLASTY: CPT | Mod: LT | Performed by: SURGERY

## 2024-12-15 PROCEDURE — 36415 COLL VENOUS BLD VENIPUNCTURE: CPT | Performed by: PODIATRIST

## 2024-12-15 PROCEDURE — 85049 AUTOMATED PLATELET COUNT: CPT | Performed by: STUDENT IN AN ORGANIZED HEALTH CARE EDUCATION/TRAINING PROGRAM

## 2024-12-15 PROCEDURE — 06LQ0ZZ OCCLUSION OF LEFT SAPHENOUS VEIN, OPEN APPROACH: ICD-10-PCS | Performed by: SURGERY

## 2024-12-15 PROCEDURE — 36415 COLL VENOUS BLD VENIPUNCTURE: CPT | Performed by: STUDENT IN AN ORGANIZED HEALTH CARE EDUCATION/TRAINING PROGRAM

## 2024-12-15 DEVICE — IMPLANTABLE DEVICE: Type: IMPLANTABLE DEVICE | Site: ILIAC/FEMORALS | Status: FUNCTIONAL

## 2024-12-15 RX ORDER — LABETALOL HYDROCHLORIDE 5 MG/ML
10 INJECTION, SOLUTION INTRAVENOUS
Status: DISCONTINUED | OUTPATIENT
Start: 2024-12-15 | End: 2024-12-15 | Stop reason: HOSPADM

## 2024-12-15 RX ORDER — CEFAZOLIN SODIUM/WATER 2 G/20 ML
2 SYRINGE (ML) INTRAVENOUS
Status: COMPLETED | OUTPATIENT
Start: 2024-12-15 | End: 2024-12-15

## 2024-12-15 RX ORDER — HYDROMORPHONE HCL IN WATER/PF 6 MG/30 ML
0.2 PATIENT CONTROLLED ANALGESIA SYRINGE INTRAVENOUS
Status: DISPENSED | OUTPATIENT
Start: 2024-12-15

## 2024-12-15 RX ORDER — SODIUM CHLORIDE, SODIUM LACTATE, POTASSIUM CHLORIDE, CALCIUM CHLORIDE 600; 310; 30; 20 MG/100ML; MG/100ML; MG/100ML; MG/100ML
INJECTION, SOLUTION INTRAVENOUS CONTINUOUS
Status: DISCONTINUED | OUTPATIENT
Start: 2024-12-15 | End: 2024-12-16

## 2024-12-15 RX ORDER — DEXAMETHASONE SODIUM PHOSPHATE 4 MG/ML
INJECTION, SOLUTION INTRA-ARTICULAR; INTRALESIONAL; INTRAMUSCULAR; INTRAVENOUS; SOFT TISSUE PRN
Status: DISCONTINUED | OUTPATIENT
Start: 2024-12-15 | End: 2024-12-15

## 2024-12-15 RX ORDER — ONDANSETRON 4 MG/1
4 TABLET, ORALLY DISINTEGRATING ORAL EVERY 30 MIN PRN
Status: DISCONTINUED | OUTPATIENT
Start: 2024-12-15 | End: 2024-12-15 | Stop reason: HOSPADM

## 2024-12-15 RX ORDER — ASPIRIN 81 MG/1
81 TABLET ORAL DAILY
Status: DISCONTINUED | OUTPATIENT
Start: 2024-12-15 | End: 2024-12-18

## 2024-12-15 RX ORDER — HYDROMORPHONE HCL IN WATER/PF 6 MG/30 ML
0.1 PATIENT CONTROLLED ANALGESIA SYRINGE INTRAVENOUS
Status: ACTIVE | OUTPATIENT
Start: 2024-12-15

## 2024-12-15 RX ORDER — HEPARIN SODIUM 5000 [USP'U]/.5ML
5000 INJECTION, SOLUTION INTRAVENOUS; SUBCUTANEOUS EVERY 8 HOURS
Status: DISCONTINUED | OUTPATIENT
Start: 2024-12-16 | End: 2024-12-18

## 2024-12-15 RX ORDER — BUPIVACAINE HYDROCHLORIDE 5 MG/ML
INJECTION, SOLUTION PERINEURAL PRN
Status: DISCONTINUED | OUTPATIENT
Start: 2024-12-15 | End: 2024-12-15 | Stop reason: HOSPADM

## 2024-12-15 RX ORDER — ACETAMINOPHEN 325 MG/1
975 TABLET ORAL ONCE
Status: DISCONTINUED | OUTPATIENT
Start: 2024-12-15 | End: 2024-12-15 | Stop reason: HOSPADM

## 2024-12-15 RX ORDER — ACETAMINOPHEN 325 MG/1
975 TABLET ORAL EVERY 8 HOURS
Status: COMPLETED | OUTPATIENT
Start: 2024-12-15 | End: 2024-12-18

## 2024-12-15 RX ORDER — FENTANYL CITRATE 50 UG/ML
INJECTION, SOLUTION INTRAMUSCULAR; INTRAVENOUS PRN
Status: DISCONTINUED | OUTPATIENT
Start: 2024-12-15 | End: 2024-12-15

## 2024-12-15 RX ORDER — IOPAMIDOL 612 MG/ML
INJECTION, SOLUTION INTRAVASCULAR PRN
Status: DISCONTINUED | OUTPATIENT
Start: 2024-12-15 | End: 2024-12-15 | Stop reason: HOSPADM

## 2024-12-15 RX ORDER — PROPOFOL 10 MG/ML
INJECTION, EMULSION INTRAVENOUS PRN
Status: DISCONTINUED | OUTPATIENT
Start: 2024-12-15 | End: 2024-12-15

## 2024-12-15 RX ORDER — POLYETHYLENE GLYCOL 3350 17 G/17G
17 POWDER, FOR SOLUTION ORAL DAILY
Status: DISPENSED | OUTPATIENT
Start: 2024-12-16

## 2024-12-15 RX ORDER — BISACODYL 10 MG
10 SUPPOSITORY, RECTAL RECTAL DAILY PRN
Status: ACTIVE | OUTPATIENT
Start: 2024-12-18

## 2024-12-15 RX ORDER — HYDROMORPHONE HCL IN WATER/PF 6 MG/30 ML
0.2 PATIENT CONTROLLED ANALGESIA SYRINGE INTRAVENOUS EVERY 5 MIN PRN
Status: DISCONTINUED | OUTPATIENT
Start: 2024-12-15 | End: 2024-12-15 | Stop reason: HOSPADM

## 2024-12-15 RX ORDER — LIDOCAINE HYDROCHLORIDE 20 MG/ML
INJECTION, SOLUTION INFILTRATION; PERINEURAL PRN
Status: DISCONTINUED | OUTPATIENT
Start: 2024-12-15 | End: 2024-12-15

## 2024-12-15 RX ORDER — AMOXICILLIN 250 MG
1 CAPSULE ORAL 2 TIMES DAILY
Status: DISCONTINUED | OUTPATIENT
Start: 2024-12-15 | End: 2024-12-17

## 2024-12-15 RX ORDER — LIDOCAINE 40 MG/G
CREAM TOPICAL
Status: ACTIVE | OUTPATIENT
Start: 2024-12-15

## 2024-12-15 RX ORDER — HEPARIN SODIUM 1000 [USP'U]/ML
INJECTION, SOLUTION INTRAVENOUS; SUBCUTANEOUS PRN
Status: DISCONTINUED | OUTPATIENT
Start: 2024-12-15 | End: 2024-12-15 | Stop reason: HOSPADM

## 2024-12-15 RX ORDER — FENTANYL CITRATE 0.05 MG/ML
25 INJECTION, SOLUTION INTRAMUSCULAR; INTRAVENOUS EVERY 5 MIN PRN
Status: DISCONTINUED | OUTPATIENT
Start: 2024-12-15 | End: 2024-12-15 | Stop reason: HOSPADM

## 2024-12-15 RX ORDER — PROCHLORPERAZINE MALEATE 5 MG/1
5 TABLET ORAL EVERY 6 HOURS PRN
Status: DISCONTINUED | OUTPATIENT
Start: 2024-12-15 | End: 2024-12-16

## 2024-12-15 RX ORDER — ACETAMINOPHEN 325 MG/1
650 TABLET ORAL EVERY 4 HOURS PRN
Status: ACTIVE | OUTPATIENT
Start: 2024-12-18

## 2024-12-15 RX ORDER — CLOPIDOGREL BISULFATE 75 MG/1
75 TABLET ORAL DAILY
Status: DISPENSED | OUTPATIENT
Start: 2024-12-15

## 2024-12-15 RX ORDER — HYDRALAZINE HYDROCHLORIDE 20 MG/ML
2.5-5 INJECTION INTRAMUSCULAR; INTRAVENOUS EVERY 10 MIN PRN
Status: DISCONTINUED | OUTPATIENT
Start: 2024-12-15 | End: 2024-12-15 | Stop reason: HOSPADM

## 2024-12-15 RX ORDER — NALOXONE HYDROCHLORIDE 0.4 MG/ML
0.1 INJECTION, SOLUTION INTRAMUSCULAR; INTRAVENOUS; SUBCUTANEOUS
Status: DISCONTINUED | OUTPATIENT
Start: 2024-12-15 | End: 2024-12-15 | Stop reason: HOSPADM

## 2024-12-15 RX ORDER — GLYCOPYRROLATE 0.2 MG/ML
INJECTION, SOLUTION INTRAMUSCULAR; INTRAVENOUS PRN
Status: DISCONTINUED | OUTPATIENT
Start: 2024-12-15 | End: 2024-12-15

## 2024-12-15 RX ORDER — EPHEDRINE SULFATE 50 MG/ML
INJECTION, SOLUTION INTRAMUSCULAR; INTRAVENOUS; SUBCUTANEOUS PRN
Status: DISCONTINUED | OUTPATIENT
Start: 2024-12-15 | End: 2024-12-15

## 2024-12-15 RX ORDER — HYDROMORPHONE HCL IN WATER/PF 6 MG/30 ML
0.4 PATIENT CONTROLLED ANALGESIA SYRINGE INTRAVENOUS EVERY 5 MIN PRN
Status: DISCONTINUED | OUTPATIENT
Start: 2024-12-15 | End: 2024-12-15 | Stop reason: HOSPADM

## 2024-12-15 RX ORDER — SODIUM CHLORIDE, SODIUM LACTATE, POTASSIUM CHLORIDE, CALCIUM CHLORIDE 600; 310; 30; 20 MG/100ML; MG/100ML; MG/100ML; MG/100ML
INJECTION, SOLUTION INTRAVENOUS CONTINUOUS
Status: DISCONTINUED | OUTPATIENT
Start: 2024-12-15 | End: 2024-12-15 | Stop reason: HOSPADM

## 2024-12-15 RX ORDER — MAGNESIUM HYDROXIDE 1200 MG/15ML
LIQUID ORAL PRN
Status: DISCONTINUED | OUTPATIENT
Start: 2024-12-15 | End: 2024-12-15 | Stop reason: HOSPADM

## 2024-12-15 RX ORDER — DEXMEDETOMIDINE HYDROCHLORIDE 4 UG/ML
INJECTION, SOLUTION INTRAVENOUS PRN
Status: DISCONTINUED | OUTPATIENT
Start: 2024-12-15 | End: 2024-12-15

## 2024-12-15 RX ORDER — DEXAMETHASONE SODIUM PHOSPHATE 4 MG/ML
4 INJECTION, SOLUTION INTRA-ARTICULAR; INTRALESIONAL; INTRAMUSCULAR; INTRAVENOUS; SOFT TISSUE
Status: DISCONTINUED | OUTPATIENT
Start: 2024-12-15 | End: 2024-12-15 | Stop reason: HOSPADM

## 2024-12-15 RX ORDER — ONDANSETRON 2 MG/ML
4 INJECTION INTRAMUSCULAR; INTRAVENOUS EVERY 6 HOURS PRN
Status: ACTIVE | OUTPATIENT
Start: 2024-12-15

## 2024-12-15 RX ORDER — PROPOFOL 10 MG/ML
INJECTION, EMULSION INTRAVENOUS CONTINUOUS PRN
Status: DISCONTINUED | OUTPATIENT
Start: 2024-12-15 | End: 2024-12-15

## 2024-12-15 RX ORDER — FENTANYL CITRATE 0.05 MG/ML
50 INJECTION, SOLUTION INTRAMUSCULAR; INTRAVENOUS EVERY 5 MIN PRN
Status: DISCONTINUED | OUTPATIENT
Start: 2024-12-15 | End: 2024-12-15 | Stop reason: HOSPADM

## 2024-12-15 RX ORDER — CEFAZOLIN SODIUM/WATER 2 G/20 ML
2 SYRINGE (ML) INTRAVENOUS SEE ADMIN INSTRUCTIONS
Status: DISCONTINUED | OUTPATIENT
Start: 2024-12-15 | End: 2024-12-15 | Stop reason: HOSPADM

## 2024-12-15 RX ORDER — ONDANSETRON 4 MG/1
4 TABLET, ORALLY DISINTEGRATING ORAL EVERY 6 HOURS PRN
Status: ACTIVE | OUTPATIENT
Start: 2024-12-15

## 2024-12-15 RX ORDER — ONDANSETRON 2 MG/ML
4 INJECTION INTRAMUSCULAR; INTRAVENOUS EVERY 30 MIN PRN
Status: DISCONTINUED | OUTPATIENT
Start: 2024-12-15 | End: 2024-12-15 | Stop reason: HOSPADM

## 2024-12-15 RX ORDER — HEPARIN SODIUM 1000 [USP'U]/ML
INJECTION, SOLUTION INTRAVENOUS; SUBCUTANEOUS PRN
Status: DISCONTINUED | OUTPATIENT
Start: 2024-12-15 | End: 2024-12-15

## 2024-12-15 RX ORDER — ONDANSETRON 2 MG/ML
INJECTION INTRAMUSCULAR; INTRAVENOUS PRN
Status: DISCONTINUED | OUTPATIENT
Start: 2024-12-15 | End: 2024-12-15

## 2024-12-15 RX ADMIN — HYDROMORPHONE HYDROCHLORIDE 0.3 MG: 1 INJECTION, SOLUTION INTRAMUSCULAR; INTRAVENOUS; SUBCUTANEOUS at 10:29

## 2024-12-15 RX ADMIN — Medication: at 14:10

## 2024-12-15 RX ADMIN — Medication 200 MG: at 13:51

## 2024-12-15 RX ADMIN — FENTANYL CITRATE 50 MCG: 50 INJECTION INTRAMUSCULAR; INTRAVENOUS at 09:54

## 2024-12-15 RX ADMIN — CLOPIDOGREL BISULFATE 75 MG: 75 TABLET ORAL at 16:53

## 2024-12-15 RX ADMIN — ONDANSETRON 4 MG: 2 INJECTION INTRAMUSCULAR; INTRAVENOUS at 13:21

## 2024-12-15 RX ADMIN — ACETAMINOPHEN 975 MG: 325 TABLET, FILM COATED ORAL at 16:52

## 2024-12-15 RX ADMIN — GLYCOPYRROLATE 0.2 MG: 0.2 INJECTION, SOLUTION INTRAMUSCULAR; INTRAVENOUS at 09:40

## 2024-12-15 RX ADMIN — PROPOFOL 20 MG: 10 INJECTION, EMULSION INTRAVENOUS at 13:39

## 2024-12-15 RX ADMIN — ROCURONIUM BROMIDE 10 MG: 50 INJECTION, SOLUTION INTRAVENOUS at 12:17

## 2024-12-15 RX ADMIN — SENNOSIDES AND DOCUSATE SODIUM 1 TABLET: 8.6; 5 TABLET ORAL at 20:31

## 2024-12-15 RX ADMIN — HYDROMORPHONE HYDROCHLORIDE 0.2 MG: 0.2 INJECTION, SOLUTION INTRAMUSCULAR; INTRAVENOUS; SUBCUTANEOUS at 19:08

## 2024-12-15 RX ADMIN — SENNOSIDES AND DOCUSATE SODIUM 1 TABLET: 50; 8.6 TABLET ORAL at 20:30

## 2024-12-15 RX ADMIN — SODIUM CHLORIDE, POTASSIUM CHLORIDE, SODIUM LACTATE AND CALCIUM CHLORIDE: 600; 310; 30; 20 INJECTION, SOLUTION INTRAVENOUS at 13:15

## 2024-12-15 RX ADMIN — HEPARIN SODIUM 2000 UNITS: 1000 INJECTION, SOLUTION INTRAVENOUS; SUBCUTANEOUS at 11:39

## 2024-12-15 RX ADMIN — VANCOMYCIN HYDROCHLORIDE 1500 MG: 10 INJECTION, POWDER, LYOPHILIZED, FOR SOLUTION INTRAVENOUS at 19:00

## 2024-12-15 RX ADMIN — SENNOSIDES AND DOCUSATE SODIUM 1 TABLET: 50; 8.6 TABLET ORAL at 20:31

## 2024-12-15 RX ADMIN — HYDROMORPHONE HYDROCHLORIDE 2 MG: 2 TABLET ORAL at 21:17

## 2024-12-15 RX ADMIN — DEXAMETHASONE SODIUM PHOSPHATE 4 MG: 4 INJECTION, SOLUTION INTRA-ARTICULAR; INTRALESIONAL; INTRAMUSCULAR; INTRAVENOUS; SOFT TISSUE at 09:45

## 2024-12-15 RX ADMIN — FENTANYL CITRATE 50 MCG: 50 INJECTION INTRAMUSCULAR; INTRAVENOUS at 09:24

## 2024-12-15 RX ADMIN — Medication 2 G: at 13:27

## 2024-12-15 RX ADMIN — PIPERACILLIN AND TAZOBACTAM 3.38 G: 3; .375 INJECTION, POWDER, FOR SOLUTION INTRAVENOUS at 02:51

## 2024-12-15 RX ADMIN — FENTANYL CITRATE 25 MCG: 50 INJECTION, SOLUTION INTRAMUSCULAR; INTRAVENOUS at 14:53

## 2024-12-15 RX ADMIN — PROPOFOL 30 MCG/KG/MIN: 10 INJECTION, EMULSION INTRAVENOUS at 09:26

## 2024-12-15 RX ADMIN — SODIUM CHLORIDE, POTASSIUM CHLORIDE, SODIUM LACTATE AND CALCIUM CHLORIDE: 600; 310; 30; 20 INJECTION, SOLUTION INTRAVENOUS at 08:42

## 2024-12-15 RX ADMIN — HEPARIN SODIUM 2000 UNITS: 1000 INJECTION, SOLUTION INTRAVENOUS; SUBCUTANEOUS at 11:02

## 2024-12-15 RX ADMIN — HYDROMORPHONE HYDROCHLORIDE 1 MG: 2 TABLET ORAL at 15:45

## 2024-12-15 RX ADMIN — PROPOFOL 180 MG: 10 INJECTION, EMULSION INTRAVENOUS at 09:24

## 2024-12-15 RX ADMIN — PIPERACILLIN AND TAZOBACTAM 3.38 G: 3; .375 INJECTION, POWDER, FOR SOLUTION INTRAVENOUS at 20:37

## 2024-12-15 RX ADMIN — Medication 5 MG: at 10:03

## 2024-12-15 RX ADMIN — ASPIRIN 81 MG: 81 TABLET, COATED ORAL at 16:53

## 2024-12-15 RX ADMIN — ROCURONIUM BROMIDE 10 MG: 50 INJECTION, SOLUTION INTRAVENOUS at 11:43

## 2024-12-15 RX ADMIN — Medication 5 MG: at 12:55

## 2024-12-15 RX ADMIN — FENTANYL CITRATE 25 MCG: 50 INJECTION, SOLUTION INTRAMUSCULAR; INTRAVENOUS at 14:46

## 2024-12-15 RX ADMIN — HYDROMORPHONE HYDROCHLORIDE 2 MG: 2 TABLET ORAL at 04:53

## 2024-12-15 RX ADMIN — ROCURONIUM BROMIDE 20 MG: 50 INJECTION, SOLUTION INTRAVENOUS at 10:32

## 2024-12-15 RX ADMIN — PHENYLEPHRINE HYDROCHLORIDE 0.5 MCG/KG/MIN: 10 INJECTION INTRAVENOUS at 09:30

## 2024-12-15 RX ADMIN — HYDROMORPHONE HYDROCHLORIDE 0.2 MG: 0.2 INJECTION, SOLUTION INTRAMUSCULAR; INTRAVENOUS; SUBCUTANEOUS at 17:21

## 2024-12-15 RX ADMIN — Medication 2 G: at 09:27

## 2024-12-15 RX ADMIN — Medication 5 MG: at 11:51

## 2024-12-15 RX ADMIN — HYDROMORPHONE HYDROCHLORIDE 0.2 MG: 1 INJECTION, SOLUTION INTRAMUSCULAR; INTRAVENOUS; SUBCUTANEOUS at 10:41

## 2024-12-15 RX ADMIN — Medication 5 MG: at 10:25

## 2024-12-15 RX ADMIN — ROCURONIUM BROMIDE 50 MG: 50 INJECTION, SOLUTION INTRAVENOUS at 09:25

## 2024-12-15 RX ADMIN — HEPARIN SODIUM 5000 UNITS: 1000 INJECTION, SOLUTION INTRAVENOUS; SUBCUTANEOUS at 10:45

## 2024-12-15 RX ADMIN — LIDOCAINE HYDROCHLORIDE 100 MG: 20 INJECTION, SOLUTION INFILTRATION; PERINEURAL at 09:24

## 2024-12-15 RX ADMIN — Medication 5 MG: at 12:34

## 2024-12-15 RX ADMIN — DEXMEDETOMIDINE HYDROCHLORIDE 12 MCG: 200 INJECTION INTRAVENOUS at 13:39

## 2024-12-15 ASSESSMENT — ACTIVITIES OF DAILY LIVING (ADL)
ADLS_ACUITY_SCORE: 59
ADLS_ACUITY_SCORE: 59
ADLS_ACUITY_SCORE: 50
ADLS_ACUITY_SCORE: 59
ADLS_ACUITY_SCORE: 55
ADLS_ACUITY_SCORE: 55
ADLS_ACUITY_SCORE: 56
ADLS_ACUITY_SCORE: 50
ADLS_ACUITY_SCORE: 50
ADLS_ACUITY_SCORE: 55
ADLS_ACUITY_SCORE: 59
ADLS_ACUITY_SCORE: 50
ADLS_ACUITY_SCORE: 59
ADLS_ACUITY_SCORE: 57
ADLS_ACUITY_SCORE: 50
ADLS_ACUITY_SCORE: 55
ADLS_ACUITY_SCORE: 55
ADLS_ACUITY_SCORE: 57
ADLS_ACUITY_SCORE: 55
ADLS_ACUITY_SCORE: 55
ADLS_ACUITY_SCORE: 50
ADLS_ACUITY_SCORE: 59
ADLS_ACUITY_SCORE: 59

## 2024-12-15 NOTE — PLAN OF CARE
Diagnosis: Creation bypass vascular proximal superior femoral to distal posterior tibial,  angioplasty and SFA stent placement x2  POD#: 0  Mental Status: A/O x4  Activity/dangle Bed rest, no bending of leg more than 45.  Diet: Clear liquids, advance to soft as patient becomes more alert  Pain: Dilaudid x3 and scheduled tylenol  Elizondo/Voiding: Elizondo  Tele/Restraints/Iso: N/A  02/LDA: 2L NC, R PIV infusing LR at 70 ml/hr.  D/C Date: expected DC in 3-5 days to home.   Other Info: Had Revascularization on nov. 25th, hx of HTN, asthma, and DVT. For procedure had a total of 8 total incisions, 1 L groin and 7 L leg. Groin dressing with small drainage (monitoring), L leg ankle significant amount of drainage (monitoring). Bed rest until tomorrow morning (12/16). Up with assist tomorrow, PT consult in place for morning.

## 2024-12-15 NOTE — PROGRESS NOTES
Date/Time:12/14 ,[id number]    Trauma/Ortho/Medical (Choose one) :Ortho    Diagnosis:Left great toe wound  Mental Status:A&O x 4  Activity/dangle:SBA  Diet:NPO @ midnight  Pain:PRN Dilaudid  Elizondo/Voiding:Ambulating to BR  Tele/Restraints/Iso:NA  02/LDA:RA,PIV infusing NS @75 ml/hr  D/C Date:TBD  Other Info:  to OR @0730 for CREATION, BYPASS, VASCULAR, FEMORAL TO TIBIAL.  Left SFA stent placement

## 2024-12-15 NOTE — PLAN OF CARE
Alert and Oriented. Independent in room. NPO. Pt went down for procedure around 0739, has not arrived back.

## 2024-12-15 NOTE — ANESTHESIA POSTPROCEDURE EVALUATION
Patient: Yumiko SUNG LaPage    Procedure: Procedure(s):  CREATION BYPASS VASCULAR PROXIMAL SUPERIOR FEMORAL TO DISTAL POSTERIOR TIBIAL, ANGIOPLASTY AND SFA STENE PLACEMENT X2.       Anesthesia Type:  General    Note:     Postop Pain Control: Uneventful            Sign Out: Well controlled pain   PONV: No   Neuro/Psych: Uneventful            Sign Out: Acceptable/Baseline neuro status   Airway/Respiratory: Uneventful            Sign Out: Acceptable/Baseline resp. status   CV/Hemodynamics: Uneventful            Sign Out: Acceptable CV status   Other NRE: NONE   DID A NON-ROUTINE EVENT OCCUR?            Last vitals:  Vitals Value Taken Time   /64 12/15/24 1500   Temp 36.2  C (97.1  F) 12/15/24 1500   Pulse 65 12/15/24 1509   Resp 10 12/15/24 1509   SpO2 100 % 12/15/24 1509   Vitals shown include unfiled device data.    Electronically Signed By: Paxton Tapia MD  December 15, 2024  4:01 PM

## 2024-12-15 NOTE — ANESTHESIA CARE TRANSFER NOTE
Patient: Yumiko Mccartney    Procedure: Procedure(s):  CREATION BYPASS VASCULAR PROXIMAL SUPERIOR FEMORAL TO DISTAL POSTERIOR TIBIAL, ANGIOPLASTY AND SFA STENE PLACEMENT X2.       Diagnosis: PVD (peripheral vascular disease) with claudication (H) [I73.9]  Diagnosis Additional Information: No value filed.    Anesthesia Type:   General     Note:    Oropharynx: oropharynx clear of all foreign objects and spontaneously breathing  Level of Consciousness: awake  Oxygen Supplementation: face mask  Level of Supplemental Oxygen (L/min / FiO2): 6  Independent Airway: airway patency satisfactory and stable  Dentition: dentition unchanged  Vital Signs Stable: post-procedure vital signs reviewed and stable  Report to RN Given: handoff report given  Patient transferred to: PACU    Handoff Report: Identifed the Patient, Identified the Reponsible Provider, Reviewed the pertinent medical history, Discussed the surgical course, Reviewed Intra-OP anesthesia mangement and issues during anesthesia, Set expectations for post-procedure period and Allowed opportunity for questions and acknowledgement of understanding    Vitals:  Vitals Value Taken Time   /68 12/15/24 1359   Temp 97.0    Pulse 80 12/15/24 1400   Resp 19 12/15/24 1400   SpO2 100 % 12/15/24 1400   Vitals shown include unfiled device data.    Electronically Signed By: KALIN Claros CRNA  December 15, 2024  2:01 PM

## 2024-12-15 NOTE — PHARMACY-VANCOMYCIN DOSING SERVICE
"Pharmacy Vancomycin Note  Date of Service December 15, 2024  Patient's  1940   84 year old, female    Indication: Skin and Soft Tissue Infection  Day of Therapy: 3  Current vancomycin regimen:  1500 mg IV q24h  Current vancomycin monitoring method: AUC  Current vancomycin therapeutic monitoring goal: 400-600 mg*h/L    InsightRX Prediction of Current Vancomycin Regimen  Loading dose: N/A  Regimen: 1500 mg IV every 24 hours.  Start time: 18:57 on 12/15/2024  Exposure target: AUC24 (range)400-600 mg/L.hr   AUC24,ss: 523 mg/L.hr  Probability of AUC24 > 400: 88 %  Ctrough,ss: 16.7 mg/L  Probability of Ctrough,ss > 20: 30 %  Probability of nephrotoxicity (Lodise CLINT ): 12 %      Current estimated CrCl = Estimated Creatinine Clearance: 45.3 mL/min (A) (based on SCr of 1.03 mg/dL (H)).    Creatinine for last 3 days  2024:  5:31 PM Creatinine 1.06 mg/dL  2024:  9:04 AM Creatinine 1.15 mg/dL  12/15/2024:  8:36 AM Creatinine 1.03 mg/dL    Recent Vancomycin Levels (past 3 days)  12/15/2024:  8:36 AM Vancomycin 12.6 ug/mL    Vancomycin IV Administrations (past 72 hours)                     vancomycin (VANCOCIN) 1,500 mg in 0.9% NaCl 265 mL intermittent infusion (mg) 1,500 mg New Bag 24     1,500 mg New Bag 24                    Nephrotoxins and other renal medications (From now, onward)      Start     Dose/Rate Route Frequency Ordered Stop    24 190  vancomycin (VANCOCIN) 1,500 mg in 0.9% NaCl 265 mL intermittent infusion         1,500 mg  over 90 Minutes Intravenous EVERY 24 HOURS 24 18224 183  piperacillin-tazobactam (ZOSYN) 3.375 g vial to attach to  mL bag        Note to Pharmacy: For [facility], [facility] and [facility]: For Zosyn-naive patients, use the \"Zosyn initial dose + extended infusion\" order panel.    3.375 g  over 30 Minutes Intravenous EVERY 6 HOURS 24 182                 Contrast Orders - past 72 hours (72h ago, onward)      Start     Dose/Rate " Route Frequency Stop    12/15/24 1316  iopamidol (ISOVUE-300) IV solution 61%  Status:  Discontinued           PRN 12/15/24 1402    12/14/24 1300  gadobutrol (GADAVIST) injection 9 mL         9 mL Intravenous ONCE 12/14/24 1302    12/14/24 1130  perflutren diluted in saline (DEFINITY) injection 10 mL        Note to Pharmacy: NDC# [id number]    10 mL Intravenous ONCE 12/14/24 1119            Interpretation of levels and current regimen:  Vancomycin level is reflective of -600    Has serum creatinine changed greater than 50% in last 72 hours: No    Urine output:  good urine output    Renal Function: Stable    IPlan:  Continue Current Dose  Vancomycin monitoring method: AUC  Vancomycin therapeutic monitoring goal: 400-600 mg*h/L  Pharmacy will check vancomycin levels as appropriate in 1-3 Days.  Serum creatinine levels will be ordered daily for the first week of therapy and at least twice weekly for subsequent weeks.    Jennifer Chavez RP

## 2024-12-15 NOTE — PROGRESS NOTES
[facility]    Medicine Progress Note - Hospitalist Service    Date of Admission:  12/13/2024    Assessment & Plan   Yumiko Mccartney is a 84 year old female with known history of peripheral vascular disease who presents from her podiatrist office with worsening pain of her left great toe.     Peripheral vascular disease  Skin ulcer of the left great toe with exposed fat layer  Cellulitis of the left great toe  -Recently underwent left lower extremity angiogram 11/25/2024 revascularization of the left SFA  -Patient reports worsening pain for the last few days although she has had pain of the left toe for several weeks.  She also noticed some erythema with puslike drainage  -Saw vascular surgery 12/12 and was started on Keflex with concerns for cellulitis  -Was evaluated by  at podiatry clinic 12/13 where x-ray did not show deeper infection.  Recommended admission to the hospital and MRI of the left foot  -MRI of the foot with no evidence for osteomyelitis, soft tissue stranding of the great toe can be seen with a cellulitis, no soft tissue abscess.  CRP low  -Podiatry and vascular surgery following, appreciate input  -- Status post creation bypass vascular proximal superior femoral to distal posterior tibial angioplasty and SFA stent placement x 2 - 12/15  -Resumption of PTA Xarelto aspirin and Plavix once okay by vascular surgery  -Continue broad-spectrum IV antibiotics with vancomycin and Zosyn, likely can de-escalate antibiotics tomorrow    Essential hypertension  -PTA on lisinopril/HCTZ  daily  -Blood pressures normal to soft without PTA antihypertensives so will continue to hold until blood pressure consistently elevated and monitor blood pressure postprocedure     History of DVT  -PTA Xarelto currently on hold for above-mentioned procedure procedure  -Resume once okay by vascular surgery     Asthma, moderate persistent  -Resume PTA as needed nebulizers     Major  "depression  -Resume prior to admission Wellbutrin     CKD stage IIIa  -Baseline creatinine is between 1-1.2, currently at baseline    DVT Prophylaxis: Pneumatic Compression Devices.  Resume PTA Xarelto once okay by vascular surgery  Elizondo Catheter: PRESENT, indication:    Lines: None     Cardiac Monitoring: None  Code Status: Full Code      Clinically Significant Risk Factors                   # Hypertension: Noted on problem list            # Obesity: Estimated body mass index is 33.47 kg/m  as calculated from the following:    Height as of 11/25/24: 1.626 m (5' 4\").    Weight as of an earlier encounter on 12/13/24: 88.5 kg (195 lb)., PRESENT ON ADMISSION     # Asthma: noted on problem list        Social Drivers of Health    Physical Activity: Unknown (4/5/2024)    Exercise Vital Sign     Days of Exercise per Week: 1 day          Disposition Plan     Medically Ready for Discharge: Anticipated in 2-4 Days             Janene Fernandez MD  Hospitalist Service  [facility]  Securely message with [employer] (more info)  Text page via [facility] Paging/Directory   ______________________________________________________________________    Interval History   Seen patient in PACU.  Updated her about the results of MRI.  Denied any new complaints.   No new nursing concerns    Physical Exam   Vital Signs: Temp: 97.6  F (36.4  C) Temp src: Temporal BP: (!) 140/65 Pulse: 65   Resp: 16 SpO2: 97 % O2 Device: None (Room air)    Weight: 0 lbs 0 oz    Exam:  Constitutional: Awake, alert and no distress. Appears comfortable  Head: Normocephalic. No masses, lesions, tenderness or abnormalities  ENMT: ENT exam normal, no neck nodes or sinus tenderness  Cardiovascular: RRR.  no murmurs, no rubs or JVD  Respiratory:normal WOB,b/l equal air entry, no wheezes or crackles   Gastrointestinal: Abdomen soft, non-tender. BS normal. No masses, organomegaly  : Deferred  Extremities : Dressing in place, no clubbing or cyanosis  "     Medical Decision Making       40 MINUTES SPENT BY ME on the date of service doing chart review, history, exam, documentation & further activities per the note.      Data     I have personally reviewed the following data over the past 24 hrs:    6.5  \   9.2 (L)   / 424     143 107 12.0 /  105 (H)   4.4 27 1.03 (H) \       Imaging results reviewed over the past 24 hrs:   No results found for this or any previous visit (from the past 24 hours).  Recent Labs   Lab 12/15/24  0836 12/14/24  0904 12/13/24  1731   WBC 6.5  --  8.1   HGB 9.2*  --  10.9*   MCV 85  --  84     --  480*     --  139   POTASSIUM 4.4  --  4.5   CHLORIDE 107  --  102   CO2 27  --  26   BUN 12.0  --  16.6   CR 1.03* 1.15* 1.06*   ANIONGAP 9  --  11   NGOC 9.1  --  9.9   *  --  105*

## 2024-12-15 NOTE — ANESTHESIA PROCEDURE NOTES
Airway       Patient location during procedure: OR       Procedure Start/Stop Times: 12/15/2024 9:27 AM  Staff -        Anesthesiologist:  Paxton Tapia MD       CRNA: Karen Leo APRN CRNA       Performed By: CRNA  Consent for Airway        Urgency: elective  Indications and Patient Condition       Indications for airway management: andres-procedural       Induction type:intravenous       Mask difficulty assessment: 1 - vent by mask    Final Airway Details       Final airway type: endotracheal airway       Successful airway: ETT - single and Oral  Endotracheal Airway Details        ETT size (mm): 7.0       Cuffed: yes       Successful intubation technique: video laryngoscopy       VL Blade Size: Pierre 3       Grade View of Cords: 1       Adjucts: stylet       Position: Right       Measured from: gums/teeth       Secured at (cm): 21       Bite block used: None    Post intubation assessment        Placement verified by: capnometry and chest rise        Number of attempts at approach: 1       Secured with: tape       Ease of procedure: easy       Dentition: Intact and Unchanged    Medication(s) Administered   Medication Administration Time: 12/15/2024 9:27 AM

## 2024-12-15 NOTE — OP NOTE
OPERATIVE NOTE    PROCEDURE DATE: 12/15/2024      PRE-OP DIAGNOSIS: #1.  Nonhealing left plantar great toe ulceration due to severe tibial peripheral artery disease     #2.  Previous placement of left distal SFA/proximal above-knee popliteal Viabahn stent for calcific focal stenosis      POST-OP DIAGNOSES: Same      PROCEDURE PERFORMED: #1.  Left proximal superficial femoral to distal common posterior tibial ankle in situ greater saphenous vein bypass    -- Ligation of multiple competing sidebranches     #2.  Left leg angiogram via open common femoral artery access    -- Restenting of previous distal SFA/above-knee popliteal stent with 8x 80 mm and 8 x 60 mm  Cordis self-expanding stents with balloon angioplasty    -- Completion angiogram including with knee bending      SURGEON:  Leonard Coates M.D.      ASSISTANT:  Faby Caldwell MD (Vascular Fellow)      ANESTHESIA:  General-endotracheal      PRE-OP MEDICATIONS: Scheduled IV antibiotics      INDICATIONS FOR PROCEDURE: 84-year-old patient has developed a nonhealing plantar proximal left great toe ulcer with ischemic changes to her toe.  She has undergone previous angiogram revealing a high-grade focal calcified stenosis of the distal SFA that was treated with a Viabahn stent.  All tibial vessels are occluded.  Collaterals reconstitute the distal ankle, and posterior tibial artery with the medial lateral branch is widely patent.  We felt to be a good candidate for a distal bypass to allow wound healing of this ulceration.  Good greater saphenous vein was noted on prior mapping that was remapped in preinduction.     We also had concerns about the Viabahn stent in its location that bending may cause it to be crushed and we want to preserve blood flow down to the proximal tibial vessels and therefore planned when the arteries were exposed to place self-expanding more durable stents to help prevent this from occurring.    Risk benefits reviewed with patient and  family.      DESCRIPTION OF PROCEDURE: Brought to the op induced under general anesthesia and orally intubated without difficulty.  Elizondo catheter was placed.  Calf pneumatic compression boot was placed on the right calf with appropriate padding.  The entire left leg groin and foot were prepped and draped isolating out the foot with a sterile blue.    VASCULAR EXPOSURE: Incision was made in the left groin.  Dissection was carried down to identify an excellent greater saphenous vein.  This was dissected free down to the saphenofemoral junction ligating multiple sidebranches between 3-0 silk suture and mobilizing the vein in the groin.  Via this incision we also exposed the very proximal superficial femoral artery for a length of 4 cm.  This measured over 6 mm in diameter had no atherosclerotic changes and excellent pulse.  Sidebranches was preserved.  Proximal distal Silastic vessels were placed.    GREATER SAPHENOUS VEIN EXPOSURE: We used a lighted retractor.  Vein was mobilized and several branches noted in the proximal thigh the previous gladys.  We did not identified preoperative mapping any branches in the thigh and this was left alone.  Multiple branches of the immediate knee area and incision was made over this and these branches were ligated and divided mobilizing the vein which was of good quality and caliber.  We then made a distal incision over the calf extending onto the ankle to harvest the rest of the greater saphenous vein to mobilize this.  1 large sidebranch was preserved for the foot overall anastomosis eventually.  Other branches were divided.  There was a complex of branches at the proximal portion of her incision that were initially left intact make sure we had the appropriate main channel.    POSTERIOR TIBIAL: We then made an incision over the medial ankle.  Dissection was carried down through the sheath to identify an excellent posterior tibial artery measuring approximately 2 mm in diameter.  No  disease was noted.  Proximal distal Silastic vessels are placed.    IN SITU BYPASS: 5000 units intravenous heparin were given.  After 5 minutes the GSV was clamped at the saphenofemoral junction transected and this was oversewn with a running 5-0 Prolene suture.  First valve was cut under direct visualization.  Vesseloops were tightened around the proximal SFA and a 8 mm longitudinal arteriotomy was made disease-free artery.  An end-to-side anastomosis with the GSV was performed with running 6-0 Prolene suture.  Upon completion Leeson the Vesseloops and padded bulldog with an excellent pulse.  Metal ligaclips were used to gladys the anastomosis.  Distal end of the vein was then ligated at the ankle level with a 3-0 silk suture and transected.  Gently irrigated with heparinized saline solution and 0.5% Marcaine.  The LeMaitre valvulotome was passed through the distal end of the vein up to the groin.  This was withdrawn twice with good pulsatile flow being noted.  We identified the multiple branches in the distal calf ligating the sidebranch in between 406 suture.  A pulse did diminish distally and we found that we had a large sidebranch in the distal one third of the calf which was identified and ligated between 3-0 silk sutures.  Much better pulsatile flow was noted.  Greater saphenous vein was brought in the general subcutaneous tunnel from the distal calf down to the ankle controlled with a patent bulldog.  Vesseloops were tightened.  Additional 2000 units intravenous heparin were given.  Vein graft to spatulated up the sidebranch.  #11 blade was used into the posterior tibial artery Jacoem's extend the arteriotomy for a length of 6 mm.  Artery was small but disease-free.  And decide anastomosis using the heel of the sidebranch was performed with running 7-0 Prolene suture and loupe magnification.  Upon completion release of the padded bulldog and Vesseloops we had a strong pulse and excellent Doppler flow.    SIDE  BRANCH LIGATION: By Doppler we did find one sidebranch going posteriorly at the confluence of vein branches that we had no in the distal calf and this branch was ligated with markedly improved distal pulse and flow.  We also followed by Doppler there were several sidebranches in the mid thigh not previously appreciated on duplex.  An incision was made over this and dissection was carried down we identified 2 large branches which are again ligated with 3-0 silk suture.  Upon completion no obvious competing sidebranches were noted by Doppler and we had excellent distal graft pulse with excellent Doppler flow.  Good hemostasis was noted.    A 10 inch On-Q catheter was brought through a groin stab incision placed along her venous tunnel from the groin down to the distal thigh for postoperative analgesia infusion of Marcaine.      ANGIOGRAM: Distal 2000 units intravenous heparin were given.  Pursestring was placed around the very distal common femoral artery just above her anastomosis of the in situ graft.  This was accessed in the standard technique and a 6 5 [place] sheath was introduced.  Using C arm and contrast we performed the angiogram identifying the Viabahn stent which is patent but we had concerns about crushing with knee bending.  We used 2 Cordis  self-expanding stents first measuring 8 x 80 mm and a proximal extension 8 x 60 mm.  Balloon expansion was then performed on the stents.  Follow-up angiogram revealed excellent results.  We bent her knee to over 60 degrees with no kinking or bending in the stent appreciated.      Wires and catheters were removed and the previously placed 6 open person was tied with excellent hemostasis.  Total contrast=15 ml    Wounds were all irrigated.  Groin was closed in multiple layers with interrupted 2-0 and 3-0 Vicryl suture and the skin with 4-0 Monocryl in suppurative fashion.  The 2 thigh incisions were closed with interrupted 2-0 Vicryl in layers followed by 4-0 Monocryl  in a subcuticular fashion.  Calf and incision proximal was closed interrupted 3-0 Vicryl.  Due to her heavy legs the calf and ankle incisions were closed with interrupted simple and mattress 4-0 nylon sutures.    Wounds were infiltrated with 0.5% Marcaine.  Surgical adhesive was applied to the groin and thigh wounds followed by Tegaderm dressings.  Wound gel was applied to the calf and ankle incisions followed by gauze and Kerlix rolls.    Patient tolerated all procedures well.  Needle and sponge counts correct x 2.          EBL: 250 mL      COMPLICATIONS: None      OPERATIVE FINDINGS: Excellent inflow and outflow arteries with an excellent greater saphenous vein.  Recent stenting of the distal SFA/proximal above-knee popliteal stent was performed with good results.    Proximal SFA= A   GSV=A   PT=A      Leonard Coates MD

## 2024-12-15 NOTE — PROGRESS NOTES
Postop check    Patient and left flank pain.  Palpable graft and multiphasic distal PT signal.  Strong monophasic signal in the first webspace in the left foot as well.    No obvious bleeding noted.  Left groin incision is intact without signs of bleeding.    Continue aspirin and Plavix.  Will eventually need Plavix and Xarelto nearing discharge.  Bed rest today and up with assist tomorrow.  Physical therapy tomorrow.  I encouraged the patient to participate in physical therapy tomorrow.  Expect discharge in 3 to 5 days.

## 2024-12-15 NOTE — PLAN OF CARE
Date/Time: 12/14: [id number]    Trauma/Ortho/Medical (Choose one): Ortho    Diagnosis: (L) great toe infection  POD#: N/A  Mental Status: Oriented x 4  Activity/dangle: Up independent  Diet: Regular. NPO after midnight  Pain: Managed with prn dilaudid  Elizondo/Voiding: Bathroom  Tele/Restraints/Iso: None  02/LDA: RA. IV SL. Is on intermittent abx  D/C Date: TBD  Other Info: Scheduled for CREATION, BYPASS, VASCULAR, FEMORAL TO TIBIAL.  Left SFA stent placement tomorrow at 1400

## 2024-12-16 ENCOUNTER — APPOINTMENT (OUTPATIENT)
Dept: PHYSICAL THERAPY | Facility: CLINIC | Age: 84
DRG: 253 | End: 2024-12-16
Attending: STUDENT IN AN ORGANIZED HEALTH CARE EDUCATION/TRAINING PROGRAM
Payer: COMMERCIAL

## 2024-12-16 LAB
ANION GAP SERPL CALCULATED.3IONS-SCNC: 9 MMOL/L (ref 7–15)
BUN SERPL-MCNC: 10.4 MG/DL (ref 8–23)
CALCIUM SERPL-MCNC: 8.8 MG/DL (ref 8.8–10.4)
CHLORIDE SERPL-SCNC: 108 MMOL/L (ref 98–107)
CREAT SERPL-MCNC: 1.04 MG/DL (ref 0.51–0.95)
EGFRCR SERPLBLD CKD-EPI 2021: 53 ML/MIN/1.73M2
ERYTHROCYTE [DISTWIDTH] IN BLOOD BY AUTOMATED COUNT: 13.9 % (ref 10–15)
GLUCOSE SERPL-MCNC: 105 MG/DL (ref 70–99)
GLUCOSE SERPL-MCNC: 105 MG/DL (ref 70–99)
HCO3 SERPL-SCNC: 27 MMOL/L (ref 22–29)
HCT VFR BLD AUTO: 28.1 % (ref 35–47)
HGB BLD-MCNC: 8.3 G/DL (ref 11.7–15.7)
MCH RBC QN AUTO: 24.6 PG (ref 26.5–33)
MCHC RBC AUTO-ENTMCNC: 29.5 G/DL (ref 31.5–36.5)
MCV RBC AUTO: 83 FL (ref 78–100)
PLATELET # BLD AUTO: 356 10E3/UL (ref 150–450)
POTASSIUM SERPL-SCNC: 4.3 MMOL/L (ref 3.4–5.3)
RBC # BLD AUTO: 3.37 10E6/UL (ref 3.8–5.2)
SODIUM SERPL-SCNC: 144 MMOL/L (ref 135–145)
WBC # BLD AUTO: 7.7 10E3/UL (ref 4–11)

## 2024-12-16 PROCEDURE — 250N000013 HC RX MED GY IP 250 OP 250 PS 637: Performed by: STUDENT IN AN ORGANIZED HEALTH CARE EDUCATION/TRAINING PROGRAM

## 2024-12-16 PROCEDURE — 85027 COMPLETE CBC AUTOMATED: CPT | Performed by: STUDENT IN AN ORGANIZED HEALTH CARE EDUCATION/TRAINING PROGRAM

## 2024-12-16 PROCEDURE — 250N000011 HC RX IP 250 OP 636: Performed by: STUDENT IN AN ORGANIZED HEALTH CARE EDUCATION/TRAINING PROGRAM

## 2024-12-16 PROCEDURE — 36415 COLL VENOUS BLD VENIPUNCTURE: CPT | Performed by: STUDENT IN AN ORGANIZED HEALTH CARE EDUCATION/TRAINING PROGRAM

## 2024-12-16 PROCEDURE — 258N000003 HC RX IP 258 OP 636: Performed by: STUDENT IN AN ORGANIZED HEALTH CARE EDUCATION/TRAINING PROGRAM

## 2024-12-16 PROCEDURE — 99232 SBSQ HOSP IP/OBS MODERATE 35: CPT | Performed by: INTERNAL MEDICINE

## 2024-12-16 PROCEDURE — 250N000013 HC RX MED GY IP 250 OP 250 PS 637: Performed by: SURGERY

## 2024-12-16 PROCEDURE — 97161 PT EVAL LOW COMPLEX 20 MIN: CPT | Mod: GP | Performed by: PHYSICAL THERAPIST

## 2024-12-16 PROCEDURE — 80048 BASIC METABOLIC PNL TOTAL CA: CPT | Performed by: STUDENT IN AN ORGANIZED HEALTH CARE EDUCATION/TRAINING PROGRAM

## 2024-12-16 PROCEDURE — 99231 SBSQ HOSP IP/OBS SF/LOW 25: CPT | Performed by: PODIATRIST

## 2024-12-16 PROCEDURE — 120N000001 HC R&B MED SURG/OB

## 2024-12-16 PROCEDURE — 97530 THERAPEUTIC ACTIVITIES: CPT | Mod: GP | Performed by: PHYSICAL THERAPIST

## 2024-12-16 PROCEDURE — 250N000011 HC RX IP 250 OP 636: Performed by: INTERNAL MEDICINE

## 2024-12-16 RX ORDER — FERROUS SULFATE 325(65) MG
325 TABLET ORAL 2 TIMES DAILY
Status: DISCONTINUED | OUTPATIENT
Start: 2024-12-16 | End: 2024-12-17

## 2024-12-16 RX ORDER — CEFAZOLIN SODIUM 1 G/3ML
1 INJECTION, POWDER, FOR SOLUTION INTRAMUSCULAR; INTRAVENOUS EVERY 8 HOURS
Status: DISCONTINUED | OUTPATIENT
Start: 2024-12-16 | End: 2024-12-19

## 2024-12-16 RX ADMIN — CLOPIDOGREL BISULFATE 75 MG: 75 TABLET ORAL at 08:04

## 2024-12-16 RX ADMIN — ASPIRIN 81 MG: 81 TABLET, COATED ORAL at 08:04

## 2024-12-16 RX ADMIN — ACETAMINOPHEN 975 MG: 325 TABLET, FILM COATED ORAL at 00:05

## 2024-12-16 RX ADMIN — SENNOSIDES AND DOCUSATE SODIUM 1 TABLET: 8.6; 5 TABLET ORAL at 20:41

## 2024-12-16 RX ADMIN — HYDROMORPHONE HYDROCHLORIDE 2 MG: 2 TABLET ORAL at 01:20

## 2024-12-16 RX ADMIN — SODIUM CHLORIDE, POTASSIUM CHLORIDE, SODIUM LACTATE AND CALCIUM CHLORIDE: 600; 310; 30; 20 INJECTION, SOLUTION INTRAVENOUS at 00:10

## 2024-12-16 RX ADMIN — PIPERACILLIN AND TAZOBACTAM 3.38 G: 3; .375 INJECTION, POWDER, FOR SOLUTION INTRAVENOUS at 02:57

## 2024-12-16 RX ADMIN — CEFAZOLIN 1 G: 1 INJECTION, POWDER, FOR SOLUTION INTRAMUSCULAR; INTRAVENOUS at 20:42

## 2024-12-16 RX ADMIN — HYDROMORPHONE HYDROCHLORIDE 2 MG: 2 TABLET ORAL at 06:35

## 2024-12-16 RX ADMIN — PIPERACILLIN AND TAZOBACTAM 3.38 G: 3; .375 INJECTION, POWDER, FOR SOLUTION INTRAVENOUS at 08:04

## 2024-12-16 RX ADMIN — FERROUS SULFATE TAB 325 MG (65 MG ELEMENTAL FE) 325 MG: 325 (65 FE) TAB at 20:41

## 2024-12-16 RX ADMIN — HEPARIN SODIUM 5000 UNITS: 5000 INJECTION, SOLUTION INTRAVENOUS; SUBCUTANEOUS at 08:05

## 2024-12-16 RX ADMIN — HEPARIN SODIUM 5000 UNITS: 5000 INJECTION, SOLUTION INTRAVENOUS; SUBCUTANEOUS at 16:22

## 2024-12-16 RX ADMIN — PIPERACILLIN AND TAZOBACTAM 3.38 G: 3; .375 INJECTION, POWDER, FOR SOLUTION INTRAVENOUS at 14:31

## 2024-12-16 RX ADMIN — SENNOSIDES AND DOCUSATE SODIUM 1 TABLET: 8.6; 5 TABLET ORAL at 08:04

## 2024-12-16 RX ADMIN — ACETAMINOPHEN 975 MG: 325 TABLET, FILM COATED ORAL at 08:04

## 2024-12-16 RX ADMIN — HYDROMORPHONE HYDROCHLORIDE 2 MG: 2 TABLET ORAL at 20:20

## 2024-12-16 RX ADMIN — ACETAMINOPHEN 975 MG: 325 TABLET, FILM COATED ORAL at 16:21

## 2024-12-16 RX ADMIN — BUPROPION HYDROCHLORIDE 150 MG: 150 TABLET, EXTENDED RELEASE ORAL at 08:04

## 2024-12-16 RX ADMIN — HYDROMORPHONE HYDROCHLORIDE 2 MG: 2 TABLET ORAL at 16:21

## 2024-12-16 RX ADMIN — HYDROMORPHONE HYDROCHLORIDE 2 MG: 2 TABLET ORAL at 11:47

## 2024-12-16 ASSESSMENT — ACTIVITIES OF DAILY LIVING (ADL)
ADLS_ACUITY_SCORE: 60
ADLS_ACUITY_SCORE: 59
ADLS_ACUITY_SCORE: 59
ADLS_ACUITY_SCORE: 60
ADLS_ACUITY_SCORE: 59
ADLS_ACUITY_SCORE: 60
ADLS_ACUITY_SCORE: 59
ADLS_ACUITY_SCORE: 60
ADLS_ACUITY_SCORE: 59
ADLS_ACUITY_SCORE: 59
ADLS_ACUITY_SCORE: 60
ADLS_ACUITY_SCORE: 59

## 2024-12-16 NOTE — PROGRESS NOTES
12/16/24 1993   Appointment Info   Signing Clinician's Name / Credentials (PT) Nichole Santizo DPT   Living Environment   People in Home alone   Current Living Arrangements house  (4 level split)   Home Accessibility stairs to enter home;stairs within home   Number of Stairs, Main Entrance none   Stair Railings, Main Entrance none   Number of Stairs, Within Home, Primary greater than 10 stairs  (13)   Stair Railings, Within Home, Primary railing on left side (ascending)   Transportation Anticipated car, drives self;family or friend will provide   Living Environment Comments Pt reports she could possibly stay with one of her 2 sons, couple stairs to enter/exit son's homes.   Self-Care   Usual Activity Tolerance good   Current Activity Tolerance poor   Equipment Currently Used at Home none   Fall history within last six months no   Activity/Exercise/Self-Care Comment Pt owns crutches, SEC and FWW.   General Information   Onset of Illness/Injury or Date of Surgery 12/13/24   Referring Physician Daxa Nguyen DPM   Patient/Family Therapy Goals Statement (PT) Pt is agreeable to TCU.   Pertinent History of Current Problem (include personal factors and/or comorbidities that impact the POC) 85 y/o female admitted from podiatry clinic d/t worsening pain of her left great toe. Pt is now POD # 1 fem-distal PT bypass with in situ vein and relining of SFA stent. PMH including PVD and HTN.   Existing Precautions/Restrictions fall   Weight-Bearing Status - LLE weight-bearing as tolerated   General Observations Pt in supine upon arrival of therapist.   Cognition   Affect/Mental Status (Cognition) WFL   Orientation Status (Cognition) oriented x 3   Follows Commands (Cognition) WFL   Pain Assessment   Patient Currently in Pain   (L LE pain at rest: 8/10)   Integumentary/Edema   Integumentary/Edema Comments L LE incisions covered with dressing, noted bloody draining medial area below knee. Noted bloody drainage from groin  pain infusion line.   Posture    Posture Comments Noted forward head and shoulder posture sitting EOB and standing at FWW.   Range of Motion (ROM)   ROM Comment Limited L LE ROM d/t pain, otherwise R LE WFL.   Strength (Manual Muscle Testing)   Strength Comments Not formally assessed, pt demonstrates at least 3/5 grossly in B LEs with functional mobility.   Bed Mobility   Comment, (Bed Mobility) Supine-sit, modA.   Transfers   Comment, (Transfers) Sit <> stand with FWW and Marcie.   Gait/Stairs (Locomotion)   Comment, (Gait/Stairs) Pt amb a couple steps from EOB-recliner with FWW and Marcie.   Balance   Balance Comments Noted good seated balance and fair standing balance at FWW.   Sensory Examination   Sensory Perception Comments Pt unable to report of numbness/tingling in L LE, denies numbness/tingling in R LE.   Clinical Impression   Criteria for Skilled Therapeutic Intervention Yes, treatment indicated   PT Diagnosis (PT) Difficulty with functional mobility.   Influenced by the following impairments Pain, Generalized weakness, Decreased activity tolerance   Functional limitations due to impairments Limited functional mobility requiring AD and assist.   Clinical Presentation (PT Evaluation Complexity) stable   Clinical Presentation Rationale Based on PMH, current presentation, and social support.   Clinical Decision Making (Complexity) low complexity   Planned Therapy Interventions (PT) balance training;bed mobility training;gait training;ROM (range of motion);stair training;strengthening;transfer training   Risk & Benefits of therapy have been explained patient   PT Total Evaluation Time   PT Eval, Low Complexity Minutes (08535) 8   Physical Therapy Goals   PT Frequency 5x/week   PT Predicted Duration/Target Date for Goal Attainment 12/20/24   PT Goals Bed Mobility;Transfers;Gait;Stairs   PT: Bed Mobility Supervision/stand-by assist;Supine to/from sit   PT: Transfers Supervision/stand-by assist;Sit to/from  stand;Assistive device   PT: Gait Supervision/stand-by assist;Rolling walker;100 feet   PT: Stairs Minimal assist;6 stairs;Rail on left   Interventions   Interventions Quick Adds Gait Training;Therapeutic Activity   Therapeutic Activity   Therapeutic Activities: dynamic activities to improve functional performance Minutes (27883) 23   Symptoms Noted During/After Treatment Dizziness;Fatigue   Treatment Detail/Skilled Intervention PT: Pt in supine upon arrival of therapist, pt required encouragement to participate in PT session and education on importance of mobilizing to promote circulation and prevent deconditioning. Supine-sit with modA, cues provided to utilize bed railing and assist with transfer, use of reposition sheet to scoot hips towards EOB. Noted forward head posture upon sitting and limited weightbearing on L hip. Cues provided to achieve midline sitting posture. Sit <> stand from EOB with FWW and Marcie, cues provided for hand placement and upright posture upon standing.  Noted pt tolerates minimal WB on L LE d/t pain. Pt amb a couple steps towards bedside recliner with FWW and Marcie, continuous cues for sequencing. Pt sitting up in chair at end of session, chair alarm on and needs within reach. Pt reported dizziness. BP: 113/44, HR: 71, encouraged pt to complete APs with B LEs elevated. After approx 5 mins, rechecked BP: 120/46, HR: 69 bpm.   PT Discharge Planning   PT Plan Monitor BP, progress transfers and gait as able   PT Discharge Recommendation (DC Rec) Transitional Care Facility   PT Rationale for DC Rec Pt is below baseline and will require continued skilled PT intervention at TCU prior to returning home alone. Pt is in agreement with TCU.   PT Brief overview of current status Goals of therapy will be to address safe mobility and make recs for d/c to next level of care. Pt and RN will continue to follow all falls risk precautions as documented by RN staff while hospitalized   Physical Therapy Time  and Intention   Timed Code Treatment Minutes 23   Total Session Time (sum of timed and untimed services) 31

## 2024-12-16 NOTE — PROGRESS NOTES
Vascular surgery progress note    Still having some left leg pain. No issues overnight.    On exam,  Left leg incision cdi. Some drainage noted.  Regular rate and rhythm.  Palp graft and biphasic PT signals.     Impression: POD 1 fem-distal PT bypass with in situ vein and relining of SFA stent.     Continue aspirin and Plavix,  Elizondo out today.  MRI and podiatry evaluation pending  Stomach fluid.  Physical therapy.    STAFF: Doing well this morning.  Pain fairly well-controlled with On-Q pump and oral medications.   Dressings changed on calf and ankle.  Strong pulse in bypass graft.     On aspirin/Plavix.  Awaiting evaluation by Dr. Davis on the right plantar great toe ulcer now that   Revascularization has occurred.       Leonard Coates MD

## 2024-12-16 NOTE — PLAN OF CARE
Diagnosis: Creation bypass vascular proximal superior femoral to distal posterior tibial, angioplasty and SFA stent placement x2.  POD#: 1  Mental Status: A&O x4  Activity/dangle: Ast of 2 GB/W pivot  Diet: Cardiac diet  Pain: Managed with PRN PO Dilaudid  Elizondo/Voiding: Elizondo, patent. Per order to remove POD1 but pt is refusing. MD notified  Tele/Restraints/Iso: N/A  02/LDA: Room air. IV saline locked  D/C Date: TBD  Other Info: Dressing has small drainage.

## 2024-12-16 NOTE — PLAN OF CARE
Diagnosis: Creation bypass vascular proximal superior femoral to distal posterior tibial, angioplasty and SFA stent placement x2   POD#:1  Mental Status: A/O x4  Activity/dangle Ax2 with Gladis RUTH   Diet: Cardiac diet   Pain: PRN Dilaudid, Scheduled tylenol   Elizondo/Voiding: Elizondo, in until tomorrow (12/17)  Tele/Restraints/Iso: N/A  02/LDA: Room air, 2 R PIV SL  D/C Date: TBD  Other Info: Small drainage groin dressing, L dressing CDI. Leg elevated on wedge per vascular team request, CMS intact, VSS. Ancef Q8hr.

## 2024-12-16 NOTE — PROGRESS NOTES
[place] [facility]    Visited with pt and both sons.  Discussed findings.    Very sore with PT--expected.  In recliner with leg elevated.  +3 graft pulse.  Dry dressings from this AM.    Hgb=8.3 (pre-op=9.2)  MRI= no osteo    PLAN: #1. With history right leg DVT about 3 years ago and more recent right STP will plan Elquis and Plavix.     #2.  Acute and chronic blood los anemia--start oral iron     #3.  No need for major foot surgery at this time per Dr Ryan Coates MD

## 2024-12-16 NOTE — PROGRESS NOTES
"[facility]  PROGRESS NOTE    CHIEF COMPLAINT:      I was asked by Werner Gonzalez MD  to evaluate this patient for left foot.    PATIENT HISTORY:  Yumiko Mccartney is a 84 year old female seen in follow up for her left foot. Had LLE bypass yesterday. Left foot MRI also now completed. Currently feels well, but some leg pain following bypass.     Review of Systems:  A 10 point review of systems was performed and is positive for that noted above in the patient history.  All other areas are negative.     PAST MEDICAL HISTORY:   Past Medical History:   Diagnosis Date    Acne rosacea     Actinic keratosis     Amblyopia     Asthma, moderate persistent     Basal cell carcinoma 10/2010    back X2    Cataract, mod, od; mild-mod, os 01/12/2012    DJD (degenerative joint disease), lumbosacral 08/06/2014    DVT (deep venous thrombosis) (H)     Elevated cholesterol     Endometriosis     HTN (hypertension)     Moderate major depression (H)     \"Circumstances\"    Osteopenia         PAST SURGICAL HISTORY:   Past Surgical History:   Procedure Laterality Date    ARTHROPLASTY KNEE Left 10/4/2021    Procedure: ARTHROPLASTY, LEFT KNEE, TOTAL;  Surgeon: Glenn Calvin MD;  Location: [facility] OR    BLEPHAROPLASTY BILATERAL  3/9/2006; 2013    both eyes, upper lid; revision (EN)    CATARACT IOL, RT/LT      HC REMOVAL GALLBLADDER      INJECT BLOCK MEDIAL BRANCH CERVICAL/THORACIC/LUMBAR Bilateral 12/7/2023    Procedure: BLOCK, NERVE, FACET JOINT, MEDIAL BRANCH, DIAGNOSTICL3/4/ALA;  Surgeon: Dinesh Proctor MD;  Location: [facility] OR    IR LOWER EXTREMITY ANGIOGRAM LEFT  11/25/2024    LASER YAG CAPSULOTOMY      left eye (AC lysis also)    PHACOEMULSIFICATION WITH STANDARD INTRAOCULAR LENS IMPLANT  1/2012; 4/2013    right eye; left eye    REPAIR PTOSIS      SURGICAL HISTORY OF -       endometriosis surgeriesx3    SURGICAL HISTORY OF -       ganiglion cyst onfoot    SURGICAL HISTORY OF -       Eye for \"droopy eye\" "    Z APPENDECTOMY          MEDICATIONS:  Reviewed in Epic. Current.     ALLERGIES:    Allergies   Allergen Reactions    Erythromycin Swelling and Other (See Comments)        SOCIAL HISTORY:   Social History     Socioeconomic History    Marital status:      Spouse name: Not on file    Number of children: 3    Years of education: Not on file    Highest education level: Not on file   Occupational History     Employer: RETIRED   Tobacco Use    Smoking status: Never     Passive exposure: Never    Smokeless tobacco: Never   Vaping Use    Vaping status: Never Used   Substance and Sexual Activity    Alcohol use: Yes     Comment: rarely    Drug use: No    Sexual activity: Yes     Partners: Male   Other Topics Concern    Parent/sibling w/ CABG, MI or angioplasty before 65F 55M? Not Asked   Social History Narrative    Not on file     Social Drivers of Health     Financial Resource Strain: Low Risk  (4/5/2024)    Financial Resource Strain     Within the past 12 months, have you or your family members you live with been unable to get utilities (heat, electricity) when it was really needed?: No   Food Insecurity: Low Risk  (4/5/2024)    Food Insecurity     Within the past 12 months, did you worry that your food would run out before you got money to buy more?: No     Within the past 12 months, did the food you bought just not last and you didn t have money to get more?: No   Transportation Needs: Low Risk  (4/5/2024)    Transportation Needs     Within the past 12 months, has lack of transportation kept you from medical appointments, getting your medicines, non-medical meetings or appointments, work, or from getting things that you need?: No   Physical Activity: Unknown (4/5/2024)    Exercise Vital Sign     Days of Exercise per Week: 1 day     Minutes of Exercise per Session: Not on file   Stress: No Stress Concern Present (4/5/2024)    [employer] - Occupational Stress Questionnaire      Feeling of Stress : Not at all   Social Connections: Not on file   Interpersonal Safety: Low Risk  (11/25/2024)    Interpersonal Safety     Do you feel physically and emotionally safe where you currently live?: Yes     Within the past 12 months, have you been hit, slapped, kicked or otherwise physically hurt by someone?: No     Within the past 12 months, have you been humiliated or emotionally abused in other ways by your partner or ex-partner?: No   Housing Stability: Low Risk  (4/5/2024)    Housing Stability     Do you have housing? : Yes     Are you worried about losing your housing?: No        FAMILY HISTORY:   Family History   Problem Relation Age of Onset    C.A.D. Father     C.A.D. Brother     Hypertension Mother     Cancer No family hx of     Diabetes No family hx of     Cerebrovascular Disease No family hx of     Thyroid Disease No family hx of     Glaucoma No family hx of     Macular Degeneration No family hx of         EXAM:Vitals: /52 (BP Location: Right arm)   Pulse 76   Temp 98.7  F (37.1  C) (Oral)   Resp 16   SpO2 97%   BMI= There is no height or weight on file to calculate BMI.    LABS:   .a1c  Last Comprehensive Metabolic Panel:  Sodium   Date Value Ref Range Status   12/16/2024 144 135 - 145 mmol/L Final   08/14/2020 140 133 - 144 mmol/L Final     Potassium   Date Value Ref Range Status   12/16/2024 4.3 3.4 - 5.3 mmol/L Final   01/17/2023 4.0 3.4 - 5.3 mmol/L Final   08/14/2020 3.9 3.4 - 5.3 mmol/L Final     Chloride   Date Value Ref Range Status   12/16/2024 108 (H) 98 - 107 mmol/L Final   01/17/2023 102 94 - 109 mmol/L Final   08/14/2020 106 94 - 109 mmol/L Final     Carbon Dioxide   Date Value Ref Range Status   08/14/2020 30 20 - 32 mmol/L Final     Carbon Dioxide (CO2)   Date Value Ref Range Status   12/16/2024 27 22 - 29 mmol/L Final   01/17/2023 31 20 - 32 mmol/L Final     Anion Gap   Date Value Ref Range Status   12/16/2024 9 7 - 15 mmol/L Final   01/17/2023 9 3 - 14 mmol/L Final    08/14/2020 4 3 - 14 mmol/L Final     Glucose   Date Value Ref Range Status   12/16/2024 105 (H) 70 - 99 mg/dL Final   12/16/2024 105 (H) 70 - 99 mg/dL Final   01/17/2023 98 70 - 99 mg/dL Final   08/14/2020 97 70 - 99 mg/dL Final     Comment:     Non Fasting     Urea Nitrogen   Date Value Ref Range Status   12/16/2024 10.4 8.0 - 23.0 mg/dL Final   01/17/2023 16 7 - 30 mg/dL Final   08/14/2020 16 7 - 30 mg/dL Final     Creatinine   Date Value Ref Range Status   12/16/2024 1.04 (H) 0.51 - 0.95 mg/dL Final   08/14/2020 0.91 0.52 - 1.04 mg/dL Final     GFR Estimate   Date Value Ref Range Status   12/16/2024 53 (L) >60 mL/min/1.73m2 Final     Comment:     eGFR calculated using 2021 CKD-EPI equation.   08/14/2020 60 (L) >60 mL/min/[1.73_m2] Final     Comment:     Non  GFR Calc  Starting 12/18/2018, serum creatinine based estimated GFR (eGFR) will be   calculated using the Chronic Kidney Disease Epidemiology Collaboration   (CKD-EPI) equation.       Calcium   Date Value Ref Range Status   12/16/2024 8.8 8.8 - 10.4 mg/dL Final     Comment:     Reference intervals for this test were updated on 7/16/2024 to reflect our healthy population more accurately. There may be differences in the flagging of prior results with similar values performed with this method. Those prior results can be interpreted in the context of the updated reference intervals.   08/14/2020 9.7 8.5 - 10.1 mg/dL Final     Lab Results   Component Value Date    WBC 8.1 12/13/2024    WBC 6.6 08/14/2020     Lab Results   Component Value Date    RBC 4.40 12/13/2024    RBC 4.68 08/14/2020     Lab Results   Component Value Date    HGB 10.9 12/13/2024    HGB 11.7 08/14/2020     Lab Results   Component Value Date    HCT 36.8 12/13/2024    HCT 37.3 08/14/2020     Lab Results   Component Value Date     12/13/2024     08/14/2020      Lab Results   Component Value Date    INR 1.10 11/25/2024    INR 2.45 05/17/2022        General appearance:  "Patient is alert and fully cooperative with history & exam.  No sign of distress is noted during the visit.      Respiratory: Breathing is regular & unlabored while sitting.      HEENT: Hearing is intact to spoken word.  Speech is clear.  No gross evidence of visual impairment that would impact ambulation.      Dermatologic: Left foot: hallux with several dried small eschars. No significant cellulitis. Tender to touch. Some hyperemia/violaceous color changes.      Vascular: Dorsalis pedis and posterior tibial pulses are weakly palpable.     Neurologic: Lower extremity sensation is intact, bilateral foot, to light touch.  No evidence of neurological-based weakness or contracture in the lower extremities.       Musculoskeletal: Patient is ambulatory without an assistive device or brace.  No gross foot or ankle deformity noted.       Psychiatric: Affect is pleasant & appropriate.      All cultures:  No results for input(s): \"CULTURE\" in the last 168 hours.     IMAGING:     IMPRESSION: No acute displaced left foot fracture or dislocation is  identified. Mild degenerative change at the left first MTP joint and  scattered at the IP joints of the lesser toes. Mild degenerative  change in the left mid foot with dorsal fragmented spur at the  proximal left navicular and dorsal spurring at the talar neck. Heel  spur with distal Achilles insertional enthesophyte. Nothing definitive  for acute left foot osteomyelitis radiographically. There may be an  ulcer/wound at the distal great toe. Forefoot MRI without and with  intravenous contrast could further assess if there is continued  clinical concern for forefoot osteomyelitis.    I personally reviewed the images and agree with the reports as stated.  No obvious osteomyelitis     ABIs:   FINDINGS:  Right MEL:   DP: 1.4  PT: 1.3.     Left MEL:   DP: 0.5   PT: 0.8.     Right Digital brachial index: 0.85.  Left Digital Brachial index: 0.51     Waveforms: Triphasic throughout the " right lower extremity. Triphasic  to the level of the left popliteal artery. Biphasic to monophasic in  the left distal tibial arteries. Left digital waveforms are dampened.     Exercise: The patient was exercised on a treadmill for 5 minutes at  1.5 miles per hour and at a 10% incline. Patient had no symptoms with  exercise.     Right exercise MEL: 1.24.  Left exercise MEL: 0.76                                                                      IMPRESSION: Normal ankle-brachial indices in the right lower  extremity. Moderate arterial insufficiency in the left lower extremity  at rest not significantly changed following exercise.    Left foot MRI:   IMPRESSION:  1.  No evidence for osteomyelitis with attention to the the great toe.     2.  Soft tissue stranding great toe can be seen with cellulitis. No soft tissue abscess.    ASSESSMENT:  Left hallux pain, small eschars to plantar and distal toe   Peripheral Arterial Disease s/p left SFA stent, ongoing tibial disease      MEDICAL DECISION MAKING:   -Discussed all findings with patient. Chart and imaging reviewed.   -Left foot MRI reviewed and without signs of osteomyelitis. Clinically no significant cellulitis, drainage or ulcers that probe to bone.   -May follow up to debride plantar hallux eschar when her pain is improved. Otherwise could also be done outpatient.   -Recommend continued wound care to sites. Will place orders to paint sites with betadine.   -WBAT to [facility] Department of Podiatry/Foot & Ankle Surgery  Available via [employer] Text

## 2024-12-16 NOTE — PROGRESS NOTES
[facility]    Medicine Progress Note - Hospitalist Service    Date of Admission:  12/13/2024    Assessment & Plan   Yumiko Mccartney is a 84 year old female with known history of peripheral vascular disease who presents from her podiatrist office with worsening pain of her left great toe.     Peripheral vascular disease   Skin ulcer of the left great toe with exposed fat layer  Cellulitis of the left great toe  *Recently underwent left lower extremity angiogram 11/25/2024 revascularization of the left SFA  *Patient reports worsening pain for the last few days although she has had pain of the left toe for several weeks.  She also noticed some erythema with puslike drainage  *Saw vascular surgery 12/12 and was started on Keflex with concerns for cellulitis  *Was evaluated by  at podiatry clinic 12/13 where x-ray did not show deeper infection.  Recommended admission to the hospital and MRI of the left foot  *MRI of the foot with no evidence for osteomyelitis, soft tissue stranding of the great toe can be seen with a cellulitis, no soft tissue abscess.  CRP low  *Podiatry and vascular surgery following, appreciate input  *Status post bypass proximal superior femoral to distal posterior tibial angioplasty and SFA stent placement x 2 - 12/15. EBL 250cc  - routine post op cares per vascular surgery, on Q pump and oral meds for pain,defer to vascular surgery  - Continue ASA and plavix and ASA, timing of xarelto per vascular  - narrow antibiotics today to Cefazolin q8hrs, stopped vanc/zosyn on 12/16/2024  - podiatry following, recommend continued wound care, may follow up to debride plantar hallux eschar when pain improved, otherwise can be done as outpatient  - patient did not want pool out today due to leg pain and discussed with risk of infections, agreeable to removal am of 12/17       Acute on chronic anemia suspect component of surgical blood loss  Hemoglobin baseline ~10g/dL  EBL 250cc  "on 12/15/24  --hgb 8.3 on 12/16/2024  - will monitor      Essential hypertension  -PTA on lisinopril/HCTZ  daily  -Blood pressures normal to soft without PTA antihypertensives so will continue to hold until blood pressure consistently elevated and monitor blood pressure postprocedure     History of DVT  -PTA Xarelto currently on hold for above-mentioned procedure procedure  -Resume once okay by vascular surgery     Asthma, moderate persistent  -Resume PTA as needed nebulizers     Major depression  -Resume prior to admission Wellbutrin     CKD stage IIIa  -Baseline creatinine is between 1-1.2, currently at baseline          Diet: Low Saturated Fat Na <2400 mg    DVT Prophylaxis: Pneumatic Compression Devices and Ambulate every shift  Elizondo Catheter: Not present  Lines: None     Cardiac Monitoring: None  Code Status: Full Code      Clinically Significant Risk Factors          # Hyperchloremia: Highest Cl = 108 mmol/L in last 2 days, will monitor as appropriate              # Hypertension: Noted on problem list            # Obesity: Estimated body mass index is 33.47 kg/m  as calculated from the following:    Height as of 11/25/24: 1.626 m (5' 4\").    Weight as of an earlier encounter on 12/13/24: 88.5 kg (195 lb)., PRESENT ON ADMISSION     # Asthma: noted on problem list        Social Drivers of Health    Physical Activity: Unknown (4/5/2024)    Exercise Vital Sign     Days of Exercise per Week: 1 day          Disposition Plan     Medically Ready for Discharge: Anticipated in 2-4 Days             Dejuan Crump MD  Hospitalist Service  [facility]  Securely message with [employer] (more info)  Text page via [employer] Paging/Directory   ______________________________________________________________________    Interval History   Her main complaint is left leg pain and q pump and pain meds helping.   Reports toe pain is better.   Not much appetite but drinking.  Denies nausea, vomiting or abdominal " pain    Physical Exam   Vital Signs: Temp: 98.7  F (37.1  C) Temp src: Oral BP: 133/52 Pulse: 76   Resp: 16 SpO2: 97 % O2 Device: Nasal cannula Oxygen Delivery: 2 LPM  Weight: 0 lbs 0 oz    General Appearance: Alert, awake and no apparent distress  Respiratory: clear to auscultation bilaterally  Cardiovascular: regular rate and rhythm  GI: soft and non-tender  Skin: Left leg wrapped in dressing. Left toe with no significant erythema, base of toe some eschar on plantar aspect, no drainage noted      Medical Decision Making       MANAGEMENT DISCUSSED with the following over the past 24 hours: patient and RN   NOTE(S)/MEDICAL RECORDS REVIEWED over the past 24 hours: vascular surgery note, op note  Tests ORDERED & REVIEWED in the past 24 hours:  - BMP  - CBC      Data     I have personally reviewed the following data over the past 24 hrs:    7.7  \   8.3 (L)   / 356     144 108 (H) 10.4 /  105 (H); 105 (H)   4.3 27 1.04 (H) \       Imaging results reviewed over the past 24 hrs:   No results found for this or any previous visit (from the past 24 hours).

## 2024-12-16 NOTE — PROGRESS NOTES
Date/Time:12/15,7506-2727    Trauma/Ortho/Medical (Choose one) :Ortho    Diagnosis:CREATION BYPASS VASCULAR PROXIMAL SUPERIOR FEMORAL TO DISTAL POSTERIOR TIBIAL, ANGIOPLASTY AND SFA STENE PLACEMENT X 2  POD#:1  Mental Status:A&O x 4  Activity/dangle:bedrest  Diet:Clear liquid,advance  as tolerated to low saturated   Pain:PRN Dilaudid,scheduled Tylenol  Elizondo/Voiding:Elizondo cath overnight,removed POD 1  Tele/Restraints/Iso:NA  02/LDA:JAYLEN VICTORIA SL  D/C Date:TBD  Other Info: CMS intact,Left ankle dressing has dried serosanguinous drainage,left groin dressing  with small drainage ,intact  PT consult

## 2024-12-17 ENCOUNTER — APPOINTMENT (OUTPATIENT)
Dept: OCCUPATIONAL THERAPY | Facility: CLINIC | Age: 84
DRG: 253 | End: 2024-12-17
Attending: STUDENT IN AN ORGANIZED HEALTH CARE EDUCATION/TRAINING PROGRAM
Payer: COMMERCIAL

## 2024-12-17 LAB
ANION GAP SERPL CALCULATED.3IONS-SCNC: 8 MMOL/L (ref 7–15)
BUN SERPL-MCNC: 13.5 MG/DL (ref 8–23)
CALCIUM SERPL-MCNC: 8.8 MG/DL (ref 8.8–10.4)
CHLORIDE SERPL-SCNC: 104 MMOL/L (ref 98–107)
CREAT SERPL-MCNC: 1.03 MG/DL (ref 0.51–0.95)
EGFRCR SERPLBLD CKD-EPI 2021: 54 ML/MIN/1.73M2
ERYTHROCYTE [DISTWIDTH] IN BLOOD BY AUTOMATED COUNT: 14.1 % (ref 10–15)
GLUCOSE BLDC GLUCOMTR-MCNC: 112 MG/DL (ref 70–99)
GLUCOSE SERPL-MCNC: 119 MG/DL (ref 70–99)
HCO3 SERPL-SCNC: 27 MMOL/L (ref 22–29)
HCT VFR BLD AUTO: 25.6 % (ref 35–47)
HGB BLD-MCNC: 7.7 G/DL (ref 11.7–15.7)
MCH RBC QN AUTO: 25.2 PG (ref 26.5–33)
MCHC RBC AUTO-ENTMCNC: 30.1 G/DL (ref 31.5–36.5)
MCV RBC AUTO: 84 FL (ref 78–100)
PLATELET # BLD AUTO: 372 10E3/UL (ref 150–450)
POTASSIUM SERPL-SCNC: 3.8 MMOL/L (ref 3.4–5.3)
RBC # BLD AUTO: 3.06 10E6/UL (ref 3.8–5.2)
SODIUM SERPL-SCNC: 139 MMOL/L (ref 135–145)
WBC # BLD AUTO: 9 10E3/UL (ref 4–11)

## 2024-12-17 PROCEDURE — 250N000013 HC RX MED GY IP 250 OP 250 PS 637: Performed by: STUDENT IN AN ORGANIZED HEALTH CARE EDUCATION/TRAINING PROGRAM

## 2024-12-17 PROCEDURE — 250N000011 HC RX IP 250 OP 636: Performed by: INTERNAL MEDICINE

## 2024-12-17 PROCEDURE — 250N000011 HC RX IP 250 OP 636: Performed by: STUDENT IN AN ORGANIZED HEALTH CARE EDUCATION/TRAINING PROGRAM

## 2024-12-17 PROCEDURE — 97535 SELF CARE MNGMENT TRAINING: CPT | Mod: GO | Performed by: OCCUPATIONAL THERAPIST

## 2024-12-17 PROCEDURE — 85027 COMPLETE CBC AUTOMATED: CPT | Performed by: INTERNAL MEDICINE

## 2024-12-17 PROCEDURE — 250N000013 HC RX MED GY IP 250 OP 250 PS 637: Performed by: INTERNAL MEDICINE

## 2024-12-17 PROCEDURE — 99232 SBSQ HOSP IP/OBS MODERATE 35: CPT | Performed by: INTERNAL MEDICINE

## 2024-12-17 PROCEDURE — 120N000001 HC R&B MED SURG/OB

## 2024-12-17 PROCEDURE — 80048 BASIC METABOLIC PNL TOTAL CA: CPT | Performed by: INTERNAL MEDICINE

## 2024-12-17 PROCEDURE — 97165 OT EVAL LOW COMPLEX 30 MIN: CPT | Mod: GO | Performed by: OCCUPATIONAL THERAPIST

## 2024-12-17 PROCEDURE — 99231 SBSQ HOSP IP/OBS SF/LOW 25: CPT | Performed by: PODIATRIST

## 2024-12-17 PROCEDURE — 82374 ASSAY BLOOD CARBON DIOXIDE: CPT | Performed by: INTERNAL MEDICINE

## 2024-12-17 PROCEDURE — 36415 COLL VENOUS BLD VENIPUNCTURE: CPT | Performed by: INTERNAL MEDICINE

## 2024-12-17 PROCEDURE — 97530 THERAPEUTIC ACTIVITIES: CPT | Mod: GO | Performed by: OCCUPATIONAL THERAPIST

## 2024-12-17 RX ORDER — AMOXICILLIN 250 MG
2 CAPSULE ORAL 2 TIMES DAILY
Status: DISPENSED | OUTPATIENT
Start: 2024-12-17

## 2024-12-17 RX ORDER — FERROUS SULFATE 325(65) MG
325 TABLET ORAL EVERY OTHER DAY
Status: DISPENSED | OUTPATIENT
Start: 2024-12-18

## 2024-12-17 RX ADMIN — HYDROMORPHONE HYDROCHLORIDE 2 MG: 2 TABLET ORAL at 14:09

## 2024-12-17 RX ADMIN — ASPIRIN 81 MG: 81 TABLET, COATED ORAL at 08:10

## 2024-12-17 RX ADMIN — HYDROMORPHONE HYDROCHLORIDE 2 MG: 2 TABLET ORAL at 09:31

## 2024-12-17 RX ADMIN — HEPARIN SODIUM 5000 UNITS: 5000 INJECTION, SOLUTION INTRAVENOUS; SUBCUTANEOUS at 23:41

## 2024-12-17 RX ADMIN — HEPARIN SODIUM 5000 UNITS: 5000 INJECTION, SOLUTION INTRAVENOUS; SUBCUTANEOUS at 15:33

## 2024-12-17 RX ADMIN — HEPARIN SODIUM 5000 UNITS: 5000 INJECTION, SOLUTION INTRAVENOUS; SUBCUTANEOUS at 00:46

## 2024-12-17 RX ADMIN — ACETAMINOPHEN 975 MG: 325 TABLET, FILM COATED ORAL at 08:09

## 2024-12-17 RX ADMIN — ACETAMINOPHEN 975 MG: 325 TABLET, FILM COATED ORAL at 23:41

## 2024-12-17 RX ADMIN — CEFAZOLIN 1 G: 1 INJECTION, POWDER, FOR SOLUTION INTRAMUSCULAR; INTRAVENOUS at 05:19

## 2024-12-17 RX ADMIN — CEFAZOLIN 1 G: 1 INJECTION, POWDER, FOR SOLUTION INTRAMUSCULAR; INTRAVENOUS at 20:47

## 2024-12-17 RX ADMIN — CLOPIDOGREL BISULFATE 75 MG: 75 TABLET ORAL at 08:10

## 2024-12-17 RX ADMIN — ACETAMINOPHEN 975 MG: 325 TABLET, FILM COATED ORAL at 15:33

## 2024-12-17 RX ADMIN — CEFAZOLIN 1 G: 1 INJECTION, POWDER, FOR SOLUTION INTRAMUSCULAR; INTRAVENOUS at 12:41

## 2024-12-17 RX ADMIN — HYDROMORPHONE HYDROCHLORIDE 2 MG: 2 TABLET ORAL at 00:46

## 2024-12-17 RX ADMIN — SENNOSIDES AND DOCUSATE SODIUM 2 TABLET: 50; 8.6 TABLET ORAL at 20:46

## 2024-12-17 RX ADMIN — HYDROMORPHONE HYDROCHLORIDE 2 MG: 2 TABLET ORAL at 05:19

## 2024-12-17 RX ADMIN — HYDROMORPHONE HYDROCHLORIDE 2 MG: 2 TABLET ORAL at 18:21

## 2024-12-17 RX ADMIN — ACETAMINOPHEN 975 MG: 325 TABLET, FILM COATED ORAL at 00:46

## 2024-12-17 RX ADMIN — SENNOSIDES AND DOCUSATE SODIUM 1 TABLET: 8.6; 5 TABLET ORAL at 08:09

## 2024-12-17 RX ADMIN — HEPARIN SODIUM 5000 UNITS: 5000 INJECTION, SOLUTION INTRAVENOUS; SUBCUTANEOUS at 08:09

## 2024-12-17 RX ADMIN — BUPROPION HYDROCHLORIDE 150 MG: 150 TABLET, EXTENDED RELEASE ORAL at 08:10

## 2024-12-17 RX ADMIN — HYDROMORPHONE HYDROCHLORIDE 2 MG: 2 TABLET ORAL at 23:41

## 2024-12-17 ASSESSMENT — ACTIVITIES OF DAILY LIVING (ADL)
ADLS_ACUITY_SCORE: 59
ADLS_ACUITY_SCORE: 60
ADLS_ACUITY_SCORE: 59
ADLS_ACUITY_SCORE: 59
ADLS_ACUITY_SCORE: 60
ADLS_ACUITY_SCORE: 59
ADLS_ACUITY_SCORE: 58
ADLS_ACUITY_SCORE: 59
ADLS_ACUITY_SCORE: 58
ADLS_ACUITY_SCORE: 58
ADLS_ACUITY_SCORE: 59
ADLS_ACUITY_SCORE: 59
ADLS_ACUITY_SCORE: 58
ADLS_ACUITY_SCORE: 59
ADLS_ACUITY_SCORE: 59
ADLS_ACUITY_SCORE: 58
ADLS_ACUITY_SCORE: 59
ADLS_ACUITY_SCORE: 58
ADLS_ACUITY_SCORE: 59
ADLS_ACUITY_SCORE: 60
ADLS_ACUITY_SCORE: 58

## 2024-12-17 NOTE — PROGRESS NOTES
12/17/24 1000   Appointment Info   Signing Clinician's Name / Credentials (OT) Fransisca Arellano,OTR/L   Living Environment   People in Home alone   Current Living Arrangements house   Home Accessibility stairs to enter home;stairs within home   Number of Stairs, Main Entrance none   Stair Railings, Main Entrance none   Number of Stairs, Within Home, Primary greater than 10 stairs  (3 + 10 no rail;no steps to enter home;3 stairs to  kitchen/dining room/living room, another 10 to bedrooms;min level laundry, BR, family room)   Stair Railings, Within Home, Primary railing on left side (ascending)   Transportation Anticipated car, drives self   Living Environment Comments Pt. reports no stairs to enter home to main floor,then 3 steps up to kitchen , 10 steps up to bedrooms;pt. reports she could possilby stay at hoen of her son's homes at discharge   Self-Care   Usual Activity Tolerance good   Current Activity Tolerance fair   Regular Exercise No   Equipment Currently Used at Home none  (has available:BSC, shower chair, WC, WW, cane;grab bars in walk-in shower; does not use AD at baseline for mobililty)   Fall history within last six months no   Activity/Exercise/Self-Care Comment Pt. manages own IADL's, including mowing lawn(on riding mower), cleaning, laundry, driving;has hired snow removeral for first time this year. Uses pillbox for meds.   Instrumental Activities of Daily Living (IADL)   IADL Comments completes IADL's on her own   General Information   Onset of Illness/Injury or Date of Surgery 12/15/24   Referring Physician Faby Keller MD   Additional Occupational Profile Info/Pertinent History of Current Problem OT: Per H & P--83 y/o female admitted from podiatry clinic d/t worsening pain of her left great toe. Pt is now POD # 1 fem-distal PT bypass with in situ vein and relining of SFA stent. PMH including PVD and HTN.   Performance Patterns (Routines, Roles, Habits) indep. at baseline;has not done much activity  since onset of foot pain   Existing Precautions/Restrictions fall   Left Lower Extremity (Weight-bearing Status) weight-bearing as tolerated (WBAT)   General Observations and Info Pt. lying in bed, has not yet been OOB today;agreeable to OT, no pain at rest   Cognitive Status Examination   Orientation Status orientation to person, place and time   Cognitive Status Comments intact per observation/conversation   Visual Perception   Impact of Vision Impairment on Function (Vision) wears reading glasses   Sensory   Sensory Comments no numbness/tingling reported   Pain Assessment   Patient Currently in Pain No  (pain when moves LE--10 w/ WB)   Range of Motion Comprehensive   Comment, General Range of Motion BUE WFL   Strength Comprehensive (MMT)   Comment, General Manual Muscle Testing (MMT) Assessment BUE WFL;gen.weakness   Coordination   Upper Extremity Coordination No deficits were identified   Bed Mobility   Comment (Bed Mobility) mod. -max. A--needed max. A to move LLE   Transfers   Transfer Comments min.-mod. A   Balance   Balance Comments impaired   Lower Body Dressing Assessment/Training   [place] Level (Lower Body Dressing) dependent (less than 25% patient effort)   Grooming Assessment/Training   [place] Level (Grooming) minimum assist (75% patient effort)   Toileting   [place] Level (Toileting) maximum assist (25% patient effort)   Comment, (Toileting) per clinical judgment   Clinical Impression   Criteria for Skilled Therapeutic Interventions Met (OT) Yes, treatment indicated   OT Diagnosis Decline in ADL performance   OT Problem List-Impairments impacting ADL problems related to;activity tolerance impaired;balance;flexibility;mobility;strength;pain   Assessment of Occupational Performance 3-5 Performance Deficits   Identified Performance Deficits dressing, bathing, grooming, mobility/transfers, IADL's   Planned Therapy Interventions (OT) ADL retraining;progressive activity/exercise;home program  guidelines;transfer training;strengthening   Clinical Decision Making Complexity (OT) problem focused assessment/low complexity   Risk & Benefits of therapy have been explained evaluation/treatment results reviewed;care plan/treatment goals reviewed;risks/benefits reviewed;current/potential barriers reviewed;participants voiced agreement with care plan;participants included;patient   OT Total Evaluation Time   OT Eval, Low Complexity Minutes (21327) 12   OT Goals   Therapy Frequency (OT) Daily   OT Predicted Duration/Target Date for Goal Attainment 12/22/24   OT Goals Hygiene/Grooming;Lower Body Dressing;Toilet Transfer/Toileting;Transfers   OT: Hygiene/Grooming supervision/stand-by assist;while standing   OT: Lower Body Dressing Supervision/stand-by assist;using adaptive equipment;including set-up/clothing retrieval   OT: Transfer Supervision/stand-by assist;with assistive device   OT: Toilet Transfer/Toileting Supervision/stand-by assist;Maximum assist;cleaning and garment management;toilet transfer;using adaptive equipment   Interventions   Interventions Quick Adds Self-Care/Home Management;Therapeutic Activity   Self-Care/Home Management   Self-Care/Home Mgmt/ADL, Compensatory, Meal Prep Minutes (98979) 25   Symptoms Noted During/After Treatment (Meal Preparation/Planning Training) fatigue;increased pain   Treatment Detail/Skilled Intervention OT:Pt. lying in bed upon arrival, reluctantly agreeable to session, no pain at rest;dependent to jena L sock;pt. needed overall mod. A to bring trunk forward for supine-sit, HOB elevated,  max. A to move LLE over to EOB;sitting EOB SBA, able to scoot self forward;worked on safe transfer from EOB(see note below/therapeutic activitities);pt. transferred to chair, declined standing trial at chair for g/h tasks, set-up w/ supplies and completed in sitting/SBA(goal for standing in bathroom next session). MD/Dr. Coates visit during session(Doppler reading);discussed DC--pt.would  like to discharge home, can possibly go to son's.   Therapeutic Activities   Therapeutic Activity Minutes (85458) 9   Symptoms noted during/after treatment fatigue;increased pain   Treatment Detail/Skilled Intervention OT:Worked on sit-stand tranfsers from EOB;ed. in hand placement/safe push-off, completed w/ min. A;pt. stood w/ overall CGA;pt. then able to take 6 steps over to recliner w/ min. A, stand-sit w/ CGA;after seated rest, pt. completed transfer from recliner w/ CGA-min. A, ambulated short distance, approx. 8 feet frwd./bkwd., tolerated well, despite pain reproted 9/10. Pt. up in chair, LE's elevated, alarm on upon departure.   OT Discharge Planning   OT Plan ambulate to bathroom for g/h at sink(chair behind), toileting/transfer, dressing   OT Discharge Recommendation (DC Rec) home with assist;home with home care occupational therapy;Transitional Care Facility   OT Rationale for DC Rec OT:Pt. currently below baseline, however, making good progress;anticipate pt. will be able to discharge home w/ family assist for all I/ADL's as indicated + Home OT/PT, pending continued inatient progress;plan is for home per pt. + surgeon(pt. does not want to discharge to TCU). Pt. does have DME at home, and can likely stay w/ one of her son's.   OT Brief overview of current status mod. A supine-sit w/ assist to move LLE, min. A sit-stand transfers;CGA-min. A for mobility in room(aprox. 15 feet total).   OT Equipment Needed at Discharge dressing equipment   Total Session Time   Timed Code Treatment Minutes 34   Total Session Time (sum of timed and untimed services) 46

## 2024-12-17 NOTE — PROGRESS NOTES
Vascular Surgery Progress Note  12/17/2024       Subjective:  - Patient resting comfortably in bed this morning. Still sore from PT. Encouraged to keep working hard.      Objective:  Temp:  [98.6  F (37  C)-99.1  F (37.3  C)] 99  F (37.2  C)  Pulse:  [72-82] 72  Resp:  [16-18] 16  BP: (120-145)/(52-67) 125/58  SpO2:  [95 %-97 %] 96 %      Intake/Output Summary (Last 24 hours) at 12/17/2024 0749  Last data filed at 12/17/2024 0700  Gross per 24 hour   Intake --   Output 1725 ml   Net -1725 ml         Physical Exam:  Gen: Awake, alert, NAD  Resp: Breathing room air  Incisions: c/d/I, dressings changed on calf and ankle  Ext: WWP, no edema  Vascular: palpable graft and monophasic PT      Labs:  Recent Labs   Lab 12/16/24  0802 12/15/24  2102 12/15/24  0836 12/13/24  1731   WBC 7.7  --  6.5 8.1   HGB 8.3*  --  9.2* 10.9*    396 424 480*       Recent Labs   Lab 12/17/24  0550 12/16/24  0802 12/15/24  0836 12/14/24  0904 12/13/24  1731   NA  --  144 143  --  139   POTASSIUM  --  4.3 4.4  --  4.5   CHLORIDE  --  108* 107  --  102   CO2  --  27 27  --  26   BUN  --  10.4 12.0  --  16.6   CR  --  1.04* 1.03* 1.15* 1.06*   * 105*  105* 105*  --  105*   NGOC  --  8.8 9.1  --  9.9       Imaging:  MR FOOT LEFT W/O and W CONTRAST  IMPRESSION:  1.  No evidence for osteomyelitis with attention to the the great toe.  2.  Soft tissue stranding great toe can be seen with cellulitis. No soft tissue abscess.     Assessment/Plan:   84 year old female POD2 s/p Left proximal superficial femoral to distal common posterior tibial ankle in situ greater saphenous vein bypass and teft leg angiogram via open common femoral artery access with restenting of previous distal SFA/above-knee popliteal stent.     - Continue aspirin and plavix, will eventually need plavix and eliquis nearing discharge  - encouraged to keep working with PT and get up and moving  - continue multimodal pain management  - No need for major foot surgery at this  time per Dr Davis   - hgb 8.3 (pre-op 9.2) continue oral iron     Discussed with vascular surgery staff, who is in agreement with the above.  - - - - - - - - - - - - - - - - - -  Kelly Foster PA-C  Vascular Surgery

## 2024-12-17 NOTE — PROGRESS NOTES
Podiatry / Foot and Ankle Surgery Progress Note    December 17, 2024    Subject: Patient was seen at bedside.  She relates that she is very tired, in pain though it is managed with medication.  Overall slept well last night as is feeling a bit better.    Objective:  Vitals: /58 (BP Location: Right arm)   Pulse 72   Temp 99  F (37.2  C) (Oral)   Resp 16   SpO2 96%   BMI= There is no height or weight on file to calculate BMI.    General:  Patient is alert and orientated.  NAD.    Left foot with eschar noted distal hallux as well as proximal hallux near the webspace.  Pain with palpation of these sites.  Eschar appears well adhered.  No malodor, no fluctuance, no drainage.  Very tender to palpation at the eschar    Imaging:    XRAY IMPRESSION: No acute displaced left foot fracture or dislocation is  identified. Mild degenerative change at the left first MTP joint and  scattered at the IP joints of the lesser toes. Mild degenerative  change in the left mid foot with dorsal fragmented spur at the  proximal left navicular and dorsal spurring at the talar neck. Heel  spur with distal Achilles insertional enthesophyte. Nothing definitive  for acute left foot osteomyelitis radiographically. There may be an  ulcer/wound at the distal great toe. Forefoot MRI without and with  intravenous contrast could further assess if there is continued  clinical concern for forefoot osteomyelitis.     Study Result    Narrative & Impression   EXAM: MR FOOT LEFT W/O and W CONTRAST  LOCATION: [facility]  DATE: 12/14/2024     INDICATION: Peripheral vascular disease, cellulitis and toe ulcer.  COMPARISON: 12/13/2024.  TECHNIQUE: Routine. Additional postgadolinium T1 sequences were obtained.  IV CONTRAST: 9mL Gadavist     FINDINGS:      JOINTS AND BONES:   -No evidence of osteomyelitis. There is no marrow edema, enhancement or cortical destructive change. No marrow replacement with attention to the toes. There  is no evidence of an acute fracture. Localized partial-thickness cartilage loss with subchondral   cystic change along the naviculocuneiform articulation. No joint effusion or synovitis on postcontrast sequences.     TENDONS:   -No tendon tear, tendinopathy, or tenosynovitis.      LIGAMENTS:   -Lisfranc ligament: Intact. No subluxation.     MUSCLES AND SOFT TISSUES:   -There is scattered soft tissue edema about the visualized foot. There is some more localized reticulation and edema within the soft tissues of the great toe. No rim-enhancing abscess or soft tissue mass. Partial fatty atrophy intrinsic musculature of   the foot.                                                                      IMPRESSION:  1.  No evidence for osteomyelitis with attention to the the great toe.     2.  Soft tissue stranding great toe can be seen with cellulitis. No soft tissue abscess.         Assessment: 84 year old female s/p LLE bypass, left leg angiogram with restenting of previous distal SFA/above-knee popliteal stent, with stable appearing necrotic eschars left great toe    Plan:    Discussed all findings with patient. Chart and imaging reviewed.   -Left foot MRI reviewed and without signs of osteomyelitis. Clinically no significant cellulitis, drainage or ulcers that probe to bone.   -Antibiotics - ancef per hospital team  -The left foot appears stable on exam today.  Eschars remain too tender to debride at bedside.  This can be followed as an outpatient.  Follow up in 2-3 weeks  -Betadine to eschar wound sites.  Keep foot clean, dry and open to air  -WBAT to LLE   -Podiatry to sign off, please contact if any questions or concerns        Nona Phoenix DPM  11:13 AM

## 2024-12-17 NOTE — PLAN OF CARE
Goal Outcome Evaluation:    Diagnosis: Creation bypass vascular proximal superior femoral to distal posterior tibial, angioplasty and SFA stent placement x2   POD#:2  Mental Status: A&O x4  Activity/dangle Ax2 with Gladis RUTH   Diet: Cardiac diet   Pain: PRN PO Dilaudid X2 , Scheduled tylenol   Elizondo/Voiding: Elizondo, will be removed today  Tele/Restraints/Iso: N/A  02/LDA: Room air, 2 R PIV SL  D/C Date: TBD  Other Info: Small drainage groin dressing, L dressing CDI. Leg elevated on wedge per vascular team request, CMS intact, VSS on RA

## 2024-12-17 NOTE — PROGRESS NOTES
[facility]    Medicine Progress Note - Hospitalist Service    Date of Admission:  12/13/2024    Assessment & Plan   Yumiko Mccartney is a 84 year old female with known history of peripheral vascular disease who presents from her podiatrist office with worsening pain of her left great toe.     Peripheral vascular disease   Skin ulcer of the left great toe with exposed fat layer  Cellulitis of the left great toe  *Recently underwent left lower extremity angiogram 11/25/2024 revascularization of the left SFA  *Patient reports worsening pain for the last few days although she has had pain of the left toe for several weeks.  She also noticed some erythema with puslike drainage  *Saw vascular surgery 12/12 and was started on Keflex with concerns for cellulitis  *Was evaluated by  at podiatry clinic 12/13 where x-ray did not show deeper infection.  Recommended admission to the hospital and MRI of the left foot  *MRI of the foot with no evidence for osteomyelitis, soft tissue stranding of the great toe can be seen with a cellulitis, no soft tissue abscess.  CRP low  *Podiatry and vascular surgery following, appreciate input  *Status post bypass proximal superior femoral to distal posterior tibial angioplasty and SFA stent placement x 2 - 12/15. EBL 250cc  - routine post op cares per vascular surgery, oral meds for pain,defer to vascular surgery. Qpump removed on 12/17/2024 by vascular  - Continue ASA and plavix, timing of DOAC per vascular, vascular surgery considering discontinuing ASA and starting DOAC tomorrow  - narrow antibiotics today to Cefazolin q8hrs, stopped vanc/zosyn on 12/16/2024  - podiatry following, recommend continued wound care, may follow up to debride plantar hallux eschar when pain improved and plan for outpatient, podiatry signed off  - pool removed on 12/17/2024, bladder protocol  - bowel regimen in place       Acute on chronic anemia suspect component of surgical blood  "loss  Hemoglobin baseline ~10g/dL  EBL 250cc on 12/15/24  --hgb 9.2 g/dL preop  8.3 on POD1--> 7.7g/dL on 12/17/2024  - ferrous sulfate has been initiated, continue  - will monitor    Essential hypertension  -PTA on lisinopril/HCTZ  daily  -Blood pressures normal to soft without PTA antihypertensives so will continue to hold until blood pressure consistently elevated and monitor blood pressure postprocedure. Possible start Lisinopril on 12/18 and gradually add back hydrochlorothiazide      History of DVT  -PTA Xarelto currently on hold for above-mentioned procedure procedure  -Resume once okay by vascular surgery     Asthma, moderate persistent  -Resume PTA as needed nebulizers     Major depression  -Resume prior to admission Wellbutrin     CKD stage IIIa  -Baseline creatinine is between 1-1.2, currently at baseline  - monitor          Diet: Low Saturated Fat Na <2400 mg    DVT Prophylaxis: Pneumatic Compression Devices and Ambulate every shift  Elizondo Catheter: Not present  Lines: None     Cardiac Monitoring: None  Code Status: Full Code      Clinically Significant Risk Factors          # Hyperchloremia: Highest Cl = 108 mmol/L in last 2 days, will monitor as appropriate              # Hypertension: Noted on problem list            # Obesity: Estimated body mass index is 33.47 kg/m  as calculated from the following:    Height as of 11/25/24: 1.626 m (5' 4\").    Weight as of an earlier encounter on 12/13/24: 88.5 kg (195 lb)., PRESENT ON ADMISSION     # Asthma: noted on problem list        Social Drivers of Health    Physical Activity: Unknown (4/5/2024)    Exercise Vital Sign     Days of Exercise per Week: 1 day          Disposition Plan     Medically Ready for Discharge: Anticipated in 2-4 Days when cleared by vascular surgery             Dejuan Crump MD  Hospitalist Service  [facility]  Securely message with txtr (more info)  Text page via mygall Paging/Directory "   ______________________________________________________________________    Interval History   Patient reports pain in her right leg, but some what better.   Denies nausea or vomiting. States she does not have much appetite.     Physical Exam   Vital Signs: Temp: 99  F (37.2  C) Temp src: Oral BP: 125/58 Pulse: 72   Resp: 16 SpO2: 96 % O2 Device: None (Room air)    Weight: 0 lbs 0 oz    General Appearance: Alert, awake and no apparent distress  Respiratory: clear to auscultation bilaterally  Cardiovascular: regular rate and rhythm  GI: soft and non-tender  Skin: Left leg wrapped in dressing. Left toe with no significant erythema, base of toe some eschar on plantar aspect, no drainage noted      Medical Decision Making       MANAGEMENT DISCUSSED with the following over the past 24 hours: patient and RN   NOTE(S)/MEDICAL RECORDS REVIEWED over the past 24 hours: vascular surgery note,nursing notes  Tests ORDERED & REVIEWED in the past 24 hours:  - BMP  - CBC      Data     I have personally reviewed the following data over the past 24 hrs:    9.0  \   7.7 (L)   / 372     139 104 13.5 /  119 (H)   3.8 27 1.03 (H) \       Imaging results reviewed over the past 24 hrs:   No results found for this or any previous visit (from the past 24 hours).

## 2024-12-17 NOTE — PROGRESS NOTES
Vascular Surgery Progress Note:  POD#2    S: Overall had a good day so far.  Doing better with physical therapy.  Up in recliner this afternoon.    O:   Vitals:  BP  Min: 120/55  Max: 145/67  Temp  Av.9  F (37.2  C)  Min: 98.6  F (37  C)  Max: 99.1  F (37.3  C)  Pulse  Av.5  Min: 72  Max: 82  I/O last 3 completed shifts:  In: -   Out: 1525 [Urine:1525]    Physical Exam: Alert and appropriate.  Comfortable.  Conversant    Dressings from yesterday are all intact.    On-Q pump empty--removed    +3 graft pulse.    +3 biphasic flow in graft and distal PT      Assessment/Plan: Doing very well.  Continue to increase activity.  Still on aspirin/Plavix.    Would consider restarting Eliquis and discontinuing aspirin tomorrow    On iron for asymptomatic acute and chronic blood loss anemia      Wm. Aminata MD

## 2024-12-17 NOTE — PLAN OF CARE
Goal Outcome Evaluation:       Pt A&Ox4. VSS on RA. Low Sat Fat Na diet. Up 1A GB&W, Elizondo removed at 0900, voiding in BSC, PVR @1600 at 181cc. LLE dressing CDI, elevated on wedge, CMS intact. Pain controlled w/ scheduled Tylenol and PRN PO Dilaudid. R PIVs SL w/ int abx. Vascular and Podiatry following. Discharge home vs TCU pending medical stability, Continue plan of Care.

## 2024-12-18 ENCOUNTER — APPOINTMENT (OUTPATIENT)
Dept: PHYSICAL THERAPY | Facility: CLINIC | Age: 84
DRG: 253 | End: 2024-12-18
Attending: INTERNAL MEDICINE
Payer: COMMERCIAL

## 2024-12-18 LAB
HGB BLD-MCNC: 7.9 G/DL (ref 11.7–15.7)
PLATELET # BLD AUTO: 370 10E3/UL (ref 150–450)

## 2024-12-18 PROCEDURE — 250N000011 HC RX IP 250 OP 636: Performed by: STUDENT IN AN ORGANIZED HEALTH CARE EDUCATION/TRAINING PROGRAM

## 2024-12-18 PROCEDURE — 250N000013 HC RX MED GY IP 250 OP 250 PS 637

## 2024-12-18 PROCEDURE — 250N000013 HC RX MED GY IP 250 OP 250 PS 637: Performed by: STUDENT IN AN ORGANIZED HEALTH CARE EDUCATION/TRAINING PROGRAM

## 2024-12-18 PROCEDURE — 85018 HEMOGLOBIN: CPT | Performed by: INTERNAL MEDICINE

## 2024-12-18 PROCEDURE — 120N000001 HC R&B MED SURG/OB

## 2024-12-18 PROCEDURE — 97116 GAIT TRAINING THERAPY: CPT | Mod: GP | Performed by: PHYSICAL THERAPIST

## 2024-12-18 PROCEDURE — 97530 THERAPEUTIC ACTIVITIES: CPT | Mod: GP | Performed by: PHYSICAL THERAPIST

## 2024-12-18 PROCEDURE — 250N000013 HC RX MED GY IP 250 OP 250 PS 637: Performed by: INTERNAL MEDICINE

## 2024-12-18 PROCEDURE — 999N000127 HC STATISTIC PERIPHERAL IV START W US GUIDANCE

## 2024-12-18 PROCEDURE — 85049 AUTOMATED PLATELET COUNT: CPT | Performed by: STUDENT IN AN ORGANIZED HEALTH CARE EDUCATION/TRAINING PROGRAM

## 2024-12-18 PROCEDURE — 250N000011 HC RX IP 250 OP 636: Performed by: INTERNAL MEDICINE

## 2024-12-18 PROCEDURE — 999N000040 HC STATISTIC CONSULT NO CHARGE VASC ACCESS

## 2024-12-18 PROCEDURE — 36415 COLL VENOUS BLD VENIPUNCTURE: CPT | Performed by: INTERNAL MEDICINE

## 2024-12-18 PROCEDURE — 99232 SBSQ HOSP IP/OBS MODERATE 35: CPT | Performed by: INTERNAL MEDICINE

## 2024-12-18 PROCEDURE — 99207 PR NO BILLABLE SERVICE THIS VISIT: CPT

## 2024-12-18 RX ORDER — GABAPENTIN 300 MG/1
300 CAPSULE ORAL DAILY
Status: DISPENSED | OUTPATIENT
Start: 2024-12-19

## 2024-12-18 RX ORDER — GABAPENTIN 600 MG/1
600 TABLET ORAL AT BEDTIME
Status: DISCONTINUED | OUTPATIENT
Start: 2024-12-18 | End: 2024-12-19

## 2024-12-18 RX ORDER — LISINOPRIL 20 MG/1
20 TABLET ORAL DAILY
Status: DISPENSED | OUTPATIENT
Start: 2024-12-18

## 2024-12-18 RX ADMIN — CEFAZOLIN 1 G: 1 INJECTION, POWDER, FOR SOLUTION INTRAMUSCULAR; INTRAVENOUS at 21:07

## 2024-12-18 RX ADMIN — ACETAMINOPHEN 975 MG: 325 TABLET, FILM COATED ORAL at 08:56

## 2024-12-18 RX ADMIN — HEPARIN SODIUM 5000 UNITS: 5000 INJECTION, SOLUTION INTRAVENOUS; SUBCUTANEOUS at 09:00

## 2024-12-18 RX ADMIN — RIVAROXABAN 20 MG: 10 TABLET, FILM COATED ORAL at 17:01

## 2024-12-18 RX ADMIN — FERROUS SULFATE TAB 325 MG (65 MG ELEMENTAL FE) 325 MG: 325 (65 FE) TAB at 08:56

## 2024-12-18 RX ADMIN — BUPROPION HYDROCHLORIDE 150 MG: 150 TABLET, EXTENDED RELEASE ORAL at 08:56

## 2024-12-18 RX ADMIN — HYDROMORPHONE HYDROCHLORIDE 2 MG: 2 TABLET ORAL at 17:01

## 2024-12-18 RX ADMIN — HYDROMORPHONE HYDROCHLORIDE 2 MG: 2 TABLET ORAL at 09:05

## 2024-12-18 RX ADMIN — SENNOSIDES AND DOCUSATE SODIUM 2 TABLET: 50; 8.6 TABLET ORAL at 08:56

## 2024-12-18 RX ADMIN — CEFAZOLIN 1 G: 1 INJECTION, POWDER, FOR SOLUTION INTRAMUSCULAR; INTRAVENOUS at 11:32

## 2024-12-18 RX ADMIN — LISINOPRIL 20 MG: 20 TABLET ORAL at 08:56

## 2024-12-18 RX ADMIN — GABAPENTIN 600 MG: 600 TABLET, FILM COATED ORAL at 21:08

## 2024-12-18 RX ADMIN — CLOPIDOGREL BISULFATE 75 MG: 75 TABLET ORAL at 08:56

## 2024-12-18 RX ADMIN — CEFAZOLIN 1 G: 1 INJECTION, POWDER, FOR SOLUTION INTRAMUSCULAR; INTRAVENOUS at 04:25

## 2024-12-18 RX ADMIN — HYDROMORPHONE HYDROCHLORIDE 2 MG: 2 TABLET ORAL at 05:12

## 2024-12-18 RX ADMIN — HYDROMORPHONE HYDROCHLORIDE 2 MG: 2 TABLET ORAL at 12:58

## 2024-12-18 RX ADMIN — HYDROMORPHONE HYDROCHLORIDE 2 MG: 2 TABLET ORAL at 21:13

## 2024-12-18 ASSESSMENT — ACTIVITIES OF DAILY LIVING (ADL)
ADLS_ACUITY_SCORE: 58
ADLS_ACUITY_SCORE: 39
ADLS_ACUITY_SCORE: 58
ADLS_ACUITY_SCORE: 36
ADLS_ACUITY_SCORE: 58
DEPENDENT_IADLS:: INDEPENDENT
ADLS_ACUITY_SCORE: 36
ADLS_ACUITY_SCORE: 36
ADLS_ACUITY_SCORE: 58
ADLS_ACUITY_SCORE: 39
ADLS_ACUITY_SCORE: 58
ADLS_ACUITY_SCORE: 62
ADLS_ACUITY_SCORE: 36
ADLS_ACUITY_SCORE: 58

## 2024-12-18 NOTE — PROGRESS NOTES
[facility]    Medicine Progress Note - Hospitalist Service    Date of Admission:  12/13/2024    Assessment & Plan   Yumiko Mccartney is a 84 year old female with known history of peripheral vascular disease who presents from her podiatrist office with worsening pain of her left great toe. Hospital problem as below     Peripheral vascular disease Status post bypass proximal superior femoral to distal posterior tibial angioplasty and SFA stent placement x 2  on 12/15/24   Skin ulcer of the left great toe with exposed fat layer  Cellulitis of the left great toe  *Recently underwent left lower extremity angiogram 11/25/2024 revascularization of the left SFA  *Patient reports worsening pain for the last few days although she has had pain of the left toe for several weeks.  She also noticed some erythema with puslike drainage  *Saw vascular surgery 12/12 and was started on Keflex with concerns for cellulitis  *Was evaluated by  at podiatry clinic 12/13 where x-ray did not show deeper infection.  Recommended admission to the hospital and MRI of the left foot  *MRI of the foot with no evidence for osteomyelitis, soft tissue stranding of the great toe can be seen with a cellulitis, no soft tissue abscess.  CRP low  *Podiatry and vascular surgery following, appreciate input  *Status post bypass proximal superior femoral to distal posterior tibial angioplasty and SFA stent placement x 2 - 12/15. EBL 250cc  *received vanc/zosyn 12/13-12/16, transitioned to Cefazolin on 12/16/2024     - routine post op cares per vascular surgery, oral meds for pain,defer to vascular surgery  - Continue  plavix. ASA stopped and PTA Xarelto resumed on 12/18/2024 by vascular  - continue Cefazolin to complete total 10 days, if discharge, can change to Keflex to complete treatment course  - podiatry followed, recommend continued wound care, may follow up to debride plantar hallux eschar when pain improved and plan for  "outpatient, podiatry signed off on 12/17  - bowel regimen in place  - prn analgesics  - resumed PTA gabapentin 300mg daily and 600mg at bedtime (takes 1200mg at bedtime), increase back to PTA dose in the next couple days      Acute on chronic anemia suspect component of surgical blood loss  Hemoglobin baseline ~10g/dL  EBL 250cc on 12/15/24  --hgb 9.2 g/dL preop  8.3 on POD1--> 7.7g/dL-->7.9 on 12/18/2024  - ferrous sulfate has been initiated, continue  - will monitor    Essential hypertension  -PTA on lisinopril/HCTZ  daily  - restart Lisinopril 20mg daily on 12/18/2024  - continue to hold hydrochlorothiazide at this time, bp controlled and she is not eating much. Add back as bp starts to rise       History of DVT  -PTA Xarelto resumed by vascular surgery on 12/18/2024 as above       Asthma, moderate persistent  -Resume PTA as needed nebulizers     Major depression  -Resume prior to admission Wellbutrin     CKD stage IIIa  -Baseline creatinine is between 1-1.2, currently at baseline  - monitor          Diet: Low Saturated Fat Na <2400 mg  Snacks/Supplements Adult: Ensure Enlive; With Meals    DVT Prophylaxis: Pneumatic Compression Devices and Ambulate every shift, Xarelto  Elizondo Catheter: Not present  Lines: None     Cardiac Monitoring: None  Code Status: Full Code      Clinically Significant Risk Factors                   # Hypertension: Noted on problem list            # Obesity: Estimated body mass index is 33.47 kg/m  as calculated from the following:    Height as of 11/25/24: 1.626 m (5' 4\").    Weight as of an earlier encounter on 12/13/24: 88.5 kg (195 lb).      # Asthma: noted on problem list        Social Drivers of Health    Physical Activity: Unknown (4/5/2024)    Exercise Vital Sign     Days of Exercise per Week: 1 day          Disposition Plan     Medically Ready for Discharge: Anticipated in 2-4 Days when cleared by vascular surgery, possible TCU             Dejuan Crump MD  Hospitalist " [facility]  Securely message with [place] (more info)  Text page via [employer] Paging/Directory   ______________________________________________________________________    Interval History   Patient reports still soar and mostly when she is walking.  Denies n/v or abdominal pain. No BM yet. Passing flatus. Not much appetite, but state she is drinking good.  Denies chest pain or shortness of breath.     Physical Exam   Vital Signs: Temp: 98.5  F (36.9  C) Temp src: Oral BP: 127/56 Pulse: 73   Resp: 18 SpO2: 96 % O2 Device: None (Room air)    Weight: 0 lbs 0 oz    General Appearance: Alert, awake and no apparent distress  Respiratory: clear to auscultation bilaterally  Cardiovascular: regular rate and rhythm  GI: soft and non-tender  Skin: Left leg wrapped in dressing. Left toe with no significant erythema, base of toe some eschar on plantar aspect, no drainage noted      Medical Decision Making       MANAGEMENT DISCUSSED with the following over the past 24 hours: patient and RN   NOTE(S)/MEDICAL RECORDS REVIEWED over the past 24 hours: vascular surgery note,nursing notes  Tests ORDERED & REVIEWED in the past 24 hours:  - hemoglobin, platlet       Data     I have personally reviewed the following data over the past 24 hrs:    N/A  \   7.9 (L)   / 370     N/A N/A N/A /  N/A   N/A N/A N/A \       Imaging results reviewed over the past 24 hrs:   No results found for this or any previous visit (from the past 24 hours).

## 2024-12-18 NOTE — PLAN OF CARE
Date/Time:12/17-18 19:00-07:30    Trauma/Ortho/Medical (Choose one) :Ortho    Diagnosis:CREATION BYPASS VASCULAR PROXIMAL SUPERIOR FEMORAL TO DISTAL POSTERIOR TIBIAL, ANGIOPLASTY AND SFA STENE PLACEMENT X 2  POD#:2  Mental Status:A/O x 4  Activity/dangle: Ax1 GB/W  Diet: Cardiac   Pain:PRN PO Dilaudid,scheduled Tylenol  Elizondo/Voiding:Up to BSC to void, no BM this shift   Tele/Restraints/Iso:NA  02/LDA: VSS on RA ex HTN at times ,RPIV x2 SL  D/C Date: Possibly 12/18  Other Info: CMS intact,Left ankle dressing has dried serosanguinous drainage,left groin dressing  with small drainage ,intact  PT consult

## 2024-12-18 NOTE — CONSULTS
Care Management Initial Consult    General Information  Assessment completed with: Patient,    Type of CM/SW Visit: Initial Assessment    Primary Care Provider verified and updated as needed: Yes   Readmission within the last 30 days:        Reason for Consult: discharge planning  Advance Care Planning:            Communication Assessment  Patient's communication style: spoken language (English or Bilingual)             Cognitive  Cognitive/Neuro/Behavioral: WDL  Level of Consciousness: alert     Orientation: oriented x 4             Living Environment:   People in home: alone     Current living Arrangements: house      Able to return to prior arrangements: yes       Family/Social Support:  Care provided by: self  Provides care for: no one  Marital Status:   Support system: Children          Description of Support System: Supportive, Involved         Current Resources:   Patient receiving home care services: No        Community Resources: None  Equipment currently used at home: none  Supplies currently used at home:      Employment/Financial:  Employment Status:          Financial Concerns:             Does the patient's insurance plan have a 3 day qualifying hospital stay waiver?  No    Lifestyle & Psychosocial Needs:  Social Drivers of Health     Food Insecurity: Low Risk  (4/5/2024)    Food Insecurity     Within the past 12 months, did you worry that your food would run out before you got money to buy more?: No     Within the past 12 months, did the food you bought just not last and you didn t have money to get more?: No   Depression: Not at risk (4/5/2024)    PHQ-2     PHQ-2 Score: 0   Housing Stability: Low Risk  (4/5/2024)    Housing Stability     Do you have housing? : Yes     Are you worried about losing your housing?: No   Tobacco Use: Low Risk  (12/13/2024)    Patient History     Smoking Tobacco Use: Never     Smokeless Tobacco Use: Never     Passive Exposure: Never   Financial Resource Strain: Low  Risk  (4/5/2024)    Financial Resource Strain     Within the past 12 months, have you or your family members you live with been unable to get utilities (heat, electricity) when it was really needed?: No   Alcohol Use: Not on file   Transportation Needs: Low Risk  (4/5/2024)    Transportation Needs     Within the past 12 months, has lack of transportation kept you from medical appointments, getting your medicines, non-medical meetings or appointments, work, or from getting things that you need?: No   Physical Activity: Unknown (4/5/2024)    Exercise Vital Sign     Days of Exercise per Week: 1 day     Minutes of Exercise per Session: Not on file   Interpersonal Safety: Low Risk  (11/25/2024)    Interpersonal Safety     Do you feel physically and emotionally safe where you currently live?: Yes     Within the past 12 months, have you been hit, slapped, kicked or otherwise physically hurt by someone?: No     Within the past 12 months, have you been humiliated or emotionally abused in other ways by your partner or ex-partner?: No   Stress: No Stress Concern Present (4/5/2024)    [employer] [place] [employer] - Occupational Stress Questionnaire     Feeling of Stress : Not at all   Social Connections: Not on file   Health Literacy: Not on file       Functional Status:  Prior to admission patient needed assistance:   Dependent ADLs:: Independent  Dependent IADLs:: Independent       Mental Health Status:          Chemical Dependency Status:                Values/Beliefs:  Spiritual, Cultural Beliefs, Yazidism Practices, Values that affect care:                 Discussed  Partnership in Safe Discharge Planning  document with patient/family: No    Additional Information:  Writer met with patient and introduced self and role.  Patient aware of the need for a TCU stay.   Patient shared that she lives alone with a catracho.  Her son lives in [place] and he is the closest of her 3 kids.  Patient selected [facility]  or [facility] for TCU. Referrals sent via DOD    Next Steps: TCU acceptance    Jennifer Mckeon RN

## 2024-12-18 NOTE — PROGRESS NOTES
VASCULAR SURGERY PROGRESS NOTE    Subjective:  No acute events overnight, pain continues to be an issue however is improving daily. Poor appetite, denies n/v/d. Regular diet. Tolerating PT.     Objective:  Intake/Output Summary (Last 24 hours) at 12/18/2024 0741  Last data filed at 12/18/2024 0500  Gross per 24 hour   Intake 240 ml   Output 700 ml   Net -460 ml     PHYSICAL EXAM:  BP (!) 150/60 (BP Location: Right arm, Patient Position: Semi-Nobles's, Cuff Size: Adult Regular)   Pulse 77   Temp 98.7  F (37.1  C) (Oral)   Resp 16   SpO2 98%   Alert and oriented x4, neurologically intact  CMS intact  Incisions CDI  Palpable graft pulse, with biphasic PT       ASSESSMENT:  84 year old female POD3 s/p Left proximal superficial femoral to distal common posterior tibial ankle in situ greater saphenous vein bypass and teft leg angiogram via open common femoral artery access with restenting of previous distal SFA/above-knee popliteal stent.          PLAN:  -restart Eliquis+plavix  -iron for asymptomatic acute on chronic blood loss  -poor appetite, recommend protein supplementation   -oob, continue to increase activity  -podiatry to assess for debridement outpatient in 2-3 weeks  -Edemawear, change gauze underneath as needed  -continue pulse checks  -pain regimen  -pulmonary toilet    Discussed pt history, exam, assessment and plan with Dr. Coates of the vascular surgery service, who is in agreement with the above.    Charlee Vargas, NP  VASCULAR SURGERY     STAFF:   Doing very well postoperatively.  Still quite sore but doing better with physical therapy.  Incisions all healing well.  +3 graft pulse with good Doppler signals in graft and distal PT.    Will reinitiate Xarelto 20 mg daily along with Plavix.  Continue working with physical therapy.  Will decide whether she is able to go home or needs TCU.      Dr. Davis will see the patient as an outpatient in our clinic to follow-up her plantar toe ulcer that has no  evidence of osteomyelitis and now with improved blood supply should be able to heal.      Leonard Coates MD

## 2024-12-18 NOTE — CONSULTS
"CLINICAL NUTRITION SERVICES  -  ASSESSMENT NOTE    Recommendations Ordered by Registered Dietitian (RD):   Ensure Plus HP with breakfast and lunch  Encouraged po intakes to support nutritional needs and recovery   Malnutrition:   % Weight Loss:  None noted  % Intake:  <75% for > 7 days (moderate malnutrition)  Subcutaneous Fat Loss:  None observed  Muscle Loss:  Temporal region mild depletion  Fluid Retention:  Trace 1+ (left foot/ankle)    Malnutrition Diagnosis: Patient does not meet two of the above criteria necessary for diagnosing malnutrition     REASON FOR ASSESSMENT  Yumiko Mccartney is a 84 year old female seen by Registered Dietitian for RN Consult - \"Poor intake, MD recommended supplements.\"    NUTRITION HISTORY  Information obtained from chart review and patient at bedside. Typical food/fluid intake is 2-3 meals/day. She denies changes to her baseline intakes prior to admission.     Breakfast: toast + fruit  Lunch: \"bigger meal\"  Dinner: \"smaller meal\" - salad if anything    Supplements: has taken protein drinks in the past; agreeable to Strawberry Ensure Plus HP  Appetite: very poor, \"nothing tastes good\"    CURRENT NUTRITION ORDERS  Diet Order:     Low Saturated Fat/2400 mg Sodium     Current Intake/Tolerance:  12/17: 75/50/100 (ordered 1158 kcal and 46 g protein per healthtouch)  12/16: nothing documented (ordered 1237 kcal and 43 g protein per healthtouch)  12/14: 100    NUTRITION FOCUSED PHYSICAL ASSESSMENT FOR DIAGNOSING MALNUTRITION)  Yes             Observed:    Muscle wasting (refer to documentation in Malnutrition section)    Obtained from Chart/Interdisciplinary Team:  Edema 1+ (left foot/ankle)    12/18: Vascular Surgery - s/p Left proximal superficial femoral to distal common posterior tibial ankle in situ greater saphenous vein bypass and teft leg angiogram via open common femoral artery access with restenting of previous distal SFA/above-knee popliteal stent.     ANTHROPOMETRICS  Height: " "Data Unavailable (5' 4\" as of 11/25/24)  Weight: 0 lbs 0 oz (88.5 kg as of 12/13/24)  There is no height or weight on file to calculate BMI. (33.47 using above measurements)  Weight Status:  Obesity Grade I BMI 30-34.9   IBW: 120 lb  % IBW: 163%  Weight History: No significant weight loss    Wt Readings from Last 10 Encounters:   12/13/24 88.5 kg (195 lb)   11/25/24 88.5 kg (195 lb)   11/23/24 88.5 kg (195 lb)   11/21/24 89.4 kg (197 lb)   10/29/24 89.4 kg (197 lb)   09/11/24 90.8 kg (200 lb 1.6 oz)   05/29/24 92.4 kg (203 lb 11.2 oz)   04/05/24 93 kg (205 lb)   03/16/24 93.5 kg (206 lb 1.6 oz)   11/02/23 90.7 kg (200 lb)     LABS  12/17: Cr 1.03 (H) GFR 54 (L)    MEDICATIONS  Ferrous sulfate 325 mg, Senokot    ASSESSED NUTRITION NEEDS PER APPROVED PRACTICE GUIDELINES:    Dosing Weight: 63.2 kg (adjusted)  Estimated Energy Needs: 7486-6219 kcals (20-30 kcal/kg)  Justification: obese  Estimated Protein Needs:  grams protein (1.2-1.8 g pro/Kg)  Justification: post-op and preservation of lean body mass  Estimated Fluid Needs: 3912-5363 mL (1 mL/Kcal)  Justification: maintenance or per MD recommendation    NUTRITION DIAGNOSIS:  Inadequate protein-energy intake related to poor appetite as evidenced by patient report, documented intakes, increased protein needs post-op, need for nutritional supplement    NUTRITION INTERVENTIONS  Recommendations / Nutrition Prescription  Ensure Plus HP with breakfast and lunch  Encouraged po intakes to support nutritional needs and recovery    Implementation  Nutrition education: No education needs assessed at this time  General/healthful diet  Medical Food Supplement - ordered    Nutrition Goals  PO intakes >/= 75% meals TID + supplements BID    MONITORING AND EVALUATION:  Progress towards goals will be monitored and evaluated per protocol and Practice Guidelines    Abbi Eden RD, LD    "

## 2024-12-19 ENCOUNTER — APPOINTMENT (OUTPATIENT)
Dept: PHYSICAL THERAPY | Facility: CLINIC | Age: 84
DRG: 253 | End: 2024-12-19
Attending: INTERNAL MEDICINE
Payer: COMMERCIAL

## 2024-12-19 ENCOUNTER — APPOINTMENT (OUTPATIENT)
Dept: OCCUPATIONAL THERAPY | Facility: CLINIC | Age: 84
DRG: 253 | End: 2024-12-19
Attending: INTERNAL MEDICINE
Payer: COMMERCIAL

## 2024-12-19 VITALS
HEART RATE: 82 BPM | TEMPERATURE: 99.5 F | RESPIRATION RATE: 18 BRPM | OXYGEN SATURATION: 100 % | DIASTOLIC BLOOD PRESSURE: 60 MMHG | SYSTOLIC BLOOD PRESSURE: 135 MMHG

## 2024-12-19 PROCEDURE — 250N000011 HC RX IP 250 OP 636: Performed by: INTERNAL MEDICINE

## 2024-12-19 PROCEDURE — 120N000001 HC R&B MED SURG/OB

## 2024-12-19 PROCEDURE — 99232 SBSQ HOSP IP/OBS MODERATE 35: CPT | Performed by: INTERNAL MEDICINE

## 2024-12-19 PROCEDURE — 97116 GAIT TRAINING THERAPY: CPT | Mod: GP

## 2024-12-19 PROCEDURE — 250N000013 HC RX MED GY IP 250 OP 250 PS 637: Performed by: STUDENT IN AN ORGANIZED HEALTH CARE EDUCATION/TRAINING PROGRAM

## 2024-12-19 PROCEDURE — 97530 THERAPEUTIC ACTIVITIES: CPT | Mod: GP

## 2024-12-19 PROCEDURE — 250N000013 HC RX MED GY IP 250 OP 250 PS 637

## 2024-12-19 PROCEDURE — 250N000013 HC RX MED GY IP 250 OP 250 PS 637: Performed by: INTERNAL MEDICINE

## 2024-12-19 PROCEDURE — 97535 SELF CARE MNGMENT TRAINING: CPT | Mod: GO

## 2024-12-19 PROCEDURE — 99207 PR NO BILLABLE SERVICE THIS VISIT: CPT

## 2024-12-19 RX ORDER — GABAPENTIN 600 MG/1
1200 TABLET ORAL AT BEDTIME
Status: DISPENSED | OUTPATIENT
Start: 2024-12-19

## 2024-12-19 RX ORDER — METHOCARBAMOL 500 MG/1
250 TABLET ORAL 3 TIMES DAILY PRN
Status: ACTIVE | OUTPATIENT
Start: 2024-12-19

## 2024-12-19 RX ADMIN — SENNOSIDES AND DOCUSATE SODIUM 2 TABLET: 50; 8.6 TABLET ORAL at 20:36

## 2024-12-19 RX ADMIN — HYDROMORPHONE HYDROCHLORIDE 2 MG: 2 TABLET ORAL at 21:45

## 2024-12-19 RX ADMIN — CEFAZOLIN 1 G: 1 INJECTION, POWDER, FOR SOLUTION INTRAMUSCULAR; INTRAVENOUS at 13:04

## 2024-12-19 RX ADMIN — HYDROMORPHONE HYDROCHLORIDE 2 MG: 2 TABLET ORAL at 05:45

## 2024-12-19 RX ADMIN — HYDROMORPHONE HYDROCHLORIDE 2 MG: 2 TABLET ORAL at 13:42

## 2024-12-19 RX ADMIN — BUPROPION HYDROCHLORIDE 150 MG: 150 TABLET, EXTENDED RELEASE ORAL at 08:13

## 2024-12-19 RX ADMIN — HYDROMORPHONE HYDROCHLORIDE 2 MG: 2 TABLET ORAL at 09:46

## 2024-12-19 RX ADMIN — CLOPIDOGREL BISULFATE 75 MG: 75 TABLET ORAL at 08:13

## 2024-12-19 RX ADMIN — GABAPENTIN 1200 MG: 600 TABLET, FILM COATED ORAL at 20:35

## 2024-12-19 RX ADMIN — RIVAROXABAN 20 MG: 10 TABLET, FILM COATED ORAL at 17:46

## 2024-12-19 RX ADMIN — GABAPENTIN 300 MG: 300 CAPSULE ORAL at 08:13

## 2024-12-19 RX ADMIN — CEFAZOLIN 1 G: 1 INJECTION, POWDER, FOR SOLUTION INTRAMUSCULAR; INTRAVENOUS at 05:45

## 2024-12-19 RX ADMIN — HYDROMORPHONE HYDROCHLORIDE 2 MG: 2 TABLET ORAL at 01:26

## 2024-12-19 RX ADMIN — HYDROMORPHONE HYDROCHLORIDE 2 MG: 2 TABLET ORAL at 17:45

## 2024-12-19 RX ADMIN — CEPHALEXIN 250 MG: 250 CAPSULE ORAL at 20:36

## 2024-12-19 RX ADMIN — LISINOPRIL 20 MG: 20 TABLET ORAL at 08:13

## 2024-12-19 ASSESSMENT — ACTIVITIES OF DAILY LIVING (ADL)
ADLS_ACUITY_SCORE: 39
ADLS_ACUITY_SCORE: 36
ADLS_ACUITY_SCORE: 39
ADLS_ACUITY_SCORE: 36
ADLS_ACUITY_SCORE: 39
ADLS_ACUITY_SCORE: 36
ADLS_ACUITY_SCORE: 39
ADLS_ACUITY_SCORE: 39
ADLS_ACUITY_SCORE: 36
ADLS_ACUITY_SCORE: 39
ADLS_ACUITY_SCORE: 36

## 2024-12-19 NOTE — PROGRESS NOTES
VASCULAR SURGERY PROGRESS NOTE    Subjective:  No acute events overnight. Pain slowly improving. Poor appetite, denies nvd. Regular diet. Tolerating PT.     Objective:  Intake/Output Summary (Last 24 hours) at 12/19/2024 0751  Last data filed at 12/18/2024 1430  Gross per 24 hour   Intake 720 ml   Output 350 ml   Net 370 ml     PHYSICAL EXAM:  /53 (BP Location: Right arm, Patient Position: Semi-Nobles's, Cuff Size: Adult Regular)   Pulse 84   Temp 98.6  F (37  C) (Oral)   Resp 18   SpO2 97%   Alert and oriented x4, neurologically intact  CMS intact  Incisions CDI  Palpable graft pulse, with biphasic PT       ASSESSMENT:  84 year old female POD4 s/p Left proximal superficial femoral to distal common posterior tibial ankle in situ greater saphenous vein bypass and teft leg angiogram via open common femoral artery access with restenting of previous distal SFA/above-knee popliteal stent.          PLAN:  -continue Eliquis+plavix  -iron for asymptomatic acute on chronic blood loss  -poor appetite, recommend protein supplementation   -oob, continue to increase activity  -podiatry to assess for debridement outpatient in 2-3 weeks  -Edemawear, change gauze underneath as needed  -continue pulse checks  -pain regimen  -pulmonary toilet  -from vascular surgery standpoint, ok to discharge today, pending further recs from PT/primary team  -possible ARU    Discussed pt history, exam, assessment and plan with vascular surgery staff    Charlee Vargas NP  VASCULAR SURGERY     STAFF: Very comfortable this morning.  Did a better job with ambulation yesterday with physical therapy and walker but still limited.      All surgical sites=A.      +3 graft pulse.  Excellent Doppler graft and native distal DVT.    Plan: On chronic Xarelto along with Plavix.  Increase activity.  If he still has issues with ambulation plan TCU in the next several days.      Leonard Coates MD

## 2024-12-19 NOTE — PROGRESS NOTES
Care Management Follow Up    Length of Stay (days): 6    Expected Discharge Date: 12/20/2024     Concerns to be Addressed:       Patient plan of care discussed at interdisciplinary rounds: Yes    Anticipated Discharge Disposition: Transitional Care              Anticipated Discharge Services:    Anticipated Discharge DME: Walker    Patient/family educated on Medicare website which has current facility and service quality ratings:    Education Provided on the Discharge Plan: Yes  Patient/Family in Agreement with the Plan: yes    Referrals Placed by CM/SW: Post Acute Facilities  Private pay costs discussed: Not applicable    Discussed  Partnership in Safe Discharge Planning  document with patient/family: No     Handoff Completed: No, handoff not indicated or clinically appropriate    Additional Information:  Patient accepted to [facility] in [place]. Writer sent a message asking if this is a private room or shared.     They offered a private room tomorrow, 12/20/24 after 1500. Waiting for answer on additional cost for private room.    1530: Updated patient on room tomorrow and cost of private room ($45/day). She is in agreement and would like a ride set up for her at discharge. Call to [facility] and wheelchair set up for 12/20/24 between [id number]. Message sent to facility to update.    Next Steps: update patient of bed acceptance and follow for discharge     HANNAH Piña

## 2024-12-19 NOTE — PROGRESS NOTES
[facility]    Medicine Progress Note - Hospitalist Service    Date of Admission:  12/13/2024    Assessment & Plan   Yumiko Mccartney is a 84 year old female with known history of peripheral vascular disease who presents from her podiatrist office with worsening pain of her left great toe. Hospital problem as below     Peripheral vascular disease Status post bypass proximal superior femoral to distal posterior tibial angioplasty and SFA stent placement x 2  on 12/15/24   Skin ulcer of the left great toe with exposed fat layer  Cellulitis of the left great toe  *Recently underwent left lower extremity angiogram 11/25/2024 revascularization of the left SFA  *Patient reports worsening pain for the last few days although she has had pain of the left toe for several weeks.  She also noticed some erythema with puslike drainage  *Saw vascular surgery 12/12 and was started on Keflex with concerns for cellulitis  *Was evaluated by  at podiatry clinic 12/13 where x-ray did not show deeper infection.  Recommended admission to the hospital and MRI of the left foot  *MRI of the foot with no evidence for osteomyelitis, soft tissue stranding of the great toe can be seen with a cellulitis, no soft tissue abscess.  CRP low  *Podiatry and vascular surgery following, appreciate input  *Status post bypass proximal superior femoral to distal posterior tibial angioplasty and SFA stent placement x 2 - 12/15. EBL 250cc  *received vanc/zosyn 12/13-12/16, transitioned to Cefazolin on 12/16/2024     - routine post op cares per vascular surgery  - Continue Plavix. ASA stopped and PTA Xarelto resumed on 12/18/2024 by vascular  - Change Ancef to Keflex TID to complete 10 days course  - podiatry followed, recommend continued wound care, may follow up to debride plantar hallux eschar when pain improved and plan for outpatient, podiatry signed off on 12/17  - bowel regimen in place  - prn analgesics  - resumed PTA  "gabapentin 300mg daily and 600mg at bedtime on 12/18, increase at bedtime dose to PTA dose of 1200mg on 12/19/2024  - vascular surgery following, ok to discharge from their stand point  - social work following, likely discharge to TCU on 12/20      Acute on chronic anemia suspect component of surgical blood loss  Hemoglobin baseline ~10g/dL  EBL 250cc on 12/15/24  --hgb 9.2 g/dL preop  8.3 on POD1--> 7.7g/dL-->7.9 on 12/18/2024  - ferrous sulfate has been initiated, continue  - will monitor    Essential hypertension  -PTA on lisinopril/HCTZ  daily  - restart Lisinopril 20mg daily on 12/18/2024  - continue to hold hydrochlorothiazide at this time, bp controlled. Add back as bp starts to rise       History of DVT  -PTA Xarelto resumed by vascular surgery on 12/18/2024 as above       Asthma, moderate persistent  -Resume PTA as needed nebulizers     Major depression  -Resume prior to admission Wellbutrin     CKD stage IIIa  -Baseline creatinine is between 1-1.2, currently at baseline  - monitor          Diet: Low Saturated Fat Na <2400 mg  Snacks/Supplements Adult: Ensure Enlive; With Meals    DVT Prophylaxis: Pneumatic Compression Devices and Ambulate every shift, Xarelto  Elizondo Catheter: Not present  Lines: None     Cardiac Monitoring: None  Code Status: Full Code      Clinically Significant Risk Factors                   # Hypertension: Noted on problem list            # Obesity: Estimated body mass index is 33.47 kg/m  as calculated from the following:    Height as of 11/25/24: 1.626 m (5' 4\").    Weight as of an earlier encounter on 12/13/24: 88.5 kg (195 lb).      # Asthma: noted on problem list        Social Drivers of Health    Physical Activity: Unknown (4/5/2024)    Exercise Vital Sign     Days of Exercise per Week: 1 day          Disposition Plan     Medically Ready for Discharge: Anticipated Tomorrow to TCU             Dejuan Crump MD  Hospitalist Service  [facility] " Hospital  Securely message with [employer] (more info)  Text page via [employer] Paging/Directory   ______________________________________________________________________    Interval History   Patient reports pain is somewhat improved. She was able to do more with therapy today and having pain med prior helps significantly.   Denies nausea or vomiting or abdominal pain. States not much appetite but likes the ensure.   Afebrile.   Had BM x 2 yesterday      Physical Exam   Vital Signs: Temp: 98.7  F (37.1  C) Temp src: Oral BP: 135/54 Pulse: 78   Resp: 18 SpO2: 97 % O2 Device: None (Room air)    Weight: 0 lbs 0 oz    General Appearance: Alert, awake and no apparent distress  Respiratory: clear to auscultation bilaterally  Cardiovascular: regular rate and rhythm  GI: soft and non-tender  Skin: Left leg wrapped in dressing. Left toe with no significant erythema, base of toe some eschar on plantar aspect, no drainage noted      Medical Decision Making       MANAGEMENT DISCUSSED with the following over the past 24 hours: patient and RN, care management team   NOTE(S)/MEDICAL RECORDS REVIEWED over the past 24 hours: vascular surgery note,nursing notes       Data         Imaging results reviewed over the past 24 hrs:   No results found for this or any previous visit (from the past 24 hours).

## 2024-12-19 NOTE — PLAN OF CARE
Goal Outcome Evaluation:       Pt A&Ox4. VSS on RA. Low Sat Fat Na diet. Up 1A GB&W, voiding in BR. LLE dressing CDI, elevated on wedge, CMS intact. Pain controlled w/ PRN PO Dilaudid. L PIV SL w/ int abx. Vascular following. Discharge to TCU tomorrow 12/20 w/ WC ride scheduled for [id number], Continue plan of Care.

## 2024-12-19 NOTE — PLAN OF CARE
Date/Time:12/18 2231-0432   Trauma/Ortho/Medical (Choose one) :Ortho   Diagnosis:CREATION BYPASS VASCULAR PROXIMAL SUPERIOR FEMORAL TO DISTAL POSTERIOR TIBIAL, ANGIOPLASTY AND SFA STENE PLACEMENT X 2  POD#:3  Mental Status:A/O x 4  Activity/dangle: Ax1 GB/W  Diet: Cardiac   Pain: PRN PO Dilaudid,scheduled Tylenol  Elizondo/Voiding:Up to Bathroom to void, BM today  Tele/Restraints/Iso:NA  02/LDA: VSS on RA ex HTN at times ,RPIV x2 SL  D/C Date: TBD- TCU placement  Other Info: CMS intact, LLE dressing CDI

## 2024-12-19 NOTE — DISCHARGE INSTRUCTIONS
[facility]  Discharge Instructions: Post-Operative Femoral Popliteal Artery Bypass    ACTIVITY  Take frequent, short walks and increase your activity gradually.    Avoid strenuous physical activity or heavy lifting greater than 15-20 lbs. for 1-2 weeks.  You may climb stairs.  You may drive without restrictions when you are not using any prescription pain medication and feel comfortable in a car.  You may return to work/school when you are comfortable without any prescription pain medication.    WOUND CARE  You may remove your outer dressing or Band-Aids and shower 48 hours after the surgery.  Pat your incisions dry and leave them open to air.  Re-apply dressing (Band-Aids or gauze/tape) as needed for comfort or drainage.  You have steri-strips (looks like white tape) on your incisions.  You may peel off the steri-strips 2 weeks after your surgery if they have not peeled off on their own.   Do not soak your incisions in a tub or pool for 2 weeks.   Do not apply any lotions, creams, or ointments to your incision(s).  A ridge under your incision(s) is normal and will gradually resolve.    DIET  Start with liquids, then gradually resume your regular diet as tolerated.   Drink plenty of liquids to stay hydrated.    PAIN  Expect some tenderness and discomfort at the incision site(s).  Use the prescribed pain medication at your discretion.  Expect gradual resolution of your pain over several days.  You may take acetaminophen/Tylenol instead of or in addition to your prescribed pain medication.    Do not drink alcohol or drive while you are taking pain medications.  You may apply ice to your incisions in 20 minute intervals as needed for the next 48 hours.  After that time, consider switching to heat if you prefer.    EXPECTATIONS  Pain medications can cause constipation.  Limit use when possible.  Take over the counter stool softener/stimulant, such as Colace or Senna, 1-2 times a day with  plenty of water.  You may take a mild over the counter laxative, such as Miralax or a suppository, as needed.    You may discontinue these medications once you are having regular bowel movements and/or are no longer taking your narcotic pain medication.    FOLLOW UP  You have a follow up appointment scheduled in clinic on +++. Please contact us prior if you have any concerns.   For any questions, please call us at [phone] and ask to speak with our nurse.  We are located at [address] [place] [address].    CALL OUR OFFICE -[phone] IF YOU HAVE:   Chills or fever above 101 F.  Increased redness, warmth, or drainage at your incisions.  Significant bleeding.  Pain not relieved by your pain medication or rest.  Increasing pain after the first 48 hours.  Cool or paleness to your lower leg.   Any other concerns or questions.

## 2024-12-19 NOTE — PLAN OF CARE
Goal Outcome Evaluation:    Date/Time: 12/18/2024 6531-1240     Diagnosis: Bypass vascular proximal superior Femoral to distal posterior tibial- agiogrma and SFA stent placement  POD#: 4  Mental Status: A/O x4  Activity/dangle: 1 GB/walker  Diet: Low sat fat , Na < 2400mg  Pain: Dilaudid given  Elizondo/Voiding: BR  Tele/Restraints/Iso:NA  02/LDA: RA / PIV sl  D/C Date: TBD  Other Info:  dressing LLE CDI

## 2024-12-19 NOTE — PROGRESS NOTES
Shift Summary [id number]    Admitting Diagnosis: Left foot cellulitis  PVD (peripheral vascular disease) with claudication (H)   Vitals:Baseline  Pain: PRN Oral dilaudid  A&Ox4  Voiding:BR  Mobility:A 1GBW  Tele:na  CMS  PIV:Intermittent abx  Lung Sounds:Clear  GI:BM during this shift  Dressing: LLE dressing CDI  Diet:Low Saturated Fat Na <2400 mg  Other: TCU placement pending

## 2024-12-20 VITALS
SYSTOLIC BLOOD PRESSURE: 116 MMHG | HEART RATE: 78 BPM | TEMPERATURE: 98.7 F | DIASTOLIC BLOOD PRESSURE: 70 MMHG | RESPIRATION RATE: 16 BRPM | OXYGEN SATURATION: 98 %

## 2024-12-20 PROCEDURE — 250N000013 HC RX MED GY IP 250 OP 250 PS 637: Performed by: INTERNAL MEDICINE

## 2024-12-20 PROCEDURE — 99207 PR NO BILLABLE SERVICE THIS VISIT: CPT

## 2024-12-20 PROCEDURE — 250N000013 HC RX MED GY IP 250 OP 250 PS 637: Performed by: STUDENT IN AN ORGANIZED HEALTH CARE EDUCATION/TRAINING PROGRAM

## 2024-12-20 PROCEDURE — 99239 HOSP IP/OBS DSCHRG MGMT >30: CPT | Performed by: INTERNAL MEDICINE

## 2024-12-20 RX ORDER — METHOCARBAMOL 500 MG/1
500 TABLET, FILM COATED ORAL 3 TIMES DAILY PRN
DISCHARGE
Start: 2024-12-20

## 2024-12-20 RX ORDER — AMOXICILLIN 250 MG
1 CAPSULE ORAL 2 TIMES DAILY PRN
DISCHARGE
Start: 2024-12-20

## 2024-12-20 RX ORDER — FERROUS SULFATE 325(65) MG
325 TABLET ORAL EVERY OTHER DAY
DISCHARGE
Start: 2024-12-22

## 2024-12-20 RX ORDER — LISINOPRIL 20 MG/1
20 TABLET ORAL DAILY
DISCHARGE
Start: 2024-12-21 | End: 2025-01-09 | Stop reason: DRUGHIGH

## 2024-12-20 RX ORDER — HYDROMORPHONE HYDROCHLORIDE 2 MG/1
2 TABLET ORAL EVERY 6 HOURS PRN
Qty: 20 TABLET | Refills: 0 | Status: SHIPPED | OUTPATIENT
Start: 2024-12-20

## 2024-12-20 RX ADMIN — HYDROMORPHONE HYDROCHLORIDE 2 MG: 2 TABLET ORAL at 11:10

## 2024-12-20 RX ADMIN — CLOPIDOGREL BISULFATE 75 MG: 75 TABLET ORAL at 08:33

## 2024-12-20 RX ADMIN — CEPHALEXIN 250 MG: 250 CAPSULE ORAL at 06:48

## 2024-12-20 RX ADMIN — HYDROMORPHONE HYDROCHLORIDE 2 MG: 2 TABLET ORAL at 03:03

## 2024-12-20 RX ADMIN — FERROUS SULFATE TAB 325 MG (65 MG ELEMENTAL FE) 325 MG: 325 (65 FE) TAB at 08:33

## 2024-12-20 RX ADMIN — CEPHALEXIN 250 MG: 250 CAPSULE ORAL at 14:02

## 2024-12-20 RX ADMIN — BUPROPION HYDROCHLORIDE 150 MG: 150 TABLET, EXTENDED RELEASE ORAL at 08:33

## 2024-12-20 RX ADMIN — GABAPENTIN 300 MG: 300 CAPSULE ORAL at 08:33

## 2024-12-20 RX ADMIN — LISINOPRIL 20 MG: 20 TABLET ORAL at 08:33

## 2024-12-20 RX ADMIN — HYDROMORPHONE HYDROCHLORIDE 2 MG: 2 TABLET ORAL at 06:48

## 2024-12-20 RX ADMIN — HYDROMORPHONE HYDROCHLORIDE 2 MG: 2 TABLET ORAL at 15:06

## 2024-12-20 ASSESSMENT — ACTIVITIES OF DAILY LIVING (ADL)
ADLS_ACUITY_SCORE: 36

## 2024-12-20 NOTE — PROGRESS NOTES
Care Management Discharge Note    Discharge Date: 12/20/2024       Discharge Disposition: Transitional Care    Discharge Services:      Discharge DME: Walker    Discharge Transportation: family or friend will provide, agency    Private pay costs discussed: private room/amenity fees and transportation costs    Does the patient's insurance plan have a 3 day qualifying hospital stay waiver?  Yes     Which insurance plan 3 day waiver is available? Alternative insurance waiver    Will the waiver be used for post-acute placement? Yes    PAS Confirmation Code: [id number]  Patient/family educated on Medicare website which has current facility and service quality ratings:      Education Provided on the Discharge Plan: Yes  Persons Notified of Discharge Plans: Tomi   Patient/Family in Agreement with the Plan: yes    Handoff Referral Completed: No, handoff not indicated or clinically appropriate    Additional Information:  Patient accepted to  [facility] today. Writer had set up a ride for her via [employer] today between [id number]. Writer had updated facility via [employer]. PAS completed.     PAS-RR    D: Per DHS regulation, SW completed and submitted PAS-RR to [place] [employer] Direct [employer] via the Senior [employer] Line.  PAS-RR confirmation # is : [id number]    I: SW spoke with patient and they are aware a PAS-RR has been submitted.  SW reviewed with patient that they may be contacted for a follow up appointment within 10 days of hospital discharge if their SNF stay is < 30 days.  Contact information for [employer] was also provided.    A: patient verbalized understanding.    P: Further questions may be directed to [employer] Line at #[phone], option #4 for PAS-RR staff.    Writer updated patient on timing and she is in agreement. Orders received for discharge and faxed. Call to facility to update and they are aware and transport time works out fine for them.     HANNAH Piña

## 2024-12-20 NOTE — PLAN OF CARE
Goal Outcome Evaluation:  Diagnosis:CREATION BYPASS VASCULAR PROXIMAL SUPERIOR FEMORAL TO DISTAL POSTERIOR TIBIAL, ANGIOPLASTY AND SFA STENE PLACEMENT X 2  POD#:5    Orientation: Aox4    Vitals/Tele: VSS on RA    Pain: Dilaudid x2, scheduled tylenol.    IV Access/drains: PIV SL x2    Diet: Cardiac    Mobility: Ax1 GB/W    GI/: Continent B/B    Wound/Skin: LLE dressing CDI    Discharge Plan: Patient accepted to [facility] in [place]. A ride is set up between [id number]

## 2024-12-20 NOTE — PLAN OF CARE
Goal Outcome Evaluation:       Date/Time:12/19 1900-2330     Trauma/Ortho/Medical (Choose one) :Ortho     Diagnosis:CREATION BYPASS VASCULAR PROXIMAL SUPERIOR FEMORAL TO DISTAL POSTERIOR TIBIAL, ANGIOPLASTY AND SFA STENE PLACEMENT X 2  POD#:4  Mental Status:A/O x 4  Activity/dangle: Ax1 GB/W  Diet: Cardiac   Pain: PRN PO Dilaudid,scheduled Tylenol  Elizondo/Voiding:Up to Bathroom to void  Tele/Restraints/Iso:NA  02/LDA: VSS on RA ex HTN at times ,RPIV x2 SL  D/C Date: TBD- TCU placement  Other Info: CMS intact, LLE dressing CDI

## 2024-12-20 NOTE — PLAN OF CARE
Physical Therapy Discharge Summary    Reason for therapy discharge:    Discharged to transitional care facility.    Progress towards therapy goal(s). See goals on Care Plan in [facility] electronic health record for goal details.  Goals partially met.  Barriers to achieving goals:   discharge from facility.    Therapy recommendation(s):    Continued therapy is recommended.  Rationale/Recommendations:Pt is below baseline and will benefit from continued skilled PT intervention at TCU prior to returning home alone.  PT Brief overview of current status: Assist of 1 with FWW    Recommendation above provided by last treating therapist.

## 2024-12-20 NOTE — PLAN OF CARE
Goal Outcome Evaluation:       Pt A&Ox4. VSS on RA. Low Sat Fat Na diet. Up 1A GB&W, voiding in BR. LLE dressing CDI, elevated on wedge. CMS intact. Pain controlled w/ PO dilaudid. PIV removed, belongings sent with patient. Pt discharging to TCU this evening w/ WC shasta, TCU paperwork prepared. Continue plan of Care.

## 2024-12-20 NOTE — PROGRESS NOTES
VASCULAR SURGERY PROGRESS NOTE    Subjective:  No acute events overnight. Pain slowly improving. Poor appetite, denies nvd. Regular diet. Tolerating PT.     Objective:  Intake/Output Summary (Last 24 hours) at 12/20/2024 0742  Last data filed at 12/19/2024 1400  Gross per 24 hour   Intake --   Output 1 ml   Net -1 ml     PHYSICAL EXAM:  /52 (BP Location: Left arm, Patient Position: Semi-Nobles's, Cuff Size: Adult Regular)   Pulse 82   Temp 98.3  F (36.8  C) (Oral)   Resp 16   SpO2 96%   Alert and oriented x4, neurologically intact  CMS intact  Incisions CDI  Palpable graft pulse, with biphasic PT     ASSESSMENT:  84 year old female POD5 s/p Left proximal superficial femoral to distal common posterior tibial ankle in situ greater saphenous vein bypass and teft leg angiogram via open common femoral artery access with restenting of previous distal SFA/above-knee popliteal stent.     PLAN:  -continue Eliquis+plavix  -iron for asymptomatic acute on chronic blood loss  -poor appetite, recommend protein supplementation   -oob, continue to increase activity  -podiatry to assess for debridement outpatient in 2-3 weeks  -[place], change gauze underneath as needed  -continue pulse checks  -pain regimen  -pulmonary toilet  -from vascular surgery standpoint, ok to discharge today, pending further recs from PT/primary team  -ARU today     Discussed pt history, exam, assessment and plan with vascular surgery staff    Charlee Vargas NP  VASCULAR SURGERY     STAFF: As above.  Overall doing excellent with well-healing surgical incisions and +3 pulse.  Podiatry plans to debride plantar left great toe ulcer that has no evidence of osteomyelitis by MRI.  Hopefully with improved blood supply this will heal rapidly.    Patient is been fairly sore and slow to ambulate even with physical therapy and walker.  Thus plan ARU placement short-term.    Leonard Coates MD

## 2024-12-20 NOTE — DISCHARGE SUMMARY
"[facility]  Hospitalist Discharge Summary      Date of Admission:  12/13/2024  Date of Discharge:  12/20/2024  Discharging Provider: Dejuan Crump MD  Discharge Service: Hospitalist Service    Discharge Diagnoses   See below    Clinically Significant Risk Factors     # Obesity: Estimated body mass index is 33.47 kg/m  as calculated from the following:    Height as of 11/25/24: 1.626 m (5' 4\").    Weight as of an earlier encounter on 12/13/24: 88.5 kg (195 lb).       Follow-ups Needed After Discharge   Follow-up Appointments       Follow Up and recommended labs and tests      Follow up with alf physician.  The following labs/tests are recommended: basic metabolic panel and complete blood count in a week.        Follow-up and recommended labs and tests       *PLEASE CALL [phone] TO SCHEDULE*  Follow up in 2-3 weeks  --------------------------------------------------------------------------  [facility] SPECIAL[facility]  [address]  [place] [address] [place], [place] [address] [phone]  [phone]     [address] [place] [address] [address]  [place] [address] [place] [address]  [phone]  [phone]    Dr. Daxa Crawford - [facility][1] / [place]                Unresulted Labs Ordered in the Past 30 Days of this Admission       No orders found from 11/13/2024 to 12/14/2024.            Discharge Disposition   Discharged to short-term care facility  Condition at discharge: Stable    Hospital Course   Yumiko Mccartney is a 84 year old female with known history of peripheral vascular disease who presents from her podiatrist office with worsening pain of her left great toe. Hospital problem as below     Peripheral vascular disease Status post " bypass proximal superior femoral to distal posterior tibial angioplasty and SFA stent placement x 2  on 12/15/24   Skin ulcer of the left great toe with exposed fat layer  Cellulitis of the left great toe  *Recently underwent left lower extremity angiogram 11/25/2024 revascularization of the left SFA  *Patient reports worsening pain for the last few days although she has had pain of the left toe for several weeks.  She also noticed some erythema with puslike drainage  *Saw vascular surgery 12/12 and was started on Keflex with concerns for cellulitis  *Was evaluated by  at podiatry clinic 12/13 where x-ray did not show deeper infection.  Recommended admission to the hospital and MRI of the left foot  *MRI of the foot with no evidence for osteomyelitis, soft tissue stranding of the great toe can be seen with a cellulitis, no soft tissue abscess.  CRP low  *Podiatry and vascular surgery following, appreciate input  *Status post bypass proximal superior femoral to distal posterior tibial angioplasty and SFA stent placement x 2 - 12/15. EBL 250cc  *received vanc/zosyn 12/13-12/16, transitioned to Cefazolin on 12/16/2024     - post op cares per vascular surgery  - Continue Plavix. ASA stopped and PTA Xarelto resumed on 12/18/2024 by vascular. Will continue ASA/Xarelto at discharge  - Change Ancef to Keflex TID to complete 10 days course-->7 more doses remaining at discharge  - podiatry followed, recommend continued wound care, may follow up to debride plantar hallux eschar when pain improved and plan for outpatient, podiatry signed off on 12/17  - bowel regimen in place--> continue prn at discharge. Currently having BM  - pain continues to be an issue-- discharge on Dilaudid 2mg q6hrs prn, Tylenol and Robaxin 500mg TID prn-- discussed with patient  - resumed PTA gabapentin 300mg daily and 1200mg at bedtime on 12/19/2024  - vascular surgery following, ok to discharge from their stand point  - will discharge to  TCU      Acute on chronic anemia suspect component of surgical blood loss  Hemoglobin baseline ~10g/dL  EBL 250cc on 12/15/24  --hgb 9.2 g/dL preop  8.3 on POD1--> 7.7g/dL-->7.9 on 12/18/2024  - ferrous sulfate every other day initiated in hospital--will continue at discharge  - will monitor    Essential hypertension  -PTA on lisinopril/HCTZ  daily  - restart Lisinopril 20mg daily on 12/18/2024  - continue to hold hydrochlorothiazide at discharge since bp well controlled at this time. This can be added back at TCU or OP basis if bp rises       History of DVT  -PTA Xarelto resumed by vascular surgery on 12/18/2024 as above    Asthma, moderate persistent-stale  -Resume PTA as needed nebulizers     Major depression  -Resume prior to admission Wellbutrin     CKD stage IIIa  -Baseline creatinine is between 1-1.2, currently at baseline      Consultations This Hospital Stay   VASCULAR SURGERY IP CONSULT  PODIATRY IP CONSULT  PHARMACY TO DOSE VANCO  PHYSICAL THERAPY ADULT IP CONSULT  OCCUPATIONAL THERAPY ADULT IP CONSULT  CARE MANAGEMENT / SOCIAL WORK IP CONSULT  NUTRITION SERVICES ADULT IP CONSULT  VASCULAR ACCESS ADULT IP CONSULT  PHYSICAL THERAPY ADULT IP CONSULT  OCCUPATIONAL THERAPY ADULT IP CONSULT    Code Status   Full Code    Time Spent on this Encounter   I, Dejuan Crump MD, personally saw the patient today and spent 45 minutes discharging this patient.       Dejuan Crump MD  [facility] ORTHOP[facility]  [address] [place] [address]  Phone: [phone]  Fax: [phone]  ______________________________________________________________________    Physical Exam   Vital Signs: Temp: 98.7  F (37.1  C) Temp src: Oral BP: 116/70 Pulse: 78   Resp: 16 SpO2: 98 % O2 Device: None (Room air)    Weight: 0 lbs 0 oz  General Appearance: Alert, awake and no apparent distress  Respiratory: clear to auscultation bilaterally  Cardiovascular: regular rate and rhythm  GI: soft and  non-tender  Skin: warm and dry         Primary Care Physician   Gavi Bear    Discharge Orders      Primary Care - Care Coordination Referral      Activity    Your activity upon discharge: Weightbearing as tolerated to Left lower extremity.     Follow-up and recommended labs and tests     *PLEASE CALL [phone] TO SCHEDULE*  Follow up in 2-3 weeks  --------------------------------------------------------------------------  [facility] SPECIALTY  [address] [place] [address] [address]  [place] [address] [place] [address]  [phone]  [phone]     [address] [facility] [facility] [address]  [place] [address] [place] [address]  [phone]  [phone]    Dr. Daxa Nguyen - [place]  Dr. Shabnam Davis - [place]  Dr. Sukhdeep Crawford - [facility] / [place]     Wound care and dressings    Instructions to care for your wound at home:  Keep the left foot clean, dry and open to air.  Apply a small amount of betadine to the wound sites on first toe daily     General info for SNF    Length of Stay Estimate: Short Term Care: Estimated # of Days 31-90  Condition at Discharge: Stable  Level of care:skilled   Rehabilitation Potential: Good  Admission H&P remains valid and up-to-date: Yes  Recent Chemotherapy: N/A  Use Nursing Home Standing Orders: Yes     Mantoux instructions    Give two-step Mantoux (PPD) Per Facility Policy Yes     Follow Up and recommended labs and tests    Follow up with detention physician.  The following labs/tests are recommended: basic metabolic panel and complete blood count in a week.     Reason for your hospital stay    You were admitted to the hospital for peripheral vascular disease.     Activity - Up with assistive device     Activity - Up with nursing assistance     Physical Therapy Adult Consult    Evaluate and treat as clinically  indicated.    Reason:  physical deconditioning     Occupational Therapy Adult Consult    Evaluate and treat as clinically indicated.    Reason:  deconditioning     Fall precautions     Diet    Follow this diet upon discharge: Current Diet:Orders Placed This Encounter      Snacks/Supplements Adult: Ensure Enlive; With Meals      Regular diet       Significant Results and Procedures   Most Recent 3 CBC's:  Recent Labs   Lab Test 12/18/24  0852 12/17/24  1112 12/16/24  0802 12/15/24  2102 12/15/24  0836   WBC  --  9.0 7.7  --  6.5   HGB 7.9* 7.7* 8.3*  --  9.2*   MCV  --  84 83  --  85    372 356   < > 424    < > = values in this interval not displayed.     Most Recent 3 BMP's:  Recent Labs   Lab Test 12/17/24  1112 12/17/24  0550 12/16/24  0802 12/15/24  0836     --  144 143   POTASSIUM 3.8  --  4.3 4.4   CHLORIDE 104  --  108* 107   CO2 27  --  27 27   BUN 13.5  --  10.4 12.0   CR 1.03*  --  1.04* 1.03*   ANIONGAP 8  --  9 9   NGOC 8.8  --  8.8 9.1   * 112* 105*  105* 105*   ,   Results for orders placed or performed during the hospital encounter of 12/13/24   US Lower Extremity Arterial Duplex Left    Narrative    EXAM: US LOWER EXTREMITY ARTERIAL DUPLEX LEFT  LOCATION: [facility]  DATE: 12/13/2024    INDICATION: Concern for acute limb ischemia  COMPARISON: None.  TECHNIQUE: Duplex utilizing 2D gray-scale imaging, Doppler interrogation with color-flow and spectral waveform analysis.    FINDINGS:    LEFT LOWER EXTREMITY ARTERIAL ASSESSMENT:  Common femoral artery: 105 cm/s  Profunda femoris artery: 62 cm/s  SFA (proximal): 93 cm/s  SFA (mid): 87 cm/s    SFA (distal): Velocities within the distal SFA stent range from 72-82 cm/s    Popliteal artery: 46-61 cm/s  Anterior tibial artery: 127 cm/s  Posterior tibial artery: 62 cm/s  peroneal artery: 69 centimeters per second  Dorsalis pedis artery: 21 cm/s    Waveforms are multiphasic from the CFA through the popliteal artery.  Waveforms are monophasic in the tibial arteries.      Impression    IMPRESSION:  1.  Distal left SFA stent is widely patent.  2.  Multiphasic waveforms from the CFA through the popliteal artery with transition to monophasic waveforms in the tibial arteries. This suggests stenosis of the distal popliteal or proximal tibial arteries.   US Lower Extremity Venous Mapping Bilateral    Narrative    EXAM: US LOWER EXTREMITY VENOUS MAPPING BILATERAL  LOCATION: [facility]  DATE: 12/13/2024    INDICATION: Pre op for bypass  COMPARISON: None.  TECHNIQUE: Ultrasound examination of the lower extremity veins was performed, including gray-scale and compression imaging.     LOWER  EXTREMITY FINDINGS:       VENOUS DIAMETERS  RIGHT GREAT SAPHENOUS VEIN  Upper Thigh: 8.2 mm  Mid Thigh: 6.2 mm  Lower Thigh: 6 mm  Knee: 4.6 mm  Upper Calf: 3.6 mm  Mid Calf: 3.4 mm  Lower Calf: 3.0 mm  Ankle: 2.9 mm    LEFT GREAT SAPHENOUS VEIN  Upper Thigh: 5.9 mm  Mid Thigh: 4.4 mm  Lower Thigh: 4.9 mm  Knee: 4.0 mm  Upper Calf: 3.5 mm  Mid Calf: 3.4 mm  Lower Calf: 2.8 mm  Ankle: 2.8 mm        Impression    IMPRESSION:  Bilateral great saphenous veins are patent. Measurements as above.   US Carotid Bilateral    Narrative    EXAM: US CAROTID BILATERAL  LOCATION: [facility]  DATE: 12/14/2024    INDICATION: Peripheral vascular disease. PRE OP  COMPARISON: None.  TECHNIQUE: Duplex exam performed utilizing 2D gray-scale imaging, Doppler interrogation with color-flow and spectral waveform analysis. The percent diameter stenosis is determined using Updated Recommendations for Carotid Stenosis Interpretation Criteria   from [employer] [facility].    FINDINGS:    RIGHT: Mild plaque at the bifurcation. The peak systolic velocity in the ICA is less than 180 cm/sec, consistent with less than 50% stenosis. Normal velocities in the ECA. Antegrade flow within the vertebral artery.     LEFT: Mild plaque at the  bifurcation. The peak systolic velocity in the ICA is less than 180 cm/sec, consistent with less than 50% stenosis. Normal velocities in the ECA. Antegrade flow within the vertebral artery.    VELOCITY CHART:  CCA   Right: 88/18 cm/s   Left: 91/16 cm/s  ICA   Right: 90/20 cm/s   Left: 112/35 cm/s  ECA   Right: 82/11 cm/s   Left: 111/15 cm/s  ICA/CCA PSV Ratio   Right: 1.1   Left: 1.2      Impression    IMPRESSION:  1.  Mild plaque formation, velocities consistent with less than 50% stenosis in the right internal carotid artery.  2.  Mild plaque formation, velocities consistent with less than 50% stenosis in the left internal carotid artery.  3.  Flow within the vertebral arteries is antegrade.   MR Foot Left w/o & w Contrast    Narrative    EXAM: MR FOOT LEFT W/O and W CONTRAST  LOCATION: [facility]  DATE: 12/14/2024    INDICATION: Peripheral vascular disease, cellulitis and toe ulcer.  COMPARISON: 12/13/2024.  TECHNIQUE: Routine. Additional postgadolinium T1 sequences were obtained.  IV CONTRAST: 9mL Gadavist    FINDINGS:     JOINTS AND BONES:   -No evidence of osteomyelitis. There is no marrow edema, enhancement or cortical destructive change. No marrow replacement with attention to the toes. There is no evidence of an acute fracture. Localized partial-thickness cartilage loss with subchondral   cystic change along the naviculocuneiform articulation. No joint effusion or synovitis on postcontrast sequences.    TENDONS:   -No tendon tear, tendinopathy, or tenosynovitis.     LIGAMENTS:   -Lisfranc ligament: Intact. No subluxation.    MUSCLES AND SOFT TISSUES:   -There is scattered soft tissue edema about the visualized foot. There is some more localized reticulation and edema within the soft tissues of the great toe. No rim-enhancing abscess or soft tissue mass. Partial fatty atrophy intrinsic musculature of   the foot.      Impression    IMPRESSION:  1.  No evidence for osteomyelitis with  attention to the the great toe.    2.  Soft tissue stranding great toe can be seen with cellulitis. No soft tissue abscess.   Echocardiogram Complete     Value    LVEF  55-60%    Narrative    [id number]  [id number]  [id number]^DARLENE^CASSIE^JAYLEN     [facility]  Echocardiography Laboratory  [address] [place] [address]     Name: MELODIE ZAPATA  MRN: [id number]  : 1940  Study Date: 2024 10:09 AM  Age: 84 yrs  Gender: Female  Patient Location: [facility]  Reason For Study: Abn EKG  Ordering Physician: CASSIE COLON  Referring Physician: PETER PAEZ  Performed By: Sandra Meneses     BSA: 1.9 m2  Height: 64 in  Weight: 195 lb  HR: 66  BP: 119/54 mmHg  ______________________________________________________________________________  Procedure  Complete Echo Adult. Definity (NDC #00750-251) given intravenously.  ______________________________________________________________________________  Interpretation Summary     The left ventricle is normal in size.  There is normal left ventricular wall thickness.  The visual ejection fraction is 55-60%.  No regional wall motion abnormalities noted.  There is no significant valve disease  No prior studies for comparison  ______________________________________________________________________________  Left Ventricle  The left ventricle is normal in size. There is normal left ventricular wall  thickness. Left ventricular systolic function is normal. The visual ejection  fraction is 55-60%. No regional wall motion abnormalities noted.     Right Ventricle  The right ventricle is borderline dilated. The right ventricular systolic  function is normal.     Atria  The left atrium is moderately dilated. The right atrium is moderately dilated.     Mitral Valve  The mitral valve is normal in structure and function. There is physiologic  mitral regurgitation.     Tricuspid Valve  The tricuspid valve is not well visualized, but is grossly normal. There  is  trace tricuspid regurgitation.     Aortic Valve  The aortic valve is trileaflet with aortic valve sclerosis. No aortic  regurgitation is present. No aortic stenosis is present.     Pulmonic Valve  The pulmonic valve is not well seen, but is grossly normal.     Vessels  Normal size aorta.     Pericardium  The pericardium appears normal.     ______________________________________________________________________________  MMode/2D Measurements & Calculations  IVSd: 0.70 cm  LVIDd: 5.1 cm  LVIDs: 3.4 cm  LVPWd: 1.1 cm  FS: 32.6 %  LV mass(C)d: 163.6 grams  LV mass(C)dI: 84.5 grams/m2     Ao root diam: 3.2 cm  LA dimension: 3.5 cm  asc Aorta Diam: 3.6 cm  LA/Ao: 1.1  LVOT diam: 1.9 cm  LVOT area: 2.8 cm2  Ao root diam index Ht(cm/m): 2.0  Ao root diam index BSA (cm/m2): 1.6  Asc Ao diam index BSA (cm/m2): 1.9  Asc Ao diam index Ht(cm/m): 2.2  LA Volume (BP): 69.3 ml  LA Volume Index (BP): 35.7 ml/m2  RV Base: 4.4 cm     RWT: 0.43  TAPSE: 2.9 cm     Doppler Measurements & Calculations  MV E max markus: 88.8 cm/sec  MV A max markus: 65.5 cm/sec  MV E/A: 1.4  MV dec time: 0.22 sec  E/E' av.9  Lateral E/e': 7.4  Medial E/e': 10.5  RV S Marksu: 13.2 cm/sec     ______________________________________________________________________________  Report approved by: Astrid Angela MD on 2024 11:40 AM             Discharge Medications   Current Discharge Medication List        START taking these medications    Details   ferrous sulfate (FEROSUL) 325 (65 Fe) MG tablet Take 1 tablet (325 mg) by mouth every other day.    Associated Diagnoses: Anemia, unspecified type      HYDROmorphone (DILAUDID) 2 MG tablet Take 1 tablet (2 mg) by mouth every 6 hours as needed for pain.  Qty: 20 tablet, Refills: 0    Associated Diagnoses: Postoperative pain      lisinopril (ZESTRIL) 20 MG tablet Take 1 tablet (20 mg) by mouth daily.    Associated Diagnoses: Essential hypertension with goal blood pressure less than 140/90      methocarbamol  (ROBAXIN) 500 MG tablet Take 1 tablet (500 mg) by mouth 3 times daily as needed for muscle spasms or other (pain).    Associated Diagnoses: Postoperative pain      senna-docusate (SENOKOT-S/PERICOLACE) 8.6-50 MG tablet Take 1 tablet by mouth 2 times daily as needed for constipation.    Associated Diagnoses: Constipation, unspecified constipation type           CONTINUE these medications which have CHANGED    Details   cephALEXin (KEFLEX) 250 MG capsule Take 1 capsule (250 mg) by mouth every 8 hours for 7 doses.    Associated Diagnoses: Cellulitis, unspecified cellulitis site           CONTINUE these medications which have NOT CHANGED    Details   acetaminophen (TYLENOL) 325 MG tablet Take 2 tablets (650 mg) by mouth every 4 hours as needed for other  Qty: 60 tablet, Refills: 0    Comments: Discharge today  Associated Diagnoses: Chronic pain of left knee      albuterol (PROVENTIL) (2.5 MG/3ML) 0.083% neb solution Take 1 vial (2.5 mg) by nebulization every 6 hours as needed for shortness of breath or wheezing  Qty: 90 mL, Refills: 0    Associated Diagnoses: Moderate persistent asthma with acute exacerbation      buPROPion (WELLBUTRIN XL) 150 MG 24 hr tablet Take 1 tablet (150 mg) by mouth every morning.  Qty: 90 tablet, Refills: 1    Associated Diagnoses: Severe obesity with body mass index (BMI) of 35.0 to 35.9 and comorbidity (H)      calcium citrate (CITRACAL) 950 (200 Ca) MG tablet Take 1 tablet by mouth daily.      Cholecalciferol (VITAMIN D) 2000 UNIT CAPS Take 2,000 Units by mouth daily      clopidogrel (PLAVIX) 75 MG tablet Take 1 tablet (75 mg) by mouth daily.  Qty: 30 tablet, Refills: 0    Associated Diagnoses: PAD (peripheral artery disease) (H)      gabapentin (NEURONTIN) 600 MG tablet TAKE 0.5 TAB BY MOUTH IN MORNING & 2 TABS AT BEDTIME  Qty: 225 tablet, Refills: 1    Associated Diagnoses: DDD (degenerative disc disease), lumbar      Multiple Vitamin (MULTIVITAMIN ADULT PO) Take by mouth daily       rivaroxaban ANTICOAGULANT (XARELTO) 20 MG TABS tablet Take 1 tablet (20 mg) by mouth daily with food.  Qty: 90 tablet, Refills: 3    Associated Diagnoses: Acute deep vein thrombosis (DVT) of femoral vein of right lower extremity (H)           STOP taking these medications       acetaminophen-codeine (TYLENOL #3) 300-30 MG per tablet Comments:   Reason for Stopping:         aspirin 81 MG EC tablet Comments:   Reason for Stopping:         lisinopril-hydrochlorothiazide (ZESTORETIC) 20-12.5 MG tablet Comments:   Reason for Stopping:         tiZANidine (ZANAFLEX) 4 MG tablet Comments:   Reason for Stopping:             Allergies   Allergies   Allergen Reactions    Erythromycin Swelling and Other (See Comments)

## 2024-12-21 NOTE — PLAN OF CARE
Occupational Therapy Discharge Summary    Reason for therapy discharge:    Discharged to transitional care facility.    Progress towards therapy goal(s). See goals on Care Plan in Psychiatric electronic health record for goal details.  Goals partially met.  Barriers to achieving goals:   discharge from facility.    Therapy recommendation(s):    Continued therapy is recommended.  Rationale/Recommendations:  to increase indep with functional mobility and self cares.

## 2024-12-23 ENCOUNTER — PATIENT OUTREACH (OUTPATIENT)
Dept: CARE COORDINATION | Facility: CLINIC | Age: 84
End: 2024-12-23
Payer: COMMERCIAL

## 2024-12-23 NOTE — PROGRESS NOTES
Clinic Care Coordination Contact  Care Coordination Transition Communication    Clinical Data: Patient was hospitalized at [facility] from 12/13/2024 - 12/20/2024  with diagnosis of   PVD (peripheral vascular disease) with claudication (H).     Assessment: Patient has transitioned to TCU/ARU for short term rehabilitation:    Facility Name: [facility]  Transition Communication:  Notified facility of Primary Care- Care Coordination support via Telephone.    Britni DICKENS [phone] option #2    Plan: Care Coordinator will await notification from facility staff informing of patient's discharge plans/needs. Care Coordinator will review chart and outreach to facility staff every 4 weeks and as needed.     Kristan Wing RN, BSN, PHN Care Coordinator  [facility]   Phone: [phone]

## 2024-12-31 ENCOUNTER — TELEPHONE (OUTPATIENT)
Dept: OTHER | Facility: CLINIC | Age: 84
End: 2024-12-31

## 2024-12-31 ENCOUNTER — MYC MEDICAL ADVICE (OUTPATIENT)
Dept: OTHER | Facility: CLINIC | Age: 84
End: 2024-12-31

## 2024-12-31 NOTE — TELEPHONE ENCOUNTER
Upon chart review, appears patient had xray done at [place] with the associated dx of TB.    Contacted [facility] and spoke with SHERRY Almanza who stated he was not aware of any TB diagnosis for patient and also spoke with patient while on the phone, who stated she was not aware of this.      Discussed having patient send a picture of groin site via [employer] and Cami stated he would assist with this.  Will await for picture and review with Dr. Coates.  Cami is aware writer will call back with any further recommendations once reviewed with Dr. Coates.    Celena Neal, VALERIEN, RN, CV-BC, CNOR  [facility] [place]

## 2024-12-31 NOTE — TELEPHONE ENCOUNTER
[facility]    Who is the name of the provider?:  FIONA MAHARAJ   What is the location you see this provider at/preferred location?: [place]  Person calling / Facility: Charge Nurse- Malek/any charge nurse  Phone number:  [phone]  Nurse call back needed:  Yes if needed     Reason for call:  [facility] charge nurse called with an update. Incision on left side (upper incision) has some redness around the site. Incision was shown to the provider who said the redness has spread since last check to her groin area.     Calling to let [facility] Care Team know. There will be antibiotic order changes regardless/(a different provider ?)so patient wanted to let Dr Maharaj know.    Pharmacy location:n/a  Outside Imaging: n/a   Can we leave a detailed message on this number?  YES     12/31/2024, 11:58 AM

## 2024-12-31 NOTE — TELEPHONE ENCOUNTER
Duplicate encounter.    Inna Medina RN  [facility]

## 2025-01-02 NOTE — TELEPHONE ENCOUNTER
Reviewed pictures with Dr. Coates.  Due to provider availability on 1/3/25, will attempt to move patient to 1/2/25 appt with Dr. Coates.  Scheduling is attempting to coordinate this.  Celena Neal, VALERIEN, RN, CV-BC, CNOR  [facility]

## 2025-01-04 ENCOUNTER — MEDICAL CORRESPONDENCE (OUTPATIENT)
Dept: HEALTH INFORMATION MANAGEMENT | Facility: CLINIC | Age: 85
End: 2025-01-04

## 2025-01-07 ENCOUNTER — TELEPHONE (OUTPATIENT)
Dept: OTHER | Facility: CLINIC | Age: 85
End: 2025-01-07

## 2025-01-07 ENCOUNTER — PATIENT OUTREACH (OUTPATIENT)
Dept: CARE COORDINATION | Facility: CLINIC | Age: 85
End: 2025-01-07

## 2025-01-07 ENCOUNTER — OFFICE VISIT (OUTPATIENT)
Dept: PODIATRY | Facility: CLINIC | Age: 85
End: 2025-01-07
Payer: COMMERCIAL

## 2025-01-07 VITALS — WEIGHT: 195 LBS | BODY MASS INDEX: 33.47 KG/M2 | SYSTOLIC BLOOD PRESSURE: 104 MMHG | DIASTOLIC BLOOD PRESSURE: 62 MMHG

## 2025-01-07 DIAGNOSIS — I73.9 PVD (PERIPHERAL VASCULAR DISEASE) WITH CLAUDICATION: Primary | ICD-10-CM

## 2025-01-07 DIAGNOSIS — L97.522 SKIN ULCER OF LEFT GREAT TOE WITH FAT LAYER EXPOSED (H): ICD-10-CM

## 2025-01-07 DIAGNOSIS — M79.672 LEFT FOOT PAIN: ICD-10-CM

## 2025-01-07 NOTE — PROGRESS NOTES
"Clinic Care Coordination Contact  Transitions of Care Outreach  Chief Complaint   Patient presents with    Clinic Care Coordination - Post Hospital       Most Recent Admission Date: 12/13/2024   Most Recent Admission Diagnosis:      Most Recent Discharge Date: 12/20/2024   Most Recent Discharge Diagnosis: PVD (peripheral vascular disease) with claudication (H) - I73.9  Essential hypertension with goal blood pressure less than 140/90 - I10  Cellulitis, unspecified cellulitis site - L03.90  Anemia, unspecified type - D64.9  Postoperative pain - G89.18  Constipation, unspecified constipation type - K59.00     Transitions of Care Assessment    Discharge Assessment  How are you doing now that you are home?: CC RN received a VM from Britni DICKENS [phone] option #2  with [facility] and the [employer]. Britni reported that patient discharged home 1/3/25 with [facility] (SN/physical therapy/OT/HHA).   CC RN contacted patient, introduced self, role, care coordination program and reason for call.  Patient reports, \"Oh I'm feeling better, I'm able to walk better\" Using her walker and cane as needed. Able to take a shower yesterday. She is not home and does not have her med list with her. Patient noted changes to her meds. Patient advised to bring her copy of the [facility] AVS with her to her post hospital follow up appointment this week. Patient has no questions on how or what meds she is to be taking. Patients good friend staying with her, driving to her appointments. SOC with [facility] came out 1/4/25. Therapist coming 1/10. Reviewed her recent specialty care visits:  -1/7/25 visit with [facility] Podiatry / Foot & Ankle Surgery follow up in 4-5 wks   -1/2/25 visit with [facility] Yolanda. Follow up in 3 weeks ~ 1/23/25  Patient declined enrollment with primary care coordination.  How are your symptoms? (Red Flag symptoms escalate to triage hotline per guidelines): " Improved  Do you know how to contact your clinic care team if you have future questions or changes to your health status? : Yes  Does the patient have their discharge instructions? : Yes  Does the patient have questions regarding their discharge instructions? : No  Were you started on any new medications or were there changes to any of your previous medications? : Yes  Does the patient have all of their medications?: Yes  Do you have questions regarding any of your medications? : No  Do you have all of your needed medical supplies or equipment (DME)?  (i.e. oxygen tank, CPAP, cane, etc.): Yes         Post-op (Clinicians Only)  Did the patient have surgery or a procedure: Yes (Pt completed an in person visit with her podiatry provider this morning, area was assessed.)  Incision: open, healing  Drainage: No  Bleeding: none  Fever: No  Chills: No  Redness: Yes  Warmth: No  Swelling: Yes  Incision site pain: Yes  Eating & Drinking: eating and drinking without complaints/concerns  PO Intake: regular diet  Bowel Function: normal  Date of last BM: 01/06/25  Urinary Status: voiding without complaint/concerns    CCRN Explained and offered Care Coordination support to eligible patients: Yes    Patient accepted? No    Follow up Plan     Discharge Follow-Up  Discharge follow up appointment scheduled in alignment with recommended follow up timeframe or Transitions of Risk Category? (Low = within 30 days; Moderate= within 14 days; High= within 7 days): Yes  Discharge Follow Up Appointment Date: 01/07/25  Discharge Follow Up Appointment Scheduled with?: Specialty Care Provider    Future Appointments   Date Time Provider Department Center   1/9/2025  3:00 PM Thein, Darlene R, APRN CNP [facility]   2/13/2025 10:30 AM Daxa Nguyen DPM, Podiatry/Foot and Ankle Surgery [facility]       Outpatient Plan as outlined on AVS reviewed with patient.    For any urgent concerns, please contact our 24 hour nurse triage line:  [phone] ([phone])     Patient declined Care Coordination support.  Kristan Wing RN

## 2025-01-07 NOTE — TELEPHONE ENCOUNTER
Per 1/2/25 visit with Dr. Coates, pt needs the following:     LLE arterial US  In clinic appt with Dr. Coates to discuss results  Please schedule this in approximately 3 weeks    Routing to scheduling to contact patient to coordinate above.    Appt note in order comments.    Celena Neal, VALERIEN, RN, CV-BC, CNOR  [facility]

## 2025-01-07 NOTE — PROGRESS NOTES
Podiatry / Foot and Ankle Surgery Progress Note    January 7, 2025    Subject: Patient was seen for ulcerations to left great toe.  Notes that the foot is doing a little better than previous.  Has questions about her vascular incision site and is concerned about infection.    Objective:  Vitals: Wt 88.5 kg (195 lb)   BMI 33.47 kg/m    BMI= Body mass index is 33.47 kg/m .     General:  Patient is alert and orientated.  NAD.     Vascular: Foot and ankle pulses are faintly palpable.  Foot is cold to the touch.  Some purple duskiness noted to the distal aspect of the left great toe.     Neuro:  Light and gross touch sensation intact to digits, dorsum, and plantar aspects of the feet.     Derm: Full-thickness stage III ulceration to the plantar aspect of the left great toe.  This measures 0.5 cm x 0.5 cm x 0.1 cm.  Base of the wound is slightly fibrous.  No surrounding erythema purulent drainage or malodor noted.     Musculoskeletal:  No foot deformity noted.        Assessment:     PVD (peripheral vascular disease) with claudication (H)  Skin ulcer of left great toe with fat layer exposed (H)  Left foot pain     Medical Decision Making/Plan: At this time the ulcers appear to be healing on the left great toe.  We will have her wash the left foot with soap and water and pat dry.  She will apply iodine and a Band-Aid to the ulceration daily.  She was given an order for Nitro-Bid to put on the toe to help with some pain we will have her follow-up in 4 to 5 weeks for reassessment of the ulcer..  Discussed that her vascular incision site appears to be healing well and there does not appear to be any signs of infection.  She does have swelling around the area and that can cause some blanchable redness.    All questions were answered to patient satisfaction and she will call further questions or concerns.   Patient Risk Factor:  Patient is a medium risk factor for infection.     Daxa Nguyen DPM, Podiatry/Foot and Ankle  Surgery

## 2025-01-07 NOTE — PATIENT INSTRUCTIONS
Thank you for choosing [facility] Podiatry / Foot & Ankle Surgery!    DR PAEZ'S CLINIC:  [facility]   [address]   [place] [address]  [phone]    (Tues, Wed, Thur am, Fri pm)     [facility]  [address], [place] [address]  [phone]  (Every other Friday morning)    [place] [facility] [place] [id number] [place] [id number] [phone]  (Every other Friday)     TRIAGE LINE: [phone]  APPOINTMENTS: [phone]  RADIOLOGY: [phone]  SET UP SURGERY: [phone]  PHYSICAL THERAPY: [phone]   FAX NUMBER: [phone]  BILLING QUESTIONS: [phone]       Follow up: 4-5 weeks      BETADINE TO THE [facility] DAILY

## 2025-01-07 NOTE — LETTER
1/7/2025      Yumiko Mccartney  [address]  [place] [address]      Dear Colleague,    Thank you for referring your patient, Yumiko Mccartney, to the [facility]. Please see a copy of my visit note below.    Podiatry / Foot and Ankle Surgery Progress Note    January 7, 2025    Subject: Patient was seen for ulcerations to left great toe.  Notes that the foot is doing a little better than previous.  Has questions about her vascular incision site and is concerned about infection.    Objective:  Vitals: Wt 88.5 kg (195 lb)   BMI 33.47 kg/m    BMI= Body mass index is 33.47 kg/m .     General:  Patient is alert and orientated.  NAD.     Vascular: Foot and ankle pulses are faintly palpable.  Foot is cold to the touch.  Some purple duskiness noted to the distal aspect of the left great toe.     Neuro:  Light and gross touch sensation intact to digits, dorsum, and plantar aspects of the feet.     Derm: Full-thickness stage III ulceration to the plantar aspect of the left great toe.  This measures 0.5 cm x 0.5 cm x 0.1 cm.  Base of the wound is slightly fibrous.  No surrounding erythema purulent drainage or malodor noted.     Musculoskeletal:  No foot deformity noted.        Assessment:     PVD (peripheral vascular disease) with claudication (H)  Skin ulcer of left great toe with fat layer exposed (H)  Left foot pain     Medical Decision Making/Plan: At this time the ulcers appear to be healing on the left great toe.  We will have her wash the left foot with soap and water and pat dry.  She will apply iodine and a Band-Aid to the ulceration daily.  She was given an order for Nitro-Bid to put on the toe to help with some pain we will have her follow-up in 4 to 5 weeks for reassessment of the ulcer..  Discussed that her vascular incision site appears to be healing well and there does not appear to be any signs of infection.  She does have swelling around the area and that can cause some  blanchable redness.    All questions were answered to patient satisfaction and she will call further questions or concerns.   Patient Risk Factor:  Patient is a medium risk factor for infection.     Daxa Nguyen DPM, Podiatry/Foot and Ankle Surgery              Again, thank you for allowing me to participate in the care of your patient.        Sincerely,        Daxa Nguyen DPM, Podiatry/Foot and Ankle Surgery    Electronically signed

## 2025-01-09 ENCOUNTER — VIRTUAL VISIT (OUTPATIENT)
Dept: FAMILY MEDICINE | Facility: CLINIC | Age: 85
End: 2025-01-09
Payer: COMMERCIAL

## 2025-01-09 ENCOUNTER — TELEPHONE (OUTPATIENT)
Dept: FAMILY MEDICINE | Facility: CLINIC | Age: 85
End: 2025-01-09

## 2025-01-09 DIAGNOSIS — I73.9 PAD (PERIPHERAL ARTERY DISEASE): ICD-10-CM

## 2025-01-09 DIAGNOSIS — Z09 HOSPITAL DISCHARGE FOLLOW-UP: Primary | ICD-10-CM

## 2025-01-09 DIAGNOSIS — M51.369 DEGENERATION OF INTERVERTEBRAL DISC OF LUMBAR REGION, UNSPECIFIED WHETHER PAIN PRESENT: ICD-10-CM

## 2025-01-09 DIAGNOSIS — D64.9 ANEMIA, UNSPECIFIED TYPE: ICD-10-CM

## 2025-01-09 DIAGNOSIS — I73.9 PVD (PERIPHERAL VASCULAR DISEASE) WITH CLAUDICATION: ICD-10-CM

## 2025-01-09 RX ORDER — SULFAMETHOXAZOLE AND TRIMETHOPRIM 800; 160 MG/1; MG/1
1 TABLET ORAL 2 TIMES DAILY
COMMUNITY

## 2025-01-09 RX ORDER — LISINOPRIL AND HYDROCHLOROTHIAZIDE 12.5; 2 MG/1; MG/1
1 TABLET ORAL DAILY
COMMUNITY
Start: 2025-01-03

## 2025-01-09 RX ORDER — CLOPIDOGREL BISULFATE 75 MG/1
75 TABLET ORAL DAILY
Qty: 30 TABLET | Refills: 0 | Status: CANCELLED | OUTPATIENT
Start: 2025-01-09

## 2025-01-09 NOTE — PATIENT INSTRUCTIONS
"Labs ordered today. You can schedule a \"lab only\" appointment at your convenience at any [facility] by going into your mychart or by calling our scheduling line. Those will be available via [employer] within a couple days after you have them drawn. You will get a response from me shortly thereafter.     Reach out to vascular team for Plavix refills. If you aren't able to get refill by tomorrow (Friday), please reach out to my team and I'll refill to get you through the weekend and to give vascular time to send refill.   "

## 2025-01-09 NOTE — PROGRESS NOTES
"Tomi is a 84 year old who is being evaluated via a billable video visit.    How would you like to obtain your AVS? [employer]  If the video visit is dropped, the invitation should be resent by: Text to cell phone: [phone]  Will anyone else be joining your video visit? No      Assessment & Plan     Hospital discharge follow-up  Improved since discharge. She has seen vascular and foot doctor in the past week.     PVD (peripheral vascular disease) with claudication  She was advised on 1/2/25 by vascular to continue plavix and xarelto. She is almost out of plavix. Advised she reach out to vascular team to do refills since then will be the ones to decide when to stop it. If she is unable to get the refill by tomorrow, advised she reach out to me and I'll send a refill to get her through until they can send the script.    Anemia, unspecified type  Hemoglobin ordered to recheck levels. If normal, she can just continue her MVI. If still low, would advise starting iron supplement MWF and follow-up with PCP in 1-2 months.     - Hemoglobin; Future    Degeneration of intervertebral disc of lumbar region, unspecified whether pain present  She is weaning off the methocarbamol. Discussed that it was okay to restart gabapentin for DDD pain/neuropathy pain if needed.         MED REC REQUIRED  Post Medication Reconciliation Status:  Discharge medications reconciled, continue medications without change  BMI  Estimated body mass index is 33.47 kg/m  as calculated from the following:    Height as of 11/25/24: 1.626 m (5' 4\").    Weight as of 1/7/25: 88.5 kg (195 lb).         Patient Instructions   Labs ordered today. You can schedule a \"lab only\" appointment at your convenience at any [facility] by going into your [employer] or by calling our scheduling line. Those will be available via [employer] within a couple days after you have them drawn. You will get a response from me shortly thereafter.     Reach out to vascular team for Plavix " "refills. If you aren't able to get refill by tomorrow (Friday), please reach out to my team and I'll refill to get you through the weekend and to give vascular time to send refill.     Subjective   Tomi is a 84 year old, presenting for the following health issues:  Surgical Followup, Hospital F/U, and Asthma (Due for asthma action plan )        1/9/2025     9:10 AM   Additional Questions   Roomed by Giovanna   Accompanied by none         1/9/2025     9:10 AM   Patient Reported Additional Medications   Patient reports taking the following new medications sulfamethoxezole (3 tablets left) bacterial infection     Video Start Time:  09:41am    HPI         1/7/2025   Post Discharge Outreach   How are you doing now that you are home? CC RN received a VM from Britni DICKENS [phone] option #2  with [facility] and the [facility]. Britni reported that patient discharged home 1/3/25 with [facility] (SN/physical therapy/OT/HHA).   CC RN contacted patient, introduced self, role, care coordination program and reason for call.  Patient reports, \"Oh I'm feeling better, I'm able to walk better\" Using her walker and cane as needed. Able to take a shower yesterday. She is not home and does not have her med list with her. Patient noted changes to her meds. Patient advised to bring her copy of the [facility] AVS with her to her post hospital follow up appointment this week. Patient has no questions on how or what meds she is to be taking. Patients good friend staying with her, driving to her appointments. SOC with [facility] came out 1/4/25. Therapist coming 1/10. Reviewed her recent specialty care visits:  -1/7/25 visit with [facility] Podiatry / Foot & Ankle Surgery follow up in 4-5 wks   -1/2/25 visit with [facility] Yolanda. Follow up in 3 weeks ~ 1/23/25  Patient declined enrollment with primary care coordination.   How are your symptoms? (Red Flag symptoms escalate to triage " hotline per guidelines) Improved   Does the patient have their discharge instructions?  Yes   Does the patient have questions regarding their discharge instructions?  No   Were you started on any new medications or were there changes to any of your previous medications?  Yes   Does the patient have all of their medications? Yes   Do you have questions regarding any of your medications?  No   Do you have all of your needed medical supplies or equipment (DME)?  (i.e. oxygen tank, CPAP, cane, etc.) Yes   Discharge Follow Up Appointment Date 1/7/2025   Discharge Follow Up Appointment Scheduled with? Specialty Care Provider       Hospital Follow-up Visit:    Hospital/Nursing Home/IP Rehab Facility:  [facility]  Date of Admission: 12/13/24 initially at [facility]   Date of Discharge: 12/20/24 but patient stated was discharged from rehab last week  Reason(s) for Admission: Peripheral Vascular Disease, Anemia, Cellulitis   Was the patient in the ICU or did the patient experience delirium during hospitalization?  No  Do you have any other stressors you would like to discuss with your provider? OTHER: patient is unsure     Problems taking medications regularly:  None  Medication changes since discharge: antibiotic switched, has three days left now   Problems adhering to non-medication therapy:  None    Summary of hospitalization:  [facility] discharge summary reviewed  Diagnostic Tests/Treatments reviewed.  Follow up needed: CBC  Other Healthcare Providers Involved in Patient s Care:         Specialist appointment - saw podiatry this week and Surgical follow-up appointment - saw vascular on 1/2/25  Update since discharge: improved.         Plan of care communicated with patient and family           Hospital discharge note from 12/20/24:   [facility]  Hospitalist Discharge Summary       Date of Admission:  12/13/2024  Date of Discharge:   "12/20/2024  Discharging Provider: Dejuan Crump MD  Discharge Service: Hospitalist Service        Discharge Diagnoses  See below        Clinically Significant Risk Factors     # Obesity: Estimated body mass index is 33.47 kg/m  as calculated from the following:    Height as of 11/25/24: 1.626 m (5' 4\").    Weight as of an earlier encounter on 12/13/24: 88.5 kg (195 lb).             Follow-ups Needed After Discharge  Follow-up Appointments         Follow Up and recommended labs and tests       Follow up with senior care physician.  The following labs/tests are recommended: basic metabolic panel and complete blood count in a week.          Follow-up and recommended labs and tests        *PLEASE CALL [phone] TO SCHEDULE*  Follow up in 2-3 weeks  --------------------------------------------------------------------------  [facility]  [address]  [place], [place] [address], [place] [address]  [phone]  [phone]                [id number] [facility] [address]  [place], [place] [address] [address]  [phone]  [phone]     [facility] [facility]                     Unresulted Labs Ordered in the Past 30 Days of this Admission         No orders found from 11/13/2024 to 12/14/2024.                   Discharge Disposition  Discharged to short-term care facility  Condition at discharge: Stable        Hospital Course  Yumiko Mccartney is a 84 year old female with known history of peripheral vascular disease who presents from her podiatrist office with worsening pain of her left great toe. Hospital problem as below     Peripheral vascular disease Status post bypass proximal superior femoral to distal posterior tibial angioplasty and SFA stent " placement x 2  on 12/15/24   Skin ulcer of the left great toe with exposed fat layer  Cellulitis of the left great toe  *Recently underwent left lower extremity angiogram 11/25/2024 revascularization of the left SFA  *Patient reports worsening pain for the last few days although she has had pain of the left toe for several weeks.  She also noticed some erythema with puslike drainage  *Saw vascular surgery 12/12 and was started on Keflex with concerns for cellulitis  *Was evaluated by  at podiatry clinic 12/13 where x-ray did not show deeper infection.  Recommended admission to the hospital and MRI of the left foot  *MRI of the foot with no evidence for osteomyelitis, soft tissue stranding of the great toe can be seen with a cellulitis, no soft tissue abscess.  CRP low  *Podiatry and vascular surgery following, appreciate input  *Status post bypass proximal superior femoral to distal posterior tibial angioplasty and SFA stent placement x 2 - 12/15. EBL 250cc  *received vanc/zosyn 12/13-12/16, transitioned to Cefazolin on 12/16/2024      - post op cares per vascular surgery  - Continue Plavix. ASA stopped and PTA Xarelto resumed on 12/18/2024 by vascular. Will continue ASA/Xarelto at discharge  - Change Ancef to Keflex TID to complete 10 days course-->7 more doses remaining at discharge  - podiatry followed, recommend continued wound care, may follow up to debride plantar hallux eschar when pain improved and plan for outpatient, podiatry signed off on 12/17  - bowel regimen in place--> continue prn at discharge. Currently having BM  - pain continues to be an issue-- discharge on Dilaudid 2mg q6hrs prn, Tylenol and Robaxin 500mg TID prn-- discussed with patient  - resumed PTA gabapentin 300mg daily and 1200mg at bedtime on 12/19/2024  - vascular surgery following, ok to discharge from their stand point  - will discharge to TCU        Acute on chronic anemia suspect component of surgical blood loss  Hemoglobin  "baseline ~10g/dL  EBL 250cc on 12/15/24  --hgb 9.2 g/dL preop  8.3 on POD1--> 7.7g/dL-->7.9 on 12/18/2024  - ferrous sulfate every other day initiated in hospital--will continue at discharge  - will monitor     Essential hypertension  -PTA on lisinopril/HCTZ  daily  - restart Lisinopril 20mg daily on 12/18/2024  - continue to hold hydrochlorothiazide at discharge since bp well controlled at this time. This can be added back at TCU or OP basis if bp rises        History of DVT  -PTA Xarelto resumed by vascular surgery on 12/18/2024 as above     Asthma, moderate persistent-stale  -Resume PTA as needed nebulizers     Major depression  -Resume prior to admission Wellbutrin     CKD stage IIIa  -Baseline creatinine is between 1-1.2, currently at baseline        --------------------------------------------------------  Additional provider notes: Patient presents for the following:     Hospital follow-up: went to TCU for 2 weeks. Feels she is doing well.   -saw vascular on 1/2/25. Advised in note to stay on plavix and xarelto for a 6 months total. She is almost out of plavix.  -saw \"foot doctor\" this past Tuesday.    Anemia: Last hemoglobin was 9.5 on 12/31/24. She states she has iron in MVI, but not taking additional iron at this time.    DDD/neuropathy: she has been off gabapentin due to being on a methocarbamol. She is weaning off that and has questions about whether she should restart gabapentin or not.    Allergies   Allergen Reactions    Erythromycin Swelling and Other (See Comments)       Current Outpatient Medications   Medication Sig Dispense Refill    acetaminophen (TYLENOL) 325 MG tablet Take 2 tablets (650 mg) by mouth every 4 hours as needed for other 60 tablet 0    albuterol (PROVENTIL) (2.5 MG/3ML) 0.083% neb solution Take 1 vial (2.5 mg) by nebulization every 6 hours as needed for shortness of breath or wheezing 90 mL 0    buPROPion (WELLBUTRIN XL) 150 MG 24 hr tablet Take 1 tablet (150 mg) by mouth every " "morning. 90 tablet 1    calcium citrate (CITRACAL) 950 (200 Ca) MG tablet Take 1 tablet by mouth daily.      Cholecalciferol (VITAMIN D) 2000 UNIT CAPS Take 2,000 Units by mouth daily      clopidogrel (PLAVIX) 75 MG tablet Take 1 tablet (75 mg) by mouth daily. 30 tablet 0    gabapentin (NEURONTIN) 600 MG tablet TAKE 0.5 TAB BY MOUTH IN MORNING & 2 TABS AT BEDTIME 225 tablet 1    HYDROmorphone (DILAUDID) 2 MG tablet Take 1 tablet (2 mg) by mouth every 6 hours as needed for pain. 20 tablet 0    lisinopril (ZESTRIL) 20 MG tablet Take 1 tablet (20 mg) by mouth daily.      methocarbamol (ROBAXIN) 500 MG tablet Take 1 tablet (500 mg) by mouth 3 times daily as needed for muscle spasms or other (pain).      Multiple Vitamin (MULTIVITAMIN ADULT PO) Take by mouth daily      nitroGLYcerin (NITRO-BID) 2 % OINT ointment Place 1 inch (15 mg) over 12 hours onto the skin every 24 hours. Apply to left great toe 30 g 1    rivaroxaban ANTICOAGULANT (XARELTO) 20 MG TABS tablet Take 1 tablet (20 mg) by mouth daily with food. 90 tablet 3    senna-docusate (SENOKOT-S/PERICOLACE) 8.6-50 MG tablet Take 1 tablet by mouth 2 times daily as needed for constipation.      ferrous sulfate (FEROSUL) 325 (65 Fe) MG tablet Take 1 tablet (325 mg) by mouth every other day. (Patient not taking: Reported on 1/9/2025)      polyethylene glycol (MIRALAX) 17 GM/Dose powder Take 1 Capful by mouth daily. (Patient not taking: Reported on 1/9/2025)       No current facility-administered medications for this visit.       Past Medical History:   Diagnosis Date    Acne rosacea     Actinic keratosis     Amblyopia     Asthma, moderate persistent     Basal cell carcinoma 10/2010    back X2    Cataract, mod, od; mild-mod, os 01/12/2012    DJD (degenerative joint disease), lumbosacral 08/06/2014    DVT (deep venous thrombosis) (H)     Elevated cholesterol     Endometriosis     HTN (hypertension)     Moderate major depression (H)     \"Circumstances\"    Osteopenia  "             Review of Systems  Constitutional, HEENT, cardiovascular, pulmonary, gi and gu systems are negative, except as otherwise noted.      Objective           Vitals:  No vitals were obtained today due to virtual visit.    Physical Exam   GENERAL: alert and no distress  EYES: Eyes grossly normal to inspection.  No discharge or erythema, or obvious scleral/conjunctival abnormalities.  RESP: No audible wheeze, cough, or visible cyanosis.    SKIN: Visible skin clear. No significant rash, abnormal pigmentation or lesions.  NEURO: Cranial nerves grossly intact.  Mentation and speech appropriate for age.  PSYCH: Appropriate affect, tone, and pace of words          Video-Visit Details    Type of service:  Video Visit   Video End Time:9:54 AM  Originating Location (pt. Location): Home    Distant Location (provider location):  On-site  Platform used for Video Visit: Jessica  Signed Electronically by: Cari Bain DNP

## 2025-01-09 NOTE — TELEPHONE ENCOUNTER
Reason for Call:  Appointment Request    Patient requesting this type of appt:  Hospital/ED Follow-Up     Requested provider: Gavi Bear    Reason patient unable to be scheduled: Not within requested timeframe    When does patient want to be seen/preferred time:  earliest possible date    Comments: Pt prefer to see PCP in person. Requesting for earliest date. Pt in need of refills for clopidogrel (PLAVIX) 75 MG tab. Also noted pt is taking xarelot 20 mg        Pharmacy of choice is [address] [facility] [address]     Could we send this information to you in [employer] or would you prefer to receive a phone call?:   Patient would prefer a phone call   Okay to leave a detailed message?: Yes at Cell number on file:    Telephone Information:   Mobile [phone]       Call taken on 1/9/2025 at 7:25 AM by Matthew Harrell

## 2025-01-14 ENCOUNTER — TELEPHONE (OUTPATIENT)
Dept: FAMILY MEDICINE | Facility: CLINIC | Age: 85
End: 2025-01-14

## 2025-01-14 NOTE — TELEPHONE ENCOUNTER
Forms/Letter Request    Type of form/letter: Home Health Certification  Cert: 01/04/2025 to 03/04/2025      Do we have the form/letter: Yes: placed in provider bin    Who is the form from? Home Health Care    Where did/will the form come from? form was faxed in    When is form/letter needed by: not indicated    How would you like the form/letter returned: Fax : [phone]    Patient Notified form requests are processed in 5-7 business days:Yes    Could we send this information to you in [employer] or would you prefer to receive a phone call?:   Patient would prefer a phone call   Okay to leave a detailed message?: Yes at Cell number on file:    Telephone Information:   Mobile [phone]

## 2025-01-15 ENCOUNTER — MEDICAL CORRESPONDENCE (OUTPATIENT)
Dept: HEALTH INFORMATION MANAGEMENT | Facility: CLINIC | Age: 85
End: 2025-01-15

## 2025-01-15 DIAGNOSIS — Z53.9 DIAGNOSIS NOT YET DEFINED: Primary | ICD-10-CM

## 2025-01-15 PROCEDURE — G0180 MD CERTIFICATION HHA PATIENT: HCPCS | Performed by: PREVENTIVE MEDICINE

## 2025-01-15 NOTE — TELEPHONE ENCOUNTER
Faxed provider signed Home Health Cert #[id number] to [facility], [phone], right fax confirmed at 4:13 pm today, 1/15/2025. Sent to abstracting.  Yumiko Avery MA  [facility]   Primary Care

## 2025-01-24 ENCOUNTER — DOCUMENTATION ONLY (OUTPATIENT)
Dept: FAMILY MEDICINE | Facility: CLINIC | Age: 85
End: 2025-01-24
Payer: COMMERCIAL

## 2025-01-24 DIAGNOSIS — I73.9 PAD (PERIPHERAL ARTERY DISEASE): Primary | ICD-10-CM

## 2025-01-24 DIAGNOSIS — I10 ESSENTIAL HYPERTENSION WITH GOAL BLOOD PRESSURE LESS THAN 140/90: ICD-10-CM

## 2025-01-24 DIAGNOSIS — Z13.1 ENCOUNTER FOR SCREENING FOR DIABETES MELLITUS: ICD-10-CM

## 2025-01-24 NOTE — PROGRESS NOTES
Yumiko Mccartney has an upcoming lab appointment:    Future Appointments   Date Time Provider Department Center   1/25/2025  1:00 PM [facility]   1/28/2025  9:30 AM Gavi Bear MD [facility]   1/30/2025 10:45 AM [facility]   1/30/2025 11:45 AM Leonard Coates MD [facility]   2/13/2025 10:30 AM Daxa Nguyen DPM, Podiatry/Foot and Ankle Surgery [facility]     Patient is scheduled for the following lab(s):     There is no order available. Please review and place either future orders or HMPO (Review of Health Maintenance Protocol Orders), as appropriate.    Health Maintenance Due   Topic    ANNUAL REVIEW OF HM ORDERS      Inna Cerna

## 2025-01-25 ENCOUNTER — LAB (OUTPATIENT)
Dept: LAB | Facility: CLINIC | Age: 85
End: 2025-01-25
Payer: COMMERCIAL

## 2025-01-25 DIAGNOSIS — D64.9 ANEMIA, UNSPECIFIED TYPE: ICD-10-CM

## 2025-01-25 DIAGNOSIS — I10 ESSENTIAL HYPERTENSION WITH GOAL BLOOD PRESSURE LESS THAN 140/90: ICD-10-CM

## 2025-01-25 DIAGNOSIS — Z13.1 ENCOUNTER FOR SCREENING FOR DIABETES MELLITUS: ICD-10-CM

## 2025-01-25 DIAGNOSIS — I73.9 PAD (PERIPHERAL ARTERY DISEASE): ICD-10-CM

## 2025-01-25 LAB
ALBUMIN SERPL BCG-MCNC: 4.4 G/DL (ref 3.5–5.2)
ALP SERPL-CCNC: 81 U/L (ref 40–150)
ALT SERPL W P-5'-P-CCNC: 11 U/L (ref 0–50)
ANION GAP SERPL CALCULATED.3IONS-SCNC: 13 MMOL/L (ref 7–15)
AST SERPL W P-5'-P-CCNC: 22 U/L (ref 0–45)
BILIRUB SERPL-MCNC: 0.2 MG/DL
BUN SERPL-MCNC: 15.8 MG/DL (ref 8–23)
CALCIUM SERPL-MCNC: 9.9 MG/DL (ref 8.8–10.4)
CHLORIDE SERPL-SCNC: 103 MMOL/L (ref 98–107)
CHOLEST SERPL-MCNC: 176 MG/DL
CREAT SERPL-MCNC: 1.03 MG/DL (ref 0.51–0.95)
CREAT UR-MCNC: 122 MG/DL
EGFRCR SERPLBLD CKD-EPI 2021: 54 ML/MIN/1.73M2
EST. AVERAGE GLUCOSE BLD GHB EST-MCNC: 111 MG/DL
FASTING STATUS PATIENT QL REPORTED: NO
GLUCOSE SERPL-MCNC: 89 MG/DL (ref 70–99)
HBA1C MFR BLD: 5.5 % (ref 0–5.6)
HCO3 SERPL-SCNC: 25 MMOL/L (ref 22–29)
HDLC SERPL-MCNC: 46 MG/DL
LDLC SERPL CALC-MCNC: 96 MG/DL
MICROALBUMIN UR-MCNC: 26.3 MG/L
MICROALBUMIN/CREAT UR: 21.56 MG/G CR (ref 0–25)
NONHDLC SERPL-MCNC: 130 MG/DL
POTASSIUM SERPL-SCNC: 4.5 MMOL/L (ref 3.4–5.3)
PROT SERPL-MCNC: 7.5 G/DL (ref 6.4–8.3)
SODIUM SERPL-SCNC: 141 MMOL/L (ref 135–145)
TRIGL SERPL-MCNC: 171 MG/DL
TSH SERPL DL<=0.005 MIU/L-ACNC: 0.59 UIU/ML (ref 0.3–4.2)

## 2025-01-25 PROCEDURE — 82570 ASSAY OF URINE CREATININE: CPT

## 2025-01-25 PROCEDURE — 36415 COLL VENOUS BLD VENIPUNCTURE: CPT

## 2025-01-25 PROCEDURE — 82043 UR ALBUMIN QUANTITATIVE: CPT

## 2025-01-25 PROCEDURE — 83036 HEMOGLOBIN GLYCOSYLATED A1C: CPT

## 2025-01-25 PROCEDURE — 80061 LIPID PANEL: CPT

## 2025-01-25 PROCEDURE — 80053 COMPREHEN METABOLIC PANEL: CPT

## 2025-01-25 PROCEDURE — 85025 COMPLETE CBC W/AUTO DIFF WBC: CPT

## 2025-01-25 PROCEDURE — 84443 ASSAY THYROID STIM HORMONE: CPT

## 2025-01-27 LAB
BASOPHILS # BLD AUTO: 0.1 10E3/UL (ref 0–0.2)
BASOPHILS NFR BLD AUTO: 1 %
EOSINOPHIL # BLD AUTO: 0.2 10E3/UL (ref 0–0.7)
EOSINOPHIL NFR BLD AUTO: 2 %
ERYTHROCYTE [DISTWIDTH] IN BLOOD BY AUTOMATED COUNT: 13.9 % (ref 10–15)
HCT VFR BLD AUTO: 34.2 % (ref 35–47)
HGB BLD-MCNC: 10.3 G/DL (ref 11.7–15.7)
IMM GRANULOCYTES # BLD: 0.1 10E3/UL
IMM GRANULOCYTES NFR BLD: 1 %
LYMPHOCYTES # BLD AUTO: 2 10E3/UL (ref 0.8–5.3)
LYMPHOCYTES NFR BLD AUTO: 25 %
MCH RBC QN AUTO: 25.7 PG (ref 26.5–33)
MCHC RBC AUTO-ENTMCNC: 30.1 G/DL (ref 31.5–36.5)
MCV RBC AUTO: 85 FL (ref 78–100)
MONOCYTES # BLD AUTO: 0.4 10E3/UL (ref 0–1.3)
MONOCYTES NFR BLD AUTO: 5 %
NEUTROPHILS # BLD AUTO: 5.2 10E3/UL (ref 1.6–8.3)
NEUTROPHILS NFR BLD AUTO: 66 %
NRBC # BLD AUTO: 0 10E3/UL
NRBC BLD AUTO-RTO: 0 /100
PLATELET # BLD AUTO: 669 10E3/UL (ref 150–450)
RBC # BLD AUTO: 4.01 10E6/UL (ref 3.8–5.2)
WBC # BLD AUTO: 8 10E3/UL (ref 4–11)

## 2025-01-28 ENCOUNTER — OFFICE VISIT (OUTPATIENT)
Dept: FAMILY MEDICINE | Facility: CLINIC | Age: 85
End: 2025-01-28
Payer: COMMERCIAL

## 2025-01-28 VITALS
HEIGHT: 64 IN | RESPIRATION RATE: 16 BRPM | TEMPERATURE: 98 F | DIASTOLIC BLOOD PRESSURE: 78 MMHG | BODY MASS INDEX: 32.64 KG/M2 | SYSTOLIC BLOOD PRESSURE: 130 MMHG | OXYGEN SATURATION: 98 % | WEIGHT: 191.2 LBS | HEART RATE: 85 BPM

## 2025-01-28 DIAGNOSIS — L97.522 SKIN ULCER OF LEFT GREAT TOE WITH FAT LAYER EXPOSED (H): ICD-10-CM

## 2025-01-28 DIAGNOSIS — D64.9 ANEMIA, UNSPECIFIED TYPE: ICD-10-CM

## 2025-01-28 DIAGNOSIS — I73.9 PAD (PERIPHERAL ARTERY DISEASE): Primary | ICD-10-CM

## 2025-01-28 DIAGNOSIS — D69.1 QUALITATIVE PLATELET DEFECTS (H): ICD-10-CM

## 2025-01-28 DIAGNOSIS — N18.31 CHRONIC KIDNEY DISEASE, STAGE 3A (H): ICD-10-CM

## 2025-01-28 DIAGNOSIS — I10 ESSENTIAL HYPERTENSION WITH GOAL BLOOD PRESSURE LESS THAN 140/90: ICD-10-CM

## 2025-01-28 PROBLEM — E66.01 MORBID OBESITY (H): Status: RESOLVED | Noted: 2023-05-01 | Resolved: 2025-01-28

## 2025-01-28 PROCEDURE — 99213 OFFICE O/P EST LOW 20 MIN: CPT | Performed by: PREVENTIVE MEDICINE

## 2025-01-28 PROCEDURE — G2211 COMPLEX E/M VISIT ADD ON: HCPCS | Performed by: PREVENTIVE MEDICINE

## 2025-01-28 NOTE — PROGRESS NOTES
"  Assessment & Plan     PAD (peripheral artery disease)  -is home after surgery and rehab  -Home care to continue  -has follow up with Vascular.  -stitches to be removed  -On Plavix and Xarelto    Anemia, unspecified type  -Hemoglobin at 10.3 on 1/25/2025.  Recheck labs in 4 weeks  - CBC with platelets and differential    Essential hypertension with goal blood pressure less than 140/90  -Patient is back on her home regimen of medications including lisinopril hydrochlorothiazide. 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  Will recheck labs in 4 weeks to ensure stability or improvement    Chronic kidney disease, stage 3a (H)  -No use of NSAIDs   -GFR stable at 54 on 1/25/2025        MED REC REQUIRED  Post Medication Reconciliation Status: discharge medications reconciled, continue medications without change  BMI  Estimated body mass index is 32.82 kg/m  as calculated from the following:    Height as of this encounter: 1.626 m (5' 4\").    Weight as of this encounter: 86.7 kg (191 lb 3.2 oz).       Subjective   Tomi is a 84 year old, presenting for the following health issues:  Hospital F/U        1/28/2025     9:30 AM   Additional Questions   Roomed by Denney   Accompanied by self         1/28/2025     9:30 AM   Patient Reported Additional Medications   Patient reports taking the following new medications Methocarbomol 500 mg , clopidogrel (plavix) 75 mg     HPI       Has been home now and has Home Care    No fever  No drainage from the foot  Stitches out in a few days  Antibiotics are done.    Off all the pain pills  Has been on Plavix and Xarelto  No bleeding  No new bruising  No falls  Managing with ADLs  Lives in a 4 level split.  May be going to " "an independent living  Has a wedding to go to in [facility] in August, wants to feel better before then      Has been very out of breath, cannot walk up the stairs.  No coughing  No new leg edema  No chest pain  Chest X ray normal 12/24  ECHO normal 12/24.      Hospital Follow-up Visit:    Hospital/Nursing Home/IP Rehab Facility: [facility]  Date of Admission: 12/13/2024  Date of Discharge: 12/20/2024  Reason(s) for Admission: Left foot pain   Was the patient in the ICU or did the patient experience delirium during hospitalization?  No  Do you have any other stressors you would like to discuss with your provider? Health Concerns - shortness of breath    Problems taking medications regularly:  None  Medication changes since discharge: Methocarbomol 500 mg , clopidogrel (plavix) 75 mg  Problems adhering to non-medication therapy:  None    Summary of hospitalization:  [facility] discharge summary reviewed  Diagnostic Tests/Treatments reviewed.  Follow up needed: Labs  Other Healthcare Providers Involved in Patient s Care:         Homecare and Specialist appointment - multiple   Update since discharge: improved.         Plan of care communicated with patient      The following is a summary:    \"Hospital Course  Yumiko Mccartney is a 84 year old female with known history of peripheral vascular disease who presents from her podiatrist office with worsening pain of her left great toe. Hospital problem as below     Peripheral vascular disease Status post bypass proximal superior femoral to distal posterior tibial angioplasty and SFA stent placement x 2  on 12/15/24   Skin ulcer of the left great toe with exposed fat layer  Cellulitis of the left great toe  *Recently underwent left lower extremity angiogram 11/25/2024 revascularization of the left SFA  *Patient reports worsening pain for the last few days although she has had pain of the left toe for several weeks.  She also noticed some " erythema with puslike drainage  *Saw vascular surgery 12/12 and was started on Keflex with concerns for cellulitis  *Was evaluated by  at podiatry clinic 12/13 where x-ray did not show deeper infection.  Recommended admission to the hospital and MRI of the left foot  *MRI of the foot with no evidence for osteomyelitis, soft tissue stranding of the great toe can be seen with a cellulitis, no soft tissue abscess.  CRP low  *Podiatry and vascular surgery following, appreciate input  *Status post bypass proximal superior femoral to distal posterior tibial angioplasty and SFA stent placement x 2 - 12/15. EBL 250cc  *received vanc/zosyn 12/13-12/16, transitioned to Cefazolin on 12/16/2024      - post op cares per vascular surgery  - Continue Plavix. ASA stopped and PTA Xarelto resumed on 12/18/2024 by vascular. Will continue ASA/Xarelto at discharge  - Change Ancef to Keflex TID to complete 10 days course-->7 more doses remaining at discharge  - podiatry followed, recommend continued wound care, may follow up to debride plantar hallux eschar when pain improved and plan for outpatient, podiatry signed off on 12/17  - bowel regimen in place--> continue prn at discharge. Currently having BM  - pain continues to be an issue-- discharge on Dilaudid 2mg q6hrs prn, Tylenol and Robaxin 500mg TID prn-- discussed with patient  - resumed PTA gabapentin 300mg daily and 1200mg at bedtime on 12/19/2024  - vascular surgery following, ok to discharge from their stand point  - will discharge to TCU        Acute on chronic anemia suspect component of surgical blood loss  Hemoglobin baseline ~10g/dL  EBL 250cc on 12/15/24  --hgb 9.2 g/dL preop  8.3 on POD1--> 7.7g/dL-->7.9 on 12/18/2024  - ferrous sulfate every other day initiated in hospital--will continue at discharge  - will monitor     Essential hypertension  -PTA on lisinopril/HCTZ  daily  - restart Lisinopril 20mg daily on 12/18/2024  - continue to hold hydrochlorothiazide at  "discharge since bp well controlled at this time. This can be added back at TCU or OP basis if bp rises       History of DVT  -PTA Xarelto resumed by vascular surgery on 12/18/2024 as above     Asthma, moderate persistent-stale  -Resume PTA as needed nebulizers     Major depression  -Resume prior to admission Wellbutrin     CKD stage IIIa  -Baseline creatinine is between 1-1.2, currently at baseline                    Objective    /78 (BP Location: Left arm, Patient Position: Sitting, Cuff Size: Adult Large)   Pulse 85   Temp 98  F (36.7  C) (Temporal)   Resp 16   Ht 1.626 m (5' 4\")   Wt 86.7 kg (191 lb 3.2 oz)   SpO2 98%   BMI 32.82 kg/m    Body mass index is 32.82 kg/m .  Physical Exam   GENERAL APPEARANCE: healthy, alert and no distress  EYES: Eyes grossly normal to inspection and conjunctivae and sclerae normal  RESP: lungs clear to auscultation - no rales, rhonchi or wheezes  CV: regular rates and rhythm, normal S1 S2  NEURO: Normal strength and tone, mentation intact and speech normal  PSYCH: mentation appears normal  Left foot and toe: no drainage+, no severe tenderness, some edema, no spreading redness.     No results found for any visits on 01/28/25.        Lab on 01/25/2025   Component Date Value Ref Range Status    Cholesterol 01/25/2025 176  <200 mg/dL Final    Triglycerides 01/25/2025 171 (H)  <150 mg/dL Final    Direct Measure HDL 01/25/2025 46 (L)  >=50 mg/dL Final    LDL Cholesterol Calculated 01/25/2025 96  <100 mg/dL Final    Non HDL Cholesterol 01/25/2025 130 (H)  <130 mg/dL Final    Patient Fasting > 8hrs? 01/25/2025 No   Final    Creatinine Urine mg/dL 01/25/2025 122.0  mg/dL Final    The reference ranges have not been established in urine creatinine. The results should be integrated into the clinical context for interpretation.    Albumin Urine mg/L 01/25/2025 26.3  mg/L Final    The reference ranges have not been established in urine albumin. The results should be integrated into " the clinical context for interpretation.    Albumin Urine mg/g Cr 01/25/2025 21.56  0.00 - 25.00 mg/g Cr Final    Microalbuminuria is defined as an albumin:creatinine ratio of 17 to 299 for males and 25 to 299 for females. A ratio of albumin:creatinine of 300 or higher is indicative of overt proteinuria.  Due to biologic variability, positive results should be confirmed by a second, first-morning random or 24-hour timed urine specimen. If there is discrepancy, a third specimen is recommended. When 2 out of 3 results are in the microalbuminuria range, this is evidence for incipient nephropathy and warrants increased efforts at glucose control, blood pressure control, and institution of therapy with an angiotensin-converting-enzyme (ACE) inhibitor (if the patient can tolerate it).      TSH 01/25/2025 0.59  0.30 - 4.20 uIU/mL Final    Estimated Average Glucose 01/25/2025 111  <117 mg/dL Final    Hemoglobin A1C 01/25/2025 5.5  0.0 - 5.6 % Final    Normal <5.7%   Prediabetes 5.7-6.4%    Diabetes 6.5% or higher     Note: Adopted from ADA consensus guidelines.    Sodium 01/25/2025 141  135 - 145 mmol/L Final    Potassium 01/25/2025 4.5  3.4 - 5.3 mmol/L Final    Carbon Dioxide (CO2) 01/25/2025 25  22 - 29 mmol/L Final    Anion Gap 01/25/2025 13  7 - 15 mmol/L Final    Urea Nitrogen 01/25/2025 15.8  8.0 - 23.0 mg/dL Final    Creatinine 01/25/2025 1.03 (H)  0.51 - 0.95 mg/dL Final    GFR Estimate 01/25/2025 54 (L)  >60 mL/min/1.73m2 Final    eGFR calculated using 2021 CKD-EPI equation.    Calcium 01/25/2025 9.9  8.8 - 10.4 mg/dL Final    Reference intervals for this test were updated on 7/16/2024 to reflect our healthy population more accurately. There may be differences in the flagging of prior results with similar values performed with this method. Those prior results can be interpreted in the context of the updated reference intervals.    Chloride 01/25/2025 103  98 - 107 mmol/L Final    Glucose 01/25/2025 89  70 - 99  mg/dL Final    Alkaline Phosphatase 01/25/2025 81  40 - 150 U/L Final    AST 01/25/2025 22  0 - 45 U/L Final    ALT 01/25/2025 11  0 - 50 U/L Final    Protein Total 01/25/2025 7.5  6.4 - 8.3 g/dL Final    Albumin 01/25/2025 4.4  3.5 - 5.2 g/dL Final    Bilirubin Total 01/25/2025 0.2  <=1.2 mg/dL Final    WBC Count 01/25/2025 8.0  4.0 - 11.0 10e3/uL Final    RBC Count 01/25/2025 4.01  3.80 - 5.20 10e6/uL Final    Hemoglobin 01/25/2025 10.3 (L)  11.7 - 15.7 g/dL Final    Hematocrit 01/25/2025 34.2 (L)  35.0 - 47.0 % Final    MCV 01/25/2025 85  78 - 100 fL Final    MCH 01/25/2025 25.7 (L)  26.5 - 33.0 pg Final    MCHC 01/25/2025 30.1 (L)  31.5 - 36.5 g/dL Final    RDW 01/25/2025 13.9  10.0 - 15.0 % Final    Platelet Count 01/25/2025 669 (H)  150 - 450 10e3/uL Final    % Neutrophils 01/25/2025 66  % Final    % Lymphocytes 01/25/2025 25  % Final    % Monocytes 01/25/2025 5  % Final    % Eosinophils 01/25/2025 2  % Final    % Basophils 01/25/2025 1  % Final    % Immature Granulocytes 01/25/2025 1  % Final    NRBCs per 100 WBC 01/25/2025 0  <1 /100 Final    Absolute Neutrophils 01/25/2025 5.2  1.6 - 8.3 10e3/uL Final    Absolute Lymphocytes 01/25/2025 2.0  0.8 - 5.3 10e3/uL Final    Absolute Monocytes 01/25/2025 0.4  0.0 - 1.3 10e3/uL Final    Absolute Eosinophils 01/25/2025 0.2  0.0 - 0.7 10e3/uL Final    Absolute Basophils 01/25/2025 0.1  0.0 - 0.2 10e3/uL Final    Absolute Immature Granulocytes 01/25/2025 0.1  <=0.4 10e3/uL Final    Absolute NRBCs 01/25/2025 0.0  10e3/uL Final         Signed Electronically by: Gavi Bear MD

## 2025-01-29 ENCOUNTER — MYC MEDICAL ADVICE (OUTPATIENT)
Dept: FAMILY MEDICINE | Facility: CLINIC | Age: 85
End: 2025-01-29
Payer: COMMERCIAL

## 2025-01-29 DIAGNOSIS — J45.30 MILD PERSISTENT ASTHMA WITHOUT COMPLICATION: ICD-10-CM

## 2025-01-29 DIAGNOSIS — J45.41 MODERATE PERSISTENT ASTHMA WITH ACUTE EXACERBATION: Primary | ICD-10-CM

## 2025-01-29 RX ORDER — ALBUTEROL SULFATE 90 UG/1
2 INHALANT RESPIRATORY (INHALATION) EVERY 6 HOURS PRN
Qty: 18 G | Refills: 0 | Status: SHIPPED | OUTPATIENT
Start: 2025-01-29

## 2025-01-29 NOTE — TELEPHONE ENCOUNTER
Routing to provider to review/advise    Marianne Rahman, RN, BSN  [facility] [place]

## 2025-01-29 NOTE — PROGRESS NOTES
[place] [facility]    Yumiko Mccartney returns for vascular follow-up.  She has a history of nonhealing left plantar great toe ulcer and severe tibial disease.    --Left distal SFA/proximal above-knee popliteal Viabahn stent failed    --12/15/2024: Left proximal SFA to distal common posterior tibial in situ bypass graft.  Minimal disease in posterior tibial artery.  Excellent inflow.  Did very well.  ARU with discharge 12/20/2024.    --1/2/2025 follow-up: Well-functioning graft.  Excellent graft and native PT Doppler.  Plan after left great toe and tip of great toe with dry eschar no open ulcers.  Recommended 3-month duplex    PMH: Medications: Zestoretic, Neurontin, Wellbutrin, Xarelto, Plavix, inhaler   Medical: Hypertension    COPD    PAD    1/2025 LDL= 96   Minimal carotid bifurcation disease on 12/14/2024 duplex   Normal preoperative MEL Right =1.4.  Left= 0.80    Overall she is doing very well.  Podiatry is following her for her plantar [facility] great [facility].    Patient is on Plavix and Xarelto.  Due to liver issues they wanted to switch over to Eliquis but she just filled her Xarelto prescription and will finish this off before switching over.    Swelling has been an issue and she cannot tolerate compression stockings we actually does not want this due to her distal bypass.  This is gradually improving.  Overall she is feeling much better and is now walking.  Starting to wear shoes again.    Exam: Alert and appropriate.  Normal affect.  Ambulatory.   Blood pressure 123/75.  Pulse 67.   Chest= clear   All leg incisions are healing well.  We did removed over half the sutures on her last   They were visit and the rest of the sutures are removed today.   Swelling is still present but improved.   Plus regional graft pulse.   Good bedside Doppler of graft at distal ankle and outflow native posterior tibial artery.  No evidence of erythema around  the very superficial plantar medial great toe ulcer    Duplex today reveals a bypass graft is patent with good flow.  We do see multiple sidebranches which are still patent.  The distal ones are small measuring 1 and 1.3 mm.  Mid thigh measures 3 mm but not affecting flow.  The left SFA stent still remains patent with no stenosis.        IMPRESSION:   #1.  Widely patent left proximal SFA to distal ankle posterior tibial bypass graft.  Several sidebranches which are of no clinical concern at this time.    #2.  Postoperative calf swelling.  I have placed her into a size 7 Spandage to help relieve this over time.    #3.  Toe ulcer which is very stable followed by podiatry.    #4.  Plavix and Xarelto will be switching over to Eliquis.    Over 20 minutes with patient today.    Follow-up duplex of graft in 3 months.    Leonard Coates MD   This note was created using Dragon voice recognition software which may result in transcription errors.

## 2025-01-30 ENCOUNTER — OFFICE VISIT (OUTPATIENT)
Dept: OTHER | Facility: CLINIC | Age: 85
End: 2025-01-30
Attending: SURGERY
Payer: COMMERCIAL

## 2025-01-30 ENCOUNTER — MEDICAL CORRESPONDENCE (OUTPATIENT)
Dept: HEALTH INFORMATION MANAGEMENT | Facility: CLINIC | Age: 85
End: 2025-01-30

## 2025-01-30 VITALS — DIASTOLIC BLOOD PRESSURE: 75 MMHG | HEART RATE: 67 BPM | SYSTOLIC BLOOD PRESSURE: 123 MMHG

## 2025-01-30 DIAGNOSIS — L97.529 TOE ULCER, LEFT, WITH UNSPECIFIED SEVERITY (H): Primary | ICD-10-CM

## 2025-01-30 DIAGNOSIS — E78.5 HYPERLIPIDEMIA LDL GOAL <70: ICD-10-CM

## 2025-01-30 DIAGNOSIS — I73.9 PAD (PERIPHERAL ARTERY DISEASE): ICD-10-CM

## 2025-01-30 PROCEDURE — G0463 HOSPITAL OUTPT CLINIC VISIT: HCPCS | Performed by: SURGERY

## 2025-01-30 RX ORDER — CLOPIDOGREL BISULFATE 75 MG/1
75 TABLET ORAL DAILY
Qty: 30 TABLET | Refills: 0 | Status: SHIPPED | OUTPATIENT
Start: 2025-01-30

## 2025-01-30 NOTE — PROGRESS NOTES
[facility]        Patient is here for a follow up.    Pt is currently taking Plavix and Xarelto.    There were no vitals taken for this visit.    The provider has been notified that the patient has concerns of slow healing of toe and SOB with activity.     Questions patient would like addressed today are: N/A.    Refills are needed: Yes:  pend    Has homecare services and agency name:  Princess Lagunas MA

## 2025-02-13 ENCOUNTER — OFFICE VISIT (OUTPATIENT)
Dept: PODIATRY | Facility: CLINIC | Age: 85
End: 2025-02-13
Payer: COMMERCIAL

## 2025-02-13 VITALS
SYSTOLIC BLOOD PRESSURE: 118 MMHG | DIASTOLIC BLOOD PRESSURE: 80 MMHG | HEIGHT: 64 IN | WEIGHT: 191 LBS | BODY MASS INDEX: 32.61 KG/M2

## 2025-02-13 DIAGNOSIS — Z87.2 HEALED FOOT ULCER: ICD-10-CM

## 2025-02-13 DIAGNOSIS — M79.672 LEFT FOOT PAIN: ICD-10-CM

## 2025-02-13 DIAGNOSIS — I73.9 PVD (PERIPHERAL VASCULAR DISEASE): Primary | ICD-10-CM

## 2025-02-13 NOTE — LETTER
"2/13/2025      Yumiko Mccartney  [address]  [place] [address]      Dear Colleague,    Thank you for referring your patient, Yumiko Mccartney, to the [facility]. Please see a copy of my visit note below.    Podiatry / Foot and Ankle Surgery Progress Note    February 13, 2025    Subject: Patient was seen for  Patient was seen for ulcerations to left great toe.  Notes that the foot is doing better than previous.  Notes that the toe is still sore but has not been draining anything for the last week.    Objective:  Vitals: /80   Ht 1.626 m (5' 4\")   Wt 86.6 kg (191 lb)   BMI 32.79 kg/m    BMI= Body mass index is 32.79 kg/m .    General:  Patient is alert and orientated.  NAD.    Vascular: Foot and ankle pulses are faintly palpable.  Foot is cold to the touch.  Some purple duskiness noted to the distal aspect of the left great toe.     Neuro:  Light and gross touch sensation intact to digits, dorsum, and plantar aspects of the feet.     Derm: Full-thickness stage III ulceration to the plantar aspect of the left great toe has healed.  No open lesions or signs of acute infection noted.     Musculoskeletal:  No foot deformity noted.        Assessment:     PVD (peripheral vascular disease) with claudication (H)  Skin ulcer of left great toe with fat layer exposed (H)  Left foot pain      Medical Decision Making/Plan: At this time, the ulcer appears healed.  She can get the foot wet and lotion the foot.  We will have her follow-up as needed as there is no further open lesions.     All questions were answered to patient satisfaction and she will call further questions or concerns.     Patient Risk Factor:  Patient is a medium risk factor for infection.     Daxa Nguyen DPM, Podiatry/Foot and Ankle Surgery            Again, thank you for allowing me to participate in the care of your patient.        Sincerely,        Daxa Nguyen DPM, Podiatry/Foot and Ankle " Surgery    Electronically signed

## 2025-02-13 NOTE — PROGRESS NOTES
"Podiatry / Foot and Ankle Surgery Progress Note    February 13, 2025    Subject: Patient was seen for  Patient was seen for ulcerations to left great toe.  Notes that the foot is doing better than previous.  Notes that the toe is still sore but has not been draining anything for the last week.    Objective:  Vitals: /80   Ht 1.626 m (5' 4\")   Wt 86.6 kg (191 lb)   BMI 32.79 kg/m    BMI= Body mass index is 32.79 kg/m .    General:  Patient is alert and orientated.  NAD.    Vascular: Foot and ankle pulses are faintly palpable.  Foot is cold to the touch.  Some purple duskiness noted to the distal aspect of the left great toe.     Neuro:  Light and gross touch sensation intact to digits, dorsum, and plantar aspects of the feet.     Derm: Full-thickness stage III ulceration to the plantar aspect of the left great toe has healed.  No open lesions or signs of acute infection noted.     Musculoskeletal:  No foot deformity noted.        Assessment:     PVD (peripheral vascular disease) with claudication (H)  Skin ulcer of left great toe with fat layer exposed (H)  Left foot pain      Medical Decision Making/Plan: At this time, the ulcer appears healed.  She can get the foot wet and lotion the foot.  We will have her follow-up as needed as there is no further open lesions.     All questions were answered to patient satisfaction and she will call further questions or concerns.     Patient Risk Factor:  Patient is a medium risk factor for infection.     Daxa Nguyen DPM, Podiatry/Foot and Ankle Surgery          "

## 2025-02-13 NOTE — PATIENT INSTRUCTIONS
Thank you for choosing [facility] Podiatry / Foot & Ankle Surgery!    DR PAEZ'S CLINIC:  [facility]   [address]   [place] [address]  [phone]    (Tues, Wed, Thur am, Fri pm)     [facility]  [address], [place] [address]  [phone]  (Every other Friday morning)    [facility]  [address] [place] [id number] [phone]  (Every other Friday)     TRIAGE LINE: [phone]  APPOINTMENTS: [phone]  RADIOLOGY: [phone]  SET UP SURGERY: [phone]  PHYSICAL THERAPY: [phone]   FAX NUMBER: [phone]  BILLING QUESTIONS: [phone]       Follow up: As needed      NEUROPATHY  Peripheral neuropathy is damage of the peripheral nerves. Your peripheral nerves--the nerves in your toes and fingertips--are the ones on the periphery of your body. When the nerves are damaged, they don't function properly. People with peripheral neuropathy have decreased or abnormal sensation in their toes and fingers. Sometimes, they develop problems moving these parts of the body as well.    When a person develops neuropathy, they usually begin to feel numbness or tingling in their feet and sometime in their legs.  Other symptoms may include painful burning or hot feet, tingling or feeling like insects or ants are crawling on your feet or legs.      In the [place], the most common cause of peripheral neuropathy is diabetes. According to the American Diabetes Association, 60 to 70 percent of people with diabetes will develop neuropathy within their lifetime.  Other causes of peripheral neuropathy include:  Certain medications, including some chemotherapy drugs.   Heredity. Some people have a family history of peripheral neuropathy.   Advanced age. Peripheral neuropathy is more common as people age.   Arthritis. Certain type of arthritis can cause peripheral neuropathy.   Alcoholism. According to the [place] National  Library of Medicine, up to half of all long-term heavy alcohol users develop peripheral neuropathy.   Neurological disorders. Certain neurological disorders, including spina bifida and fibromyalgia, are associated with peripheral neuropathy.   Injury. Acute injury to the peripheral nerves may also cause peripheral neuropathy.     Pain Management Strategies:  1.Blood Sugar Control - Most important in diabetics  2. Medications such as: Amytriptylline, duloxetine, gabapentin, lyrica, tramadol  3. Nutritional therapy: Vitamin B6 (100mg daily), Vitamin B12 (75mcg daily), Vitamin D 2000 IU daily), Alpha-Lipoic Acid (600-1800mg daily), Acetyl-L-Carnitine (500-1000mg TID, L-methyl folate (1500mcg daily)  4. TENS (Trans-electrical nerve stimulation) with physical therapy  5. Compounding pain creams    RECOMMENDATION TO AVOID PROBLEMS  1.Wash your feet with lukewarm water and a mild soap and then dry them thoroughly, especially between the toes.  2. Examine your feet daily looking for cuts, corns, blisters, cracks, ect., especially after wearing new shoes. Make sure to look between your toes. If you cannot see the bottom of your feet, set a mirror on the floor and hold your foot over it, or ask a spouse, friend or family member to examine your feet for you. Contact your doctor immediately if new problems are noted or if sores are not healing.  3. Immediately apply moisturizer to the tops and bottoms of your feet, avoiding areas between the toes. Hand lotion (Intesive Care, Karol, Eucerin, Neutrogena, Curel, ect...) is sufficient unless your doctor prescribes a medicated lotion. Apply sunscreen to your feet when going swimming outside.  4. Use clean comfortable shoes, wear white socks (if you have any bleeding or drainage, you will see it on white socks). Socks should not have thick seams or cut off the circulation around the leg. Break in new shoes slowly and rotate with older shoes until broken in. Check the inside of your  shoes with your hand to look for areas of irritation or objects that may have fallen into your shoes.    5. Keep slippers by the side of your bed for use during the night.  6. Shoes should be fitted by a professional and should not cause areas of irritation.  Check your feet regularly when wearing a new pair of shoes and replace them as needed.  7. Cut your nails straight across, but then gently round any sharp edges with a cardboard nail file.  If you have neuropathy, peripheral vascular disease or cannot see that well to trim your own toenails, see a medical professional for care.  8. Taking Vitamin B6, B12, and Alpha Lipoic Acid supplements can sometimes help.    DO NOT  1.  Do not soak your feet if you have an open sore.  Use only lukewarm water and always check the temperature with your hand as hot water can easily burn your feet.    2.  Never use a hot water bottle or heating pad on your feet.  Also do not apply cold compresses to your feet.  With decreased sensation, you could burn or freeze your feet.    3. Do not apply any of these to your feet:   -  Over the counter medicine for corns or warts   -  Harsh chemicals like boric acid   -  Do not self-treat corns, cuts, blisters or infections. Always consult your doctor.  4.  Do not wear sandals, slippers or walk barefoot, especially on hot sand or concrete or other harsh surfaces.  5.  If you smoke, stop!

## 2025-02-14 ENCOUNTER — MYC MEDICAL ADVICE (OUTPATIENT)
Dept: ORTHOPEDICS | Facility: CLINIC | Age: 85
End: 2025-02-14
Payer: COMMERCIAL

## 2025-02-14 DIAGNOSIS — M51.26 LUMBAR DISC HERNIATION: ICD-10-CM

## 2025-02-14 DIAGNOSIS — M54.16 LUMBAR RADICULOPATHY: ICD-10-CM

## 2025-02-14 DIAGNOSIS — G89.29 CHRONIC BILATERAL LOW BACK PAIN WITH SCIATICA, SCIATICA LATERALITY UNSPECIFIED: Primary | ICD-10-CM

## 2025-02-14 DIAGNOSIS — M54.40 CHRONIC BILATERAL LOW BACK PAIN WITH SCIATICA, SCIATICA LATERALITY UNSPECIFIED: Primary | ICD-10-CM

## 2025-02-18 ENCOUNTER — MEDICAL CORRESPONDENCE (OUTPATIENT)
Dept: HEALTH INFORMATION MANAGEMENT | Facility: CLINIC | Age: 85
End: 2025-02-18
Payer: COMMERCIAL

## 2025-02-27 DIAGNOSIS — I73.9 PAD (PERIPHERAL ARTERY DISEASE): ICD-10-CM

## 2025-02-27 RX ORDER — CLOPIDOGREL BISULFATE 75 MG/1
75 TABLET ORAL DAILY
Qty: 90 TABLET | Refills: 1 | Status: SHIPPED | OUTPATIENT
Start: 2025-02-27

## 2025-02-27 NOTE — TELEPHONE ENCOUNTER
Unable to fill per [facility] protocol.  Medication and pharmacy loaded.    Ernestine BLOUNT, RN    [facility]  Office: [phone]  Fax: [phone]

## 2025-03-03 ENCOUNTER — LAB (OUTPATIENT)
Dept: LAB | Facility: CLINIC | Age: 85
End: 2025-03-03
Payer: COMMERCIAL

## 2025-03-03 ENCOUNTER — OFFICE VISIT (OUTPATIENT)
Dept: OPHTHALMOLOGY | Facility: CLINIC | Age: 85
End: 2025-03-03
Payer: COMMERCIAL

## 2025-03-03 DIAGNOSIS — H26.8 PXF (PSEUDOEXFOLIATION OF LENS CAPSULE): ICD-10-CM

## 2025-03-03 DIAGNOSIS — D64.9 ANEMIA, UNSPECIFIED TYPE: ICD-10-CM

## 2025-03-03 DIAGNOSIS — H53.002 AMBLYOPIA OF LEFT EYE: ICD-10-CM

## 2025-03-03 DIAGNOSIS — H52.4 PRESBYOPIA: ICD-10-CM

## 2025-03-03 DIAGNOSIS — H43.813 POSTERIOR VITREOUS DETACHMENT, BILATERAL: ICD-10-CM

## 2025-03-03 DIAGNOSIS — Z96.1 PSEUDOPHAKIA: ICD-10-CM

## 2025-03-03 DIAGNOSIS — Z01.01 ENCOUNTER FOR EXAMINATION OF EYES AND VISION WITH ABNORMAL FINDINGS: Primary | ICD-10-CM

## 2025-03-03 LAB
BASOPHILS # BLD AUTO: 0 10E3/UL (ref 0–0.2)
BASOPHILS NFR BLD AUTO: 0 %
EOSINOPHIL # BLD AUTO: 0.2 10E3/UL (ref 0–0.7)
EOSINOPHIL NFR BLD AUTO: 2 %
ERYTHROCYTE [DISTWIDTH] IN BLOOD BY AUTOMATED COUNT: 13.7 % (ref 10–15)
HCT VFR BLD AUTO: 34.5 % (ref 35–47)
HGB BLD-MCNC: 10.6 G/DL (ref 11.7–15.7)
IMM GRANULOCYTES # BLD: 0 10E3/UL
IMM GRANULOCYTES NFR BLD: 0 %
LYMPHOCYTES # BLD AUTO: 2.3 10E3/UL (ref 0.8–5.3)
LYMPHOCYTES NFR BLD AUTO: 29 %
MCH RBC QN AUTO: 24.9 PG (ref 26.5–33)
MCHC RBC AUTO-ENTMCNC: 30.7 G/DL (ref 31.5–36.5)
MCV RBC AUTO: 81 FL (ref 78–100)
MONOCYTES # BLD AUTO: 0.4 10E3/UL (ref 0–1.3)
MONOCYTES NFR BLD AUTO: 5 %
NEUTROPHILS # BLD AUTO: 5.1 10E3/UL (ref 1.6–8.3)
NEUTROPHILS NFR BLD AUTO: 64 %
PLATELET # BLD AUTO: 568 10E3/UL (ref 150–450)
RBC # BLD AUTO: 4.26 10E6/UL (ref 3.8–5.2)
WBC # BLD AUTO: 8 10E3/UL (ref 4–11)

## 2025-03-03 PROCEDURE — 92014 COMPRE OPH EXAM EST PT 1/>: CPT | Performed by: OPHTHALMOLOGY

## 2025-03-03 PROCEDURE — 85025 COMPLETE CBC W/AUTO DIFF WBC: CPT

## 2025-03-03 PROCEDURE — 36415 COLL VENOUS BLD VENIPUNCTURE: CPT

## 2025-03-03 PROCEDURE — 92015 DETERMINE REFRACTIVE STATE: CPT | Performed by: OPHTHALMOLOGY

## 2025-03-03 ASSESSMENT — VISUAL ACUITY
OD_PH_SC: 20/30
OD_CC: J2
OD_SC: 20/40
OS_PH_SC+: -1
OS_PH_SC: 20/50
OS_CC: J3
METHOD: SNELLEN - LINEAR
OD_PH_SC+: -1
OD_SC+: -2
CORRECTION_TYPE: GLASSES
OS_SC: 20/60

## 2025-03-03 ASSESSMENT — CONF VISUAL FIELD
OD_INFERIOR_TEMPORAL_RESTRICTION: 0
OD_SUPERIOR_NASAL_RESTRICTION: 0
OS_SUPERIOR_NASAL_RESTRICTION: 0
OS_SUPERIOR_TEMPORAL_RESTRICTION: 0
OD_SUPERIOR_TEMPORAL_RESTRICTION: 0
OS_NORMAL: 1
OD_INFERIOR_NASAL_RESTRICTION: 0
OS_INFERIOR_TEMPORAL_RESTRICTION: 0
OS_INFERIOR_NASAL_RESTRICTION: 0
OD_NORMAL: 1

## 2025-03-03 ASSESSMENT — EXTERNAL EXAM - RIGHT EYE: OD_EXAM: 1+ BROW PTOSIS

## 2025-03-03 ASSESSMENT — REFRACTION_MANIFEST
OD_SPHERE: PLANO
OD_ADD: +2.50
OS_ADD: +2.50
OS_CYLINDER: +1.25
OS_AXIS: 165
OS_SPHERE: -1.50
OD_AXIS: 028
OD_CYLINDER: +1.25

## 2025-03-03 ASSESSMENT — TONOMETRY
IOP_METHOD: APPLANATION
OD_IOP_MMHG: 18
OS_IOP_MMHG: 25

## 2025-03-03 ASSESSMENT — SLIT LAMP EXAM - LIDS
COMMENTS: GOOD COSMESIS
COMMENTS: GOOD COSMESIS

## 2025-03-03 ASSESSMENT — EXTERNAL EXAM - LEFT EYE: OS_EXAM: 1+ BROW PTOSIS

## 2025-03-03 ASSESSMENT — CUP TO DISC RATIO
OS_RATIO: 0.3
OD_RATIO: 0.4

## 2025-03-03 ASSESSMENT — REFRACTION_WEARINGRX
SPECS_TYPE: OTC
OD_SPHERE: +2.50
OS_SPHERE: +2.50

## 2025-03-03 NOTE — LETTER
"3/3/2025      Yumiko Mccartney  [address]  [place] [address]      Dear Colleague,    Thank you for referring your patient, Yumiko Mccartney, to the [facility]. Please see a copy of my visit note below.     Current Eye Medications:  Systane as needed both eyes      Subjective:  Complete exam: She complains of very dry eyes.  She also states her distance visual acuity and near visual acuity is getting worse.  (Especially the near)     Objective:  See Ophthalmology Exam.       Assessment:  Consistent recent intraocular pressure elevation left eye.  Disc appears okay.  History of pseudoexfoliation left eye.  Otherwise stable eye exam.      ICD-10-CM    1. Encounter for examination of eyes and vision with abnormal findings  Z01.01       2. Presbyopia  H52.4       3. Pseudophakia, ou; Yag Caps, os  Z96.1       4. 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 Amblyopia, mild, of left eye  H53.002       6. Posterior vitreous detachment, bilateral  H43.813             Plan:  Glasses prescription given - optional (Will try first bifocal).    May use artificial tears up to four times a day (like Refresh Optive, Systane Balance, or TheraTears. Avoid \"get the red out\" drops and generic artifical tears).     Recommend trying gel drops during the day and at bedtime (Refresh Celluvisc, Systane gel, Genteal gel tears)     Possible clouding of posterior capsule right eye discussed.     Return visit next available at your convenience for an intraocular pressure check, glaucoma OCT, retinal OCT, Johnston Visual Field, and Pachy.     Ashu Gonzales M.D.  [phone]           Again, thank you for allowing me to participate in the care of your patient.        Sincerely,        Ashu Gonzales MD    Electronically signed"

## 2025-03-03 NOTE — PROGRESS NOTES
" Current Eye Medications:  Systane as needed both eyes      Subjective:  Complete exam: She complains of very dry eyes.  She also states her distance visual acuity and near visual acuity is getting worse.  (Especially the near)     Objective:  See Ophthalmology Exam.       Assessment:  Consistent recent intraocular pressure elevation left eye.  Disc appears okay.  History of pseudoexfoliation left eye.  Otherwise stable eye exam.      ICD-10-CM    1. Encounter for examination of eyes and vision with abnormal findings  Z01.01       2. Presbyopia  H52.4       3. Pseudophakia, ou; Yag Caps, os  Z96.1       4. 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 Amblyopia, mild, of left eye  H53.002       6. Posterior vitreous detachment, bilateral  H43.813             Plan:  Glasses prescription given - optional (Will try first bifocal).    May use artificial tears up to four times a day (like Refresh Optive, Systane Balance, or TheraTears. Avoid \"get the red out\" drops and generic artifical tears).     Recommend trying gel drops during the day and at bedtime (Refresh Celluvisc, Systane gel, Genteal gel tears)     Possible clouding of posterior capsule right eye discussed.     Return visit next available at your convenience for an intraocular pressure check, glaucoma OCT, retinal OCT, Johnston Visual Field, and Pachy.     Ashu Gonzales M.D.  [phone]         "

## 2025-03-03 NOTE — PATIENT INSTRUCTIONS
"Glasses prescription given - optional    May use artificial tears up to four times a day (like Refresh Optive, Systane Balance, or TheraTears. Avoid \"get the red out\" drops and generic artifical tears).     Recommend trying gel drops during the day and at bedtime (Refresh Celluvisc, Systane gel, Genteal gel tears)     Possible clouding of posterior capsule right eye discussed.     Return visit next available at your convenience for an intraocular pressure check, glaucoma OCT, retinal OCT, Johnston Visual Field, and Pachy.     Ashu Gonzales M.D.  [phone]    "

## 2025-03-05 ENCOUNTER — MEDICAL CORRESPONDENCE (OUTPATIENT)
Dept: HEALTH INFORMATION MANAGEMENT | Facility: CLINIC | Age: 85
End: 2025-03-05

## 2025-03-05 NOTE — RESULT ENCOUNTER NOTE
Yumiko,     Blood count is stable.  Platelets are improving.    Please do not hesitate to call us at [phone] if you have any questions or concerns.    Thank you,    Gavi Bear MD MPH

## 2025-03-06 ENCOUNTER — PATIENT OUTREACH (OUTPATIENT)
Dept: CARE COORDINATION | Facility: CLINIC | Age: 85
End: 2025-03-06
Payer: COMMERCIAL

## 2025-03-08 ENCOUNTER — HOSPITAL ENCOUNTER (EMERGENCY)
Facility: CLINIC | Age: 85
Discharge: HOME | End: 2025-03-08
Attending: EMERGENCY MEDICINE | Admitting: EMERGENCY MEDICINE
Payer: COMMERCIAL

## 2025-03-08 ENCOUNTER — NURSE TRIAGE (OUTPATIENT)
Dept: NURSING | Facility: CLINIC | Age: 85
End: 2025-03-08
Payer: COMMERCIAL

## 2025-03-08 ENCOUNTER — APPOINTMENT (OUTPATIENT)
Dept: ULTRASOUND IMAGING | Facility: CLINIC | Age: 85
End: 2025-03-08
Payer: COMMERCIAL

## 2025-03-08 VITALS
HEART RATE: 72 BPM | OXYGEN SATURATION: 99 % | DIASTOLIC BLOOD PRESSURE: 77 MMHG | TEMPERATURE: 96.5 F | RESPIRATION RATE: 17 BRPM | SYSTOLIC BLOOD PRESSURE: 152 MMHG

## 2025-03-08 DIAGNOSIS — I73.9 PAD (PERIPHERAL ARTERY DISEASE): ICD-10-CM

## 2025-03-08 DIAGNOSIS — L81.9 DISCOLORATION OF SKIN OF TOE: ICD-10-CM

## 2025-03-08 LAB
ERYTHROCYTE [DISTWIDTH] IN BLOOD BY AUTOMATED COUNT: 13.6 % (ref 10–15)
HCT VFR BLD AUTO: 36.9 % (ref 35–47)
HGB BLD-MCNC: 11.2 G/DL (ref 11.7–15.7)
HOLD SPECIMEN: NORMAL
MCH RBC QN AUTO: 24.6 PG (ref 26.5–33)
MCHC RBC AUTO-ENTMCNC: 30.4 G/DL (ref 31.5–36.5)
MCV RBC AUTO: 81 FL (ref 78–100)
PLATELET # BLD AUTO: 539 10E3/UL (ref 150–450)
RBC # BLD AUTO: 4.56 10E6/UL (ref 3.8–5.2)
WBC # BLD AUTO: 7.3 10E3/UL (ref 4–11)

## 2025-03-08 PROCEDURE — 250N000013 HC RX MED GY IP 250 OP 250 PS 637: Performed by: EMERGENCY MEDICINE

## 2025-03-08 PROCEDURE — 99207 PR NO CHARGE LOS: CPT | Performed by: STUDENT IN AN ORGANIZED HEALTH CARE EDUCATION/TRAINING PROGRAM

## 2025-03-08 PROCEDURE — 93922 UPR/L XTREMITY ART 2 LEVELS: CPT

## 2025-03-08 PROCEDURE — 93925 LOWER EXTREMITY STUDY: CPT

## 2025-03-08 PROCEDURE — 85018 HEMOGLOBIN: CPT | Performed by: STUDENT IN AN ORGANIZED HEALTH CARE EDUCATION/TRAINING PROGRAM

## 2025-03-08 PROCEDURE — 99284 EMERGENCY DEPT VISIT MOD MDM: CPT | Mod: 25

## 2025-03-08 PROCEDURE — 36415 COLL VENOUS BLD VENIPUNCTURE: CPT | Performed by: STUDENT IN AN ORGANIZED HEALTH CARE EDUCATION/TRAINING PROGRAM

## 2025-03-08 RX ORDER — CEPHALEXIN 500 MG/1
500 CAPSULE ORAL 4 TIMES DAILY
Qty: 40 CAPSULE | Refills: 0 | Status: SHIPPED | OUTPATIENT
Start: 2025-03-08 | End: 2025-03-18

## 2025-03-08 RX ORDER — CEPHALEXIN 500 MG/1
500 CAPSULE ORAL ONCE
Status: COMPLETED | OUTPATIENT
Start: 2025-03-08 | End: 2025-03-08

## 2025-03-08 RX ADMIN — CEPHALEXIN 500 MG: 500 CAPSULE ORAL at 19:48

## 2025-03-08 ASSESSMENT — ACTIVITIES OF DAILY LIVING (ADL)
ADLS_ACUITY_SCORE: 59

## 2025-03-08 ASSESSMENT — COLUMBIA-SUICIDE SEVERITY RATING SCALE - C-SSRS
1. IN THE PAST MONTH, HAVE YOU WISHED YOU WERE DEAD OR WISHED YOU COULD GO TO SLEEP AND NOT WAKE UP?: NO
2. HAVE YOU ACTUALLY HAD ANY THOUGHTS OF KILLING YOURSELF IN THE PAST MONTH?: NO
6. HAVE YOU EVER DONE ANYTHING, STARTED TO DO ANYTHING, OR PREPARED TO DO ANYTHING TO END YOUR LIFE?: NO

## 2025-03-08 NOTE — TELEPHONE ENCOUNTER
Nurse Triage SBAR    Is this a 2nd Level Triage? YES, LICENSED PRACTITIONER REVIEW IS REQUIRED    Situation: black big toe on left foot    Background: Patient calling to report black, cold big toe on left foot.  Notes that yesterday the toe had a red [place] on the top and home care nurse circled the reddened area and advised patient to see if the area grew at all. Today, upon waking, patient noticed the reddened [place] had grown and the entire toe is cold and black in comparison to the other toes. She also notes that it is slightly painful.  She reports having a vascular bypass the middle of December with no complications since.      Assessment: dark/black big toe on left foot, cold to the touch    Protocol Recommended Disposition:   Go to ED Now (Or PCP Triage)    Recommendation: Advised patient to be evaluated in ED.      Paged to provider    Provider consult indicated.     Reason for page: black/cold toe    Page sent to Dr. Granados by RN at 1115.   Provider, Dr. Granados, returning page to Nurse Advisors at 1122    Provider recommended plan of care: Go to ED.    Provider Recommendation Follow Up:   Reached patient/caregiver. Informed of provider's recommendations. Patient verbalized understanding and agrees with the plan.     Does the patient meet one of the following criteria for ADS visit consideration? 16+ years old, with an [facility] PCP     TIP  Providers, please consider if this condition is appropriate for management at one of our Acute and Diagnostic Services sites.     If patient is a good candidate, please use dotphrase <dot>triageresponse and select Refer to ADS to document.    Cristal Ch RN on 3/8/2025 at 11:17 AM      Reason for Disposition   Purple or black skin on toe  (Exception: Person remembers bruising the toe from an injury.)    Additional Information   Negative: Followed a toe injury   Negative: Wound looks infected   Negative: Caused by an animal bite   Negative: Caused by frostbite    Negative: Caused by an ingrown toenail   Negative: Athlete's Foot suspected (i.e., itchy red rash in web space between toes)   Negative: Foot pain is main symptom   Negative: Foot is cool or blue in comparison to other foot    Protocols used: Toe Pain-A-AH

## 2025-03-08 NOTE — ED TRIAGE NOTES
Had vascular surgery middle of December. Since surgery L 1st toe was white. 2 days ago started turning black and is spreading. Denies fevers.     Triage Assessment (Adult)       Row Name 03/08/25 1238          Triage Assessment    Airway WDL WDL        Respiratory WDL    Respiratory WDL WDL        Skin Circulation/Temperature WDL    Skin Circulation/Temperature WDL X  reported wound on L 1st toe        Cardiac WDL    Cardiac WDL WDL        Peripheral/Neurovascular WDL    Peripheral Neurovascular WDL WDL        Cognitive/Neuro/Behavioral WDL    Cognitive/Neuro/Behavioral WDL WDL

## 2025-03-08 NOTE — ED PROVIDER NOTES
Emergency Department Note      History of Present Illness     Chief Complaint   Wound Check      HPI   Yumiko Mccartney is a 84 year old female on Plavix and Xarelto with history of DVT, hyperlipidemia, hypertension, basal cell carcinoma, and CKD who presents to the ED for wound check. The patient reports that she had vascular surgery in her left lower extremity to improve blood flow, but immediately after the surgery the top of her left great toe became extremely shiny and white in color. She explains that her toe has changed to a purple color and is continuing to spread over the last 2 days. The patient adds that her left toes are more cold than usual, but she denies any new or increased pain.    Independent Historian   None    Review of External Notes   I reviewed the patient's vascular surgery visit note from 1/30/25. Patient is on Plavix and Xarelto    Past Medical History     Medical History and Problem List   Actinic keratosis  Rosacea  Amblyopia  Asthma  Basal cell carcinoma  DJD  DVT  Hyperlipidemia  Endometriosis  Hypertension  Depression  Osteopenia  Obesity  Lumbosacral spondylosis  Anemia  CKD  Skin ulcer of left great toe    Medications   Zanaflex  Proair inhaler  Proventil inhaler  Wellbutrin  Plavix  Neurontin  Zestoretic  Robaxin  Xarelto    Surgical History   Arthroplasty knee (L)  Blepharoplasty (B)  Femorotibial bypass graft (L)  Cataract removal (B)  Cholecystectomy  Lower extremity angiogram (L)  Repair ptosis  Endometriosis surgery x3  Ganglion cyst removal   Appendectomy    Physical Exam     Patient Vitals for the past 24 hrs:   BP Temp Temp src Pulse Resp SpO2   03/08/25 1237 (!) 152/77 (!) 96.5  F (35.8  C) Temporal 72 17 99 %     Physical Exam  Nursing note and vitals reviewed.    Constitutional:  Appears comfortable.    Cardiovascular:  Normal rate, regular rhythm.     DP pulse pulse 2+.  Cap refill less than 2 seconds in the left 2nd through 5th toes, 2 to 3 seconds in the great toe tip.   There is no blanching on the top of the toe in the circled area.  Musculoskeletal:  Discoloration to the left great toe, mostly purpleish and then the top looks more violaceous in the circled area.  Neurological:   Alert and oriented.  Sensation is good in the toes and not painful.  Skin:    Skin is warm and dry.  Discoloration of the great toe as noted in the picture.  Psychiatric:   Behavior is normal. Appropriate mood and affect.     Judgment and thought content normal.         Diagnostics     Lab Results   Labs Ordered and Resulted from Time of ED Arrival to Time of ED Departure - No data to display    Imaging   No orders to display       Independent Interpretation   None    ED Course      Medications Administered   Medications - No data to display      Discussion of Management   Vascular surgery, Dr. Caldera    ED Course   ED Course as of 03/08/25 1506   Sat Mar 08, 2025   1332 I obtained history and examined the patient as noted above   1355 I spoke with Dr. Caldera, vascular surgery, regarding the patient's presentation, findings, and plan of care.   1400 I rechecked the patient and explained findings.       Additional Documentation  None    Medical Decision Making / Diagnosis     CMS Diagnoses: None    MIPS       None    MDM   Yumiko Mccartney is a 84 year old female who came in with change in color of the great toe on the left foot.  This is the leg that had the arterial graft in December.  She does not have increased pain, there is capillary refill in the main part of the toe but on the top there is no blanching and the darker purple area.  She has a dorsalis pedal pulse.  That toe is a little cooler than the other ones.  I talked with Dr. Caldera from vascular surgery and he is here to see the patient.  Further disposition per Dr. Caldera and I have signed the patient out to my partner Dr. Goncalves  pending vascular surgery recommendations    Disposition   Care of the patient was transferred to my colleague  Dr. Goncalves pending vascular surgery consultation in the ED.     Diagnosis     ICD-10-CM    1. Discoloration of skin of toe  L81.9       2. PAD (peripheral artery disease)  I73.9                Scribe Disclosure:  I, Sheldon Calvin, am serving as a scribe at 1:24 PM on 3/8/2025 to document services personally performed by Kamilah Cox MD based on my observations and the provider's statements to me.        Kamilah Cox MD  03/08/25 3007

## 2025-03-09 NOTE — CONSULTS
Vascular Surgery Consultation    Yumiko Mccartney MRN# [id number]   Age: 84 year old YOB: 1940     Date of Admission:  3/8/2025    Date of Consult:   03/08/25    Reason for consult: L 1st toe discoloration       Requesting service: Hospitalist; requesting provider: Dr. Cox                   Assessment and Plan:   84F with hx failed L SFA/popliteal artery stenting for LLE CLTI with wounds who is now s/p L SFA-PT in-situ bypass on 12/15/2024 with Dr. Coates. She presents with several days of discomfort in her L 1st toe and red discoloration on the dorsal surface of the 1st toe. Normal arterial studies and exam consistent with patent bypass. Unclear etiology of these symptoms, though may be an early cellulitis.    - Ok to discharge from a Vascular Surgery perspective  - Will arrange for follow-up with our team  - Would recommend short course of antibiotics on discharge to address possible early cellulitis  - Appreciate podiatry input on appearance of toe    Discussed with staff, Dr. Jackson.    Reji Caldera MD  Vascular Surgery resident             Chief Complaint:   L 1st toe pain and discoloration         History of Present Illness:   Ms. Mccartney is an 84F with hx failed L SFA/popliteal artery stenting for LLE CLTI with wounds who is now s/p L SFA-PT in-situ bypass on 12/15/2024 with Dr. Coates. She states that beginning about 2 days ago, she noticed some pain in her L 1st toe which had not been present before. She denies any trauma prior to the development of symptoms. She denies any fevers or chills. Several days before the development of symptoms, she had a scab from an ulcer on on the plantar surface of her 1st toe fall off. She last saw podiatry about 1 month ago who felt that her wound was improving and therefore she was discharged from clinic.          Past Medical History:     Past Medical History:   Diagnosis Date    Acne rosacea     Actinic keratosis     Amblyopia     Asthma, moderate  "persistent     Basal cell carcinoma 10/2010    back X2    Cataract, mod, od; mild-mod, os 01/12/2012    DJD (degenerative joint disease), lumbosacral 08/06/2014    DVT (deep venous thrombosis) (H)     Elevated cholesterol     Endometriosis     HTN (hypertension)     Moderate major depression (H)     \"Circumstances\"    Osteopenia              Past Surgical History:     Past Surgical History:   Procedure Laterality Date    ARTHROPLASTY KNEE Left 10/4/2021    Procedure: ARTHROPLASTY, LEFT KNEE, TOTAL;  Surgeon: Glenn Calvin MD;  Location: [facility] OR    BLEPHAROPLASTY BILATERAL  3/9/2006; 2013    both eyes, upper lid; revision (EN)    BYPASS GRAFT FEMOROTIBIAL Left 12/15/2024    Procedure: CREATION BYPASS VASCULAR PROXIMAL SUPERIOR FEMORAL TO DISTAL POSTERIOR TIBIAL, ANGIOPLASTY AND SFA STENE PLACEMENT X2.;  Surgeon: Leonard Coates MD;  Location: [facility] OR    CATARACT IOL, RT/LT      HC REMOVAL GALLBLADDER      INJECT BLOCK MEDIAL BRANCH CERVICAL/THORACIC/LUMBAR Bilateral 12/7/2023    Procedure: BLOCK, NERVE, FACET JOINT, MEDIAL BRANCH, DIAGNOSTICL3/4/ALA;  Surgeon: Dinesh Proctor MD;  Location: [facility] OR    IR LOWER EXTREMITY ANGIOGRAM LEFT  11/25/2024    LASER YAG CAPSULOTOMY      left eye (AC lysis also)    PHACOEMULSIFICATION WITH STANDARD INTRAOCULAR LENS IMPLANT  1/2012; 4/2013    right eye; left eye    REPAIR PTOSIS      SURGICAL HISTORY OF -       endometriosis surgeriesx3    SURGICAL HISTORY OF -       ganiglion cyst onfoot    SURGICAL HISTORY OF -       Eye for \"droopy eye\"    ZZC APPENDECTOMY               Social History:     Social History     Tobacco Use    Smoking status: Never     Passive exposure: Never    Smokeless tobacco: Never   Substance Use Topics    Alcohol use: Yes     Comment: rarely             Family History:     Family History   Problem Relation Age of Onset    C.A.D. Father     C.A.D. Brother     Hypertension Mother     Cancer No family hx of     Diabetes No family hx of     " Cerebrovascular Disease No family hx of     Thyroid Disease No family hx of     Glaucoma No family hx of     Macular Degeneration No family hx of                 Allergies:     Allergies   Allergen Reactions    Erythromycin Swelling and Other (See Comments)             Medications:     No current facility-administered medications for this encounter.     Current Outpatient Medications   Medication Sig Dispense Refill    cephALEXin (KEFLEX) 500 MG capsule Take 1 capsule (500 mg) by mouth 4 times daily for 10 days. 40 capsule 0    acetaminophen (TYLENOL) 325 MG tablet Take 2 tablets (650 mg) by mouth every 4 hours as needed for other 60 tablet 0    albuterol (PROAIR HFA/PROVENTIL HFA/VENTOLIN HFA) 108 (90 Base) MCG/ACT inhaler Inhale 2 puffs into the lungs every 6 hours as needed for shortness of breath, wheezing or cough. 18 g 0    albuterol (PROVENTIL) (2.5 MG/3ML) 0.083% neb solution TAKE 1 VIAL (2.5 MG) BY NEBULIZATION EVERY 6 HOURS AS NEEDED FOR SHORTNESS OF BREATH OR WHEEZING 75 mL 0    buPROPion (WELLBUTRIN XL) 150 MG 24 hr tablet Take 1 tablet (150 mg) by mouth every morning. 90 tablet 1    calcium citrate (CITRACAL) 950 (200 Ca) MG tablet Take 1 tablet by mouth daily.      Cholecalciferol (VITAMIN D) 2000 UNIT CAPS Take 2,000 Units by mouth daily      clopidogrel (PLAVIX) 75 MG tablet TAKE 1 TABLET BY MOUTH EVERY DAY 90 tablet 1    gabapentin (NEURONTIN) 600 MG tablet TAKE 0.5 TAB BY MOUTH IN MORNING & 2 TABS AT BEDTIME 225 tablet 1    lisinopril-hydrochlorothiazide (ZESTORETIC) 20-12.5 MG tablet Take 1 tablet by mouth daily.      methocarbamol (ROBAXIN) 500 MG tablet Take 1 tablet (500 mg) by mouth 3 times daily as needed for muscle spasms or other (pain).      Multiple Vitamin (MULTIVITAMIN ADULT PO) Take by mouth daily      nitroGLYcerin (NITRO-BID) 2 % OINT ointment Place 1 inch (15 mg) over 12 hours onto the skin every 24 hours. Apply to left great toe 30 g 1    rivaroxaban ANTICOAGULANT (XARELTO) 20 MG  "TABS tablet Take 1 tablet (20 mg) by mouth daily with food. 90 tablet 3    senna-docusate (SENOKOT-S/PERICOLACE) 8.6-50 MG tablet Take 1 tablet by mouth 2 times daily as needed for constipation.      sulfamethoxazole-trimethoprim (BACTRIM DS) 800-160 MG tablet Take 1 tablet by mouth 2 times daily.                 Review of Systems:     A 12 point review of systems was completed and found to be negative unless otherwise stated in the above HPI            Physical Exam:   BP (!) 152/77   Pulse 72   Temp (!) 96.5  F (35.8  C) (Temporal)   Resp 17   SpO2 99%     GEN: A&Ox3, no acute distress  NEURO: CN II-XII grossly intact. No gross neurologic deficits  NECK: trachea midline, no JVD  HEENT: No scleral icterus.  RESP: Nonlabored breathing on room air  CV: RRR by graft pulse, noncyanotic  MSK: Moves all extremities independently. Normal active range of motion. No motor or sensory deficits  PSYCH: cooperative   PULSES: Palpable bypass graft, multiphasic PT and AT signals at the level of the ankle. Picture of toe in chart. Incisions well-healed          Data:   All laboratory data reviewed    Results:  BMPNo lab results found in last 7 days.  CBC  Recent Labs   Lab 03/08/25  1814 03/03/25  1342   WBC 7.3 8.0   HGB 11.2* 10.6*   * 568*     LFTNo lab results found in last 7 days.  No results for input(s): \"GLC\", \"BGM\" in the last 168 hours.    Imaging:  No results found.           "

## 2025-03-09 NOTE — ED PROVIDER NOTES
Patient was signed out to me by Dr. Cox, awaiting a vascular surgery consult.  The vascular surgeons came and saw the patient and then ordered imaging studies and specifically an arterial ultrasound and ABIs.  I spoke with the vascular surgeon a few times, and I also spoke with the on-call podiatrist, Dr. Davis, per the vascular surgeons request.  She feels that the patient is safe for discharge based on the pictures that are in the chart.  The vascular surgeons also feel that the patient is safe for discharge.  I am going to provide her oral Keflex per the patient's request as well as the vascular surgeon agreeing that it is reasonable.  They are going to arrange for follow-up in vascular surgery.  The patient knows to return with any concerns or worsening symptoms.     Jordan Goncalves MD  03/08/25 1940

## 2025-03-10 ENCOUNTER — TELEPHONE (OUTPATIENT)
Dept: OTHER | Facility: CLINIC | Age: 85
End: 2025-03-10
Payer: COMMERCIAL

## 2025-03-10 NOTE — TELEPHONE ENCOUNTER
Routing to scheduling to coordinate the following:    In clinic visit with Charlee this week  Please schedule this week (offer morning appt 3/13 with Charlee)     Appt note: History of rest pain in left lower extremity and discoloration, LLE angiogram on 11/25/2024 in which VBX stent placement of a foal lesion within the distal SFA was severely stenosed, CREATION BYPASS VASCULAR PROXIMAL SUPERIOR FEMORAL TO DISTAL POSTERIOR TIBIAL, ANGIOPLASTY AND SFA STENT PLACEMENT X2 on 12/15/24 by Dr. Coates, had ER visit 3/8/25 for discoloration of skin of toe.    Please see staff message below. Routing to Charlee to advise if MEL should be repeated prior to in clinic visit and if not, should order for MEL due 3/12/25 be cancelled?     Inna Medina RN  [facility] [place]

## 2025-03-10 NOTE — TELEPHONE ENCOUNTER
Latanya Slater, RN  P [facility] Rn Triage  Team could you help in getting this pt set up for follow up.          Previous Messages       ----- Message -----  From: Ellie Jackson DO  Sent: 3/10/2025  12:28 AM CDT  To: Catalina Arias, SHERRY; Charlee Ledesma, RN; *  Subject: ER follow up                                    Hi this pt has hx of LLE bypass with Dr Coates- came to ED with toe erythema. Bypass looks great, will need close follow up and possible abx. Did not get toe pressures in ED- could help determine if she has distal microvascular disease as well.     Charlee- could you see this week in clinic?    Let me know if any questions. Thanks!    -Ellie

## 2025-03-10 NOTE — TELEPHONE ENCOUNTER
Pt has MEL w exercise and TBI order dated 3/10/25.    Inna Medina RN  [facility] [place]

## 2025-03-10 NOTE — TELEPHONE ENCOUNTER
Please schedule MEL with exercise and TBI prior to in clinic visit with Charlee. Thanks!    Inna Medina RN  [facility] [place]

## 2025-03-11 ENCOUNTER — MEDICAL CORRESPONDENCE (OUTPATIENT)
Dept: HEALTH INFORMATION MANAGEMENT | Facility: CLINIC | Age: 85
End: 2025-03-11
Payer: COMMERCIAL

## 2025-03-11 ENCOUNTER — TELEPHONE (OUTPATIENT)
Dept: FAMILY MEDICINE | Facility: CLINIC | Age: 85
End: 2025-03-11
Payer: COMMERCIAL

## 2025-03-11 NOTE — TELEPHONE ENCOUNTER
Form faxed to [facility] [phone] on 3/11/25 at 4:28pm. Copy placed in abstract and original in TC bin.

## 2025-03-11 NOTE — TELEPHONE ENCOUNTER
Forms/Letter Request    Type of form/letter: Order # [id number]-Left toe Voltaren Gel    Do we have the form/letter: Yes: Placed in Dr Bear's box    Who is the form from? [facility]    Where did/will the form come from? form was faxed in    When is form/letter needed by: Not indicated    How would you like the form/letter returned: Fax : [phone]      Yumiko Avery MA/  [facility]   Primary Care

## 2025-03-11 NOTE — TELEPHONE ENCOUNTER
Order faxed to [facility] [phone] on 3/11/25 at 4:45pm. Copy placed in abstract and original in TC copy bin.

## 2025-03-11 NOTE — TELEPHONE ENCOUNTER
Forms/Letter Request    Type of form/letter: Home Health Care Cert and Plan of Care Order # [id number] Cert Period: 3/5/2025-5/3/2025  Do we have the form/letter: Yes: Placed in Dr Bear's box    Who is the form from? [facility]    Where did/will the form come from? form was faxed in    When is form/letter needed by: Not indicated    How would you like the form/letter returned: Fax : [phone]    Yumiko Avery MA/  [facility]   Primary Care

## 2025-03-13 ENCOUNTER — OFFICE VISIT (OUTPATIENT)
Dept: OTHER | Facility: CLINIC | Age: 85
End: 2025-03-13
Payer: COMMERCIAL

## 2025-03-13 VITALS — DIASTOLIC BLOOD PRESSURE: 78 MMHG | HEART RATE: 75 BPM | SYSTOLIC BLOOD PRESSURE: 135 MMHG

## 2025-03-13 DIAGNOSIS — I73.9 PAD (PERIPHERAL ARTERY DISEASE): Primary | ICD-10-CM

## 2025-03-13 PROCEDURE — G0463 HOSPITAL OUTPT CLINIC VISIT: HCPCS

## 2025-03-13 NOTE — PROGRESS NOTES
[facility]        Patient is here for a  follow up.    Pt is currently taking Plavix, Xarelto, and Antibiotics.    /78 (BP Location: Right arm, Patient Position: Chair, Cuff Size: Adult Regular)   Pulse 75     The provider has been notified that the patient has no concerns.     Questions patient would like addressed today are: N/A.    Refills are needed: N/A    Has homecare services and agency name:  Princess Garrido ma

## 2025-03-13 NOTE — PROGRESS NOTES
"    VASCULAR SURGERY PROGRESS NOTE    LOCATION: [facility]    Yumiko Mccartney  Medical Record #: [id number]  YOB: 1940  Age: 84 year old     Date of Service: 3/13/2025    PRIMARY CARE PROVIDER: Gavi Bear    Reason for visit:  Follow-up    Tomi Mccartney is an 84 year old female with hx of failed L SFA/popliteal artery stenting for LLE CLTI with wounds who is now s/p L SFA-PT in-situ bypass on 12/15/2024 with Dr. Coates. Seen in ED on 3/8 for several days of discomfort in the L first toe and discoloration. Arterial studies consistent with patent bypass with one area of elevated velocities.     Today Tomi is:  Alert and oriented x4, neurologically intact  Monophasic DP and PT on examination, slight discoloration noted in the L toe. Normothermic to touch  Palpable graft pulse   Denies pain in the left foot, denies extension of discoloration. States that the discoloration \"ebbs and flows\"      RECOMMENDATION/RISKS: Discussed with Dr. Coates, no intervention needed at this time given patent bypass graft. We will have Tomi return to clinic  in 3 months time with a LLE arterial duplex and ABIs(to get toe pressures only). Discussed possible microvascular disease, patient has no hx of smoking.     Discussed s/s of when to follow-up with vascular surgery more urgently versus ED. Continue on plavix and xarelto.       REVIEW OF SYSTEMS:    A 12 point ROS was reviewed and is negative except for what is listed above in HPI.    PHH:    Past Medical History:   Diagnosis Date    Acne rosacea     Actinic keratosis     Amblyopia     Asthma, moderate persistent     Basal cell carcinoma 10/2010    back X2    Cataract, mod, od; mild-mod, os 01/12/2012    DJD (degenerative joint disease), lumbosacral 08/06/2014    DVT (deep venous thrombosis) (H)     Elevated cholesterol     Endometriosis     HTN (hypertension)     Moderate major depression (H)     \"Circumstances\"    Osteopenia           Past Surgical History: " "  Procedure Laterality Date    ARTHROPLASTY KNEE Left 10/4/2021    Procedure: ARTHROPLASTY, LEFT KNEE, TOTAL;  Surgeon: Glenn Calvin MD;  Location: [facility] OR    BLEPHAROPLASTY BILATERAL  3/9/2006; 2013    both eyes, upper lid; revision (EN)    BYPASS GRAFT FEMOROTIBIAL Left 12/15/2024    Procedure: CREATION BYPASS VASCULAR PROXIMAL SUPERIOR FEMORAL TO DISTAL POSTERIOR TIBIAL, ANGIOPLASTY AND SFA STENE PLACEMENT X2.;  Surgeon: Leonard Coates MD;  Location: [facility] OR    CATARACT IOL, RT/LT      HC REMOVAL GALLBLADDER      INJECT BLOCK MEDIAL BRANCH CERVICAL/THORACIC/LUMBAR Bilateral 12/7/2023    Procedure: BLOCK, NERVE, FACET JOINT, MEDIAL BRANCH, DIAGNOSTICL3/4/ALA;  Surgeon: Dinesh Proctor MD;  Location: [facility] OR    IR LOWER EXTREMITY ANGIOGRAM LEFT  11/25/2024    LASER YAG CAPSULOTOMY      left eye (AC lysis also)    PHACOEMULSIFICATION WITH STANDARD INTRAOCULAR LENS IMPLANT  1/2012; 4/2013    right eye; left eye    REPAIR PTOSIS      SURGICAL HISTORY OF -       endometriosis surgeriesx3    SURGICAL HISTORY OF -       ganiglion cyst onfoot    SURGICAL HISTORY OF -       Eye for \"droopy eye\"    ZZC APPENDECTOMY         ALLERGIES:  Erythromycin    MEDS:    Current Outpatient Medications:     acetaminophen (TYLENOL) 325 MG tablet, Take 2 tablets (650 mg) by mouth every 4 hours as needed for other, Disp: 60 tablet, Rfl: 0    albuterol (PROAIR HFA/PROVENTIL HFA/VENTOLIN HFA) 108 (90 Base) MCG/ACT inhaler, Inhale 2 puffs into the lungs every 6 hours as needed for shortness of breath, wheezing or cough., Disp: 18 g, Rfl: 0    albuterol (PROVENTIL) (2.5 MG/3ML) 0.083% neb solution, TAKE 1 VIAL (2.5 MG) BY NEBULIZATION EVERY 6 HOURS AS NEEDED FOR SHORTNESS OF BREATH OR WHEEZING, Disp: 75 mL, Rfl: 0    buPROPion (WELLBUTRIN XL) 150 MG 24 hr tablet, Take 1 tablet (150 mg) by mouth every morning., Disp: 90 tablet, Rfl: 1    calcium citrate (CITRACAL) 950 (200 Ca) MG tablet, Take 1 tablet by mouth daily., Disp: , " Rfl:     cephALEXin (KEFLEX) 500 MG capsule, Take 1 capsule (500 mg) by mouth 4 times daily for 10 days., Disp: 40 capsule, Rfl: 0    Cholecalciferol (VITAMIN D) 2000 UNIT CAPS, Take 2,000 Units by mouth daily, Disp: , Rfl:     clopidogrel (PLAVIX) 75 MG tablet, TAKE 1 TABLET BY MOUTH EVERY DAY, Disp: 90 tablet, Rfl: 1    gabapentin (NEURONTIN) 600 MG tablet, TAKE 0.5 TAB BY MOUTH IN MORNING & 2 TABS AT BEDTIME, Disp: 225 tablet, Rfl: 1    lisinopril-hydrochlorothiazide (ZESTORETIC) 20-12.5 MG tablet, Take 1 tablet by mouth daily., Disp: , Rfl:     methocarbamol (ROBAXIN) 500 MG tablet, Take 1 tablet (500 mg) by mouth 3 times daily as needed for muscle spasms or other (pain)., Disp: , Rfl:     Multiple Vitamin (MULTIVITAMIN ADULT PO), Take by mouth daily, Disp: , Rfl:     nitroGLYcerin (NITRO-BID) 2 % OINT ointment, Place 1 inch (15 mg) over 12 hours onto the skin every 24 hours. Apply to left great toe, Disp: 30 g, Rfl: 1    rivaroxaban ANTICOAGULANT (XARELTO) 20 MG TABS tablet, Take 1 tablet (20 mg) by mouth daily with food., Disp: 90 tablet, Rfl: 3    senna-docusate (SENOKOT-S/PERICOLACE) 8.6-50 MG tablet, Take 1 tablet by mouth 2 times daily as needed for constipation., Disp: , Rfl:     sulfamethoxazole-trimethoprim (BACTRIM DS) 800-160 MG tablet, Take 1 tablet by mouth 2 times daily., Disp: , Rfl:     SOCIAL HABITS:    History   Smoking Status    Never   Smokeless Tobacco    Never     Social History    Substance and Sexual Activity      Alcohol use: Yes        Comment: rarely      History   Drug Use No       FAMILY HISTORY:    Family History   Problem Relation Age of Onset    C.A.D. Father     C.A.D. Brother     Hypertension Mother     Cancer No family hx of     Diabetes No family hx of     Cerebrovascular Disease No family hx of     Thyroid Disease No family hx of     Glaucoma No family hx of     Macular Degeneration No family hx of        PE:  There were no vitals taken for this visit.  Wt Readings from Last  1 Encounters:   02/13/25 191 lb (86.6 kg)     There is no height or weight on file to calculate BMI.    DIAGNOSTIC STUDIES:     Images:  US Lower Extremity Arterial Duplex Bilateral    Result Date: 3/9/2025  EXAM: RESTING ANKLE-BRACHIAL INDICES (ABIs) LOCATION: [facility] DATE: 3/8/2025 INDICATION: follow-up left lower extremity bypass COMPARISON: 11/5/2024 MEL FINDINGS: RIGHT                                               Brachial: 128 Ankle (PT): 149 Index: 1.11  Ankle (DP): 147 Index: 1.10 Digit: 110 Index: 0.82   LEFT Brachial: 134 Unable to obtain ankle pressures due to distal graft anastomosis. DUPLEX ARTERIAL ULTRASOUND FINDINGS: RIGHT LOWER EXTREMITY ARTERIAL ASSESSMENT: Common femoral artery: 132 cm/s Profunda femoris artery: 124 cm/s SFA (proximal): 100 cm/s SFA (mid): 95 cm/s SFA (distal): 96 cm/s Popliteal artery: 68-80 cm/s Posterior tibial artery: 104 cm/s Anterior tibial artery: 141 cm/s Dorsalis pedis artery: 73 cm/s LEFT LOWER EXTREMITY ARTERIAL ASSESSMENT: Common femoral artery: 143 cm/s Common femoral to posterior tibial Artery Bypass Proximal anastomosis: 171 cm/s Proximal graft: 161 cm/s Mid graft: 71 cm/s Distal graft: 91 cm/s Distal anastomosis: 193 cm/s Outflow (posterior tibial artery): 110 cm/s Posterior tibial artery (distal): 97 cm/s Anterior tibial artery: 46 cm/s Dorsalis pedis artery: 26 cm/s     IMPRESSION: 1.  RIGHT LOWER EXTREMITY: MEL is normal. Unremarkable lower extremity arterial duplex, specifically no evidence of hemodynamically significant stenosis or occlusion. 2.  LEFT LOWER EXTREMITY: Unable to obtain MEL as above. Patent common femoral to posterior tibial artery bypass with elevated velocities at the distal anastomosis concerning for mild developing stenosis. The distal posterior tibial artery is patent with  biphasic waveforms. Patent anterior tibial and dorsalis pedis artery with monophasic waveforms.    US MEL Doppler No Exercise    Result Date:  3/9/2025  EXAM: RESTING ANKLE-BRACHIAL INDICES (ABIs) LOCATION: [facility] DATE: 3/8/2025 INDICATION: follow-up left lower extremity bypass COMPARISON: 11/5/2024 MEL FINDINGS: RIGHT                                               Brachial: 128 Ankle (PT): 149 Index: 1.11  Ankle (DP): 147 Index: 1.10 Digit: 110 Index: 0.82   LEFT Brachial: 134 Unable to obtain ankle pressures due to distal graft anastomosis. DUPLEX ARTERIAL ULTRASOUND FINDINGS: RIGHT LOWER EXTREMITY ARTERIAL ASSESSMENT: Common femoral artery: 132 cm/s Profunda femoris artery: 124 cm/s SFA (proximal): 100 cm/s SFA (mid): 95 cm/s SFA (distal): 96 cm/s Popliteal artery: 68-80 cm/s Posterior tibial artery: 104 cm/s Anterior tibial artery: 141 cm/s Dorsalis pedis artery: 73 cm/s LEFT LOWER EXTREMITY ARTERIAL ASSESSMENT: Common femoral artery: 143 cm/s Common femoral to posterior tibial Artery Bypass Proximal anastomosis: 171 cm/s Proximal graft: 161 cm/s Mid graft: 71 cm/s Distal graft: 91 cm/s Distal anastomosis: 193 cm/s Outflow (posterior tibial artery): 110 cm/s Posterior tibial artery (distal): 97 cm/s Anterior tibial artery: 46 cm/s Dorsalis pedis artery: 26 cm/s     IMPRESSION: 1.  RIGHT LOWER EXTREMITY: MEL is normal. Unremarkable lower extremity arterial duplex, specifically no evidence of hemodynamically significant stenosis or occlusion. 2.  LEFT LOWER EXTREMITY: Unable to obtain MEL as above. Patent common femoral to posterior tibial artery bypass with elevated velocities at the distal anastomosis concerning for mild developing stenosis. The distal posterior tibial artery is patent with  biphasic waveforms. Patent anterior tibial and dorsalis pedis artery with monophasic waveforms.    LABS:      Sodium   Date Value Ref Range Status   01/25/2025 141 135 - 145 mmol/L Final   12/17/2024 139 135 - 145 mmol/L Final   12/16/2024 144 135 - 145 mmol/L Final   08/14/2020 140 133 - 144 mmol/L Final   06/12/2019 140 133 - 144 mmol/L  Final   07/31/2018 140 133 - 144 mmol/L Final     Urea Nitrogen   Date Value Ref Range Status   01/25/2025 15.8 8.0 - 23.0 mg/dL Final   12/17/2024 13.5 8.0 - 23.0 mg/dL Final   12/16/2024 10.4 8.0 - 23.0 mg/dL Final   01/17/2023 16 7 - 30 mg/dL Final   05/17/2022 15 7 - 30 mg/dL Final   10/14/2021 10 7 - 30 mg/dL Final   08/14/2020 16 7 - 30 mg/dL Final   06/12/2019 16 7 - 30 mg/dL Final   07/31/2018 20 7 - 30 mg/dL Final     Hemoglobin   Date Value Ref Range Status   03/08/2025 11.2 (L) 11.7 - 15.7 g/dL Final   03/03/2025 10.6 (L) 11.7 - 15.7 g/dL Final   01/25/2025 10.3 (L) 11.7 - 15.7 g/dL Final   08/14/2020 11.7 11.7 - 15.7 g/dL Final   06/12/2019 11.9 11.7 - 15.7 g/dL Final   07/31/2018 11.8 11.7 - 15.7 g/dL Final     Platelet Count   Date Value Ref Range Status   03/08/2025 539 (H) 150 - 450 10e3/uL Final   03/03/2025 568 (H) 150 - 450 10e3/uL Final   01/25/2025 669 (H) 150 - 450 10e3/uL Final   08/14/2020 245 150 - 450 10e9/L Final   06/12/2019 231 150 - 450 10e9/L Final   07/31/2018 175 150 - 450 10e9/L Final     INR   Date Value Ref Range Status   11/25/2024 1.10 0.85 - 1.15 Final   05/17/2022 2.45 (H) 0.85 - 1.15 Final       25 minutes spent on the day of encounter doing chart review, history and exam, documentation, and further activities as noted.     Charlee Vargas NP  VASCULAR SURGERY

## 2025-03-14 ENCOUNTER — TELEPHONE (OUTPATIENT)
Dept: FAMILY MEDICINE | Facility: CLINIC | Age: 85
End: 2025-03-14
Payer: COMMERCIAL

## 2025-03-14 NOTE — TELEPHONE ENCOUNTER
Forms/Letter Request    Type of form/letter: [facility]   Order date 03/14/2025  Order No: [id number]     Do we have the form/letter: Yes: placed in provider bin    Who is the form from? Home care    Where did/will the form come from? form was faxed in    When is form/letter needed by: asap    How would you like the form/letter returned: Fax : [phone]    Patient Notified form requests are processed in 5-7 business days:N/A    Could we send this information to you in R17 or would you prefer to receive a phone call?:   No preference   Okay to leave a detailed message?: N/A at Cell number on file:    Telephone Information:   Mobile [phone]

## 2025-03-17 DIAGNOSIS — Z53.9 DIAGNOSIS NOT YET DEFINED: Primary | ICD-10-CM

## 2025-03-17 PROCEDURE — G0179 MD RECERTIFICATION HHA PT: HCPCS | Performed by: PREVENTIVE MEDICINE

## 2025-03-19 ENCOUNTER — MEDICAL CORRESPONDENCE (OUTPATIENT)
Dept: HEALTH INFORMATION MANAGEMENT | Facility: CLINIC | Age: 85
End: 2025-03-19
Payer: COMMERCIAL

## 2025-03-19 NOTE — TELEPHONE ENCOUNTER
REASON FOR VISIT: RIGHT KNEE PAIN    DATE OF APPT: 4/02/2025   NOTES (FOR ALL VISITS) STATUS DETAILS   OFFICE NOTE from referring provider N/A Self   EMG N/A    MEDICATION LIST N/A    IMAGING  (FOR ALL VISITS)     XR N/A    MRI (HEAD, NECK, SPINE) N/A    CT (HEAD, NECK, SPINE) N/A

## 2025-03-19 NOTE — TELEPHONE ENCOUNTER
Form received and faxed to [facility] at [phone].  A copy placed in TC bin and Abstract.  RightFax confirmed at 4:22pm

## 2025-03-20 ENCOUNTER — PATIENT OUTREACH (OUTPATIENT)
Dept: CARE COORDINATION | Facility: CLINIC | Age: 85
End: 2025-03-20
Payer: COMMERCIAL

## 2025-03-31 ENCOUNTER — PATIENT OUTREACH (OUTPATIENT)
Dept: CARE COORDINATION | Facility: CLINIC | Age: 85
End: 2025-03-31
Payer: COMMERCIAL

## 2025-04-01 DIAGNOSIS — M25.561 RIGHT KNEE PAIN: Primary | ICD-10-CM

## 2025-04-02 ENCOUNTER — OFFICE VISIT (OUTPATIENT)
Dept: ORTHOPEDICS | Facility: CLINIC | Age: 85
End: 2025-04-02
Payer: COMMERCIAL

## 2025-04-02 ENCOUNTER — ANCILLARY PROCEDURE (OUTPATIENT)
Dept: GENERAL RADIOLOGY | Facility: CLINIC | Age: 85
End: 2025-04-02
Attending: PREVENTIVE MEDICINE
Payer: COMMERCIAL

## 2025-04-02 ENCOUNTER — PRE VISIT (OUTPATIENT)
Dept: ORTHOPEDICS | Facility: CLINIC | Age: 85
End: 2025-04-02

## 2025-04-02 DIAGNOSIS — M70.61 GREATER TROCHANTERIC BURSITIS, RIGHT: ICD-10-CM

## 2025-04-02 DIAGNOSIS — M17.11 ARTHRITIS OF RIGHT KNEE: Primary | ICD-10-CM

## 2025-04-02 DIAGNOSIS — M25.561 RIGHT KNEE PAIN: ICD-10-CM

## 2025-04-02 PROCEDURE — 73562 X-RAY EXAM OF KNEE 3: CPT | Mod: RT | Performed by: RADIOLOGY

## 2025-04-02 RX ORDER — LIDOCAINE HYDROCHLORIDE 10 MG/ML
4 INJECTION, SOLUTION INFILTRATION; PERINEURAL
Status: COMPLETED | OUTPATIENT
Start: 2025-04-02 | End: 2025-04-02

## 2025-04-02 RX ORDER — TRIAMCINOLONE ACETONIDE 40 MG/ML
40 INJECTION, SUSPENSION INTRA-ARTICULAR; INTRAMUSCULAR
Status: COMPLETED | OUTPATIENT
Start: 2025-04-02 | End: 2025-04-02

## 2025-04-02 RX ADMIN — TRIAMCINOLONE ACETONIDE 40 MG: 40 INJECTION, SUSPENSION INTRA-ARTICULAR; INTRAMUSCULAR at 09:32

## 2025-04-02 RX ADMIN — LIDOCAINE HYDROCHLORIDE 4 ML: 10 INJECTION, SOLUTION INFILTRATION; PERINEURAL at 09:32

## 2025-04-02 RX ADMIN — TRIAMCINOLONE ACETONIDE 40 MG: 40 INJECTION, SUSPENSION INTRA-ARTICULAR; INTRAMUSCULAR at 09:33

## 2025-04-02 RX ADMIN — LIDOCAINE HYDROCHLORIDE 4 ML: 10 INJECTION, SOLUTION INFILTRATION; PERINEURAL at 09:33

## 2025-04-02 NOTE — PROGRESS NOTES
HISTORY OF PRESENT ILLNESS  Ms. Mccartney is a pleasant 84 year old year old female who presents to clinic today with the following:    What problem are you here for: Evaluation for right medial knee pain and swelling.     How long have you had this problem: February 2025, 2 months     Have you had any recent imaging of this problem? Xrays/MRI/CT scans:  - XR of right knee taken at appointment today.     Have you had treatments for this problem in the past?  -Medications: Tylenol (two 650 mg tablets, three times daily). Gabapentin daily and tizanidine daily.   -Physical therapy: continues with [facility] for   -Injections:  None   -Surgery: 10/4/2021 left total knee arthroplasty with Dr. Calvin   12/15/88292 with Dr. Coates  PROCEDURE PERFORMED: #1.  Left proximal superficial femoral to distal common posterior tibial ankle in situ greater saphenous vein bypass                          -- Ligation of multiple competing sidebranches                 #2.  Left leg angiogram via open common femoral artery access                          -- Restenting of previous distal SFA/above-knee popliteal stent with 8x 80 mm and 8 x 60 mm  Cordis self-expanding stents with balloon angioplasty                          -- Completion angiogram including with knee bending       - Applied ice pack   - Applied heat pack     How severe is this problem today? 0-10 scale: 0/10    How severe has this problem been at WORST in the past? 0-10 scale: 9/10 with walking     What do you think caused this problem: None but believes symptoms were brought on during rehab after left vascular procedure on 12/15/2025 (for left toe pain)     Does this problem or its symptoms cause difficulty for you falling asleep or staying asleep: Yes    MEDICAL HISTORY  Patient Active Problem List   Diagnosis    Asthma, moderate persistent    Acne rosacea    CARDIOVASCULAR SCREENING; LDL GOAL LESS THAN 130    Osteopenia    Vitamin D deficiency    Hx of pseudoexfoliation, os     Pseudophakia, ou; Yag Caps, os    HTN, goal below 140/90    Peptic ulcer    History of blepharoplasty, upper lid, ou (elsewhere); revision (EN)    Obesity, Class II, BMI 35-39.9    Restless legs syndrome    DJD (degenerative joint disease), lumbosacral    Rosacea    Essential hypertension with goal blood pressure less than 140/90    Obesity, Class I, BMI 30-34.9    Posterior vitreous detachment, bilateral    Vertigo    Amblyopia, mild, of left eye    Chronic pain of left knee    Lumbosacral spondylosis without myelopathy    PVD (peripheral vascular disease) with claudication    Anemia, unspecified type    Skin ulcer of left great toe with fat layer exposed (H)    Qualitative platelet defects (H)    Chronic kidney disease, stage 3a (H)       Current Outpatient Medications   Medication Sig Dispense Refill    acetaminophen (TYLENOL) 325 MG tablet Take 2 tablets (650 mg) by mouth every 4 hours as needed for other 60 tablet 0    albuterol (PROAIR HFA/PROVENTIL HFA/VENTOLIN HFA) 108 (90 Base) MCG/ACT inhaler Inhale 2 puffs into the lungs every 6 hours as needed for shortness of breath, wheezing or cough. 18 g 0    albuterol (PROVENTIL) (2.5 MG/3ML) 0.083% neb solution TAKE 1 VIAL (2.5 MG) BY NEBULIZATION EVERY 6 HOURS AS NEEDED FOR SHORTNESS OF BREATH OR WHEEZING 75 mL 0    buPROPion (WELLBUTRIN XL) 150 MG 24 hr tablet Take 1 tablet (150 mg) by mouth every morning. 90 tablet 1    calcium citrate (CITRACAL) 950 (200 Ca) MG tablet Take 1 tablet by mouth daily.      Cholecalciferol (VITAMIN D) 2000 UNIT CAPS Take 2,000 Units by mouth daily      clopidogrel (PLAVIX) 75 MG tablet TAKE 1 TABLET BY MOUTH EVERY DAY 90 tablet 1    gabapentin (NEURONTIN) 600 MG tablet TAKE 0.5 TAB BY MOUTH IN MORNING & 2 TABS AT BEDTIME 225 tablet 1    lisinopril-hydrochlorothiazide (ZESTORETIC) 20-12.5 MG tablet Take 1 tablet by mouth daily.      methocarbamol (ROBAXIN) 500 MG tablet Take 1 tablet (500 mg) by mouth 3 times daily as needed for  muscle spasms or other (pain).      Multiple Vitamin (MULTIVITAMIN ADULT PO) Take by mouth daily      nitroGLYcerin (NITRO-BID) 2 % OINT ointment Place 1 inch (15 mg) over 12 hours onto the skin every 24 hours. Apply to left great toe 30 g 1    rivaroxaban ANTICOAGULANT (XARELTO) 20 MG TABS tablet Take 1 tablet (20 mg) by mouth daily with food. 90 tablet 3    senna-docusate (SENOKOT-S/PERICOLACE) 8.6-50 MG tablet Take 1 tablet by mouth 2 times daily as needed for constipation.      sulfamethoxazole-trimethoprim (BACTRIM DS) 800-160 MG tablet Take 1 tablet by mouth 2 times daily.         Allergies   Allergen Reactions    Erythromycin Swelling and Other (See Comments)       Family History   Problem Relation Age of Onset    C.A.D. Father     C.A.D. Brother     Hypertension Mother     Cancer No family hx of     Diabetes No family hx of     Cerebrovascular Disease No family hx of     Thyroid Disease No family hx of     Glaucoma No family hx of     Macular Degeneration No family hx of      Social History     Socioeconomic History    Marital status:     Number of children: 3   Occupational History     Employer: RETIRED   Tobacco Use    Smoking status: Never     Passive exposure: Never    Smokeless tobacco: Never   Vaping Use    Vaping status: Never Used   Substance and Sexual Activity    Alcohol use: Not Currently     Comment: rarely    Drug use: No    Sexual activity: Yes     Partners: Male     Social Drivers of Health     Financial Resource Strain: Low Risk  (4/5/2024)    Financial Resource Strain     Within the past 12 months, have you or your family members you live with been unable to get utilities (heat, electricity) when it was really needed?: No   Food Insecurity: Low Risk  (4/5/2024)    Food Insecurity     Within the past 12 months, did you worry that your food would run out before you got money to buy more?: No     Within the past 12 months, did the food you bought just not last and you didn t have money  to get more?: No   Transportation Needs: Low Risk  (4/5/2024)    Transportation Needs     Within the past 12 months, has lack of transportation kept you from medical appointments, getting your medicines, non-medical meetings or appointments, work, or from getting things that you need?: No   Physical Activity: Unknown (4/5/2024)    Exercise Vital Sign     Days of Exercise per Week: 1 day   Stress: No Stress Concern Present (4/5/2024)    [employer] - Occupational Stress Questionnaire     Feeling of Stress : Not at all   Interpersonal Safety: Low Risk  (12/18/2024)    Interpersonal Safety     Do you feel physically and emotionally safe where you currently live?: Yes     Within the past 12 months, have you been hit, slapped, kicked or otherwise physically hurt by someone?: No     Within the past 12 months, have you been humiliated or emotionally abused in other ways by your partner or ex-partner?: No   Housing Stability: Low Risk  (4/5/2024)    Housing Stability     Do you have housing? : Yes     Are you worried about losing your housing?: No       Additional medical/Social/Surgical histories reviewed in [facility] and updated as appropriate.     REVIEW OF SYSTEMS (4/2/2025)  10 point ROS of systems including Constitutional, Eyes, Respiratory, Cardiovascular, Gastroenterology, Genitourinary, Integumentary, Musculoskeletal, Psychiatric, Allergic/Immunologic were all negative except for pertinent positives noted in my HPI.     PHYSICAL EXAM  VSS    General  - normal appearance, in no obvious distress  HEENT  - conjunctivae not injected, moist mucous membranes, normocephalic/atraumatic head, ears normal appearance, no lesions, mouth normal appearance, no scars, normal dentition and teeth present  CV  - normal femoral pulse  Pulm  - normal respiratory pattern, non-labored  Musculoskeletal -right  hip  - stance: normal gait without limp,  no obvious leg length discrepancy, normal heel and toe walk,  single leg squat displays knee valgus, contralateral hip drop, internal rotation of the hip   - inspection: no swelling or effusion,  normal bone and joint alignment, no obvious deformity  - palpation: tender over the greater trochanter  - ROM: pain with active abduction, normal and painless flexion, extension, IR, ER, adduction  - strength: 5/5 in all planes  - special tests:  (-) LAILA  (-) FADIR  no pain with axial femoral load  Neuro  - no sensory or motor deficit, grossly normal coordination, normal muscle tone  Skin  - no ecchymosis, erythema, warmth, or induration, no obvious rash  Psych  - interactive, appropriate, normal mood and affect   Right knee; has pain over medial joint, pain with flexion, mild effusion, ligaments stable    ASSESSMENT & PLAN  83 yo female with right knee arthritis, swelling and right hip trochanteric bursitis    I independently reviewed the following imaging studies:  Right knee xray: shows arthritis, worse medial knee  After a 20 minute discussion and examination, we decided to perform a same day injection for diagnostic and therapeutic purposes for  Right knee and right hip bursa  Followup in 2 months  Consider HA injections for knee      Abad Armendariz MD, CAQSM    Trochanteric Hip Injection - Ultrasound Guided  The patient was informed of the risks and the benefits of the procedure and a written consent was signed.  The patient s right  lateral hip was prepped with chlorhexidine in sterile fashion.   40 mg of triamcinolone suspension was drawn up into a 5 mL syringe with 4 mL of 1% lidocaine.  Injection was performed using sterile technique.  Under ultrasound guidance a 3.5-inch 22-gauge needle was used to enter the andres-trochanteric tissue.  Needle placement was visualized and documented with ultrasound which was deemed necessary to ensure medication did not enter the tendon itself, which could potentially cause tendon damage.   Injection performed long axis to the probe with  probe in short axis to the greater trochanter.  Expansion of the andres-trochanteric tissue was visualized under ultrasound upon injection.  Images were permanently stored for the patient's record.  There were no complications. The patient tolerated the procedure well. There was negligible bleeding.      PROCEDURE:        right    Knee joint injection   The patient was apprised of the risks and the benefits of the procedure and consented and a written consent was signed.   The patient s  KNEE was sterilely prepped with chloraprep.   40 mg of triamcinolone suspension was drawn up into a 5 mL syringe with 4 mL of 1% lidocaine  The patient was injected into the knee with a 1.5-inch 22-gauge needle at the_superiorlateral aspect of their knee joint  There were no complications. The patient tolerated the procedure well. There was minimal bleeding.   The patient was instructed to ice the knee upon leaving clinic and refrain from overuse over the next 3 days.   The patient was instructed to go to the emergency room with any usual pain, swelling, or redness occurred in the injected area.   The patient was given a followup appointment to evaluate response to the injection to their increased range of motion and reduction of pain.  Follow up PRN  Dr Abad Armendariz     Large Joint Injection: R knee joint    Date/Time: 4/2/2025 9:32 AM    Performed by: Abad Armendariz MD  Authorized by: Abad Armendariz MD    Indications:  Pain, osteoarthritis and joint swelling  Needle Size:  22 G  Guidance: landmark guided    Approach:  Superolateral  Location:  Knee      Medications:  40 mg triamcinolone 40 MG/ML; 4 mL lidocaine 1 %  Outcome:  Tolerated well, no immediate complications  Procedure discussed: discussed risks, benefits, and alternatives    Consent Given by:  Patient  Timeout: timeout called immediately prior to procedure    Prep: patient was prepped and draped in usual sterile fashion    Large Joint Injection: R greater  trochanteric bursa    Date/Time: 4/2/2025 9:33 AM    Performed by: Abad Armendariz MD  Authorized by: Abad Armendariz MD    Indications:  Pain and diagnostic evaluation  Needle Size:  22 G  Guidance: ultrasound    Approach:  Lateral  Location:  Hip      Site:  R greater trochanteric bursa  Medications:  40 mg triamcinolone 40 MG/ML; 4 mL lidocaine 1 %  Outcome:  Tolerated well, no immediate complications  Procedure discussed: discussed risks, benefits, and alternatives    Consent Given by:  Patient  Timeout: timeout called immediately prior to procedure    Prep: patient was prepped and draped in usual sterile fashion

## 2025-04-02 NOTE — LETTER
4/2/2025      Yumiko Mccartney  [address]  [place] [address]      Dear Colleague,    Thank you for referring your patient, Yumiko Mccartney, to the [facility] [place]. Please see a copy of my visit note below.    HISTORY OF PRESENT ILLNESS  Ms. Mccartney is a pleasant 84 year old year old female who presents to clinic today with the following:    What problem are you here for: Evaluation for right medial knee pain and swelling.     How long have you had this problem: February 2025, 2 months     Have you had any recent imaging of this problem? Xrays/MRI/CT scans:  - XR of right knee taken at appointment today.     Have you had treatments for this problem in the past?  -Medications: Tylenol (two 650 mg tablets, three times daily). Gabapentin daily and tizanidine daily.   -Physical therapy: continues with HEP for   -Injections:  None   -Surgery: 10/4/2021 left total knee arthroplasty with Dr. Calvin   12/15/16258 with Dr. Coates  PROCEDURE PERFORMED: #1.  Left proximal superficial femoral to distal common posterior tibial ankle in situ greater saphenous vein bypass                          -- Ligation of multiple competing sidebranches                 #2.  Left leg angiogram via open common femoral artery access                          -- Restenting of previous distal SFA/above-knee popliteal stent with 8x 80 mm and 8 x 60 mm  Cordis self-expanding stents with balloon angioplasty                          -- Completion angiogram including with knee bending       - Applied ice pack   - Applied heat pack     How severe is this problem today? 0-10 scale: 0/10    How severe has this problem been at WORST in the past? 0-10 scale: 9/10 with walking     What do you think caused this problem: None but believes symptoms were brought on during rehab after left vascular procedure on 12/15/2025 (for left toe pain)     Does this problem or its symptoms cause difficulty for you falling  asleep or staying asleep: Yes    MEDICAL HISTORY  Patient Active Problem List   Diagnosis     Asthma, moderate persistent     Acne rosacea     CARDIOVASCULAR SCREENING; LDL GOAL LESS THAN 130     Osteopenia     Vitamin D deficiency     Hx of pseudoexfoliation, os     Pseudophakia, ou; Yag Caps, os     HTN, goal below 140/90     Peptic ulcer     History of blepharoplasty, upper lid, ou (elsewhere); revision (EN)     Obesity, Class II, BMI 35-39.9     Restless legs syndrome     DJD (degenerative joint disease), lumbosacral     Rosacea     Essential hypertension with goal blood pressure less than 140/90     Obesity, Class I, BMI 30-34.9     Posterior vitreous detachment, bilateral     Vertigo     Amblyopia, mild, of left eye     Chronic pain of left knee     Lumbosacral spondylosis without myelopathy     PVD (peripheral vascular disease) with claudication     Anemia, unspecified type     Skin ulcer of left great toe with fat layer exposed (H)     Qualitative platelet defects (H)     Chronic kidney disease, stage 3a (H)       Current Outpatient Medications   Medication Sig Dispense Refill     acetaminophen (TYLENOL) 325 MG tablet Take 2 tablets (650 mg) by mouth every 4 hours as needed for other 60 tablet 0     albuterol (PROAIR HFA/PROVENTIL HFA/VENTOLIN HFA) 108 (90 Base) MCG/ACT inhaler Inhale 2 puffs into the lungs every 6 hours as needed for shortness of breath, wheezing or cough. 18 g 0     albuterol (PROVENTIL) (2.5 MG/3ML) 0.083% neb solution TAKE 1 VIAL (2.5 MG) BY NEBULIZATION EVERY 6 HOURS AS NEEDED FOR SHORTNESS OF BREATH OR WHEEZING 75 mL 0     buPROPion (WELLBUTRIN XL) 150 MG 24 hr tablet Take 1 tablet (150 mg) by mouth every morning. 90 tablet 1     calcium citrate (CITRACAL) 950 (200 Ca) MG tablet Take 1 tablet by mouth daily.       Cholecalciferol (VITAMIN D) 2000 UNIT CAPS Take 2,000 Units by mouth daily       clopidogrel (PLAVIX) 75 MG tablet TAKE 1 TABLET BY MOUTH EVERY DAY 90 tablet 1     gabapentin  (NEURONTIN) 600 MG tablet TAKE 0.5 TAB BY MOUTH IN MORNING & 2 TABS AT BEDTIME 225 tablet 1     lisinopril-hydrochlorothiazide (ZESTORETIC) 20-12.5 MG tablet Take 1 tablet by mouth daily.       methocarbamol (ROBAXIN) 500 MG tablet Take 1 tablet (500 mg) by mouth 3 times daily as needed for muscle spasms or other (pain).       Multiple Vitamin (MULTIVITAMIN ADULT PO) Take by mouth daily       nitroGLYcerin (NITRO-BID) 2 % OINT ointment Place 1 inch (15 mg) over 12 hours onto the skin every 24 hours. Apply to left great toe 30 g 1     rivaroxaban ANTICOAGULANT (XARELTO) 20 MG TABS tablet Take 1 tablet (20 mg) by mouth daily with food. 90 tablet 3     senna-docusate (SENOKOT-S/PERICOLACE) 8.6-50 MG tablet Take 1 tablet by mouth 2 times daily as needed for constipation.       sulfamethoxazole-trimethoprim (BACTRIM DS) 800-160 MG tablet Take 1 tablet by mouth 2 times daily.         Allergies   Allergen Reactions     Erythromycin Swelling and Other (See Comments)       Family History   Problem Relation Age of Onset     C.A.D. Father      C.A.D. Brother      Hypertension Mother      Cancer No family hx of      Diabetes No family hx of      Cerebrovascular Disease No family hx of      Thyroid Disease No family hx of      Glaucoma No family hx of      Macular Degeneration No family hx of      Social History     Socioeconomic History     Marital status:      Number of children: 3   Occupational History     Employer: RETIRED   Tobacco Use     Smoking status: Never     Passive exposure: Never     Smokeless tobacco: Never   Vaping Use     Vaping status: Never Used   Substance and Sexual Activity     Alcohol use: Not Currently     Comment: rarely     Drug use: No     Sexual activity: Yes     Partners: Male     Social Drivers of Health     Financial Resource Strain: Low Risk  (4/5/2024)    Financial Resource Strain      Within the past 12 months, have you or your family members you live with been unable to get utilities  (heat, electricity) when it was really needed?: No   Food Insecurity: Low Risk  (4/5/2024)    Food Insecurity      Within the past 12 months, did you worry that your food would run out before you got money to buy more?: No      Within the past 12 months, did the food you bought just not last and you didn t have money to get more?: No   Transportation Needs: Low Risk  (4/5/2024)    Transportation Needs      Within the past 12 months, has lack of transportation kept you from medical appointments, getting your medicines, non-medical meetings or appointments, work, or from getting things that you need?: No   Physical Activity: Unknown (4/5/2024)    Exercise Vital Sign      Days of Exercise per Week: 1 day   Stress: No Stress Concern Present (4/5/2024)    [employer] - Occupational Stress Questionnaire      Feeling of Stress : Not at all   Interpersonal Safety: Low Risk  (12/18/2024)    Interpersonal Safety      Do you feel physically and emotionally safe where you currently live?: Yes      Within the past 12 months, have you been hit, slapped, kicked or otherwise physically hurt by someone?: No      Within the past 12 months, have you been humiliated or emotionally abused in other ways by your partner or ex-partner?: No   Housing Stability: Low Risk  (4/5/2024)    Housing Stability      Do you have housing? : Yes      Are you worried about losing your housing?: No       Additional medical/Social/Surgical histories reviewed in [facility] and updated as appropriate.     REVIEW OF SYSTEMS (4/2/2025)  10 point ROS of systems including Constitutional, Eyes, Respiratory, Cardiovascular, Gastroenterology, Genitourinary, Integumentary, Musculoskeletal, Psychiatric, Allergic/Immunologic were all negative except for pertinent positives noted in my HPI.     PHYSICAL EXAM  VSS    General  - normal appearance, in no obvious distress  HEENT  - conjunctivae not injected, moist mucous membranes,  normocephalic/atraumatic head, ears normal appearance, no lesions, mouth normal appearance, no scars, normal dentition and teeth present  CV  - normal femoral pulse  Pulm  - normal respiratory pattern, non-labored  Musculoskeletal -right  hip  - stance: normal gait without limp,  no obvious leg length discrepancy, normal heel and toe walk, single leg squat displays knee valgus, contralateral hip drop, internal rotation of the hip   - inspection: no swelling or effusion,  normal bone and joint alignment, no obvious deformity  - palpation: tender over the greater trochanter  - ROM: pain with active abduction, normal and painless flexion, extension, IR, ER, adduction  - strength: 5/5 in all planes  - special tests:  (-) LAILA  (-) FADIR  no pain with axial femoral load  Neuro  - no sensory or motor deficit, grossly normal coordination, normal muscle tone  Skin  - no ecchymosis, erythema, warmth, or induration, no obvious rash  Psych  - interactive, appropriate, normal mood and affect   Right knee; has pain over medial joint, pain with flexion, mild effusion, ligaments stable    ASSESSMENT & PLAN  83 yo female with right knee arthritis, swelling and right hip trochanteric bursitis    I independently reviewed the following imaging studies:  Right knee xray: shows arthritis, worse medial knee  After a 20 minute discussion and examination, we decided to perform a same day injection for diagnostic and therapeutic purposes for  Right knee and right hip bursa  Followup in 2 months  Consider HA injections for knee      Abad Armendariz MD, CAQSM    Trochanteric Hip Injection - Ultrasound Guided  The patient was informed of the risks and the benefits of the procedure and a written consent was signed.  The patient s right  lateral hip was prepped with chlorhexidine in sterile fashion.   40 mg of triamcinolone suspension was drawn up into a 5 mL syringe with 4 mL of 1% lidocaine.  Injection was performed using sterile technique.   Under ultrasound guidance a 3.5-inch 22-gauge needle was used to enter the andres-trochanteric tissue.  Needle placement was visualized and documented with ultrasound which was deemed necessary to ensure medication did not enter the tendon itself, which could potentially cause tendon damage.   Injection performed long axis to the probe with probe in short axis to the greater trochanter.  Expansion of the andres-trochanteric tissue was visualized under ultrasound upon injection.  Images were permanently stored for the patient's record.  There were no complications. The patient tolerated the procedure well. There was negligible bleeding.      PROCEDURE:        right    Knee joint injection   The patient was apprised of the risks and the benefits of the procedure and consented and a written consent was signed.   The patient s  KNEE was sterilely prepped with chloraprep.   40 mg of triamcinolone suspension was drawn up into a 5 mL syringe with 4 mL of 1% lidocaine  The patient was injected into the knee with a 1.5-inch 22-gauge needle at the_superiorlateral aspect of their knee joint  There were no complications. The patient tolerated the procedure well. There was minimal bleeding.   The patient was instructed to ice the knee upon leaving clinic and refrain from overuse over the next 3 days.   The patient was instructed to go to the emergency room with any usual pain, swelling, or redness occurred in the injected area.   The patient was given a followup appointment to evaluate response to the injection to their increased range of motion and reduction of pain.  Follow up PRN  Dr Abad Armendariz     Large Joint Injection: R knee joint    Date/Time: 4/2/2025 9:32 AM    Performed by: Abad Armendariz MD  Authorized by: Abad Armendariz MD    Indications:  Pain, osteoarthritis and joint swelling  Needle Size:  22 G  Guidance: landmark guided    Approach:  Superolateral  Location:  Knee      Medications:  40 mg  triamcinolone 40 MG/ML; 4 mL lidocaine 1 %  Outcome:  Tolerated well, no immediate complications  Procedure discussed: discussed risks, benefits, and alternatives    Consent Given by:  Patient  Timeout: timeout called immediately prior to procedure    Prep: patient was prepped and draped in usual sterile fashion    Large Joint Injection: R greater trochanteric bursa    Date/Time: 4/2/2025 9:33 AM    Performed by: Abad Armendariz MD  Authorized by: Abad Armendariz MD    Indications:  Pain and diagnostic evaluation  Needle Size:  22 G  Guidance: ultrasound    Approach:  Lateral  Location:  Hip      Site:  R greater trochanteric bursa  Medications:  40 mg triamcinolone 40 MG/ML; 4 mL lidocaine 1 %  Outcome:  Tolerated well, no immediate complications  Procedure discussed: discussed risks, benefits, and alternatives    Consent Given by:  Patient  Timeout: timeout called immediately prior to procedure    Prep: patient was prepped and draped in usual sterile fashion          Again, thank you for allowing me to participate in the care of your patient.        Sincerely,        Abad Armendariz MD    Electronically signed

## 2025-04-02 NOTE — NURSING NOTE
[address] [address]  Dept: [phone]  ______________________________________________________________________________    Patient: Yumiko Mccartney   : 1940   MRN: [id number]   2025    INVASIVE PROCEDURE SAFETY CHECKLIST    Date: 2025   Procedure: right knee joint injections with kenalog and right greater trochanteric bursa injection with kenalog   Patient Name: Yumiko Mccartney  MRN: [id number]  YOB: 1940    Action: Complete sections as appropriate. Any discrepancy results in a HARD COPY until resolved.     PRE PROCEDURE:  Patient ID verified with 2 identifiers (name and  or MRN): Yes  Procedure and site verified with patient/designee (when able): Yes  Accurate consent documentation in medical record: Yes  H&P (or appropriate assessment) documented in medical record: Yes  H&P must be up to 20 days prior to procedure and updates within 24 hours of procedure as applicable: Yes  Relevant diagnostic and radiology test results appropriately labeled and displayed as applicable: NA  Procedure site(s) marked with provider initials: NA    TIMEOUT:  Time-Out performed immediately prior to starting procedure, including verbal and active participation of all team members addressing the following:Yes  * Correct patient identify  * Confirmed that the correct side and site are marked  * An accurate procedure consent form  * Agreement on the procedure to be done  * Correct patient position  * Relevant images and results are properly labeled and appropriately displayed  * The need to administer antibiotics or fluids for irrigation purposes during the procedure as applicable   * Safety precautions based on patient history or medication use    DURING PROCEDURE: Verification of correct person, site, and procedures any time the responsibility for care of the patient is transferred to another member of the care team.       Prior to  injection, verified patient identity using patient's name and date of birth.  Due to injection administration, patient instructed to remain in clinic for 15 minutes  afterwards, and to report any adverse reaction to me immediately.    Bursa injection was performed.   and Joint injection was performed.      Kenalog x2  Drug Amount Wasted:  None.  Vial/Syringe: Single dose vial  Expiration Date:  07/01/2026    Ernestine Cabrera, ATC  April 2, 2025

## 2025-04-10 ENCOUNTER — OFFICE VISIT (OUTPATIENT)
Dept: OPHTHALMOLOGY | Facility: CLINIC | Age: 85
End: 2025-04-10
Payer: COMMERCIAL

## 2025-04-10 DIAGNOSIS — H40.001 GLAUCOMA SUSPECT OF RIGHT EYE: ICD-10-CM

## 2025-04-10 DIAGNOSIS — H40.1421 PSEUDOEXFOLIATIVE GLAUCOMA, LEFT EYE, MILD STAGE: Primary | ICD-10-CM

## 2025-04-10 PROCEDURE — 92083 EXTENDED VISUAL FIELD XM: CPT | Performed by: OPHTHALMOLOGY

## 2025-04-10 PROCEDURE — 92133 CPTRZD OPH DX IMG PST SGM ON: CPT | Performed by: OPHTHALMOLOGY

## 2025-04-10 PROCEDURE — 92012 INTRM OPH EXAM EST PATIENT: CPT | Performed by: OPHTHALMOLOGY

## 2025-04-10 RX ORDER — LATANOPROST 50 UG/ML
1 SOLUTION/ DROPS OPHTHALMIC EVERY EVENING
Qty: 2.5 ML | Refills: 2 | Status: SHIPPED | OUTPATIENT
Start: 2025-04-10

## 2025-04-10 ASSESSMENT — TONOMETRY
OS_IOP_MMHG: 28
OD_IOP_MMHG: 18
IOP_METHOD: APPLANATION

## 2025-04-10 ASSESSMENT — VISUAL ACUITY
OS_SC: 20/60
OD_PH_SC: 20/30
OD_PH_SC+: -2/+2
OS_SC+: -1
OS_PH_SC+: -1
OS_PH_SC: 20/50
OD_SC: 20/40
METHOD: SNELLEN - LINEAR
OD_SC+: -1

## 2025-04-10 ASSESSMENT — PACHYMETRY
OS_CT(UM): 514
OD_CT(UM): 520

## 2025-04-10 NOTE — LETTER
"4/10/2025      Yumiko Mccartney  [address]  [place] [address]      Dear Colleague,    Thank you for referring your patient, Yumiko Mccartney, to the [facility]. Please see a copy of my visit note below.     Current Eye Medications:  Refresh Celluvisc both eyes at night.      Subjective:  Patient returns for glaucoma follow up and testing. Yesterday patient noticed the vision in her left eye was like \"looking through a tunnel\". She doesn't notice it all the time but she did yesterday and during testing today. Patient denies both floaters and flashing lights and no ocular pain or discomfort. No other ocular concerns today.     No hx of Bell's palsy or HFS.     Objective:  See Ophthalmology Exam.       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  Mild orbicularis spasm left eye.      Plan:  Begin:  Latanoprost (green top) One drop in both eyes at bedtime.     May use artificial tears up to four times a day (like Refresh Optive, Systane Balance, or TheraTears. Avoid \"get the red out\" drops and generic artifical tears).     Wait at least 5 minutes between drops if using more than one at a time.     Return visit in 3 weeks for an intraocular pressure check.    Ashu Gonzales M.D.  [phone]    Again, thank you for allowing me to participate in the care of your patient.        Sincerely,        Ashu Gonzales MD    Electronically signed"

## 2025-04-10 NOTE — PATIENT INSTRUCTIONS
"Begin:  Latanoprost (green top) One drop in both eyes at bedtime.     May use artificial tears up to four times a day (like Refresh Optive, Systane Balance, or TheraTears. Avoid \"get the red out\" drops and generic artifical tears).     Wait at least 5 minutes between drops if using more than one at a time.     Return visit in 3 weeks for an intraocular pressure check.    Ashu Gonzales M.D.  [phone]      "

## 2025-04-10 NOTE — PROGRESS NOTES
" Current Eye Medications:  Refresh Celluvisc both eyes at night.      Subjective:  Patient returns for glaucoma follow up and testing. Yesterday patient noticed the vision in her left eye was like \"looking through a tunnel\". She doesn't notice it all the time but she did yesterday and during testing today. Patient denies both floaters and flashing lights and no ocular pain or discomfort. No other ocular concerns today.     No hx of Bell's palsy or HFS.     Objective:  See Ophthalmology Exam.       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  Mild orbicularis spasm left eye.      Plan:  Begin:  Latanoprost (green top) One drop in both eyes at bedtime.     May use artificial tears up to four times a day (like Refresh Optive, Systane Balance, or TheraTears. Avoid \"get the red out\" drops and generic artifical tears).     Wait at least 5 minutes between drops if using more than one at a time.     Return visit in 3 weeks for an intraocular pressure check.    Ashu Gonzales M.D.  [phone]    "

## 2025-04-14 ENCOUNTER — TELEPHONE (OUTPATIENT)
Dept: OPHTHALMOLOGY | Facility: CLINIC | Age: 85
End: 2025-04-14
Payer: COMMERCIAL

## 2025-04-14 NOTE — TELEPHONE ENCOUNTER
[facility] Call Center    Phone Message    May a detailed message be left on voicemail: yes     Reason for Call: Other: pt is needing to R/S her IOP on 4/30/25 writer;s next available is 5/16/25, pt declined to schedule that appt as Dr Gonzales advised pt to return in 3 wks, please contact pt to discuss options Thank you     Action Taken: Message routed to:  [facility]    Travel Screening: Not Applicable     Date of Service:

## 2025-04-14 NOTE — CONFIDENTIAL NOTE
Explained that Dr. Gonzales is out of town and his schedule is full surrounding those dates. I scheduled patient for 05/16/2025 and told her to continue using eye drops.

## 2025-04-15 ENCOUNTER — TELEPHONE (OUTPATIENT)
Dept: FAMILY MEDICINE | Facility: CLINIC | Age: 85
End: 2025-04-15
Payer: COMMERCIAL

## 2025-04-15 PROBLEM — H40.001 GLAUCOMA SUSPECT OF RIGHT EYE: Status: ACTIVE | Noted: 2025-04-15

## 2025-04-15 PROBLEM — H40.1421: Status: ACTIVE | Noted: 2025-04-15

## 2025-04-15 ASSESSMENT — EXTERNAL EXAM - RIGHT EYE: OD_EXAM: 1+ BROW PTOSIS

## 2025-04-15 ASSESSMENT — EXTERNAL EXAM - LEFT EYE: OS_EXAM: 1+ BROW PTOSIS

## 2025-04-15 ASSESSMENT — SLIT LAMP EXAM - LIDS
COMMENTS: GOOD COSMESIS
COMMENTS: GOOD COSMESIS

## 2025-04-15 NOTE — TELEPHONE ENCOUNTER
Patient Quality Outreach    Patient is due for the following:   Asthma  -  ACT needed  Physical Annual Wellness Visit    Action(s) Taken:   Schedule a Annual Wellness Visit    Type of outreach:    Sent [employer] message.    Questions for provider review:    None         Jenny Coffey MA  Chart routed to None.

## 2025-04-16 ENCOUNTER — TELEPHONE (OUTPATIENT)
Dept: WOUND CARE | Facility: CLINIC | Age: 85
End: 2025-04-16
Payer: COMMERCIAL

## 2025-04-16 NOTE — TELEPHONE ENCOUNTER
"Provider:  Dr. Coates   Procedure: L SFA-PT in-situ bypass on 12/15/2024 with Dr. Coates   Surgery date:  12/15/24    Pt called and is concerned about left great toe.  Left great toe on the outer (right side) of the toe is painful.  Reports it feels like an ingrown toenail.  Review of chart from last vascular follow up on 3/13/25 notes the following:   -States that the discoloration \"ebbs and flows\"    -slight discoloration noted in the L toe. Normothermic to touch    -Continue on plavix and xarelto.     Recommendations:   Scheduled patient for clinic only visit with Charlee for tomorrow, 4/17/25.  Sent patient a [employer] message to attach picture of toe.    Will review picture when received and update Charlee.    Celena Neal, VALERIEN, RN, CV-BC, CNOR  [facility] [place]    Addendum   04/17/25  Patient was seen in clinic by Charlee.  Closing encounter.  "

## 2025-04-16 NOTE — TELEPHONE ENCOUNTER
[facility]    Who is the name of the provider?:  Aminata /Sam    What is the location you see this provider  at/preferred location?: [facility]  Person calling / Facility: Self  Phone number:  [phone]  Nurse call back needed:  YES   Can we leave a detailed message on this number?  YES     Reason for call:  Surgery 12/15/25.  Left great toe was red last week, now purple.  Thinks she should be seen ASAP.     Pharmacy location:  NA

## 2025-04-17 ENCOUNTER — OFFICE VISIT (OUTPATIENT)
Dept: OTHER | Facility: CLINIC | Age: 85
End: 2025-04-17
Attending: SURGERY
Payer: COMMERCIAL

## 2025-04-17 VITALS — SYSTOLIC BLOOD PRESSURE: 131 MMHG | HEART RATE: 73 BPM | DIASTOLIC BLOOD PRESSURE: 78 MMHG

## 2025-04-17 DIAGNOSIS — I73.9 PAD (PERIPHERAL ARTERY DISEASE): Primary | ICD-10-CM

## 2025-04-17 PROCEDURE — G0463 HOSPITAL OUTPT CLINIC VISIT: HCPCS

## 2025-04-17 NOTE — PROGRESS NOTES
"    VASCULAR SURGERY PROGRESS NOTE    LOCATION:  [facility]     Yumiko Mccartney  Medical Record #: [id number]  YOB: 1940  Age: 84 year old     Date of Service: 4/17/2025    PRIMARY CARE PROVIDER: Gavi Bear    Reason for visit:  Concern for toe pain    Tomi Mccartney is an 84 year old female with hx of failed L SFA/popliteal artery stenting for LLE CLTI with wounds who is now s/p L SFA-PT in-situ bypass on 12/15/2024 with Dr. Coates. Seen in ED on 3/8 for several days of discomfort in the L first toe and discoloration. Unchanged pulse examination, L toe is slightly more dusky and patient has a suspected ingrown toenail of the L big toe. Palpable graft pulse.       RECOMMENDATION/RISKS: Will see Tomi at allotted follow-up. Call if any worsening. She should see podiatry about her ingrown nail.     Discussed possibly trialing statin.     REVIEW OF SYSTEMS:    A 12 point ROS was reviewed and is negative except for what is listed above in HPI.    PHH:    Past Medical History:   Diagnosis Date    Acne rosacea     Actinic keratosis     Amblyopia     Asthma, moderate persistent     Basal cell carcinoma 10/2010    back X2    Cataract, mod, od; mild-mod, os 01/12/2012    DJD (degenerative joint disease), lumbosacral 08/06/2014    DVT (deep venous thrombosis) (H)     Elevated cholesterol     Endometriosis     HTN (hypertension)     Moderate major depression (H)     \"Circumstances\"    Osteopenia           Past Surgical History:   Procedure Laterality Date    ARTHROPLASTY KNEE Left 10/4/2021    Procedure: ARTHROPLASTY, LEFT KNEE, TOTAL;  Surgeon: Glenn Calvin MD;  Location: [facility] OR    BLEPHAROPLASTY BILATERAL  3/9/2006; 2013    both eyes, upper lid; revision (EN)    BYPASS GRAFT FEMOROTIBIAL Left 12/15/2024    Procedure: CREATION BYPASS VASCULAR PROXIMAL SUPERIOR FEMORAL TO DISTAL POSTERIOR TIBIAL, ANGIOPLASTY AND SFA STENE PLACEMENT X2.;  Surgeon: Leonard Coates MD;  Location:  OR    " "CATARACT IOL, RT/LT      HC REMOVAL GALLBLADDER      INJECT BLOCK MEDIAL BRANCH CERVICAL/THORACIC/LUMBAR Bilateral 12/7/2023    Procedure: BLOCK, NERVE, FACET JOINT, MEDIAL BRANCH, DIAGNOSTICL3/4/ALA;  Surgeon: Dinesh Proctor MD;  Location: [facility] OR    IR LOWER EXTREMITY ANGIOGRAM LEFT  11/25/2024    LASER YAG CAPSULOTOMY      left eye (AC lysis also)    PHACOEMULSIFICATION WITH STANDARD INTRAOCULAR LENS IMPLANT  1/2012; 4/2013    right eye; left eye    REPAIR PTOSIS      SURGICAL HISTORY OF -       endometriosis surgeriesx3    SURGICAL HISTORY OF -       ganiglion cyst onfoot    SURGICAL HISTORY OF -       Eye for \"droopy eye\"    ZZC APPENDECTOMY         ALLERGIES:  Erythromycin    MEDS:    Current Outpatient Medications:     acetaminophen (TYLENOL) 325 MG tablet, Take 2 tablets (650 mg) by mouth every 4 hours as needed for other, Disp: 60 tablet, Rfl: 0    albuterol (PROAIR HFA/PROVENTIL HFA/VENTOLIN HFA) 108 (90 Base) MCG/ACT inhaler, Inhale 2 puffs into the lungs every 6 hours as needed for shortness of breath, wheezing or cough., Disp: 18 g, Rfl: 0    albuterol (PROVENTIL) (2.5 MG/3ML) 0.083% neb solution, TAKE 1 VIAL (2.5 MG) BY NEBULIZATION EVERY 6 HOURS AS NEEDED FOR SHORTNESS OF BREATH OR WHEEZING, Disp: 75 mL, Rfl: 0    buPROPion (WELLBUTRIN XL) 150 MG 24 hr tablet, Take 1 tablet (150 mg) by mouth every morning., Disp: 90 tablet, Rfl: 1    calcium citrate (CITRACAL) 950 (200 Ca) MG tablet, Take 1 tablet by mouth daily., Disp: , Rfl:     Cholecalciferol (VITAMIN D) 2000 UNIT CAPS, Take 2,000 Units by mouth daily, Disp: , Rfl:     clopidogrel (PLAVIX) 75 MG tablet, TAKE 1 TABLET BY MOUTH EVERY DAY, Disp: 90 tablet, Rfl: 1    gabapentin (NEURONTIN) 600 MG tablet, TAKE 0.5 TAB BY MOUTH IN MORNING & 2 TABS AT BEDTIME, Disp: 225 tablet, Rfl: 1    latanoprost (XALATAN) 0.005 % ophthalmic solution, Place 1 drop into both eyes every evening. Wait at least 5 minutes between drops if using more than one at a " time., Disp: 2.5 mL, Rfl: 2    lisinopril-hydrochlorothiazide (ZESTORETIC) 20-12.5 MG tablet, Take 1 tablet by mouth daily., Disp: , Rfl:     methocarbamol (ROBAXIN) 500 MG tablet, Take 1 tablet (500 mg) by mouth 3 times daily as needed for muscle spasms or other (pain)., Disp: , Rfl:     Multiple Vitamin (MULTIVITAMIN ADULT PO), Take by mouth daily, Disp: , Rfl:     nitroGLYcerin (NITRO-BID) 2 % OINT ointment, Place 1 inch (15 mg) over 12 hours onto the skin every 24 hours. Apply to left great toe, Disp: 30 g, Rfl: 1    rivaroxaban ANTICOAGULANT (XARELTO) 20 MG TABS tablet, Take 1 tablet (20 mg) by mouth daily with food., Disp: 90 tablet, Rfl: 3    senna-docusate (SENOKOT-S/PERICOLACE) 8.6-50 MG tablet, Take 1 tablet by mouth 2 times daily as needed for constipation., Disp: , Rfl:     sulfamethoxazole-trimethoprim (BACTRIM DS) 800-160 MG tablet, Take 1 tablet by mouth 2 times daily., Disp: , Rfl:     SOCIAL HABITS:    History   Smoking Status    Never   Smokeless Tobacco    Never     Social History    Substance and Sexual Activity      Alcohol use: Not Currently        Comment: rarely      History   Drug Use No       FAMILY HISTORY:    Family History   Problem Relation Age of Onset    C.A.D. Father     C.A.D. Brother     Hypertension Mother     Cancer No family hx of     Diabetes No family hx of     Cerebrovascular Disease No family hx of     Thyroid Disease No family hx of     Glaucoma No family hx of     Macular Degeneration No family hx of        PE:  /78 (BP Location: Left arm, Patient Position: Chair, Cuff Size: Adult Regular)   Pulse 73   Wt Readings from Last 1 Encounters:   02/13/25 191 lb (86.6 kg)     There is no height or weight on file to calculate BMI.    DIAGNOSTIC STUDIES:     Images:  XR Knee Right 3 Views    Result Date: 4/2/2025  EXAM: XR KNEE RIGHT 3 VIEWS LOCATION: [facility] DATE: 4/2/2025 INDICATION: Right knee pain. COMPARISON: None.     IMPRESSION:  Chondrocalcinosis in the medial and lateral compartments. Moderate patellofemoral osteoarthrosis. No fracture or calcified intra-articular body. Small effusion. Diffuse bone demineralization.      LABS:      Sodium   Date Value Ref Range Status   01/25/2025 141 135 - 145 mmol/L Final   12/17/2024 139 135 - 145 mmol/L Final   12/16/2024 144 135 - 145 mmol/L Final   08/14/2020 140 133 - 144 mmol/L Final   06/12/2019 140 133 - 144 mmol/L Final   07/31/2018 140 133 - 144 mmol/L Final     Urea Nitrogen   Date Value Ref Range Status   01/25/2025 15.8 8.0 - 23.0 mg/dL Final   12/17/2024 13.5 8.0 - 23.0 mg/dL Final   12/16/2024 10.4 8.0 - 23.0 mg/dL Final   01/17/2023 16 7 - 30 mg/dL Final   05/17/2022 15 7 - 30 mg/dL Final   10/14/2021 10 7 - 30 mg/dL Final   08/14/2020 16 7 - 30 mg/dL Final   06/12/2019 16 7 - 30 mg/dL Final   07/31/2018 20 7 - 30 mg/dL Final     Hemoglobin   Date Value Ref Range Status   03/08/2025 11.2 (L) 11.7 - 15.7 g/dL Final   03/03/2025 10.6 (L) 11.7 - 15.7 g/dL Final   01/25/2025 10.3 (L) 11.7 - 15.7 g/dL Final   08/14/2020 11.7 11.7 - 15.7 g/dL Final   06/12/2019 11.9 11.7 - 15.7 g/dL Final   07/31/2018 11.8 11.7 - 15.7 g/dL Final     Platelet Count   Date Value Ref Range Status   03/08/2025 539 (H) 150 - 450 10e3/uL Final   03/03/2025 568 (H) 150 - 450 10e3/uL Final   01/25/2025 669 (H) 150 - 450 10e3/uL Final   08/14/2020 245 150 - 450 10e9/L Final   06/12/2019 231 150 - 450 10e9/L Final   07/31/2018 175 150 - 450 10e9/L Final     INR   Date Value Ref Range Status   11/25/2024 1.10 0.85 - 1.15 Final   05/17/2022 2.45 (H) 0.85 - 1.15 Final       30 minutes spent on the day of encounter doing chart review, history and exam, documentation, and further activities as noted.       Charlee Vargas, NP  VASCULAR SURGERY

## 2025-04-17 NOTE — PROGRESS NOTES
[facility]        Patient is here for a follow up  to discuss left great toe.    Pt is currently taking Plavix and Xarelto.    /78 (BP Location: Left arm, Patient Position: Chair, Cuff Size: Adult Regular)   Pulse 73     The provider has been notified that the patient has no concerns.     Questions patient would like addressed today are: N/A.    Refills are needed: N/A    Has homecare services and agency name:  Princess Garrido MA

## 2025-04-23 ENCOUNTER — OFFICE VISIT (OUTPATIENT)
Dept: PODIATRY | Facility: CLINIC | Age: 85
End: 2025-04-23
Payer: COMMERCIAL

## 2025-04-23 VITALS — BODY MASS INDEX: 32.79 KG/M2 | WEIGHT: 191 LBS

## 2025-04-23 DIAGNOSIS — M51.369 DDD (DEGENERATIVE DISC DISEASE), LUMBAR: ICD-10-CM

## 2025-04-23 DIAGNOSIS — I73.9 PAD (PERIPHERAL ARTERY DISEASE): ICD-10-CM

## 2025-04-23 DIAGNOSIS — L60.0 INGROWN NAIL OF GREAT TOE OF LEFT FOOT: ICD-10-CM

## 2025-04-23 DIAGNOSIS — M54.16 LUMBAR RADICULOPATHY: ICD-10-CM

## 2025-04-23 DIAGNOSIS — E66.01 SEVERE OBESITY WITH BODY MASS INDEX (BMI) OF 35.0 TO 35.9 AND COMORBIDITY (H): ICD-10-CM

## 2025-04-23 DIAGNOSIS — M79.672 LEFT FOOT PAIN: Primary | ICD-10-CM

## 2025-04-23 DIAGNOSIS — M79.672 LEFT FOOT PAIN: ICD-10-CM

## 2025-04-23 RX ORDER — SILVER SULFADIAZINE 10 MG/G
CREAM TOPICAL 2 TIMES DAILY
Qty: 25 G | Refills: 0 | Status: SHIPPED | OUTPATIENT
Start: 2025-04-23

## 2025-04-23 NOTE — LETTER
4/23/2025      Yumiko Mccartney  [address]  [place] [address]      Dear Colleague,    Thank you for referring your patient, Yumiko Mccartney, to the [facility]OD[facility]. Please see a copy of my visit note below.    Podiatry / Foot and Ankle Surgery Progress Note    April 23, 2025    Subject: Patient was seen for the left great toe.  Not sure if she has an ingrown.  It sore at the edge of the nail.  Pain can be 7 out of 10.  Worse with pressure.  Denies fever, nausea, vomiting.  Denies specific injury.    Objective:  Vitals: Wt 86.6 kg (191 lb)   BMI 32.79 kg/m    BMI= Body mass index is 32.79 kg/m .    General:  Patient is alert and orientated.  NAD.    Vascular:  DP and PT pulses are faintly palpable.  No edema or varicosities noted.  CFT's < 3secs.  Skin temp is normal.    Neuro:  Light and gross touch sensation intact to digits, dorsum, and plantar aspects of the feet.    Derm:   Medial border of the left great toenail is incurvated. Localized redness and pain on  palpation.     Musculoskeletal:  No foot deformity noted.      Assessment:    Left foot pain  Ingrown nail of great toe of left foot  PAD (peripheral artery disease)    Medical Decision Making/Plan:     The potential causes and nature of an ingrown toenail were discussed with the patient.  We reviewed the natural history/prognosis of the condition and potential risks if no treatment is provided.      Treatment options discussed included conservative management (oral antibiotics, soaking of foot, adequate width shoes)  as well as surgical management (partial or total nail removal).  The pros and cons of both forms of treatment were reviewed.      After thorough discussion and answering all questions, the patient elected to have the border removed.  She will soak the foot twice a day for 2 weeks and apply antibiotic ointment and a bandage..       Patient Risk Factor:  Patient is a low risk factor for infection.     All  questions were answered to patients satisfaction and they will call with further questions or concerns.    Procedure: After verbal consent, the left great toe was anesthetized with 5cc's of 1% lidocaine plain. A tourniquet was applied to the toe. The medial border was then raised from the nail bed and then cut the length of the nail.  The offending nail border was then removed.    Bacitracin was applied to the nail bed.  The tourniquet was removed.  Bandage was applied to the toe.  The patient tolerated the procedure and anesthesia well.      Daxa Nguyne DPM, Podiatry/Foot and Ankle Surgery      Again, thank you for allowing me to participate in the care of your patient.        Sincerely,        Daxa Nguyen DPM, Podiatry/Foot and Ankle Surgery    Electronically signed

## 2025-04-23 NOTE — PROGRESS NOTES
Podiatry / Foot and Ankle Surgery Progress Note    April 23, 2025    Subject: Patient was seen for the left great toe.  Not sure if she has an ingrown.  It sore at the edge of the nail.  Pain can be 7 out of 10.  Worse with pressure.  Denies fever, nausea, vomiting.  Denies specific injury.    Objective:  Vitals: Wt 86.6 kg (191 lb)   BMI 32.79 kg/m    BMI= Body mass index is 32.79 kg/m .    General:  Patient is alert and orientated.  NAD.    Vascular:  DP and PT pulses are faintly palpable.  No edema or varicosities noted.  CFT's < 3secs.  Skin temp is normal.    Neuro:  Light and gross touch sensation intact to digits, dorsum, and plantar aspects of the feet.    Derm:   Medial border of the left great toenail is incurvated. Localized redness and pain on  palpation.     Musculoskeletal:  No foot deformity noted.      Assessment:    Left foot pain  Ingrown nail of great toe of left foot  PAD (peripheral artery disease)    Medical Decision Making/Plan:     The potential causes and nature of an ingrown toenail were discussed with the patient.  We reviewed the natural history/prognosis of the condition and potential risks if no treatment is provided.      Treatment options discussed included conservative management (oral antibiotics, soaking of foot, adequate width shoes)  as well as surgical management (partial or total nail removal).  The pros and cons of both forms of treatment were reviewed.      After thorough discussion and answering all questions, the patient elected to have the border removed.  She will soak the foot twice a day for 2 weeks and apply antibiotic ointment and a bandage..       Patient Risk Factor:  Patient is a low risk factor for infection.     All questions were answered to patients satisfaction and they will call with further questions or concerns.    Procedure: After verbal consent, the left great toe was anesthetized with 5cc's of 1% lidocaine plain. A tourniquet was applied to the toe. The  medial border was then raised from the nail bed and then cut the length of the nail.  The offending nail border was then removed.    Bacitracin was applied to the nail bed.  The tourniquet was removed.  Bandage was applied to the toe.  The patient tolerated the procedure and anesthesia well.      Daxa Nguyen DPM, Podiatry/Foot and Ankle Surgery

## 2025-04-23 NOTE — PATIENT INSTRUCTIONS
Thank you for choosing [facility] Podiatry / Foot & Ankle Surgery!    DR PAEZ'S CLINIC:  [facility]   [address]   [place] [address]  [phone]    (Tues, Wed, Thur am)     [facility]  [address] [facility] [place] [address]  [phone]  (Every Friday)     TRIAGE LINE: [phone]  APPOINTMENTS: [phone]  RADIOLOGY: [phone]  SET UP SURGERY: [phone]  PHYSICAL THERAPY: [phone]   FAX NUMBER: [phone]  BILLING QUESTIONS: [phone]       Follow up: As needed      INGROWN TOENAILS  When a toenail is ingrown, it is curved and grows into the skin, usually at the nail borders (the sides of the nail). This  digging in  of the nail irritates the skin, often creating pain, redness, swelling, and warmth in the toe.  If an ingrown nail causes a break in the skin, bacteria may enter and cause an infection in the area, which is often marked by drainage and a foul odor. However, even if the toe isn t painful, red, swollen, or warm, a nail that curves downward into the skin can progress to an infection.  CAUSES:  Heredity: In many people, the tendency for ingrown toenails is inherited.   Trauma: Sometimes an ingrown toenail is the result of trauma, such as stubbing your toe, having an object fall on your toe, or engaging in activities that involve repeated pressure on the toes, such as kicking or running.   Improper Trimming:  The most common cause of ingrown toenails is cutting your nails too short. This encourages the skin next to the nail to fold over the nail.   Improperly Sized Footwear: Ingrown toenails can result from wearing socks and shoes that are tight or short.   Nail Conditions: Ingrown toenails can be caused by nail problems, such as fungal infections or losing a nail due to trauma.   TREATMENT: Sometimes initial treatment for ingrown toenails can be safely performed at home. However, home treatment is strongly discouraged if an  infection is suspected, or for those who have medical conditions that put feet at high risk, such as diabetes, nerve damage in the foot, or poor circulation.  Home care: If you don t have an infection or any of the above medical conditions, you can soak your foot in room-temperature water (adding Epsom s salt may be recommended by your doctor), and gently massage the side of the nail fold to help reduce the inflammation.  Avoid attempting  bathroom surgery.  Repeated cutting of the nail can cause the condition to worsen over time. If your symptoms fail to improve, it s time to see a foot and ankle surgeon.  Physician care: After examining the toe, the foot and ankle surgeon will select the treatment best suited for you. If an infection is present, an oral antibiotic may be prescribed.  Sometimes a minor surgical procedure, often performed in the office, will ease the pain and remove the offending nail. After applying a local anesthetic, the doctor removes part of the nail s side border. Some nails may become ingrown again, requiring removal of the nail root.  Following the nail procedure, a light bandage will be applied. Most people experience very little pain after surgery and may resume normal activity the next day. If your surgeon has prescribed an oral antibiotic, be sure to take all the medication, even if your symptoms have improved.  PREVENTION:  Proper Trimming: Cut toenails in a fairly straight line, and don t cut them too short. You should be able to get your fingernail under the sides and end of the nail.   Well-fitting Footwear: Don t wear shoes that are short or tight in the toe area. Avoid shoes that are loose, because they too cause pressure on the toes, especially when running or walking briskly.     INGROWN TOENAIL REMOVAL AFTERCARE   Go directly home and elevate the affected foot on one or two pillows for the remainder of the day/evening if possible. Your toe may stay numb anywhere from 2-8 hours.    Take Tylenol, ibuprofen or another anti-inflammatory as needed for pain.   Take antibiotic if that has been prescribed. Finish the entire prescribed antibiotic even if your symptoms have improved.   The evening of the procedure, soak/wash the affected area in warm water (you may add Epsom salt) for 5 to 10 minutes. Do this twice a day for 2-4 weeks (6-8 weeks if you had phenol) (you may count showering/bathing as one soak).  After soaks, pat the area dry and then allow to airdry for a few minutes. Apply antibiotic ointment to the area and cover with 2 X 2 gauze and paper tape or band-aid.  You may pursue everyday activities as tolerated with either an open toe shoe or cut-out shoe as needed or you may wear regular shoes if no pain is noted.  Watch for any signs and symptoms of infection such as: redness, red streaks going up the foot/leg, swelling, pus or foul odor. Those that have had the phenol procedure, the toe will drain longer and will look like it is infected because it is a chemical burn.   Please call with questions.

## 2025-04-24 ENCOUNTER — TELEPHONE (OUTPATIENT)
Dept: ANESTHESIOLOGY | Facility: CLINIC | Age: 85
End: 2025-04-24
Payer: COMMERCIAL

## 2025-04-24 RX ORDER — GABAPENTIN 600 MG/1
TABLET ORAL
Qty: 225 TABLET | Refills: 1 | Status: SHIPPED | OUTPATIENT
Start: 2025-04-24

## 2025-04-24 RX ORDER — BUPROPION HYDROCHLORIDE 150 MG/1
150 TABLET ORAL EVERY MORNING
Qty: 90 TABLET | Refills: 1 | Status: SHIPPED | OUTPATIENT
Start: 2025-04-24

## 2025-04-30 ENCOUNTER — VIRTUAL VISIT (OUTPATIENT)
Dept: ORTHOPEDICS | Facility: CLINIC | Age: 85
End: 2025-04-30
Attending: PREVENTIVE MEDICINE
Payer: COMMERCIAL

## 2025-04-30 DIAGNOSIS — M17.11 ARTHRITIS OF RIGHT KNEE: ICD-10-CM

## 2025-04-30 DIAGNOSIS — M70.61 GREATER TROCHANTERIC BURSITIS, RIGHT: ICD-10-CM

## 2025-04-30 DIAGNOSIS — M54.16 LUMBAR RADICULOPATHY: Primary | ICD-10-CM

## 2025-04-30 NOTE — PROGRESS NOTES
Tomi is a 84 year old who is being evaluated via a billable telephone visit.    What phone number would you like to be contacted at? listed  How would you like to obtain your AVS? Emily  Originating Location (pt. Location): Home    Distant Location (provider location):  On-site  Telephone visit completed due to the patient did not consent to a video visit.  Phone call duration: 20 minutes  Patient is a   84  year old who is being evaluated via a billable telephone visit.      What phone number would you like to be contacted at? CELL  How would you like to obtain your AVS? EMILY        Subjective   Patient is a   84  year old who presents by phone call visit for the following:     HPI   Followup for pain clinic referral   And had injections by me about a month ago in knee and hip  Overall doing better, pain in knee improved as well as hip  Wants to consider the options for her lumbar radiculopathy per pain clinic      Review of Systems   Constitutional, HEENT, cardiovascular, pulmonary, gi and gu systems are negative, except as otherwise noted.      Objective           Vitals:  No vitals were obtained today due to virtual visit.    Physical Exam   healthy, alert, and no distress  PSYCH: Alert and oriented times 3; coherent speech, normal   rate and volume, able to articulate logical thoughts, able   to abstract reason, no tangential thoughts, no hallucinations   or delusions  His affect is normal  RESP: No cough, no audible wheezing, able to talk in full sentences  Remainder of exam unable to be completed due to telephone visits    Assessment/Plan  85 yo female with right knee arthritis, improved, and lumbar radiculopathy, not resolved and right hip bursitis, improved    I independently reviewed the following imaging studies and discussed with patient:  Right knee xray: shows arthritis, left knee xray: shows intact knee replacement  Lumbar MRI shows ddd, disc herniations  Discussed considering HA injections for  right knee if worsens  Cont. Medications as written  Followup with pain clinic as planned            Phone call duration: 20 minutes  Phone call start: 1120am  Phone call end: 1140am  Dr Armendariz

## 2025-04-30 NOTE — LETTER
4/30/2025      Yumiko Mccartney  [address]  [place] [address]      Dear Colleague,    Thank you for referring your patient, Yumiko Mccartney, to the [facility] [place]. Please see a copy of my visit note below.    Tomi is a 84 year old who is being evaluated via a billable telephone visit.    What phone number would you like to be contacted at? listed  How would you like to obtain your AVS? Monique  Originating Location (pt. Location): Home  {PROVIDER LOCATION On-site should be selected for visits conducted from your clinic location or adjoining [facility] hospital, academic office, or other nearby [facility] building. Off-site should be selected for all other provider locations, including home:[id number]}  Distant Location (provider location):  On-site  Telephone visit completed due to the patient did not consent to a video visit.  Phone call duration: 20 minutes  Patient is a   84  year old who is being evaluated via a billable telephone visit.      What phone number would you like to be contacted at? CELL  How would you like to obtain your AVS? MONIQUE        Subjective   Patient is a   84  year old who presents by phone call visit for the following:     HPI   Followup for pain clinic referral   And had injections by me about a month ago in knee and hip  Overall doing better, pain in knee improved as well as hip  Wants to consider the options for her lumbar radiculopathy per pain clinic      Review of Systems   Constitutional, HEENT, cardiovascular, pulmonary, gi and gu systems are negative, except as otherwise noted.      Objective           Vitals:  No vitals were obtained today due to virtual visit.    Physical Exam   healthy, alert, and no distress  PSYCH: Alert and oriented times 3; coherent speech, normal   rate and volume, able to articulate logical thoughts, able   to abstract reason, no tangential thoughts, no hallucinations   or delusions  His affect is normal  RESP: No  cough, no audible wheezing, able to talk in full sentences  Remainder of exam unable to be completed due to telephone visits    Assessment/Plan  83 yo female with right knee arthritis, improved, and lumbar radiculopathy, not resolved and right hip bursitis, improved    I independently reviewed the following imaging studies and discussed with patient:  Right knee xray: shows arthritis, left knee xray: shows intact knee replacement  Lumbar MRI shows ddd, disc herniations  Discussed considering HA injections for right knee if worsens  Cont. Medications as written  Followup with pain clinic as planned            Phone call duration: 20 minutes  Phone call start: 1120am  Phone call end: 1140am  Dr Armendariz      Again, thank you for allowing me to participate in the care of your patient.        Sincerely,        Abad Armendariz MD    Electronically signed

## 2025-05-11 ENCOUNTER — HEALTH MAINTENANCE LETTER (OUTPATIENT)
Age: 85
End: 2025-05-11

## 2025-05-16 ENCOUNTER — OFFICE VISIT (OUTPATIENT)
Dept: OPHTHALMOLOGY | Facility: CLINIC | Age: 85
End: 2025-05-16
Payer: COMMERCIAL

## 2025-05-16 DIAGNOSIS — H40.1421 PSEUDOEXFOLIATIVE GLAUCOMA, LEFT EYE, MILD STAGE: Primary | ICD-10-CM

## 2025-05-16 DIAGNOSIS — H40.001 GLAUCOMA SUSPECT OF RIGHT EYE: ICD-10-CM

## 2025-05-16 PROCEDURE — 92012 INTRM OPH EXAM EST PATIENT: CPT | Performed by: OPHTHALMOLOGY

## 2025-05-16 ASSESSMENT — VISUAL ACUITY
OD_SC: 20/40
OS_PH_SC: 20/50
OS_SC: 20/60
OD_PH_SC: 20/25
METHOD: SNELLEN - LINEAR
OD_PH_SC+: -2
OS_SC+: +1
OD_SC+: -1

## 2025-05-16 ASSESSMENT — TONOMETRY
IOP_METHOD: APPLANATION
OD_IOP_MMHG: 14
OS_IOP_MMHG: 21

## 2025-05-16 ASSESSMENT — EXTERNAL EXAM - RIGHT EYE: OD_EXAM: 1+ BROW PTOSIS

## 2025-05-16 ASSESSMENT — EXTERNAL EXAM - LEFT EYE: OS_EXAM: 1+ BROW PTOSIS

## 2025-05-16 ASSESSMENT — SLIT LAMP EXAM - LIDS
COMMENTS: GOOD COSMESIS
COMMENTS: GOOD COSMESIS

## 2025-05-16 NOTE — LETTER
"5/16/2025      Yumiko Mccartney  [address]  [place] [address]      Dear Colleague,    Thank you for referring your patient, Yumiko Mccartney, to the [facility]. Please see a copy of my visit note below.     Current Eye Medications:  Latanoprost both eyes every evening.  Last drops:  10:30pm.      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  The patient is here for a pressure check following the addition of Latanoprost.  The patient complains of both eyes hurting all the time, and she has blurry vision in each eye since she started using Latanoprost.       Objective:  See Ophthalmology Exam.       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      Plan:  Continue:   Latanoprost (green top) every evening both eyes.    May use artificial tears up to four times a day (like Refresh Optive, Systane Balance, or TheraTears. Avoid \"get the red out\" drops and generic artifical tears).     Wait at least 5 minutes between drops if using more than one at a time.     Glasses Rx given - optional     Return visit in 4 months for an intraocular pressure check.     Ashu Gonzales M.D.  [phone]         Again, thank you for allowing me to participate in the care of your patient.        Sincerely,        Ashu Gonzales MD    Electronically signed"

## 2025-05-16 NOTE — PATIENT INSTRUCTIONS
"Continue:   Latanoprost (green top) every evening both eyes.    May use artificial tears up to four times a day (like Refresh Optive, Systane Balance, or TheraTears. Avoid \"get the red out\" drops and generic artifical tears).     Wait at least 5 minutes between drops if using more than one at a time.     Glasses Rx given - optional     Return visit in 4 months for an intraocular pressure check.     Ashu Gonzales M.D.  [phone]    "

## 2025-05-16 NOTE — PROGRESS NOTES
" Current Eye Medications:  Latanoprost both eyes every evening.  Last drops:  10:30pm.      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  The patient is here for a pressure check following the addition of Latanoprost.  The patient complains of both eyes hurting all the time, and she has blurry vision in each eye since she started using Latanoprost.       Objective:  See Ophthalmology Exam.       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      Plan:  Continue:   Latanoprost (green top) every evening both eyes.    May use artificial tears up to four times a day (like Refresh Optive, Systane Balance, or TheraTears. Avoid \"get the red out\" drops and generic artifical tears).     Wait at least 5 minutes between drops if using more than one at a time.     Glasses Rx given - optional     Return visit in 4 months for an intraocular pressure check.     Ashu Gonzales M.D.  [phone]       "

## 2025-05-20 NOTE — PROGRESS NOTES
Medication Therapy Management (MTM) Encounter    ASSESSMENT:                            Medication Adherence/Access: No issues identified.    1. PVD/Prior DVT  Controlled. Plan in place with vascular    2. Hypertension  Controlled. Blood pressure at goal of <140/90    3. Weight management  Uncontrolled. Patient is not experiencing benefit with current dose of bupropion. Could consider increasing to max dose to optimize therapy. Will discuss with PCP.    4. RLS/Neuropathy  Stable    5. Asthma  Controlled. ACT at goal of 20 or greater.    6. Glaucoma  Stable     7. Supplements  Stable     PLAN:                            1. Sent message to Dr. Bear about increasing bupropion    Follow-up: yearly or sooner if needed    SUBJECTIVE/OBJECTIVE:                          Yumiko Mccartney is a 84 year old female seen for an initial visit. She was referred to me from her insurance.      Reason for visit: comprehensive med review.    Allergies/ADRs: Reviewed in chart  Past Medical History: Reviewed in chart  Tobacco: She reports that she has never smoked. She has never been exposed to tobacco smoke. She has never used smokeless tobacco.  Alcohol: none  Immunizations: due for COVID booster    Medication Adherence/Access: Patient uses pill box(es).  Patient takes medications 2 time(s) per day.   Per patient, misses medication 0 time(s) per week.     PVD/Prior DVT  Clopidogrel 75 mg each evening  Xarelto 20 mg each morning  Nitroglycerin tabs as needed    Patient follows with [facility]  She had a failed popliteal artery stent last year  Did bypass graft which was successful  On Xarelto and clopidogrel since then per vascular  Denies abnormal bleeding/bruising  Hypertension   Lisinopril/hydrochlorothiazide 20-12.5 mg each morning    Tolerating medication well  Denies frequent urination, dry cough, or dizziness  Patient self monitors blood pressure at home  Reports it is always at goal - no specific readings to report  Denies  concerns     Weight management  Bupropion  mg each morning    Started taking this for weight loss  Reports it has not helped so far  Denies palpitations, sweating, or tachycardia since starting  She is wondering if she can increase the dose  Pain   RLS/Neuropathy   Acetaminophen 1000 mg twice daily   Gabapentin 300 mg each morning and 1200 mg each evening  Tizanidine 4 mg at bedtime as needed    Pain type/location: neuropathy   Patient feels that current therapy is effective.   Patient reports the following side effects: no medication side effects     Asthma  Albuterol inhaler as needed for shortness of breath   Albuterol nebs as needed for shortness of breath     Tolerating well  Uses very infrequently for shortness of breath  ACT always at goal despite no controller inhaler  Denies concerns at this time    Glaucoma  Latanoprost 0.005% drops twice daily    Working well   Follows with optho as directed    Supplements   Calcium citrate 600 mg daily  Vitamin D 2000 units daily  Multivitamin daily    No reported issues at this time      ----------------  I spent 21 minutes with this patient today. All changes were made via collaborative practice agreement with Gavi Bear MD. A copy of the visit note was provided to the patient's provider(s).    A summary of these recommendations was sent via [employer].    Christina De Luna, PharmD, [facility]  Medication Therapy Management Provider  [phone]    Telemedicine Visit Details  The patient's medications can be safely assessed via a telemedicine encounter.  Type of service:  Telephone visit  Originating Location (pt. Location): Home    Distant Location (provider location):  On-site  Start Time: 2:03 PM  End Time: 2:24 PM     Medication Therapy Recommendations  Obesity, Class I, BMI 30-34.9   1 Current Medication: buPROPion (WELLBUTRIN XL) 150 MG 24 hr tablet   Current Medication Sig: TAKE 1 TABLET BY MOUTH EVERY MORNING   Rationale: Dose too low - Dosage too low -  Effectiveness   Recommendation: Increase Dose - buPROPion 300 MG 24 hr tablet   Status: Contact Provider - Awaiting Response   Identified Date: 5/21/2025

## 2025-05-21 ENCOUNTER — VIRTUAL VISIT (OUTPATIENT)
Dept: PHARMACY | Facility: CLINIC | Age: 85
End: 2025-05-21
Payer: COMMERCIAL

## 2025-05-21 DIAGNOSIS — G25.81 RESTLESS LEGS SYNDROME: ICD-10-CM

## 2025-05-21 DIAGNOSIS — J45.40 MODERATE PERSISTENT ASTHMA WITHOUT COMPLICATION: ICD-10-CM

## 2025-05-21 DIAGNOSIS — H40.001 GLAUCOMA SUSPECT OF RIGHT EYE: ICD-10-CM

## 2025-05-21 DIAGNOSIS — I10 ESSENTIAL HYPERTENSION WITH GOAL BLOOD PRESSURE LESS THAN 140/90: ICD-10-CM

## 2025-05-21 DIAGNOSIS — Z78.9 TAKES DIETARY SUPPLEMENTS: ICD-10-CM

## 2025-05-21 DIAGNOSIS — E66.812 OBESITY, CLASS II, BMI 35-39.9: ICD-10-CM

## 2025-05-21 DIAGNOSIS — I73.9 PVD (PERIPHERAL VASCULAR DISEASE) WITH CLAUDICATION: Primary | ICD-10-CM

## 2025-05-21 PROCEDURE — 99607 MTMS BY PHARM ADDL 15 MIN: CPT | Mod: 93 | Performed by: PHARMACIST

## 2025-05-21 PROCEDURE — 99605 MTMS BY PHARM NP 15 MIN: CPT | Mod: 93 | Performed by: PHARMACIST

## 2025-05-21 RX ORDER — ACETAMINOPHEN 500 MG
500-1000 TABLET ORAL 2 TIMES DAILY
COMMUNITY

## 2025-05-21 NOTE — LETTER
May 22, 2025  Yumiko Mccartney  [address]  [place] [facility] [address]    Dear Ms. Mccartney, [facility]     Thank you for talking with me on May 21, 2025 about your health and medications. As a follow-up to our conversation, I have included two documents:      Your Recommended To-Do List has steps you should take to get the best results from your medications.  Your Medication List will help you keep track of your medications and how to take them.    If you want to talk about these documents, please call Christina De Luna RPH at phone: [phone], Monday-Friday 8-4:30pm.    I look forward to working with you and your doctors to make sure your medications work well for you.    Sincerely,  Christina De Luna RPH  [facility] Pharmacist, [facility]

## 2025-05-21 NOTE — LETTER
_  Medication List        Prepared on: May 21, 2025     Bring your Medication List when you go to the doctor, hospital, or   emergency room. And, share it with your family or caregivers.     Note any changes to how you take your medications.  Cross out medications when you no longer use them.    Medication How I take it Why I use it Prescriber   acetaminophen (TYLENOL) 500 MG tablet Take 500-1,000 mg by mouth 2 times daily. Pain Patient Reported   albuterol (PROAIR HFA/PROVENTIL HFA/VENTOLIN HFA) 108 (90 Base) MCG/ACT inhaler Inhale 2 puffs into the lungs every 6 hours as needed for shortness of breath, wheezing or cough. Mild persistent asthma without complication Gavi Bear MD   albuterol (PROVENTIL) (2.5 MG/3ML) 0.083% neb solution TAKE 1 VIAL (2.5 MG) BY NEBULIZATION EVERY 6 HOURS AS NEEDED FOR SHORTNESS OF BREATH OR WHEEZING Moderate persistent asthma with acute exacerbation Gavi Bear MD   buPROPion (WELLBUTRIN XL) 150 MG 24 hr tablet Take 1 tablet (150 mg) by mouth every morning Appetite suppression  Gavi Bear MD   calcium citrate (CITRACAL) 950 (200 Ca) MG tablet Take 1 tablet by mouth daily. Bone health  Patient reported   Cholecalciferol (VITAMIN D) 2000 UNIT CAPS Take 2,000 Units by mouth daily Bone health Patient Reported   clopidogrel (PLAVIX) 75 MG tablet TAKE 1 TABLET BY MOUTH EVERY DAY PAD  Leonard Coates MD   gabapentin (NEURONTIN) 600 MG tablet TAKE 0.5 TAB BY MOUTH IN MORNING & 2 TABS AT BEDTIME DDD (Degenerative Disc Disease), Lumbar Gavi Bear MD   latanoprost (XALATAN) 0.005 % ophthalmic solution Place 1 drop into both eyes every evening. Wait at least 5 minutes between drops if using more than one at a time. Glaucoma Ashu Gonzales MD   lisinopril-hydrochlorothiazide (ZESTORETIC) 20-12.5 MG tablet Take 1 tablet by mouth daily. Hypertension  Gavi Bear MD   Multiple Vitamin (MULTIVITAMIN ADULT PO) Take by mouth daily General health Patient Reported   rivaroxaban  ANTICOAGULANT (XARELTO) 20 MG TABS tablet Take 1 tablet (20 mg) by mouth daily with food. Acute deep vein thrombosis (DVT) Anaid Taylor PA-C   tiZANidine (ZANAFLEX) 4 MG tablet Take 1 tablet (4 mg) by mouth nightly as needed for muscle spasms. Left Foot Pain Gavi Bear MD         Add new medications, over-the-counter drugs, herbals, vitamins, or  minerals in the blank rows below.    Medication How I take it Why I use it Prescriber                                      Allergies:      - Erythromycin - Swelling, Other (See Comments)        Side effects I have had:      Not on File        Other Information:              My notes and questions:

## 2025-05-21 NOTE — LETTER
"Recommended To-Do List      Prepared on: May 21, 2025       You can get the best results from your medications by completing the items on this \"To-Do List.\"      Bring your To-Do List when you go to your doctor. And, share it with your family or caregivers.    My To-Do List:  What we talked about: What I should do:   Bupropion    I sent a message to Dr. Bear about increasing your dosage of buPROPion to help with appetite suppression. If you don't hear back from us by next week, please check in           What we talked about: What I should do:                     "

## 2025-05-21 NOTE — Clinical Note
Kenney Hardin - I saw Tomi for annual med review today. Only recommendation was possibly increasing bupropion since she hasn't noticed appetite suppression on current dose. I just sent you a separate encounter about that. No other changes recommended!

## 2025-05-22 DIAGNOSIS — E66.01 SEVERE OBESITY WITH BODY MASS INDEX (BMI) OF 35.0 TO 35.9 AND COMORBIDITY (H): ICD-10-CM

## 2025-05-22 RX ORDER — BUPROPION HYDROCHLORIDE 300 MG/1
300 TABLET ORAL EVERY MORNING
Qty: 90 TABLET | Refills: 1 | Status: SHIPPED | OUTPATIENT
Start: 2025-05-22

## 2025-05-22 NOTE — TELEPHONE ENCOUNTER
Saw patient for MTM visit and she expressed interest in increasing bupropion to help with appetite suppression. She is not having benefit at current dose. I have order pended to increase bupropion to 300 mg daily if PCP feels this is appropriate.    Christina De Luna, PharmD, [facility]  Medication Therapy Management Provider  [phone]

## 2025-05-22 NOTE — PATIENT INSTRUCTIONS
"Recommendations from today's MTM visit:                                                    MTM (medication therapy management) is a service provided by a clinical pharmacist designed to help you get the most of out of your medicines.   Today we reviewed what your medicines are for, how to know if they are working, that your medicines are safe and how to make your medicine regimen as easy as possible.      1. I sent a message to Dr. Bear about increasing bupropion. If you dont hear back from us by next week, call to check in on this    Follow-up: yearly or sooner if needed    It was great speaking with you today.  I value your experience and would be very thankful for your time in providing feedback in our clinic survey. In the next few days, you may receive an email or text message from [facility] with a link to a survey related to your  clinical pharmacist.\"     To schedule another MTM appointment, please call the clinic directly or you may call the MTM scheduling line at [phone].    My Clinical Pharmacist's contact information:                                                      Please feel free to contact me with any questions or concerns you have.      Christina De Luna, PharmD, [facility]  Phone: [phone]     "

## 2025-06-11 ENCOUNTER — TELEPHONE (OUTPATIENT)
Dept: ANESTHESIOLOGY | Facility: CLINIC | Age: 85
End: 2025-06-11
Payer: COMMERCIAL

## 2025-06-11 NOTE — TELEPHONE ENCOUNTER
"Spoke with Riley from [facility] regarding TENs unit order. He states \"low back pain\" is not a coverable diagnosis for Medicare for the TENs unit.  Will pass on to MD to change diagnosis so patient will have coverage.    Carmel Shipley LPN    "

## 2025-06-11 NOTE — TELEPHONE ENCOUNTER
[facility] Call Center    Phone Message    May a detailed message be left on voicemail: yes     Reason for Call: Other: Riley is requesting to speak with a nurse regarding this patients tens unit prescription. His direct number is [phone] Please call back.     Action Taken: Message routed to:  [facility]    Travel Screening: Not Applicable

## 2025-06-16 ENCOUNTER — HOSPITAL ENCOUNTER (OUTPATIENT)
Dept: ULTRASOUND IMAGING | Facility: CLINIC | Age: 85
Discharge: HOME | End: 2025-06-16
Payer: COMMERCIAL

## 2025-06-16 DIAGNOSIS — I73.9 PAD (PERIPHERAL ARTERY DISEASE): ICD-10-CM

## 2025-06-16 PROCEDURE — 93922 UPR/L XTREMITY ART 2 LEVELS: CPT

## 2025-06-16 PROCEDURE — 93926 LOWER EXTREMITY STUDY: CPT | Mod: LT

## 2025-06-18 NOTE — PROGRESS NOTES
*** [place] [facility]    Yumiko Mccartney returns for vascular follow-up.  84-year-old patient with a history of left plantar great toe nonhealing ulcer.    --Left distal SFA/proximal popliteal Viabahn stent that failed    --12/15/2024: Left proximal SFA to distal common posterior tibial in situ bypass graft.  Minimal PT disease.    --1/30/2025 clinic follow-up: Widely patent duplex of graft.  Several sidebranches of no clinical concern.    PMH: Medications: Neurontin, Wellbutrin, Zestoretic, eyedrops, Xarelto, Plavix   Medical: Hypertension    LDL= 96 A1c= 5.5    Recent evaluated by  for left great ingrown toenail.  Decided on conservative treatment.    ROS: Overall doing well following her bypass.  Does have discomfort on the plantar aspect of her left great toe where she had an ulceration and a callus is present with a small scab.  This does bother her when she walks.  Also some ongoing swelling and is used size 6 [employer] in the past.    Concerned that she will be flying to [facility] in August for her daughter's wedding and swelling on the airflight.    Exam: Alert and appropriate.  Normal affect.  Ambulatory.   Blood pressure 124/73.  Pulse 74.   Chest= clear   Cardiovascular= regular rate   Well-healed left leg incisions.  +3 graft pulse.   Biphasic graft in distal PT bedside Doppler         Small scab/callus on the proximal plantar left great toe that I trimmed with a #15    Blade scalpel.  No ulcer.  Band-Aid to site.      TESTING 6/16/2025:     MEL:   Right= 1.12/1.05.  Multiphasic waveforms.  Left= noncompressible.  Triphasic PT and biphasic DP        Left graft duplex= widely patent.  Normalization of anastomotic distal velocities.  1 sidebranch with I have no concern--others have occluded      IMPRESSION:   #1.  Widely patent left proximal SFA to ankle PT in situ bypass graft.  Good flow through the graft.  Single sidebranch still present of no concern.  Continue on Xarelto/Plavix.   Follow-up duplex in 3 months.    #2.  Overall good risk factor control.  Non-smoker.  Well-controlled blood pressure.  LDL really good at least) 100.    #3.  Mild postoperative swelling.  I have given her a Spandage size 6D is now on her airflight for the wedding.  She may decide to purchase this since 25 yards is in each box of this disposable compression which is much safer than traditional compression due to her distal bypass graft.    #4.  Successful angioplasty left SFA and above-knee popliteal artery.  Adequate Doppler flow noted    20 minutes with patient today.  Follow-up in 3 months.    Leonard Coates MD   This note was created using Dragon voice recognition software which may result in transcription errors.

## 2025-06-19 ENCOUNTER — PATIENT OUTREACH (OUTPATIENT)
Dept: CARE COORDINATION | Facility: CLINIC | Age: 85
End: 2025-06-19
Payer: COMMERCIAL

## 2025-06-19 ENCOUNTER — OFFICE VISIT (OUTPATIENT)
Dept: OTHER | Facility: CLINIC | Age: 85
End: 2025-06-19
Attending: SURGERY
Payer: COMMERCIAL

## 2025-06-19 VITALS — SYSTOLIC BLOOD PRESSURE: 124 MMHG | HEART RATE: 74 BPM | DIASTOLIC BLOOD PRESSURE: 73 MMHG

## 2025-06-19 DIAGNOSIS — I73.9 PAD (PERIPHERAL ARTERY DISEASE): ICD-10-CM

## 2025-06-19 DIAGNOSIS — H40.1421 PSEUDOEXFOLIATIVE GLAUCOMA, LEFT EYE, MILD STAGE: ICD-10-CM

## 2025-06-19 DIAGNOSIS — E78.5 HYPERLIPIDEMIA LDL GOAL <70: Primary | ICD-10-CM

## 2025-06-19 DIAGNOSIS — H40.001 GLAUCOMA SUSPECT OF RIGHT EYE: ICD-10-CM

## 2025-06-19 PROCEDURE — 99213 OFFICE O/P EST LOW 20 MIN: CPT | Performed by: SURGERY

## 2025-06-19 PROCEDURE — 3074F SYST BP LT 130 MM HG: CPT | Performed by: SURGERY

## 2025-06-19 PROCEDURE — G0463 HOSPITAL OUTPT CLINIC VISIT: HCPCS | Performed by: SURGERY

## 2025-06-19 PROCEDURE — 3078F DIAST BP <80 MM HG: CPT | Performed by: SURGERY

## 2025-06-19 RX ORDER — LATANOPROST 50 UG/ML
1 SOLUTION/ DROPS OPHTHALMIC EVERY EVENING
Qty: 7.5 ML | Refills: 3 | Status: SHIPPED | OUTPATIENT
Start: 2025-06-19

## 2025-06-19 NOTE — PROGRESS NOTES
[facility]        Patient is here for a  follow up.    Pt is currently taking Plavix and Xarelto.    /73 (BP Location: Left arm, Patient Position: Chair, Cuff Size: Adult Regular)   Pulse 74     The provider has been notified that the patient has no concerns.     Questions patient would like addressed today are: N/A.    Refills are needed: N/A    Has homecare services and agency name:  Princess Garrido MA

## 2025-06-24 ENCOUNTER — OFFICE VISIT (OUTPATIENT)
Dept: FAMILY MEDICINE | Facility: CLINIC | Age: 85
End: 2025-06-24
Attending: PREVENTIVE MEDICINE
Payer: COMMERCIAL

## 2025-06-24 ENCOUNTER — TELEPHONE (OUTPATIENT)
Dept: FAMILY MEDICINE | Facility: CLINIC | Age: 85
End: 2025-06-24

## 2025-06-24 ENCOUNTER — RESULTS FOLLOW-UP (OUTPATIENT)
Dept: FAMILY MEDICINE | Facility: CLINIC | Age: 85
End: 2025-06-24

## 2025-06-24 VITALS
WEIGHT: 197.2 LBS | SYSTOLIC BLOOD PRESSURE: 139 MMHG | RESPIRATION RATE: 16 BRPM | BODY MASS INDEX: 33.67 KG/M2 | DIASTOLIC BLOOD PRESSURE: 84 MMHG | HEIGHT: 64 IN | TEMPERATURE: 97.6 F | HEART RATE: 74 BPM | OXYGEN SATURATION: 98 %

## 2025-06-24 DIAGNOSIS — I10 ESSENTIAL HYPERTENSION WITH GOAL BLOOD PRESSURE LESS THAN 140/90: ICD-10-CM

## 2025-06-24 DIAGNOSIS — Z00.00 ENCOUNTER FOR MEDICARE ANNUAL WELLNESS EXAM: Primary | ICD-10-CM

## 2025-06-24 DIAGNOSIS — E78.5 HYPERLIPIDEMIA LDL GOAL <70: ICD-10-CM

## 2025-06-24 DIAGNOSIS — N18.31 CHRONIC KIDNEY DISEASE, STAGE 3A (H): ICD-10-CM

## 2025-06-24 DIAGNOSIS — D64.9 ANEMIA, UNSPECIFIED TYPE: Primary | ICD-10-CM

## 2025-06-24 DIAGNOSIS — J45.30 MILD PERSISTENT ASTHMA WITHOUT COMPLICATION: ICD-10-CM

## 2025-06-24 DIAGNOSIS — R79.89 ELEVATED PLATELET COUNT: ICD-10-CM

## 2025-06-24 DIAGNOSIS — I73.9 PAD (PERIPHERAL ARTERY DISEASE): ICD-10-CM

## 2025-06-24 DIAGNOSIS — D64.9 ANEMIA, UNSPECIFIED TYPE: ICD-10-CM

## 2025-06-24 DIAGNOSIS — I82.411 ACUTE DEEP VEIN THROMBOSIS (DVT) OF FEMORAL VEIN OF RIGHT LOWER EXTREMITY (H): ICD-10-CM

## 2025-06-24 DIAGNOSIS — M51.369 DEGENERATION OF INTERVERTEBRAL DISC OF LUMBAR REGION, UNSPECIFIED WHETHER PAIN PRESENT: ICD-10-CM

## 2025-06-24 DIAGNOSIS — E66.01 SEVERE OBESITY WITH BODY MASS INDEX (BMI) OF 35.0 TO 35.9 AND COMORBIDITY (H): ICD-10-CM

## 2025-06-24 LAB
ERYTHROCYTE [DISTWIDTH] IN BLOOD BY AUTOMATED COUNT: 14.1 % (ref 10–15)
HCT VFR BLD AUTO: 35.1 % (ref 35–47)
HGB BLD-MCNC: 10.6 G/DL (ref 11.7–15.7)
MCH RBC QN AUTO: 25.2 PG (ref 26.5–33)
MCHC RBC AUTO-ENTMCNC: 30.2 G/DL (ref 31.5–36.5)
MCV RBC AUTO: 83 FL (ref 78–100)
PLATELET # BLD AUTO: 596 10E3/UL (ref 150–450)
RBC # BLD AUTO: 4.21 10E6/UL (ref 3.8–5.2)
WBC # BLD AUTO: 8.2 10E3/UL (ref 4–11)

## 2025-06-24 PROCEDURE — 83540 ASSAY OF IRON: CPT | Performed by: PREVENTIVE MEDICINE

## 2025-06-24 PROCEDURE — 99214 OFFICE O/P EST MOD 30 MIN: CPT | Mod: 25 | Performed by: PREVENTIVE MEDICINE

## 2025-06-24 PROCEDURE — 82607 VITAMIN B-12: CPT | Performed by: PREVENTIVE MEDICINE

## 2025-06-24 PROCEDURE — G0439 PPPS, SUBSEQ VISIT: HCPCS | Performed by: PREVENTIVE MEDICINE

## 2025-06-24 PROCEDURE — 83550 IRON BINDING TEST: CPT | Performed by: PREVENTIVE MEDICINE

## 2025-06-24 PROCEDURE — 3075F SYST BP GE 130 - 139MM HG: CPT | Performed by: PREVENTIVE MEDICINE

## 2025-06-24 PROCEDURE — 82728 ASSAY OF FERRITIN: CPT | Performed by: PREVENTIVE MEDICINE

## 2025-06-24 PROCEDURE — 1126F AMNT PAIN NOTED NONE PRSNT: CPT | Performed by: PREVENTIVE MEDICINE

## 2025-06-24 PROCEDURE — 85027 COMPLETE CBC AUTOMATED: CPT | Performed by: PREVENTIVE MEDICINE

## 2025-06-24 PROCEDURE — 3079F DIAST BP 80-89 MM HG: CPT | Performed by: PREVENTIVE MEDICINE

## 2025-06-24 PROCEDURE — 36415 COLL VENOUS BLD VENIPUNCTURE: CPT | Performed by: PREVENTIVE MEDICINE

## 2025-06-24 PROCEDURE — 80053 COMPREHEN METABOLIC PANEL: CPT | Performed by: PREVENTIVE MEDICINE

## 2025-06-24 PROCEDURE — 80061 LIPID PANEL: CPT | Performed by: PREVENTIVE MEDICINE

## 2025-06-24 RX ORDER — DULOXETIN HYDROCHLORIDE 20 MG/1
20 CAPSULE, DELAYED RELEASE ORAL DAILY
Qty: 30 CAPSULE | Refills: 1 | Status: SHIPPED | OUTPATIENT
Start: 2025-06-24

## 2025-06-24 SDOH — HEALTH STABILITY: PHYSICAL HEALTH: ON AVERAGE, HOW MANY DAYS PER WEEK DO YOU ENGAGE IN MODERATE TO STRENUOUS EXERCISE (LIKE A BRISK WALK)?: 7 DAYS

## 2025-06-24 ASSESSMENT — ASTHMA QUESTIONNAIRES
ACT_TOTALSCORE: 25
QUESTION_2 LAST FOUR WEEKS HOW OFTEN HAVE YOU HAD SHORTNESS OF BREATH: NOT AT ALL
QUESTION_4 LAST FOUR WEEKS HOW OFTEN HAVE YOU USED YOUR RESCUE INHALER OR NEBULIZER MEDICATION (SUCH AS ALBUTEROL): NOT AT ALL
QUESTION_5 LAST FOUR WEEKS HOW WOULD YOU RATE YOUR ASTHMA CONTROL: COMPLETELY CONTROLLED
QUESTION_1 LAST FOUR WEEKS HOW MUCH OF THE TIME DID YOUR ASTHMA KEEP YOU FROM GETTING AS MUCH DONE AT WORK, SCHOOL OR AT HOME: NONE OF THE TIME
QUESTION_3 LAST FOUR WEEKS HOW OFTEN DID YOUR ASTHMA SYMPTOMS (WHEEZING, COUGHING, SHORTNESS OF BREATH, CHEST TIGHTNESS OR PAIN) WAKE YOU UP AT NIGHT OR EARLIER THAN USUAL IN THE MORNING: NOT AT ALL

## 2025-06-24 ASSESSMENT — SOCIAL DETERMINANTS OF HEALTH (SDOH): HOW OFTEN DO YOU GET TOGETHER WITH FRIENDS OR RELATIVES?: MORE THAN THREE TIMES A WEEK

## 2025-06-24 ASSESSMENT — PAIN SCALES - GENERAL: PAINLEVEL_OUTOF10: NO PAIN (0)

## 2025-06-24 NOTE — PATIENT INSTRUCTIONS
Taper off Wellbutrin:  -Take 5 days a week for a week then 4 days a week for a week then 3 days a week for a week then STOP.    Then Start Duloxetine 20 mg daily.         At [facility], we strive to deliver an exceptional experience to you, every time we see you. If you receive a survey, please let us know what we are doing well and/or what we could improve upon, as we do value your feedback.  If you have MyChart, you can expect to receive results automatically within 24 hours of their completion.  Your provider will send a note interpreting your results as well.   If you do not have MyChart, you should receive your results in about a week by mail.    Your care team:                            Family Medicine Internal Medicine   MD David Puckett MD Shantel Branch-Fleming, MD Srinivasa Vaka, MD Katya Belousova, PAKALIN Temple, DNP   Trevin Washburn MD Pediatrics   Gavi Bear, MD Huma Alarcon, MD Shabnam Burkett, PA-ALVARO Bautista APRN CNP   MD Jacqueline Mederos, MD Erma Minaya, CNP      Same-Day Provider (No follow-up visits)    EMY Camarillo PA-C     Clinic hours: Monday - Thursday 7 am-6 pm; Fridays 7 am-5 pm.   Urgent care: Monday - Friday 10 am- 8 pm; Saturday and Sunday 9 am-5 pm.    Clinic: [phone]       [facility]: Monday - Thursday 8 am - 7 pm; Friday 8 am - 6 pm  [facility]: [phone]     [facility] Scheduling: Monday - Friday 7 am - 7 pm; Saturday 7 am - 3:30 pm  ([phone]) [place] : [phone]   Patient Education   Preventive Care Advice   This is general advice given by our system to help you stay healthy. However, your care team may have specific advice just for you. Please talk to your care team about your preventive care needs.  Nutrition  Eat 5 or more servings of fruits and vegetables each  day.  Try wheat bread, brown rice and whole grain pasta (instead of white bread, rice, and pasta).  Get enough calcium and vitamin D. Check the label on foods and aim for 100% of the RDA (recommended daily allowance).  Lifestyle  Exercise at least 150 minutes each week  (30 minutes a day, 5 days a week).  Do muscle strengthening activities 2 days a week. These help control your weight and prevent disease.  No smoking.  Wear sunscreen to prevent skin cancer.  Have a dental exam and cleaning every 6 months.  Yearly exams  See your health care team every year to talk about:  Any changes in your health.  Any medicines your care team has prescribed.  Preventive care, family planning, and ways to prevent chronic diseases.  Shots (vaccines)   HPV shots (up to age 26), if you've never had them before.  Hepatitis B shots (up to age 59), if you've never had them before.  COVID-19 shot: Get this shot when it's due.  Flu shot: Get a flu shot every year.  Tetanus shot: Get a tetanus shot every 10 years.  Pneumococcal, hepatitis A, and RSV shots: Ask your care team if you need these based on your risk.  Shingles shot (for age 50 and up)  General health tests  Diabetes screening:  Starting at age 35, Get screened for diabetes at least every 3 years.  If you are younger than age 35, ask your care team if you should be screened for diabetes.  Cholesterol test: At age 39, start having a cholesterol test every 5 years, or more often if advised.  Bone density scan (DEXA): At age 50, ask your care team if you should have this scan for osteoporosis (brittle bones).  Hepatitis C: Get tested at least once in your life.  STIs (sexually transmitted infections)  Before age 24: Ask your care team if you should be screened for STIs.  After age 24: Get screened for STIs if you're at risk. You are at risk for STIs (including HIV) if:  You are sexually active with more than one person.  You don't use condoms every time.  You or a partner was  diagnosed with a sexually transmitted infection.  If you are at risk for HIV, ask about PrEP medicine to prevent HIV.  Get tested for HIV at least once in your life, whether you are at risk for HIV or not.  Cancer screening tests  Cervical cancer screening: If you have a cervix, begin getting regular cervical cancer screening tests starting at age 21.  Breast cancer scan (mammogram): If you've ever had breasts, begin having regular mammograms starting at age 40. This is a scan to check for breast cancer.  Colon cancer screening: It is important to start screening for colon cancer at age 45.  Have a colonoscopy test every 10 years (or more often if you're at risk) Or, ask your provider about stool tests like a FIT test every year or Cologuard test every 3 years.  To learn more about your testing options, visit:   .  For help making a decision, visit:   https://bit.ly/it36220.  Prostate cancer screening test: If you have a prostate, ask your care team if a prostate cancer screening test (PSA) at age 55 is right for you.  Lung cancer screening: If you are a current or former smoker ages 50 to 80, ask your care team if ongoing lung cancer screenings are right for you.  For informational purposes only. Not to replace the advice of your health care provider. Copyright   2023 [facility]. All rights reserved. Clinically reviewed by the [facility] Transitions Program. [employer] 228883 - REV 01/24.  Preventing Falls: Care Instructions  Injuries and health problems such as trouble walking or poor eyesight can increase your risk of falling. So can some medicines. But there are things you can do to help prevent falls. You can exercise to get stronger. You can also arrange your home to make it safer.    Talk to your doctor about the medicines you take. Ask if any of them increase the risk of falls and whether they can be changed or stopped.   Try to exercise regularly. It can help improve your strength and  "balance. This can help lower your risk of falling.         Practice fall safety and prevention.   Wear low-heeled shoes that fit well and give your feet good support. Talk to your doctor if you have foot problems that make this hard.  Carry a cellphone or wear a medical alert device that you can use to call for help.  Use stepladders instead of chairs to reach high objects. Don't climb if you're at risk for falls. Ask for help, if needed.  Wear the correct eyeglasses, if you need them.        Make your home safer.   Remove rugs, cords, clutter, and furniture from walkways.  Keep your house well lit. Use night-lights in hallways and bathrooms.  Install and use sturdy handrails on stairways.  Wear nonskid footwear, even inside. Don't walk barefoot or in socks without shoes.        Be safe outside.   Use handrails, curb cuts, and ramps whenever possible.  Keep your hands free by using a shoulder bag or backpack.  Try to walk in well-lit areas. Watch out for uneven ground, changes in pavement, and debris.  Be careful in the winter. Walk on the grass or gravel when sidewalks are slippery. Use de-icer on steps and walkways. Add non-slip devices to shoes.    Put grab bars and nonskid mats in your shower or tub and near the toilet. Try to use a shower chair or bath bench when bathing.   Get into a tub or shower by putting in your weaker leg first. Get out with your strong side first. Have a phone or medical alert device in the bathroom with you.   Where can you learn more?  Go to https://www.AddFleet.net/patiented  Enter G117 in the search box to learn more about \"Preventing Falls: Care Instructions.\"  Current as of: July 31, 2024  Content Version: 14.5 2024-2025 [employer].   Care instructions adapted under license by your healthcare professional. If you have questions about a medical condition or this instruction, always ask your healthcare professional. [employer] disclaims any warranty or " liability for your use of this information.       Patient Education   Preventive Care Advice   This is general advice given by our system to help you stay healthy. However, your care team may have specific advice just for you. Please talk to your care team about your preventive care needs.  Nutrition  Eat 5 or more servings of fruits and vegetables each day.  Try wheat bread, brown rice and whole grain pasta (instead of white bread, rice, and pasta).  Get enough calcium and vitamin D. Check the label on foods and aim for 100% of the RDA (recommended daily allowance).  Lifestyle  Exercise at least 150 minutes each week  (30 minutes a day, 5 days a week).  Do muscle strengthening activities 2 days a week. These help control your weight and prevent disease.  No smoking.  Wear sunscreen to prevent skin cancer.  Have a dental exam and cleaning every 6 months.  Yearly exams  See your health care team every year to talk about:  Any changes in your health.  Any medicines your care team has prescribed.  Preventive care, family planning, and ways to prevent chronic diseases.  Shots (vaccines)   HPV shots (up to age 26), if you've never had them before.  Hepatitis B shots (up to age 59), if you've never had them before.  COVID-19 shot: Get this shot when it's due.  Flu shot: Get a flu shot every year.  Tetanus shot: Get a tetanus shot every 10 years.  Pneumococcal, hepatitis A, and RSV shots: Ask your care team if you need these based on your risk.  Shingles shot (for age 50 and up)  General health tests  Diabetes screening:  Starting at age 35, Get screened for diabetes at least every 3 years.  If you are younger than age 35, ask your care team if you should be screened for diabetes.  Cholesterol test: At age 39, start having a cholesterol test every 5 years, or more often if advised.  Bone density scan (DEXA): At age 50, ask your care team if you should have this scan for osteoporosis (brittle bones).  Hepatitis C: Get  tested at least once in your life.  STIs (sexually transmitted infections)  Before age 24: Ask your care team if you should be screened for STIs.  After age 24: Get screened for STIs if you're at risk. You are at risk for STIs (including HIV) if:  You are sexually active with more than one person.  You don't use condoms every time.  You or a partner was diagnosed with a sexually transmitted infection.  If you are at risk for HIV, ask about PrEP medicine to prevent HIV.  Get tested for HIV at least once in your life, whether you are at risk for HIV or not.  Cancer screening tests  Cervical cancer screening: If you have a cervix, begin getting regular cervical cancer screening tests starting at age 21.  Breast cancer scan (mammogram): If you've ever had breasts, begin having regular mammograms starting at age 40. This is a scan to check for breast cancer.  Colon cancer screening: It is important to start screening for colon cancer at age 45.  Have a colonoscopy test every 10 years (or more often if you're at risk) Or, ask your provider about stool tests like a FIT test every year or Cologuard test every 3 years.  To learn more about your testing options, visit:   .  For help making a decision, visit:   https://bit.ly/ip15617.  Prostate cancer screening test: If you have a prostate, ask your care team if a prostate cancer screening test (PSA) at age 55 is right for you.  Lung cancer screening: If you are a current or former smoker ages 50 to 80, ask your care team if ongoing lung cancer screenings are right for you.  For informational purposes only. Not to replace the advice of your health care provider. Copyright   2023 [facility]. All rights reserved. Clinically reviewed by the [facility] Transitions Program. [employer] 635394 - REV 01/24.  Preventing Falls: Care Instructions  Injuries and health problems such as trouble walking or poor eyesight can increase your risk of falling. So can some  medicines. But there are things you can do to help prevent falls. You can exercise to get stronger. You can also arrange your home to make it safer.    Talk to your doctor about the medicines you take. Ask if any of them increase the risk of falls and whether they can be changed or stopped.   Try to exercise regularly. It can help improve your strength and balance. This can help lower your risk of falling.         Practice fall safety and prevention.   Wear low-heeled shoes that fit well and give your feet good support. Talk to your doctor if you have foot problems that make this hard.  Carry a cellphone or wear a medical alert device that you can use to call for help.  Use stepladders instead of chairs to reach high objects. Don't climb if you're at risk for falls. Ask for help, if needed.  Wear the correct eyeglasses, if you need them.        Make your home safer.   Remove rugs, cords, clutter, and furniture from walkways.  Keep your house well lit. Use night-lights in hallways and bathrooms.  Install and use sturdy handrails on stairways.  Wear nonskid footwear, even inside. Don't walk barefoot or in socks without shoes.        Be safe outside.   Use handrails, curb cuts, and ramps whenever possible.  Keep your hands free by using a shoulder bag or backpack.  Try to walk in well-lit areas. Watch out for uneven ground, changes in pavement, and debris.  Be careful in the winter. Walk on the grass or gravel when sidewalks are slippery. Use de-icer on steps and walkways. Add non-slip devices to shoes.    Put grab bars and nonskid mats in your shower or tub and near the toilet. Try to use a shower chair or bath bench when bathing.   Get into a tub or shower by putting in your weaker leg first. Get out with your strong side first. Have a phone or medical alert device in the bathroom with you.   Where can you learn more?  Go to https://www.InGaugeIt.net/patiented  Enter G117 in the search box to learn more about  "\"Preventing Falls: Care Instructions.\"  Current as of: July 31, 2024  Content Version: 14.5 2024-2025 [employer].   Care instructions adapted under license by your healthcare professional. If you have questions about a medical condition or this instruction, always ask your healthcare professional. [employer] disclaims any warranty or liability for your use of this information.       "

## 2025-06-24 NOTE — TELEPHONE ENCOUNTER
"tirzepatide-Weight Management (ZEPBOUND) 2.5 MG/0.5ML prefilled pen 2 mL       Alternative requested. \"Patient have 2 ins, part D does not covered the med, her secondary ins need a PA for it to be covered. Please prescribe alternative or do PA.\"    NO PA information given on fax  "

## 2025-06-25 ENCOUNTER — PATIENT OUTREACH (OUTPATIENT)
Dept: ONCOLOGY | Facility: CLINIC | Age: 85
End: 2025-06-25
Payer: COMMERCIAL

## 2025-06-25 LAB
ALBUMIN SERPL BCG-MCNC: 4.6 G/DL (ref 3.5–5.2)
ALP SERPL-CCNC: 80 U/L (ref 40–150)
ALT SERPL W P-5'-P-CCNC: 11 U/L (ref 0–50)
ANION GAP SERPL CALCULATED.3IONS-SCNC: 11 MMOL/L (ref 7–15)
AST SERPL W P-5'-P-CCNC: 20 U/L (ref 0–45)
BILIRUB SERPL-MCNC: 0.3 MG/DL
BUN SERPL-MCNC: 21.5 MG/DL (ref 8–23)
CALCIUM SERPL-MCNC: 10.6 MG/DL (ref 8.8–10.4)
CHLORIDE SERPL-SCNC: 103 MMOL/L (ref 98–107)
CHOLEST SERPL-MCNC: 167 MG/DL
CREAT SERPL-MCNC: 1.17 MG/DL (ref 0.51–0.95)
EGFRCR SERPLBLD CKD-EPI 2021: 46 ML/MIN/1.73M2
FASTING STATUS PATIENT QL REPORTED: YES
FASTING STATUS PATIENT QL REPORTED: YES
FERRITIN SERPL-MCNC: 112 NG/ML (ref 11–328)
GLUCOSE SERPL-MCNC: 98 MG/DL (ref 70–99)
HCO3 SERPL-SCNC: 28 MMOL/L (ref 22–29)
HDLC SERPL-MCNC: 44 MG/DL
IRON BINDING CAPACITY (ROCHE): 264 UG/DL (ref 240–430)
IRON SATN MFR SERPL: 36 % (ref 15–46)
IRON SERPL-MCNC: 95 UG/DL (ref 37–145)
LDLC SERPL CALC-MCNC: 89 MG/DL
NONHDLC SERPL-MCNC: 123 MG/DL
POTASSIUM SERPL-SCNC: 5 MMOL/L (ref 3.4–5.3)
PROT SERPL-MCNC: 7.1 G/DL (ref 6.4–8.3)
SODIUM SERPL-SCNC: 142 MMOL/L (ref 135–145)
TRIGL SERPL-MCNC: 172 MG/DL
VIT B12 SERPL-MCNC: 830 PG/ML (ref 232–1245)

## 2025-06-25 NOTE — PROGRESS NOTES
New Patient Oncology Nurse Navigator Note     Referral Received: 06/25/25      Referring provider:     Gavi Bear MD     Referring Clinic/Organization: [facility]     Referred to: Benign Hematology    Requested provider (if applicable): First available - did not specify      Evaluation for :   Diagnosis   D64.9 (ICD-10-CM) - Anemia, unspecified type   R79.89 (ICD-10-CM) - Elevated platelet count      Clinical History (per Nurse review of records provided):       Latest Reference Range & Units 12/16/24 08:02 12/17/24 11:12 12/18/24 08:52 01/25/25 12:59 03/03/25 13:42 03/08/25 18:14 06/24/25 14:08   Ferritin 11 - 328 ng/mL       112   Iron 37 - 145 ug/dL       95   Iron Binding Capacity 240 - 430 ug/dL       264   Iron Sat Index 15 - 46 %       36   WBC 4.0 - 11.0 10e3/uL 7.7 9.0  8.0 8.0 7.3 8.2   Hemoglobin 11.7 - 15.7 g/dL 8.3 (L) 7.7 (L) 7.9 (L) 10.3 (L) 10.6 (L) 11.2 (L) 10.6 (L)   Hematocrit 35.0 - 47.0 % 28.1 (L) 25.6 (L)  34.2 (L) 34.5 (L) 36.9 35.1   Platelet Count 150 - 450 10e3/uL 356 372 370 669 (H) 568 (H) 539 (H) 596 (H)   RBC Count 3.80 - 5.20 10e6/uL 3.37 (L) 3.06 (L)  4.01 4.26 4.56 4.21   MCV 78 - 100 fL 83 84  85 81 81 83   MCH 26.5 - 33.0 pg 24.6 (L) 25.2 (L)  25.7 (L) 24.9 (L) 24.6 (L) 25.2 (L)   MCHC 31.5 - 36.5 g/dL 29.5 (L) 30.1 (L)  30.1 (L) 30.7 (L) 30.4 (L) 30.2 (L)   RDW 10.0 - 15.0 % 13.9 14.1  13.9 13.7 13.6 14.1   (L): Data is abnormally low  (H): Data is abnormally high    Records Location: [facility]     Additional testing needed prior to consult:     ?    Referral updates and Plan:     06/25/2025 2:58 PM -  Referral received and reviewed. Called pt at this time.  Pt stated she is seeing Anaid in September from the [facility] clinic and she is wondering if she could manage the labs.  Advised I could reach out to her and ask and I would call her back to schedule if she needs a separate hematologist. Pt agrees with this plan.     06/26/2025 8:54 AM -  [facility] provider, Anaid, has agreed to  manage the thrombocytosis and anemia.  [email or web address]@ message sent to pt to inform. Closed referral at this time.     VALERIE SainiN, RN, OCN  Hematology/Oncology Nurse Navigator  [facility]  [phone] / [phone]

## 2025-06-26 ENCOUNTER — DOCUMENTATION ONLY (OUTPATIENT)
Dept: HEMATOLOGY | Facility: CLINIC | Age: 85
End: 2025-06-26
Payer: COMMERCIAL

## 2025-06-26 ENCOUNTER — MYC MEDICAL ADVICE (OUTPATIENT)
Dept: HEMATOLOGY | Facility: CLINIC | Age: 85
End: 2025-06-26
Payer: COMMERCIAL

## 2025-06-26 DIAGNOSIS — D64.9 ANEMIA, UNSPECIFIED TYPE: Primary | ICD-10-CM

## 2025-06-26 NOTE — TELEPHONE ENCOUNTER
Central Prior Authorization Team - Phone: [phone]     PRIOR AUTHORIZATION DENIED    Medication: ZEPBOUND 5 MG/0.5ML SC SOAJ  Insurance Company: [employer] - Phone [phone] Fax [phone]  Denial Date: 6/25/2025  Denial Reason(s):       Appeal Information:   drugs used for weight loss are excluded from patients insurance plan and not eligible for PA/appeals.      Patient Notified: no  Unfortunately, we cannot call the patient with denials because we do not know what next steps the MD will take nor can we give medical advice, please notify the patient of what they are to expect for the continuation of their therapy from the provider.

## 2025-06-26 NOTE — TELEPHONE ENCOUNTER
Insurance plan does not cover the weight loss medication.  As discussed in clinic, I have sent the prescription over to [facility] pharmacy as patient was interested in purchasing medication as self-pay.    Thank you,  Gavi Bear MD MPH

## 2025-06-26 NOTE — TELEPHONE ENCOUNTER
Called and gave patient Dr Bear's message. Patient understands.  Yumiko Avery MA/  [facility]   Primary Care

## 2025-06-30 ENCOUNTER — PATIENT OUTREACH (OUTPATIENT)
Dept: CARE COORDINATION | Facility: CLINIC | Age: 85
End: 2025-06-30
Payer: COMMERCIAL

## 2025-07-03 ENCOUNTER — TELEPHONE (OUTPATIENT)
Dept: OTHER | Facility: CLINIC | Age: 85
End: 2025-07-03
Payer: COMMERCIAL

## 2025-07-03 NOTE — TELEPHONE ENCOUNTER
[facility]    Who is the name of the provider? Dr Coates    What is the location you see this provider at/preferred location? [place]    Person calling / Facility:  Tomi    Phone number: [phone]    Nurse call back needed:     Reason for call: Pt's leg is warm, swollen and it itches. This has been going on for about 5 days. Pt had CREATION BYPASS VASCULAR PROXIMAL SUPERIOR FEMORAL TO DISTAL POSTERIOR TIBIAL, ANGIOPLASTY AND SFA STENE PLACEMENT X2 on 12/15/24 with Dr Coates.       Pharmacy location: N/A    Outside Imaging: N/A    Can we leave a detailed message on this number? Y    Additional Info: Pt's Birthday Today

## 2025-07-03 NOTE — TELEPHONE ENCOUNTER
"Hx: S/p left proximal SFA to distal common posterior tibial in situ bypass graft on 12/15/24 with Dr Lau; 06/19/25 LOV with Dr. Coates    Per LOV, notes patient has mild postoperative swelling.  Spandage size 6D was given to patient.      Spoke to patient.  Patient reports since last week she has developed some burning/itchy pain to her left lower extremity.  Patient reports her left leg is warmer than her right leg and that it is \"a little red.\"  Also, patient reports having some intermittent swelling in her left leg; however, this is not new.    Patient denies that her left leg is hot to the touch, has spreading redness, or has any wounds.  Patient denies that she has had a fever.  She states her symptoms have been the same and denies that they are worsening.      At time of call, writer advised patient to go to urgent care for immediate evaluation.  Patient declined as she states she had to wait 6 hours in the urgent care before being seen one time.  Also, patient notes that is it is her birthday today and she does not want to go anywhere.      Writer scheduled patient to be seen with KALIN STOKES next Tuesday as her symptoms are remaining the same.  If symptoms worsen patient was advised to go to urgent care.  Patient verbalized understanding.  She states if her symptoms improve she will call [facility] to cancel the appointment.              "

## 2025-07-07 ENCOUNTER — ANCILLARY PROCEDURE (OUTPATIENT)
Dept: ULTRASOUND IMAGING | Facility: CLINIC | Age: 85
End: 2025-07-07
Attending: INTERNAL MEDICINE
Payer: COMMERCIAL

## 2025-07-07 ENCOUNTER — OFFICE VISIT (OUTPATIENT)
Dept: PEDIATRICS | Facility: CLINIC | Age: 85
End: 2025-07-07
Payer: COMMERCIAL

## 2025-07-07 ENCOUNTER — OFFICE VISIT (OUTPATIENT)
Dept: URGENT CARE | Facility: URGENT CARE | Age: 85
End: 2025-07-07
Payer: COMMERCIAL

## 2025-07-07 VITALS
DIASTOLIC BLOOD PRESSURE: 78 MMHG | RESPIRATION RATE: 18 BRPM | SYSTOLIC BLOOD PRESSURE: 142 MMHG | HEART RATE: 68 BPM | OXYGEN SATURATION: 96 % | TEMPERATURE: 98.6 F

## 2025-07-07 VITALS
TEMPERATURE: 98.6 F | DIASTOLIC BLOOD PRESSURE: 71 MMHG | WEIGHT: 196 LBS | BODY MASS INDEX: 33.46 KG/M2 | RESPIRATION RATE: 18 BRPM | HEIGHT: 64 IN | SYSTOLIC BLOOD PRESSURE: 151 MMHG | OXYGEN SATURATION: 96 % | HEART RATE: 85 BPM

## 2025-07-07 DIAGNOSIS — R60.0 LEG EDEMA, LEFT: ICD-10-CM

## 2025-07-07 DIAGNOSIS — R60.0 LEG EDEMA, LEFT: Primary | ICD-10-CM

## 2025-07-07 DIAGNOSIS — M79.89 PAIN AND SWELLING OF LEFT LOWER LEG: Primary | ICD-10-CM

## 2025-07-07 DIAGNOSIS — M79.662 PAIN AND SWELLING OF LEFT LOWER LEG: Primary | ICD-10-CM

## 2025-07-07 DIAGNOSIS — I73.9 PVD (PERIPHERAL VASCULAR DISEASE) WITH CLAUDICATION: ICD-10-CM

## 2025-07-07 DIAGNOSIS — L03.116 CELLULITIS OF LEFT LOWER EXTREMITY: ICD-10-CM

## 2025-07-07 PROCEDURE — 99214 OFFICE O/P EST MOD 30 MIN: CPT | Performed by: INTERNAL MEDICINE

## 2025-07-07 PROCEDURE — 3077F SYST BP >= 140 MM HG: CPT | Performed by: EMERGENCY MEDICINE

## 2025-07-07 PROCEDURE — 3077F SYST BP >= 140 MM HG: CPT | Performed by: INTERNAL MEDICINE

## 2025-07-07 PROCEDURE — 3078F DIAST BP <80 MM HG: CPT | Performed by: EMERGENCY MEDICINE

## 2025-07-07 PROCEDURE — 1125F AMNT PAIN NOTED PAIN PRSNT: CPT | Performed by: EMERGENCY MEDICINE

## 2025-07-07 PROCEDURE — 1125F AMNT PAIN NOTED PAIN PRSNT: CPT | Performed by: INTERNAL MEDICINE

## 2025-07-07 PROCEDURE — 99207 REFERRAL TO ACUTE AND DIAGNOSTIC SERVICES: CPT | Performed by: EMERGENCY MEDICINE

## 2025-07-07 PROCEDURE — 93971 EXTREMITY STUDY: CPT | Mod: LT | Performed by: RADIOLOGY

## 2025-07-07 PROCEDURE — 3078F DIAST BP <80 MM HG: CPT | Performed by: INTERNAL MEDICINE

## 2025-07-07 ASSESSMENT — PAIN SCALES - GENERAL
PAINLEVEL_OUTOF10: SEVERE PAIN (9)
PAINLEVEL_OUTOF10: SEVERE PAIN (9)

## 2025-07-07 NOTE — PROGRESS NOTES
Acute and Diagnostic Services Clinic Visit    Assessment & Plan     Leg edema, left  PVD  In patient with complex history of DVTs and PVD. She has been taking two doacs and had stopped her plavix which I suspect was not what her team actually recommended but only documentation is from RN communication so will need to follow-up with her team on this.     US in clinic today is negative for thrombosis to explain her edema. She has an appointmetn scheduled with vascular surgery tomorrow so will defer additional evaluation and treatment until that time.    - US Lower Extremity Venous Duplex Left; Future    30 minutes were spent doing chart review, history and exam, documentation and further activities per the note.             Subjective   Tomi is a 85 year old, presenting for the following health issues:  Deep Vein Thrombosis (R/O DVT to Left leg - pain for about a week )    HPI      She has been anticoagulated for many months with xarelto and plavix. She had a phone visit with her hematologist and per her recollection was told to stop plavix and start eliquis in addition to xarelto. Based on a virtual encounter note I see recommendations to stop xarelto and start eliquis and no mention of plavix.     As a result, for the past two weeks she has been using xarelto and eliquis and NOT plavix.    One week ago cristy developed swelling and some redness of the left lower leg. She has warmth and pain in the leg. The swelling has not changed in the past week. She had tenderness with palpation.      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         Family history of DVT: no         Recent surgery: no   Aggravating factors include: sitting  Therapies tried and outcome: rest/inactivity and acetaminophen  Prior surgery on arteries of veins in this area: Yes, date December 2024              Objective    BP (!) 142/78 (BP Location: Right arm, Patient Position: Sitting, Cuff Size: Adult Large)   Pulse 68   Temp 98.6  F (37  C) (Oral)   Resp 18   SpO2 96%   There is no height or weight on file to calculate BMI.  Physical Exam     Well appearing  RRR  CTAB  Left leg is quite edematous to the calf. No area of confluent erythema though the extremity is warm.    Recent Results (from the past 24 hours)   US Lower Extremity Venous Duplex Left    Narrative    EXAM: US LOWER EXTREMITY VENOUS DUPLEX LEFT  LOCATION: [facility]  DATE: 7/7/2025    INDICATION:  Leg edema, left  COMPARISON: None.  TECHNIQUE: Venous Duplex ultrasound of the left lower extremity with and without compression, augmentation and duplex. Color flow and spectral Doppler with waveform analysis performed.    FINDINGS: Exam includes the common femoral, femoral, popliteal, and contralateral common femoral veins as well as segmentally visualized deep calf veins and greater saphenous vein.     LEFT: No deep vein thrombosis. No superficial thrombophlebitis. No popliteal cyst.      Impression    IMPRESSION:  1.  No deep venous thrombosis in the left lower extremity.           Signed Electronically by: Chery Lewis MD

## 2025-07-07 NOTE — Clinical Note
I saw Tomi in [facility] today for evaluation of left leg edema for the past week. No DVT on doppler us today. I think there has been some confusion about her medications. She was under the impression that she was told to stop plavix and take both eliquis and xarelto which she has been doing for the past two weeks. This was not what I saw documented and I reviewed that with her. She will be in vascular clinic tomorrow for follow-up.

## 2025-07-07 NOTE — PATIENT INSTRUCTIONS
The ultrasound today did not show any evidence of blood clot.     Please see your vascular team tomorrow as planned and discuss the plan for your anticoagulation medications with them at that time.

## 2025-07-07 NOTE — PROGRESS NOTES
Assessment & Plan     Diagnosis:    ICD-10-CM    1. Pain and swelling of left lower leg  M79.662     M79.89       2. Cellulitis of left lower extremity  L03.116           Medical Decision Making:  Yumiko Mccartney is a 85 year old female who presents for evaluation of skin redness.  The history, physical exam and supporting data are consistent with cellulitis. However, the patient is a vasculopath with history of both peripheral arterial disease with claudication, as well as DVT history.  She is on Xarelto and Eliquis per her hematologist, but she does have quite notable leg swelling on the left with palpable cord.  PE concerns at this juncture.  Given her marked swelling, unclear if this just represents cellulitis but I do believe she requires ultrasound and further evaluation at this point.  Patient was directed to go to the [facility] now for further evaluation.  Patient verbalizes understanding and agreement with the plan including reasons to go to the ER. All questions answered.     Referral to Acute and Diagnostic Services    [phone] ([place]) [place] [id number] [place] [address]    Transition to Acute & Diagnostic Services Clinic has been discussed with patient, and she agrees with next level of care.   Patient understands that evaluation/treatment at [facility] typically takes significantly longer than in clinic/urgent care (>2 hours).  The [facility] Acute and Diagnostics Services Clinic has been contacted by provider/staff to confirm patient acceptance.         Special issues:      None                     The following provider has assessed this patient for intervention at [facility], and directed the patient for referral: EMY Rapp PA-C  [facility] URGENT CARE    Subjective     Yumiko Mccartney is a 85 year old female who presents to clinic today for the following health issues:  Chief Complaint   Patient presents with    Urgent Care  "   Swelling     Patient reports symptoms started a week ago having left lower leg swelling, pain, and soreness when skin is touched states she was in [place] last week and just arrived this past week but still having symptoms        HPI    Patient states that over the last week plus she has been experiencing left leg pain and swelling, has fluctuated slightly in size but more recently it is getting bigger, there is some skin redness and she is concerned about infection.  She did take some of her friend's amoxicillin for a few days which seemed to help a little bit, but now it seems to be getting worse again.  She was also recently taken off of Plavix due to it causing some kidney issues, she was placed on Eliquis and is already on Xarelto for history of DVT.  She does have a history of a bypass graft in the left leg, has some pain near the surgical site where there is also the red skin.  She is not sure if there is something worse going on, but she does follow-up with her vascular team tomorrow.  She denies any chest pain, shortness of breath, fevers, red streaks going up up past the knee or other concerns.    Review of Systems    See HPI    Objective      Vitals: BP (!) 151/71   Pulse 85   Temp 98.6  F (37  C) (Oral)   Resp 18   Ht 1.626 m (5' 4\")   Wt 88.9 kg (196 lb)   SpO2 96%   BMI 33.64 kg/m      Patient Vitals for the past 24 hrs:   BP Temp Temp src Pulse Resp SpO2 Height Weight   07/07/25 1209 (!) 151/71 -- -- -- -- -- -- --   07/07/25 1207 (!) 152/73 98.6  F (37  C) Oral 85 18 96 % 1.626 m (5' 4\") 88.9 kg (196 lb)       Vital signs reviewed by: Jerry Wright PA-C    Physical Exam   Constitutional: Patient is alert and cooperative. No acute distress.  Cardiovascular: Regular rate and rhythm  Pulmonary/Chest: Lungs are clear to auscultation throughout. Effort normal. No respiratory distress. No wheezes, rales or rhonchi.  Neurological: Alert and oriented x3.   MSK/Skin: Lower extremity is swollen from the " knee down  with pitting edema.  There is erythema and warmth to the anterior portion of the shin.  DP/PT pulses present but faint.  Dry healing ulcers on the great toe, capillary refill is normal in all toes.  No gangrene or eschar.  No pulsatile popliteal mass.  Psychiatric:The patient has a normal mood and affect.       Jerry Wright PA-C, July 7, 2025

## 2025-07-07 NOTE — PROGRESS NOTES
Urgent Care Clinic Visit  Rapid rooming was initiated.  Provider was consulted, patient will remain in room and provider will be with patient as soon as possible.  Chief Complaint   Patient presents with    Urgent Care    Swelling     Patient reports symptoms started a week ago having left lower leg swelling, pain, and soreness when skin is touched states she was in [place] last week and just arrived this past week but still having symptoms            7/7/2025    12:08 PM   Additional Questions   Roomed by Kimberly BERUMEN   Accompanied by Self

## 2025-07-08 ENCOUNTER — OFFICE VISIT (OUTPATIENT)
Dept: OTHER | Facility: CLINIC | Age: 85
End: 2025-07-08
Payer: COMMERCIAL

## 2025-07-08 ENCOUNTER — TELEPHONE (OUTPATIENT)
Dept: HEMATOLOGY | Facility: CLINIC | Age: 85
End: 2025-07-08
Payer: COMMERCIAL

## 2025-07-08 VITALS — HEART RATE: 66 BPM | SYSTOLIC BLOOD PRESSURE: 121 MMHG | DIASTOLIC BLOOD PRESSURE: 77 MMHG

## 2025-07-08 DIAGNOSIS — L03.116 CELLULITIS OF LEFT LOWER EXTREMITY: Primary | ICD-10-CM

## 2025-07-08 PROCEDURE — 99213 OFFICE O/P EST LOW 20 MIN: CPT

## 2025-07-08 PROCEDURE — G0463 HOSPITAL OUTPT CLINIC VISIT: HCPCS

## 2025-07-08 PROCEDURE — 3074F SYST BP LT 130 MM HG: CPT

## 2025-07-08 PROCEDURE — 3078F DIAST BP <80 MM HG: CPT

## 2025-07-08 RX ORDER — CEPHALEXIN 500 MG/1
500 CAPSULE ORAL 2 TIMES DAILY
Qty: 20 CAPSULE | Refills: 0 | Status: SHIPPED | OUTPATIENT
Start: 2025-07-08

## 2025-07-08 NOTE — TELEPHONE ENCOUNTER
Patient saw Nathaniel today in clinic and was recently switched from Xarelto to Eliquis and is also on Plavix. She forgot to ask nathaniel if she is to remain on Plavix while on the Eliquis. Writer will contact patient once Nathaniel responds about the Plavix.    Also routing to hematology team who switched patient from Xarelto to Eliquis to discontinue Xarelto from MAR. Pt reported she has been taking both Xarelto and Eliquis.     Ernestine BLOUNT, RN    [facility]  Vascular [facility]  Office: [phone]  Fax: [phone]

## 2025-07-08 NOTE — PROGRESS NOTES
[facility] Vascular Clinic        Patient is here for a follow up.    Pt is currently taking Plavix, Eliquis, and Xarelto.    /77 (BP Location: Left arm, Patient Position: Sitting, Cuff Size: Adult Regular)   Pulse 66     The provider has been notified that the patient has concerns of leg pain.     Questions patient would like addressed today are: N/A.    Refills are needed: N/A    Has homecare services and agency name:  No     Patient has been identified as a fall risk.    Interventions were completed as noted below:  [x]Exam tables/chairs remained in the lowest position.   []Patient remained in wheelchair.    []Provider requested patient in exam chair.  Patient required assist and use of transfer belt.  [x]Exam room door remained open unless with a provider.  []A bell provided to patient to notify staff if assistance was needed.  [x]Provider notified patient was identified as a fall risk.      Britany Lagunas MA

## 2025-07-09 NOTE — TELEPHONE ENCOUNTER
No bleeding concerns from our end, discussed in appointment that she should remain on Eliquis and plavix at this time. And that she should not be on Xarelto while taking another DOAC.

## 2025-07-10 NOTE — PROGRESS NOTES
"    VASCULAR SURGERY PROGRESS NOTE    LOCATION: Vascular [facility]    Yumiko Mccartney  Medical Record #: [id number]  YOB: 1940  Age: 85 year old     Date of Service: 7/8/2025    PRIMARY CARE PROVIDER: Gavi Bear    Reason for visit:  Leg redness/swelling    Tomi Mccartney is an 84 year old female with hx of failed L SFA/popliteal artery stenting for LLE CLTI with wounds who is now s/p L SFA-PT in-situ bypass on 12/15/2024 with Dr. Coates. Bypass is widely patent however has had increased lower extremity swelling with erythema and pain.     Denies fevers and chills. Noted area is on the patient's left shin, felt to be warm to touch and red. Is not spreading. 1-2+ edema in that area.     Will treat with abx for cellulitis, discussed s/s of when to proceed to ER.     Should only be on one DOAC.     Follow-up as previously indicated for surveillance, discussed that we can do virtual appointments for any issues.     REVIEW OF SYSTEMS:    A 12 point ROS was reviewed and is negative except for what is listed above in HPI.    PHH:    Past Medical History:   Diagnosis Date    Acne rosacea     Actinic keratosis     Amblyopia     Asthma, moderate persistent     Basal cell carcinoma 10/2010    back X2    Cataract, mod, od; mild-mod, os 01/12/2012    DJD (degenerative joint disease), lumbosacral 08/06/2014    DVT (deep venous thrombosis) (H)     Elevated cholesterol     Endometriosis     HTN (hypertension)     Moderate major depression (H)     \"Circumstances\"    Osteopenia           Past Surgical History:   Procedure Laterality Date    ARTHROPLASTY KNEE Left 10/4/2021    Procedure: ARTHROPLASTY, LEFT KNEE, TOTAL;  Surgeon: Glenn Calvin MD;  Location: [facility] OR    BLEPHAROPLASTY BILATERAL  3/9/2006; 2013    both eyes, upper lid; revision (EN)    BYPASS GRAFT FEMOROTIBIAL Left 12/15/2024    Procedure: CREATION BYPASS VASCULAR PROXIMAL SUPERIOR FEMORAL TO DISTAL POSTERIOR TIBIAL, ANGIOPLASTY AND SFA STENE " "PLACEMENT X2.;  Surgeon: Leonard Coates MD;  Location: [facility] OR    CATARACT IOL, RT/LT      HC REMOVAL GALLBLADDER      INJECT BLOCK MEDIAL BRANCH CERVICAL/THORACIC/LUMBAR Bilateral 12/7/2023    Procedure: BLOCK, NERVE, FACET JOINT, MEDIAL BRANCH, DIAGNOSTICL3/4/ALA;  Surgeon: Dinesh Proctor MD;  Location: [facility] OR    IR LOWER EXTREMITY ANGIOGRAM LEFT  11/25/2024    LASER YAG CAPSULOTOMY      left eye (AC lysis also)    PHACOEMULSIFICATION WITH STANDARD INTRAOCULAR LENS IMPLANT  1/2012; 4/2013    right eye; left eye    REPAIR PTOSIS      SURGICAL HISTORY OF -       endometriosis surgeriesx3    SURGICAL HISTORY OF -       ganiglion cyst onfoot    SURGICAL HISTORY OF -       Eye for \"droopy eye\"    ZZC APPENDECTOMY         ALLERGIES:  Erythromycin    MEDS:    Current Outpatient Medications:     acetaminophen (TYLENOL) 500 MG tablet, Take 500-1,000 mg by mouth 2 times daily., Disp: , Rfl:     albuterol (PROVENTIL) (2.5 MG/3ML) 0.083% neb solution, TAKE 1 VIAL (2.5 MG) BY NEBULIZATION EVERY 6 HOURS AS NEEDED FOR SHORTNESS OF BREATH OR WHEEZING, Disp: 75 mL, Rfl: 0    albuterol (VENTOLIN HFA) 108 (90 Base) MCG/ACT inhaler, INHALE 2 PUFFS INTO THE LUNGS EVERY 6 HOURS AS NEEDED FOR SHORTNESS OF BREATH, WHEEZING OR COUGH., Disp: 18 g, Rfl: 0    apixaban ANTICOAGULANT (ELIQUIS ANTICOAGULANT) 2.5 MG tablet, Take 1 tablet (2.5 mg) by mouth 2 times daily., Disp: 180 tablet, Rfl: 2    buPROPion (WELLBUTRIN XL) 300 MG 24 hr tablet, Take 1 tablet (300 mg) by mouth every morning. Dose increase, Disp: 90 tablet, Rfl: 1    calcium citrate (CITRACAL) 950 (200 Ca) MG tablet, Take 1 tablet by mouth daily., Disp: , Rfl:     cephALEXin (KEFLEX) 500 MG capsule, Take 1 capsule (500 mg) by mouth 2 times daily., Disp: 20 capsule, Rfl: 0    Cholecalciferol (VITAMIN D) 2000 UNIT CAPS, Take 2,000 Units by mouth daily, Disp: , Rfl:     clopidogrel (PLAVIX) 75 MG tablet, TAKE 1 TABLET BY MOUTH EVERY DAY, Disp: 90 tablet, Rfl: 1    " DULoxetine (CYMBALTA) 20 MG capsule, Take 1 capsule (20 mg) by mouth daily. For pain and mood, Disp: 30 capsule, Rfl: 1    gabapentin (NEURONTIN) 600 MG tablet, TAKE 0.5 TAB BY MOUTH IN MORNING & 2 TABS AT BEDTIME, Disp: 225 tablet, Rfl: 1    latanoprost (XALATAN) 0.005 % ophthalmic solution, Place 1 drop into both eyes every evening. Wait at least 5 minutes between drops if using more than one at a time., Disp: 7.5 mL, Rfl: 3    lisinopril-hydrochlorothiazide (ZESTORETIC) 20-12.5 MG tablet, TAKE 2 TABLETS BY MOUTH EVERY DAY, Disp: 180 tablet, Rfl: 3    Multiple Vitamin (MULTIVITAMIN ADULT PO), Take by mouth daily, Disp: , Rfl:     tiZANidine (ZANAFLEX) 4 MG tablet, TAKE 1 TABLET (4 MG) BY MOUTH NIGHTLY AS NEEDED FOR MUSCLE SPASM, Disp: 90 tablet, Rfl: 0    tirzepatide-weight management (ZEPBOUND) 2.5 MG/0.5ML vial, Inject 0.5 mLs (2.5 mg) subcutaneously once a week. (Patient not taking: Reported on 7/8/2025), Disp: 2 mL, Rfl: 1    SOCIAL HABITS:    History   Smoking Status    Never   Smokeless Tobacco    Never     Social History    Substance and Sexual Activity      Alcohol use: Not Currently        Comment: rarely      History   Drug Use No       FAMILY HISTORY:    Family History   Problem Relation Age of Onset    C.A.D. Father     C.A.D. Brother     Hypertension Mother     Cancer No family hx of     Diabetes No family hx of     Cerebrovascular Disease No family hx of     Thyroid Disease No family hx of     Glaucoma No family hx of     Macular Degeneration No family hx of        PE:  /77 (BP Location: Left arm, Patient Position: Sitting, Cuff Size: Adult Regular)   Pulse 66   Wt Readings from Last 1 Encounters:   07/07/25 196 lb (88.9 kg)     There is no height or weight on file to calculate BMI.    DIAGNOSTIC STUDIES:     Images:  US Lower Extremity Venous Duplex Left  Result Date: 7/7/2025  EXAM: US LOWER EXTREMITY VENOUS DUPLEX LEFT LOCATION: [facility] DATE: 7/7/2025  INDICATION:  Leg edema, left COMPARISON: None. TECHNIQUE: Venous Duplex ultrasound of the left lower extremity with and without compression, augmentation and duplex. Color flow and spectral Doppler with waveform analysis performed. FINDINGS: Exam includes the common femoral, femoral, popliteal, and contralateral common femoral veins as well as segmentally visualized deep calf veins and greater saphenous vein. LEFT: No deep vein thrombosis. No superficial thrombophlebitis. No popliteal cyst.     IMPRESSION: 1.  No deep venous thrombosis in the left lower extremity.    US Lower Extremity Arterial Duplex Left  Result Date: 6/16/2025  EXAM: US LOWER EXTREMITY ARTERIAL DUPLEX LEFT LOCATION: [facility] DATE: 6/16/2025 INDICATION:  PAD (peripheral artery disease). History of left proximal SFA to distal posterior tibial bypass. COMPARISON: 3/8/2025 TECHNIQUE: Duplex utilizing 2D grayscale imaging, Doppler interrogation with color-flow and spectral waveform analysis. FINDINGS: LEFT LOWER EXTREMITY ARTERIAL ASSESSMENT: Transition please insert velocities from the patient worksheet here. SEGMENT (Velocities cm/s)(Diameter mm)                      PSYS/ED Inflow: 126 0 8.1 Proximal Anastomosis (Graft (PAG)): 161 19 -- Proximal Graft (PG): 68 15 6.3 Mid Graft (MG): 53 17 5.0 Distal Graft (DG): 124 0 2.9 Distal Anastomosis (DAG): 51 0 -- Native: 69 0 3.1 There are brisk biphasic waveforms in the native inflow. Waveforms throughout the bypass are also predominantly biphasic with the exception of the mid graft where waveform is monophasic. Biphasic flow continues into the native posterior tibial outflow.     IMPRESSION: Focused evaluation of the left lower extremity shows a patent femoral to distal posterior tibial bypass graft without evidence of significant stenosis. Elevated distal anastomotic velocities recorded on prior examination are not replicated today.     US MEL Doppler No Exercise  Result Date:  6/16/2025  EXAM: RESTING ANKLE-BRACHIAL INDICES (ABIs) LOCATION: [facility] DATE: 6/16/2025 INDICATION: Peripheral arterial disease. COMPARISON: 3/8/2025. MEL FINDINGS: RIGHT Brachial: 130 Ankle (PT): 146 Index: 1.12 Ankle (DP): 136 Index: 1.05 Digit: 124 Index: 0.95 LEFT Brachial: 126 Ankle (PT): - Index: - Ankle (DP): - Index: - Digit: - Index: - The right MEL at rest is 1.12. The left MEL at rest is not obtained.  WAVEFORMS: The dorsalis pedis and posterior tibial arteries are multiphasic. Normal right digital waveform noted. Left digital waveform absent..     IMPRESSION: 1.  RIGHT LOWER EXTREMITY: MEL on the right is normal, with normal digital brachial index. Normal tibial and digital waveforms also noted. 2.  LEFT LOWER EXTREMITY: MEL not obtained on the left due to presence of a bypass graft with distal PTA anastomosis. Distal posterior tibial and dorsal pedal waveforms are brisk and multiphasic. Digital waveform absent with unobtainable digital pressure. Findings suggest a patent bypass graft with small vessel disease.    LABS:      Sodium   Date Value Ref Range Status   06/24/2025 142 135 - 145 mmol/L Final   01/25/2025 141 135 - 145 mmol/L Final   12/17/2024 139 135 - 145 mmol/L Final   08/14/2020 140 133 - 144 mmol/L Final   06/12/2019 140 133 - 144 mmol/L Final   07/31/2018 140 133 - 144 mmol/L Final     Urea Nitrogen   Date Value Ref Range Status   06/24/2025 21.5 8.0 - 23.0 mg/dL Final   01/25/2025 15.8 8.0 - 23.0 mg/dL Final   12/17/2024 13.5 8.0 - 23.0 mg/dL Final   01/17/2023 16 7 - 30 mg/dL Final   05/17/2022 15 7 - 30 mg/dL Final   10/14/2021 10 7 - 30 mg/dL Final   08/14/2020 16 7 - 30 mg/dL Final   06/12/2019 16 7 - 30 mg/dL Final   07/31/2018 20 7 - 30 mg/dL Final     Hemoglobin   Date Value Ref Range Status   06/24/2025 10.6 (L) 11.7 - 15.7 g/dL Final   03/08/2025 11.2 (L) 11.7 - 15.7 g/dL Final   03/03/2025 10.6 (L) 11.7 - 15.7 g/dL Final   08/14/2020 11.7 11.7 - 15.7 g/dL  Final   06/12/2019 11.9 11.7 - 15.7 g/dL Final   07/31/2018 11.8 11.7 - 15.7 g/dL Final     Platelet Count   Date Value Ref Range Status   06/24/2025 596 (H) 150 - 450 10e3/uL Final   03/08/2025 539 (H) 150 - 450 10e3/uL Final   03/03/2025 568 (H) 150 - 450 10e3/uL Final   08/14/2020 245 150 - 450 10e9/L Final   06/12/2019 231 150 - 450 10e9/L Final   07/31/2018 175 150 - 450 10e9/L Final     INR   Date Value Ref Range Status   11/25/2024 1.10 0.85 - 1.15 Final   05/17/2022 2.45 (H) 0.85 - 1.15 Final       20 minutes spent on the day of encounter doing chart review, history and exam, documentation, and further activities as noted.     Charlee Vargas, NP  VASCULAR SURGERY

## 2025-08-11 ENCOUNTER — LAB (OUTPATIENT)
Dept: LAB | Facility: CLINIC | Age: 85
End: 2025-08-11
Payer: COMMERCIAL

## 2025-08-11 DIAGNOSIS — D64.9 ANEMIA, UNSPECIFIED TYPE: ICD-10-CM

## 2025-08-11 LAB
BASOPHILS # BLD AUTO: 0.1 10E3/UL (ref 0–0.2)
BASOPHILS NFR BLD AUTO: 1 %
EOSINOPHIL # BLD AUTO: 0.2 10E3/UL (ref 0–0.7)
EOSINOPHIL NFR BLD AUTO: 3 %
ERYTHROCYTE [DISTWIDTH] IN BLOOD BY AUTOMATED COUNT: 13.3 % (ref 10–15)
HCT VFR BLD AUTO: 35.7 % (ref 35–47)
HGB BLD-MCNC: 10.8 G/DL (ref 11.7–15.7)
IMM GRANULOCYTES # BLD: 0 10E3/UL
IMM GRANULOCYTES NFR BLD: 0 %
LYMPHOCYTES # BLD AUTO: 1.6 10E3/UL (ref 0.8–5.3)
LYMPHOCYTES NFR BLD AUTO: 22 %
MCH RBC QN AUTO: 24.6 PG (ref 26.5–33)
MCHC RBC AUTO-ENTMCNC: 30.3 G/DL (ref 31.5–36.5)
MCV RBC AUTO: 81 FL (ref 78–100)
MONOCYTES # BLD AUTO: 0.4 10E3/UL (ref 0–1.3)
MONOCYTES NFR BLD AUTO: 5 %
NEUTROPHILS # BLD AUTO: 5.1 10E3/UL (ref 1.6–8.3)
NEUTROPHILS NFR BLD AUTO: 69 %
PLATELET # BLD AUTO: 553 10E3/UL (ref 150–450)
RBC # BLD AUTO: 4.39 10E6/UL (ref 3.8–5.2)
RETICS # AUTO: 0.07 10E6/UL
RETICS/RBC NFR AUTO: 1.6 %
WBC # BLD AUTO: 7.4 10E3/UL (ref 4–11)

## 2025-08-11 PROCEDURE — 85045 AUTOMATED RETICULOCYTE COUNT: CPT

## 2025-08-11 PROCEDURE — 83540 ASSAY OF IRON: CPT

## 2025-08-11 PROCEDURE — 99207 BLOOD MORPHOLOGY PATHOLOGIST REVIEW: CPT | Performed by: PATHOLOGY

## 2025-08-11 PROCEDURE — 85025 COMPLETE CBC W/AUTO DIFF WBC: CPT

## 2025-08-11 PROCEDURE — 83550 IRON BINDING TEST: CPT

## 2025-08-11 PROCEDURE — 82728 ASSAY OF FERRITIN: CPT

## 2025-08-11 PROCEDURE — 36415 COLL VENOUS BLD VENIPUNCTURE: CPT

## 2025-08-12 LAB
FERRITIN SERPL-MCNC: 96 NG/ML (ref 11–328)
IRON BINDING CAPACITY (ROCHE): 264 UG/DL (ref 240–430)
IRON SATN MFR SERPL: 41 % (ref 15–46)
IRON SERPL-MCNC: 109 UG/DL (ref 37–145)
PATH REPORT.COMMENTS IMP SPEC: NORMAL
PATH REPORT.COMMENTS IMP SPEC: NORMAL
PATH REPORT.FINAL DX SPEC: NORMAL
PATH REPORT.MICROSCOPIC SPEC OTHER STN: NORMAL
PATH REPORT.MICROSCOPIC SPEC OTHER STN: NORMAL
PATH REPORT.RELEVANT HX SPEC: NORMAL

## 2025-09-01 DIAGNOSIS — I73.9 PAD (PERIPHERAL ARTERY DISEASE): ICD-10-CM

## 2025-09-02 RX ORDER — CLOPIDOGREL BISULFATE 75 MG/1
75 TABLET ORAL DAILY
Qty: 90 TABLET | Refills: 1 | Status: SHIPPED | OUTPATIENT
Start: 2025-09-02

## 2025-09-03 DIAGNOSIS — E66.01 SEVERE OBESITY WITH BODY MASS INDEX (BMI) OF 35.0 TO 35.9 AND COMORBIDITY (H): ICD-10-CM

## 2025-09-04 RX ORDER — BUPROPION HYDROCHLORIDE 300 MG/1
300 TABLET ORAL EVERY MORNING
Qty: 90 TABLET | Refills: 1 | OUTPATIENT
Start: 2025-09-04

## (undated) DEVICE — SYR 03ML LL W/O NDL 309657

## (undated) DEVICE — SUCTION IRR SYSTEM W/O TIP INTERPULSE HANDPIECE 0210-100-000

## (undated) DEVICE — NEEDLE HYPO MONOJECT STANDARD 22GA 1 1/2IN BLUE 1188822112

## (undated) DEVICE — HOOD FLYTE W/PEELAWAY 408-800-100

## (undated) DEVICE — SOL NACL 0.9% IRRIG 1000ML BOTTLE 2F7124

## (undated) DEVICE — BASIN SET MAJOR

## (undated) DEVICE — BLADE KNIFE SURG 20 371120

## (undated) DEVICE — PREP CHLORAPREP 26ML TINTED HI-LITE ORANGE 930815

## (undated) DEVICE — SOL WATER IRRIG 1000ML BOTTLE 2F7114

## (undated) DEVICE — SYR 10ML FINGER CONTROL W/O NDL 309695

## (undated) DEVICE — SPONGE RAY-TEC 4X8" 7318

## (undated) DEVICE — Device

## (undated) DEVICE — SU PROLENE 7-0 BV-1DA 24" 8702H

## (undated) DEVICE — SOL NACL 0.9% IRRIG 3000ML BAG 2B7477

## (undated) DEVICE — RAD RX ISOVUE 300 (50ML) 61% IOPAMIDOL CHARGE PER ML

## (undated) DEVICE — SU VICRYL 2-0 CT-1 27" J339H

## (undated) DEVICE — WAVEGUIDE ILLUMINATOR INVUITY EIGR WIDE/FLAT 104008

## (undated) DEVICE — BONE CEMENT MIXEVAC HI VAC W/CARTRIDGE 0306-563-000

## (undated) DEVICE — NDL SPINAL 22GA 5" QUINCKE 405148

## (undated) DEVICE — LINEN TOWEL PACK X5 5464

## (undated) DEVICE — GOWN XLG DISP 9545

## (undated) DEVICE — DRAPE COVER C-ARM SEAMLESS SNAP-KAP 03-KP26 LATEX FREE

## (undated) DEVICE — DRSG TEGADERM ALGINATE AG 4X5" 90303

## (undated) DEVICE — LINEN LEG ROLL 5489

## (undated) DEVICE — BAG DECANTER STERILE WHITE DYNJDEC09

## (undated) DEVICE — DRAPE C-ARM 60X42" 1013

## (undated) DEVICE — SU VICRYL 0 CT-1 36" J946H

## (undated) DEVICE — PREP CHLORAPREP W/ORANGE TINT 10.5ML 930715

## (undated) DEVICE — RAD INTRODUCER KIT MICRO 5FRX10CM .018 NITINOL G/W

## (undated) DEVICE — NDL 19GA 1.5"

## (undated) DEVICE — GLOVE BIOGEL PI ULTRATOUCH SZ 7.5 41175

## (undated) DEVICE — ADH SKIN CLOSURE PREMIERPRO EXOFIN 1.0ML 3470

## (undated) DEVICE — NDL SPINAL 22GA 3.5" QUINCKE 405181

## (undated) DEVICE — STOPCOCK HIGH PRESSURE 3-WAY

## (undated) DEVICE — SYR 10ML LL W/O NDL 302995

## (undated) DEVICE — BONE CLEANING TIP INTERPULSE  0210-010-000

## (undated) DEVICE — SU SILK 3-0 TIE 24" SA74H

## (undated) DEVICE — STRAP KNEE/BODY 31143004

## (undated) DEVICE — SPONGE LAP 18X18" X8435

## (undated) DEVICE — BLADE SAW SAGITTAL STRK 18X90X1.27MM HD SYS 6 6118-127-090

## (undated) DEVICE — DRSG KERLIX 4 1/2"X4YDS ROLL 6715

## (undated) DEVICE — GLOVE PROTEXIS W/NEU-THERA 8.0  2D73TE80

## (undated) DEVICE — LINEN ORTHO PACK 5446

## (undated) DEVICE — RAD INFLATOR BASIC COMPAK  IN4130

## (undated) DEVICE — SU VICRYL 3-0 SH 27" J316H

## (undated) DEVICE — NDL ANGIOCATH 18GA 1.25" 4055

## (undated) DEVICE — ESU GROUND PAD UNIVERSAL W/O CORD

## (undated) DEVICE — DRSG TEGADERM 4X10" 1627

## (undated) DEVICE — GLOVE PROTEXIS POWDER FREE 7.5 ORTHOPEDIC 2D73ET75

## (undated) DEVICE — SOL NACL 0.9% INJ 250ML BAG 2B1322Q

## (undated) DEVICE — CATH ANGIO ANG GLIDE 5FRX65CM CG507

## (undated) DEVICE — SU PROLENE 6-0 C-1DA 30" 8706H

## (undated) DEVICE — DRSG STERI STRIP 1/2X4" R1547

## (undated) DEVICE — SU MONOCRYL 4-0 PS-2 18" UND Y496G

## (undated) DEVICE — SU ETHILON 4-0 FS-2 18" 662H

## (undated) DEVICE — SUCTION MANIFOLD NEPTUNE 2 SYS 4 PORT 0702-020-000

## (undated) DEVICE — INTRO SHEATH 6FRX10CM PINNACLE MARKER RSB612

## (undated) DEVICE — SU MONOCRYL 3-0 PS-1 27" Y936H

## (undated) DEVICE — DRSG PRIMAPORE 03 1/8X6" 66000318

## (undated) DEVICE — SU PROLENE 5-0 BBDA 36"  8580H

## (undated) DEVICE — SU SILK 4-0 TIE 12X30" A303H

## (undated) DEVICE — GOWN IMPERVIOUS SPECIALTY XLG/XLONG 32474

## (undated) DEVICE — GLOVE BIOGEL PI ULTRATOUCH G SZ 8.0 42180

## (undated) DEVICE — SU PROLENE 5-0 RB-1DA 36"  8556H

## (undated) DEVICE — NDL SPINAL 25GA 3.5" QUINCKE 405180

## (undated) DEVICE — VALVULOTOME LEMAITRE 1.5MMX98CM HYDRO 1009-00

## (undated) DEVICE — TRAY PAIN INJECTION 97A 640

## (undated) DEVICE — GLOVE PROTEXIS BLUE W/NEU-THERA 8.0  2D73EB80

## (undated) DEVICE — CATH ON-Q PAIN SILVER SKR 10" PM040-A

## (undated) DEVICE — GOWN IMPERVIOUS ZONED LG

## (undated) DEVICE — ESU GROUND PAD ADULT W/CORD E7507

## (undated) DEVICE — TUBING IV EXTENSION SET 60" HI FLOW 2N3349

## (undated) DEVICE — CLIP ETHICON LIGACLIP SM BLUE LT100

## (undated) DEVICE — GUIDEWIRE TERUMO .035X260CM STIFF EXCHANGE GS3509

## (undated) DEVICE — TOURNIQUET CUFF 34" REPRO BROWN 60-7070-106

## (undated) DEVICE — DECANTER VIAL 2006S

## (undated) DEVICE — BLADE CLIPPER SGL USE 9680

## (undated) DEVICE — SU VICRYL 2-0 CT-1 27" UND J259H

## (undated) DEVICE — PACK VASCULAR SCV15VAFSB

## (undated) RX ORDER — PAPAVERINE HYDROCHLORIDE 30 MG/ML
INJECTION INTRAMUSCULAR; INTRAVENOUS
Status: DISPENSED
Start: 2024-12-15

## (undated) RX ORDER — LIDOCAINE HYDROCHLORIDE 10 MG/ML
INJECTION, SOLUTION INFILTRATION; PERINEURAL
Status: DISPENSED
Start: 2024-12-15

## (undated) RX ORDER — HEPARIN SODIUM 200 [USP'U]/100ML
INJECTION, SOLUTION INTRAVENOUS
Status: DISPENSED
Start: 2024-11-25

## (undated) RX ORDER — LIDOCAINE HYDROCHLORIDE 10 MG/ML
INJECTION, SOLUTION EPIDURAL; INFILTRATION; INTRACAUDAL; PERINEURAL
Status: DISPENSED
Start: 2023-12-07

## (undated) RX ORDER — FENTANYL CITRATE 50 UG/ML
INJECTION, SOLUTION INTRAMUSCULAR; INTRAVENOUS
Status: DISPENSED
Start: 2021-10-04

## (undated) RX ORDER — FENTANYL CITRATE 50 UG/ML
INJECTION, SOLUTION INTRAMUSCULAR; INTRAVENOUS
Status: DISPENSED
Start: 2024-12-15

## (undated) RX ORDER — GLYCOPYRROLATE 0.2 MG/ML
INJECTION, SOLUTION INTRAMUSCULAR; INTRAVENOUS
Status: DISPENSED
Start: 2024-12-15

## (undated) RX ORDER — SCOLOPAMINE TRANSDERMAL SYSTEM 1 MG/1
PATCH, EXTENDED RELEASE TRANSDERMAL
Status: DISPENSED
Start: 2021-10-04

## (undated) RX ORDER — DEXAMETHASONE SODIUM PHOSPHATE 4 MG/ML
INJECTION, SOLUTION INTRA-ARTICULAR; INTRALESIONAL; INTRAMUSCULAR; INTRAVENOUS; SOFT TISSUE
Status: DISPENSED
Start: 2024-12-15

## (undated) RX ORDER — CELECOXIB 200 MG/1
CAPSULE ORAL
Status: DISPENSED
Start: 2021-10-04

## (undated) RX ORDER — HEPARIN SODIUM 1000 [USP'U]/ML
INJECTION, SOLUTION INTRAVENOUS; SUBCUTANEOUS
Status: DISPENSED
Start: 2024-11-25

## (undated) RX ORDER — PROPOFOL 10 MG/ML
INJECTION, EMULSION INTRAVENOUS
Status: DISPENSED
Start: 2024-12-15

## (undated) RX ORDER — EPHEDRINE SULFATE 50 MG/ML
INJECTION, SOLUTION INTRAMUSCULAR; INTRAVENOUS; SUBCUTANEOUS
Status: DISPENSED
Start: 2024-12-15

## (undated) RX ORDER — CLOPIDOGREL 300 MG/1
TABLET, FILM COATED ORAL
Status: DISPENSED
Start: 2024-11-25

## (undated) RX ORDER — FENTANYL CITRATE 0.05 MG/ML
INJECTION, SOLUTION INTRAMUSCULAR; INTRAVENOUS
Status: DISPENSED
Start: 2024-12-15

## (undated) RX ORDER — LIDOCAINE HYDROCHLORIDE 10 MG/ML
INJECTION, SOLUTION INFILTRATION; PERINEURAL
Status: DISPENSED
Start: 2024-11-25

## (undated) RX ORDER — CEFAZOLIN SODIUM 2 G/100ML
INJECTION, SOLUTION INTRAVENOUS
Status: DISPENSED
Start: 2021-10-04

## (undated) RX ORDER — HYDROMORPHONE HYDROCHLORIDE 1 MG/ML
INJECTION, SOLUTION INTRAMUSCULAR; INTRAVENOUS; SUBCUTANEOUS
Status: DISPENSED
Start: 2024-12-15

## (undated) RX ORDER — HEPARIN SODIUM 1000 [USP'U]/ML
INJECTION, SOLUTION INTRAVENOUS; SUBCUTANEOUS
Status: DISPENSED
Start: 2024-12-15

## (undated) RX ORDER — BUPIVACAINE HYDROCHLORIDE 5 MG/ML
INJECTION, SOLUTION EPIDURAL; INTRACAUDAL
Status: DISPENSED
Start: 2024-12-15

## (undated) RX ORDER — TRANEXAMIC ACID 650 MG/1
TABLET ORAL
Status: DISPENSED
Start: 2021-10-04

## (undated) RX ORDER — ACETAMINOPHEN 325 MG/1
TABLET ORAL
Status: DISPENSED
Start: 2021-10-04

## (undated) RX ORDER — METHYLPREDNISOLONE ACETATE 40 MG/ML
INJECTION, SUSPENSION INTRA-ARTICULAR; INTRALESIONAL; INTRAMUSCULAR; SOFT TISSUE
Status: DISPENSED
Start: 2023-12-07

## (undated) RX ORDER — OXYCODONE HYDROCHLORIDE 5 MG/1
TABLET ORAL
Status: DISPENSED
Start: 2021-10-04

## (undated) RX ORDER — ONDANSETRON 2 MG/ML
INJECTION INTRAMUSCULAR; INTRAVENOUS
Status: DISPENSED
Start: 2024-12-15